# Patient Record
Sex: FEMALE | Race: WHITE | URBAN - METROPOLITAN AREA
[De-identification: names, ages, dates, MRNs, and addresses within clinical notes are randomized per-mention and may not be internally consistent; named-entity substitution may affect disease eponyms.]

---

## 2017-04-16 ENCOUNTER — EMERGENCY (EMERGENCY)
Facility: HOSPITAL | Age: 77
LOS: 0 days | Discharge: HOME | End: 2017-04-16
Admitting: INTERNAL MEDICINE

## 2017-06-14 ENCOUNTER — APPOINTMENT (OUTPATIENT)
Dept: SURGERY | Facility: CLINIC | Age: 77
End: 2017-06-14

## 2017-06-14 VITALS
DIASTOLIC BLOOD PRESSURE: 74 MMHG | SYSTOLIC BLOOD PRESSURE: 148 MMHG | HEIGHT: 62 IN | BODY MASS INDEX: 37.54 KG/M2 | WEIGHT: 204 LBS

## 2017-06-14 DIAGNOSIS — D17.30 BENIGN LIPOMATOUS NEOPLASM OF SKIN AND SUBCUTANEOUS TISSUE OF UNSPECIFIED SITES: ICD-10-CM

## 2017-06-27 DIAGNOSIS — M79.672 PAIN IN LEFT FOOT: ICD-10-CM

## 2017-06-27 DIAGNOSIS — Z79.899 OTHER LONG TERM (CURRENT) DRUG THERAPY: ICD-10-CM

## 2017-06-27 DIAGNOSIS — E78.00 PURE HYPERCHOLESTEROLEMIA, UNSPECIFIED: ICD-10-CM

## 2017-06-27 DIAGNOSIS — I10 ESSENTIAL (PRIMARY) HYPERTENSION: ICD-10-CM

## 2017-06-27 DIAGNOSIS — M10.9 GOUT, UNSPECIFIED: ICD-10-CM

## 2017-06-28 ENCOUNTER — OUTPATIENT (OUTPATIENT)
Dept: OUTPATIENT SERVICES | Facility: HOSPITAL | Age: 77
LOS: 1 days | Discharge: HOME | End: 2017-06-28

## 2017-06-28 DIAGNOSIS — I10 ESSENTIAL (PRIMARY) HYPERTENSION: ICD-10-CM

## 2017-06-28 DIAGNOSIS — I48.91 UNSPECIFIED ATRIAL FIBRILLATION: ICD-10-CM

## 2017-06-28 DIAGNOSIS — R92.8 OTHER ABNORMAL AND INCONCLUSIVE FINDINGS ON DIAGNOSTIC IMAGING OF BREAST: ICD-10-CM

## 2017-06-28 DIAGNOSIS — E78.5 HYPERLIPIDEMIA, UNSPECIFIED: ICD-10-CM

## 2017-08-28 ENCOUNTER — INPATIENT (INPATIENT)
Facility: HOSPITAL | Age: 77
LOS: 0 days | Discharge: HOME | End: 2017-08-29
Attending: EMERGENCY MEDICINE | Admitting: INTERNAL MEDICINE

## 2017-08-28 DIAGNOSIS — I48.91 UNSPECIFIED ATRIAL FIBRILLATION: ICD-10-CM

## 2017-08-28 DIAGNOSIS — I10 ESSENTIAL (PRIMARY) HYPERTENSION: ICD-10-CM

## 2017-08-28 DIAGNOSIS — R07.89 OTHER CHEST PAIN: ICD-10-CM

## 2017-08-28 DIAGNOSIS — R60.0 LOCALIZED EDEMA: ICD-10-CM

## 2017-08-28 DIAGNOSIS — E78.5 HYPERLIPIDEMIA, UNSPECIFIED: ICD-10-CM

## 2017-08-28 DIAGNOSIS — R07.9 CHEST PAIN, UNSPECIFIED: ICD-10-CM

## 2018-01-02 ENCOUNTER — INPATIENT (INPATIENT)
Facility: HOSPITAL | Age: 78
LOS: 4 days | Discharge: HOME | End: 2018-01-07
Attending: INTERNAL MEDICINE

## 2018-01-02 DIAGNOSIS — I10 ESSENTIAL (PRIMARY) HYPERTENSION: ICD-10-CM

## 2018-01-02 DIAGNOSIS — E78.5 HYPERLIPIDEMIA, UNSPECIFIED: ICD-10-CM

## 2018-01-02 DIAGNOSIS — I48.91 UNSPECIFIED ATRIAL FIBRILLATION: ICD-10-CM

## 2018-01-12 DIAGNOSIS — I48.91 UNSPECIFIED ATRIAL FIBRILLATION: ICD-10-CM

## 2018-01-12 DIAGNOSIS — E89.0 POSTPROCEDURAL HYPOTHYROIDISM: ICD-10-CM

## 2018-01-12 DIAGNOSIS — Z85.850 PERSONAL HISTORY OF MALIGNANT NEOPLASM OF THYROID: ICD-10-CM

## 2018-01-12 DIAGNOSIS — N17.9 ACUTE KIDNEY FAILURE, UNSPECIFIED: ICD-10-CM

## 2018-01-12 DIAGNOSIS — E78.5 HYPERLIPIDEMIA, UNSPECIFIED: ICD-10-CM

## 2018-01-12 DIAGNOSIS — I50.9 HEART FAILURE, UNSPECIFIED: ICD-10-CM

## 2018-01-12 DIAGNOSIS — N18.3 CHRONIC KIDNEY DISEASE, STAGE 3 (MODERATE): ICD-10-CM

## 2018-01-12 DIAGNOSIS — I13.0 HYPERTENSIVE HEART AND CHRONIC KIDNEY DISEASE WITH HEART FAILURE AND STAGE 1 THROUGH STAGE 4 CHRONIC KIDNEY DISEASE, OR UNSPECIFIED CHRONIC KIDNEY DISEASE: ICD-10-CM

## 2018-01-12 DIAGNOSIS — M10.9 GOUT, UNSPECIFIED: ICD-10-CM

## 2018-01-12 DIAGNOSIS — D64.9 ANEMIA, UNSPECIFIED: ICD-10-CM

## 2018-01-17 DIAGNOSIS — I50.31 ACUTE DIASTOLIC (CONGESTIVE) HEART FAILURE: ICD-10-CM

## 2018-07-03 ENCOUNTER — OUTPATIENT (OUTPATIENT)
Dept: OUTPATIENT SERVICES | Facility: HOSPITAL | Age: 78
LOS: 1 days | Discharge: HOME | End: 2018-07-03

## 2018-07-03 DIAGNOSIS — N63.20 UNSPECIFIED LUMP IN THE LEFT BREAST, UNSPECIFIED QUADRANT: ICD-10-CM

## 2018-07-09 DIAGNOSIS — N63.21 UNSPECIFIED LUMP IN THE LEFT BREAST, UPPER OUTER QUADRANT: ICD-10-CM

## 2018-07-11 ENCOUNTER — APPOINTMENT (OUTPATIENT)
Dept: SURGERY | Facility: CLINIC | Age: 78
End: 2018-07-11
Payer: MEDICARE

## 2018-07-11 VITALS
WEIGHT: 214 LBS | HEIGHT: 62 IN | SYSTOLIC BLOOD PRESSURE: 120 MMHG | DIASTOLIC BLOOD PRESSURE: 78 MMHG | BODY MASS INDEX: 39.38 KG/M2

## 2018-07-11 PROCEDURE — 99212 OFFICE O/P EST SF 10 MIN: CPT

## 2018-12-21 ENCOUNTER — INPATIENT (INPATIENT)
Facility: HOSPITAL | Age: 78
LOS: 1 days | Discharge: HOME | End: 2018-12-23
Attending: HOSPITALIST | Admitting: HOSPITALIST
Payer: MEDICARE

## 2018-12-21 VITALS
TEMPERATURE: 98 F | DIASTOLIC BLOOD PRESSURE: 69 MMHG | OXYGEN SATURATION: 95 % | SYSTOLIC BLOOD PRESSURE: 131 MMHG | RESPIRATION RATE: 20 BRPM | HEART RATE: 65 BPM

## 2018-12-21 DIAGNOSIS — Z98.890 OTHER SPECIFIED POSTPROCEDURAL STATES: Chronic | ICD-10-CM

## 2018-12-21 LAB
ALBUMIN SERPL ELPH-MCNC: 3.9 G/DL — SIGNIFICANT CHANGE UP (ref 3.5–5.2)
ALP SERPL-CCNC: 86 U/L — SIGNIFICANT CHANGE UP (ref 30–115)
ALT FLD-CCNC: 8 U/L — SIGNIFICANT CHANGE UP (ref 0–41)
ANION GAP SERPL CALC-SCNC: 15 MMOL/L — HIGH (ref 7–14)
APPEARANCE UR: CLEAR — SIGNIFICANT CHANGE UP
APTT BLD: 34.3 SEC — SIGNIFICANT CHANGE UP (ref 27–39.2)
AST SERPL-CCNC: 10 U/L — SIGNIFICANT CHANGE UP (ref 0–41)
BASOPHILS # BLD AUTO: 0.03 K/UL — SIGNIFICANT CHANGE UP (ref 0–0.2)
BASOPHILS NFR BLD AUTO: 0.2 % — SIGNIFICANT CHANGE UP (ref 0–1)
BILIRUB SERPL-MCNC: 0.4 MG/DL — SIGNIFICANT CHANGE UP (ref 0.2–1.2)
BILIRUB UR-MCNC: NEGATIVE — SIGNIFICANT CHANGE UP
BLD GP AB SCN SERPL QL: SIGNIFICANT CHANGE UP
BUN SERPL-MCNC: 34 MG/DL — HIGH (ref 10–20)
CALCIUM SERPL-MCNC: 8.8 MG/DL — SIGNIFICANT CHANGE UP (ref 8.5–10.1)
CHLORIDE SERPL-SCNC: 100 MMOL/L — SIGNIFICANT CHANGE UP (ref 98–110)
CO2 SERPL-SCNC: 27 MMOL/L — SIGNIFICANT CHANGE UP (ref 17–32)
COLOR SPEC: YELLOW — SIGNIFICANT CHANGE UP
CREAT SERPL-MCNC: 1.2 MG/DL — SIGNIFICANT CHANGE UP (ref 0.7–1.5)
DIFF PNL FLD: NEGATIVE — SIGNIFICANT CHANGE UP
EOSINOPHIL # BLD AUTO: 0.02 K/UL — SIGNIFICANT CHANGE UP (ref 0–0.7)
EOSINOPHIL NFR BLD AUTO: 0.1 % — SIGNIFICANT CHANGE UP (ref 0–8)
ETHANOL SERPL-MCNC: <10 MG/DL — HIGH
GLUCOSE SERPL-MCNC: 143 MG/DL — HIGH (ref 70–99)
GLUCOSE UR QL: NEGATIVE MG/DL — SIGNIFICANT CHANGE UP
HCT VFR BLD CALC: 36.7 % — LOW (ref 37–47)
HGB BLD-MCNC: 11.3 G/DL — LOW (ref 12–16)
IMM GRANULOCYTES NFR BLD AUTO: 1.5 % — HIGH (ref 0.1–0.3)
INR BLD: 1.41 RATIO — HIGH (ref 0.65–1.3)
KETONES UR-MCNC: NEGATIVE — SIGNIFICANT CHANGE UP
LACTATE SERPL-SCNC: 1.2 MMOL/L — SIGNIFICANT CHANGE UP (ref 0.5–2.2)
LEUKOCYTE ESTERASE UR-ACNC: NEGATIVE — SIGNIFICANT CHANGE UP
LIDOCAIN IGE QN: 25 U/L — SIGNIFICANT CHANGE UP (ref 7–60)
LYMPHOCYTES # BLD AUTO: 1.37 K/UL — SIGNIFICANT CHANGE UP (ref 1.2–3.4)
LYMPHOCYTES # BLD AUTO: 9.1 % — LOW (ref 20.5–51.1)
MCHC RBC-ENTMCNC: 25 PG — LOW (ref 27–31)
MCHC RBC-ENTMCNC: 30.8 G/DL — LOW (ref 32–37)
MCV RBC AUTO: 81.2 FL — SIGNIFICANT CHANGE UP (ref 81–99)
MONOCYTES # BLD AUTO: 0.46 K/UL — SIGNIFICANT CHANGE UP (ref 0.1–0.6)
MONOCYTES NFR BLD AUTO: 3.1 % — SIGNIFICANT CHANGE UP (ref 1.7–9.3)
NEUTROPHILS # BLD AUTO: 12.9 K/UL — HIGH (ref 1.4–6.5)
NEUTROPHILS NFR BLD AUTO: 86 % — HIGH (ref 42.2–75.2)
NITRITE UR-MCNC: NEGATIVE — SIGNIFICANT CHANGE UP
NRBC # BLD: 0 /100 WBCS — SIGNIFICANT CHANGE UP (ref 0–0)
PH UR: 7 — SIGNIFICANT CHANGE UP (ref 5–8)
PLATELET # BLD AUTO: 310 K/UL — SIGNIFICANT CHANGE UP (ref 130–400)
POTASSIUM SERPL-MCNC: 4.4 MMOL/L — SIGNIFICANT CHANGE UP (ref 3.5–5)
POTASSIUM SERPL-SCNC: 4.4 MMOL/L — SIGNIFICANT CHANGE UP (ref 3.5–5)
PROT SERPL-MCNC: 6.7 G/DL — SIGNIFICANT CHANGE UP (ref 6–8)
PROT UR-MCNC: NEGATIVE MG/DL — SIGNIFICANT CHANGE UP
PROTHROM AB SERPL-ACNC: 16.2 SEC — HIGH (ref 9.95–12.87)
RBC # BLD: 4.52 M/UL — SIGNIFICANT CHANGE UP (ref 4.2–5.4)
RBC # FLD: 17.3 % — HIGH (ref 11.5–14.5)
SODIUM SERPL-SCNC: 142 MMOL/L — SIGNIFICANT CHANGE UP (ref 135–146)
SP GR SPEC: 1.01 — SIGNIFICANT CHANGE UP (ref 1.01–1.03)
TYPE + AB SCN PNL BLD: SIGNIFICANT CHANGE UP
UROBILINOGEN FLD QL: 0.2 MG/DL — SIGNIFICANT CHANGE UP (ref 0.2–0.2)
WBC # BLD: 15 K/UL — HIGH (ref 4.8–10.8)
WBC # FLD AUTO: 15 K/UL — HIGH (ref 4.8–10.8)

## 2018-12-21 PROCEDURE — 93010 ELECTROCARDIOGRAM REPORT: CPT

## 2018-12-21 RX ORDER — LISINOPRIL 2.5 MG/1
40 TABLET ORAL DAILY
Qty: 0 | Refills: 0 | Status: DISCONTINUED | OUTPATIENT
Start: 2018-12-21 | End: 2018-12-23

## 2018-12-21 RX ORDER — TRAMADOL HYDROCHLORIDE 50 MG/1
50 TABLET ORAL
Qty: 0 | Refills: 0 | Status: DISCONTINUED | OUTPATIENT
Start: 2018-12-21 | End: 2018-12-23

## 2018-12-21 RX ORDER — RIVAROXABAN 15 MG-20MG
20 KIT ORAL EVERY 24 HOURS
Qty: 0 | Refills: 0 | Status: DISCONTINUED | OUTPATIENT
Start: 2018-12-21 | End: 2018-12-23

## 2018-12-21 RX ORDER — CABERGOLINE 0.5 MG/1
1 TABLET ORAL
Qty: 0 | Refills: 0 | COMMUNITY

## 2018-12-21 RX ORDER — TRIAMTERENE/HYDROCHLOROTHIAZID 75 MG-50MG
1 TABLET ORAL DAILY
Qty: 0 | Refills: 0 | Status: DISCONTINUED | OUTPATIENT
Start: 2018-12-21 | End: 2018-12-23

## 2018-12-21 RX ORDER — LEVOTHYROXINE SODIUM 125 MCG
175 TABLET ORAL DAILY
Qty: 0 | Refills: 0 | Status: DISCONTINUED | OUTPATIENT
Start: 2018-12-21 | End: 2018-12-23

## 2018-12-21 RX ORDER — AMLODIPINE BESYLATE 2.5 MG/1
5 TABLET ORAL DAILY
Qty: 0 | Refills: 0 | Status: DISCONTINUED | OUTPATIENT
Start: 2018-12-21 | End: 2018-12-23

## 2018-12-21 RX ORDER — CITALOPRAM 10 MG/1
20 TABLET, FILM COATED ORAL DAILY
Qty: 0 | Refills: 0 | Status: DISCONTINUED | OUTPATIENT
Start: 2018-12-21 | End: 2018-12-23

## 2018-12-21 RX ORDER — METOPROLOL TARTRATE 50 MG
50 TABLET ORAL
Qty: 0 | Refills: 0 | Status: DISCONTINUED | OUTPATIENT
Start: 2018-12-21 | End: 2018-12-23

## 2018-12-21 RX ORDER — ACETAMINOPHEN 500 MG
650 TABLET ORAL EVERY 6 HOURS
Qty: 0 | Refills: 0 | Status: DISCONTINUED | OUTPATIENT
Start: 2018-12-21 | End: 2018-12-23

## 2018-12-21 RX ORDER — ATORVASTATIN CALCIUM 80 MG/1
20 TABLET, FILM COATED ORAL AT BEDTIME
Qty: 0 | Refills: 0 | Status: DISCONTINUED | OUTPATIENT
Start: 2018-12-21 | End: 2018-12-23

## 2018-12-21 RX ADMIN — ATORVASTATIN CALCIUM 20 MILLIGRAM(S): 80 TABLET, FILM COATED ORAL at 21:27

## 2018-12-21 RX ADMIN — RIVAROXABAN 20 MILLIGRAM(S): KIT at 20:32

## 2018-12-21 RX ADMIN — Medication 50 MILLIGRAM(S): at 20:32

## 2018-12-21 NOTE — H&P ADULT - PMH
Afib    High cholesterol    HTN (hypertension)    Hypothyroid    Osteoarthritis    Sleep apnea Afib  on xarelto  High cholesterol    HTN (hypertension)    Hypothyroid  s/p thyroid ca surgery  Osteoarthritis    Sleep apnea Afib  on xarelto  High cholesterol    HTN (hypertension)    Hypothyroid  s/p thyroid ca surgery  Osteoarthritis    Pituitary adenoma  ??  Sleep apnea Afib  on xarelto  CKD (chronic kidney disease) stage 3, GFR 30-59 ml/min    High cholesterol    HTN (hypertension)    Hypothyroid  s/p thyroid ca surgery  Osteoarthritis    Pituitary adenoma  ??  Sleep apnea

## 2018-12-21 NOTE — ED PROVIDER NOTE - NS ED ROS FT
Eyes:  No visual changes, eye pain or discharge.  Constitutional: No fever, chills, weakness.   ENMT:  No hearing changes, pain, no sore throat or runny nose, no difficulty swallowing  Cardiac:  No chest pain, SOB or edema. No chest pain with exertion.  Respiratory:  No cough or respiratory distress. No hemoptysis. No history of asthma or RAD.  GI:  No nausea, vomiting, diarrhea or abdominal pain.  :  No dysuria, frequency or burning.  MS:  Right knee, upper leg pain. No hip pain. No back pain.  Neuro:  No headache or weakness.  No LOC.  Skin:  No skin rash.   Endocrine: No history of thyroid disease or diabetes.

## 2018-12-21 NOTE — H&P ADULT - NSHPPHYSICALEXAM_GEN_ALL_CORE
GENERAL: NAD, speaks in full sentences, no signs of respiratory distress  HEAD:  Atraumatic, Normocephalic  EYES: EOMI, PERRLA, conjunctiva and sclera clear  NECK: Supple, No JVD  CHEST/LUNG: Clear to auscultation bilaterally; No wheeze; No crackles; No accessory muscles used  HEART: Regular rate and rhythm; No murmurs;   ABDOMEN: Soft, Nontender, Nondistended; Bowel sounds present; No guarding  EXTREMITIES:  2+ Peripheral Pulses, No cyanosis or edema  PSYCH: AAOx3  NEUROLOGY: non-focal  SKIN: No rashes or lesions    T(C): 36.3 (21 Dec 2018 16:36), Max: 36.8 (21 Dec 2018 12:30)  T(F): 97.4 (21 Dec 2018 16:36), Max: 98.3 (21 Dec 2018 12:30)  HR: 60 (21 Dec 2018 16:36) (60 - 65)  BP: 152/70 (21 Dec 2018 16:36) (131/69 - 152/70)  BP(mean): --  RR: 20 (21 Dec 2018 16:36) (20 - 20)  SpO2: 95% (21 Dec 2018 16:36) (95% - 95%)

## 2018-12-21 NOTE — ED PROVIDER NOTE - PHYSICAL EXAMINATION
Constitutional: Well developed, well nourished. NAD. Good general hygiene  Head: Atraumatic.  Eyes: PERRLA. EOMI without discomfort.   ENT: No nasal discharge. Mucous membranes moist.  Neck: Supple. Painless ROM.  Cardiovascular: Regular rhythm. Regular rate. Normal S1 and S2. No murmurs. 2+ pulses in all extremities.   Pulmonary: Normal respiratory rate and effort. Lungs clear to auscultation bilaterally. No wheezing, rales, or rhonchi. Bilateral, equal lung expansion.   Abdominal: Soft. Nondistended. Nontender. No rebound or guarding.   MS: Unable to ambulate. Significant tenderness in right knee, posterior thigh. No hip tenderness.  Skin: No rashes.   Neuro: AAOx3. No focal neurological deficits.  Psych: Normal mood. Normal affect.

## 2018-12-21 NOTE — H&P ADULT - ASSESSMENT
# Fall  # inability to ambulate    # AFib on xarelto    # HTN    # ckd 3  - avoid nephrotoxics  - f/u bmp    # DVT ppx  - on xarelto    # Dispo # Fall  # Osteoarthritis  # inability to ambulate  - pt/rehab cs  - pain control    # AFib on xarelto  - c/w xarelto  - c/w BB  - Fall precautions on AC    # HTN  - stable  - c/w home meds    # Hypothyroidism s/p thyroidectomy   - stable  - c/w home meds    # ckd 3  - avoid nephrotoxics  - f/u bmp    # DVT ppx  - on xarelto    # Dispo  - pt/rehab cs # Fall  # Osteoarthritis  # inability to ambulate  - pt/rehab cs  - pain control    # AFib on xarelto  - c/w xarelto  - c/w BB  - Fall precautions on AC    # HTN  - stable  - c/w home meds    # Hypothyroidism s/p thyroidectomy   - stable  - c/w home meds    # ckd 3  - avoid nephrotoxics  - f/u bmp    # DVT ppx  - on xarelto    # Dispo  - pt/rehab cs  - PMD is dr gaines- Dr quintero might cover the might?? # Mechanical Fall  # Osteoarthritis  # inability to ambulate  - pt/rehab cs  - pain control    # AFib on xarelto  - c/w xarelto  - c/w BB  - Fall precautions on AC    # HTN  - stable  - c/w home meds    # Hypothyroidism s/p thyroidectomy   - stable  - c/w home meds    # ckd 3  - avoid nephrotoxics  - f/u bmp    # DVT ppx  - on xarelto    # Dispo  - pt/rehab cs; will need physical therapy   - PMD is dr gaines- Dr quintero might cover the might?? 78 yof with pmh of pituitary growth, thyroid ca s/p thyroidectomy, hypothyroidism, htn, afib on xarelto, dld, OA presents s/p Mechanical fall    # Mechanical Fall  # Osteoarthritis  # inability to ambulate  - pt/rehab cs  - pain control    # AFib on xarelto  - c/w xarelto  - c/w BB  - Fall precautions on AC    # HTN  - stable  - c/w home meds    # Hypothyroidism s/p thyroidectomy   - stable  - c/w home meds    # pituitary enlargement/ pituitary adenoma??  - on cabergoline weekly;     # ckd 3  - avoid nephrotoxics  - f/u bmp    # DVT ppx  - on xarelto    # Dispo  - pt/rehab cs; will need physical therapy   - PMD is dr gaines- Dr quintero might cover the might?? 78 yof with pmh of pituitary growth, thyroid ca s/p thyroidectomy, hypothyroidism, htn, afib on xarelto, dld, OA presents s/p Mechanical fall    # Mechanical Fall  # Osteoarthritis  # inability to ambulate  # Leukocytosis 2/2 fall- reactive  - pt/rehab cs  - pain control  - U/A; CXR- negative    # AFib on xarelto  - c/w xarelto  - c/w BB  - Fall precautions on AC    # HTN  - stable  - c/w home meds    # Hypothyroidism s/p thyroidectomy   - stable  - c/w home meds    # pituitary enlargement/ pituitary adenoma??  - on cabergoline weekly;     # ckd 3  - avoid nephrotoxics  - f/u bmp    # DVT ppx  - on xarelto    # Dispo  - pt/rehab cs; will need physical therapy   - PMD is dr gaines- Dr quintero might cover the might??

## 2018-12-21 NOTE — H&P ADULT - HISTORY OF PRESENT ILLNESS
78 yof with pmh of pituitary growth, thyroid ca s/p thyroidectomy, hypothyroidism, htn, afib on xarelto, dld, OA presents s/p Mechanical fall  - pt dint see a step, she tripped and fell on her knees and face 1 day ptp; she hurt her face and knees when she fell; she had mild nose bleed when she fell which resolved on its own  - she was able to walk yesterday but in pain; today she woke up and she was unable to walk by herself 2/2 pain in her b/l Knees  - pt is from home, lives alone; independent of ADLs;   - pt denies dizziness, syncope, lightheadedness, palpitations, sob, chest pain;

## 2018-12-21 NOTE — ED ADULT NURSE NOTE - NSIMPLEMENTINTERV_GEN_ALL_ED
Implemented All Fall with Harm Risk Interventions:  [place] to call system. Call bell, personal items and telephone within reach. Instruct patient to call for assistance. Room bathroom lighting operational. Non-slip footwear when patient is off stretcher. Physically safe environment: no spills, clutter or unnecessary equipment. Stretcher in lowest position, wheels locked, appropriate side rails in place. Provide visual cue, wrist band, yellow gown, etc. Monitor gait and stability. Monitor for mental status changes and reorient to person, place, and time. Review medications for side effects contributing to fall risk. Reinforce activity limits and safety measures with patient and family. Provide visual clues: red socks.

## 2018-12-21 NOTE — ED ADULT TRIAGE NOTE - CHIEF COMPLAINT QUOTE
Pt tripped and fell yesterday, fell face down on floor, on Xarelto, no LOC, today pt c/o headache, nose pain, R wrist and R knee pain. Pt aox3, GCS 15.

## 2018-12-21 NOTE — H&P ADULT - NSHPLABSRESULTS_GEN_ALL_CORE
11.3   15.00 )-----------( 310      ( 21 Dec 2018 13:12 )             36.7     142  |  100  |  34<H>  ----------------------------<  143<H>  4.4   |  27  |  1.2    Ca    8.8      21 Dec 2018 13:12    TPro  6.7  /  Alb  3.9  /  TBili  0.4  /  DBili  x   /  AST  10  /  ALT  8   /  AlkPhos  86      Color: Yellow / Appearance: Clear / S.015 / pH: x  Gluc: x / Ketone: Negative  / Bili: Negative / Urobili: 0.2 mg/dL   Blood: x / Protein: Negative mg/dL / Nitrite: Negative   Leuk Esterase: Negative / RBC: x / WBC x   Sq Epi: x / Non Sq Epi: x / Bacteria: x    PT/INR - ( 21 Dec 2018 13:12 )   PT: 16.20 sec;   INR: 1.41 ratio      PTT - ( 21 Dec 2018 13:12 )  PTT:34.3 sec    Lactate Trend   @ 13:12 Lactate:1.2     < from: 12 Lead ECG (18 @ 13:41) >    Normal sinus rhythm  Left anterior fascicular block  Abnormal ECG    < end of copied text >    < from: Xray Knee 4 Views, Right (18 @ 14:41) >    No evidence of acute fracture.  Meniscal chondrocalcinosis.  Synovial osteochondromatosis.    < end of copied text >    < from: CT Head No Cont (18 @ 16:07) >    No CT evidence of acute intracranial pathology.    < end of copied text >    < from: CT Lower Extremity No Cont, Right (18 @ 16:16) >    1. Osteopenia without acutely displaced fracture  2. Moderate right hip osteoarthritis with labral chondrocalcinosis and   joint body  3. Tricompartmental knee osteoarthritis with suprapatellar joint bodies   and moderate volume joint effusion

## 2018-12-21 NOTE — CONSULT NOTE ADULT - SUBJECTIVE AND OBJECTIVE BOX
TRAUMA ACTIVATION LEVEL:  Consult    MECHANISM OF INJURY: Fall    GCS: 15 	E: 4	V: 5	M: 6      HPI: 78 year old female on xarelto for a. fib s/p mechanical fall yesterday on to her knees, witnesses -LOC -HT, c/o pain over nasal bridge and b/l knee pain. Has been ambulating since yesterday but complaining of pain.      PAST MEDICAL & SURGICAL HISTORY:  Sleep apnea  Afib  Hypothyroid  High cholesterol  HTN (hypertension)  No significant past surgical history      Allergies  No Known Allergies      Home Medications:  amLODIPine 5 mg oral tablet: 1 tab(s) orally once a day (21 Dec 2018 13:28)  atorvastatin 20 mg oral tablet: 1 tab(s) orally once a day (21 Dec 2018 13:28)  benazepril: 50 milligram(s) orally 2 times a day (21 Dec 2018 13:28)  cabergoline 0.5 mg oral tablet: 1 tab(s) orally (21 Dec 2018 13:28)  citalopram 20 mg oral tablet: 1 tab(s) orally once a day (21 Dec 2018 13:28)  levothyroxine 175 mcg (0.175 mg) oral tablet: 1 tab(s) orally once a day (21 Dec 2018 13:28)  metoprolol tartrate 50 mg oral tablet: 1 tab(s) orally 2 times a day (21 Dec 2018 13:28)  Toviaz 4 mg oral tablet, extended release: 1 tab(s) orally once a day (21 Dec 2018 13:28)  Xarelto 20 mg oral tablet: 1 tab(s) orally once a day (in the evening) (21 Dec 2018 13:28)      ROS: 10-system review is otherwise negative except HPI above.      Primary Survey:    A - airway intact  B - bilateral breath sounds and good chest rise  C - palpable pulses in all extremities  D - GCS 15 on arrival, MONTIEL  Exposure obtained    Vital Signs Last 24 Hrs  T(F): 97.4 (21 Dec 2018 16:36), Max: 98.3 (21 Dec 2018 12:30)  HR: 60 (21 Dec 2018 16:36) (60 - 65)  BP: 152/70 (21 Dec 2018 16:36) (131/69 - 152/70)  RR: 20 (21 Dec 2018 16:36) (20 - 20)  SpO2: 95% (21 Dec 2018 16:36) (95% - 95%)    Secondary Survey:   General: NAD  HEENT: Normocephalic, atraumatic, EOMI, PEERLA. no scalp lacerations   Neck: Soft, midline trachea. no cspine tenderness  Chest: No chest wall tenderness. or subq  emphysema   Cardiac: S1, S2, RRR  Respiratory: Bilateral breath sounds, clear and equal bilaterally  Abdomen: Soft, non-distended, non-tender, no rebound,   Groin: Normal appearing, pelvis stable   Ext: palp radial b/l UE, b/l DP palp in Lower Extrem.   Back: no TTP, no palpable runoff/stepoff/deformity        LABS:                          11.3   15.00 )-----------( 310      ( 21 Dec 2018 13:12 )             36.7       Auto Neutrophil %: 86.0 % (18 @ 13:12)  Auto Immature Granulocyte %: 1.5 % (18 @ 13:12)        142  |  100  |  34<H>  ----------------------------<  143<H>  4.4   |  27  |  1.2      Calcium, Total Serum: 8.8 mg/dL (18 @ 13:12)      LFTs:             6.7  | 0.4  | 10       ------------------[86      ( 21 Dec 2018 13:12 )  3.9  | x    | 8           Lipase:25        Lactate, Blood: 1.2 mmol/L (18 @ 13:12)    Coags:     16.20  ----< 1.41    ( 21 Dec 2018 13:12 )     34.3            Urinalysis Basic - ( 21 Dec 2018 13:12 )    Color: Yellow / Appearance: Clear / S.015 / pH: x  Gluc: x / Ketone: Negative  / Bili: Negative / Urobili: 0.2 mg/dL   Blood: x / Protein: Negative mg/dL / Nitrite: Negative   Leuk Esterase: Negative / RBC: x / WBC x   Sq Epi: x / Non Sq Epi: x / Bacteria: x    Alcohol, Blood: <10 mg/dL (18 @ 13:12)      RADIOLOGY & ADDITIONAL STUDIES:    < from: Xray Chest 1 View AP/PA (.18 @ 14:39) >  Impression:    No radiographic evidence of acute pulmonary disease.  < end of copied text >    < from: Xray Hip 2-3 Views, Right (12..18 @ 14:39) >  Impression:  No evidence of acute fracture.  < end of copied text >    < from: Xray Knee 4 Views, Right (12..18 @ 14:41) >  Impression:  No evidence of acute fracture.  Meniscal chondrocalcinosis.  Synovial osteochondromatosis.  < end of copied text >    < from: CT Cervical Spine No Cont (12..18 @ 16:07) >  IMPRESSION:  1.  No evidence of acute cervical spine fracture or subluxation.  2.  Overall mild degenerative changes as described.  3.  Multiple lucent vertebral body lesions are nonspecific and may   reflect hemangiomas. If there is clinicalconcern consider outpatient MRI   or bone scan.  < end of copied text >    < from: CT Head No Cont (..18 @ 16:07) >  IMPRESSION:  No CT evidence of acute intracranial pathology.  < end of copied text >    < from: CT Maxillofacial No Cont (..18 @ 16:08) >  IMPRESSION:  No evidence of acute facial fracture.  < end of copied text >    < from: CT Lower Extremity No Cont, Right (..18 @ 16:16) >  Impression:  1. Osteopenia without acutely displaced fracture  2. Moderate right hip osteoarthritis with labral chondrocalcinosis and   joint body  3. Tricompartmental knee osteoarthritis with suprapatellar joint bodies   and moderate volume joint effusion  < end of copied text >      ---------------------------------------------------------------------------------------    ASSESSMENT:  78y old f s/p fall, no injuries being admitted to medicine for pain control, physical therapy and social work

## 2018-12-21 NOTE — ED PROVIDER NOTE - OBJECTIVE STATEMENT
77 y/o female h/o afib on xarelto, HLD p/w fall. Fell yesterday, mechanical, down one step. Broke fall with both knees, hit head on table. No LOC, no vomiting. Able to ambulate, however with severe pain in right knee and right upper leg. Denies numbness/parasthesias, confusion.

## 2018-12-21 NOTE — ED PROVIDER NOTE - MEDICAL DECISION MAKING DETAILS
79 Y/O F ON XARELTO S/P FALL YESTERDAY. ALL DIAGNOSTIC TESTING REVIEWED. NO ACUTE TRAUMATIC INJURY. PT UNABLE TO AMBULATE DUE TO KNEE PAIN, WILL ADMIT FOR PT/REHAB EVAL.

## 2018-12-21 NOTE — ED PROVIDER NOTE - PROGRESS NOTE DETAILS
CT, labs, XR I personally evaluated the patient. I reviewed the Resident’s or Physician Assistant’s note (as assigned above), and agree with the findings and plan except as documented in my note.   79 Y/O F DLD, AFIB ON XARELTO, S/P FALL YESTERDAY MORNING. PT TRIPPED AND FELL FORWARD, FALLING ONTO B/L KNEES AND THEN HIT HER FACE ON A TABLE. + EPISTAXIS AT THE TIME. + NOSE PAIN. NO LOC. NO HA, N/V. NO NECK PAIN. NO BACK PAIN. NO CP, SOB. NO ABD PAIN. VITALS NOTED. ALERT OX3 NAD GCS-15. NCAT. PERRL, EOMI. + TENDERNESS BRIDGE OF NOSE. NO DEFORMITY. OP NORMAL. MMM. NO MIDLINE C SPINE TENDERNESS. LUNGS CLEAR B/L. CHEST NONTENDER, NO CREPITUS. RRR. ABD- SOFT NONTENDER. PELVIS STABLE NONTENDER. BACK NONTENDER. NO SPINE TENDERNESS. NEURO EXAM NONFOCAL. L KNEE WITH TENDERNESS. NO EFFUSION. + FROM. L HIP NONTENDER, FROM. L ANKLE NONTENDER, FROM. 2+ PEDAL PULSES B/L. TRAUMA CONSULTED. Admit for PT rehab. Spoke to RAPHAEL Mruphy. Spoke to PMD who stated to admit to hospitalist.

## 2018-12-21 NOTE — ED ADULT NURSE NOTE - OBJECTIVE STATEMENT
Pt s/p fall yesterday, (-)LOC. Pt stated pt fell face down on the floor, now c/o headache, R wrist and R knee pain. Pt on xarelto for afib. Pt A&ox4 denies any other symptoms

## 2018-12-22 PROBLEM — E03.9 HYPOTHYROIDISM, UNSPECIFIED: Chronic | Status: ACTIVE | Noted: 2018-12-21

## 2018-12-22 PROBLEM — I48.91 UNSPECIFIED ATRIAL FIBRILLATION: Chronic | Status: ACTIVE | Noted: 2018-12-21

## 2018-12-22 LAB
ANION GAP SERPL CALC-SCNC: 17 MMOL/L — HIGH (ref 7–14)
BUN SERPL-MCNC: 29 MG/DL — HIGH (ref 10–20)
CALCIUM SERPL-MCNC: 8.8 MG/DL — SIGNIFICANT CHANGE UP (ref 8.5–10.1)
CHLORIDE SERPL-SCNC: 97 MMOL/L — LOW (ref 98–110)
CO2 SERPL-SCNC: 27 MMOL/L — SIGNIFICANT CHANGE UP (ref 17–32)
CREAT SERPL-MCNC: 1.1 MG/DL — SIGNIFICANT CHANGE UP (ref 0.7–1.5)
GLUCOSE SERPL-MCNC: 104 MG/DL — HIGH (ref 70–99)
HCT VFR BLD CALC: 33.1 % — LOW (ref 37–47)
HGB BLD-MCNC: 10.4 G/DL — LOW (ref 12–16)
MAGNESIUM SERPL-MCNC: 2 MG/DL — SIGNIFICANT CHANGE UP (ref 1.8–2.4)
MCHC RBC-ENTMCNC: 25.4 PG — LOW (ref 27–31)
MCHC RBC-ENTMCNC: 31.4 G/DL — LOW (ref 32–37)
MCV RBC AUTO: 80.9 FL — LOW (ref 81–99)
NRBC # BLD: 0 /100 WBCS — SIGNIFICANT CHANGE UP (ref 0–0)
PLATELET # BLD AUTO: 291 K/UL — SIGNIFICANT CHANGE UP (ref 130–400)
POTASSIUM SERPL-MCNC: 3.7 MMOL/L — SIGNIFICANT CHANGE UP (ref 3.5–5)
POTASSIUM SERPL-SCNC: 3.7 MMOL/L — SIGNIFICANT CHANGE UP (ref 3.5–5)
RBC # BLD: 4.09 M/UL — LOW (ref 4.2–5.4)
RBC # FLD: 17.4 % — HIGH (ref 11.5–14.5)
SODIUM SERPL-SCNC: 141 MMOL/L — SIGNIFICANT CHANGE UP (ref 135–146)
TSH SERPL-MCNC: 0.48 UIU/ML — SIGNIFICANT CHANGE UP (ref 0.27–4.2)
WBC # BLD: 12.06 K/UL — HIGH (ref 4.8–10.8)
WBC # FLD AUTO: 12.06 K/UL — HIGH (ref 4.8–10.8)

## 2018-12-22 RX ORDER — LANOLIN ALCOHOL/MO/W.PET/CERES
5 CREAM (GRAM) TOPICAL AT BEDTIME
Qty: 0 | Refills: 0 | Status: DISCONTINUED | OUTPATIENT
Start: 2018-12-22 | End: 2018-12-23

## 2018-12-22 RX ADMIN — AMLODIPINE BESYLATE 5 MILLIGRAM(S): 2.5 TABLET ORAL at 06:02

## 2018-12-22 RX ADMIN — CITALOPRAM 20 MILLIGRAM(S): 10 TABLET, FILM COATED ORAL at 11:25

## 2018-12-22 RX ADMIN — Medication 1 TABLET(S): at 06:06

## 2018-12-22 RX ADMIN — LISINOPRIL 40 MILLIGRAM(S): 2.5 TABLET ORAL at 06:01

## 2018-12-22 RX ADMIN — ATORVASTATIN CALCIUM 20 MILLIGRAM(S): 80 TABLET, FILM COATED ORAL at 21:48

## 2018-12-22 RX ADMIN — Medication 650 MILLIGRAM(S): at 12:53

## 2018-12-22 RX ADMIN — RIVAROXABAN 20 MILLIGRAM(S): KIT at 17:18

## 2018-12-22 RX ADMIN — Medication 5 MILLIGRAM(S): at 22:55

## 2018-12-22 RX ADMIN — Medication 50 MILLIGRAM(S): at 17:18

## 2018-12-22 RX ADMIN — Medication 175 MICROGRAM(S): at 06:02

## 2018-12-22 RX ADMIN — Medication 650 MILLIGRAM(S): at 12:52

## 2018-12-22 NOTE — CONSULT NOTE ADULT - SUBJECTIVE AND OBJECTIVE BOX
SEEN AND EXAMINED  R knee pain, fall a few days ago  has been walking since just has some knee pain    PAST MEDICAL & SURGICAL HISTORY:  CKD (chronic kidney disease) stage 3, GFR 30-59 ml/min  Pituitary adenoma: ??  Osteoarthritis  Sleep apnea  Afib: on xarelto  Hypothyroid: s/p thyroid ca surgery  High cholesterol  HTN (hypertension)  H/O thyroidectomy    Home Medications:  amLODIPine 5 mg oral tablet: 1 tab(s) orally once a day (21 Dec 2018 13:28)  atorvastatin 20 mg oral tablet: 1 tab(s) orally once a day (21 Dec 2018 13:28)  benazepril: 50 milligram(s) orally 2 times a day (21 Dec 2018 13:28)  cabergoline 0.5 mg oral tablet: 0.5 tab(s) orally once a week (21 Dec 2018 17:34)  citalopram 20 mg oral tablet: 1 tab(s) orally once a day (21 Dec 2018 13:28)  levothyroxine 175 mcg (0.175 mg) oral tablet: 1 tab(s) orally once a day (21 Dec 2018 13:28)  metoprolol tartrate 50 mg oral tablet: 1 tab(s) orally 2 times a day (21 Dec 2018 13:28)  Toviaz 4 mg oral tablet, extended release: 1 tab(s) orally once a day (21 Dec 2018 13:28)  triamterene-hydrochlorothiazide 37.5 mg-25 mg oral tablet: 1 tab(s) orally once a day (21 Dec 2018 17:35)  Xarelto 20 mg oral tablet: 1 tab(s) orally once a day (in the evening) (21 Dec 2018 13:28)    Vital Signs Last 24 Hrs  T(C): 36.5 (22 Dec 2018 11:14), Max: 37.1 (21 Dec 2018 18:00)  T(F): 97.7 (22 Dec 2018 11:14), Max: 98.7 (21 Dec 2018 18:00)  HR: 54 (22 Dec 2018 11:14) (51 - 88)  BP: 133/76 (22 Dec 2018 11:14) (119/62 - 152/70)  BP(mean): --  RR: 18 (22 Dec 2018 11:14) (16 - 20)  SpO2: 95% (21 Dec 2018 16:36) (95% - 95%)                        10.4   12.06 )-----------( 291      ( 22 Dec 2018 04:30 )             33.1     PE  R hip no pain with movment  R knee  ROM mild pain  mild swelling  JLT  stable VVAP grossly  ehl fhl ok  wwp  comps soft  skin intact    xr/CT chrondrocalcinosis, OA, loose bodies. artifact at proximal tibia/fibula    A/P: as above in R knee  no indication for actue ortho interention  f/u clinic  WBAT, knee immobilzer for comfort  pain control  dvt ppx  med mgmt

## 2018-12-22 NOTE — PROGRESS NOTE ADULT - ASSESSMENT
78 yof with pmh of pituitary growth, thyroid ca s/p thyroidectomy, hypothyroidism, htn, afib on xarelto, dld, OA presents s/p Mechanical fall    R knee pain  -ortho consult appreciated  -pain control  -pt/rehab    AFib on xarelto  - c/w xarelto  - c/w BB  - Fall precautions on AC    HTN  - stable  - c/w home meds    Hypothyroidism s/p thyroidectomy   - stable  - c/w home meds    Pituitary enlargement/ pituitary adenoma  - on cabergoline weekly    Ckd 3  - avoid nephrotoxics  - f/u bmp    Patient may be discharged home once she can ambulate.

## 2018-12-22 NOTE — PROGRESS NOTE ADULT - SUBJECTIVE AND OBJECTIVE BOX
CHIEF COMPLAINT:    Patient is a 78y old  Female who presents with a chief complaint of fall.       INTERVAL HPI/OVERNIGHT EVENTS:    Patient seen and examined at bedside. No acute overnight events occurred.    ROS: Reports moderate knee pain. All other systems are negative.    Vital Signs:    T(F): 97.7 (12-22-18 @ 11:14), Max: 98.7 (12-21-18 @ 18:00)  HR: 54 (12-22-18 @ 11:14) (51 - 88)  BP: 133/76 (12-22-18 @ 11:14) (119/62 - 152/70)  RR: 18 (12-22-18 @ 11:14) (16 - 20)  SpO2: 95% (12-21-18 @ 16:36) (95% - 95%)  I&O's Summary    22 Dec 2018 07:01  -  22 Dec 2018 15:57  --------------------------------------------------------  IN: 480 mL / OUT: 300 mL / NET: 180 mL      PHYSICAL EXAM:    GENERAL:  NAD  SKIN: No rashes or lesions  HEENT: Atraumatic. Normocephalic. Anicteric  NECK:  No JVD.   PULMONARY: Clear to ausculation bilaterally. No wheezing. No rales  CVS: Normal S1, S2. Regular rate and rhythm. No murmurs.  ABDOMEN/GI: Soft, Nontender, Nondistended; Bowel sounds are present  EXTREMITIES:  No edema B/L LE. no knee edema, pain to palpation right knee  NEUROLOGIC:  No motor deficit.  PSYCH: Alert & oriented x 3, normal affect    LABS:                        10.4   12.06 )-----------( 291      ( 22 Dec 2018 04:30 )             33.1     12-22    141  |  97<L>  |  29<H>  ----------------------------<  104<H>  3.7   |  27  |  1.1    Ca    8.8      22 Dec 2018 04:30  Mg     2.0     12-22    TPro  6.7  /  Alb  3.9  /  TBili  0.4  /  DBili  x   /  AST  10  /  ALT  8   /  AlkPhos  86  12-21    PT/INR - ( 21 Dec 2018 13:12 )   PT: 16.20 sec;   INR: 1.41 ratio         PTT - ( 21 Dec 2018 13:12 )  PTT:34.3 sec      Medications:  Standing  amLODIPine   Tablet 5 milliGRAM(s) Oral daily  atorvastatin 20 milliGRAM(s) Oral at bedtime  citalopram 20 milliGRAM(s) Oral daily  levothyroxine 175 MICROGram(s) Oral daily  lisinopril 40 milliGRAM(s) Oral daily  metoprolol tartrate 50 milliGRAM(s) Oral two times a day  rivaroxaban 20 milliGRAM(s) Oral every 24 hours  triamterene 37.5 mG/hydrochlorothiazide 25 mG Tablet 1 Tablet(s) Oral daily    PRN Meds  acetaminophen   Tablet .. 650 milliGRAM(s) Oral every 6 hours PRN  traMADol 50 milliGRAM(s) Oral two times a day PRN      Case discussed with resident    Care discussed with pt

## 2018-12-22 NOTE — CONSULT NOTE ADULT - ATTENDING COMMENTS
Pt seen and examined.  agree with resident exam and plan.  Knee immobilizer for comfort.  Pain control.  PT, OOB, WBAT  f/u with orthopedics as outpatient.  ORtho to sign off.

## 2018-12-22 NOTE — PROGRESS NOTE ADULT - ASSESSMENT
78 yof with pmh of pituitary growth, thyroid ca s/p thyroidectomy, hypothyroidism, htn, afib on xarelto, dld, OA presents s/p Mechanical fall    # Mechanical Fall no acute fractures  R knee pain  on CT scan   Moderate right hip osteoarthritis with labral chondrocalcinosis   Tricompartmental knee osteoarthritis with suprapatellar joint bodies   and moderate volume joint effusion  pain control   if no improvement - and effusion gets bigger   ortho consult and tapping the effusion  PT/OT rehab     # AFib on xarelto  - c/w xarelto  - c/w BB  - Fall precautions on AC    # HTN  - stable  - c/w home meds    # Hypothyroidism s/p thyroidectomy   - stable  - c/w home meds    # pituitary enlargement/ pituitary adenoma??  - on cabergoline weekly;   follow up outpatient     # ckd 3  - avoid nephrotoxics  - f/u bmp    # DVT ppx  - on xarelto    # Dispo  - pt/rehab cs; will need physical therapy

## 2018-12-22 NOTE — PROGRESS NOTE ADULT - SUBJECTIVE AND OBJECTIVE BOX
JEFFERY UGALDE 78y Female  MRN#: [id number]   CODE STATUS: FULL CODE       SUBJECTIVE  Patient is a 78y old Female who presents with a chief complaint of fall (21 Dec 2018 17:12)  Currently admitted to medicine with the primary diagnosis of Fall    Today is hospital day 1d, and this morning she is stable. no major overnight events - she is complaining of R knee and quadriceps pain but complete range of motion present   no fever no chills no chest pain  no nausea no diarrhea no vomit     OBJECTIVE  PAST MEDICAL & SURGICAL HISTORY  CKD (chronic kidney disease) stage 3, GFR 30-59 ml/min  Pituitary adenoma: ??  Osteoarthritis  Sleep apnea  Afib: on xarelto  Hypothyroid: s/p thyroid ca surgery  High cholesterol  HTN (hypertension)  H/O thyroidectomy    ALLERGIES:  No Known Allergies    MEDICATIONS:  STANDING MEDICATIONS  amLODIPine   Tablet 5 milliGRAM(s) Oral daily  atorvastatin 20 milliGRAM(s) Oral at bedtime  citalopram 20 milliGRAM(s) Oral daily  levothyroxine 175 MICROGram(s) Oral daily  lisinopril 40 milliGRAM(s) Oral daily  metoprolol tartrate 50 milliGRAM(s) Oral two times a day  rivaroxaban 20 milliGRAM(s) Oral every 24 hours  triamterene 37.5 mG/hydrochlorothiazide 25 mG Tablet 1 Tablet(s) Oral daily    PRN MEDICATIONS  acetaminophen   Tablet .. 650 milliGRAM(s) Oral every 6 hours PRN  traMADol 50 milliGRAM(s) Oral two times a day PRN      Home Medications  Home Medications:  amLODIPine 5 mg oral tablet: 1 tab(s) orally once a day (21 Dec 2018 13:28)  atorvastatin 20 mg oral tablet: 1 tab(s) orally once a day (21 Dec 2018 13:28)  benazepril: 50 milligram(s) orally 2 times a day (21 Dec 2018 13:28)  cabergoline 0.5 mg oral tablet: 0.5 tab(s) orally once a week (21 Dec 2018 17:34)  citalopram 20 mg oral tablet: 1 tab(s) orally once a day (21 Dec 2018 13:28)  levothyroxine 175 mcg (0.175 mg) oral tablet: 1 tab(s) orally once a day (21 Dec 2018 13:28)  metoprolol tartrate 50 mg oral tablet: 1 tab(s) orally 2 times a day (21 Dec 2018 13:28)  Toviaz 4 mg oral tablet, extended release: 1 tab(s) orally once a day (21 Dec 2018 13:28)  triamterene-hydrochlorothiazide 37.5 mg-25 mg oral tablet: 1 tab(s) orally once a day (21 Dec 2018 17:35)  Xarelto 20 mg oral tablet: 1 tab(s) orally once a day (in the evening) (21 Dec 2018 13:28)      VITAL SIGNS: Last 24 Hours  T(C): 36.6 (22 Dec 2018 00:00), Max: 37.1 (21 Dec 2018 18:00)  T(F): 97.8 (22 Dec 2018 00:00), Max: 98.7 (21 Dec 2018 18:00)  HR: 51 (22 Dec 2018 05:58) (51 - 88)  BP: 132/60 (22 Dec 2018 05:58) (119/62 - 152/70)  BP(mean): --  RR: 18 (22 Dec 2018 05:58) (16 - 20)  SpO2: 95% (21 Dec 2018 16:36) (95% - 95%)    LABS:                        10.4   12.06 )-----------( 291      ( 22 Dec 2018 04:30 )             33.1     12    141  |  97<L>  |  29<H>  ----------------------------<  104<H>  3.7   |  27  |  1.1    Ca    8.8      22 Dec 2018 04:30  Mg     2.0         TPro  6.7  /  Alb  3.9  /  TBili  0.4  /  DBili  x   /  AST  10  /  ALT  8   /  AlkPhos  86  12-21    PT/INR - ( 21 Dec 2018 13:12 )   PT: 16.20 sec;   INR: 1.41 ratio         PTT - ( 21 Dec 2018 13:12 )  PTT:34.3 sec  Urinalysis Basic - ( 21 Dec 2018 13:12 )    Color: Yellow / Appearance: Clear / S.015 / pH: x  Gluc: x / Ketone: Negative  / Bili: Negative / Urobili: 0.2 mg/dL   Blood: x / Protein: Negative mg/dL / Nitrite: Negative   Leuk Esterase: Negative / RBC: x / WBC x   Sq Epi: x / Non Sq Epi: x / Bacteria: x        Lactate, Blood: 1.2 mmol/L (18 @ 13:12)          RADIOLOGY:  CT LOWER EXTREMITY  impression:  1. Osteopenia without acutely displaced fracture  2. Moderate right hip osteoarthritis with labral chondrocalcinosis and   joint body  3. Tricompartmental knee osteoarthritis with suprapatellar joint bodies   and moderate volume joint effusion    CT brain negative     PHYSICAL EXAM:    GENERAL: NAD, well-developed, AAOx3  HEENT:  Atraumatic, Normocephalic. EOMI, PERRLA, conjunctiva and sclera clear, No JVD  PULMONARY: Clear to auscultation bilaterally; No wheeze  CARDIOVASCULAR: Regular rate and rhythm; No murmurs, rubs, or gallops  GASTROINTESTINAL: Soft, Nontender, Nondistended; Bowel sounds present  MUSCULOSKELETAL:  2+ Peripheral Pulses, No clubbing, cyanosis, or edema- pain on palpation of R knee - full range of motion   NEUROLOGY: non-focal  SKIN: No rashes or lesions

## 2018-12-23 ENCOUNTER — TRANSCRIPTION ENCOUNTER (OUTPATIENT)
Age: 78
End: 2018-12-23

## 2018-12-23 VITALS — WEIGHT: 209.88 LBS

## 2018-12-23 LAB
ANION GAP SERPL CALC-SCNC: 17 MMOL/L — HIGH (ref 7–14)
BUN SERPL-MCNC: 33 MG/DL — HIGH (ref 10–20)
CALCIUM SERPL-MCNC: 8.9 MG/DL — SIGNIFICANT CHANGE UP (ref 8.5–10.1)
CHLORIDE SERPL-SCNC: 100 MMOL/L — SIGNIFICANT CHANGE UP (ref 98–110)
CO2 SERPL-SCNC: 25 MMOL/L — SIGNIFICANT CHANGE UP (ref 17–32)
CREAT SERPL-MCNC: 1.2 MG/DL — SIGNIFICANT CHANGE UP (ref 0.7–1.5)
GLUCOSE SERPL-MCNC: 111 MG/DL — HIGH (ref 70–99)
HCT VFR BLD CALC: 36.8 % — LOW (ref 37–47)
HGB BLD-MCNC: 11.5 G/DL — LOW (ref 12–16)
MCHC RBC-ENTMCNC: 25.4 PG — LOW (ref 27–31)
MCHC RBC-ENTMCNC: 31.3 G/DL — LOW (ref 32–37)
MCV RBC AUTO: 81.4 FL — SIGNIFICANT CHANGE UP (ref 81–99)
NRBC # BLD: 0 /100 WBCS — SIGNIFICANT CHANGE UP (ref 0–0)
PLATELET # BLD AUTO: 317 K/UL — SIGNIFICANT CHANGE UP (ref 130–400)
POTASSIUM SERPL-MCNC: 3.7 MMOL/L — SIGNIFICANT CHANGE UP (ref 3.5–5)
POTASSIUM SERPL-SCNC: 3.7 MMOL/L — SIGNIFICANT CHANGE UP (ref 3.5–5)
RBC # BLD: 4.52 M/UL — SIGNIFICANT CHANGE UP (ref 4.2–5.4)
RBC # FLD: 17.3 % — HIGH (ref 11.5–14.5)
SODIUM SERPL-SCNC: 142 MMOL/L — SIGNIFICANT CHANGE UP (ref 135–146)
WBC # BLD: 13.63 K/UL — HIGH (ref 4.8–10.8)
WBC # FLD AUTO: 13.63 K/UL — HIGH (ref 4.8–10.8)

## 2018-12-23 RX ORDER — ACETAMINOPHEN 500 MG
2 TABLET ORAL
Qty: 0 | Refills: 0 | COMMUNITY
Start: 2018-12-23

## 2018-12-23 RX ORDER — FESOTERODINE FUMARATE 8 MG/1
1 TABLET, FILM COATED, EXTENDED RELEASE ORAL
Qty: 0 | Refills: 0 | COMMUNITY

## 2018-12-23 RX ORDER — ATORVASTATIN CALCIUM 80 MG/1
1 TABLET, FILM COATED ORAL
Qty: 0 | Refills: 0 | COMMUNITY

## 2018-12-23 RX ADMIN — Medication 1 TABLET(S): at 05:10

## 2018-12-23 RX ADMIN — Medication 175 MICROGRAM(S): at 05:09

## 2018-12-23 RX ADMIN — AMLODIPINE BESYLATE 5 MILLIGRAM(S): 2.5 TABLET ORAL at 05:09

## 2018-12-23 RX ADMIN — LISINOPRIL 40 MILLIGRAM(S): 2.5 TABLET ORAL at 05:09

## 2018-12-23 RX ADMIN — CITALOPRAM 20 MILLIGRAM(S): 10 TABLET, FILM COATED ORAL at 12:35

## 2018-12-23 NOTE — DISCHARGE NOTE ADULT - PLAN OF CARE
Evaluate for fracture and treat pain After you tripped and fell you did not break any bones. After 1 day in hospital your pain was controlled and you were able to walk.

## 2018-12-23 NOTE — DISCHARGE NOTE ADULT - PATIENT PORTAL LINK FT
You can access the [facility] Patient Portal, offered by [facility], by registering with the following website: [email or web address]

## 2018-12-23 NOTE — DISCHARGE NOTE ADULT - CARE PROVIDER_API CALL
Tad Savage (DO), Medicine  [facility] [place] [address]  Phone: [phone]  Fax: [phone]

## 2018-12-23 NOTE — PROGRESS NOTE ADULT - SUBJECTIVE AND OBJECTIVE BOX
Patient seen and examined at bedside. Patient has been ambulating. Pain greatly reduced. Pt eager to go home.     PHYSICAL EXAM:  GENERAL: NAD, speaks in full sentences, no signs of respiratory distress  HEAD:  Atraumatic, Normocephalic  EYES: EOMI, PERRLA, conjunctiva and sclera clear  NECK: Supple, No JVD  CHEST/LUNG: Clear to auscultation bilaterally; No wheeze; No crackles; No accessory muscles used  HEART: Regular rate and rhythm; No murmurs;   ABDOMEN: Soft, Nontender, Nondistended; Bowel sounds present; No guarding  EXTREMITIES:  2+ Peripheral Pulses, No cyanosis or edema  PSYCH: AAOx3  NEUROLOGY: non-focal  SKIN: No rashes or lesions    Since patient can walk with out issue, pain controlled, no fracture, pt is medically stable for discharge home. She lives a lone but is going to her daughter's house.

## 2018-12-23 NOTE — DISCHARGE NOTE ADULT - HOSPITAL COURSE
Patient presents to ER after mechanical fall. No fractures sustained. Pt able to ambulate independently. Stable for d/c home Patient presents to ER after mechanical fall. No fractures sustained. Pt able to ambulate independently. Stable for d/c home. Patient DOES NOT have heart failure. System autopopulates that she does and there is no obvious way around this.

## 2018-12-23 NOTE — DISCHARGE NOTE ADULT - CARE PLAN
Principal Discharge DX:	Right knee pain, unspecified chronicity  Goal:	Evaluate for fracture and treat pain  Assessment and plan of treatment:	After you tripped and fell you did not break any bones. After 1 day in hospital your pain was controlled and you were able to walk.

## 2018-12-28 DIAGNOSIS — M11.251 OTHER CHONDROCALCINOSIS, RIGHT HIP: ICD-10-CM

## 2018-12-28 DIAGNOSIS — E78.5 HYPERLIPIDEMIA, UNSPECIFIED: ICD-10-CM

## 2018-12-28 DIAGNOSIS — Z91.81 HISTORY OF FALLING: ICD-10-CM

## 2018-12-28 DIAGNOSIS — R26.2 DIFFICULTY IN WALKING, NOT ELSEWHERE CLASSIFIED: ICD-10-CM

## 2018-12-28 DIAGNOSIS — M25.561 PAIN IN RIGHT KNEE: ICD-10-CM

## 2018-12-28 DIAGNOSIS — M85.80 OTHER SPECIFIED DISORDERS OF BONE DENSITY AND STRUCTURE, UNSPECIFIED SITE: ICD-10-CM

## 2018-12-28 DIAGNOSIS — D72.829 ELEVATED WHITE BLOOD CELL COUNT, UNSPECIFIED: ICD-10-CM

## 2018-12-28 DIAGNOSIS — I48.91 UNSPECIFIED ATRIAL FIBRILLATION: ICD-10-CM

## 2018-12-28 DIAGNOSIS — I12.9 HYPERTENSIVE CHRONIC KIDNEY DISEASE WITH STAGE 1 THROUGH STAGE 4 CHRONIC KIDNEY DISEASE, OR UNSPECIFIED CHRONIC KIDNEY DISEASE: ICD-10-CM

## 2018-12-28 DIAGNOSIS — E03.9 HYPOTHYROIDISM, UNSPECIFIED: ICD-10-CM

## 2018-12-28 DIAGNOSIS — M25.461 EFFUSION, RIGHT KNEE: ICD-10-CM

## 2018-12-28 DIAGNOSIS — F41.9 ANXIETY DISORDER, UNSPECIFIED: ICD-10-CM

## 2018-12-28 DIAGNOSIS — N18.3 CHRONIC KIDNEY DISEASE, STAGE 3 (MODERATE): ICD-10-CM

## 2018-12-28 DIAGNOSIS — D35.2 BENIGN NEOPLASM OF PITUITARY GLAND: ICD-10-CM

## 2018-12-28 DIAGNOSIS — M19.90 UNSPECIFIED OSTEOARTHRITIS, UNSPECIFIED SITE: ICD-10-CM

## 2018-12-28 DIAGNOSIS — Z79.01 LONG TERM (CURRENT) USE OF ANTICOAGULANTS: ICD-10-CM

## 2019-01-19 ENCOUNTER — INPATIENT (INPATIENT)
Facility: HOSPITAL | Age: 79
LOS: 5 days | Discharge: NURSING, REHAB OR LONG-TERM CARE | End: 2019-01-25
Attending: INTERNAL MEDICINE | Admitting: INTERNAL MEDICINE
Payer: MEDICARE

## 2019-01-19 VITALS
TEMPERATURE: 98 F | RESPIRATION RATE: 20 BRPM | HEART RATE: 66 BPM | DIASTOLIC BLOOD PRESSURE: 75 MMHG | SYSTOLIC BLOOD PRESSURE: 160 MMHG | OXYGEN SATURATION: 98 %

## 2019-01-19 DIAGNOSIS — Z98.890 OTHER SPECIFIED POSTPROCEDURAL STATES: Chronic | ICD-10-CM

## 2019-01-19 PROBLEM — E78.00 PURE HYPERCHOLESTEROLEMIA, UNSPECIFIED: Chronic | Status: ACTIVE | Noted: 2018-12-21

## 2019-01-19 PROBLEM — M19.90 UNSPECIFIED OSTEOARTHRITIS, UNSPECIFIED SITE: Chronic | Status: ACTIVE | Noted: 2018-12-21

## 2019-01-19 PROBLEM — I10 ESSENTIAL (PRIMARY) HYPERTENSION: Chronic | Status: ACTIVE | Noted: 2018-12-21

## 2019-01-19 PROBLEM — N18.3 CHRONIC KIDNEY DISEASE, STAGE 3 (MODERATE): Chronic | Status: ACTIVE | Noted: 2018-12-21

## 2019-01-19 PROBLEM — G47.30 SLEEP APNEA, UNSPECIFIED: Chronic | Status: ACTIVE | Noted: 2018-12-21

## 2019-01-19 LAB
ALBUMIN SERPL ELPH-MCNC: 4 G/DL — SIGNIFICANT CHANGE UP (ref 3.5–5.2)
ALP SERPL-CCNC: 82 U/L — SIGNIFICANT CHANGE UP (ref 30–115)
ALT FLD-CCNC: 6 U/L — SIGNIFICANT CHANGE UP (ref 0–41)
ANION GAP SERPL CALC-SCNC: 16 MMOL/L — HIGH (ref 7–14)
APPEARANCE UR: CLEAR — SIGNIFICANT CHANGE UP
AST SERPL-CCNC: 9 U/L — SIGNIFICANT CHANGE UP (ref 0–41)
BASOPHILS # BLD AUTO: 0.04 K/UL — SIGNIFICANT CHANGE UP (ref 0–0.2)
BASOPHILS NFR BLD AUTO: 0.3 % — SIGNIFICANT CHANGE UP (ref 0–1)
BILIRUB SERPL-MCNC: 0.4 MG/DL — SIGNIFICANT CHANGE UP (ref 0.2–1.2)
BILIRUB UR-MCNC: NEGATIVE — SIGNIFICANT CHANGE UP
BUN SERPL-MCNC: 30 MG/DL — HIGH (ref 10–20)
CALCIUM SERPL-MCNC: 9.2 MG/DL — SIGNIFICANT CHANGE UP (ref 8.5–10.1)
CHLORIDE SERPL-SCNC: 99 MMOL/L — SIGNIFICANT CHANGE UP (ref 98–110)
CO2 SERPL-SCNC: 28 MMOL/L — SIGNIFICANT CHANGE UP (ref 17–32)
COLOR SPEC: YELLOW — SIGNIFICANT CHANGE UP
CREAT SERPL-MCNC: 1.2 MG/DL — SIGNIFICANT CHANGE UP (ref 0.7–1.5)
DIFF PNL FLD: NEGATIVE — SIGNIFICANT CHANGE UP
EOSINOPHIL # BLD AUTO: 0.03 K/UL — SIGNIFICANT CHANGE UP (ref 0–0.7)
EOSINOPHIL NFR BLD AUTO: 0.2 % — SIGNIFICANT CHANGE UP (ref 0–8)
ETHANOL SERPL-MCNC: <10 MG/DL — HIGH
GLUCOSE SERPL-MCNC: 100 MG/DL — HIGH (ref 70–99)
GLUCOSE UR QL: NEGATIVE MG/DL — SIGNIFICANT CHANGE UP
HCT VFR BLD CALC: 34.9 % — LOW (ref 37–47)
HGB BLD-MCNC: 10.6 G/DL — LOW (ref 12–16)
IMM GRANULOCYTES NFR BLD AUTO: 0.7 % — HIGH (ref 0.1–0.3)
KETONES UR-MCNC: NEGATIVE — SIGNIFICANT CHANGE UP
LEUKOCYTE ESTERASE UR-ACNC: NEGATIVE — SIGNIFICANT CHANGE UP
LIDOCAIN IGE QN: 30 U/L — SIGNIFICANT CHANGE UP (ref 7–60)
LYMPHOCYTES # BLD AUTO: 1.84 K/UL — SIGNIFICANT CHANGE UP (ref 1.2–3.4)
LYMPHOCYTES # BLD AUTO: 13.8 % — LOW (ref 20.5–51.1)
MCHC RBC-ENTMCNC: 24.8 PG — LOW (ref 27–31)
MCHC RBC-ENTMCNC: 30.4 G/DL — LOW (ref 32–37)
MCV RBC AUTO: 81.7 FL — SIGNIFICANT CHANGE UP (ref 81–99)
MONOCYTES # BLD AUTO: 0.98 K/UL — HIGH (ref 0.1–0.6)
MONOCYTES NFR BLD AUTO: 7.4 % — SIGNIFICANT CHANGE UP (ref 1.7–9.3)
NEUTROPHILS # BLD AUTO: 10.32 K/UL — HIGH (ref 1.4–6.5)
NEUTROPHILS NFR BLD AUTO: 77.6 % — HIGH (ref 42.2–75.2)
NITRITE UR-MCNC: NEGATIVE — SIGNIFICANT CHANGE UP
NRBC # BLD: 0 /100 WBCS — SIGNIFICANT CHANGE UP (ref 0–0)
PH UR: 7.5 — SIGNIFICANT CHANGE UP (ref 5–8)
PLATELET # BLD AUTO: 341 K/UL — SIGNIFICANT CHANGE UP (ref 130–400)
POTASSIUM SERPL-MCNC: 3.8 MMOL/L — SIGNIFICANT CHANGE UP (ref 3.5–5)
POTASSIUM SERPL-SCNC: 3.8 MMOL/L — SIGNIFICANT CHANGE UP (ref 3.5–5)
PROT SERPL-MCNC: 6.5 G/DL — SIGNIFICANT CHANGE UP (ref 6–8)
PROT UR-MCNC: NEGATIVE MG/DL — SIGNIFICANT CHANGE UP
RBC # BLD: 4.27 M/UL — SIGNIFICANT CHANGE UP (ref 4.2–5.4)
RBC # FLD: 15.9 % — HIGH (ref 11.5–14.5)
SODIUM SERPL-SCNC: 143 MMOL/L — SIGNIFICANT CHANGE UP (ref 135–146)
SP GR SPEC: 1.01 — SIGNIFICANT CHANGE UP (ref 1.01–1.03)
UROBILINOGEN FLD QL: 0.2 MG/DL — SIGNIFICANT CHANGE UP (ref 0.2–0.2)
WBC # BLD: 13.3 K/UL — HIGH (ref 4.8–10.8)
WBC # FLD AUTO: 13.3 K/UL — HIGH (ref 4.8–10.8)

## 2019-01-19 PROCEDURE — 99283 EMERGENCY DEPT VISIT LOW MDM: CPT

## 2019-01-19 RX ORDER — LEVOTHYROXINE SODIUM 125 MCG
175 TABLET ORAL
Qty: 0 | Refills: 0 | Status: DISCONTINUED | OUTPATIENT
Start: 2019-01-19 | End: 2019-01-25

## 2019-01-19 RX ORDER — CABERGOLINE 0.5 MG/1
0.5 TABLET ORAL
Qty: 0 | Refills: 0 | COMMUNITY

## 2019-01-19 RX ORDER — AMLODIPINE BESYLATE 2.5 MG/1
1 TABLET ORAL
Qty: 0 | Refills: 0 | COMMUNITY

## 2019-01-19 RX ORDER — MORPHINE SULFATE 50 MG/1
4 CAPSULE, EXTENDED RELEASE ORAL EVERY 4 HOURS
Qty: 0 | Refills: 0 | Status: DISCONTINUED | OUTPATIENT
Start: 2019-01-19 | End: 2019-01-23

## 2019-01-19 RX ORDER — BENAZEPRIL HYDROCHLORIDE 40 MG/1
50 TABLET ORAL
Qty: 0 | Refills: 0 | COMMUNITY

## 2019-01-19 RX ORDER — LEVOTHYROXINE SODIUM 125 MCG
1 TABLET ORAL
Qty: 0 | Refills: 0 | COMMUNITY

## 2019-01-19 RX ORDER — TRIAMTERENE/HYDROCHLOROTHIAZID 75 MG-50MG
1 TABLET ORAL DAILY
Qty: 0 | Refills: 0 | Status: DISCONTINUED | OUTPATIENT
Start: 2019-01-19 | End: 2019-01-25

## 2019-01-19 RX ORDER — ACETAMINOPHEN 500 MG
650 TABLET ORAL EVERY 6 HOURS
Qty: 0 | Refills: 0 | Status: DISCONTINUED | OUTPATIENT
Start: 2019-01-19 | End: 2019-01-25

## 2019-01-19 RX ORDER — LISINOPRIL 2.5 MG/1
20 TABLET ORAL
Qty: 0 | Refills: 0 | Status: DISCONTINUED | OUTPATIENT
Start: 2019-01-19 | End: 2019-01-25

## 2019-01-19 RX ORDER — OXYCODONE HYDROCHLORIDE 5 MG/1
10 TABLET ORAL EVERY 4 HOURS
Qty: 0 | Refills: 0 | Status: DISCONTINUED | OUTPATIENT
Start: 2019-01-19 | End: 2019-01-23

## 2019-01-19 RX ORDER — CITALOPRAM 10 MG/1
20 TABLET, FILM COATED ORAL DAILY
Qty: 0 | Refills: 0 | Status: DISCONTINUED | OUTPATIENT
Start: 2019-01-19 | End: 2019-01-25

## 2019-01-19 RX ORDER — METOPROLOL TARTRATE 50 MG
50 TABLET ORAL
Qty: 0 | Refills: 0 | Status: DISCONTINUED | OUTPATIENT
Start: 2019-01-19 | End: 2019-01-25

## 2019-01-19 RX ORDER — ATORVASTATIN CALCIUM 80 MG/1
20 TABLET, FILM COATED ORAL AT BEDTIME
Qty: 0 | Refills: 0 | Status: DISCONTINUED | OUTPATIENT
Start: 2019-01-19 | End: 2019-01-25

## 2019-01-19 RX ORDER — ENOXAPARIN SODIUM 100 MG/ML
90 INJECTION SUBCUTANEOUS
Qty: 0 | Refills: 0 | Status: DISCONTINUED | OUTPATIENT
Start: 2019-01-19 | End: 2019-01-21

## 2019-01-19 RX ORDER — OXYBUTYNIN CHLORIDE 5 MG
5 TABLET ORAL
Qty: 0 | Refills: 0 | Status: DISCONTINUED | OUTPATIENT
Start: 2019-01-19 | End: 2019-01-25

## 2019-01-19 RX ORDER — AMLODIPINE BESYLATE 2.5 MG/1
5 TABLET ORAL AT BEDTIME
Qty: 0 | Refills: 0 | Status: DISCONTINUED | OUTPATIENT
Start: 2019-01-19 | End: 2019-01-25

## 2019-01-19 RX ORDER — MORPHINE SULFATE 50 MG/1
4 CAPSULE, EXTENDED RELEASE ORAL ONCE
Qty: 0 | Refills: 0 | Status: DISCONTINUED | OUTPATIENT
Start: 2019-01-19 | End: 2019-01-19

## 2019-01-19 RX ORDER — OXYCODONE AND ACETAMINOPHEN 5; 325 MG/1; MG/1
1 TABLET ORAL ONCE
Qty: 0 | Refills: 0 | Status: DISCONTINUED | OUTPATIENT
Start: 2019-01-19 | End: 2019-01-19

## 2019-01-19 RX ADMIN — ATORVASTATIN CALCIUM 20 MILLIGRAM(S): 80 TABLET, FILM COATED ORAL at 21:30

## 2019-01-19 RX ADMIN — OXYCODONE AND ACETAMINOPHEN 1 TABLET(S): 5; 325 TABLET ORAL at 11:35

## 2019-01-19 RX ADMIN — OXYCODONE HYDROCHLORIDE 10 MILLIGRAM(S): 5 TABLET ORAL at 21:00

## 2019-01-19 RX ADMIN — LISINOPRIL 20 MILLIGRAM(S): 2.5 TABLET ORAL at 18:54

## 2019-01-19 RX ADMIN — MORPHINE SULFATE 4 MILLIGRAM(S): 50 CAPSULE, EXTENDED RELEASE ORAL at 15:11

## 2019-01-19 RX ADMIN — Medication 5 MILLIGRAM(S): at 18:54

## 2019-01-19 RX ADMIN — MORPHINE SULFATE 4 MILLIGRAM(S): 50 CAPSULE, EXTENDED RELEASE ORAL at 13:02

## 2019-01-19 RX ADMIN — OXYCODONE AND ACETAMINOPHEN 1 TABLET(S): 5; 325 TABLET ORAL at 15:11

## 2019-01-19 RX ADMIN — Medication 50 MILLIGRAM(S): at 18:54

## 2019-01-19 RX ADMIN — AMLODIPINE BESYLATE 5 MILLIGRAM(S): 2.5 TABLET ORAL at 21:30

## 2019-01-19 RX ADMIN — ENOXAPARIN SODIUM 90 MILLIGRAM(S): 100 INJECTION SUBCUTANEOUS at 18:54

## 2019-01-19 RX ADMIN — OXYCODONE HYDROCHLORIDE 10 MILLIGRAM(S): 5 TABLET ORAL at 20:28

## 2019-01-19 NOTE — ED PROVIDER NOTE - MEDICAL DECISION MAKING DETAILS
pt w/ fall from standing, head injury on xarelto w/ R hip pain. CT showing fx, d/w ortho. admit to med

## 2019-01-19 NOTE — H&P ADULT - FAMILY HISTORY
Mother  Still living? Unknown  Family history of lung cancer, Age at diagnosis: Age Unknown     Father  Still living? Unknown  Family history of abdominal aortic aneurysm (AAA), Age at diagnosis: Age Unknown

## 2019-01-19 NOTE — CONSULT NOTE ADULT - SUBJECTIVE AND OBJECTIVE BOX
TRAUMA ACTIVATION LEVEL:  Alert    MECHANISM OF INJURY: [] Fall	    GCS: 	E: 4	V: 5	M: 6      HPI:  79y/o F, w/ PMH of Afib (on Xarelto - last dose yesterday before fall), HTN, and other PMH listed below, presented to ED s/p fall at home yesterday; +HT, +AC, -LOC. Pt states she believes she tripped yesterday, on a tile floor, hitting her head on the bottom step. She complains of extreme pain of her R knee and posterior thigh, and pain around the abrasion on her frontotemporal region. Denies other complaints. Denies CP, SOB, HA, changes in vision, nausea/vomiting, abdominal pain, numbness/tingling. Pt admits to falling recently (~1 mo ago, no injuries).      PAST MEDICAL & SURGICAL HISTORY:  CKD (chronic kidney disease) stage 3, GFR 30-59 ml/min  Pituitary adenoma: ??  Osteoarthritis  Sleep apnea  Afib: on xarelto  Hypothyroid: s/p thyroid ca surgery  High cholesterol  HTN (hypertension)  H/O thyroidectomy      Allergies    No Known Allergies    Intolerances        Home Medications:  acetaminophen 325 mg oral tablet: 2 tab(s) orally every 6 hours, As needed, Mild Pain (1 - 3) (23 Dec 2018 10:29)  amLODIPine 5 mg oral tablet: 1 tab(s) orally once a day (21 Dec 2018 13:28)  benazepril: 50 milligram(s) orally 2 times a day (21 Dec 2018 13:28)  cabergoline 0.5 mg oral tablet: 0.5 tab(s) orally once a week (21 Dec 2018 17:34)  citalopram 20 mg oral tablet: 1 tab(s) orally once a day (21 Dec 2018 13:28)  levothyroxine 175 mcg (0.175 mg) oral tablet: 1 tab(s) orally once a day (21 Dec 2018 13:28)  metoprolol tartrate 50 mg oral tablet: 1 tab(s) orally 2 times a day (21 Dec 2018 13:28)  triamterene-hydrochlorothiazide 37.5 mg-25 mg oral tablet: 1 tab(s) orally once a day (21 Dec 2018 17:35)  Xarelto 20 mg oral tablet: 1 tab(s) orally once a day (in the evening) (21 Dec 2018 13:28)      ROS: 10-system review is otherwise negative except HPI above.      Primary Survey:    A - airway intact  B - bilateral breath sounds and good chest rise  C - palpable pulses in all extremities  D - GCS 15 on arrival, MONTIEL  Exposure obtained    Vital Signs Last 24 Hrs  T(C): 36.7 (19 Jan 2019 10:50), Max: 36.7 (19 Jan 2019 10:50)  T(F): 98 (19 Jan 2019 10:50), Max: 98 (19 Jan 2019 10:50)  HR: 66 (19 Jan 2019 10:50) (66 - 66)  BP: 160/75 (19 Jan 2019 10:50) (160/75 - 160/75)  BP(mean): --  RR: 20 (19 Jan 2019 10:50) (20 - 20)  SpO2: 98% (19 Jan 2019 10:50) (98% - 98%)    Secondary Survey:   General: NAD  HEENT: Frontotemporal bump + abrasion,  EOMI, PEERLA.  Neck: Soft, midline trachea. no c-spine tenderness  Chest: No chest wall tenderness. or subq  emphysema   Cardiac: S1, S2, RRR  Respiratory: b/l breath sounds, nonlabored  Abdomen: Soft, non-distended, non-tender, no rebound  Groin: Normal appearing, pelvis stable   Ext: distal pulses palpable      Procedures:    LABS:  Labs:  CAPILLARY BLOOD GLUCOSE                      LFTs:         Coags:                        RADIOLOGY & ADDITIONAL STUDIES:    pending      --------------------------------------------------------------------------------------- TRAUMA ACTIVATION LEVEL:  Alert    MECHANISM OF INJURY: [] Fall	    GCS: 	E: 4	V: 5	M: 6      HPI:  79y/o F, w/ PMH of Afib (on Xarelto - last dose yesterday before fall), HTN, and other PMH listed below, presented to ED s/p fall at home yesterday; +HT, +AC, -LOC. Pt states she believes she tripped yesterday, on a tile floor, hitting her head on the bottom step. She complains of extreme pain of her R knee and posterior thigh, and pain around the abrasion on her frontotemporal region. Denies other complaints. Denies CP, SOB, HA, changes in vision, nausea/vomiting, abdominal pain, numbness/tingling. Pt admits to falling recently (~1 mo ago, no injuries).      PAST MEDICAL & SURGICAL HISTORY:  CKD (chronic kidney disease) stage 3, GFR 30-59 ml/min  Pituitary adenoma: ??  Osteoarthritis  Sleep apnea  Afib: on xarelto  Hypothyroid: s/p thyroid ca surgery  High cholesterol  HTN (hypertension)  H/O thyroidectomy      Allergies    No Known Allergies    Intolerances        Home Medications:  acetaminophen 325 mg oral tablet: 2 tab(s) orally every 6 hours, As needed, Mild Pain (1 - 3) (23 Dec 2018 10:29)  amLODIPine 5 mg oral tablet: 1 tab(s) orally once a day (21 Dec 2018 13:28)  benazepril: 50 milligram(s) orally 2 times a day (21 Dec 2018 13:28)  cabergoline 0.5 mg oral tablet: 0.5 tab(s) orally once a week (21 Dec 2018 17:34)  citalopram 20 mg oral tablet: 1 tab(s) orally once a day (21 Dec 2018 13:28)  levothyroxine 175 mcg (0.175 mg) oral tablet: 1 tab(s) orally once a day (21 Dec 2018 13:28)  metoprolol tartrate 50 mg oral tablet: 1 tab(s) orally 2 times a day (21 Dec 2018 13:28)  triamterene-hydrochlorothiazide 37.5 mg-25 mg oral tablet: 1 tab(s) orally once a day (21 Dec 2018 17:35)  Xarelto 20 mg oral tablet: 1 tab(s) orally once a day (in the evening) (21 Dec 2018 13:28)      ROS: 10-system review is otherwise negative except HPI above.      Primary Survey:    A - airway intact  B - bilateral breath sounds and good chest rise  C - palpable pulses in all extremities  D - GCS 15 on arrival, MONTIEL  Exposure obtained    Vital Signs Last 24 Hrs  T(C): 36.7 (2019 10:50), Max: 36.7 (2019 10:50)  T(F): 98 (2019 10:50), Max: 98 (2019 10:50)  HR: 66 (2019 10:50) (66 - 66)  BP: 160/75 (2019 10:50) (160/75 - 160/75)  BP(mean): --  RR: 20 (2019 10:50) (20 - 20)  SpO2: 98% (2019 10:50) (98% - 98%)    Secondary Survey:   General: NAD  HEENT: Frontotemporal bump + abrasion,  EOMI, PEERLA.  Neck: Soft, midline trachea. no c-spine tenderness  Chest: No chest wall tenderness. or subq  emphysema   Cardiac: S1, S2, RRR  Respiratory: b/l breath sounds, nonlabored  Abdomen: Soft, non-distended, non-tender, no rebound  Groin: Normal appearing, pelvis stable   Ext: distal pulses palpable, left knee tender       Procedures:    LABS:  POCT Blood Glucose.: 104 mg/dL (2019 11:26)                          10.6   13.30 )-----------( 341      ( 2019 11:20 )             34.9       Auto Neutrophil %: 77.6 % (19 @ 11:20)  Auto Immature Granulocyte %: 0.7 % (19 @ 11:20)        143  |  99  |  30<H>  ----------------------------<  100<H>  3.8   |  28  |  1.2      Calcium, Total Serum: 9.2 mg/dL (19 @ 11:20)      LFTs:             6.5  | 0.4  | 9        ------------------[82      ( 2019 11:20 )  4.0  | x    | 6           Lipase:30     Amylase:x             Coags:          Alcohol, Blood: <10 mg/dL (19 @ 11:20)    Urinalysis Basic - ( 2019 11:21 )    Color: Yellow / Appearance: Clear / S.015 / pH: x  Gluc: x / Ketone: Negative  / Bili: Negative / Urobili: 0.2 mg/dL   Blood: x / Protein: Negative mg/dL / Nitrite: Negative   Leuk Esterase: Negative / RBC: x / WBC x   Sq Epi: x / Non Sq Epi: x / Bacteria: x      RADIOLOGY & ADDITIONAL STUDIES:    < from: CT Head No Cont (19 @ 13:45) >  1.  No evidence of acute intracranial pathology.      2.  Chronic microvascular ischemic changes.    3.  Small right frontal extracalvarial hematoma.        < end of copied text >  < from: CT Cervical Spine No Cont (19 @ 13:48) >  IMPRESSION:    No evidence of a cervical spine fracture or subluxation.    Degenerative changes of the cervical spine as above.    Multiple lucent vertebral body lesions are again noted which may reflect   hemangiomas. If there is clinical concern consider nonemergent MRI or   bone scan.        < end of copied text >  < from: CT Hip No Cont, Right (19 @ 13:55) >    IMPRESSION:  1. Femoral head/ neck cortical irregularity with subacute impaction   incomplete fracture. If patient is not ambulatory and further assessment   is clinically,warranted consider MRI of the right hip to characterize   fracture  2. 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 Since prior, there is no   interval change with tricompartmental severe degenerative change and   multiple joint bodies largest in the suprapatellar bursa measuring up to   2 cm. There is unchanged small suprapatellar joint effusion. Vascular   calcifications are present.    < end of copied text >      ---------------------------------------------------------------------------------------

## 2019-01-19 NOTE — ED PROVIDER NOTE - NS ED ROS FT
Constitutional: See HPI.  Eyes: No visual changes, eye pain or discharge. No Photophobia  ENMT: No neck pain or stiffness. No limited ROM  Cardiac: No SOB or edema. No chest pain with exertion.  Respiratory: No cough or respiratory distress. No hemoptysis. No history of asthma or RAD.  GI: No nausea, vomiting, diarrhea or abdominal pain.  : No dysuria, frequency or burning. No Discharge  MS: No myalgia, muscle weakness. Right hip/knee pain  Neuro: No headache or weakness. No LOC.  Skin: No skin rash.  Except as documented in the HPI, all other systems are negative.

## 2019-01-19 NOTE — ED ADULT NURSE NOTE - ED STAT RN HANDOFF DETAILS
ED ENT HPI





- General


Chief complaint: Skin/Abscess/Foreign Body


Stated complaint: ABSCESS


Time Seen by Provider: 08/04/18 10:52


Source: patient


Mode of arrival: Ambulatory


Limitations: No Limitations





- History of Present Illness


Initial comments: 





She is a 15-year-old black female who presented with sore throat.  Patient 

states she has some pain and swelling to the left tonsillar region.  Patient 

also has had subjective fevers.  Patient has 6 out of 10 pain when she 

swallows.  Patient denies any cough.  Patient states there is no nausea 

vomiting diarrhea or chest pain at this time also.





- Related Data


 Previous Rx's











 Medication  Instructions  Recorded  Last Taken  Type


 


Azithromycin [Zithromax Z-CANDY] 250 mg PO DAILY #6 tablet 08/04/18 Unknown Rx


 


HYDROcodone/ACETAMINOPHEN 6 ml PO Q6HR PRN #60 solution 08/04/18 Unknown Rx





[Hydrocodon-Acetamin 7.5-325/15]    











 Allergies











Allergy/AdvReac Type Severity Reaction Status Date / Time


 


No Known Allergies Allergy   Unverified 08/04/18 10:48














ED Dental HPI





- General


Chief complaint: Skin/Abscess/Foreign Body


Stated complaint: ABSCESS


Time Seen by Provider: 08/04/18 10:52


Source: patient


Mode of arrival: Ambulatory


Limitations: No Limitations





- Related Data


 Previous Rx's











 Medication  Instructions  Recorded  Last Taken  Type


 


Azithromycin [Zithromax Z-CANDY] 250 mg PO DAILY #6 tablet 08/04/18 Unknown Rx


 


HYDROcodone/ACETAMINOPHEN 6 ml PO Q6HR PRN #60 solution 08/04/18 Unknown Rx





[Hydrocodon-Acetamin 7.5-325/15]    











 Allergies











Allergy/AdvReac Type Severity Reaction Status Date / Time


 


No Known Allergies Allergy   Unverified 08/04/18 10:48














ED Review of Systems


ROS: 


Stated complaint: ABSCESS


Other details as noted in HPI





Comment: All other systems reviewed and negative





ED Past Medical Hx





- Past Medical History


Hx Diabetes: Yes





- Surgical History


Past Surgical History?: No





- Social History


Smoking Status: Never Smoker


Substance Use Type: None





- Medications


Home Medications: 


 Home Medications











 Medication  Instructions  Recorded  Confirmed  Last Taken  Type


 


Azithromycin [Zithromax Z-CANDY] 250 mg PO DAILY #6 tablet 08/04/18  Unknown Rx


 


HYDROcodone/ACETAMINOPHEN 6 ml PO Q6HR PRN #60 solution 08/04/18  Unknown Rx





[Hydrocodon-Acetamin 7.5-325/15]     














ED Physical Exam





- General


Limitations: No Limitations


General appearance: alert, in no apparent distress





- Head


Head exam: Present: atraumatic, normocephalic





- Eye


Eye exam: Present: normal appearance





- ENT


ENT exam: Present: mucous membranes moist, other (and has some bilateral 

tonsillar erythema with some mild swelling.  There is no exudates present.  

Patient does have some very small tender anterior cervical lymph nodes present.)





- Neck


Neck exam: Present: normal inspection, lymphadenopathy





- Respiratory


Respiratory exam: Present: normal lung sounds bilaterally.  Absent: respiratory 

distress, wheezes, rales, rhonchi, stridor





- Cardiovascular


Cardiovascular Exam: Present: regular rate, normal rhythm.  Absent: systolic 

murmur, diastolic murmur, rubs, gallop





- GI/Abdominal


GI/Abdominal exam: Present: soft, normal bowel sounds





- Extremities Exam


Extremities exam: Present: normal inspection





- Back Exam


Back exam: Present: normal inspection





- Neurological Exam


Neurological exam: Present: alert, oriented X3





- Psychiatric


Psychiatric exam: Present: normal affect, normal mood





- Skin


Skin exam: Present: warm, dry, intact, normal color.  Absent: rash





ED Course





 Vital Signs











  08/04/18





  10:48


 


Temperature 98.3 F


 


Pulse Rate 90


 


Respiratory 18





Rate 


 


Blood Pressure 133/67


 


O2 Sat by Pulse 99





Oximetry 











Critical care attestation.: 


If time is entered above; I have spent that time in minutes in the direct care 

of this critically ill patient, excluding procedure time.








ED Disposition


Clinical Impression: 


Pharyngitis


Qualifiers:


 Pharyngitis/tonsillitis etiology: unspecified etiology Qualified Code(s): 

J02.9 - Acute pharyngitis, unspecified





Disposition: DC-01 TO HOME OR SELFCARE


Is pt being admited?: No


Does the pt Need Aspirin: No


Condition: Stable


Instructions:  Pharyngitis (ED)


Time of Disposition: 10:59 report given to SANNA Castaneda

## 2019-01-19 NOTE — ED PROVIDER NOTE - PHYSICAL EXAMINATION
AOx4, Non toxic appearing, NAD, speaking in full sentences. GCS 15.   Skin - warm and dry, no acute rash.   Head - small contusion to midline frontoparietal region, no fluctuance, noncompressible.    Eyes - PERRLA/EOMI, conjunctiva and sclera clear.   ENT- MM moist, no nasal discharge.  Pharynx unremarkable.  No mastoid or temporal ttp.   Neck - supple nt, no meningeal signs.   Heart - RRR s1s2 nl, no rub/murmur.   Lungs- No retractions, BS equal, CTAB.   Abdomen - soft ntnd no r/g.   Extremities- Swelling and ecchymosis to anterior right knee, ranging with difficulty/pain. moves all toes, +equal distal pulses, brisk cap refill, sensation wnl, normal ROM. No LE edema, calves nttp b/l.

## 2019-01-19 NOTE — H&P ADULT - HISTORY OF PRESENT ILLNESS
79 yo female with pmh of PAF on xarelto, htn, hld, depression, hypothyroid p/w mechanical fall with (+)head trauma, no LOC. patient states that she tripped on the pavement and hit top of her head on a step. denies having any palpitations, diaphoresis, chest pain, blurry vision, dizziness States that she does have right hip and knee pain  worsened after the fall but she has been having the pain for weeks. yesterday she went to her PMD office (Dr. Savage) and had a cortisone injection to her right knee.    interval history:  1 month ago patient had a mechanical fall, she missed a stair when walking down a flight of steps at [employer] when watching good morning jerrell. she was able to complete watching the show but at night when at home she developed severe pain of right le and came in via cab for evaluation. trauma w/u was negative at that time. was admitted and d/marco.

## 2019-01-19 NOTE — CONSULT NOTE ADULT - ASSESSMENT
ASSESSMENT:  77 y/o F, w/ PMH of Afib on Xarelto and other PMH listed above, s/p trip & fall. hitting her head on a step. +bump and abrasion on frontotemporal scalp. Complains of pain of her R knee & posterior thigh    PLAN:    - f/u panscan  - f/u X/R R knee  - f/u labs ASSESSMENT:  79 y/o F, w/ PMH of Afib on Xarelto and other PMH listed above, s/p trip & fall. hitting her head on a step. +bump and abrasion on frontotemporal scalp. Complains of pain of her R knee & posterior thigh    PLAN:    - f/u panscan  - f/u X/R R knee  - f/u labs    Senior Trauma Resident Note  77yo f sp fall from standing +ht -loc on xeralto for afib  left hip fracture - f/u ortho  cleared from trauma  Airway intact  Bilateral Breath Sounds  Palpable pulses in 4 ext  GCS 15, PERRL, MONTIEL  VSS  No Subq emphysema, abdominal tenderness,  or pelvic instability   CXR and PXR negative  FAST neg  Ct findings above  Will Dispo accordingly  Plan as above d/w Dr Nai Ambriz

## 2019-01-19 NOTE — ED PROVIDER NOTE - ATTENDING CONTRIBUTION TO CARE
9 yo F with a hx of a. fib on Xarelto, HTN, HLD here for fall. pt was going to go up stairs but fell forward and hit head on stair. no loc/n/v. + R knee/hip pain.     A/B/C intact, GCS 15    vss  gen- NAD, aaox3  card-rrr  lungs-ctab, no wheezing or rhonchi  abd-sntnd, no guarding or rebound  neuro- full str/sensation, cn ii-xii grossly intact, normal coordination  RLE: ttp to knee and distal femur, abrasion to knee  Hip: minimal R hip ttp  Back: no c spine ttp    trauma alert- head injury on AC  cth, ctcs, cxr, pelvis film, R knee film, trauma labs  ed obs

## 2019-01-19 NOTE — H&P ADULT - NSHPSOCIALHISTORY_GEN_ALL_CORE
no smoking  no etoh use  no drug use    patient lives at home alone, completes all ADL's on her own   passed away 1.5 years ago

## 2019-01-19 NOTE — CONSULT NOTE ADULT - SUBJECTIVE AND OBJECTIVE BOX
78F s/p GL mechanical fall and R knee pain. ELVIS, previously was able to ambulate upstairs, reported R knee pain +hip pain after fall. No R knee clicking or pop after fall. Since being in ED, Hip pain improved to pain 2/10. Knee pain still 8/10.   No open wounds. No numbness/tingling.   Walks with cane. hx of OA in R knee.  Limited ROM in R knee.     ED got CT of hip; ortho consulted to r/o hip fx    phx: afib, htn, knee oa  nka  med per chart   NS, retired    PE  NAD   RLE   Can SLR  No pain with axial force  No log roll pain at hip, pain at suprapatella area  M JLT   v/v stable   lachman neg  WWP toes  ROM 0-85   s/sp/t silt    xr: Tricompartment OA R knee  CT hip, no hip fx, xr : no monica fx    A/p   rec MRI of R Hip to r/o hip fx   NWB pending MRI   -Knee pain   OA vs MMT  Cryotherapy  NSAID 78F s/p GL mechanical fall and R knee pain. ELVIS, previously was able to ambulate upstairs, reported R knee pain +hip pain after fall. No R knee clicking or pop after fall. Since being in ED, Hip pain improved to pain 2/10. Knee pain still 8/10.   No open wounds. No numbness/tingling.   Walks with cane. hx of OA in R knee.  Limited ROM in R knee.     ED got CT of hip; ortho consulted to r/o hip fx    phx: afib, htn, knee oa  nka  med per chart   NS, retired    PE  NAD   RLE   Can SLR  No pain with axial force  No log roll pain at hip, pain at suprapatella area  M JLT   v/v stable   lachman neg  WWP toes  ROM 0-85   s/sp/t silt    xr: Tricompartment OA R knee  CT hip, no hip fx, xr : no monica fx    A/p   rec MRI of R Hip to r/o hip fx   NWB pending MRI   -Knee pain   OA vs MMT  Cryotherapy  NSAID   second fall in a month  significant right hip pain  x-rays negative  nv intact    MRI  edema in femoral neck  no clear evidence of fx to neck  will discuss with radiology and DHF tomorrow    choices discussed with patient and family include protected WB or CRPP

## 2019-01-19 NOTE — ED ADULT NURSE NOTE - NSIMPLEMENTINTERV_GEN_ALL_ED
Implemented All Fall with Harm Risk Interventions:  [place] to call system. Call bell, personal items and telephone within reach. Instruct patient to call for assistance. Room bathroom lighting operational. Non-slip footwear when patient is off stretcher. Physically safe environment: no spills, clutter or unnecessary equipment. Stretcher in lowest position, wheels locked, appropriate side rails in place. Provide visual cue, wrist band, yellow gown, etc. Monitor gait and stability. Monitor for mental status changes and reorient to person, place, and time. Review medications for side effects contributing to fall risk. Reinforce activity limits and safety measures with patient and family. Provide visual clues: red socks.

## 2019-01-19 NOTE — H&P ADULT - ASSESSMENT
#mechanical fall subacute fem neck incomplete fracture  - admit to medicine  - orthopedics eval  - bedrest for now, NWB for RLE until otho eval  - pain control  - pt rehab eval      # Atrial fibrillation  -hold xarelto for now incase ortho needs to operate  -lovenox BID for now, if ortho states patient does not need surgery then restart xarelto  -metoprolol 50mg BID      # HTN  c/w home medications    #hypothyroid  - levothyroxine 175 qhs Monday -friday (patinets dosing from her endocrinologist)    #HLD  atorvastatin    #depression- celexa    #overactive bladder  oxybutynin qhs    #diet, dash diet

## 2019-01-19 NOTE — H&P ADULT - NSHPLABSRESULTS_GEN_ALL_CORE
Allergies  No Known Allergies    Intolerances        T(F): 96.7 (19 @ 16:06), Max: 98 (19 @ 10:50)  HR: 84 (19 @ 16:06) (56 - 84)  BP: 157/85 (19 @ 16:06) (140/56 - 160/75)  BP(mean): --  RR: 18 (19 @ 16:06) (18 - 20)  SpO2: 98% (19 @ 14:51) (98% - 98%)        143  |  99  |  30<H>  ----------------------------<  100<H>  3.8   |  28  |  1.2    Ca    9.2      2019 11:20    TPro  6.5  /  Alb  4.0  /  TBili  0.4  /  DBili  x   /  AST  9   /  ALT  6   /  AlkPhos  82                              10.6   13.30 )-----------( 341      ( 2019 11:20 )             34.9         Urinalysis Basic - ( 2019 11:21 )    Color: Yellow / Appearance: Clear / S.015 / pH: x  Gluc: x / Ketone: Negative  / Bili: Negative / Urobili: 0.2 mg/dL   Blood: x / Protein: Negative mg/dL / Nitrite: Negative   Leuk Esterase: Negative / RBC: x / WBC x   Sq Epi: x / Non Sq Epi: x / Bacteria: x      POCT Blood Glucose.: 104 mg/dL (2019 11:26)    < from: CT Hip No Cont, Right (19 @ 13:55) >    1. Femoral head/ neck cortical irregularity with subacute impaction   incomplete fracture. If patient is not ambulatory and further assessment   is clinically,warranted consider MRI of the right hip to characterize   fracture  2. 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    Degenerative changes of the cervical spine as above.    Multiple lucent vertebral body lesions are again noted which may reflect   hemangiomas. If there is clinical concern consider nonemergent MRI or   bone scan.    < end of copied text >    < from: CT Head No Cont (19 @ 13:45) >    1.  No evidence of acute intracranial pathology.      2.  Chronic microvascular ischemic changes.    3.  Small right frontal extracalvarial hematoma.    < end of copied text >

## 2019-01-19 NOTE — H&P ADULT - ATTENDING COMMENTS
79 yo woman with hx afib admitted after mechanical fall subacute fem neck incomplete fracture    Pt seen and examined- uncomfortable    chart reviewed- agree with above initial plan    awaiting MRI    ortho f/u    DVT proph  analgesia  decubitus prevention    resume xarelto and call PT/rehab  if no surgery planned

## 2019-01-19 NOTE — ED PROVIDER NOTE - OBJECTIVE STATEMENT
77 yo F with a hx of a. fib on Xarelto, HTN, HLD, presents after a fall with head trauma. Incident occurred last night, ~ 12 hours pta. Patient tripped, fell forward, and hit the top of her head on a step. No LOC. C/o right hip/knee pain. Weight bearing after incident. Denies NV, abd pain. GCS 15.

## 2019-01-19 NOTE — ED ADULT NURSE NOTE - PMH
Afib  on xarelto  CKD (chronic kidney disease) stage 3, GFR 30-59 ml/min    High cholesterol    HTN (hypertension)    Hypothyroid  s/p thyroid ca surgery  Osteoarthritis    Pituitary adenoma  ??  Sleep apnea

## 2019-01-19 NOTE — H&P ADULT - NSHPPHYSICALEXAM_GEN_ALL_CORE
PHYSICAL EXAM:  GENERAL: NAD,   HEAD:  Normocephalic, some sweling on top of scalp, no rbuising  EYES: EOMI,   NECK: Supple, No JVD  CHEST/LUNG: Clear to auscultation bilaterally; No wheeze; No crackles; No accessory muscles used  HEART: Regular rate and rhythm; No murmurs;   ABDOMEN: Soft, Nontender, Nondistended; No guarding  EXTREMITIES:  b/l knee swelling, right knee ttp, no anterior or posterior drawer signs (pain during exam), sensation intact thoughout b/l LE  PSYCH: AAOx3  NEUROLOGY: non-focal  SKIN: No rashes or lesions

## 2019-01-20 LAB
ALBUMIN SERPL ELPH-MCNC: 3.6 G/DL — SIGNIFICANT CHANGE UP (ref 3.5–5.2)
ALP SERPL-CCNC: 73 U/L — SIGNIFICANT CHANGE UP (ref 30–115)
ALT FLD-CCNC: <5 U/L — SIGNIFICANT CHANGE UP (ref 0–41)
ANION GAP SERPL CALC-SCNC: 15 MMOL/L — HIGH (ref 7–14)
APTT BLD: 44.6 SEC — HIGH (ref 27–39.2)
AST SERPL-CCNC: 7 U/L — SIGNIFICANT CHANGE UP (ref 0–41)
BILIRUB SERPL-MCNC: 0.6 MG/DL — SIGNIFICANT CHANGE UP (ref 0.2–1.2)
BUN SERPL-MCNC: 27 MG/DL — HIGH (ref 10–20)
CALCIUM SERPL-MCNC: 8.8 MG/DL — SIGNIFICANT CHANGE UP (ref 8.5–10.1)
CHLORIDE SERPL-SCNC: 97 MMOL/L — LOW (ref 98–110)
CO2 SERPL-SCNC: 27 MMOL/L — SIGNIFICANT CHANGE UP (ref 17–32)
CREAT SERPL-MCNC: 1.3 MG/DL — SIGNIFICANT CHANGE UP (ref 0.7–1.5)
GLUCOSE SERPL-MCNC: 177 MG/DL — HIGH (ref 70–99)
INR BLD: 1.32 RATIO — HIGH (ref 0.65–1.3)
MAGNESIUM SERPL-MCNC: 2.6 MG/DL — HIGH (ref 1.8–2.4)
POTASSIUM SERPL-MCNC: 3.9 MMOL/L — SIGNIFICANT CHANGE UP (ref 3.5–5)
POTASSIUM SERPL-SCNC: 3.9 MMOL/L — SIGNIFICANT CHANGE UP (ref 3.5–5)
PROT SERPL-MCNC: 6.1 G/DL — SIGNIFICANT CHANGE UP (ref 6–8)
PROTHROM AB SERPL-ACNC: 15.1 SEC — HIGH (ref 9.95–12.87)
SODIUM SERPL-SCNC: 139 MMOL/L — SIGNIFICANT CHANGE UP (ref 135–146)

## 2019-01-20 RX ADMIN — OXYCODONE HYDROCHLORIDE 10 MILLIGRAM(S): 5 TABLET ORAL at 03:35

## 2019-01-20 RX ADMIN — Medication 5 MILLIGRAM(S): at 05:24

## 2019-01-20 RX ADMIN — OXYCODONE HYDROCHLORIDE 10 MILLIGRAM(S): 5 TABLET ORAL at 08:18

## 2019-01-20 RX ADMIN — AMLODIPINE BESYLATE 5 MILLIGRAM(S): 2.5 TABLET ORAL at 21:36

## 2019-01-20 RX ADMIN — CITALOPRAM 20 MILLIGRAM(S): 10 TABLET, FILM COATED ORAL at 12:30

## 2019-01-20 RX ADMIN — OXYCODONE HYDROCHLORIDE 10 MILLIGRAM(S): 5 TABLET ORAL at 08:44

## 2019-01-20 RX ADMIN — ATORVASTATIN CALCIUM 20 MILLIGRAM(S): 80 TABLET, FILM COATED ORAL at 21:36

## 2019-01-20 RX ADMIN — Medication 50 MILLIGRAM(S): at 05:24

## 2019-01-20 RX ADMIN — Medication 50 MILLIGRAM(S): at 17:08

## 2019-01-20 RX ADMIN — OXYCODONE HYDROCHLORIDE 10 MILLIGRAM(S): 5 TABLET ORAL at 17:23

## 2019-01-20 RX ADMIN — LISINOPRIL 20 MILLIGRAM(S): 2.5 TABLET ORAL at 17:08

## 2019-01-20 RX ADMIN — LISINOPRIL 20 MILLIGRAM(S): 2.5 TABLET ORAL at 05:24

## 2019-01-20 RX ADMIN — Medication 5 MILLIGRAM(S): at 17:08

## 2019-01-20 RX ADMIN — ENOXAPARIN SODIUM 90 MILLIGRAM(S): 100 INJECTION SUBCUTANEOUS at 05:24

## 2019-01-20 RX ADMIN — Medication 1 TABLET(S): at 05:24

## 2019-01-20 RX ADMIN — ENOXAPARIN SODIUM 90 MILLIGRAM(S): 100 INJECTION SUBCUTANEOUS at 17:08

## 2019-01-20 RX ADMIN — OXYCODONE HYDROCHLORIDE 10 MILLIGRAM(S): 5 TABLET ORAL at 03:03

## 2019-01-20 NOTE — PROGRESS NOTE ADULT - ASSESSMENT
Patient seen and examined at bedside. C/O RLE hip and knee pain. Denies other complaints at this time.    Vital Signs Last 24 Hrs  T(C): 37.2 (20 Jan 2019 08:27), Max: 37.7 (19 Jan 2019 23:58)  T(F): 98.9 (20 Jan 2019 08:27), Max: 99.9 (19 Jan 2019 23:58)  HR: 85 (20 Jan 2019 08:27) (56 - 85)  BP: 153/75 (20 Jan 2019 08:27) (132/63 - 161/67)  BP(mean): --  RR: 18 (20 Jan 2019 08:27) (18 - 20)  SpO2: 98% (19 Jan 2019 14:51) (98% - 98%)    PE:  NAD   unlabored resp  RLE:   Can SLR  No pain with axial force  No log roll pain at hip, pain at suprapatella area  M JLT   v/v stable   lachman neg  WWP toes  ROM 0-85   s/sp/t silt    Imaging reviewed                          10.6   13.30 )-----------( 341      ( 19 Jan 2019 11:20 )             34.9     A/P: 77 yo F with right hip and knee pain, DJD R knee. R/O R hip fx  - pain control; NSAIDs if not contraindicated  - DVT ppx  - MRI R hip w/o contrast  - NWB until MRI  - Knee pain likely 2/2 OA vs MMT  - cryotherapy RLE  - Care per primary team  - Ortho following

## 2019-01-20 NOTE — PROGRESS NOTE ADULT - ASSESSMENT
SUBJECTIVE:    Patient is a 78y old  Female who presents with a chief complaint of fall, femoral head/neck subacute incomplete fracture (2019 10:13)    Currently admitted to medicine with the primary diagnosis of Hip fracture, right     Today is hospital day 1d. This morning she is resting comfortably in bed and reports no new issues or overnight events.     PAST MEDICAL & SURGICAL HISTORY  PAST MEDICAL & SURGICAL HISTORY:  CKD (chronic kidney disease) stage 3, GFR 30-59 ml/min  Pituitary adenoma: ??  Osteoarthritis  Sleep apnea  Afib: on xarelto  Hypothyroid: s/p thyroid ca surgery  High cholesterol  HTN (hypertension)  S/P rotator cuff surgery  H/O thyroidectomy      ALLERGIES:  No Known Allergies    MEDICATIONS:  STANDING MEDICATIONS  amLODIPine   Tablet 5 milliGRAM(s) Oral at bedtime  atorvastatin 20 milliGRAM(s) Oral at bedtime  citalopram 20 milliGRAM(s) Oral daily  enoxaparin Injectable 90 milliGRAM(s) SubCutaneous two times a day  levothyroxine 175 MICROGram(s) Oral <User Schedule>  lisinopril 20 milliGRAM(s) Oral two times a day  metoprolol tartrate 50 milliGRAM(s) Oral two times a day  oxybutynin 5 milliGRAM(s) Oral two times a day  triamterene 37.5 mG/hydrochlorothiazide 25 mG Tablet 1 Tablet(s) Oral daily    PRN MEDICATIONS  acetaminophen   Tablet .. 650 milliGRAM(s) Oral every 6 hours PRN  morphine  - Injectable 4 milliGRAM(s) IV Push every 4 hours PRN  oxyCODONE    IR 10 milliGRAM(s) Oral every 4 hours PRN    VITALS:   T(F): 98.9  HR: 85  BP: 153/75  RR: 18  SpO2: 98%    LABS:                        10.6   13.30 )-----------( 341      ( 2019 11:20 )             34.9     01-20    139  |  97<L>  |  27<H>  ----------------------------<  177<H>  3.9   |  27  |  1.3    Ca    8.8      2019 09:45  Mg     2.6     -    TPro  6.1  /  Alb  3.6  /  TBili  0.6  /  DBili  x   /  AST  7   /  ALT  <5  /  AlkPhos  73      PT/INR - ( 2019 09:45 )   PT: 15.10 sec;   INR: 1.32 ratio         PTT - ( 2019 09:45 )  PTT:44.6 sec  Urinalysis Basic - ( 2019 11:21 )    Color: Yellow / Appearance: Clear / S.015 / pH: x  Gluc: x / Ketone: Negative  / Bili: Negative / Urobili: 0.2 mg/dL   Blood: x / Protein: Negative mg/dL / Nitrite: Negative   Leuk Esterase: Negative / RBC: x / WBC x   Sq Epi: x / Non Sq Epi: x / Bacteria: x                RADIOLOGY:    < from: MR Hip No Cont, Right (19 @ 12:07) >  Impression: Focal marrow edema at right femoral head /neck junction   consistent with stress related change     No acutely displaced or complete femoral neck fracture    Moderate right hip effusion with synovitis and femoral head neck cystic   degenerative/erosive changes. Given labral chondrocalcinosis on    CT, findings suggest underlying CPPD arthropathy    < end of copied text >      PHYSICAL EXAM:  GEN: No acute distress  HEENT: NCAT  LUNGS: Clear to auscultation bilaterally   HEART: RRR. no murmurs  ABD: Soft, non-tender, non-distended  EXT: no edema  NEURO: AAOX3    Assessment and Plan:  #mechanical fall subacute fem neck incomplete fracture  - MRI suggestive of CPPD  - consider arthrocentesis or glucocorticoid joint injection?  - f/u ortho recommendations for weight bearing status  - pain control  - pt rehab eval      # Atrial fibrillation  -giving therapeutic lovenox for now until no operative management by ortho  -metoprolol 50mg BID      # HTN  c/w home medications    #hypothyroid  - levothyroxine 175 qhs Monday -friday (patinets dosing from her endocrinologist)    #HLD  atorvastatin    #depression- celexa    #overactive bladder  oxybutynin qhs    #diet, dash diet SUBJECTIVE:    Patient is a 78y old  Female who presents with a chief complaint of fall, femoral head/neck subacute incomplete fracture (2019 10:13)    Currently admitted to medicine with the primary diagnosis of Hip fracture, right     Today is hospital day 1d. This morning she is still having RLE pain. Denies fever/chills, chest pain, SOB.    PAST MEDICAL & SURGICAL HISTORY  PAST MEDICAL & SURGICAL HISTORY:  CKD (chronic kidney disease) stage 3, GFR 30-59 ml/min  Pituitary adenoma: ??  Osteoarthritis  Sleep apnea  Afib: on xarelto  Hypothyroid: s/p thyroid ca surgery  High cholesterol  HTN (hypertension)  S/P rotator cuff surgery  H/O thyroidectomy      ALLERGIES:  No Known Allergies    MEDICATIONS:  STANDING MEDICATIONS  amLODIPine   Tablet 5 milliGRAM(s) Oral at bedtime  atorvastatin 20 milliGRAM(s) Oral at bedtime  citalopram 20 milliGRAM(s) Oral daily  enoxaparin Injectable 90 milliGRAM(s) SubCutaneous two times a day  levothyroxine 175 MICROGram(s) Oral <User Schedule>  lisinopril 20 milliGRAM(s) Oral two times a day  metoprolol tartrate 50 milliGRAM(s) Oral two times a day  oxybutynin 5 milliGRAM(s) Oral two times a day  triamterene 37.5 mG/hydrochlorothiazide 25 mG Tablet 1 Tablet(s) Oral daily    PRN MEDICATIONS  acetaminophen   Tablet .. 650 milliGRAM(s) Oral every 6 hours PRN  morphine  - Injectable 4 milliGRAM(s) IV Push every 4 hours PRN  oxyCODONE    IR 10 milliGRAM(s) Oral every 4 hours PRN    VITALS:   T(F): 98.9  HR: 85  BP: 153/75  RR: 18  SpO2: 98%    LABS:                        10.6   13.30 )-----------( 341      ( 2019 11:20 )             34.9     01-20    139  |  97<L>  |  27<H>  ----------------------------<  177<H>  3.9   |  27  |  1.3    Ca    8.8      2019 09:45  Mg     2.6     01-20    TPro  6.1  /  Alb  3.6  /  TBili  0.6  /  DBili  x   /  AST  7   /  ALT  <5  /  AlkPhos  73      PT/INR - ( 2019 09:45 )   PT: 15.10 sec;   INR: 1.32 ratio         PTT - ( 2019 09:45 )  PTT:44.6 sec  Urinalysis Basic - ( 2019 11:21 )    Color: Yellow / Appearance: Clear / S.015 / pH: x  Gluc: x / Ketone: Negative  / Bili: Negative / Urobili: 0.2 mg/dL   Blood: x / Protein: Negative mg/dL / Nitrite: Negative   Leuk Esterase: Negative / RBC: x / WBC x   Sq Epi: x / Non Sq Epi: x / Bacteria: x                RADIOLOGY:    < from: MR Hip No Cont, Right (19 @ 12:07) >  Impression: Focal marrow edema at right femoral head /neck junction   consistent with stress related change     No acutely displaced or complete femoral neck fracture    Moderate right hip effusion with synovitis and femoral head neck cystic   degenerative/erosive changes. Given labral chondrocalcinosis on    CT, findings suggest underlying CPPD arthropathy    < end of copied text >      PHYSICAL EXAM:  GEN: mod discomfort  HEENT: NCAT  LUNGS: Clear to auscultation bilaterally   HEART: irregular. no murmurs  ABD: Soft, non-tender, non-distended  EXT: RLE tenderness from knee down, difficulty moving 2/2 pain, no erythema or swollen joint  NEURO: AAOX3    Assessment and Plan:  #mechanical fall subacute fem neck incomplete fracture  - MRI suggestive of CPPD  - consider arthrocentesis or glucocorticoid joint injection?  - f/u ortho recommendations for weight bearing status  - pain control  - pt rehab eval      # Atrial fibrillation  -giving therapeutic lovenox for now until no operative management by ortho  -metoprolol 50mg BID      # HTN  c/w home medications    #hypothyroid  - levothyroxine 175 qhs Monday -friday (patinets dosing from her endocrinologist)    #HLD  atorvastatin    #depression- celexa    #overactive bladder  oxybutynin qhs    #diet, dash diet

## 2019-01-21 LAB
ANION GAP SERPL CALC-SCNC: 15 MMOL/L — HIGH (ref 7–14)
BASOPHILS # BLD AUTO: 0.03 K/UL — SIGNIFICANT CHANGE UP (ref 0–0.2)
BASOPHILS NFR BLD AUTO: 0.2 % — SIGNIFICANT CHANGE UP (ref 0–1)
BUN SERPL-MCNC: 27 MG/DL — HIGH (ref 10–20)
CALCIUM SERPL-MCNC: 8.8 MG/DL — SIGNIFICANT CHANGE UP (ref 8.5–10.1)
CHLORIDE SERPL-SCNC: 100 MMOL/L — SIGNIFICANT CHANGE UP (ref 98–110)
CO2 SERPL-SCNC: 28 MMOL/L — SIGNIFICANT CHANGE UP (ref 17–32)
CREAT SERPL-MCNC: 1.3 MG/DL — SIGNIFICANT CHANGE UP (ref 0.7–1.5)
EOSINOPHIL # BLD AUTO: 0.01 K/UL — SIGNIFICANT CHANGE UP (ref 0–0.7)
EOSINOPHIL NFR BLD AUTO: 0.1 % — SIGNIFICANT CHANGE UP (ref 0–8)
GLUCOSE SERPL-MCNC: 132 MG/DL — HIGH (ref 70–99)
HCT VFR BLD CALC: 32.2 % — LOW (ref 37–47)
HGB BLD-MCNC: 9.6 G/DL — LOW (ref 12–16)
IMM GRANULOCYTES NFR BLD AUTO: 0.9 % — HIGH (ref 0.1–0.3)
LYMPHOCYTES # BLD AUTO: 1.56 K/UL — SIGNIFICANT CHANGE UP (ref 1.2–3.4)
LYMPHOCYTES # BLD AUTO: 11.3 % — LOW (ref 20.5–51.1)
MAGNESIUM SERPL-MCNC: 2.1 MG/DL — SIGNIFICANT CHANGE UP (ref 1.8–2.4)
MCHC RBC-ENTMCNC: 24.4 PG — LOW (ref 27–31)
MCHC RBC-ENTMCNC: 29.8 G/DL — LOW (ref 32–37)
MCV RBC AUTO: 81.9 FL — SIGNIFICANT CHANGE UP (ref 81–99)
MONOCYTES # BLD AUTO: 1.66 K/UL — HIGH (ref 0.1–0.6)
MONOCYTES NFR BLD AUTO: 12 % — HIGH (ref 1.7–9.3)
NEUTROPHILS # BLD AUTO: 10.41 K/UL — HIGH (ref 1.4–6.5)
NEUTROPHILS NFR BLD AUTO: 75.5 % — HIGH (ref 42.2–75.2)
NRBC # BLD: 0 /100 WBCS — SIGNIFICANT CHANGE UP (ref 0–0)
PLATELET # BLD AUTO: 298 K/UL — SIGNIFICANT CHANGE UP (ref 130–400)
POTASSIUM SERPL-MCNC: 4 MMOL/L — SIGNIFICANT CHANGE UP (ref 3.5–5)
POTASSIUM SERPL-SCNC: 4 MMOL/L — SIGNIFICANT CHANGE UP (ref 3.5–5)
RBC # BLD: 3.93 M/UL — LOW (ref 4.2–5.4)
RBC # FLD: 15.6 % — HIGH (ref 11.5–14.5)
SODIUM SERPL-SCNC: 143 MMOL/L — SIGNIFICANT CHANGE UP (ref 135–146)
WBC # BLD: 13.79 K/UL — HIGH (ref 4.8–10.8)
WBC # FLD AUTO: 13.79 K/UL — HIGH (ref 4.8–10.8)

## 2019-01-21 RX ORDER — RIVAROXABAN 15 MG-20MG
20 KIT ORAL AT BEDTIME
Qty: 0 | Refills: 0 | Status: DISCONTINUED | OUTPATIENT
Start: 2019-01-21 | End: 2019-01-25

## 2019-01-21 RX ADMIN — CITALOPRAM 20 MILLIGRAM(S): 10 TABLET, FILM COATED ORAL at 11:45

## 2019-01-21 RX ADMIN — OXYCODONE HYDROCHLORIDE 10 MILLIGRAM(S): 5 TABLET ORAL at 12:30

## 2019-01-21 RX ADMIN — Medication 50 MILLIGRAM(S): at 17:06

## 2019-01-21 RX ADMIN — Medication 5 MILLIGRAM(S): at 17:06

## 2019-01-21 RX ADMIN — ATORVASTATIN CALCIUM 20 MILLIGRAM(S): 80 TABLET, FILM COATED ORAL at 22:18

## 2019-01-21 RX ADMIN — Medication 5 MILLIGRAM(S): at 06:17

## 2019-01-21 RX ADMIN — RIVAROXABAN 20 MILLIGRAM(S): KIT at 22:18

## 2019-01-21 RX ADMIN — LISINOPRIL 20 MILLIGRAM(S): 2.5 TABLET ORAL at 17:06

## 2019-01-21 RX ADMIN — Medication 175 MICROGRAM(S): at 22:17

## 2019-01-21 RX ADMIN — LISINOPRIL 20 MILLIGRAM(S): 2.5 TABLET ORAL at 06:17

## 2019-01-21 RX ADMIN — ENOXAPARIN SODIUM 90 MILLIGRAM(S): 100 INJECTION SUBCUTANEOUS at 06:16

## 2019-01-21 RX ADMIN — OXYCODONE HYDROCHLORIDE 10 MILLIGRAM(S): 5 TABLET ORAL at 04:02

## 2019-01-21 RX ADMIN — Medication 50 MILLIGRAM(S): at 06:17

## 2019-01-21 RX ADMIN — AMLODIPINE BESYLATE 5 MILLIGRAM(S): 2.5 TABLET ORAL at 22:18

## 2019-01-21 RX ADMIN — OXYCODONE HYDROCHLORIDE 10 MILLIGRAM(S): 5 TABLET ORAL at 11:47

## 2019-01-21 RX ADMIN — OXYCODONE HYDROCHLORIDE 10 MILLIGRAM(S): 5 TABLET ORAL at 22:26

## 2019-01-21 RX ADMIN — Medication 1 TABLET(S): at 07:02

## 2019-01-21 RX ADMIN — OXYCODONE HYDROCHLORIDE 10 MILLIGRAM(S): 5 TABLET ORAL at 22:23

## 2019-01-21 NOTE — PROGRESS NOTE ADULT - SUBJECTIVE AND OBJECTIVE BOX
Patient was seen and examined. Spoke with RN. Chart reviewed.  No events overnight.  Vital Signs Last 24 Hrs  T(F): 99.3 (2019 00:00), Max: 100.4 (2019 15:45)  HR: 104 (2019 00:00) (79 - 104)  BP: 120/71 (2019 00:00) (120/71 - 153/75)  SpO2: --  MEDICATIONS  (STANDING):  amLODIPine   Tablet 5 milliGRAM(s) Oral at bedtime  atorvastatin 20 milliGRAM(s) Oral at bedtime  citalopram 20 milliGRAM(s) Oral daily  enoxaparin Injectable 90 milliGRAM(s) SubCutaneous two times a day  levothyroxine 175 MICROGram(s) Oral <User Schedule>  lisinopril 20 milliGRAM(s) Oral two times a day  metoprolol tartrate 50 milliGRAM(s) Oral two times a day  oxybutynin 5 milliGRAM(s) Oral two times a day  triamterene 37.5 mG/hydrochlorothiazide 25 mG Tablet 1 Tablet(s) Oral daily    MEDICATIONS  (PRN):  acetaminophen   Tablet .. 650 milliGRAM(s) Oral every 6 hours PRN Mild Pain (1 - 3)  morphine  - Injectable 4 milliGRAM(s) IV Push every 4 hours PRN Severe Pain (7 - 10)  oxyCODONE    IR 10 milliGRAM(s) Oral every 4 hours PRN Moderate Pain (4 - 6)    Labs:                        9.6    13.79 )-----------( 298      ( 2019 06:52 )             32.2                         10.6   13.30 )-----------( 341      ( 2019 11:20 )             34.9     2019 09:45    139    |  97     |  27     ----------------------------<  177    3.9     |  27     |  1.3    2019 11:20    143    |  99     |  30     ----------------------------<  100    3.8     |  28     |  1.2      Ca    8.8        2019 09:45  Ca    9.2        2019 11:20  Mg     2.6       2019 09:45    TPro  6.1    /  Alb  3.6    /  TBili  0.6    /  DBili  x      /  AST  7      /  ALT  <5     /  AlkPhos  73     2019 09:45  TPro  6.5    /  Alb  4.0    /  TBili  0.4    /  DBili  x      /  AST  9      /  ALT  6      /  AlkPhos  82     2019 11:20    PT/INR - ( 2019 09:45 )   PT: 15.10 sec;   INR: 1.32 ratio         PTT - ( 2019 09:45 )  PTT:44.6 sec  Urinalysis Basic - ( 2019 11:21 )    Color: Yellow / Appearance: Clear / S.015 / pH: x  Gluc: x / Ketone: Negative  / Bili: Negative / Urobili: 0.2 mg/dL   Blood: x / Protein: Negative mg/dL / Nitrite: Negative   Leuk Esterase: Negative / RBC: x / WBC x   Sq Epi: x / Non Sq Epi: x / Bacteria: x        General: comfortable, NAD  Neurologynonfocal  Head:  Normocephalic, atraumatic  ENT:  Mucosa moist, no ulcerations  Neck:  Supple, no JVD,   Skin: no breakdowns (as per RN)  Resp: CTA B/L  CV: RRR, S1S2,   GI: Soft, NT, bowel sounds  MS: No edema, + peripheral pulses,     A/P:  77 yo woman with mechanical fall subacute fem neck incomplete fracture  - MRI suggestive of CPPD  - outpt rheum eval  - f/u ortho recommendations for weight bearing status  - pain control  - pt rehab eval      # Atrial fibrillation  -giving therapeutic lovenox for now until no operative management by ortho  -metoprolol 50mg BID    DVT prophylaxis  Decubitus prevention- all measures as per RN protocol  Please call or text me with any questions or updates

## 2019-01-21 NOTE — PROGRESS NOTE ADULT - SUBJECTIVE AND OBJECTIVE BOX
mri reviewed with dr oakes       < from: MR Hip No Cont, Right (01.20.19 @ 12:07) >  INTERPRETATION:  Clinical History / Reason for exam: Evaluate for fracture    Technique: Multiplanar multi sequential water and fat-weighted sequences   are obtained through the right hip    Comparison right hip CT January 19, 2019 and pelvic x-ray January 19, 2019    Findings: Corresponding to the femoral head neck cortical irregularity   there is bone marrow edema consistent with stress related change   superimposed on cystic/erosive atypical degenerative changes suggestive   of CPPD arthropathy (see series 5 image 10-12, series 8 image 11). In   addition there is focally severe femoral acetabular degenerative change   with edema within the acetabular osteophytes.    Incidentally noted is a benign area of marrow heterogeneity at the   proximal femoral metaphysis.    There is an anterior to anterior superior labral tear with synovitis.    Muscles and tendons about the hip girdle demonstrate partial thickness   hamstringorigin tear measuring external millimeter.    There is mild strain along the subcutaneous soft tissues with unchanged   focus of fat sclerosis surrounded by patchy elevated T2 signal.   Insertional gluteal tendinosis intact. Iliopsoas insertion demonstrates   tendinosis with small bursitis.    There is apparent pelvic lipomatosis with prominent intramuscular simple   fat planes dissecting through the sciatic notch and right groin    Small right hip effusion present.    Impression: Focal marrow edema at right femoral head /neck junction   consistent with stress related change     No acutely displaced or complete femoral neck fracture    Moderate right hip effusion with synovitis and femoral head neck cystic   degenerative/erosive changes. Given labral chondrocalcinosis on    CT, findings suggest underlying CPPD arthropathy    < end of copied text >    no surgical indication   pt can be protected weight bearing with walker for 4 weeks   please consult and start pt/rehab  dispo planning   fu dr xavi oakes 2 weeks [phone]

## 2019-01-21 NOTE — PROGRESS NOTE ADULT - ASSESSMENT
#mechanical fall subacute fem neck incomplete fracture  - MRI suggestive of CPPD  - ortho: no surgical indication   pt can be protected weight bearing with walker for 4 weeks   please consult and start pt/rehab  dispo planning   fu dr xavi oakes 2 weeks [phone]   - pain control  - pt rehab eval    # Atrial fibrillation  -ok to restart xeralto per ortho  -c/w metoprolol 50mg BID    # HTN  c/w home medications    #hypothyroid  - levothyroxine 175 qhs Monday -friday (patinets dosing from her endocrinologist)    #HLD  atorvastatin    #depression- celexa    #overactive bladder  oxybutynin qhs    #diet, dash diet

## 2019-01-21 NOTE — PROGRESS NOTE ADULT - SUBJECTIVE AND OBJECTIVE BOX
SUBJECTIVE:    Patient is a 78y old Female who presents with a chief complaint of fall, femoral head/neck subacute incomplete fracture (2019 08:27)    Currently admitted to medicine with the primary diagnosis of Hip fracture, right     Today is hospital day 2d.   OVERNIGHT EVENTS no acute events   Today, patient denies any cp, sob, or any pain.     PAST MEDICAL & SURGICAL HISTORY  CKD (chronic kidney disease) stage 3, GFR 30-59 ml/min  Pituitary adenoma: ??  Osteoarthritis  Sleep apnea  Afib: on xarelto  Hypothyroid: s/p thyroid ca surgery  High cholesterol  HTN (hypertension)  S/P rotator cuff surgery  H/O thyroidectomy          ALLERGIES:  No Known Allergies    MEDICATIONS:  STANDING MEDICATIONS  amLODIPine   Tablet 5 milliGRAM(s) Oral at bedtime  atorvastatin 20 milliGRAM(s) Oral at bedtime  citalopram 20 milliGRAM(s) Oral daily  levothyroxine 175 MICROGram(s) Oral <User Schedule>  lisinopril 20 milliGRAM(s) Oral two times a day  metoprolol tartrate 50 milliGRAM(s) Oral two times a day  oxybutynin 5 milliGRAM(s) Oral two times a day  rivaroxaban 20 milliGRAM(s) Oral every 24 hours  triamterene 37.5 mG/hydrochlorothiazide 25 mG Tablet 1 Tablet(s) Oral daily    PRN MEDICATIONS  acetaminophen   Tablet .. 650 milliGRAM(s) Oral every 6 hours PRN  morphine  - Injectable 4 milliGRAM(s) IV Push every 4 hours PRN  oxyCODONE    IR 10 milliGRAM(s) Oral every 4 hours PRN    VITALS:   T(F): 98.8  HR: 80  BP: 116/59  RR: 18  SpO2: --          LABS:                        9.6    13.79 )-----------( 298      ( 2019 06:52 )             32.2     -    143  |  100  |  27<H>  ----------------------------<  132<H>  4.0   |  28  |  1.3    Ca    8.8      2019 06:52  Mg     2.1         TPro  6.1  /  Alb  3.6  /  TBili  0.6  /  DBili  x   /  AST  7   /  ALT  <5  /  AlkPhos  73      PT/INR - ( 2019 09:45 )   PT: 15.10 sec;   INR: 1.32 ratio         PTT - ( 2019 09:45 )  PTT:44.6 sec  Urinalysis Basic - ( 2019 11:21 )    Color: Yellow / Appearance: Clear / S.015 / pH: x  Gluc: x / Ketone: Negative  / Bili: Negative / Urobili: 0.2 mg/dL   Blood: x / Protein: Negative mg/dL / Nitrite: Negative   Leuk Esterase: Negative / RBC: x / WBC x   Sq Epi: x / Non Sq Epi: x / Bacteria: x                RADIOLOGY:    PHYSICAL EXAM:  GEN: NAD  LUNGS: CTAB  HEART: RRR s1s2 present   ABD: soft nontender nondistended +BS  EXT: no edema

## 2019-01-22 ENCOUNTER — TRANSCRIPTION ENCOUNTER (OUTPATIENT)
Age: 79
End: 2019-01-22

## 2019-01-22 LAB
ANION GAP SERPL CALC-SCNC: 18 MMOL/L — HIGH (ref 7–14)
BASOPHILS # BLD AUTO: 0.03 K/UL — SIGNIFICANT CHANGE UP (ref 0–0.2)
BASOPHILS NFR BLD AUTO: 0.2 % — SIGNIFICANT CHANGE UP (ref 0–1)
BLD GP AB SCN SERPL QL: SIGNIFICANT CHANGE UP
BUN SERPL-MCNC: 35 MG/DL — HIGH (ref 10–20)
CALCIUM SERPL-MCNC: 9 MG/DL — SIGNIFICANT CHANGE UP (ref 8.5–10.1)
CHLORIDE SERPL-SCNC: 96 MMOL/L — LOW (ref 98–110)
CO2 SERPL-SCNC: 25 MMOL/L — SIGNIFICANT CHANGE UP (ref 17–32)
CREAT SERPL-MCNC: 1.5 MG/DL — SIGNIFICANT CHANGE UP (ref 0.7–1.5)
EOSINOPHIL # BLD AUTO: 0.03 K/UL — SIGNIFICANT CHANGE UP (ref 0–0.7)
EOSINOPHIL NFR BLD AUTO: 0.2 % — SIGNIFICANT CHANGE UP (ref 0–8)
GLUCOSE BLDC GLUCOMTR-MCNC: 117 MG/DL — HIGH (ref 70–99)
GLUCOSE SERPL-MCNC: 132 MG/DL — HIGH (ref 70–99)
HCT VFR BLD CALC: 31.4 % — LOW (ref 37–47)
HGB BLD-MCNC: 9.5 G/DL — LOW (ref 12–16)
IMM GRANULOCYTES NFR BLD AUTO: 0.8 % — HIGH (ref 0.1–0.3)
LYMPHOCYTES # BLD AUTO: 1.26 K/UL — SIGNIFICANT CHANGE UP (ref 1.2–3.4)
LYMPHOCYTES # BLD AUTO: 9 % — LOW (ref 20.5–51.1)
MCHC RBC-ENTMCNC: 24.6 PG — LOW (ref 27–31)
MCHC RBC-ENTMCNC: 30.3 G/DL — LOW (ref 32–37)
MCV RBC AUTO: 81.3 FL — SIGNIFICANT CHANGE UP (ref 81–99)
MONOCYTES # BLD AUTO: 1.52 K/UL — HIGH (ref 0.1–0.6)
MONOCYTES NFR BLD AUTO: 10.8 % — HIGH (ref 1.7–9.3)
NEUTROPHILS # BLD AUTO: 11.1 K/UL — HIGH (ref 1.4–6.5)
NEUTROPHILS NFR BLD AUTO: 79 % — HIGH (ref 42.2–75.2)
NRBC # BLD: 0 /100 WBCS — SIGNIFICANT CHANGE UP (ref 0–0)
PLATELET # BLD AUTO: 302 K/UL — SIGNIFICANT CHANGE UP (ref 130–400)
POTASSIUM SERPL-MCNC: 4 MMOL/L — SIGNIFICANT CHANGE UP (ref 3.5–5)
POTASSIUM SERPL-SCNC: 4 MMOL/L — SIGNIFICANT CHANGE UP (ref 3.5–5)
RBC # BLD: 3.86 M/UL — LOW (ref 4.2–5.4)
RBC # FLD: 15.8 % — HIGH (ref 11.5–14.5)
SODIUM SERPL-SCNC: 139 MMOL/L — SIGNIFICANT CHANGE UP (ref 135–146)
TYPE + AB SCN PNL BLD: SIGNIFICANT CHANGE UP
WBC # BLD: 14.05 K/UL — HIGH (ref 4.8–10.8)
WBC # FLD AUTO: 14.05 K/UL — HIGH (ref 4.8–10.8)

## 2019-01-22 RX ORDER — CHLORHEXIDINE GLUCONATE 213 G/1000ML
1 SOLUTION TOPICAL
Qty: 0 | Refills: 0 | Status: DISCONTINUED | OUTPATIENT
Start: 2019-01-22 | End: 2019-01-25

## 2019-01-22 RX ADMIN — Medication 5 MILLIGRAM(S): at 18:01

## 2019-01-22 RX ADMIN — LISINOPRIL 20 MILLIGRAM(S): 2.5 TABLET ORAL at 18:01

## 2019-01-22 RX ADMIN — RIVAROXABAN 20 MILLIGRAM(S): KIT at 21:46

## 2019-01-22 RX ADMIN — Medication 50 MILLIGRAM(S): at 18:01

## 2019-01-22 RX ADMIN — CITALOPRAM 20 MILLIGRAM(S): 10 TABLET, FILM COATED ORAL at 13:02

## 2019-01-22 RX ADMIN — ATORVASTATIN CALCIUM 20 MILLIGRAM(S): 80 TABLET, FILM COATED ORAL at 21:46

## 2019-01-22 RX ADMIN — AMLODIPINE BESYLATE 5 MILLIGRAM(S): 2.5 TABLET ORAL at 21:46

## 2019-01-22 RX ADMIN — Medication 175 MICROGRAM(S): at 21:46

## 2019-01-22 RX ADMIN — Medication 1 TABLET(S): at 05:39

## 2019-01-22 RX ADMIN — Medication 50 MILLIGRAM(S): at 05:38

## 2019-01-22 RX ADMIN — OXYCODONE HYDROCHLORIDE 10 MILLIGRAM(S): 5 TABLET ORAL at 10:37

## 2019-01-22 RX ADMIN — Medication 5 MILLIGRAM(S): at 05:38

## 2019-01-22 RX ADMIN — Medication 40 MILLIGRAM(S): at 18:01

## 2019-01-22 RX ADMIN — LISINOPRIL 20 MILLIGRAM(S): 2.5 TABLET ORAL at 05:38

## 2019-01-22 RX ADMIN — OXYCODONE HYDROCHLORIDE 10 MILLIGRAM(S): 5 TABLET ORAL at 10:06

## 2019-01-22 NOTE — CONSULT NOTE ADULT - ASSESSMENT
IMPRESSION: Rehab of gait Ab ? Hip Fx R -Gouty Flare      PRECAUTIONS: [    ] Cardiac  [    ] Respiratory  [    ] Seizures [    ] Contact Isolation  [    ] Droplet Isolation  [    ] Other    Weight Bearing Status: PROTECTED WB RLE PER ORTHO- WALKER/ 50% RLE    RECOMMENDATION:  CHECK URIC ACID, STEROIDS    Out of Bed to Chair     DVT/Decubiti Prophylaxis    REHAB PLAN:     [  x   ] Bedside P/T 3-5 times a week   [     ] Bedside O/T  2-3 times a week   [     ] No Rehab Therapy Indicated   [     ]  Speech Therapy   Conditioning/ROM                                 ADL  Bed Mobility                                            Conditioning/ROM  Transfers                                                  Bed Mobility  Sitting /Standing Balance                      Transfers                                        Gait Training                                            Sitting/Standing Balance  Stair Training [x   ]Applicable                 Home equipment Eval                                                                     Splinting  [   ] Only      GOALS:   ADL   [    xIndependent         Transfers  [  x  ] Independent            Ambulation  [ x    ] Independent     [  x   ] With device                            [    ]  CG                                               [    ]  CG                                                    [     ] CG                            [    ] Min A                                          [    ] Min A                                                [     ] Min  A          DISCHARGE PLAN:   [     ]  Good candidate for Intensive Rehabilitation/Hospital based                                             Will tolerate 3hrs Intensive Rehab Daily                                       [      ]  Short Term Rehab in Skilled Nursing Facility                                       [      ]  Home with Outpatient or  services                                         [   x   ]  Possible Candidate for Intensive Hospital based Rehab

## 2019-01-22 NOTE — DISCHARGE NOTE ADULT - CARE PROVIDER_API CALL
Tad Savage (DO), Medicine  [address] [place] [address]  Phone: [phone]  Fax: [phone]    Alex Aguilar), Orthopaedic Surgery  [address] [place] [id number]  Phone: [phone]  Fax: [phone]

## 2019-01-22 NOTE — DISCHARGE NOTE ADULT - HOSPITAL COURSE
79 yo female with pmh of PAF on xarelto, htn, hld, depression, hypothyroid p/w mechanical fall with (+)head trauma, no LOC. Patient stated that she tripped on the pavement and hit top of her head on a step. Pt was found to have mechanical fall subacute fem neck incomplete fracture on xray and CT. CTH was negative for acute intracranial pathology.  MRI of hip suggestive of CPPD. Ortho recommended no surgical intervention; pt can be protected weight bearing with walker for 4 weeks (50% weight bearing).  PT/rehab consulted, rec 4A but pt denied. Her hospital course complicated by gouty attack and AMS- secondary to steroid vs opioid pain med use, but it resolved shortly after decreasing prednisone dose and discontinuing opioids.   Pt clinically stable for d/c to SNF, pt will f/u with dr xavi oakes (ortho) and her PCP (Dr. Savage) as outpatient.

## 2019-01-22 NOTE — DISCHARGE NOTE ADULT - PLAN OF CARE
Prevent falls, rehab Please participate in physical therapy.  You can use tylenol for pain control.   Continue protected weight bearing (50% weight bearing) with walker for 4 weeks.  Please follow up with Dr. Aguilar as outpatient in 2 weeks. Maintenance Please continue all your medications as prescribe and follow up with your PCP as outpatient in 2 weeks. Prevent recurrence You have completed 5 day course of prednisone in the hospital. Please follow up with your PCP as outpatient in 2 weeks.

## 2019-01-22 NOTE — DISCHARGE NOTE ADULT - MEDICATION SUMMARY - MEDICATIONS TO TAKE
I will START or STAY ON the medications listed below when I get home from the hospital:    acetaminophen 325 mg oral tablet  -- 2 tab(s) by mouth every 6 hours, As needed, Mild Pain (1 - 3)  -- Indication: For pain    benazepril 20 mg oral tablet  -- orally 2 times a day  -- Indication: For Hypertension    Xarelto 20 mg oral tablet  -- 1 tab(s) by mouth once a day (in the evening)  -- Indication: For afib    citalopram 20 mg oral tablet  -- 1 tab(s) by mouth once a day  -- Indication: For depression    atorvastatin 20 mg oral tablet  -- 1 tab(s) by mouth once a day  -- Indication: For DLD    triamterene-hydrochlorothiazide 37.5 mg-25 mg oral tablet  -- 1 tab(s) by mouth once a day  -- Indication: For HTN    metoprolol tartrate 50 mg oral tablet  -- 1 tab(s) by mouth 2 times a day  -- Indication: For afib    amLODIPine 5 mg oral tablet  -- 1 tab(s) by mouth once a day (at bedtime)  -- Indication: For HTN    levothyroxine 175 mcg (0.175 mg) oral tablet  -- 1 tab(s) by mouth 5 times a week  -- Indication: For Hypothyroid    Toviaz 4 mg oral tablet, extended release  -- 1 tab(s) by mouth once a day  -- Indication: For urinary incontinence

## 2019-01-22 NOTE — PROGRESS NOTE ADULT - ASSESSMENT
#mechanical fall subacute fem neck incomplete fracture  - MRI suggestive of CPPD  - ortho: no surgical indication; pt can be protected weight bearing with walker for 4 weeks   - dispo planning   - fu dr xavi oakes 2 weeks [phone]   - pain control  - pt rehab eval pending     # Atrial fibrillation  -c/w xeralto   -c/w metoprolol 50mg BID    # HTN  c/w home medications    #hypothyroid  - levothyroxine 175 qhs Monday -friday (patinets dosing from her endocrinologist)    #HLD  atorvastatin    #depression- celexa    #overactive bladder  oxybutynin qhs    #diet, dash diet

## 2019-01-22 NOTE — DISCHARGE NOTE ADULT - CARE PLAN
Principal Discharge DX:	Hip fracture, right  Goal:	Prevent falls, rehab  Assessment and plan of treatment:	Please participate in physical therapy.  You can use tylenol for pain control.   Continue protected weight bearing (50% weight bearing) with walker for 4 weeks.  Please follow up with Dr. Aguilar as outpatient in 2 weeks.  Secondary Diagnosis:	CKD (chronic kidney disease) stage 3, GFR 30-59 ml/min  Goal:	Maintenance  Assessment and plan of treatment:	Please continue all your medications as prescribe and follow up with your PCP as outpatient in 2 weeks.  Secondary Diagnosis:	Gout attack  Goal:	Prevent recurrence  Assessment and plan of treatment:	You have completed 5 day course of prednisone in the hospital. Please follow up with your PCP as outpatient in 2 weeks.

## 2019-01-22 NOTE — DISCHARGE NOTE ADULT - PATIENT PORTAL LINK FT
You can access the [facility] Patient Portal, offered by [facility], by registering with the following website: [email or web address]

## 2019-01-22 NOTE — PROGRESS NOTE ADULT - SUBJECTIVE AND OBJECTIVE BOX
SUBJECTIVE:    Patient is a 78y old Female who presents with a chief complaint of fall, femoral head/neck subacute incomplete fracture (22 Jan 2019 07:44)    Currently admitted to medicine with the primary diagnosis of Hip fracture, right     Today is hospital day 3d.   OVERNIGHT EVENTS  Today, patient is    PAST MEDICAL & SURGICAL HISTORY  CKD (chronic kidney disease) stage 3, GFR 30-59 ml/min  Pituitary adenoma: ??  Osteoarthritis  Sleep apnea  Afib: on xarelto  Hypothyroid: s/p thyroid ca surgery  High cholesterol  HTN (hypertension)  S/P rotator cuff surgery  H/O thyroidectomy          ALLERGIES:  No Known Allergies    MEDICATIONS:  STANDING MEDICATIONS  amLODIPine   Tablet 5 milliGRAM(s) Oral at bedtime  atorvastatin 20 milliGRAM(s) Oral at bedtime  chlorhexidine 4% Liquid 1 Application(s) Topical <User Schedule>  citalopram 20 milliGRAM(s) Oral daily  levothyroxine 175 MICROGram(s) Oral <User Schedule>  lisinopril 20 milliGRAM(s) Oral two times a day  metoprolol tartrate 50 milliGRAM(s) Oral two times a day  oxybutynin 5 milliGRAM(s) Oral two times a day  rivaroxaban 20 milliGRAM(s) Oral at bedtime  triamterene 37.5 mG/hydrochlorothiazide 25 mG Tablet 1 Tablet(s) Oral daily    PRN MEDICATIONS  acetaminophen   Tablet .. 650 milliGRAM(s) Oral every 6 hours PRN  morphine  - Injectable 4 milliGRAM(s) IV Push every 4 hours PRN  oxyCODONE    IR 10 milliGRAM(s) Oral every 4 hours PRN    VITALS:   T(F): 97.4  HR: 61  BP: 120/63  RR: 18  SpO2: --          LABS:                        9.5    14.05 )-----------( 302      ( 22 Jan 2019 06:06 )             31.4     01-22    139  |  96<L>  |  35<H>  ----------------------------<  132<H>  4.0   |  25  |  1.5    Ca    9.0      22 Jan 2019 06:06  Mg     2.1     01-21    TPro  6.1  /  Alb  3.6  /  TBili  0.6  /  DBili  x   /  AST  7   /  ALT  <5  /  AlkPhos  73  01-20    PT/INR - ( 20 Jan 2019 09:45 )   PT: 15.10 sec;   INR: 1.32 ratio         PTT - ( 20 Jan 2019 09:45 )  PTT:44.6 sec              RADIOLOGY:    PHYSICAL EXAM:  GEN: NAD  LUNGS: CTAB  HEART: RRR s1s2 present   ABD: soft nontender nondistended +BS  EXT: no edema   NEURO: aao x3

## 2019-01-22 NOTE — PROGRESS NOTE ADULT - SUBJECTIVE AND OBJECTIVE BOX
Patient was seen and examined. Spoke with RN. Chart reviewed.  No events overnight.  Vital Signs Last 24 Hrs  T(F): 99.4 (21 Jan 2019 15:03), Max: 99.4 (21 Jan 2019 15:03)  HR: 77 (21 Jan 2019 23:00) (77 - 80)  BP: 117/59 (21 Jan 2019 23:00) (116/59 - 118/60)  SpO2: --  MEDICATIONS  (STANDING):  amLODIPine   Tablet 5 milliGRAM(s) Oral at bedtime  atorvastatin 20 milliGRAM(s) Oral at bedtime  chlorhexidine 4% Liquid 1 Application(s) Topical <User Schedule>  citalopram 20 milliGRAM(s) Oral daily  levothyroxine 175 MICROGram(s) Oral <User Schedule>  lisinopril 20 milliGRAM(s) Oral two times a day  metoprolol tartrate 50 milliGRAM(s) Oral two times a day  oxybutynin 5 milliGRAM(s) Oral two times a day  rivaroxaban 20 milliGRAM(s) Oral at bedtime  triamterene 37.5 mG/hydrochlorothiazide 25 mG Tablet 1 Tablet(s) Oral daily    MEDICATIONS  (PRN):  acetaminophen   Tablet .. 650 milliGRAM(s) Oral every 6 hours PRN Mild Pain (1 - 3)  morphine  - Injectable 4 milliGRAM(s) IV Push every 4 hours PRN Severe Pain (7 - 10)  oxyCODONE    IR 10 milliGRAM(s) Oral every 4 hours PRN Moderate Pain (4 - 6)    Labs:                        9.5    14.05 )-----------( 302      ( 22 Jan 2019 06:06 )             31.4                         9.6    13.79 )-----------( 298      ( 21 Jan 2019 06:52 )             32.2     22 Jan 2019 06:06    139    |  96     |  35     ----------------------------<  132    4.0     |  25     |  1.5    21 Jan 2019 06:52    143    |  100    |  27     ----------------------------<  132    4.0     |  28     |  1.3      Ca    9.0        22 Jan 2019 06:06  Ca    8.8        21 Jan 2019 06:52  Mg     2.1       21 Jan 2019 06:52  Mg     2.6       20 Jan 2019 09:45    TPro  6.1    /  Alb  3.6    /  TBili  0.6    /  DBili  x      /  AST  7      /  ALT  <5     /  AlkPhos  73     20 Jan 2019 09:45    PT/INR - ( 20 Jan 2019 09:45 )   PT: 15.10 sec;   INR: 1.32 ratio         PTT - ( 20 Jan 2019 09:45 )  PTT:44.6 sec        Neurology:  nonfocal  Head:  Normocephalic, atraumatic  ENT:  Mucosa moist, no ulcerations  Neck:  Supple, no JVD,   Skin: no breakdowns (as per RN)  Resp: CTA B/L  CV: RRR, S1S2,   GI: Soft, NT, bowel sounds  MS: No edema, + peripheral pulses,      A/P:  77 yo woman with mechanical fall subacute fem neck incomplete fracture  - MRI suggestive of CPPD  - outpt rheum eval  - follow ortho recommendations - appreciated  - pain control  - pt rehab eval      # Atrial fibrillation  -xarelto  -metoprolol 50mg BID    OOB to chair    DC planning    DVT prophylaxis  Decubitus prevention- all measures as per RN protocol  Please call or text me with any questions or updates

## 2019-01-22 NOTE — PHYSICAL THERAPY INITIAL EVALUATION ADULT - LIVES WITH, PROFILE
alone/5 steps to enter home; 8 steps to bedroom; patient plans to go to daughter's home upon DC from hospital.

## 2019-01-22 NOTE — CONSULT NOTE ADULT - REASON FOR ADMISSION
fall, femoral head/neck subacute incomplete fracture
fall, femoral head/neck subacute incomplete fracture

## 2019-01-23 LAB
ANION GAP SERPL CALC-SCNC: 17 MMOL/L — HIGH (ref 7–14)
BASOPHILS # BLD AUTO: 0.01 K/UL — SIGNIFICANT CHANGE UP (ref 0–0.2)
BASOPHILS NFR BLD AUTO: 0.1 % — SIGNIFICANT CHANGE UP (ref 0–1)
BUN SERPL-MCNC: 51 MG/DL — HIGH (ref 10–20)
CALCIUM SERPL-MCNC: 9 MG/DL — SIGNIFICANT CHANGE UP (ref 8.5–10.1)
CHLORIDE SERPL-SCNC: 100 MMOL/L — SIGNIFICANT CHANGE UP (ref 98–110)
CO2 SERPL-SCNC: 25 MMOL/L — SIGNIFICANT CHANGE UP (ref 17–32)
CREAT SERPL-MCNC: 1.5 MG/DL — SIGNIFICANT CHANGE UP (ref 0.7–1.5)
EOSINOPHIL # BLD AUTO: 0 K/UL — SIGNIFICANT CHANGE UP (ref 0–0.7)
EOSINOPHIL NFR BLD AUTO: 0 % — SIGNIFICANT CHANGE UP (ref 0–8)
GLUCOSE BLDC GLUCOMTR-MCNC: 114 MG/DL — HIGH (ref 70–99)
GLUCOSE BLDC GLUCOMTR-MCNC: 116 MG/DL — HIGH (ref 70–99)
GLUCOSE SERPL-MCNC: 157 MG/DL — HIGH (ref 70–99)
HCT VFR BLD CALC: 31.5 % — LOW (ref 37–47)
HGB BLD-MCNC: 9.7 G/DL — LOW (ref 12–16)
IMM GRANULOCYTES NFR BLD AUTO: 0.7 % — HIGH (ref 0.1–0.3)
LYMPHOCYTES # BLD AUTO: 1.03 K/UL — LOW (ref 1.2–3.4)
LYMPHOCYTES # BLD AUTO: 6.6 % — LOW (ref 20.5–51.1)
MCHC RBC-ENTMCNC: 24.9 PG — LOW (ref 27–31)
MCHC RBC-ENTMCNC: 30.8 G/DL — LOW (ref 32–37)
MCV RBC AUTO: 80.8 FL — LOW (ref 81–99)
MONOCYTES # BLD AUTO: 0.69 K/UL — HIGH (ref 0.1–0.6)
MONOCYTES NFR BLD AUTO: 4.4 % — SIGNIFICANT CHANGE UP (ref 1.7–9.3)
NEUTROPHILS # BLD AUTO: 13.7 K/UL — HIGH (ref 1.4–6.5)
NEUTROPHILS NFR BLD AUTO: 88.2 % — HIGH (ref 42.2–75.2)
NRBC # BLD: 0 /100 WBCS — SIGNIFICANT CHANGE UP (ref 0–0)
PLATELET # BLD AUTO: 381 K/UL — SIGNIFICANT CHANGE UP (ref 130–400)
POTASSIUM SERPL-MCNC: 4.9 MMOL/L — SIGNIFICANT CHANGE UP (ref 3.5–5)
POTASSIUM SERPL-SCNC: 4.9 MMOL/L — SIGNIFICANT CHANGE UP (ref 3.5–5)
RBC # BLD: 3.9 M/UL — LOW (ref 4.2–5.4)
RBC # FLD: 15.9 % — HIGH (ref 11.5–14.5)
SODIUM SERPL-SCNC: 142 MMOL/L — SIGNIFICANT CHANGE UP (ref 135–146)
WBC # BLD: 15.54 K/UL — HIGH (ref 4.8–10.8)
WBC # FLD AUTO: 15.54 K/UL — HIGH (ref 4.8–10.8)

## 2019-01-23 RX ADMIN — Medication 5 MILLIGRAM(S): at 05:08

## 2019-01-23 RX ADMIN — Medication 40 MILLIGRAM(S): at 05:08

## 2019-01-23 RX ADMIN — ATORVASTATIN CALCIUM 20 MILLIGRAM(S): 80 TABLET, FILM COATED ORAL at 21:39

## 2019-01-23 RX ADMIN — RIVAROXABAN 20 MILLIGRAM(S): KIT at 21:39

## 2019-01-23 RX ADMIN — LISINOPRIL 20 MILLIGRAM(S): 2.5 TABLET ORAL at 05:08

## 2019-01-23 RX ADMIN — Medication 50 MILLIGRAM(S): at 19:20

## 2019-01-23 RX ADMIN — CHLORHEXIDINE GLUCONATE 1 APPLICATION(S): 213 SOLUTION TOPICAL at 05:08

## 2019-01-23 RX ADMIN — Medication 5 MILLIGRAM(S): at 19:20

## 2019-01-23 RX ADMIN — LISINOPRIL 20 MILLIGRAM(S): 2.5 TABLET ORAL at 19:20

## 2019-01-23 RX ADMIN — AMLODIPINE BESYLATE 5 MILLIGRAM(S): 2.5 TABLET ORAL at 21:39

## 2019-01-23 RX ADMIN — Medication 175 MICROGRAM(S): at 21:39

## 2019-01-23 RX ADMIN — Medication 50 MILLIGRAM(S): at 05:08

## 2019-01-23 RX ADMIN — Medication 1 TABLET(S): at 05:08

## 2019-01-23 NOTE — PROGRESS NOTE ADULT - SUBJECTIVE AND OBJECTIVE BOX
SUBJECTIVE:    Patient is a 78y old Female who presents with a chief complaint of fall, femoral head/neck subacute incomplete fracture (23 Jan 2019 08:17)    Currently admitted to medicine with the primary diagnosis of Hip fracture, right     Today is hospital day 4d.   OVERNIGHT EVENTS  Today, patient states she no longer has ankle pains after getting prednisone. States she wants to start working with PT b/c she has been in bed for long time.     PAST MEDICAL & SURGICAL HISTORY  CKD (chronic kidney disease) stage 3, GFR 30-59 ml/min  Pituitary adenoma: ??  Osteoarthritis  Sleep apnea  Afib: on xarelto  Hypothyroid: s/p thyroid ca surgery  High cholesterol  HTN (hypertension)  S/P rotator cuff surgery  H/O thyroidectomy          ALLERGIES:  No Known Allergies    MEDICATIONS:  STANDING MEDICATIONS  amLODIPine   Tablet 5 milliGRAM(s) Oral at bedtime  atorvastatin 20 milliGRAM(s) Oral at bedtime  chlorhexidine 4% Liquid 1 Application(s) Topical <User Schedule>  citalopram 20 milliGRAM(s) Oral daily  levothyroxine 175 MICROGram(s) Oral <User Schedule>  lisinopril 20 milliGRAM(s) Oral two times a day  metoprolol tartrate 50 milliGRAM(s) Oral two times a day  oxybutynin 5 milliGRAM(s) Oral two times a day  rivaroxaban 20 milliGRAM(s) Oral at bedtime  triamterene 37.5 mG/hydrochlorothiazide 25 mG Tablet 1 Tablet(s) Oral daily    PRN MEDICATIONS  acetaminophen   Tablet .. 650 milliGRAM(s) Oral every 6 hours PRN  morphine  - Injectable 4 milliGRAM(s) IV Push every 4 hours PRN  oxyCODONE    IR 10 milliGRAM(s) Oral every 4 hours PRN    VITALS:   T(F): 97.9  HR: 89  BP: 138/82  RR: 18  SpO2: --          LABS:                        9.7    15.54 )-----------( 381      ( 23 Jan 2019 06:24 )             31.5     01-23    142  |  100  |  51<H>  ----------------------------<  157<H>  4.9   |  25  |  1.5    Ca    9.0      23 Jan 2019 06:24                    RADIOLOGY:    PHYSICAL EXAM:  GEN: NAD  LUNGS: CTAB  HEART: rrr s1s2 present   ABD: soft nontender +BS  EXT: b/l ankle edema   NEURO: aao x3

## 2019-01-23 NOTE — PROGRESS NOTE ADULT - ASSESSMENT
#mechanical fall subacute fem neck incomplete fracture  - MRI suggestive of CPPD  - ortho: no surgical indication; pt can be protected weight bearing with walker for 4 weeks   - dispo planning   - fu dr xavi oakes 2 weeks [phone]   - pain control  - pt rehab- inpatient rehab candidate    #Gout flare up in b/l ankles- improved  - red, swollen, very tender ankles  - hx of gout, pt states pain simliar  - Today, she no longer has pain after getting prednisone 40 which started yesterday  - will decreased prednisone to 20 for 3 more days  - xray ankles, edema but no fracture    # Atrial fibrillation  -c/w xeralto   -c/w metoprolol 50mg BID    # HTN  c/w home medications    #hypothyroid  - levothyroxine 175 qhs Monday -friday (patinets dosing from her endocrinologist)    #HLD  atorvastatin    #depression- celexa    #overactive bladder  oxybutynin qhs    #diet, dash diet #mechanical fall subacute fem neck incomplete fracture  - MRI suggestive of CPPD  - ortho: no surgical indication; pt can be protected weight bearing with walker for 4 weeks   - dispo planning   - fu dr xavi oakes 2 weeks [phone]   - pain control  - pt rehab- inpatient rehab candidate    #AMS- likely 2/2 overuse of opioid vs delirium  - family reports that pt's mental status changed since yesterday  - will d/c all pain meds for now  - give tylenol as needed for pain control  - avoid opioids/narcotics/sedative  - CTH on admission negative     #Gout flare up in b/l ankles- improved  - red, swollen, very tender ankles  - hx of gout, pt states pain simliar  - Today, she no longer has pain after getting prednisone 40 which started yesterday  - will decreased prednisone to 20 for 3 more days  - xray ankles, edema but no fracture    # Atrial fibrillation  -c/w xeralto   -c/w metoprolol 50mg BID    # HTN  c/w home medications    #hypothyroid  - levothyroxine 175 qhs Monday -friday (patinets dosing from her endocrinologist)    #HLD  atorvastatin    #depression- celexa    #overactive bladder  oxybutynin qhs    #diet, dash diet

## 2019-01-23 NOTE — PROGRESS NOTE ADULT - SUBJECTIVE AND OBJECTIVE BOX
Patient was seen and examined. Spoke with RN. Chart reviewed.  No events overnight.  Vital Signs Last 24 Hrs  T(F): 99.1 (23 Jan 2019 05:09), Max: 99.7 (22 Jan 2019 16:03)  HR: 78 (22 Jan 2019 16:03) (78 - 78)  BP: 117/51 (23 Jan 2019 05:09) (112/56 - 117/51)  SpO2: --  MEDICATIONS  (STANDING):  amLODIPine   Tablet 5 milliGRAM(s) Oral at bedtime  atorvastatin 20 milliGRAM(s) Oral at bedtime  chlorhexidine 4% Liquid 1 Application(s) Topical <User Schedule>  citalopram 20 milliGRAM(s) Oral daily  levothyroxine 175 MICROGram(s) Oral <User Schedule>  lisinopril 20 milliGRAM(s) Oral two times a day  metoprolol tartrate 50 milliGRAM(s) Oral two times a day  oxybutynin 5 milliGRAM(s) Oral two times a day  predniSONE   Tablet 40 milliGRAM(s) Oral daily  rivaroxaban 20 milliGRAM(s) Oral at bedtime  triamterene 37.5 mG/hydrochlorothiazide 25 mG Tablet 1 Tablet(s) Oral daily    MEDICATIONS  (PRN):  acetaminophen   Tablet .. 650 milliGRAM(s) Oral every 6 hours PRN Mild Pain (1 - 3)  morphine  - Injectable 4 milliGRAM(s) IV Push every 4 hours PRN Severe Pain (7 - 10)  oxyCODONE    IR 10 milliGRAM(s) Oral every 4 hours PRN Moderate Pain (4 - 6)    Labs:                        9.7    15.54 )-----------( 381      ( 23 Jan 2019 06:24 )             31.5                         9.5    14.05 )-----------( 302      ( 22 Jan 2019 06:06 )             31.4     22 Jan 2019 06:06    139    |  96     |  35     ----------------------------<  132    4.0     |  25     |  1.5      Ca    9.0        22 Jan 2019 06:06            General: comfortable, NAD  Neurology: nonfocal  Head:  Normocephalic, atraumatic  ENT:  Mucosa moist, no ulcerations  Neck:  Supple, no JVD,   Skin: no breakdowns (as per RN)  Resp: CTA B/L  CV: RRR, S1S2,   GI: Soft, NT, bowel sounds  MS: No edema, + peripheral pulses,       A/P:  77 yo woman with mechanical fall subacute fem neck incomplete fracture  - MRI suggestive of CPPD  - outpt rheum eval  - follow ortho recommendations - appreciated  - pain control  - pt rehab eval      # Atrial fibrillation  -xarelto  -metoprolol 50mg BID    OOB to chair    DC planning  DVT prophylaxis  Decubitus prevention- all measures as per RN protocol  Please call or text me with any questions or updates

## 2019-01-24 LAB
ANION GAP SERPL CALC-SCNC: 21 MMOL/L — HIGH (ref 7–14)
BASOPHILS # BLD AUTO: 0.01 K/UL — SIGNIFICANT CHANGE UP (ref 0–0.2)
BASOPHILS NFR BLD AUTO: 0.1 % — SIGNIFICANT CHANGE UP (ref 0–1)
BUN SERPL-MCNC: 76 MG/DL — CRITICAL HIGH (ref 10–20)
CALCIUM SERPL-MCNC: 9.2 MG/DL — SIGNIFICANT CHANGE UP (ref 8.5–10.1)
CHLORIDE SERPL-SCNC: 100 MMOL/L — SIGNIFICANT CHANGE UP (ref 98–110)
CO2 SERPL-SCNC: 24 MMOL/L — SIGNIFICANT CHANGE UP (ref 17–32)
CREAT SERPL-MCNC: 1.9 MG/DL — HIGH (ref 0.7–1.5)
EOSINOPHIL # BLD AUTO: 0.04 K/UL — SIGNIFICANT CHANGE UP (ref 0–0.7)
EOSINOPHIL NFR BLD AUTO: 0.3 % — SIGNIFICANT CHANGE UP (ref 0–8)
GLUCOSE SERPL-MCNC: 159 MG/DL — HIGH (ref 70–99)
HCT VFR BLD CALC: 31.4 % — LOW (ref 37–47)
HGB BLD-MCNC: 9.7 G/DL — LOW (ref 12–16)
IMM GRANULOCYTES NFR BLD AUTO: 0.6 % — HIGH (ref 0.1–0.3)
LYMPHOCYTES # BLD AUTO: 1.48 K/UL — SIGNIFICANT CHANGE UP (ref 1.2–3.4)
LYMPHOCYTES # BLD AUTO: 10.3 % — LOW (ref 20.5–51.1)
MAGNESIUM SERPL-MCNC: 2.5 MG/DL — HIGH (ref 1.8–2.4)
MCHC RBC-ENTMCNC: 24.9 PG — LOW (ref 27–31)
MCHC RBC-ENTMCNC: 30.9 G/DL — LOW (ref 32–37)
MCV RBC AUTO: 80.5 FL — LOW (ref 81–99)
MONOCYTES # BLD AUTO: 1.16 K/UL — HIGH (ref 0.1–0.6)
MONOCYTES NFR BLD AUTO: 8 % — SIGNIFICANT CHANGE UP (ref 1.7–9.3)
NEUTROPHILS # BLD AUTO: 11.64 K/UL — HIGH (ref 1.4–6.5)
NEUTROPHILS NFR BLD AUTO: 80.7 % — HIGH (ref 42.2–75.2)
NRBC # BLD: 0 /100 WBCS — SIGNIFICANT CHANGE UP (ref 0–0)
PLATELET # BLD AUTO: 421 K/UL — HIGH (ref 130–400)
POTASSIUM SERPL-MCNC: 4.2 MMOL/L — SIGNIFICANT CHANGE UP (ref 3.5–5)
POTASSIUM SERPL-SCNC: 4.2 MMOL/L — SIGNIFICANT CHANGE UP (ref 3.5–5)
RBC # BLD: 3.9 M/UL — LOW (ref 4.2–5.4)
RBC # FLD: 15.7 % — HIGH (ref 11.5–14.5)
SODIUM SERPL-SCNC: 145 MMOL/L — SIGNIFICANT CHANGE UP (ref 135–146)
WBC # BLD: 14.41 K/UL — HIGH (ref 4.8–10.8)
WBC # FLD AUTO: 14.41 K/UL — HIGH (ref 4.8–10.8)

## 2019-01-24 RX ORDER — SODIUM CHLORIDE 9 MG/ML
1000 INJECTION INTRAMUSCULAR; INTRAVENOUS; SUBCUTANEOUS
Qty: 0 | Refills: 0 | Status: DISCONTINUED | OUTPATIENT
Start: 2019-01-24 | End: 2019-01-25

## 2019-01-24 RX ADMIN — LISINOPRIL 20 MILLIGRAM(S): 2.5 TABLET ORAL at 17:25

## 2019-01-24 RX ADMIN — Medication 50 MILLIGRAM(S): at 05:18

## 2019-01-24 RX ADMIN — Medication 50 MILLIGRAM(S): at 17:25

## 2019-01-24 RX ADMIN — CITALOPRAM 20 MILLIGRAM(S): 10 TABLET, FILM COATED ORAL at 11:27

## 2019-01-24 RX ADMIN — CHLORHEXIDINE GLUCONATE 1 APPLICATION(S): 213 SOLUTION TOPICAL at 05:16

## 2019-01-24 RX ADMIN — Medication 175 MICROGRAM(S): at 21:12

## 2019-01-24 RX ADMIN — Medication 5 MILLIGRAM(S): at 17:25

## 2019-01-24 RX ADMIN — Medication 650 MILLIGRAM(S): at 14:14

## 2019-01-24 RX ADMIN — AMLODIPINE BESYLATE 5 MILLIGRAM(S): 2.5 TABLET ORAL at 21:12

## 2019-01-24 RX ADMIN — SODIUM CHLORIDE 50 MILLILITER(S): 9 INJECTION INTRAMUSCULAR; INTRAVENOUS; SUBCUTANEOUS at 10:20

## 2019-01-24 RX ADMIN — Medication 20 MILLIGRAM(S): at 05:17

## 2019-01-24 RX ADMIN — Medication 1 TABLET(S): at 05:18

## 2019-01-24 RX ADMIN — RIVAROXABAN 20 MILLIGRAM(S): KIT at 21:12

## 2019-01-24 RX ADMIN — Medication 650 MILLIGRAM(S): at 11:25

## 2019-01-24 RX ADMIN — Medication 5 MILLIGRAM(S): at 05:18

## 2019-01-24 RX ADMIN — LISINOPRIL 20 MILLIGRAM(S): 2.5 TABLET ORAL at 05:18

## 2019-01-24 RX ADMIN — ATORVASTATIN CALCIUM 20 MILLIGRAM(S): 80 TABLET, FILM COATED ORAL at 21:12

## 2019-01-24 NOTE — PROGRESS NOTE ADULT - SUBJECTIVE AND OBJECTIVE BOX
Patient was seen and examined. Spoke with RN. Chart reviewed.    No events overnight.  Vital Signs Last 24 Hrs  T(F): 97.5 (24 Jan 2019 07:41), Max: 98 (23 Jan 2019 15:40)  HR: 61 (24 Jan 2019 07:41) (61 - 103)  BP: 133/73 (24 Jan 2019 07:41) (109/68 - 133/73)  SpO2: --  MEDICATIONS  (STANDING):  amLODIPine   Tablet 5 milliGRAM(s) Oral at bedtime  atorvastatin 20 milliGRAM(s) Oral at bedtime  chlorhexidine 4% Liquid 1 Application(s) Topical <User Schedule>  citalopram 20 milliGRAM(s) Oral daily  levothyroxine 175 MICROGram(s) Oral <User Schedule>  lisinopril 20 milliGRAM(s) Oral two times a day  metoprolol tartrate 50 milliGRAM(s) Oral two times a day  oxybutynin 5 milliGRAM(s) Oral two times a day  predniSONE   Tablet 20 milliGRAM(s) Oral daily  rivaroxaban 20 milliGRAM(s) Oral at bedtime  sodium chloride 0.9%. 1000 milliLiter(s) (50 mL/Hr) IV Continuous <Continuous>  triamterene 37.5 mG/hydrochlorothiazide 25 mG Tablet 1 Tablet(s) Oral daily    MEDICATIONS  (PRN):  acetaminophen   Tablet .. 650 milliGRAM(s) Oral every 6 hours PRN Mild Pain (1 - 3)    Labs:                        9.7    14.41 )-----------( 421      ( 24 Jan 2019 05:32 )             31.4                         9.7    15.54 )-----------( 381      ( 23 Jan 2019 06:24 )             31.5     24 Jan 2019 05:32    145    |  100    |  76     ----------------------------<  159    4.2     |  24     |  1.9    23 Jan 2019 06:24    142    |  100    |  51     ----------------------------<  157    4.9     |  25     |  1.5      Ca    9.2        24 Jan 2019 05:32  Ca    9.0        23 Jan 2019 06:24  Mg     2.5       24 Jan 2019 05:32              Radiology:    General: comfortable, NAD  Neurology: A&Ox3, nonfocal  Head:  Normocephalic, atraumatic  ENT:  Mucosa moist, no ulcerations  Neck:  Supple, no JVD,   Skin: no breakdowns (as per RN)  Resp: CTA B/L  CV: RRR, S1S2,   GI: Soft, NT, bowel sounds  MS: No edema, + peripheral pulses, FROM all 4 extremity

## 2019-01-24 NOTE — PROGRESS NOTE ADULT - ASSESSMENT
#mechanical fall subacute fem neck incomplete fracture  - MRI suggestive of CPPD  - ortho: no surgical indication; pt can be protected weight bearing with walker for 4 weeks (50% weight bearing)  - dispo planning   - fu dr xavi oakes 2 weeks [phone]   - pain control  - pt rehab- inpatient rehab candidate  - Cr 1.9 today  - will give gentle hydration NS 50cc/hr for 10 hours     #AMS- likely 2/2 steroid use- improving   - pt aao x3 today, mental status improved  - nonfocal neuro exam   - give tylenol as needed for pain control  - avoid opioids/narcotics/sedative  - CTH on admission negative   - send Uric acid level  - will consider starting colchicine    #Gout flare up in b/l ankles- improved  - no longer having any pain   - c/w prednisone 20 mg to complete total 5 day course  - xray ankles, edema but no fracture    # Atrial fibrillation  -c/w xeralto   -c/w metoprolol 50mg BID    # HTN  c/w home medications    #hypothyroid  - levothyroxine 175 qhs Monday -friday (patient's dosing from her endocrinologist)    #HLD  atorvastatin    #depression- celexa    #overactive bladder  oxybutynin qhs    #diet, dash diet #mechanical fall subacute fem neck incomplete fracture  - MRI suggestive of CPPD  - ortho: no surgical indication; pt can be protected weight bearing with walker for 4 weeks (50% weight bearing)  - dispo planning   - fu dr xavi oakes 2 weeks [phone]   - pain control  - pt rehab- inpatient rehab candidate  - Cr 1.9 today  - will give gentle hydration NS 50cc/hr for 10 hours     #AMS- likely 2/2 steroid use vs opioid use- improving   #Toxic encephalopathy   - pt aao x3 today, mental status improved  - nonfocal neuro exam   - give tylenol as needed for pain control  - avoid opioids/narcotics/sedative  - CTH on admission negative   - send Uric acid level  - will consider starting colchicine    #Gout flare up in b/l ankles- improved  - no longer having any pain   - c/w prednisone 20 mg to complete total 5 day course  - xray ankles, edema but no fracture    # Atrial fibrillation  -c/w xeralto   -c/w metoprolol 50mg BID    # HTN  c/w home medications    #hypothyroid  - levothyroxine 175 qhs Monday -friday (patient's dosing from her endocrinologist)    #HLD  atorvastatin    #depression- celexa    #overactive bladder  oxybutynin qhs    #diet, dash diet

## 2019-01-24 NOTE — PROGRESS NOTE ADULT - ASSESSMENT
mechanical fall subacute fem neck incomplete fracture  - MRI suggestive of CPPD  - ortho: no surgical indication; pt can be protected weight bearing with walker for 4 weeks   - dispo planning   - fu dr xavi oakes 2 weeks [phone]   - pain control  - pt rehab- inpatient rehab candidate    #AMS- likely 2/2 overuse of opioid vs delirium  - family reports that pt's mental status changed since yesterday  - will d/c all pain meds for now  - give tylenol as needed for pain control  - avoid opioids/narcotics/sedative  - CTH on admission negative     #Gout flare up in b/l ankles- improved  - red, swollen, very tender ankles  - hx of gout, pt states pain simliar  - Today, she no longer has pain after getting prednisone 40 which started yesterday  - will decreased prednisone to 20 for 3 more days  - xray ankles, edema but no fracture    # Atrial fibrillation  -c/w xeralto   -c/w metoprolol 50mg BID    # HTN  c/w home medications    #hypothyroid  - levothyroxine 175 qhs Monday -friday (patinets dosing from her endocrinologist)    #HLD  atorvastatin    #depression- celexa    #overactive bladder  oxybutynin qhs    #diet, dash diet

## 2019-01-24 NOTE — PROGRESS NOTE ADULT - SUBJECTIVE AND OBJECTIVE BOX
SUBJECTIVE:    Patient is a 78y old Female who presents with a chief complaint of fall, femoral head/neck subacute incomplete fracture (23 Jan 2019 10:32)    Currently admitted to medicine with the primary diagnosis of Hip fracture, right     Today is hospital day 5d.   OVERNIGHT EVENTS no acute events   Today, patient is aao x3, denies any ankle or hip pain.     PAST MEDICAL & SURGICAL HISTORY  CKD (chronic kidney disease) stage 3, GFR 30-59 ml/min  Pituitary adenoma: ??  Osteoarthritis  Sleep apnea  Afib: on xarelto  Hypothyroid: s/p thyroid ca surgery  High cholesterol  HTN (hypertension)  S/P rotator cuff surgery  H/O thyroidectomy          ALLERGIES:  No Known Allergies    MEDICATIONS:  STANDING MEDICATIONS  amLODIPine   Tablet 5 milliGRAM(s) Oral at bedtime  atorvastatin 20 milliGRAM(s) Oral at bedtime  chlorhexidine 4% Liquid 1 Application(s) Topical <User Schedule>  citalopram 20 milliGRAM(s) Oral daily  levothyroxine 175 MICROGram(s) Oral <User Schedule>  lisinopril 20 milliGRAM(s) Oral two times a day  metoprolol tartrate 50 milliGRAM(s) Oral two times a day  oxybutynin 5 milliGRAM(s) Oral two times a day  predniSONE   Tablet 20 milliGRAM(s) Oral daily  rivaroxaban 20 milliGRAM(s) Oral at bedtime  triamterene 37.5 mG/hydrochlorothiazide 25 mG Tablet 1 Tablet(s) Oral daily    PRN MEDICATIONS  acetaminophen   Tablet .. 650 milliGRAM(s) Oral every 6 hours PRN    VITALS:   T(F): 97.5  HR: 61  BP: 133/73  RR: 18  SpO2: --          LABS:                        9.7    14.41 )-----------( 421      ( 24 Jan 2019 05:32 )             31.4     01-24    145  |  100  |  76<HH>  ----------------------------<  159<H>  4.2   |  24  |  1.9<H>    Ca    9.2      24 Jan 2019 05:32  Mg     2.5     01-24                    RADIOLOGY:    PHYSICAL EXAM:  GEN: NAD  LUNGS: CTAB  HEART: rrr s1s2 present   ABD: soft nontender nondistended +BS  EXT: no edema  NEURO: nonfocal, aao x3

## 2019-01-25 VITALS — RESPIRATION RATE: 17 BRPM | SYSTOLIC BLOOD PRESSURE: 136 MMHG | TEMPERATURE: 96 F | DIASTOLIC BLOOD PRESSURE: 81 MMHG

## 2019-01-25 LAB
ANION GAP SERPL CALC-SCNC: 18 MMOL/L — HIGH (ref 7–14)
BUN SERPL-MCNC: 72 MG/DL — CRITICAL HIGH (ref 10–20)
CALCIUM SERPL-MCNC: 8.8 MG/DL — SIGNIFICANT CHANGE UP (ref 8.5–10.1)
CHLORIDE SERPL-SCNC: 103 MMOL/L — SIGNIFICANT CHANGE UP (ref 98–110)
CO2 SERPL-SCNC: 25 MMOL/L — SIGNIFICANT CHANGE UP (ref 17–32)
CREAT SERPL-MCNC: 1.4 MG/DL — SIGNIFICANT CHANGE UP (ref 0.7–1.5)
GLUCOSE SERPL-MCNC: 125 MG/DL — HIGH (ref 70–99)
HCT VFR BLD CALC: 33.2 % — LOW (ref 37–47)
HGB BLD-MCNC: 10.1 G/DL — LOW (ref 12–16)
MCHC RBC-ENTMCNC: 24.8 PG — LOW (ref 27–31)
MCHC RBC-ENTMCNC: 30.4 G/DL — LOW (ref 32–37)
MCV RBC AUTO: 81.6 FL — SIGNIFICANT CHANGE UP (ref 81–99)
NRBC # BLD: 0 /100 WBCS — SIGNIFICANT CHANGE UP (ref 0–0)
PLATELET # BLD AUTO: 436 K/UL — HIGH (ref 130–400)
POTASSIUM SERPL-MCNC: 4.5 MMOL/L — SIGNIFICANT CHANGE UP (ref 3.5–5)
POTASSIUM SERPL-SCNC: 4.5 MMOL/L — SIGNIFICANT CHANGE UP (ref 3.5–5)
RBC # BLD: 4.07 M/UL — LOW (ref 4.2–5.4)
RBC # FLD: 15.6 % — HIGH (ref 11.5–14.5)
SODIUM SERPL-SCNC: 146 MMOL/L — SIGNIFICANT CHANGE UP (ref 135–146)
URATE SERPL-MCNC: 10.1 MG/DL — HIGH (ref 2.5–7)
WBC # BLD: 11.04 K/UL — HIGH (ref 4.8–10.8)
WBC # FLD AUTO: 11.04 K/UL — HIGH (ref 4.8–10.8)

## 2019-01-25 RX ORDER — ACETAMINOPHEN 500 MG
2 TABLET ORAL
Qty: 0 | Refills: 0 | DISCHARGE
Start: 2019-01-25

## 2019-01-25 RX ADMIN — Medication 5 MILLIGRAM(S): at 05:14

## 2019-01-25 RX ADMIN — Medication 50 MILLIGRAM(S): at 05:14

## 2019-01-25 RX ADMIN — LISINOPRIL 20 MILLIGRAM(S): 2.5 TABLET ORAL at 05:14

## 2019-01-25 RX ADMIN — Medication 1 TABLET(S): at 05:14

## 2019-01-25 RX ADMIN — CHLORHEXIDINE GLUCONATE 1 APPLICATION(S): 213 SOLUTION TOPICAL at 06:38

## 2019-01-25 RX ADMIN — CITALOPRAM 20 MILLIGRAM(S): 10 TABLET, FILM COATED ORAL at 11:35

## 2019-01-25 RX ADMIN — Medication 20 MILLIGRAM(S): at 05:14

## 2019-01-25 NOTE — PROGRESS NOTE ADULT - SUBJECTIVE AND OBJECTIVE BOX
SUBJECTIVE:    Patient is a 78y old Female who presents with a chief complaint of fall, femoral head/neck subacute incomplete fracture (25 Jan 2019 09:09)    Currently admitted to medicine with the primary diagnosis of Hip fracture, right     Today is hospital day 6d.   OVERNIGHT EVENTS no acute events  Today, patient reports her leg feels weak from lying down in bed for so long.     PAST MEDICAL & SURGICAL HISTORY  CKD (chronic kidney disease) stage 3, GFR 30-59 ml/min  Pituitary adenoma: ??  Osteoarthritis  Sleep apnea  Afib: on xarelto  Hypothyroid: s/p thyroid ca surgery  High cholesterol  HTN (hypertension)  S/P rotator cuff surgery  H/O thyroidectomy          ALLERGIES:  No Known Allergies    MEDICATIONS:  STANDING MEDICATIONS  amLODIPine   Tablet 5 milliGRAM(s) Oral at bedtime  atorvastatin 20 milliGRAM(s) Oral at bedtime  chlorhexidine 4% Liquid 1 Application(s) Topical <User Schedule>  citalopram 20 milliGRAM(s) Oral daily  levothyroxine 175 MICROGram(s) Oral <User Schedule>  lisinopril 20 milliGRAM(s) Oral two times a day  metoprolol tartrate 50 milliGRAM(s) Oral two times a day  oxybutynin 5 milliGRAM(s) Oral two times a day  predniSONE   Tablet 20 milliGRAM(s) Oral daily  rivaroxaban 20 milliGRAM(s) Oral at bedtime  sodium chloride 0.9%. 1000 milliLiter(s) IV Continuous <Continuous>  triamterene 37.5 mG/hydrochlorothiazide 25 mG Tablet 1 Tablet(s) Oral daily    PRN MEDICATIONS  acetaminophen   Tablet .. 650 milliGRAM(s) Oral every 6 hours PRN    VITALS:   T(F): 96.3  HR: 60  BP: 136/81  RR: 17  SpO2: --          LABS:                        10.1   11.04 )-----------( 436      ( 25 Jan 2019 06:30 )             33.2     01-25    146  |  103  |  72<HH>  ----------------------------<  125<H>  4.5   |  25  |  1.4    Ca    8.8      25 Jan 2019 06:30  Mg     2.5     01-24                    RADIOLOGY:    PHYSICAL EXAM:  GEN: NAD  LUNGS: CTAB  HEART: rrr s1s2 present   ABD: soft nontender nondistended +BS  EXT: no edema  NEURO: nonfocal, aao x3

## 2019-01-25 NOTE — PROGRESS NOTE ADULT - REASON FOR ADMISSION
fall, femoral head/neck subacute incomplete fracture

## 2019-01-25 NOTE — PROGRESS NOTE ADULT - ASSESSMENT
#mechanical fall subacute fem neck incomplete fracture  - MRI suggestive of CPPD  - ortho: no surgical indication; pt can be protected weight bearing with walker for 4 weeks (50% weight bearing)  - dispo planning   - fu dr xavi oakes 2 weeks [phone]   - pain control  - pt rehab- inpatient rehab candidate  - Cr 1.9 today  - will give gentle hydration NS 50cc/hr for 10 hours   -pending snf placement     #AMS- likely 2/2 steroid use vs opioid use- back to baseline  #Toxic encephalopathy   - pt aao x3  - nonfocal neuro exam   - give tylenol as needed for pain control  - avoid opioids/narcotics/sedative  - CTH on admission negative     #Gout flare up in b/l ankles- improved  - no longer having any pain   - prednisone 20 mg to complete total 5 day course  - xray ankles, edema but no fracture  - uric acid elevated 10, will consider starting allopurinol    # Atrial fibrillation  -c/w xeralto   -c/w metoprolol 50mg BID    # HTN  c/w home medications    #hypothyroid  - levothyroxine 175 qhs Monday -friday (patient's dosing from her endocrinologist)    #HLD  atorvastatin    #depression- celexa    #overactive bladder  oxybutynin qhs    #diet, dash diet

## 2019-01-25 NOTE — PROGRESS NOTE ADULT - ASSESSMENT
mechanical fall subacute fem neck incomplete fracture  - MRI suggestive of CPPD  - ortho: no surgical indication; pt can be protected weight bearing with walker for 4 weeks   - dispo planning   - fu dr xavi oakes 2 weeks [phone]   - pain control  - pt rehab- inpatient rehab candidate    #AMS- likely 2/2 overuse of opioid vs delirium  - family reports that pt's mental status changed since yesterday  - will d/c all pain meds for now  - give tylenol as needed for pain control  - avoid opioids/narcotics/sedative  - CTH on admission negative     #Gout flare up in b/l ankles- improved  - red, swollen, very tender ankles  - hx of gout, pt states pain simliar  - Today, she no longer has pain after getting prednisone 40 which started yesterday  - will decreased prednisone to 20 for 3 more days  - xray ankles, edema but no fracture    # Atrial fibrillation  -c/w xeralto   -c/w metoprolol 50mg BID    # HTN  c/w home medications    #hypothyroid  - levothyroxine 175 qhs Monday -friday (patinets dosing from her endocrinologist)    #HLD  atorvastatin    #depression- celexa    #overactive bladder  oxybutynin qhs    #diet, dash diet

## 2019-01-26 ENCOUNTER — OUTPATIENT (OUTPATIENT)
Dept: OUTPATIENT SERVICES | Facility: HOSPITAL | Age: 79
LOS: 1 days | Discharge: HOME | End: 2019-01-26

## 2019-01-26 DIAGNOSIS — N18.9 CHRONIC KIDNEY DISEASE, UNSPECIFIED: ICD-10-CM

## 2019-01-26 DIAGNOSIS — I10 ESSENTIAL (PRIMARY) HYPERTENSION: ICD-10-CM

## 2019-01-26 DIAGNOSIS — I48.91 UNSPECIFIED ATRIAL FIBRILLATION: ICD-10-CM

## 2019-01-26 DIAGNOSIS — Z98.890 OTHER SPECIFIED POSTPROCEDURAL STATES: Chronic | ICD-10-CM

## 2019-01-29 DIAGNOSIS — G47.30 SLEEP APNEA, UNSPECIFIED: ICD-10-CM

## 2019-01-29 DIAGNOSIS — I12.9 HYPERTENSIVE CHRONIC KIDNEY DISEASE WITH STAGE 1 THROUGH STAGE 4 CHRONIC KIDNEY DISEASE, OR UNSPECIFIED CHRONIC KIDNEY DISEASE: ICD-10-CM
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
## 2019-01-29 DIAGNOSIS — M11.80 OTHER SPECIFIED CRYSTAL ARTHROPATHIES, UNSPECIFIED SITE: ICD-10-CM
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
## 2019-01-29 DIAGNOSIS — F32.9 MAJOR DEPRESSIVE DISORDER, SINGLE EPISODE, UNSPECIFIED: ICD-10-CM

## 2019-01-29 DIAGNOSIS — I48.91 UNSPECIFIED ATRIAL FIBRILLATION: ICD-10-CM

## 2019-01-29 DIAGNOSIS — N18.3 CHRONIC KIDNEY DISEASE, STAGE 3 (MODERATE): ICD-10-CM

## 2019-01-29 DIAGNOSIS — S72.091A OTHER FRACTURE OF HEAD AND NECK OF RIGHT FEMUR, INITIAL ENCOUNTER FOR CLOSED FRACTURE: ICD-10-CM

## 2019-01-29 DIAGNOSIS — N32.81 OVERACTIVE BLADDER: ICD-10-CM

## 2019-01-29 DIAGNOSIS — E89.0 POSTPROCEDURAL HYPOTHYROIDISM: ICD-10-CM

## 2019-01-29 DIAGNOSIS — E78.5 HYPERLIPIDEMIA, UNSPECIFIED: ICD-10-CM

## 2019-01-29 DIAGNOSIS — G92 TOXIC ENCEPHALOPATHY: ICD-10-CM

## 2019-01-29 DIAGNOSIS — Z80.1 FAMILY HISTORY OF MALIGNANT NEOPLASM OF TRACHEA, BRONCHUS AND LUNG: ICD-10-CM

## 2019-01-29 DIAGNOSIS — S09.90XA UNSPECIFIED INJURY OF HEAD, INITIAL ENCOUNTER: ICD-10-CM

## 2019-01-29 DIAGNOSIS — M10.9 GOUT, UNSPECIFIED: ICD-10-CM
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
## 2019-01-31 ENCOUNTER — OUTPATIENT (OUTPATIENT)
Dept: OUTPATIENT SERVICES | Facility: HOSPITAL | Age: 79
LOS: 1 days | Discharge: HOME | End: 2019-01-31

## 2019-01-31 DIAGNOSIS — Z98.890 OTHER SPECIFIED POSTPROCEDURAL STATES: Chronic | ICD-10-CM

## 2019-01-31 DIAGNOSIS — B96.89 OTHER SPECIFIED BACTERIAL AGENTS AS THE CAUSE OF DISEASES CLASSIFIED ELSEWHERE: ICD-10-CM

## 2019-02-01 ENCOUNTER — OUTPATIENT (OUTPATIENT)
Dept: OUTPATIENT SERVICES | Facility: HOSPITAL | Age: 79
LOS: 1 days | Discharge: HOME | End: 2019-02-01

## 2019-02-01 DIAGNOSIS — Z98.890 OTHER SPECIFIED POSTPROCEDURAL STATES: Chronic | ICD-10-CM

## 2019-02-01 DIAGNOSIS — D72.829 ELEVATED WHITE BLOOD CELL COUNT, UNSPECIFIED: ICD-10-CM

## 2019-02-03 ENCOUNTER — OUTPATIENT (OUTPATIENT)
Dept: OUTPATIENT SERVICES | Facility: HOSPITAL | Age: 79
LOS: 1 days | Discharge: HOME | End: 2019-02-03

## 2019-02-03 DIAGNOSIS — Z98.890 OTHER SPECIFIED POSTPROCEDURAL STATES: Chronic | ICD-10-CM

## 2019-02-03 DIAGNOSIS — Z79.2 LONG TERM (CURRENT) USE OF ANTIBIOTICS: ICD-10-CM

## 2019-02-05 ENCOUNTER — OUTPATIENT (OUTPATIENT)
Dept: OUTPATIENT SERVICES | Facility: HOSPITAL | Age: 79
LOS: 1 days | Discharge: HOME | End: 2019-02-05

## 2019-02-05 DIAGNOSIS — Z98.890 OTHER SPECIFIED POSTPROCEDURAL STATES: Chronic | ICD-10-CM

## 2019-02-05 DIAGNOSIS — D72.829 ELEVATED WHITE BLOOD CELL COUNT, UNSPECIFIED: ICD-10-CM

## 2019-02-05 DIAGNOSIS — R11.0 NAUSEA: ICD-10-CM

## 2019-02-06 ENCOUNTER — OUTPATIENT (OUTPATIENT)
Dept: OUTPATIENT SERVICES | Facility: HOSPITAL | Age: 79
LOS: 1 days | Discharge: HOME | End: 2019-02-06

## 2019-02-06 DIAGNOSIS — Z98.890 OTHER SPECIFIED POSTPROCEDURAL STATES: Chronic | ICD-10-CM

## 2019-02-06 DIAGNOSIS — Z79.2 LONG TERM (CURRENT) USE OF ANTIBIOTICS: ICD-10-CM

## 2019-02-08 ENCOUNTER — OUTPATIENT (OUTPATIENT)
Dept: OUTPATIENT SERVICES | Facility: HOSPITAL | Age: 79
LOS: 1 days | Discharge: HOME | End: 2019-02-08

## 2019-02-08 DIAGNOSIS — Z98.890 OTHER SPECIFIED POSTPROCEDURAL STATES: Chronic | ICD-10-CM

## 2019-02-08 DIAGNOSIS — R79.9 ABNORMAL FINDING OF BLOOD CHEMISTRY, UNSPECIFIED: ICD-10-CM

## 2019-02-09 ENCOUNTER — INPATIENT (INPATIENT)
Facility: HOSPITAL | Age: 79
LOS: 10 days | Discharge: NURSING, REHAB OR LONG-TERM CARE | End: 2019-02-20
Attending: INTERNAL MEDICINE | Admitting: INTERNAL MEDICINE

## 2019-02-09 VITALS
RESPIRATION RATE: 18 BRPM | SYSTOLIC BLOOD PRESSURE: 110 MMHG | OXYGEN SATURATION: 100 % | TEMPERATURE: 98 F | DIASTOLIC BLOOD PRESSURE: 81 MMHG | HEART RATE: 86 BPM

## 2019-02-09 DIAGNOSIS — Z98.890 OTHER SPECIFIED POSTPROCEDURAL STATES: Chronic | ICD-10-CM

## 2019-02-09 LAB
ALBUMIN SERPL ELPH-MCNC: 3.2 G/DL — LOW (ref 3.5–5.2)
ALP SERPL-CCNC: 248 U/L — HIGH (ref 30–115)
ALT FLD-CCNC: 117 U/L — HIGH (ref 0–41)
ANION GAP SERPL CALC-SCNC: 16 MMOL/L — HIGH (ref 7–14)
APTT BLD: 31.2 SEC — SIGNIFICANT CHANGE UP (ref 27–39.2)
AST SERPL-CCNC: 30 U/L — SIGNIFICANT CHANGE UP (ref 0–41)
BASOPHILS # BLD AUTO: 0.06 K/UL — SIGNIFICANT CHANGE UP (ref 0–0.2)
BASOPHILS NFR BLD AUTO: 0.6 % — SIGNIFICANT CHANGE UP (ref 0–1)
BILIRUB SERPL-MCNC: 0.3 MG/DL — SIGNIFICANT CHANGE UP (ref 0.2–1.2)
BUN SERPL-MCNC: 45 MG/DL — HIGH (ref 10–20)
CALCIUM SERPL-MCNC: 8.9 MG/DL — SIGNIFICANT CHANGE UP (ref 8.5–10.1)
CHLORIDE SERPL-SCNC: 104 MMOL/L — SIGNIFICANT CHANGE UP (ref 98–110)
CO2 SERPL-SCNC: 22 MMOL/L — SIGNIFICANT CHANGE UP (ref 17–32)
CREAT SERPL-MCNC: 1.6 MG/DL — HIGH (ref 0.7–1.5)
EOSINOPHIL # BLD AUTO: 0.1 K/UL — SIGNIFICANT CHANGE UP (ref 0–0.7)
EOSINOPHIL NFR BLD AUTO: 1.1 % — SIGNIFICANT CHANGE UP (ref 0–8)
GAS PNL BLDV: SIGNIFICANT CHANGE UP
GLUCOSE SERPL-MCNC: 134 MG/DL — HIGH (ref 70–99)
HCT VFR BLD CALC: 31.4 % — LOW (ref 37–47)
HGB BLD-MCNC: 9.3 G/DL — LOW (ref 12–16)
IMM GRANULOCYTES NFR BLD AUTO: 1.7 % — HIGH (ref 0.1–0.3)
INR BLD: 1.85 RATIO — HIGH (ref 0.65–1.3)
LYMPHOCYTES # BLD AUTO: 1.2 K/UL — SIGNIFICANT CHANGE UP (ref 1.2–3.4)
LYMPHOCYTES # BLD AUTO: 12.6 % — LOW (ref 20.5–51.1)
MAGNESIUM SERPL-MCNC: 2.3 MG/DL — SIGNIFICANT CHANGE UP (ref 1.8–2.4)
MCHC RBC-ENTMCNC: 24.5 PG — LOW (ref 27–31)
MCHC RBC-ENTMCNC: 29.6 G/DL — LOW (ref 32–37)
MCV RBC AUTO: 82.8 FL — SIGNIFICANT CHANGE UP (ref 81–99)
MONOCYTES # BLD AUTO: 1.05 K/UL — HIGH (ref 0.1–0.6)
MONOCYTES NFR BLD AUTO: 11 % — HIGH (ref 1.7–9.3)
NEUTROPHILS # BLD AUTO: 6.94 K/UL — HIGH (ref 1.4–6.5)
NEUTROPHILS NFR BLD AUTO: 73 % — SIGNIFICANT CHANGE UP (ref 42.2–75.2)
NT-PROBNP SERPL-SCNC: HIGH PG/ML (ref 0–300)
PLATELET # BLD AUTO: 371 K/UL — SIGNIFICANT CHANGE UP (ref 130–400)
POTASSIUM SERPL-MCNC: 4.8 MMOL/L — SIGNIFICANT CHANGE UP (ref 3.5–5)
POTASSIUM SERPL-SCNC: 4.8 MMOL/L — SIGNIFICANT CHANGE UP (ref 3.5–5)
PROT SERPL-MCNC: 5.8 G/DL — LOW (ref 6–8)
PROTHROM AB SERPL-ACNC: 21.1 SEC — HIGH (ref 9.95–12.87)
RBC # BLD: 3.79 M/UL — LOW (ref 4.2–5.4)
RBC # FLD: 16.8 % — HIGH (ref 11.5–14.5)
SODIUM SERPL-SCNC: 142 MMOL/L — SIGNIFICANT CHANGE UP (ref 135–146)
TROPONIN T SERPL-MCNC: <0.01 NG/ML — SIGNIFICANT CHANGE UP
WBC # BLD: 9.51 K/UL — SIGNIFICANT CHANGE UP (ref 4.8–10.8)
WBC # FLD AUTO: 9.51 K/UL — SIGNIFICANT CHANGE UP (ref 4.8–10.8)

## 2019-02-09 RX ORDER — LISINOPRIL 2.5 MG/1
20 TABLET ORAL DAILY
Qty: 0 | Refills: 0 | Status: DISCONTINUED | OUTPATIENT
Start: 2019-02-09 | End: 2019-02-13

## 2019-02-09 RX ORDER — TRIAMTERENE/HYDROCHLOROTHIAZID 75 MG-50MG
1 TABLET ORAL DAILY
Qty: 0 | Refills: 0 | Status: DISCONTINUED | OUTPATIENT
Start: 2019-02-09 | End: 2019-02-15

## 2019-02-09 RX ORDER — LEVOTHYROXINE SODIUM 125 MCG
175 TABLET ORAL DAILY
Qty: 0 | Refills: 0 | Status: DISCONTINUED | OUTPATIENT
Start: 2019-02-11 | End: 2019-02-12

## 2019-02-09 RX ORDER — RIVAROXABAN 15 MG-20MG
15 KIT ORAL EVERY 24 HOURS
Qty: 0 | Refills: 0 | Status: DISCONTINUED | OUTPATIENT
Start: 2019-02-09 | End: 2019-02-20

## 2019-02-09 RX ORDER — ACETAMINOPHEN 500 MG
650 TABLET ORAL EVERY 6 HOURS
Qty: 0 | Refills: 0 | Status: DISCONTINUED | OUTPATIENT
Start: 2019-02-09 | End: 2019-02-20

## 2019-02-09 RX ORDER — AZITHROMYCIN 500 MG/1
500 TABLET, FILM COATED ORAL ONCE
Qty: 0 | Refills: 0 | Status: COMPLETED | OUTPATIENT
Start: 2019-02-09 | End: 2019-02-09

## 2019-02-09 RX ORDER — METOPROLOL TARTRATE 50 MG
5 TABLET ORAL ONCE
Qty: 0 | Refills: 0 | Status: COMPLETED | OUTPATIENT
Start: 2019-02-09 | End: 2019-02-09

## 2019-02-09 RX ORDER — CITALOPRAM 10 MG/1
20 TABLET, FILM COATED ORAL DAILY
Qty: 0 | Refills: 0 | Status: DISCONTINUED | OUTPATIENT
Start: 2019-02-09 | End: 2019-02-20

## 2019-02-09 RX ORDER — FUROSEMIDE 40 MG
40 TABLET ORAL ONCE
Qty: 0 | Refills: 0 | Status: COMPLETED | OUTPATIENT
Start: 2019-02-09 | End: 2019-02-09

## 2019-02-09 RX ORDER — CHLORHEXIDINE GLUCONATE 213 G/1000ML
1 SOLUTION TOPICAL
Qty: 0 | Refills: 0 | Status: DISCONTINUED | OUTPATIENT
Start: 2019-02-09 | End: 2019-02-20

## 2019-02-09 RX ORDER — CEFEPIME 1 G/1
2000 INJECTION, POWDER, FOR SOLUTION INTRAMUSCULAR; INTRAVENOUS ONCE
Qty: 0 | Refills: 0 | Status: COMPLETED | OUTPATIENT
Start: 2019-02-09 | End: 2019-02-09

## 2019-02-09 RX ORDER — AMLODIPINE BESYLATE 2.5 MG/1
5 TABLET ORAL AT BEDTIME
Qty: 0 | Refills: 0 | Status: DISCONTINUED | OUTPATIENT
Start: 2019-02-10 | End: 2019-02-11

## 2019-02-09 RX ORDER — FUROSEMIDE 40 MG
40 TABLET ORAL DAILY
Qty: 0 | Refills: 0 | Status: DISCONTINUED | OUTPATIENT
Start: 2019-02-09 | End: 2019-02-10

## 2019-02-09 RX ORDER — METOPROLOL TARTRATE 50 MG
50 TABLET ORAL ONCE
Qty: 0 | Refills: 0 | Status: COMPLETED | OUTPATIENT
Start: 2019-02-09 | End: 2019-02-09

## 2019-02-09 RX ORDER — ATORVASTATIN CALCIUM 80 MG/1
20 TABLET, FILM COATED ORAL AT BEDTIME
Qty: 0 | Refills: 0 | Status: DISCONTINUED | OUTPATIENT
Start: 2019-02-09 | End: 2019-02-20

## 2019-02-09 RX ORDER — METOPROLOL TARTRATE 50 MG
50 TABLET ORAL THREE TIMES A DAY
Qty: 0 | Refills: 0 | Status: DISCONTINUED | OUTPATIENT
Start: 2019-02-09 | End: 2019-02-10

## 2019-02-09 RX ORDER — OXYBUTYNIN CHLORIDE 5 MG
5 TABLET ORAL
Qty: 0 | Refills: 0 | Status: DISCONTINUED | OUTPATIENT
Start: 2019-02-09 | End: 2019-02-20

## 2019-02-09 RX ORDER — PANTOPRAZOLE SODIUM 20 MG/1
40 TABLET, DELAYED RELEASE ORAL
Qty: 0 | Refills: 0 | Status: DISCONTINUED | OUTPATIENT
Start: 2019-02-09 | End: 2019-02-20

## 2019-02-09 RX ADMIN — CEFEPIME 100 MILLIGRAM(S): 1 INJECTION, POWDER, FOR SOLUTION INTRAMUSCULAR; INTRAVENOUS at 22:28

## 2019-02-09 RX ADMIN — AZITHROMYCIN 255 MILLIGRAM(S): 500 TABLET, FILM COATED ORAL at 19:22

## 2019-02-09 RX ADMIN — Medication 5 MILLIGRAM(S): at 22:27

## 2019-02-09 RX ADMIN — Medication 5 MILLIGRAM(S): at 22:26

## 2019-02-09 RX ADMIN — Medication 50 MILLIGRAM(S): at 19:44

## 2019-02-09 RX ADMIN — RIVAROXABAN 15 MILLIGRAM(S): KIT at 18:37

## 2019-02-09 RX ADMIN — ATORVASTATIN CALCIUM 20 MILLIGRAM(S): 80 TABLET, FILM COATED ORAL at 22:27

## 2019-02-09 RX ADMIN — Medication 40 MILLIGRAM(S): at 14:55

## 2019-02-09 NOTE — ED PROVIDER NOTE - PROGRESS NOTE DETAILS
Pt with RR 36, placed on BIPAP upon arrival with symptomatic improvement. Pt feeling better at this time. Lasix ordered. will admit for CHF exacerbation. Dr. Taye claros, endorsed to MAR.

## 2019-02-09 NOTE — H&P ADULT - ATTENDING COMMENTS
77 yo female patient with PMHx of Paroxysmal AF on Xarelto, HTN, DL, hypothyroidism (history of thyroid cancer s/p resection), FELIX on CPAP, recent hip fracture discharged to [facility] recently, currently presenting because of dyspnea on exertion and palpitations    Pt seen and examined in ER- 19a    Spoke with RN    Noted with dyspnea- now on bipap; also lius afib with RVR at 120's    Chart reviewed- agree with above plan    cardiology eval    tele    close monitoring    O2 as needed    CCU eval- transfer if bed available

## 2019-02-09 NOTE — ED ADULT NURSE REASSESSMENT NOTE - NS ED NURSE REASSESS COMMENT FT1
Pt assessed a/O times 4 with C/O SOB for 1 week on and off worse from  today comfort provide safety precaution on progress HOB elevated  35 degree ,pt is seen evaluate by Ed attending placed on Biyap   family members at bed site Lasix 40 mg IVP order is given on going nursing observation .

## 2019-02-09 NOTE — ED PROVIDER NOTE - PHYSICAL EXAMINATION
Constitutional: Well developed, well nourished. NAD.  Head: Normocephalic, atraumatic.  Eyes: PERRL. EOMI.  ENT: No nasal discharge. Mucous membranes moist.  Neck: Supple. Painless ROM.  Cardiovascular: Normal S1, S2. Regular rate and rhythm. No murmurs, rubs, or gallops.  Pulmonary: Tachypnic, speaking in short sentences. Bilateral rales.   Abdominal: Soft. Nondistended. Nontender. No rebound, guarding, rigidity.  Extremities. Pelvis stable. No lower extremity edema, symmetric calves.  Skin: No rashes, cyanosis.  Neuro: AAOx3. No focal neurological deficits.  Psych: Normal mood. Normal affect. Constitutional: Well developed, well nourished. NAD.  Head: Normocephalic, atraumatic.  Eyes: PERRL. EOMI.  ENT: No nasal discharge. Mucous membranes moist.  Neck: Supple. Painless ROM.  Cardiovascular: Normal S1, S2. Regular rate and rhythm. + systolic ejection murmur.  Pulmonary: Tachypnic, speaking in short sentences. Bilateral rales.   Abdominal: Soft. Nondistended. Nontender. No rebound, guarding, rigidity.  Extremities. Pelvis stable. No lower extremity edema, symmetric calves.  Skin: No rashes, cyanosis.  Neuro: AAOx3. No focal neurological deficits.  Psych: Normal mood. Normal affect. Constitutional: Well developed, well nourished. NAD.  Head: Normocephalic, atraumatic.  Eyes: PERRL. EOMI.  ENT: No nasal discharge. Mucous membranes moist.  Neck: Supple. Painless ROM.  Cardiovascular: Normal S1, S2. Normal rate, irregularly irregular rhythm. + systolic ejection murmur.  Pulmonary: Tachypnic, speaking in short sentences. Bilateral rales.   Abdominal: Soft. Nondistended. Nontender. No rebound, guarding, rigidity.  Extremities. Pelvis stable. No lower extremity edema, symmetric calves.  Skin: No rashes, cyanosis.  Neuro: AAOx3. No focal neurological deficits.  Psych: Normal mood. Normal affect.

## 2019-02-09 NOTE — ED PROVIDER NOTE - OBJECTIVE STATEMENT
Pt is a 78 y/of female with hx of HTN, DLD, afib on xarelto, hypothyroidism, FELIX on CPAP at night, presents to ED for SOB that began several days ago, worse since onset. Symptoms are moderate, worse with laying flat, exertion. + intermittent chest pain, cough. No fever, abd pain, n/v/d. Pt denies similar sx's in the past. Pt was recently here for fall, found to have subacute hip fracture, now in rehab. Pt is a 79 y/o female with hx of HTN, DLD, afib on xarelto, hypothyroidism, FELIX on CPAP at night, presents to ED for SOB that began several days ago, worse since onset. Symptoms are moderate, worse with laying flat, exertion. + intermittent chest pain, cough. No fever, abd pain, n/v/d. Pt denies similar sx's in the past. Pt was recently here for fall, found to have subacute hip fracture, now in rehab. Pt recently finished course of abx cefepime and vanc for elevated WBC.

## 2019-02-09 NOTE — H&P ADULT - HISTORY OF PRESENT ILLNESS
79 yo female patient with PMHx of Paroxysmal AF on Xarelto, HTN, DL, hypothyroidism (history of thyroid cancer s/p resection), FELIX on CPAP, recent hip fracture discharged to [facility] recently, currently presenting because of dyspnea on exertion and palpitations over the last 3 days.  Patient reports having dyspnea on minimal exertion over the last few days, if she walks to the bathroom she gets exhausted. She also has palpitations when she walks. She says she's been coughing, no phlegm, has orthopnea, no PND. She admits intermittent episodes of chest discomfort with the palpitations. Denies lower extremities swelling.  No other complaints: No headaches, no fever, no chills, no abdominal pain, no urinary symptoms

## 2019-02-09 NOTE — ED PROVIDER NOTE - NS ED ROS FT
Constitutional: No fever, chills.  Eyes: No visual changes.  ENT: No hearing changes. No sore throat.  Neck: No neck pain or stiffness.  Cardiovascular: + chest pain. No palpitations, edema.  Pulmonary: + SOB, cough. No hemoptysis.  Abdominal: No abdominal pain, nausea, vomiting, diarrhea.  : No dysuria, frequency.  Neuro: No headache, syncope, dizziness.  MS: No back pain. No calf pain/swelling.  Psych: No suicidal ideations.

## 2019-02-09 NOTE — ED ADULT NURSE NOTE - OBJECTIVE STATEMENT
Pt just was d/C from nursing home today C/O coughing SOB weakness fr 3 days . Pt at nursing home rehabilitation S/P fall with left hip injury .

## 2019-02-09 NOTE — ED PROVIDER NOTE - ATTENDING CONTRIBUTION TO CARE
78F PMH afib xarelto, htn, hl, depression, hypothyroid, recent admission w ?R hip fx vs CPPD, sent to rehab facility, was scheduled to be dc today but found by daughter to have SOB at rest and w exertion and EMS was called. pt reports fever 3 days ago 101. has had 2-3 days BECK, SOB, palp and dry cough. +LE edema assoc bilat. chest pressure as well in center of chest constant nonradiating. no tearing bp. no immobilization, hormones, hemoptysis. no le pain. pt had recent leukocytosis at rehab and completed 1 week vanc/cefepime.  no dysuria, freq, hematuria. no abd pain, nvdc.     on exam, AFVSS, well karl nad, ncat, eomi, perrla, mmm, bibasilar rales, tachypnea and mild inc wob, speaking in full sentences, no accessory muscle use, rrr nl s1s2 no mrg, abd soft ntnd, aaox3, no focal deficits, 2+ bilat pitting edema to knees, no erythema or warmth, no calf ttp,2+ dp pulse, from, 5/5 motor    , a/p; SOB/CP, cough, leg swelling, concern for pna, acs, CHF, doubt PE from H&P, will do labs, ekg/trop, CXR, bipap, re-eval.

## 2019-02-09 NOTE — H&P ADULT - PMH
Afib  on xarelto  CKD (chronic kidney disease) stage 3, GFR 30-59 ml/min    High cholesterol    HTN (hypertension)    Hypothyroid  s/p thyroid ca surgery  Osteoarthritis    Sleep apnea

## 2019-02-09 NOTE — H&P ADULT - ASSESSMENT
79 yo female patient with PMHx of Paroxysmal AF on Xarelto, HTN, DL, hypothyroidism (history of thyroid cancer s/p resection), FELIX on CPAP, recent hip fracture discharged to [facility] recently, currently presenting because of dyspnea on exertion and palpitations over the last 3 days.    #) Dyspnea on exertion and palpitations  Patient was found in AF/flutter with rapid ventricular rate to 130s, CXR congested, [id number] (dry bnp around 500)  -> acute CHF decompensation likely secondary to rapid heart rate  Rate control (increase metoprolol to TID instead of BID)  Diuresis with IV lasix for now (40mg daily)  Continue with anticoagulation - Change Xarelto to 15mg daily instead of 20mg (renal adjustment)  Accurate I&Os, F/U BUN/Cr  Repeat CXR in AM, F/U ECG and cardiac enzymes and electrolytes  Check 2d echo (no previous echo on chart)  Consider Cardiology consult if patient does not improve (patient follows with Dr. Quintero)    #) HTN DL hypothyroidism  Continue with home meds    #) FELIX  continue with cPAP    #) Miscellaneous  DVT ppx (already on xarelto)  GI ppx  DASH diet  Activity as tolerated  Full code  Dispo SNF vs home with home care

## 2019-02-09 NOTE — H&P ADULT - NSHPLABSRESULTS_GEN_ALL_CORE
9.3    9.51  )-----------( 371      ( 09 Feb 2019 11:30 )             31.4     142  |  104  |  45<H>  ----------------------------<  134<H>  4.8   |  22  |  1.6<H>    Ca    8.9      09 Feb 2019 11:30  Mg     2.3     02-09    TPro  5.8<L>  /  Alb  3.2<L>  /  TBili  0.3  /  DBili  x   /  AST  30  /  ALT  117<H>  /  AlkPhos  248<H>  02-09        PT/INR - ( 09 Feb 2019 11:30 )   PT: 21.10 sec;   INR: 1.85 ratio       PTT - ( 09 Feb 2019 11:30 )  PTT:31.2 sec    CARDIAC MARKERS ( 09 Feb 2019 11:30 )  x     / <0.01 ng/mL / x     / x     / x    ECG: Aflutter/fib with variable conduction, Rate around 130 bpm    CXR: Bilateral opacities, congestion

## 2019-02-10 PROBLEM — D35.2 BENIGN NEOPLASM OF PITUITARY GLAND: Chronic | Status: INACTIVE | Noted: 2018-12-21 | Resolved: 2019-02-09

## 2019-02-10 LAB
CK MB CFR SERPL CALC: 1.7 NG/ML — SIGNIFICANT CHANGE UP (ref 0.6–6.3)
GLUCOSE BLDC GLUCOMTR-MCNC: 224 MG/DL — HIGH (ref 70–99)
TROPONIN T SERPL-MCNC: <0.01 NG/ML — SIGNIFICANT CHANGE UP

## 2019-02-10 RX ORDER — METOPROLOL TARTRATE 50 MG
50 TABLET ORAL EVERY 6 HOURS
Qty: 0 | Refills: 0 | Status: DISCONTINUED | OUTPATIENT
Start: 2019-02-10 | End: 2019-02-14

## 2019-02-10 RX ORDER — DILTIAZEM HCL 120 MG
5 CAPSULE, EXT RELEASE 24 HR ORAL
Qty: 125 | Refills: 0 | Status: DISCONTINUED | OUTPATIENT
Start: 2019-02-10 | End: 2019-02-10

## 2019-02-10 RX ORDER — METOPROLOL TARTRATE 50 MG
5 TABLET ORAL ONCE
Qty: 0 | Refills: 0 | Status: COMPLETED | OUTPATIENT
Start: 2019-02-10 | End: 2019-02-10

## 2019-02-10 RX ORDER — FUROSEMIDE 40 MG
40 TABLET ORAL ONCE
Qty: 0 | Refills: 0 | Status: COMPLETED | OUTPATIENT
Start: 2019-02-10 | End: 2019-02-10

## 2019-02-10 RX ORDER — METOPROLOL TARTRATE 50 MG
50 TABLET ORAL ONCE
Qty: 0 | Refills: 0 | Status: COMPLETED | OUTPATIENT
Start: 2019-02-10 | End: 2019-02-10

## 2019-02-10 RX ORDER — DILTIAZEM HCL 120 MG
14 CAPSULE, EXT RELEASE 24 HR ORAL
Qty: 125 | Refills: 0 | Status: DISCONTINUED | OUTPATIENT
Start: 2019-02-10 | End: 2019-02-12

## 2019-02-10 RX ORDER — FUROSEMIDE 40 MG
40 TABLET ORAL EVERY 12 HOURS
Qty: 0 | Refills: 0 | Status: DISCONTINUED | OUTPATIENT
Start: 2019-02-10 | End: 2019-02-12

## 2019-02-10 RX ADMIN — Medication 1 TABLET(S): at 06:42

## 2019-02-10 RX ADMIN — RIVAROXABAN 15 MILLIGRAM(S): KIT at 18:47

## 2019-02-10 RX ADMIN — Medication 40 MILLIGRAM(S): at 18:43

## 2019-02-10 RX ADMIN — Medication 5 MILLIGRAM(S): at 23:33

## 2019-02-10 RX ADMIN — Medication 5 MILLIGRAM(S): at 06:42

## 2019-02-10 RX ADMIN — Medication 50 MILLIGRAM(S): at 02:03

## 2019-02-10 RX ADMIN — Medication 650 MILLIGRAM(S): at 02:03

## 2019-02-10 RX ADMIN — Medication 650 MILLIGRAM(S): at 21:27

## 2019-02-10 RX ADMIN — Medication 40 MILLIGRAM(S): at 12:36

## 2019-02-10 RX ADMIN — Medication 50 MILLIGRAM(S): at 11:31

## 2019-02-10 RX ADMIN — CITALOPRAM 20 MILLIGRAM(S): 10 TABLET, FILM COATED ORAL at 13:15

## 2019-02-10 RX ADMIN — LISINOPRIL 20 MILLIGRAM(S): 2.5 TABLET ORAL at 06:43

## 2019-02-10 RX ADMIN — Medication 5 MG/HR: at 12:36

## 2019-02-10 RX ADMIN — Medication 50 MILLIGRAM(S): at 06:43

## 2019-02-10 RX ADMIN — Medication 50 MILLIGRAM(S): at 18:45

## 2019-02-10 RX ADMIN — Medication 40 MILLIGRAM(S): at 06:43

## 2019-02-10 RX ADMIN — Medication 14 MG/HR: at 23:58

## 2019-02-10 RX ADMIN — Medication 5 MILLIGRAM(S): at 18:43

## 2019-02-10 RX ADMIN — Medication 40 MILLIGRAM(S): at 19:46

## 2019-02-10 NOTE — PROGRESS NOTE ADULT - SUBJECTIVE AND OBJECTIVE BOX
JEFFERY UGALDE  78y  Female      Patient is a 78y old  Female who presents with a chief complaint of dyspnea and palpitations for few days (09 Feb 2019 15:37)      INTERVAL HPI/ OVERNIGHT EVENTS:    79 yo female patient with PMHx of Paroxysmal AF on Xarelto, HTN, DL, hypothyroidism (history of thyroid cancer s/p resection), FELIX on CPAP, recent hip fracture discharged to [facility] recently, currently presenting because of dyspnea on exertion and palpitations over the last 3 days.  Patient reports having dyspnea on minimal exertion over the last few days, if she walks to the bathroom she gets exhausted.      T(C): 35.8 (02-10-19 @ 00:15), Max: 36.6 (02-09-19 @ 10:50)  HR: 103 (02-10-19 @ 00:15) (54 - 135)  BP: 120/79 (02-10-19 @ 00:15) (110/81 - 137/86)  RR: 20 (02-10-19 @ 00:15) (18 - 36)  SpO2: 89% (02-10-19 @ 00:15) (89% - 100%)      PHYSICAL EXAM:  alert oriented on BIPAP  no JVD  chest bilateral basal crackles  cvs s1 and s2 no added sound  abdomen soft lax no organomegaly  no pedal edema      LABS:  02-09    142  |  104  |  45<H>  ----------------------------<  134<H>  4.8   |  22  |  1.6<H>    Ca    8.9      09 Feb 2019 11:30  Mg     2.3     02-09    TPro  5.8<L>  /  Alb  3.2<L>  /  TBili  0.3  /  DBili  x   /  AST  30  /  ALT  117<H>  /  AlkPhos  248<H>  02-09                          9.3    9.51  )-----------( 371      ( 09 Feb 2019 11:30 )             31.4     Serum Pro-Brain Natriuretic Peptide (02.09.19 @ 11:30)    Serum Pro-Brain Natriuretic Peptide: 30166 pg/mL    RADIOLOGY & ADDITIONAL TESTS:  Xray Chest 1 View AP/PA (02.09.19 @ 12:04) >  Impression:      Left mid to lower lung base opacity and right perihilar/basilar opacity,   new.        Imaging Personally Reviewed:  [ ] YES  [ ] NO  acetaminophen   Tablet .. 650 milliGRAM(s) Oral every 6 hours PRN  amLODIPine   Tablet 5 milliGRAM(s) Oral at bedtime  atorvastatin 20 milliGRAM(s) Oral at bedtime  chlorhexidine 4% Liquid 1 Application(s) Topical <User Schedule>  citalopram 20 milliGRAM(s) Oral daily  diltiazem Injectable 10 milliGRAM(s) IV Push once  furosemide   Injectable 40 milliGRAM(s) IV Push daily  lisinopril 20 milliGRAM(s) Oral daily  metoprolol tartrate 50 milliGRAM(s) Oral three times a day  oxybutynin 5 milliGRAM(s) Oral two times a day  pantoprazole    Tablet 40 milliGRAM(s) Oral before breakfast  rivaroxaban 15 milliGRAM(s) Oral every 24 hours  triamterene 37.5 mG/hydrochlorothiazide 25 mG Tablet 1 Tablet(s) Oral daily      HEALTH ISSUES - PROBLEM Dx:

## 2019-02-10 NOTE — PROGRESS NOTE ADULT - ASSESSMENT
79 yo female patient with PMHx of Paroxysmal AF on Xarelto, HTN, DL, hypothyroidism (history of thyroid cancer s/p resection), FELIX on CPAP, recent hip fracture discharged to [facility] recently, currently presenting because of dyspnea on exertion and palpitations over the last 3 days.      # Dyspnea on exertion     likely CHF exacerbation secondary to Afib with RVR     s/p 1 dose of cardizem     if rate remains uncontrolled will start cardizem drip      on metoprolol 50 TID      cxr is congestion with elevated BNP      will increase  lasix 40 mg IV BID      strict I/O      follow 2D echocardiogram      will get cardiology on board      On rivaroxaban    # HTN     bp better controlled     on amlodipine    # DVT prophylaxis on rivaroxaban for AFib    # hypothyroidism     on levothyroxine      will check TSH         # GI prophylaxis on pantoprazole    # full code 79 yo female patient with PMHx of Paroxysmal AF on Xarelto, HTN, DL, hypothyroidism (history of thyroid cancer s/p resection), FELIX on CPAP, recent hip fracture discharged to [facility] recently, currently presenting because of dyspnea on exertion and palpitations over the last 3 days.      # Dyspnea on exertion     likely CHF exacerbation secondary to Afib with RVR     s/p 1 dose of cardizem     if rate remains uncontrolled will start cardizem drip      on metoprolol 50 TID      cxr is congestion with elevated BNP      will increase  lasix 40 mg IV BID      strict I/O      follow 2D echocardiogram      will get cardiology on board      On rivaroxaban    # HTN     bp better controlled     on amlodipine and lisinopril    # DVT prophylaxis on rivaroxaban for AFib    # anemia ( normocytic)     will check iron studies    # hypothyroidism     on levothyroxine      will check TSH         # GI prophylaxis on pantoprazole    # full code 79 yo female patient with PMHx of Paroxysmal AF on Xarelto, HTN, DL, hypothyroidism (history of thyroid cancer s/p resection), FELIX on CPAP, recent hip fracture discharged to [facility] recently, currently presenting because of dyspnea on exertion and palpitations over the last 3 days.      # Dyspnea on exertion     likely CHF exacerbation secondary to Afib with RVR     s/p 1 dose of cardizem     if rate remains uncontrolled will start cardizem drip      on metoprolol 50 TID      cxr is congestion with elevated BNP      will increase  lasix 40 mg IV BID      strict I/O      follow 2D echocardiogram      will get cardiology on board      On rivaroxaban    # HTN     bp better controlled     on amlodipine and lisinopril    # DVT prophylaxis on rivaroxaban for AFib    # anemia ( normocytic)     will check iron studies    # hypothyroidism     on levothyroxine      will check TSH    # transaminitis/ elevated ALP     will follow sonogram         # GI prophylaxis on pantoprazole    # full code

## 2019-02-11 DIAGNOSIS — Z02.9 ENCOUNTER FOR ADMINISTRATIVE EXAMINATIONS, UNSPECIFIED: ICD-10-CM

## 2019-02-11 LAB
ANION GAP SERPL CALC-SCNC: 18 MMOL/L — HIGH (ref 7–14)
BUN SERPL-MCNC: 45 MG/DL — HIGH (ref 10–20)
CALCIUM SERPL-MCNC: 8.8 MG/DL — SIGNIFICANT CHANGE UP (ref 8.5–10.1)
CHLORIDE SERPL-SCNC: 94 MMOL/L — LOW (ref 98–110)
CHOLEST SERPL-MCNC: 133 MG/DL — SIGNIFICANT CHANGE UP (ref 100–200)
CO2 SERPL-SCNC: 28 MMOL/L — SIGNIFICANT CHANGE UP (ref 17–32)
CREAT SERPL-MCNC: 1.7 MG/DL — HIGH (ref 0.7–1.5)
GLUCOSE SERPL-MCNC: 111 MG/DL — HIGH (ref 70–99)
HCT VFR BLD CALC: 35.8 % — LOW (ref 37–47)
HDLC SERPL-MCNC: 15 MG/DL — LOW
HGB BLD-MCNC: 10.8 G/DL — LOW (ref 12–16)
IRON SATN MFR SERPL: 10 % — LOW (ref 15–50)
IRON SATN MFR SERPL: 26 UG/DL — LOW (ref 35–150)
LIPID PNL WITH DIRECT LDL SERPL: 71 MG/DL — SIGNIFICANT CHANGE UP (ref 4–129)
MAGNESIUM SERPL-MCNC: 2 MG/DL — SIGNIFICANT CHANGE UP (ref 1.8–2.4)
MCHC RBC-ENTMCNC: 24.6 PG — LOW (ref 27–31)
MCHC RBC-ENTMCNC: 30.2 G/DL — LOW (ref 32–37)
MCV RBC AUTO: 81.5 FL — SIGNIFICANT CHANGE UP (ref 81–99)
NRBC # BLD: 0 /100 WBCS — SIGNIFICANT CHANGE UP (ref 0–0)
PHOSPHATE SERPL-MCNC: 5.5 MG/DL — HIGH (ref 2.1–4.9)
PLATELET # BLD AUTO: 334 K/UL — SIGNIFICANT CHANGE UP (ref 130–400)
POTASSIUM SERPL-MCNC: 7.6 MMOL/L — CRITICAL HIGH (ref 3.5–5)
POTASSIUM SERPL-SCNC: 7.6 MMOL/L — CRITICAL HIGH (ref 3.5–5)
RBC # BLD: 4.39 M/UL — SIGNIFICANT CHANGE UP (ref 4.2–5.4)
RBC # FLD: 17.4 % — HIGH (ref 11.5–14.5)
SODIUM SERPL-SCNC: 140 MMOL/L — SIGNIFICANT CHANGE UP (ref 135–146)
TIBC SERPL-MCNC: 272 UG/DL — SIGNIFICANT CHANGE UP (ref 220–430)
TOTAL CHOLESTEROL/HDL RATIO MEASUREMENT: 8.9 RATIO — HIGH (ref 4–5.5)
TRIGL SERPL-MCNC: 171 MG/DL — HIGH (ref 10–149)
UIBC SERPL-MCNC: 246 UG/DL — SIGNIFICANT CHANGE UP (ref 110–370)
WBC # BLD: 9.55 K/UL — SIGNIFICANT CHANGE UP (ref 4.8–10.8)
WBC # FLD AUTO: 9.55 K/UL — SIGNIFICANT CHANGE UP (ref 4.8–10.8)

## 2019-02-11 RX ADMIN — Medication 5 MILLIGRAM(S): at 06:28

## 2019-02-11 RX ADMIN — Medication 50 MILLIGRAM(S): at 23:37

## 2019-02-11 RX ADMIN — CITALOPRAM 20 MILLIGRAM(S): 10 TABLET, FILM COATED ORAL at 11:28

## 2019-02-11 RX ADMIN — Medication 175 MICROGRAM(S): at 06:27

## 2019-02-11 RX ADMIN — Medication 50 MILLIGRAM(S): at 11:30

## 2019-02-11 RX ADMIN — PANTOPRAZOLE SODIUM 40 MILLIGRAM(S): 20 TABLET, DELAYED RELEASE ORAL at 06:28

## 2019-02-11 RX ADMIN — ATORVASTATIN CALCIUM 20 MILLIGRAM(S): 80 TABLET, FILM COATED ORAL at 21:30

## 2019-02-11 RX ADMIN — LISINOPRIL 20 MILLIGRAM(S): 2.5 TABLET ORAL at 06:28

## 2019-02-11 RX ADMIN — Medication 1 TABLET(S): at 06:28

## 2019-02-11 RX ADMIN — RIVAROXABAN 15 MILLIGRAM(S): KIT at 16:43

## 2019-02-11 RX ADMIN — Medication 40 MILLIGRAM(S): at 06:27

## 2019-02-11 RX ADMIN — Medication 50 MILLIGRAM(S): at 06:28

## 2019-02-11 NOTE — PROGRESS NOTE ADULT - SUBJECTIVE AND OBJECTIVE BOX
Patient was seen and examined- still in ER. Spoke with RN and daughter. Chart reviewed.  Evgents noted. on cardizem drip  Vital Signs Last 24 Hrs  T(F): --  HR: 109 (11 Feb 2019 06:29) (50 - 109)  BP: 131/80 (11 Feb 2019 06:29) (102/76 - 131/80)  SpO2: 96% (11 Feb 2019 06:29) (96% - 98%)  MEDICATIONS  (STANDING):  amLODIPine   Tablet 5 milliGRAM(s) Oral at bedtime  atorvastatin 20 milliGRAM(s) Oral at bedtime  chlorhexidine 4% Liquid 1 Application(s) Topical <User Schedule>  citalopram 20 milliGRAM(s) Oral daily  diltiazem Infusion 14 mG/Hr (14 mL/Hr) IV Continuous <Continuous>  furosemide   Injectable 40 milliGRAM(s) IV Push every 12 hours  levothyroxine 175 MICROGram(s) Oral daily  lisinopril 20 milliGRAM(s) Oral daily  metoprolol tartrate 50 milliGRAM(s) Oral every 6 hours  oxybutynin 5 milliGRAM(s) Oral two times a day  pantoprazole    Tablet 40 milliGRAM(s) Oral before breakfast  rivaroxaban 15 milliGRAM(s) Oral every 24 hours  triamterene 37.5 mG/hydrochlorothiazide 25 mG Tablet 1 Tablet(s) Oral daily    MEDICATIONS  (PRN):  acetaminophen   Tablet .. 650 milliGRAM(s) Oral every 6 hours PRN Mild Pain (1 - 3)    Labs:                        9.3    9.51  )-----------( 371      ( 09 Feb 2019 11:30 )             31.4     09 Feb 2019 11:30    142    |  104    |  45     ----------------------------<  134    4.8     |  22     |  1.6      Ca    8.9        09 Feb 2019 11:30  Mg     2.3       09 Feb 2019 11:30    TPro  5.8    /  Alb  3.2    /  TBili  0.3    /  DBili  x      /  AST  30     /  ALT  117    /  AlkPhos  248    09 Feb 2019 11:30    PT/INR - ( 09 Feb 2019 11:30 )   PT: 21.10 sec;   INR: 1.85 ratio         PTT - ( 09 Feb 2019 11:30 )  PTT:31.2 sec      Culture - Blood (collected 09 Feb 2019 11:34)  Source: .Blood Blood  Preliminary Report (10 Feb 2019 20:01):    No growth to date.      General: comfortable, NAD  Neurology: nonfocal  Head:  Normocephalic, atraumatic  ENT:  Mucosa moist, no ulcerations  Neck:  Supple, no JVD,   Resp: CTA B/L  CV: RRR, S1S2,   GI: Soft, NT, bowel sounds  MS: B/L edema, + peripheral pulses,  joaquin      A/P:  77 yo female patient with PMHx of Paroxysmal AF on Xarelto, HTN, DL, hypothyroidism (history of thyroid cancer s/p resection), FELIX on CPAP, recent hip fracture discharged to [facility] recently, currently presenting because of dyspnea on exertion and palpitations      Noted with dyspnea- now on bipap; also with afib with RVR at 120's; now on cardizem drip    cardiology eval- Dr Haines    diuretics as per cardio    tele    close monitoring    O2 as needed    CCU eval- transfer if bed available .     DVT prophylaxis  Decubitus prevention- all measures as per RN protocol  Please call or text me with any questions or updates

## 2019-02-11 NOTE — PROGRESS NOTE ADULT - ASSESSMENT
77 yo female patient with PMHx of Paroxysmal AF on Xarelto, HTN, DL, hypothyroidism (history of thyroid cancer s/p resection), FELIX on CPAP, recent hip fracture discharged to [facility] recently, currently presenting because of dyspnea on exertion and palpitations over the last 3 days.    #) BECK 2/2 CHF exacerbation  - CXR = congested, BNP=10,000  - likely CHF exacerbation secondary to Afib with RVR  - on Metoprolol 50 q6h currently  - also on Cardizem drip - went into rapid A-fib overnight. BP good. Will continue for now until Cardio eval  - c/w Lasix 40 IV BID  - Echo - pending  - Cardio eval - pending    #) A-fib - c/w Xarelto + Metoprolol + Cardizem drip    #) HTN - stable, D/C'd Norvasc for now given Cardizem (2 CCB's), c/w Lisinopril    #) Hypothyroidism - c/w Synthroid    #) Transaminitis - will trend for now, RUQ US negative    DVT ppx: on Xarelto  Diet: DASH  Activity: AAT  Code status: FULL  Dispo: pending - from home

## 2019-02-11 NOTE — PROGRESS NOTE ADULT - SUBJECTIVE AND OBJECTIVE BOX
SUBJECTIVE:    States breathing is improved. Patient went into rapid A-fib, currently on Cardizem drip. Awaiting Cardio eval.    PAST MEDICAL & SURGICAL HISTORY  CKD (chronic kidney disease) stage 3, GFR 30-59 ml/min  Osteoarthritis  Sleep apnea  Afib: on xarelto  Hypothyroid: s/p thyroid ca surgery  High cholesterol  HTN (hypertension)  S/P rotator cuff surgery  H/O thyroidectomy    SOCIAL HISTORY:  Negative for smoking/alcohol/drug use.     ALLERGIES:  No Known Allergies    MEDICATIONS:  STANDING MEDICATIONS  atorvastatin 20 milliGRAM(s) Oral at bedtime  chlorhexidine 4% Liquid 1 Application(s) Topical <User Schedule>  citalopram 20 milliGRAM(s) Oral daily  diltiazem Infusion 14 mG/Hr IV Continuous <Continuous>  furosemide   Injectable 40 milliGRAM(s) IV Push every 12 hours  levothyroxine 175 MICROGram(s) Oral daily  lisinopril 20 milliGRAM(s) Oral daily  metoprolol tartrate 50 milliGRAM(s) Oral every 6 hours  oxybutynin 5 milliGRAM(s) Oral two times a day  pantoprazole    Tablet 40 milliGRAM(s) Oral before breakfast  rivaroxaban 15 milliGRAM(s) Oral every 24 hours  triamterene 37.5 mG/hydrochlorothiazide 25 mG Tablet 1 Tablet(s) Oral daily    PRN MEDICATIONS  acetaminophen   Tablet .. 650 milliGRAM(s) Oral every 6 hours PRN    VITALS:   T(F): 96.8  HR: 103  BP: 104/72  RR: 20  SpO2: 98%    LABS:                        10.8   9.55  )-----------( 334      ( 11 Feb 2019 08:34 )             35.8     02-11    140  |  94<L>  |  45<H>  ----------------------------<  111<H>  7.6<HH>   |  28  |  1.7<H>    Ca    8.8      11 Feb 2019 08:34  Phos  5.5     02-11  Mg     2.0     02-11    Culture - Blood (collected 09 Feb 2019 11:34)  Source: .Blood Blood  Preliminary Report (10 Feb 2019 20:01):    No growth to date.    CARDIAC MARKERS ( 10 Feb 2019 07:50 )  x     / <0.01 ng/mL / x     / x     / 1.7 ng/mL    PHYSICAL EXAM:  GEN: NAD, comfortable  LUNGS: CTAB, no w/r/r  HEART: RRR, no m/r/g  ABD: soft, NT/ND, +BS  EXT: no edema, PP b/l  NEURO: AAOx3

## 2019-02-12 LAB
ALBUMIN SERPL ELPH-MCNC: 3.5 G/DL — SIGNIFICANT CHANGE UP (ref 3.5–5.2)
ALP SERPL-CCNC: 187 U/L — HIGH (ref 30–115)
ALT FLD-CCNC: 57 U/L — HIGH (ref 0–41)
ANION GAP SERPL CALC-SCNC: 17 MMOL/L — HIGH (ref 7–14)
AST SERPL-CCNC: 11 U/L — SIGNIFICANT CHANGE UP (ref 0–41)
BASOPHILS # BLD AUTO: 0.03 K/UL — SIGNIFICANT CHANGE UP (ref 0–0.2)
BASOPHILS NFR BLD AUTO: 0.3 % — SIGNIFICANT CHANGE UP (ref 0–1)
BILIRUB SERPL-MCNC: 0.6 MG/DL — SIGNIFICANT CHANGE UP (ref 0.2–1.2)
BUN SERPL-MCNC: 50 MG/DL — HIGH (ref 10–20)
CALCIUM SERPL-MCNC: 8.5 MG/DL — SIGNIFICANT CHANGE UP (ref 8.5–10.1)
CHLORIDE SERPL-SCNC: 95 MMOL/L — LOW (ref 98–110)
CK MB CFR SERPL CALC: <1 NG/ML — SIGNIFICANT CHANGE UP (ref 0.6–6.3)
CK SERPL-CCNC: 16 U/L — SIGNIFICANT CHANGE UP (ref 0–225)
CO2 SERPL-SCNC: 30 MMOL/L — SIGNIFICANT CHANGE UP (ref 17–32)
CREAT SERPL-MCNC: 2.1 MG/DL — HIGH (ref 0.7–1.5)
EOSINOPHIL # BLD AUTO: 0.26 K/UL — SIGNIFICANT CHANGE UP (ref 0–0.7)
EOSINOPHIL NFR BLD AUTO: 2.4 % — SIGNIFICANT CHANGE UP (ref 0–8)
FERRITIN SERPL-MCNC: 95 NG/ML — SIGNIFICANT CHANGE UP (ref 15–150)
FLU A RESULT: NEGATIVE — SIGNIFICANT CHANGE UP
FLU A RESULT: NEGATIVE — SIGNIFICANT CHANGE UP
FLUAV AG NPH QL: NEGATIVE — SIGNIFICANT CHANGE UP
FLUBV AG NPH QL: NEGATIVE — SIGNIFICANT CHANGE UP
GGT SERPL-CCNC: 180 U/L — HIGH (ref 1–40)
GLUCOSE SERPL-MCNC: 124 MG/DL — HIGH (ref 70–99)
HCT VFR BLD CALC: 35 % — LOW (ref 37–47)
HGB BLD-MCNC: 10.7 G/DL — LOW (ref 12–16)
IMM GRANULOCYTES NFR BLD AUTO: 0.6 % — HIGH (ref 0.1–0.3)
LYMPHOCYTES # BLD AUTO: 2.19 K/UL — SIGNIFICANT CHANGE UP (ref 1.2–3.4)
LYMPHOCYTES # BLD AUTO: 20.6 % — SIGNIFICANT CHANGE UP (ref 20.5–51.1)
MCHC RBC-ENTMCNC: 24.9 PG — LOW (ref 27–31)
MCHC RBC-ENTMCNC: 30.6 G/DL — LOW (ref 32–37)
MCV RBC AUTO: 81.4 FL — SIGNIFICANT CHANGE UP (ref 81–99)
MONOCYTES # BLD AUTO: 0.95 K/UL — HIGH (ref 0.1–0.6)
MONOCYTES NFR BLD AUTO: 8.9 % — SIGNIFICANT CHANGE UP (ref 1.7–9.3)
NEUTROPHILS # BLD AUTO: 7.16 K/UL — HIGH (ref 1.4–6.5)
NEUTROPHILS NFR BLD AUTO: 67.2 % — SIGNIFICANT CHANGE UP (ref 42.2–75.2)
NRBC # BLD: 0 /100 WBCS — SIGNIFICANT CHANGE UP (ref 0–0)
PLATELET # BLD AUTO: 324 K/UL — SIGNIFICANT CHANGE UP (ref 130–400)
POTASSIUM SERPL-MCNC: 3.7 MMOL/L — SIGNIFICANT CHANGE UP (ref 3.5–5)
POTASSIUM SERPL-SCNC: 3.7 MMOL/L — SIGNIFICANT CHANGE UP (ref 3.5–5)
PROT SERPL-MCNC: 6 G/DL — SIGNIFICANT CHANGE UP (ref 6–8)
RBC # BLD: 4.3 M/UL — SIGNIFICANT CHANGE UP (ref 4.2–5.4)
RBC # FLD: 17 % — HIGH (ref 11.5–14.5)
RSV RESULT: POSITIVE
RSV RNA RESP QL NAA+PROBE: POSITIVE
SODIUM SERPL-SCNC: 142 MMOL/L — SIGNIFICANT CHANGE UP (ref 135–146)
TROPONIN T SERPL-MCNC: <0.01 NG/ML — SIGNIFICANT CHANGE UP
TSH SERPL-MCNC: 0.11 UIU/ML — LOW (ref 0.27–4.2)
WBC # BLD: 10.65 K/UL — SIGNIFICANT CHANGE UP (ref 4.8–10.8)
WBC # FLD AUTO: 10.65 K/UL — SIGNIFICANT CHANGE UP (ref 4.8–10.8)

## 2019-02-12 RX ORDER — ACETAMINOPHEN 500 MG
650 TABLET ORAL ONCE
Qty: 0 | Refills: 0 | Status: COMPLETED | OUTPATIENT
Start: 2019-02-12 | End: 2019-02-12

## 2019-02-12 RX ORDER — SODIUM CHLORIDE 9 MG/ML
250 INJECTION INTRAMUSCULAR; INTRAVENOUS; SUBCUTANEOUS ONCE
Qty: 0 | Refills: 0 | Status: COMPLETED | OUTPATIENT
Start: 2019-02-12 | End: 2019-02-12

## 2019-02-12 RX ORDER — SENNA PLUS 8.6 MG/1
2 TABLET ORAL AT BEDTIME
Qty: 0 | Refills: 0 | Status: DISCONTINUED | OUTPATIENT
Start: 2019-02-12 | End: 2019-02-20

## 2019-02-12 RX ORDER — DOCUSATE SODIUM 100 MG
100 CAPSULE ORAL THREE TIMES A DAY
Qty: 0 | Refills: 0 | Status: DISCONTINUED | OUTPATIENT
Start: 2019-02-12 | End: 2019-02-20

## 2019-02-12 RX ORDER — DILTIAZEM HCL 120 MG
5 CAPSULE, EXT RELEASE 24 HR ORAL
Qty: 125 | Refills: 0 | Status: DISCONTINUED | OUTPATIENT
Start: 2019-02-12 | End: 2019-02-12

## 2019-02-12 RX ORDER — LANOLIN ALCOHOL/MO/W.PET/CERES
5 CREAM (GRAM) TOPICAL ONCE
Qty: 0 | Refills: 0 | Status: COMPLETED | OUTPATIENT
Start: 2019-02-12 | End: 2019-02-12

## 2019-02-12 RX ORDER — LEVOTHYROXINE SODIUM 125 MCG
150 TABLET ORAL
Qty: 0 | Refills: 0 | Status: DISCONTINUED | OUTPATIENT
Start: 2019-02-12 | End: 2019-02-12

## 2019-02-12 RX ADMIN — LISINOPRIL 20 MILLIGRAM(S): 2.5 TABLET ORAL at 05:59

## 2019-02-12 RX ADMIN — ATORVASTATIN CALCIUM 20 MILLIGRAM(S): 80 TABLET, FILM COATED ORAL at 23:04

## 2019-02-12 RX ADMIN — Medication 50 MILLIGRAM(S): at 12:29

## 2019-02-12 RX ADMIN — Medication 1 TABLET(S): at 06:00

## 2019-02-12 RX ADMIN — PANTOPRAZOLE SODIUM 40 MILLIGRAM(S): 20 TABLET, DELAYED RELEASE ORAL at 08:18

## 2019-02-12 RX ADMIN — Medication 50 MILLIGRAM(S): at 23:03

## 2019-02-12 RX ADMIN — Medication 50 MILLIGRAM(S): at 06:00

## 2019-02-12 RX ADMIN — SODIUM CHLORIDE 1000 MILLILITER(S): 9 INJECTION INTRAMUSCULAR; INTRAVENOUS; SUBCUTANEOUS at 23:05

## 2019-02-12 RX ADMIN — Medication 50 MILLIGRAM(S): at 17:48

## 2019-02-12 RX ADMIN — SENNA PLUS 2 TABLET(S): 8.6 TABLET ORAL at 23:03

## 2019-02-12 RX ADMIN — CITALOPRAM 20 MILLIGRAM(S): 10 TABLET, FILM COATED ORAL at 12:21

## 2019-02-12 RX ADMIN — Medication 5 MILLIGRAM(S): at 06:00

## 2019-02-12 RX ADMIN — Medication 100 MILLIGRAM(S): at 23:03

## 2019-02-12 RX ADMIN — RIVAROXABAN 15 MILLIGRAM(S): KIT at 17:48

## 2019-02-12 RX ADMIN — Medication 5 MILLIGRAM(S): at 01:35

## 2019-02-12 RX ADMIN — Medication 5 MILLIGRAM(S): at 17:48

## 2019-02-12 RX ADMIN — Medication 175 MICROGRAM(S): at 08:18

## 2019-02-12 RX ADMIN — Medication 40 MILLIGRAM(S): at 05:58

## 2019-02-12 RX ADMIN — Medication 5 MG/HR: at 17:48

## 2019-02-12 RX ADMIN — Medication 650 MILLIGRAM(S): at 03:28

## 2019-02-12 NOTE — PROGRESS NOTE ADULT - SUBJECTIVE AND OBJECTIVE BOX
SUBJECTIVE:    Patient still on Cardizem drip. Awaiting Cardio eval - called Dr Quintero's office personally to request consult.    PAST MEDICAL & SURGICAL HISTORY  CKD (chronic kidney disease) stage 3, GFR 30-59 ml/min  Osteoarthritis  Sleep apnea  Afib: on xarelto  Hypothyroid: s/p thyroid ca surgery  High cholesterol  HTN (hypertension)  S/P rotator cuff surgery  H/O thyroidectomy    SOCIAL HISTORY:  Negative for smoking/alcohol/drug use.     ALLERGIES:  No Known Allergies    MEDICATIONS:  STANDING MEDICATIONS  atorvastatin 20 milliGRAM(s) Oral at bedtime  chlorhexidine 4% Liquid 1 Application(s) Topical <User Schedule>  citalopram 20 milliGRAM(s) Oral daily  diltiazem Infusion 14 mG/Hr IV Continuous <Continuous>  furosemide   Injectable 40 milliGRAM(s) IV Push every 12 hours  levothyroxine 175 MICROGram(s) Oral daily  lisinopril 20 milliGRAM(s) Oral daily  metoprolol tartrate 50 milliGRAM(s) Oral every 6 hours  oxybutynin 5 milliGRAM(s) Oral two times a day  pantoprazole    Tablet 40 milliGRAM(s) Oral before breakfast  rivaroxaban 15 milliGRAM(s) Oral every 24 hours  triamterene 37.5 mG/hydrochlorothiazide 25 mG Tablet 1 Tablet(s) Oral daily    PRN MEDICATIONS  acetaminophen   Tablet .. 650 milliGRAM(s) Oral every 6 hours PRN    VITALS:   T(F): 96.4  HR: 106  BP: 112/82  RR: 20  SpO2: 98%    LABS:                        10.7   10.65 )-----------( 324      ( 12 Feb 2019 08:51 )             35.0     02-11    141  |  94<L>  |  49<H>  ----------------------------<  149<H>  4.0   |  27  |  1.9<H>    Ca    8.6      11 Feb 2019 16:40  Phos  5.5     02-11  Mg     2.0     02-11    TPro  5.8<L>  /  Alb  3.3<L>  /  TBili  0.5  /  DBili  x   /  AST  17  /  ALT  68<H>  /  AlkPhos  199<H>  02-11    Culture - Blood (collected 09 Feb 2019 11:34)  Source: .Blood Blood  Preliminary Report (10 Feb 2019 20:01):    No growth to date.    PHYSICAL EXAM:  GEN: NAD, comfortable  LUNGS: CTAB, no w/r/r  HEART: irregular, no m/r/g  ABD: soft, NT/ND, +BS  EXT: no edema, PP b/l  NEURO: AAOx3

## 2019-02-12 NOTE — PROGRESS NOTE ADULT - SUBJECTIVE AND OBJECTIVE BOX
Patient was seen and examined. Spoke with RN. Chart reviewed.  No events overnight.  Vital Signs Last 24 Hrs  T(F): 95.7 (12 Feb 2019 05:47), Max: 96.7 (12 Feb 2019 00:08)  HR: 87 (12 Feb 2019 05:47) (59 - 119)  BP: 103/77 (12 Feb 2019 05:47) (94/66 - 128/66)  SpO2: 100% (12 Feb 2019 05:47) (95% - 100%)  MEDICATIONS  (STANDING):  atorvastatin 20 milliGRAM(s) Oral at bedtime  chlorhexidine 4% Liquid 1 Application(s) Topical <User Schedule>  citalopram 20 milliGRAM(s) Oral daily  diltiazem Infusion 14 mG/Hr (14 mL/Hr) IV Continuous <Continuous>  furosemide   Injectable 40 milliGRAM(s) IV Push every 12 hours  levothyroxine 175 MICROGram(s) Oral daily  lisinopril 20 milliGRAM(s) Oral daily  metoprolol tartrate 50 milliGRAM(s) Oral every 6 hours  oxybutynin 5 milliGRAM(s) Oral two times a day  pantoprazole    Tablet 40 milliGRAM(s) Oral before breakfast  rivaroxaban 15 milliGRAM(s) Oral every 24 hours  triamterene 37.5 mG/hydrochlorothiazide 25 mG Tablet 1 Tablet(s) Oral daily    MEDICATIONS  (PRN):  acetaminophen   Tablet .. 650 milliGRAM(s) Oral every 6 hours PRN Mild Pain (1 - 3)    Labs:                        10.8   9.55  )-----------( 334      ( 11 Feb 2019 08:34 )             35.8     11 Feb 2019 16:40    141    |  94     |  49     ----------------------------<  149    4.0     |  27     |  1.9    11 Feb 2019 08:34    140    |  94     |  45     ----------------------------<  111    7.6     |  28     |  1.7      Ca    8.6        11 Feb 2019 16:40  Ca    8.8        11 Feb 2019 08:34  Phos  5.5       11 Feb 2019 08:34  Mg     2.0       11 Feb 2019 08:34    TPro  5.8    /  Alb  3.3    /  TBili  0.5    /  DBili  x      /  AST  17     /  ALT  68     /  AlkPhos  199    11 Feb 2019 16:40          Culture - Blood (collected 09 Feb 2019 11:34)  Source: .Blood Blood  Preliminary Report (10 Feb 2019 20:01):    No growth to date.      General: comfortable, NAD  Neurology:  nonfocal  Head:  Normocephalic, atraumatic  ENT:  Mucosa moist, no ulcerations  Neck:  Supple, no JVD,   Skin: no breakdowns (as per RN)  Resp: CTA B/L  CV: RRR, S1S2,   GI: Soft, NT, bowel sounds  MS:  edema, + peripheral pulses      A/P:  79 yo female patient with PMHx of Paroxysmal AF on Xarelto, HTN, DL, hypothyroidism (history of thyroid cancer s/p resection), FELIX on CPAP, recent hip fracture- was at [facility] , presented with dyspnea on exertion and palpitations    cardiology eval- Dr Haines- please call to see pt today    diuretics as per cardio    tele    close monitoring    O2 as needed    CCU eval- transfer if bed available .     OOB to chair    daily I/O    DVT prophylaxis  Decubitus prevention- all measures as per RN protocol  Please call or text me with any questions or updates

## 2019-02-12 NOTE — PROGRESS NOTE ADULT - ASSESSMENT
79 yo female patient with PMHx of Paroxysmal AF on Xarelto, HTN, DL, hypothyroidism (history of thyroid cancer s/p resection), FELIX on CPAP, recent hip fracture discharged to [facility] recently, currently presenting because of dyspnea on exertion and palpitations over the last 3 days.    #) BECK 2/2 CHF exacerbation  - CXR = congested, BNP=10,000  - likely CHF exacerbation secondary to Afib with RVR  - on Metoprolol 50 q6h currently  - also on Cardizem drip - will continue for now until Cardio eval. Still in A-fib HR ~100-110. Patient's HR appears to drop in evening to around 60's?  - c/w Lasix 40 IV BID  - Echo - pending  - Cardio eval - pending. Called Dr Quintero's office personally to request consult    #) A-fib - c/w Xarelto + Metoprolol + Cardizem drip    #) HTN - stable, D/C'd Norvasc for now given Cardizem (2 CCB's), c/w Lisinopril    #) Hypothyroidism - c/w Synthroid, TSH noted to be 0.11, though patient is currently tachy now she appears to drop in PM to around 60's. Will c/w current dose for now until Cardio eval.    #) Transaminitis - unclear etiology, though trending down, will monitor for now, RUQ US negative    DVT ppx: on Xarelto  Diet: DASH  Activity: AAT  Code status: FULL  Dispo: pending - from home 79 yo female patient with PMHx of Paroxysmal AF on Xarelto, HTN, DL, hypothyroidism (history of thyroid cancer s/p resection), FELIX on CPAP, recent hip fracture discharged to [facility] recently, currently presenting because of dyspnea on exertion and palpitations over the last 3 days.    #) BECK 2/2 CHF exacerbation  - CXR = congested, BNP=10,000  - likely CHF exacerbation secondary to Afib with RVR  - on Metoprolol 50 q6h currently  - also on Cardizem drip - will continue for now until Cardio eval. Still in A-fib HR ~100-110.  - c/w Lasix 40 IV BID  - Echo - pending  - Cardio eval - pending. Called Dr Quintero's office personally to request consult    #) A-fib - c/w Xarelto + Metoprolol + Cardizem drip    #) HTN - stable, D/C'd Norvasc for now given Cardizem (2 CCB's), c/w Lisinopril    #) Hypothyroidism - c/w Synthroid, TSH noted to be 0.11, patient states she takes Synthroid 175 mcg M-F and SKIPS Sat + Sun. Patient did NOT receive dose here on Sat or Sun. Given TSH and rapid A-fib will lower dose to 150 mcg with same schedule.    #) Transaminitis - unclear etiology, though trending down, will monitor for now, RUQ US negative    DVT ppx: on Xarelto  Diet: DASH  Activity: AAT  Code status: FULL  Dispo: pending - from home

## 2019-02-12 NOTE — CONSULT NOTE ADULT - ASSESSMENT
79 yo female patient with PMHx of Paroxysmal AF on Xarelto, HTN, DL, hypothyroidism (history of thyroid cancer s/p resection), FELIX on CPAP, recent hip fracture discharged to [facility] recently, currently presenting because of dyspnea on exertion and palpitations.    paroxysmal Afib on eliquis and metoprolol  HTN , DL  hypothyr ( post thyroidectomy ) on synthroid : TSH low  SOB : mutilfactorial : volume overload secondary to Afib with RVR                                ? LRTI ( Cough , phlegm , fever NH)    recommendation  hold levothyroxine , check T3, FT4 and TBG  hold on lasix , pt is euvolemeic , Cr is worsening from 1.6 to 2.1  check 2d echo  check Flu , RVP  c/w metoprolol and cardizem   if HR still uncontrolled will consider switch to propranolol  O2 supply by [place]  will follow 79 yo female patient with PMHx of Paroxysmal AF on Xarelto, HTN, DL, hypothyroidism (history of thyroid cancer s/p resection), FELIX on CPAP, recent hip fracture discharged to [facility] recently, currently presenting because of dyspnea on exertion and palpitations.    paroxysmal Afib on eliquis and metoprolol  HTN , DL  hypothyr ( post thyroidectomy ) on synthroid : TSH low  SOB : mutilfactorial : volume overload secondary to Afib with RVR                                ? LRTI ( Cough , phlegm , fever NH)    recommendation  hold levothyroxine , check T3, FT4 and TBG  hold on lasix , pt is euvolemeic , Cr is worsening from 1.6 to 2.1  check 2d echo  check Flu , RVP  c/w metoprolol and cardizem   O2 supply by NC  will prob need FAITH/CV once stable  will follow

## 2019-02-12 NOTE — CONSULT NOTE ADULT - SUBJECTIVE AND OBJECTIVE BOX
HPI:  79 yo female patient with PMHx of Paroxysmal AF on Xarelto, HTN, DL, hypothyroidism (history of thyroid cancer s/p resection), FELIX on CPAP, recent hip fracture discharged to [facility] recently, currently presenting because of dyspnea on exertion and palpitations over the last 3 days.  Patient reports having dyspnea on minimal exertion over the last few days, if she walks to the bathroom she gets exhausted. She also has palpitations when she walks. She says she's been coughing, no phlegm, has orthopnea, no PND. She admits intermittent episodes of chest discomfort with the palpitations. Denies lower extremities swelling.  No other complaints: No headaches, no fever, no chills, no abdominal pain, no urinary symptoms.  ECG showed Afib with RVR ,CXR congested , pt was admitted , started on cardizem drip and IV lasix.      PAST MEDICAL & SURGICAL HISTORY  CKD (chronic kidney disease) stage 3, GFR 30-59 ml/min  Osteoarthritis  Sleep apnea  Afib: on xarelto  Hypothyroid: s/p thyroid ca surgery  High cholesterol  HTN (hypertension)  S/P rotator cuff surgery  H/O thyroidectomy      FAMILY HISTORY:  FAMILY HISTORY:  Family history of abdominal aortic aneurysm (AAA) (Father)  Family history of lung cancer (Mother)      SOCIAL HISTORY:  []smoker  []Alcohol  []Drug    ALLERGIES:  No Known Allergies      MEDICATIONS:  MEDICATIONS  (STANDING):  atorvastatin 20 milliGRAM(s) Oral at bedtime  chlorhexidine 4% Liquid 1 Application(s) Topical <User Schedule>  citalopram 20 milliGRAM(s) Oral daily  diltiazem Infusion 14 mG/Hr (14 mL/Hr) IV Continuous <Continuous>  furosemide   Injectable 40 milliGRAM(s) IV Push every 12 hours  levothyroxine 150 MICROGram(s) Oral <User Schedule>  lisinopril 20 milliGRAM(s) Oral daily  metoprolol tartrate 50 milliGRAM(s) Oral every 6 hours  oxybutynin 5 milliGRAM(s) Oral two times a day  pantoprazole    Tablet 40 milliGRAM(s) Oral before breakfast  rivaroxaban 15 milliGRAM(s) Oral every 24 hours  triamterene 37.5 mG/hydrochlorothiazide 25 mG Tablet 1 Tablet(s) Oral daily    MEDICATIONS  (PRN):  acetaminophen   Tablet .. 650 milliGRAM(s) Oral every 6 hours PRN Mild Pain (1 - 3)      HOME MEDICATIONS:  Home Medications:  acetaminophen 325 mg oral tablet: 2 tab(s) orally every 6 hours, As needed, Mild Pain (1 - 3) (25 Jan 2019 10:38)  amLODIPine 5 mg oral tablet: 1 tab(s) orally once a day (at bedtime) (19 Jan 2019 17:03)  atorvastatin 20 mg oral tablet: 1 tab(s) orally once a day (19 Jan 2019 17:03)  benazepril 20 mg oral tablet: orally 2 times a day (19 Jan 2019 17:03)  citalopram 20 mg oral tablet: 1 tab(s) orally once a day (19 Jan 2019 17:03)  levothyroxine 175 mcg (0.175 mg) oral tablet: 1 tab(s) orally 5 times a week (19 Jan 2019 17:03)  metoprolol tartrate 50 mg oral tablet: 1 tab(s) orally 2 times a day (19 Jan 2019 17:03)  Toviaz 4 mg oral tablet, extended release: 1 tab(s) orally once a day (19 Jan 2019 17:03)  triamterene-hydrochlorothiazide 37.5 mg-25 mg oral tablet: 1 tab(s) orally once a day (19 Jan 2019 17:03)  Xarelto 20 mg oral tablet: 1 tab(s) orally once a day (in the evening) (19 Jan 2019 17:03)      VITALS:   T(F): 96.4 (02-12 @ 08:32), Max: 97.8 (02-09 @ 10:50)  HR: 106 (02-12 @ 08:32) (50 - 135)  BP: 112/82 (02-12 @ 08:32) (94/66 - 137/86)  BP(mean): 62 (02-11 @ 17:25) (62 - 90)  RR: 20 (02-12 @ 08:32) (18 - 36)  SpO2: 98% (02-12 @ 08:32) (89% - 100%)    I&O's Summary    11 Feb 2019 07:01  -  12 Feb 2019 07:00  --------------------------------------------------------  IN: 390 mL / OUT: 2480 mL / NET: -2090 mL        REVIEW OF SYSTEMS:  CONSTITUTIONAL: generalized weakness, + fever at NH  EYES/ENT: No visual changes;  No vertigo or throat pain   NECK: No pain or stiffness  RESPIRATORY: SOB , +cough and phlegm  CARDIOVASCULAR: see HPI  GASTROINTESTINAL: No abdominal or epigastric pain. No nausea, vomiting, or hematemesis; No diarrhea or constipation. No melena or hematochezia.  GENITOURINARY: No dysuria, frequency or hematuria  NEUROLOGICAL: No numbness or weakness      PHYSICAL EXAM:  NEURO: patient is awake , alert and oriented  GEN: mild tachypnea  LUNGS: bilateral crackles and rhonchi  CARDIOVASCULAR: irregular HR , no loud murmur  ABD: Soft, non-tender, non-distended, +BS  EXT: No SHIRA    LABS:                        10.7   10.65 )-----------( 324      ( 12 Feb 2019 08:51 )             35.0     02-12    142  |  95<L>  |  50<H>  ----------------------------<  124<H>  3.7   |  30  |  2.1<H>    Ca    8.5      12 Feb 2019 08:51  Phos  5.5     02-11  Mg     2.0     02-11    TPro  6.0  /  Alb  3.5  /  TBili  0.6  /  DBili  x   /  AST  11  /  ALT  57<H>  /  AlkPhos  187<H>  02-12      Creatine Kinase, Serum: 16 U/L (02-12-19 @ 08:51)  Troponin T, Serum: <0.01 ng/mL (02-12-19 @ 08:51)    CARDIAC MARKERS ( 12 Feb 2019 08:51 )  x     / <0.01 ng/mL / 16 U/L / x     / <1.0 ng/mL    02-11 Chol 133 LDL 71 HDL 15<L> Trig 171<H>    RADIOLOGY:  -CXR: < from: Xray Chest 1 View- PORTABLE-Urgent (02.12.19 @ 04:38) >    Impression:      Stable cardiomegaly. Mild pulmonary venous congestion    < end of copied text >      ECG:  AFib with RVR

## 2019-02-13 LAB
ALBUMIN SERPL ELPH-MCNC: 3.2 G/DL — LOW (ref 3.5–5.2)
ALP SERPL-CCNC: 171 U/L — HIGH (ref 30–115)
ALT FLD-CCNC: 44 U/L — HIGH (ref 0–41)
ANION GAP SERPL CALC-SCNC: 20 MMOL/L — HIGH (ref 7–14)
AST SERPL-CCNC: 12 U/L — SIGNIFICANT CHANGE UP (ref 0–41)
BASOPHILS # BLD AUTO: 0.03 K/UL — SIGNIFICANT CHANGE UP (ref 0–0.2)
BASOPHILS NFR BLD AUTO: 0.3 % — SIGNIFICANT CHANGE UP (ref 0–1)
BILIRUB SERPL-MCNC: 0.5 MG/DL — SIGNIFICANT CHANGE UP (ref 0.2–1.2)
BUN SERPL-MCNC: 57 MG/DL — HIGH (ref 10–20)
CALCIUM SERPL-MCNC: 8.4 MG/DL — LOW (ref 8.5–10.1)
CHLORIDE SERPL-SCNC: 93 MMOL/L — LOW (ref 98–110)
CK MB CFR SERPL CALC: <1 NG/ML — SIGNIFICANT CHANGE UP (ref 0.6–6.3)
CK SERPL-CCNC: 20 U/L — SIGNIFICANT CHANGE UP (ref 0–225)
CO2 SERPL-SCNC: 27 MMOL/L — SIGNIFICANT CHANGE UP (ref 17–32)
CREAT SERPL-MCNC: 2.2 MG/DL — HIGH (ref 0.7–1.5)
EOSINOPHIL # BLD AUTO: 0.3 K/UL — SIGNIFICANT CHANGE UP (ref 0–0.7)
EOSINOPHIL NFR BLD AUTO: 3.2 % — SIGNIFICANT CHANGE UP (ref 0–8)
GLUCOSE SERPL-MCNC: 128 MG/DL — HIGH (ref 70–99)
HCT VFR BLD CALC: 33.9 % — LOW (ref 37–47)
HGB BLD-MCNC: 10.3 G/DL — LOW (ref 12–16)
IMM GRANULOCYTES NFR BLD AUTO: 0.6 % — HIGH (ref 0.1–0.3)
LYMPHOCYTES # BLD AUTO: 2.21 K/UL — SIGNIFICANT CHANGE UP (ref 1.2–3.4)
LYMPHOCYTES # BLD AUTO: 23.5 % — SIGNIFICANT CHANGE UP (ref 20.5–51.1)
MAGNESIUM SERPL-MCNC: 1.8 MG/DL — SIGNIFICANT CHANGE UP (ref 1.8–2.4)
MCHC RBC-ENTMCNC: 24.2 PG — LOW (ref 27–31)
MCHC RBC-ENTMCNC: 30.4 G/DL — LOW (ref 32–37)
MCV RBC AUTO: 79.8 FL — LOW (ref 81–99)
MONOCYTES # BLD AUTO: 0.68 K/UL — HIGH (ref 0.1–0.6)
MONOCYTES NFR BLD AUTO: 7.2 % — SIGNIFICANT CHANGE UP (ref 1.7–9.3)
NEUTROPHILS # BLD AUTO: 6.12 K/UL — SIGNIFICANT CHANGE UP (ref 1.4–6.5)
NEUTROPHILS NFR BLD AUTO: 65.2 % — SIGNIFICANT CHANGE UP (ref 42.2–75.2)
NRBC # BLD: 0 /100 WBCS — SIGNIFICANT CHANGE UP (ref 0–0)
PLATELET # BLD AUTO: 268 K/UL — SIGNIFICANT CHANGE UP (ref 130–400)
POTASSIUM SERPL-MCNC: 3.6 MMOL/L — SIGNIFICANT CHANGE UP (ref 3.5–5)
POTASSIUM SERPL-SCNC: 3.6 MMOL/L — SIGNIFICANT CHANGE UP (ref 3.5–5)
PROT SERPL-MCNC: 5.8 G/DL — LOW (ref 6–8)
RAPID RVP RESULT: SIGNIFICANT CHANGE UP
RBC # BLD: 4.25 M/UL — SIGNIFICANT CHANGE UP (ref 4.2–5.4)
RBC # FLD: 16.5 % — HIGH (ref 11.5–14.5)
SODIUM SERPL-SCNC: 140 MMOL/L — SIGNIFICANT CHANGE UP (ref 135–146)
T3 SERPL-MCNC: 65 NG/DL — LOW (ref 80–200)
T4 AB SER-ACNC: 9.3 UG/DL — SIGNIFICANT CHANGE UP (ref 4.6–12)
TROPONIN T SERPL-MCNC: <0.01 NG/ML — SIGNIFICANT CHANGE UP
WBC # BLD: 9.4 K/UL — SIGNIFICANT CHANGE UP (ref 4.8–10.8)
WBC # FLD AUTO: 9.4 K/UL — SIGNIFICANT CHANGE UP (ref 4.8–10.8)

## 2019-02-13 RX ORDER — IBUPROFEN 200 MG
400 TABLET ORAL ONCE
Qty: 0 | Refills: 0 | Status: COMPLETED | OUTPATIENT
Start: 2019-02-13 | End: 2019-02-13

## 2019-02-13 RX ADMIN — SENNA PLUS 2 TABLET(S): 8.6 TABLET ORAL at 21:15

## 2019-02-13 RX ADMIN — Medication 650 MILLIGRAM(S): at 02:09

## 2019-02-13 RX ADMIN — Medication 650 MILLIGRAM(S): at 01:39

## 2019-02-13 RX ADMIN — Medication 50 MILLIGRAM(S): at 17:22

## 2019-02-13 RX ADMIN — Medication 50 MILLIGRAM(S): at 05:38

## 2019-02-13 RX ADMIN — CITALOPRAM 20 MILLIGRAM(S): 10 TABLET, FILM COATED ORAL at 12:04

## 2019-02-13 RX ADMIN — Medication 5 MILLIGRAM(S): at 05:39

## 2019-02-13 RX ADMIN — Medication 400 MILLIGRAM(S): at 06:08

## 2019-02-13 RX ADMIN — Medication 100 MILLIGRAM(S): at 05:44

## 2019-02-13 RX ADMIN — RIVAROXABAN 15 MILLIGRAM(S): KIT at 17:22

## 2019-02-13 RX ADMIN — Medication 100 MILLIGRAM(S): at 14:49

## 2019-02-13 RX ADMIN — Medication 50 MILLIGRAM(S): at 12:04

## 2019-02-13 RX ADMIN — Medication 400 MILLIGRAM(S): at 05:38

## 2019-02-13 RX ADMIN — Medication 1 TABLET(S): at 05:43

## 2019-02-13 RX ADMIN — Medication 100 MILLIGRAM(S): at 21:15

## 2019-02-13 RX ADMIN — Medication 5 MILLIGRAM(S): at 17:22

## 2019-02-13 RX ADMIN — CHLORHEXIDINE GLUCONATE 1 APPLICATION(S): 213 SOLUTION TOPICAL at 05:39

## 2019-02-13 RX ADMIN — PANTOPRAZOLE SODIUM 40 MILLIGRAM(S): 20 TABLET, DELAYED RELEASE ORAL at 06:41

## 2019-02-13 RX ADMIN — Medication 50 MILLIGRAM(S): at 23:35

## 2019-02-13 RX ADMIN — LISINOPRIL 20 MILLIGRAM(S): 2.5 TABLET ORAL at 05:38

## 2019-02-13 NOTE — CONSULT NOTE ADULT - SUBJECTIVE AND OBJECTIVE BOX
HPI:  79 yo female patient with PMHx of Paroxysmal AF on Xarelto, HTN, DL, hypothyroidism (history of thyroid cancer s/p resection), FELIX on CPAP, recent hip fracture discharged to [facility] recently, currently presenting because of dyspnea on exertion and palpitations over the last 3 days.  Patient reports having dyspnea on minimal exertion over the last few days, if she walks to the bathroom she gets exhausted. She also has palpitations when she walks. She says she's been coughing, no phlegm, has orthopnea, no PND. She admits intermittent episodes of chest discomfort with the palpitations. Denies lower extremities swelling.  No other complaints: No headaches, no fever, no chills, no abdominal pain, no urinary symptoms (09 Feb 2019 15:37)      PAST MEDICAL & SURGICAL HISTORY:  CKD (chronic kidney disease) stage 3, GFR 30-59 ml/min  Osteoarthritis  Sleep apnea  Afib: on xarelto  Hypothyroid: s/p thyroid ca surgery  High cholesterol  HTN (hypertension)  S/P rotator cuff surgery  H/O thyroidectomy      Hospital Course:    TODAY'S SUBJECTIVE & REVIEW OF SYMPTOMS:     Constitutional WNL   Cardio WNL   Resp WNL   GI WNL  Heme WNL  Endo WNL  Skin WNL  MSK Weakness  Neuro WNL  Cognitive WNL  Psych WNL      MEDICATIONS  (STANDING):  atorvastatin 20 milliGRAM(s) Oral at bedtime  chlorhexidine 4% Liquid 1 Application(s) Topical <User Schedule>  citalopram 20 milliGRAM(s) Oral daily  diltiazem    Tablet 60 milliGRAM(s) Oral every 6 hours  docusate sodium 100 milliGRAM(s) Oral three times a day  metoprolol tartrate 50 milliGRAM(s) Oral every 6 hours  oxybutynin 5 milliGRAM(s) Oral two times a day  pantoprazole    Tablet 40 milliGRAM(s) Oral before breakfast  rivaroxaban 15 milliGRAM(s) Oral every 24 hours  senna 2 Tablet(s) Oral at bedtime  triamterene 37.5 mG/hydrochlorothiazide 25 mG Tablet 1 Tablet(s) Oral daily    MEDICATIONS  (PRN):  acetaminophen   Tablet .. 650 milliGRAM(s) Oral every 6 hours PRN Mild Pain (1 - 3)      FAMILY HISTORY:  Family history of abdominal aortic aneurysm (AAA) (Father)  Family history of lung cancer (Mother)      Allergies    No Known Allergies    Intolerances        SOCIAL HISTORY:    [  ] Etoh  [  ] Smoking  [  ] Substance abuse     Home Environment:  [  ] Home Alone  [  ] Lives with Family  [  ] Home Health Aid    Dwelling:  [  ] Apartment  [  ] Private House  [  ] Adult Home  [  ] Skilled Nursing Facility      [ x ] Short Term  [  ] Long Term  [  ] Stairs       Elevator [  ]    FUNCTIONAL STATUS PTA: (Check all that apply)  Ambulation: [   ]Independent    [x  ] Dependent     [  ] Non-Ambulatory  Assistive Device: [  ] SA Cane  [  ]  Q Cane  [x  ] Walker  [  ]  Wheelchair  ADL : [  ] Independent  [ x ]  Dependent       Vital Signs Last 24 Hrs  T(C): 35.6 (13 Feb 2019 13:13), Max: 36.6 (12 Feb 2019 15:32)  T(F): 96 (13 Feb 2019 13:13), Max: 97.9 (12 Feb 2019 15:32)  HR: 82 (13 Feb 2019 13:13) (46 - 101)  BP: 108/73 (13 Feb 2019 13:13) (88/50 - 125/75)  BP(mean): --  RR: 18 (13 Feb 2019 13:13) (18 - 20)  SpO2: 97% (12 Feb 2019 15:32) (97% - 97%)      PHYSICAL EXAM: Alert & Oriented X3  GENERAL: NAD, well-groomed, well-developed  HEAD:  Atraumatic, Normocephalic  CHEST/LUNG: Clear   HEART: S1S2+  ABDOMEN: Soft, Nontender  EXTREMITIES:  no calf tenderness    NERVOUS SYSTEM:  Cranial Nerves 2-12 intact [  ] Abnormal  [  ]  ROM: WFL all extremities [  ]  Abnormal [ x ]limited rle  Motor Strength: WFL all extremities  [  ]  Abnormal [ x ]limited rle  Sensation: intact to light touch [x  ] Abnormal [  ]  Reflexes: Symmetric [  ]  Abnormal [  ]    FUNCTIONAL STATUS:  Bed Mobility: Independent [  ]  Supervision [  ]  Needs Assistance [ x ]  N/A [  ]  Transfers: Independent [  ]  Supervision [  ]  Needs Assistance [x  ]  N/A [  ]   Ambulation: Independent [  ]  Supervision [  ]  Needs Assistance [  ]  N/A [  ]  ADL: Independent [  ] Requires Assistance [  ] N/A [  ]      LABS:                        10.3   9.40  )-----------( 268      ( 13 Feb 2019 06:11 )             33.9     02-13    140  |  93<L>  |  57<H>  ----------------------------<  128<H>  3.6   |  27  |  2.2<H>    Ca    8.4<L>      13 Feb 2019 06:11  Mg     1.8     02-13    TPro  5.8<L>  /  Alb  3.2<L>  /  TBili  0.5  /  DBili  x   /  AST  12  /  ALT  44<H>  /  AlkPhos  171<H>  02-13          RADIOLOGY & ADDITIONAL STUDIES:    Assesment:

## 2019-02-13 NOTE — PROGRESS NOTE ADULT - SUBJECTIVE AND OBJECTIVE BOX
Patient was seen and examined. Spoke with RN. Chart reviewed.  No events overnight. "I am feeling much better"  Vital Signs Last 24 Hrs  T(F): 95.3 (13 Feb 2019 05:38), Max: 97.9 (12 Feb 2019 15:32)  HR: 67 (13 Feb 2019 05:38) (56 - 106)  BP: 101/58 (13 Feb 2019 05:38) (88/50 - 112/82)  SpO2: 97% (12 Feb 2019 15:32) (97% - 98%)  MEDICATIONS  (STANDING):  atorvastatin 20 milliGRAM(s) Oral at bedtime  chlorhexidine 4% Liquid 1 Application(s) Topical <User Schedule>  citalopram 20 milliGRAM(s) Oral daily  diltiazem    Tablet 60 milliGRAM(s) Oral every 6 hours  docusate sodium 100 milliGRAM(s) Oral three times a day  lisinopril 20 milliGRAM(s) Oral daily  metoprolol tartrate 50 milliGRAM(s) Oral every 6 hours  oxybutynin 5 milliGRAM(s) Oral two times a day  pantoprazole    Tablet 40 milliGRAM(s) Oral before breakfast  rivaroxaban 15 milliGRAM(s) Oral every 24 hours  senna 2 Tablet(s) Oral at bedtime  triamterene 37.5 mG/hydrochlorothiazide 25 mG Tablet 1 Tablet(s) Oral daily    MEDICATIONS  (PRN):  acetaminophen   Tablet .. 650 milliGRAM(s) Oral every 6 hours PRN Mild Pain (1 - 3)    Labs:                        10.3   9.40  )-----------( 268      ( 13 Feb 2019 06:11 )             33.9                         10.7   10.65 )-----------( 324      ( 12 Feb 2019 08:51 )             35.0     13 Feb 2019 06:11    140    |  93     |  57     ----------------------------<  128    3.6     |  27     |  2.2    12 Feb 2019 08:51    142    |  95     |  50     ----------------------------<  124    3.7     |  30     |  2.1      Ca    8.4        13 Feb 2019 06:11  Ca    8.5        12 Feb 2019 08:51  Phos  5.5       11 Feb 2019 08:34  Mg     1.8       13 Feb 2019 06:11  Mg     2.0       11 Feb 2019 08:34    TPro  5.8    /  Alb  3.2    /  TBili  0.5    /  DBili  x      /  AST  12     /  ALT  44     /  AlkPhos  171    13 Feb 2019 06:11  TPro  6.0    /  Alb  3.5    /  TBili  0.6    /  DBili  x      /  AST  11     /  ALT  57     /  AlkPhos  187    12 Feb 2019 08:51          General: comfortable, NAD  Neurology: A&Ox3, nonfocal  Head:  Normocephalic, atraumatic  ENT:  Mucosa moist, no ulcerations  Neck:  Supple, no JVD,   Skin: no breakdowns (as per RN)  Resp: CTA B/L  CV: RRR, S1S2,   GI: Soft, NT, bowel sounds  MS: No edema, + peripheral pulses, FROM all 4 extremity      A/P:  79 yo female patient with PMHx of Paroxysmal AF on NOAC, HTN, DL, hypothyroidism (history of thyroid cancer s/p resection), FELIX on CPAP, recent hip fracture discharged to [facility] recently, currently presenting because of dyspnea on exertion and palpitations.    hold levothyroxine , check T3, FT4 and TBG  hold on lasix ,   Cr is worsening from 1.6 to 2.1 to 2.2- monitor closely  check 2d echo  check Flu , RVP  c/w metoprolol and cardizem   O2 VNC  will prob need FAITH/CV once stable    tele    close cardiology f/u    OOB to chair    PT eval  DVT prophylaxis  Decubitus prevention- all measures as per RN protocol  Please call or text me with any questions or updates

## 2019-02-13 NOTE — CONSULT NOTE ADULT - ASSESSMENT
IMPRESSION: Rehab of right hip incomplete fx    PRECAUTIONS: [  ] Cardiac  [  ] Respiratory  [  ] Seizures [  ] Contact Isolation  [  ] Droplet Isolation  [  ] Other    Weight Bearing Status: wbat RLE with walker    RECOMMENDATION:    Out of Bed to Chair     DVT/Decubiti Prophylaxis    REHAB PLAN:     [ x  ] Bedside P/T 3-5 times a week   [   ]   Bedside O/T  2-3 times a week             [   ] No Rehab Therapy Indicated                   [   ]  Speech Therapy   Conditioning/ROM                                    ADL  Bed Mobility                                               Conditioning/ROM  Transfers                                                     Bed Mobility  Sitting /Standing Balance                         Transfers                                        Gait Training                                               Sitting/Standing Balance  Stair Training [   ]Applicable                    Home equipment Eval                                                                        Splinting  [   ] Only      GOALS:   ADL   [   ]   Independent                    Transfers  [ x  ] Independent                          Ambulation  [ x  ] Independent     [    ] With device                            [   ]  CG                                                         [   ]  CG                                                                  [   ] CG                            [    ] Min A                                                   [   ] Min A                                                              [   ] Min  A          DISCHARGE PLAN:   [   ]  Good candidate for Intensive Rehabilitation/Hospital based                                             Will tolerate 3hrs Intensive Rehab Daily                                       [ x   ]  Short Term Rehab in Skilled Nursing Facility                                       [    ]  Home with Outpatient or  services                                         [    ]  Possible Candidate for Intensive Hospital based Rehab

## 2019-02-13 NOTE — PROGRESS NOTE ADULT - ASSESSMENT
JEFFERY UGALDE 78y Female  MRN#: [id number]   CODE STATUS: Full code      SUBJECTIVE  Patient is a 78y old Female who presented with a chief complaint of dyspnea and palpitations for few days  Currently admitted to medicine with the primary diagnosis of acute on chronic CHF exacerbation 2/2 atrial flutter  Today is hospital day 4d, and this morning she is resting in bed comfortably and reports no overnight events. Patient reports improved breathing and resolved palpitation. Denies chest pain, abdominal pain, urinary symptoms, n/v/d/c.    Present Today:           Stewart Catheter ()No/ (X)Yes? Indication: acute kidney injury, measuring I & Os      OBJECTIVE  PAST MEDICAL & SURGICAL HISTORY  CKD (chronic kidney disease) stage 3, GFR 30-59 ml/min  Osteoarthritis  Sleep apnea  Afib: on xarelto  Hypothyroid: s/p thyroid ca surgery  High cholesterol  HTN (hypertension)  S/P rotator cuff surgery  H/O thyroidectomy    ALLERGIES:  No Known Allergies    MEDICATIONS:  STANDING MEDICATIONS  atorvastatin 20 milliGRAM(s) Oral at bedtime  chlorhexidine 4% Liquid 1 Application(s) Topical <User Schedule>  citalopram 20 milliGRAM(s) Oral daily  diltiazem    Tablet 60 milliGRAM(s) Oral every 6 hours  docusate sodium 100 milliGRAM(s) Oral three times a day  metoprolol tartrate 50 milliGRAM(s) Oral every 6 hours  oxybutynin 5 milliGRAM(s) Oral two times a day  pantoprazole    Tablet 40 milliGRAM(s) Oral before breakfast  rivaroxaban 15 milliGRAM(s) Oral every 24 hours  senna 2 Tablet(s) Oral at bedtime  triamterene 37.5 mG/hydrochlorothiazide 25 mG Tablet 1 Tablet(s) Oral daily    PRN MEDICATIONS  acetaminophen   Tablet .. 650 milliGRAM(s) Oral every 6 hours PRN      VITAL SIGNS: Last 24 Hours  T(C): 35.6 (13 Feb 2019 13:13), Max: 36.2 (12 Feb 2019 20:00)  T(F): 96 (13 Feb 2019 13:13), Max: 97.1 (12 Feb 2019 20:00)  HR: 99 (13 Feb 2019 17:24) (46 - 101)  BP: 125/76 (13 Feb 2019 17:24) (88/50 - 125/76)  BP(mean): --  RR: 18 (13 Feb 2019 13:13) (18 - 20)  SpO2: 93% (13 Feb 2019 17:24) (93% - 93%)    LABS:                        10.3   9.40  )-----------( 268      ( 13 Feb 2019 06:11 )             33.9     02-13    140  |  93<L>  |  57<H>  ----------------------------<  128<H>  3.6   |  27  |  2.2<H>    Ca    8.4<L>      13 Feb 2019 06:11  Mg     1.8     02-13    TPro  5.8<L>  /  Alb  3.2<L>  /  TBili  0.5  /  DBili  x   /  AST  12  /  ALT  44<H>  /  AlkPhos  171<H>  02-13          Troponin T, Serum: <0.01 ng/mL (02-13-19 @ 06:11)  Creatine Kinase, Serum: 20 U/L (02-13-19 @ 06:11)      CARDIAC MARKERS ( 13 Feb 2019 06:11 )  x     / <0.01 ng/mL / 20 U/L / x     / <1.0 ng/mL  CARDIAC MARKERS ( 12 Feb 2019 08:51 )  x     / <0.01 ng/mL / 16 U/L / x     / <1.0 ng/mL    RADIOLOGY:      PHYSICAL EXAM:    GENERAL: NAD, well-developed, AAOx3  HEENT:  Atraumatic, Normocephalic. EOMI, PERRLA, conjunctiva and sclera clear, No JVD  PULMONARY: Clear to auscultation bilaterally; No wheeze  CARDIOVASCULAR: Regular rate and rhythm; No murmurs, rubs, or gallops  GASTROINTESTINAL: Soft, Nontender, Nondistended; Bowel sounds present  MUSCULOSKELETAL:  2+ Peripheral Pulses, No clubbing, cyanosis, or edema  NEUROLOGY: non-focal  SKIN: No rashes or lesions      ADMISSION SUMMARY  Patient is a 78y old Female who presented with a chief complaint of dyspnea and palpitations for few days  Currently admitted to medicine with the primary diagnosis of acute on chronic CHF exacerbation 2/2 atrial flutter      ASSESSMENT & PLAN  79 yo female patient with PMHx of Paroxysmal AF on Xarelto, HTN, DL, hypothyroidism (history of thyroid cancer s/p resection), FELIX on CPAP, recent hip fracture discharged to [facility] recently, currently presenting because of dyspnea on exertion and palpitations over the last 3 days.    #) Acute on chronic CHF exacerbation with unknown EF 2/2 atrial flutter  - CXR shows pulmonary venous congestion, with BNP 15394  - On Metoprolol 50 q6h and cardizem 60mg q6h  - Lasix on hold due to finding of KOBI   - Echo - pending  - Cardio recs appreciated: continue metoprolol and cardizem, as per Dr. Reno, will change metoprolol to 100mg BID and cardizem 240mg QD tomorrow; will probably need FAITH/CV once stable    #) Atrial flutter   - CHADsVASc 5  - Rate not well controlled, HR varies from 100 to 150  - Change metoprolol to 100mg BID and cardizem 240mg QD tomorrow    #) KOBI on CKD stage III progressing to stage VI  - Likely pre-renal 2/2 diuretic and ACE use  - Adequate UOP  - Hold both lasix and lisinopril for now  - Renal US  - Nephrology consulted    #) HTN - stable  - Continue cardizem, metoprolol, and triamtrene/HCTZ    #) Hypothyroidism - c/w Synthroid, TSH noted to be 0.11, patient states she takes Synthroid 175 mcg M-F and SKIPS Sat + Sun. Patient did NOT receive dose here on Sat or Sun. Given TSH and rapid A-fib will lower dose to 150 mcg with same schedule.    #) Transaminitis - unclear etiology, though trending down, will monitor for now, RUQ US negative    DVT ppx: on Xarelto  Diet: DASH  Activity: AAT  Code status: FULL  Dispo: pending - from home  DVT ppx:  GI ppx:  Diet:  Activity:  Lines:  Code status:  Dispo: JEFFERY UGALDE 78y Female  MRN#: [id number]   CODE STATUS: Full code      SUBJECTIVE  Patient is a 78y old Female who presented with a chief complaint of dyspnea and palpitations for few days  Currently admitted to medicine with the primary diagnosis of acute on chronic CHF exacerbation 2/2 atrial flutter  Today is hospital day 4d, and this morning she is resting in bed comfortably and reports no overnight events. Patient reports improved breathing and resolved palpitation. Denies chest pain, abdominal pain, urinary symptoms, n/v/d/c.    Present Today:           Stewart Catheter ()No/ (X)Yes? Indication: acute kidney injury, measuring I & Os      OBJECTIVE  PAST MEDICAL & SURGICAL HISTORY  CKD (chronic kidney disease) stage 3, GFR 30-59 ml/min  Osteoarthritis  Sleep apnea  Afib: on xarelto  Hypothyroid: s/p thyroid ca surgery  High cholesterol  HTN (hypertension)  S/P rotator cuff surgery  H/O thyroidectomy    ALLERGIES:  No Known Allergies    MEDICATIONS:  STANDING MEDICATIONS  atorvastatin 20 milliGRAM(s) Oral at bedtime  chlorhexidine 4% Liquid 1 Application(s) Topical <User Schedule>  citalopram 20 milliGRAM(s) Oral daily  diltiazem    Tablet 60 milliGRAM(s) Oral every 6 hours  docusate sodium 100 milliGRAM(s) Oral three times a day  metoprolol tartrate 50 milliGRAM(s) Oral every 6 hours  oxybutynin 5 milliGRAM(s) Oral two times a day  pantoprazole    Tablet 40 milliGRAM(s) Oral before breakfast  rivaroxaban 15 milliGRAM(s) Oral every 24 hours  senna 2 Tablet(s) Oral at bedtime  triamterene 37.5 mG/hydrochlorothiazide 25 mG Tablet 1 Tablet(s) Oral daily    PRN MEDICATIONS  acetaminophen   Tablet .. 650 milliGRAM(s) Oral every 6 hours PRN      VITAL SIGNS: Last 24 Hours  T(C): 35.6 (13 Feb 2019 13:13), Max: 36.2 (12 Feb 2019 20:00)  T(F): 96 (13 Feb 2019 13:13), Max: 97.1 (12 Feb 2019 20:00)  HR: 99 (13 Feb 2019 17:24) (46 - 101)  BP: 125/76 (13 Feb 2019 17:24) (88/50 - 125/76)  BP(mean): --  RR: 18 (13 Feb 2019 13:13) (18 - 20)  SpO2: 93% (13 Feb 2019 17:24) (93% - 93%)    LABS:                        10.3   9.40  )-----------( 268      ( 13 Feb 2019 06:11 )             33.9     02-13    140  |  93<L>  |  57<H>  ----------------------------<  128<H>  3.6   |  27  |  2.2<H>    Ca    8.4<L>      13 Feb 2019 06:11  Mg     1.8     02-13    TPro  5.8<L>  /  Alb  3.2<L>  /  TBili  0.5  /  DBili  x   /  AST  12  /  ALT  44<H>  /  AlkPhos  171<H>  02-13          Troponin T, Serum: <0.01 ng/mL (02-13-19 @ 06:11)  Creatine Kinase, Serum: 20 U/L (02-13-19 @ 06:11)      CARDIAC MARKERS ( 13 Feb 2019 06:11 )  x     / <0.01 ng/mL / 20 U/L / x     / <1.0 ng/mL  CARDIAC MARKERS ( 12 Feb 2019 08:51 )  x     / <0.01 ng/mL / 16 U/L / x     / <1.0 ng/mL    RADIOLOGY:      PHYSICAL EXAM:    GENERAL: NAD, well-developed, AAOx3  HEENT:  Atraumatic, Normocephalic. EOMI, PERRLA, conjunctiva and sclera clear, No JVD  PULMONARY: Clear to auscultation bilaterally; No wheeze  CARDIOVASCULAR: Regular rate and rhythm; No murmurs, rubs, or gallops  GASTROINTESTINAL: Soft, Nontender, Nondistended; Bowel sounds present  MUSCULOSKELETAL:  2+ Peripheral Pulses, No clubbing, cyanosis, or edema  NEUROLOGY: non-focal  SKIN: No rashes or lesions      ADMISSION SUMMARY  Patient is a 78y old Female who presented with a chief complaint of dyspnea and palpitations for few days  Currently admitted to medicine with the primary diagnosis of acute on chronic CHF exacerbation 2/2 atrial flutter      ASSESSMENT & PLAN  77 yo female patient with PMHx of Paroxysmal AF on Xarelto, HTN, DL, hypothyroidism (history of thyroid cancer s/p resection), FELIX on CPAP, recent hip fracture discharged to [facility] recently, currently presenting because of dyspnea on exertion and palpitations over the last 3 days.    #) Acute on chronic CHF exacerbation with unknown EF 2/2 atrial flutter  - CXR shows pulmonary venous congestion, with BNP 57706  - On Metoprolol 50 q6h and cardizem 60mg q6h  - Lasix on hold due to finding of KOBI   - Echo - pending  - Cardio recs appreciated: continue metoprolol and cardizem, as per Dr. Reno, will change metoprolol to 100mg BID and cardizem 240mg QD tomorrow; will probably need FAITH/CV once stable    #) Atrial flutter   - CHADsVASc 5  - Rate not well controlled, HR varies from 100 to 150  - Change metoprolol to 100mg BID and cardizem 240mg QD tomorrow    #) KOBI on CKD stage III progressing to stage VI  - Likely pre-renal 2/2 diuretic and ACE use  - Adequate UOP  - Hold both lasix and lisinopril for now  - Renal US  - Nephrology consulted    #) HTN - stable  - Continue cardizem, metoprolol, and triamtrene/HCTZ    #) Hypothyroidism   - TSH 0.11, will hold synthroid for now as it might precipitate persistent A flutter  - Check T3, Free T4, and TBG    #) Transaminitis due to venous congestion?  - RUQ US negative  - unclear etiology, though trending down, will monitor for now,      DVT ppx: Xarelto   GI ppx: Not indicated  Diet: DASH  Activity: ambulate as tolerated  Lines: Peripheral IVs  Code status: Full code  Dispo: acute JEFFERY UGALDE 78y Female  MRN#: [id number]   CODE STATUS: Full code      SUBJECTIVE  Patient is a 78y old Female who presented with a chief complaint of dyspnea and palpitations for few days  Currently admitted to medicine with the primary diagnosis of acute on chronic CHF exacerbation 2/2 atrial flutter  Today is hospital day 4d, and this morning she is resting in bed comfortably and reports no overnight events. Patient reports improved breathing and resolved palpitation. Denies chest pain, abdominal pain, urinary symptoms, n/v/d/c.    Present Today:           Stewart Catheter ()No/ (X)Yes? Indication: acute kidney injury, measuring I & Os      OBJECTIVE  PAST MEDICAL & SURGICAL HISTORY  CKD (chronic kidney disease) stage 3, GFR 30-59 ml/min  Osteoarthritis  Sleep apnea  Afib: on xarelto  Hypothyroid: s/p thyroid ca surgery  High cholesterol  HTN (hypertension)  S/P rotator cuff surgery  H/O thyroidectomy    ALLERGIES:  No Known Allergies    MEDICATIONS:  STANDING MEDICATIONS  atorvastatin 20 milliGRAM(s) Oral at bedtime  chlorhexidine 4% Liquid 1 Application(s) Topical <User Schedule>  citalopram 20 milliGRAM(s) Oral daily  diltiazem    Tablet 60 milliGRAM(s) Oral every 6 hours  docusate sodium 100 milliGRAM(s) Oral three times a day  metoprolol tartrate 50 milliGRAM(s) Oral every 6 hours  oxybutynin 5 milliGRAM(s) Oral two times a day  pantoprazole    Tablet 40 milliGRAM(s) Oral before breakfast  rivaroxaban 15 milliGRAM(s) Oral every 24 hours  senna 2 Tablet(s) Oral at bedtime  triamterene 37.5 mG/hydrochlorothiazide 25 mG Tablet 1 Tablet(s) Oral daily    PRN MEDICATIONS  acetaminophen   Tablet .. 650 milliGRAM(s) Oral every 6 hours PRN      VITAL SIGNS: Last 24 Hours  T(C): 35.6 (13 Feb 2019 13:13), Max: 36.2 (12 Feb 2019 20:00)  T(F): 96 (13 Feb 2019 13:13), Max: 97.1 (12 Feb 2019 20:00)  HR: 99 (13 Feb 2019 17:24) (46 - 101)  BP: 125/76 (13 Feb 2019 17:24) (88/50 - 125/76)  BP(mean): --  RR: 18 (13 Feb 2019 13:13) (18 - 20)  SpO2: 93% (13 Feb 2019 17:24) (93% - 93%)    LABS:                        10.3   9.40  )-----------( 268      ( 13 Feb 2019 06:11 )             33.9     02-13    140  |  93<L>  |  57<H>  ----------------------------<  128<H>  3.6   |  27  |  2.2<H>    Ca    8.4<L>      13 Feb 2019 06:11  Mg     1.8     02-13    TPro  5.8<L>  /  Alb  3.2<L>  /  TBili  0.5  /  DBili  x   /  AST  12  /  ALT  44<H>  /  AlkPhos  171<H>  02-13      Troponin T, Serum: <0.01 ng/mL (02-13-19 @ 06:11)  Creatine Kinase, Serum: 20 U/L (02-13-19 @ 06:11)      CARDIAC MARKERS ( 13 Feb 2019 06:11 )  x     / <0.01 ng/mL / 20 U/L / x     / <1.0 ng/mL  CARDIAC MARKERS ( 12 Feb 2019 08:51 )  x     / <0.01 ng/mL / 16 U/L / x     / <1.0 ng/mL    RADIOLOGY:      PHYSICAL EXAM:    GENERAL: NAD, well-developed, AAOx3  HEENT:  Atraumatic, Normocephalic. Conjunctiva pink and cornea white, No JVD  PULMONARY: bilateral wheezing on middle lobes and rhonchi bilaterally  CARDIOVASCULAR: Irregularly irregular rhythm; No murmurs  GASTROINTESTINAL: Soft, Nontender, Nondistended; Bowel sounds present  MUSCULOSKELETAL:  2+ Peripheral Pulses, No petal edema  NEUROLOGY: non-focal  SKIN: No rashes or lesions      ADMISSION SUMMARY  Patient is a 78y old Female who presented with a chief complaint of dyspnea and palpitations for few days  Currently admitted to medicine with the primary diagnosis of acute on chronic CHF exacerbation 2/2 atrial flutter      ASSESSMENT & PLAN  77 yo female patient with PMHx of Paroxysmal AF on Xarelto, HTN, DL, hypothyroidism (history of thyroid cancer s/p resection), FELIX on CPAP, recent hip fracture discharged to [facility] recently, currently presenting because of dyspnea on exertion and palpitations over the last 3 days.    #) Acute on chronic CHF exacerbation with unknown EF 2/2 atrial flutter  - CXR shows pulmonary venous congestion, with BNP 91513  - On Metoprolol 50 q6h and cardizem 60mg q6h  - Lasix on hold due to finding of KOBI   - Echo - pending  - Cardio recs appreciated: continue metoprolol and cardizem, as per Dr. Reno, will change metoprolol to 100mg BID and cardizem 240mg QD tomorrow; will probably need FAITH/CV once stable    #) Atrial flutter   - CHADsVASc 5  - Rate not well controlled, HR varies from 100 to 150  - Change metoprolol to 100mg BID and cardizem 240mg QD tomorrow    #) KOBI on CKD stage III progressing to stage VI  - Likely pre-renal 2/2 diuretic and ACE use  - Adequate UOP  - Hold both lasix and lisinopril for now  - Renal US  - Nephrology consulted    #) HTN - stable  - Continue cardizem, metoprolol, and triamtrene/HCTZ    #) Hypothyroidism   - TSH 0.11, will hold synthroid for now as it might precipitate persistent A flutter  - Check T3, Free T4, and TBG    #) Transaminitis due to venous congestion?  - RUQ US negative  - unclear etiology, though trending down, will monitor for now,      DVT ppx: Xarelto   GI ppx: Not indicated  Diet: DASH  Activity: ambulate as tolerated  Lines: Peripheral IVs  Code status: Full code  Dispo: acute

## 2019-02-14 LAB
ALBUMIN SERPL ELPH-MCNC: 3.2 G/DL — LOW (ref 3.5–5.2)
ALP SERPL-CCNC: 158 U/L — HIGH (ref 30–115)
ALT FLD-CCNC: 34 U/L — SIGNIFICANT CHANGE UP (ref 0–41)
ANION GAP SERPL CALC-SCNC: 16 MMOL/L — HIGH (ref 7–14)
AST SERPL-CCNC: 9 U/L — SIGNIFICANT CHANGE UP (ref 0–41)
BASOPHILS # BLD AUTO: 0.05 K/UL — SIGNIFICANT CHANGE UP (ref 0–0.2)
BASOPHILS NFR BLD AUTO: 0.5 % — SIGNIFICANT CHANGE UP (ref 0–1)
BILIRUB SERPL-MCNC: 0.4 MG/DL — SIGNIFICANT CHANGE UP (ref 0.2–1.2)
BUN SERPL-MCNC: 57 MG/DL — HIGH (ref 10–20)
CALCIUM SERPL-MCNC: 8.3 MG/DL — LOW (ref 8.5–10.1)
CHLORIDE SERPL-SCNC: 98 MMOL/L — SIGNIFICANT CHANGE UP (ref 98–110)
CO2 SERPL-SCNC: 28 MMOL/L — SIGNIFICANT CHANGE UP (ref 17–32)
CREAT SERPL-MCNC: 1.9 MG/DL — HIGH (ref 0.7–1.5)
CULTURE RESULTS: SIGNIFICANT CHANGE UP
EOSINOPHIL # BLD AUTO: 0.36 K/UL — SIGNIFICANT CHANGE UP (ref 0–0.7)
EOSINOPHIL NFR BLD AUTO: 3.4 % — SIGNIFICANT CHANGE UP (ref 0–8)
GLUCOSE SERPL-MCNC: 116 MG/DL — HIGH (ref 70–99)
HCT VFR BLD CALC: 35.9 % — LOW (ref 37–47)
HGB BLD-MCNC: 10.9 G/DL — LOW (ref 12–16)
IMM GRANULOCYTES NFR BLD AUTO: 0.7 % — HIGH (ref 0.1–0.3)
LYMPHOCYTES # BLD AUTO: 2.68 K/UL — SIGNIFICANT CHANGE UP (ref 1.2–3.4)
LYMPHOCYTES # BLD AUTO: 25 % — SIGNIFICANT CHANGE UP (ref 20.5–51.1)
MAGNESIUM SERPL-MCNC: 2 MG/DL — SIGNIFICANT CHANGE UP (ref 1.8–2.4)
MCHC RBC-ENTMCNC: 24.3 PG — LOW (ref 27–31)
MCHC RBC-ENTMCNC: 30.4 G/DL — LOW (ref 32–37)
MCV RBC AUTO: 80 FL — LOW (ref 81–99)
MONOCYTES # BLD AUTO: 0.73 K/UL — HIGH (ref 0.1–0.6)
MONOCYTES NFR BLD AUTO: 6.8 % — SIGNIFICANT CHANGE UP (ref 1.7–9.3)
NEUTROPHILS # BLD AUTO: 6.83 K/UL — HIGH (ref 1.4–6.5)
NEUTROPHILS NFR BLD AUTO: 63.6 % — SIGNIFICANT CHANGE UP (ref 42.2–75.2)
NRBC # BLD: 0 /100 WBCS — SIGNIFICANT CHANGE UP (ref 0–0)
PHOSPHATE SERPL-MCNC: 3.4 MG/DL — SIGNIFICANT CHANGE UP (ref 2.1–4.9)
PLATELET # BLD AUTO: 271 K/UL — SIGNIFICANT CHANGE UP (ref 130–400)
POTASSIUM SERPL-MCNC: 3.8 MMOL/L — SIGNIFICANT CHANGE UP (ref 3.5–5)
POTASSIUM SERPL-SCNC: 3.8 MMOL/L — SIGNIFICANT CHANGE UP (ref 3.5–5)
PROT SERPL-MCNC: 5.9 G/DL — LOW (ref 6–8)
RBC # BLD: 4.49 M/UL — SIGNIFICANT CHANGE UP (ref 4.2–5.4)
RBC # FLD: 16.4 % — HIGH (ref 11.5–14.5)
SODIUM SERPL-SCNC: 142 MMOL/L — SIGNIFICANT CHANGE UP (ref 135–146)
SPECIMEN SOURCE: SIGNIFICANT CHANGE UP
T3 SERPL-MCNC: 55 NG/DL — LOW (ref 80–200)
T4 FREE SERPL-MCNC: 1.7 NG/DL — SIGNIFICANT CHANGE UP (ref 0.9–1.8)
WBC # BLD: 10.72 K/UL — SIGNIFICANT CHANGE UP (ref 4.8–10.8)
WBC # FLD AUTO: 10.72 K/UL — SIGNIFICANT CHANGE UP (ref 4.8–10.8)

## 2019-02-14 RX ORDER — METOPROLOL TARTRATE 50 MG
100 TABLET ORAL EVERY 12 HOURS
Qty: 0 | Refills: 0 | Status: DISCONTINUED | OUTPATIENT
Start: 2019-02-14 | End: 2019-02-19

## 2019-02-14 RX ORDER — DILTIAZEM HCL 120 MG
240 CAPSULE, EXT RELEASE 24 HR ORAL DAILY
Qty: 0 | Refills: 0 | Status: DISCONTINUED | OUTPATIENT
Start: 2019-02-14 | End: 2019-02-14

## 2019-02-14 RX ORDER — POLYETHYLENE GLYCOL 3350 17 G/17G
17 POWDER, FOR SOLUTION ORAL AT BEDTIME
Qty: 0 | Refills: 0 | Status: DISCONTINUED | OUTPATIENT
Start: 2019-02-14 | End: 2019-02-20

## 2019-02-14 RX ORDER — DILTIAZEM HCL 120 MG
240 CAPSULE, EXT RELEASE 24 HR ORAL DAILY
Qty: 0 | Refills: 0 | Status: DISCONTINUED | OUTPATIENT
Start: 2019-02-15 | End: 2019-02-19

## 2019-02-14 RX ADMIN — ATORVASTATIN CALCIUM 20 MILLIGRAM(S): 80 TABLET, FILM COATED ORAL at 22:28

## 2019-02-14 RX ADMIN — PANTOPRAZOLE SODIUM 40 MILLIGRAM(S): 20 TABLET, DELAYED RELEASE ORAL at 05:22

## 2019-02-14 RX ADMIN — POLYETHYLENE GLYCOL 3350 17 GRAM(S): 17 POWDER, FOR SOLUTION ORAL at 22:28

## 2019-02-14 RX ADMIN — Medication 100 MILLIGRAM(S): at 18:16

## 2019-02-14 RX ADMIN — CHLORHEXIDINE GLUCONATE 1 APPLICATION(S): 213 SOLUTION TOPICAL at 05:21

## 2019-02-14 RX ADMIN — Medication 5 MILLIGRAM(S): at 18:17

## 2019-02-14 RX ADMIN — Medication 50 MILLIGRAM(S): at 05:22

## 2019-02-14 RX ADMIN — Medication 100 MILLIGRAM(S): at 22:28

## 2019-02-14 RX ADMIN — Medication 5 MILLIGRAM(S): at 05:22

## 2019-02-14 RX ADMIN — RIVAROXABAN 15 MILLIGRAM(S): KIT at 18:16

## 2019-02-14 RX ADMIN — Medication 1 TABLET(S): at 05:22

## 2019-02-14 RX ADMIN — CITALOPRAM 20 MILLIGRAM(S): 10 TABLET, FILM COATED ORAL at 12:03

## 2019-02-14 RX ADMIN — Medication 100 MILLIGRAM(S): at 05:22

## 2019-02-14 RX ADMIN — SENNA PLUS 2 TABLET(S): 8.6 TABLET ORAL at 22:28

## 2019-02-14 NOTE — PROGRESS NOTE ADULT - SUBJECTIVE AND OBJECTIVE BOX
Patient was seen and examined. Spoke with RN. Chart reviewed.  No events overnight.  Vital Signs Last 24 Hrs  T(F): 96.9 (14 Feb 2019 05:33), Max: 96.9 (14 Feb 2019 05:33)  HR: 59 (14 Feb 2019 05:33) (46 - 123)  BP: 102/59 (14 Feb 2019 05:33) (102/59 - 125/76)  SpO2: 93% (13 Feb 2019 17:24) (93% - 93%)  MEDICATIONS  (STANDING):  atorvastatin 20 milliGRAM(s) Oral at bedtime  chlorhexidine 4% Liquid 1 Application(s) Topical <User Schedule>  citalopram 20 milliGRAM(s) Oral daily  diltiazem    Tablet 60 milliGRAM(s) Oral every 6 hours  docusate sodium 100 milliGRAM(s) Oral three times a day  metoprolol tartrate 100 milliGRAM(s) Oral every 12 hours  oxybutynin 5 milliGRAM(s) Oral two times a day  pantoprazole    Tablet 40 milliGRAM(s) Oral before breakfast  rivaroxaban 15 milliGRAM(s) Oral every 24 hours  senna 2 Tablet(s) Oral at bedtime  triamterene 37.5 mG/hydrochlorothiazide 25 mG Tablet 1 Tablet(s) Oral daily    MEDICATIONS  (PRN):  acetaminophen   Tablet .. 650 milliGRAM(s) Oral every 6 hours PRN Mild Pain (1 - 3)    Labs:                        10.9   10.72 )-----------( 271      ( 14 Feb 2019 06:26 )             35.9                         10.3   9.40  )-----------( 268      ( 13 Feb 2019 06:11 )             33.9     14 Feb 2019 06:26    142    |  98     |  57     ----------------------------<  116    3.8     |  28     |  1.9    13 Feb 2019 06:11    140    |  93     |  57     ----------------------------<  128    3.6     |  27     |  2.2      Ca    8.3        14 Feb 2019 06:26  Ca    8.4        13 Feb 2019 06:11  Phos  3.4       14 Feb 2019 06:26  Mg     2.0       14 Feb 2019 06:26  Mg     1.8       13 Feb 2019 06:11    TPro  5.9    /  Alb  3.2    /  TBili  0.4    /  DBili  x      /  AST  9      /  ALT  34     /  AlkPhos  158    14 Feb 2019 06:26  TPro  5.8    /  Alb  3.2    /  TBili  0.5    /  DBili  x      /  AST  12     /  ALT  44     /  AlkPhos  171    13 Feb 2019 06:11          General: comfortable, NAD  Neurology: A&Ox3, nonfocal  Head:  Normocephalic, atraumatic  ENT:  Mucosa moist, no ulcerations  Neck:  Supple, no JVD,   Skin: no breakdowns (as per RN)  Resp: CTA B/L  CV: RRR, S1S2,   GI: Soft, NT, bowel sounds  MS: No edema, + peripheral pulses, FROM all 4 extremity      A/P:  79 yo female patient with PMHx of Paroxysmal AF on NOAC, HTN, DL, hypothyroidism (history of thyroid cancer s/p resection), FELIX on CPAP, recent hip fracture discharged to [facility] recently, currently presenting because of dyspnea on exertion and palpitations.    hold levothyroxine , check T3, FT4 and TBG  hold on lasix ,   Cr starting to improve  renal Dr Dong  check 2d echo  check Flu , RVP  c/w metoprolol and cardizem   O2 VNC  will prob need FAITH/CV once stable as per cardio    tele    close cardiology f/u- please call    OOB to chair    DVT prophylaxis  Decubitus prevention- all measures as per RN protocol  Please call or text me with any questions or updates

## 2019-02-14 NOTE — PROGRESS NOTE ADULT - ASSESSMENT
JEFFERY UGALDE 78y Female  MRN#: [id number]   CODE STATUS: Full code      SUBJECTIVE  Patient is a 78y old Female who presented with a chief complaint of dyspnea and palpitations for few days  Currently admitted to medicine with the primary diagnosis of acute on chronic CHF exacerbation 2/2 atrial flutter  Today is hospital day 5d, and this morning she is resting in bed comfortably and reports no overnight events. Patient reports improved breathing and resolved palpitation.  Reports dry cough starting this morning, Denies fever, chills, chest pain, abdominal pain, urinary symptoms, n/v/d/c.      OBJECTIVE  PAST MEDICAL & SURGICAL HISTORY  CKD (chronic kidney disease) stage 3, GFR 30-59 ml/min  Osteoarthritis  Sleep apnea  Afib: on xarelto  Hypothyroid: s/p thyroid ca surgery  High cholesterol  HTN (hypertension)  S/P rotator cuff surgery  H/O thyroidectomy    ALLERGIES:  No Known Allergies    MEDICATIONS:  STANDING MEDICATIONS  atorvastatin 20 milliGRAM(s) Oral at bedtime  chlorhexidine 4% Liquid 1 Application(s) Topical <User Schedule>  citalopram 20 milliGRAM(s) Oral daily  diltiazem    Tablet 60 milliGRAM(s) Oral every 6 hours  docusate sodium 100 milliGRAM(s) Oral three times a day  metoprolol tartrate 100 milliGRAM(s) Oral every 12 hours  oxybutynin 5 milliGRAM(s) Oral two times a day  pantoprazole    Tablet 40 milliGRAM(s) Oral before breakfast  polyethylene glycol 3350 17 Gram(s) Oral at bedtime  rivaroxaban 15 milliGRAM(s) Oral every 24 hours  senna 2 Tablet(s) Oral at bedtime  triamterene 37.5 mG/hydrochlorothiazide 25 mG Tablet 1 Tablet(s) Oral daily    PRN MEDICATIONS  acetaminophen   Tablet .. 650 milliGRAM(s) Oral every 6 hours PRN      VITAL SIGNS: Last 24 Hours  T(C): 36.1 (14 Feb 2019 05:33), Max: 36.1 (14 Feb 2019 05:33)  T(F): 96.9 (14 Feb 2019 05:33), Max: 96.9 (14 Feb 2019 05:33)  HR: 59 (14 Feb 2019 05:33) (46 - 123)  BP: 102/59 (14 Feb 2019 05:33) (102/59 - 125/76)  BP(mean): --  RR: 18 (14 Feb 2019 05:33) (18 - 20)  SpO2: 93% (13 Feb 2019 17:24) (93% - 93%)    LABS:                        10.9   10.72 )-----------( 271      ( 14 Feb 2019 06:26 )             35.9     02-14    142  |  98  |  57<H>  ----------------------------<  116<H>  3.8   |  28  |  1.9<H>    Ca    8.3<L>      14 Feb 2019 06:26  Phos  3.4     02-14  Mg     2.0     02-14    TPro  5.9<L>  /  Alb  3.2<L>  /  TBili  0.4  /  DBili  x   /  AST  9   /  ALT  34  /  AlkPhos  158<H>  02-14    CARDIAC MARKERS ( 13 Feb 2019 06:11 )  x     / <0.01 ng/mL / 20 U/L / x     / <1.0 ng/mL      RADIOLOGY:      PHYSICAL EXAM:    GENERAL: NAD, well-developed, AAOx3  HEENT:  Atraumatic, Normocephalic. Conjunctiva pink and cornea white, No JVD  PULMONARY: Improved bibasilar rhonchi bilaterally  CARDIOVASCULAR: Irregularly irregular rhythm; No murmurs  GASTROINTESTINAL: Soft, Nontender, Nondistended; Bowel sounds present  MUSCULOSKELETAL:  2+ Peripheral Pulses, No petal edema  NEUROLOGY: non-focal  SKIN: No rashes or lesions      ADMISSION SUMMARY  Patient is a 78y old Female who presented with a chief complaint of dyspnea and palpitations for few days  Currently admitted to medicine with the primary diagnosis of acute on chronic CHF exacerbation 2/2 atrial flutter      ASSESSMENT & PLAN  77 yo female patient with PMHx of Paroxysmal AF on Xarelto, HTN, DL, hypothyroidism (history of thyroid cancer s/p resection), FELIX on CPAP, recent hip fracture discharged to [facility] recently, currently presenting because of dyspnea on exertion and palpitations over the last 3 days.    #) Acute on chronic CHF exacerbation with unknown EF 2/2 atrial flutter  - CXR shows pulmonary venous congestion, with BNP 03901  - On Metoprolol 50 q6h and cardizem 60mg q6h  - Lasix on hold due to finding of KOBI   - Echo - pending  - Cardio recs appreciated: continue metoprolol and cardizem, as per Dr. Reno, will change metoprolol to 100mg BID and cardizem 240mg QD tomorrow; will probably need FAITH/CV once stable    #) Atrial flutter   - CHADsVASc 5  - Rate not well controlled, HR varies from 100 to 150  - Change metoprolol to 100mg BID and cardizem 240mg QD tomorrow    #) KOBI on CKD stage III progressing to stage VI  - Likely pre-renal 2/2 diuretic and ACE use  - Adequate UOP  - Hold both lasix and lisinopril for now  - Renal US  - Nephrology consulted    #) HTN - stable  - Continue cardizem, metoprolol, and triamtrene/HCTZ    #) Hypothyroidism   - TSH 0.11, will hold synthroid for now as it might precipitate persistent A flutter  - Check T3, Free T4, and TBG    #) Transaminitis due to venous congestion?  - RUQ US negative  - unclear etiology, though trending down, will monitor for now,      DVT ppx: Xarelto   GI ppx: Not indicated  Diet: DASH  Activity: ambulate as tolerated  Lines: Peripheral IVs  Code status: Full code  Dispo: acute JEFFERY UGALDE 78y Female  MRN#: [id number]   CODE STATUS: Full code      SUBJECTIVE  Patient is a 78y old Female who presented with a chief complaint of dyspnea and palpitations for few days  Currently admitted to medicine with the primary diagnosis of acute on chronic CHF exacerbation 2/2 atrial flutter  Today is hospital day 5d, and this morning she is resting in bed comfortably and reports no overnight events. Patient reports improved breathing and resolved palpitation.  Reports dry cough starting this morning, Denies fever, chills, chest pain, abdominal pain, urinary symptoms, n/v/d/c.      OBJECTIVE  PAST MEDICAL & SURGICAL HISTORY  CKD (chronic kidney disease) stage 3, GFR 30-59 ml/min  Osteoarthritis  Sleep apnea  Afib: on xarelto  Hypothyroid: s/p thyroid ca surgery  High cholesterol  HTN (hypertension)  S/P rotator cuff surgery  H/O thyroidectomy    ALLERGIES:  No Known Allergies    MEDICATIONS:  STANDING MEDICATIONS  atorvastatin 20 milliGRAM(s) Oral at bedtime  chlorhexidine 4% Liquid 1 Application(s) Topical <User Schedule>  citalopram 20 milliGRAM(s) Oral daily  diltiazem    Tablet 60 milliGRAM(s) Oral every 6 hours  docusate sodium 100 milliGRAM(s) Oral three times a day  metoprolol tartrate 100 milliGRAM(s) Oral every 12 hours  oxybutynin 5 milliGRAM(s) Oral two times a day  pantoprazole    Tablet 40 milliGRAM(s) Oral before breakfast  polyethylene glycol 3350 17 Gram(s) Oral at bedtime  rivaroxaban 15 milliGRAM(s) Oral every 24 hours  senna 2 Tablet(s) Oral at bedtime  triamterene 37.5 mG/hydrochlorothiazide 25 mG Tablet 1 Tablet(s) Oral daily    PRN MEDICATIONS  acetaminophen   Tablet .. 650 milliGRAM(s) Oral every 6 hours PRN      VITAL SIGNS: Last 24 Hours  T(C): 36.1 (14 Feb 2019 05:33), Max: 36.1 (14 Feb 2019 05:33)  T(F): 96.9 (14 Feb 2019 05:33), Max: 96.9 (14 Feb 2019 05:33)  HR: 59 (14 Feb 2019 05:33) (46 - 123)  BP: 102/59 (14 Feb 2019 05:33) (102/59 - 125/76)  BP(mean): --  RR: 18 (14 Feb 2019 05:33) (18 - 20)  SpO2: 93% (13 Feb 2019 17:24) (93% - 93%)    LABS:                        10.9   10.72 )-----------( 271      ( 14 Feb 2019 06:26 )             35.9     02-14    142  |  98  |  57<H>  ----------------------------<  116<H>  3.8   |  28  |  1.9<H>    Ca    8.3<L>      14 Feb 2019 06:26  Phos  3.4     02-14  Mg     2.0     02-14    TPro  5.9<L>  /  Alb  3.2<L>  /  TBili  0.4  /  DBili  x   /  AST  9   /  ALT  34  /  AlkPhos  158<H>  02-14    CARDIAC MARKERS ( 13 Feb 2019 06:11 )  x     / <0.01 ng/mL / 20 U/L / x     / <1.0 ng/mL      RADIOLOGY:      PHYSICAL EXAM:    GENERAL: NAD, well-developed, AAOx3  HEENT:  Atraumatic, Normocephalic. Conjunctiva pink and cornea white, No JVD  PULMONARY: Improved bibasilar rhonchi bilaterally  CARDIOVASCULAR: Irregularly irregular rhythm; No murmurs  GASTROINTESTINAL: Soft, Nontender, Nondistended; Bowel sounds present  MUSCULOSKELETAL:  2+ Peripheral Pulses, No petal edema  NEUROLOGY: non-focal  SKIN: No rashes or lesions      ADMISSION SUMMARY  Patient is a 78y old Female who presented with a chief complaint of dyspnea and palpitations for few days  Currently admitted to medicine with the primary diagnosis of acute on chronic CHF exacerbation 2/2 atrial flutter      ASSESSMENT & PLAN  79 yo female patient with PMHx of Paroxysmal AF on Xarelto, HTN, DL, hypothyroidism (history of thyroid cancer s/p resection), FELIX on CPAP, recent hip fracture discharged to [facility] recently, currently presenting because of dyspnea on exertion and palpitations over the last 3 days.    #) Acute on chronic CHF exacerbation with unknown EF 2/2 atrial flutter  - CXR shows pulmonary venous congestion, with BNP 26725  - On Metoprolol 50 q6h and cardizem 60mg q6h  - Continue to hold Lasix due to finding of KOBI   - Echo - pending  - Cardio recs appreciated: continue metoprolol and Cardizem will change metoprolol to 100mg BID and Cardizem 240mg QD tomorrow; will probably need FAITH/CV once stable    #) Atrial flutter   - CHADsVASc 5  - Rate not well controlled, HR varies from 80 - 120  - Change metoprolol to 100mg BID and cardizem 240mg QD tomorrow    #) KOBI on CKD stage III progressing to stage VI  - Likely pre-renal 2/2 diuretic and ACE use  - Adequate UOP  - Hold both lasix and lisinopril for now  - Renal US  - Nephrology consulted    #) HTN - stable  - Continue cardizem, metoprolol, and triamtrene/HCTZ    #) Hypothyroidism   - TSH 0.11, will hold synthroid for now as it might precipitate persistent A flutter  - TBG 13, T3 65, Free T4 9.3    #) Transaminitis due to venous congestion?  - RUQ US negative  - unclear etiology, though trending down, will monitor for now,      DVT ppx: Xarelto   GI ppx: Not indicated  Diet: DASH  Activity: ambulate as tolerated  Lines: Peripheral IVs  Code status: Full code  Dispo: acute

## 2019-02-14 NOTE — CONSULT NOTE ADULT - SUBJECTIVE AND OBJECTIVE BOX
NEPHROLOGY CONSULTATION NOTE    79 yo female patient with PMHx of Paroxysmal AF on Xarelto, HTN, DL, hypothyroidism (history of thyroid cancer s/p resection), FELIX on CPAP, recent hip fracture discharged to [facility] recently, currently presenting because of dyspnea on exertion and palpitations over the last 3 days.  Pt found to have RSV PNA.  also with afib with rvr.  renal consulted for worsening KOBI.    PAST MEDICAL & SURGICAL HISTORY:  CKD (chronic kidney disease) stage 3, GFR 30-59 ml/min  Osteoarthritis  Sleep apnea  Afib: on xarelto  Hypothyroid: s/p thyroid ca surgery  High cholesterol  HTN (hypertension)  S/P rotator cuff surgery  H/O thyroidectomy    Allergies:  No Known Allergies    Home Medications Reviewed    SOCIAL HISTORY:  Denies ETOH,Smoking,   FAMILY HISTORY:  Family history of abdominal aortic aneurysm (AAA) (Father)  Family history of lung cancer (Mother)        REVIEW OF SYSTEMS:    All other review of systems is negative unless indicated above.    PHYSICAL EXAM:  NAD  awake and alert  moist mm  no jvd  b/l BS  rrr  soft, nt   no edema  no [facility] Medications:   MEDICATIONS  (STANDING):  atorvastatin 20 milliGRAM(s) Oral at bedtime  chlorhexidine 4% Liquid 1 Application(s) Topical <User Schedule>  citalopram 20 milliGRAM(s) Oral daily  diltiazem    Tablet 60 milliGRAM(s) Oral every 6 hours  docusate sodium 100 milliGRAM(s) Oral three times a day  metoprolol tartrate 100 milliGRAM(s) Oral every 12 hours  oxybutynin 5 milliGRAM(s) Oral two times a day  pantoprazole    Tablet 40 milliGRAM(s) Oral before breakfast  polyethylene glycol 3350 17 Gram(s) Oral at bedtime  rivaroxaban 15 milliGRAM(s) Oral every 24 hours  senna 2 Tablet(s) Oral at bedtime  triamterene 37.5 mG/hydrochlorothiazide 25 mG Tablet 1 Tablet(s) Oral daily        VITALS:  T(F): 96.5 (02-14-19 @ 13:17), Max: 96.9 (02-14-19 @ 05:33)  HR: 115 (02-14-19 @ 19:39)  BP: 169/72 (02-14-19 @ 19:39)  RR: 20 (02-14-19 @ 13:17)  SpO2: 97% (02-14-19 @ 18:30)  Wt(kg): --    02-12 @ 07:01  -  02-13 @ 07:00  --------------------------------------------------------  IN: 0 mL / OUT: 650 mL / NET: -650 mL    02-13 @ 07:01  -  02-14 @ 07:00  --------------------------------------------------------  IN: 410 mL / OUT: 900 mL / NET: -490 mL    02-14 @ 07:01  -  02-14 @ 19:48  --------------------------------------------------------  IN: 700 mL / OUT: 250 mL / NET: 450 mL          LABS:  02-14    142  |  98  |  57<H>  ----------------------------<  116<H>  3.8   |  28  |  1.9<H>    Ca    8.3<L>      14 Feb 2019 06:26  Phos  3.4     02-14  Mg     2.0     02-14    TPro  5.9<L>  /  Alb  3.2<L>  /  TBili  0.4  /  DBili      /  AST  9   /  ALT  34  /  AlkPhos  158<H>  02-14                          10.9   10.72 )-----------( 271      ( 14 Feb 2019 06:26 )             35.9       Urine Studies:        RADIOLOGY & ADDITIONAL STUDIES:

## 2019-02-14 NOTE — CONSULT NOTE ADULT - ASSESSMENT
KOBI   - better today   - prerenal  CKD 3   - baseline Cr low 1's  Right kidney on small size and with renal cyst on abd sono  RSV PNA  Afib with RVR  recent hip fracture    plan:    off lasix  cont hctz / triamterene  hold lisinopril another 24 - 48 hours, then resume  check UA, UP/C  f/u bmp  off abx  renal dose xarelto  will follow  spoke with resident

## 2019-02-15 LAB
ALBUMIN SERPL ELPH-MCNC: 3.6 G/DL — SIGNIFICANT CHANGE UP (ref 3.5–5.2)
ALP SERPL-CCNC: 143 U/L — HIGH (ref 30–115)
ALT FLD-CCNC: 28 U/L — SIGNIFICANT CHANGE UP (ref 0–41)
ANION GAP SERPL CALC-SCNC: 18 MMOL/L — HIGH (ref 7–14)
AST SERPL-CCNC: 10 U/L — SIGNIFICANT CHANGE UP (ref 0–41)
BASOPHILS # BLD AUTO: 0.06 K/UL — SIGNIFICANT CHANGE UP (ref 0–0.2)
BASOPHILS NFR BLD AUTO: 0.5 % — SIGNIFICANT CHANGE UP (ref 0–1)
BILIRUB SERPL-MCNC: 0.3 MG/DL — SIGNIFICANT CHANGE UP (ref 0.2–1.2)
BUN SERPL-MCNC: 54 MG/DL — HIGH (ref 10–20)
CALCIUM SERPL-MCNC: 9 MG/DL — SIGNIFICANT CHANGE UP (ref 8.5–10.1)
CHLORIDE SERPL-SCNC: 98 MMOL/L — SIGNIFICANT CHANGE UP (ref 98–110)
CO2 SERPL-SCNC: 25 MMOL/L — SIGNIFICANT CHANGE UP (ref 17–32)
CREAT SERPL-MCNC: 1.6 MG/DL — HIGH (ref 0.7–1.5)
EOSINOPHIL # BLD AUTO: 0.37 K/UL — SIGNIFICANT CHANGE UP (ref 0–0.7)
EOSINOPHIL NFR BLD AUTO: 3.1 % — SIGNIFICANT CHANGE UP (ref 0–8)
GLUCOSE SERPL-MCNC: 124 MG/DL — HIGH (ref 70–99)
HCT VFR BLD CALC: 37 % — SIGNIFICANT CHANGE UP (ref 37–47)
HGB BLD-MCNC: 11.2 G/DL — LOW (ref 12–16)
IMM GRANULOCYTES NFR BLD AUTO: 0.5 % — HIGH (ref 0.1–0.3)
LYMPHOCYTES # BLD AUTO: 2.6 K/UL — SIGNIFICANT CHANGE UP (ref 1.2–3.4)
LYMPHOCYTES # BLD AUTO: 21.5 % — SIGNIFICANT CHANGE UP (ref 20.5–51.1)
MAGNESIUM SERPL-MCNC: 2 MG/DL — SIGNIFICANT CHANGE UP (ref 1.8–2.4)
MCHC RBC-ENTMCNC: 24.3 PG — LOW (ref 27–31)
MCHC RBC-ENTMCNC: 30.3 G/DL — LOW (ref 32–37)
MCV RBC AUTO: 80.4 FL — LOW (ref 81–99)
MONOCYTES # BLD AUTO: 0.9 K/UL — HIGH (ref 0.1–0.6)
MONOCYTES NFR BLD AUTO: 7.4 % — SIGNIFICANT CHANGE UP (ref 1.7–9.3)
NEUTROPHILS # BLD AUTO: 8.1 K/UL — HIGH (ref 1.4–6.5)
NEUTROPHILS NFR BLD AUTO: 67 % — SIGNIFICANT CHANGE UP (ref 42.2–75.2)
NRBC # BLD: 0 /100 WBCS — SIGNIFICANT CHANGE UP (ref 0–0)
PLATELET # BLD AUTO: 303 K/UL — SIGNIFICANT CHANGE UP (ref 130–400)
POTASSIUM SERPL-MCNC: 4 MMOL/L — SIGNIFICANT CHANGE UP (ref 3.5–5)
POTASSIUM SERPL-SCNC: 4 MMOL/L — SIGNIFICANT CHANGE UP (ref 3.5–5)
PROT SERPL-MCNC: 6.3 G/DL — SIGNIFICANT CHANGE UP (ref 6–8)
RBC # BLD: 4.6 M/UL — SIGNIFICANT CHANGE UP (ref 4.2–5.4)
RBC # FLD: 16.7 % — HIGH (ref 11.5–14.5)
SODIUM SERPL-SCNC: 141 MMOL/L — SIGNIFICANT CHANGE UP (ref 135–146)
WBC # BLD: 12.09 K/UL — HIGH (ref 4.8–10.8)
WBC # FLD AUTO: 12.09 K/UL — HIGH (ref 4.8–10.8)

## 2019-02-15 RX ORDER — FUROSEMIDE 40 MG
40 TABLET ORAL DAILY
Qty: 0 | Refills: 0 | Status: DISCONTINUED | OUTPATIENT
Start: 2019-02-15 | End: 2019-02-15

## 2019-02-15 RX ORDER — FUROSEMIDE 40 MG
40 TABLET ORAL DAILY
Qty: 0 | Refills: 0 | Status: DISCONTINUED | OUTPATIENT
Start: 2019-02-16 | End: 2019-02-20

## 2019-02-15 RX ADMIN — ATORVASTATIN CALCIUM 20 MILLIGRAM(S): 80 TABLET, FILM COATED ORAL at 21:23

## 2019-02-15 RX ADMIN — Medication 40 MILLIGRAM(S): at 11:18

## 2019-02-15 RX ADMIN — Medication 100 MILLIGRAM(S): at 06:17

## 2019-02-15 RX ADMIN — Medication 100 MILLIGRAM(S): at 17:49

## 2019-02-15 RX ADMIN — Medication 5 MILLIGRAM(S): at 17:50

## 2019-02-15 RX ADMIN — Medication 1 TABLET(S): at 06:19

## 2019-02-15 RX ADMIN — PANTOPRAZOLE SODIUM 40 MILLIGRAM(S): 20 TABLET, DELAYED RELEASE ORAL at 06:17

## 2019-02-15 RX ADMIN — CITALOPRAM 20 MILLIGRAM(S): 10 TABLET, FILM COATED ORAL at 11:18

## 2019-02-15 RX ADMIN — Medication 240 MILLIGRAM(S): at 06:17

## 2019-02-15 RX ADMIN — RIVAROXABAN 15 MILLIGRAM(S): KIT at 17:49

## 2019-02-15 RX ADMIN — Medication 5 MILLIGRAM(S): at 06:19

## 2019-02-15 NOTE — PROGRESS NOTE ADULT - ASSESSMENT
Patient is a 78y old Female who presented with a chief complaint of dyspnea and palpitations for few days  Currently admitted to medicine with the primary diagnosis of acute on chronic CHF exacerbation 2/2 atrial flutter      ASSESSMENT & PLAN  79 yo female patient with PMHx of Paroxysmal AF on Xarelto, HTN, DL, hypothyroidism (history of thyroid cancer s/p resection), FELIX on CPAP, recent hip fracture discharged to [facility] recently, currently presenting because of dyspnea on exertion and palpitations over the last 3 days.    #) Acute on chronic CHF exacerbation with unknown EF 2/2 atrial flutter  - CXR shows pulmonary venous congestion, with BNP 12178  - On Metoprolol 50 q6h and cardizem 60mg q6h  - Continue to hold Lasix due to finding of KOBI   - Echo - pending  - Cardio recs appreciated: continue metoprolol and Cardizem will change metoprolol to 100mg BID and Cardizem 240mg QD tomorrow; will probably need FAITH/CV once stable    #) Atrial flutter   - CHADsVASc 5  - Rate not well controlled, HR varies from 80 - 120  - Change metoprolol to 100mg BID and cardizem 240mg QD tomorrow    #) KOBI on CKD stage III progressing to stage VI  - Likely pre-renal 2/2 diuretic and ACE use  - Adequate UOP  - Hold both lasix and lisinopril for now  - Renal US  - Nephrology consulted    #) HTN - stable  - Continue cardizem, metoprolol, and triamtrene/HCTZ    #) Hypothyroidism   - TSH 0.11, will hold synthroid for now as it might precipitate persistent A flutter  - TBG 13, T3 65, Free T4 9.3    #) Transaminitis due to venous congestion?  - RUQ US negative  - unclear etiology, though trending down, will monitor for now,      DVT ppx: Xarelto   GI ppx: Not indicated  Diet: DASH  Activity: ambulate as tolerated  Lines: Peripheral IVs  Code status: Full code  Dispo: acute

## 2019-02-15 NOTE — PROGRESS NOTE ADULT - SUBJECTIVE AND OBJECTIVE BOX
NEPHROLOGY FOLLOW UP NOTE    cr better  no resting dyspnea  HR still a bit tachy  c/o sudden left foot pain    PAST MEDICAL & SURGICAL HISTORY:  CKD (chronic kidney disease) stage 3, GFR 30-59 ml/min  Osteoarthritis  Sleep apnea  Afib: on xarelto  Hypothyroid: s/p thyroid ca surgery  High cholesterol  HTN (hypertension)  S/P rotator cuff surgery  H/O thyroidectomy    Allergies:  No Known Allergies    Home Medications Reviewed    SOCIAL HISTORY:  Denies ETOH,Smoking,   FAMILY HISTORY:  Family history of abdominal aortic aneurysm (AAA) (Father)  Family history of lung cancer (Mother)        REVIEW OF SYSTEMS:    All other review of systems is negative unless indicated above.    advance care planning and advance care directives reviewed and discussed    PHYSICAL EXAM:  NAD  awake and alert  moist mm  no jvd  b/l BS  rrr  soft, nt   no edema, left foot ace wrap  no [facility] Medications:   MEDICATIONS  (STANDING):  atorvastatin 20 milliGRAM(s) Oral at bedtime  chlorhexidine 4% Liquid 1 Application(s) Topical <User Schedule>  citalopram 20 milliGRAM(s) Oral daily  diltiazem    milliGRAM(s) Oral daily  docusate sodium 100 milliGRAM(s) Oral three times a day  metoprolol tartrate 100 milliGRAM(s) Oral every 12 hours  oxybutynin 5 milliGRAM(s) Oral two times a day  pantoprazole    Tablet 40 milliGRAM(s) Oral before breakfast  polyethylene glycol 3350 17 Gram(s) Oral at bedtime  predniSONE   Tablet 40 milliGRAM(s) Oral every 24 hours  rivaroxaban 15 milliGRAM(s) Oral every 24 hours  senna 2 Tablet(s) Oral at bedtime        VITALS:  T(F): 96.1 (02-15-19 @ 06:20), Max: 96.7 (02-14-19 @ 20:06)  HR: 122 (02-15-19 @ 06:20)  BP: 110/84 (02-15-19 @ 06:20)  RR: 18 (02-15-19 @ 06:20)  SpO2: 97% (02-14-19 @ 18:30)  Wt(kg): --    02-13 @ 07:01  -  02-14 @ 07:00  --------------------------------------------------------  IN: 410 mL / OUT: 900 mL / NET: -490 mL    02-14 @ 07:01  -  02-15 @ 07:00  --------------------------------------------------------  IN: 700 mL / OUT: 250 mL / NET: 450 mL    02-15 @ 07:01  -  02-15 @ 12:14  --------------------------------------------------------  IN: 400 mL / OUT: 200 mL / NET: 200 mL          LABS:  02-15    141  |  98  |  54<H>  ----------------------------<  124<H>  4.0   |  25  |  1.6<H>    Ca    9.0      15 Feb 2019 07:40  Phos  3.4     02-14  Mg     2.0     02-15    TPro  6.3  /  Alb  3.6  /  TBili  0.3  /  DBili      /  AST  10  /  ALT  28  /  AlkPhos  143<H>  02-15                          11.2   12.09 )-----------( 303      ( 15 Feb 2019 07:40 )             37.0       Urine Studies:        RADIOLOGY & ADDITIONAL STUDIES:

## 2019-02-15 NOTE — PROGRESS NOTE ADULT - SUBJECTIVE AND OBJECTIVE BOX
Patient was seen and examined. Spoke with RN. Chart reviewed.  family at bedside,    No events overnight.  Vital Signs Last 24 Hrs  T(F): 96.1 (15 Feb 2019 06:20), Max: 96.7 (14 Feb 2019 20:06)  HR: 122 (15 Feb 2019 06:20) (67 - 122)  BP: 110/84 (15 Feb 2019 06:20) (102/71 - 169/72)  SpO2: 97% (14 Feb 2019 18:30) (97% - 97%)  MEDICATIONS  (STANDING):  atorvastatin 20 milliGRAM(s) Oral at bedtime  chlorhexidine 4% Liquid 1 Application(s) Topical <User Schedule>  citalopram 20 milliGRAM(s) Oral daily  diltiazem    milliGRAM(s) Oral daily  docusate sodium 100 milliGRAM(s) Oral three times a day  metoprolol tartrate 100 milliGRAM(s) Oral every 12 hours  oxybutynin 5 milliGRAM(s) Oral two times a day  pantoprazole    Tablet 40 milliGRAM(s) Oral before breakfast  polyethylene glycol 3350 17 Gram(s) Oral at bedtime  predniSONE   Tablet 40 milliGRAM(s) Oral every 24 hours  rivaroxaban 15 milliGRAM(s) Oral every 24 hours  senna 2 Tablet(s) Oral at bedtime    MEDICATIONS  (PRN):  acetaminophen   Tablet .. 650 milliGRAM(s) Oral every 6 hours PRN Mild Pain (1 - 3)  guaiFENesin   Syrup  (Sugar-Free) 200 milliGRAM(s) Oral every 6 hours PRN Cough    Labs:                        11.2   12.09 )-----------( 303      ( 15 Feb 2019 07:40 )             37.0                         10.9   10.72 )-----------( 271      ( 14 Feb 2019 06:26 )             35.9     15 Feb 2019 07:40    141    |  98     |  54     ----------------------------<  124    4.0     |  25     |  1.6    14 Feb 2019 06:26    142    |  98     |  57     ----------------------------<  116    3.8     |  28     |  1.9      Ca    9.0        15 Feb 2019 07:40  Ca    8.3        14 Feb 2019 06:26  Phos  3.4       14 Feb 2019 06:26  Mg     2.0       15 Feb 2019 07:40  Mg     2.0       14 Feb 2019 06:26    TPro  6.3    /  Alb  3.6    /  TBili  0.3    /  DBili  x      /  AST  10     /  ALT  28     /  AlkPhos  143    15 Feb 2019 07:40  TPro  5.9    /  Alb  3.2    /  TBili  0.4    /  DBili  x      /  AST  9      /  ALT  34     /  AlkPhos  158    14 Feb 2019 06:26            Radiology:    General: comfortable, NAD  Neurology: A&Ox3, nonfocal  Head:  Normocephalic, atraumatic  ENT:  Mucosa moist, no ulcerations  Neck:  Supple, no JVD,   Resp: CTA B/L  CV: RRR, S1S2,   GI: Soft, NT, bowel sounds  MS: lle rom ankle, + peripheral pulses, FROM all 4 extremity

## 2019-02-15 NOTE — PROGRESS NOTE ADULT - ASSESSMENT
KOBI   - better    - prerenal  CKD 3   - baseline Cr low 1's  Right kidney on small size and with renal cyst on abd sono  RSV PNA  Afib with RVR  recent hip fracture  left foot gout    plan:    off lasix  cont hctz / triamterene  may resume lisinopril  short prednisone course  check uric acid level  check UA, UP/C  f/u bmp  off abx  renal dose xarelto  f/u cardio re optimal HR control  full code  will follow  spoke with hospitalist  dc planning

## 2019-02-15 NOTE — PROGRESS NOTE ADULT - ASSESSMENT
JEFFERY UGALDE 78y Female  MRN#: [id number]   CODE STATUS: Full code      SUBJECTIVE  Patient is a 78y old Female who presented with a chief complaint of dyspnea and palpitations for few days  Currently admitted to medicine with the primary diagnosis of acute on chronic CHF exacerbation 2/2 atrial fibrillation with RVR  Today is hospital day 6d, and this morning she is resting in bed comfortably and reports no overnight events. Patient reports improved breathing and resolved palpitation. Reports sudden onset of left medial ankle pain with swelling right after she woke up. Patient reports history of gout 4 weeks ago, responded well with prednisone treatment. Denies fever, chills, chest pain, abdominal pain, urinary symptoms, n/v/d/c.      OBJECTIVE  PAST MEDICAL & SURGICAL HISTORY  CKD (chronic kidney disease) stage 3, GFR 30-59 ml/min  Osteoarthritis  Sleep apnea  Afib: on xarelto  Hypothyroid: s/p thyroid ca surgery  High cholesterol  HTN (hypertension)  S/P rotator cuff surgery  H/O thyroidectomy    ALLERGIES:  No Known Allergies    MEDICATIONS:  STANDING MEDICATIONS  atorvastatin 20 milliGRAM(s) Oral at bedtime  chlorhexidine 4% Liquid 1 Application(s) Topical <User Schedule>  citalopram 20 milliGRAM(s) Oral daily  diltiazem    milliGRAM(s) Oral daily  docusate sodium 100 milliGRAM(s) Oral three times a day  metoprolol tartrate 100 milliGRAM(s) Oral every 12 hours  oxybutynin 5 milliGRAM(s) Oral two times a day  pantoprazole    Tablet 40 milliGRAM(s) Oral before breakfast  polyethylene glycol 3350 17 Gram(s) Oral at bedtime  predniSONE   Tablet 40 milliGRAM(s) Oral every 24 hours  rivaroxaban 15 milliGRAM(s) Oral every 24 hours  senna 2 Tablet(s) Oral at bedtime    PRN MEDICATIONS  acetaminophen   Tablet .. 650 milliGRAM(s) Oral every 6 hours PRN  guaiFENesin   Syrup  (Sugar-Free) 200 milliGRAM(s) Oral every 6 hours PRN      VITAL SIGNS: Last 24 Hours  T(C): 35.6 (15 Feb 2019 13:27), Max: 35.9 (14 Feb 2019 20:06)  T(F): 96 (15 Feb 2019 13:27), Max: 96.7 (14 Feb 2019 20:06)  HR: 54 (15 Feb 2019 13:27) (54 - 122)  BP: 98/65 (15 Feb 2019 13:27) (98/65 - 169/72)  BP(mean): --  RR: 20 (15 Feb 2019 13:27) (18 - 20)  SpO2: 94% (15 Feb 2019 08:00) (94% - 97%)    LABS:                        11.2   12.09 )-----------( 303      ( 15 Feb 2019 07:40 )             37.0     02-15    141  |  98  |  54<H>  ----------------------------<  124<H>  4.0   |  25  |  1.6<H>    Ca    9.0      15 Feb 2019 07:40  Phos  3.4     02-14  Mg     2.0     02-15    TPro  6.3  /  Alb  3.6  /  TBili  0.3  /  DBili  x   /  AST  10  /  ALT  28  /  AlkPhos  143<H>  02-15    RADIOLOGY:      PHYSICAL EXAM:    GENERAL: NAD, well-developed, AAOx3  HEENT:  Atraumatic, Normocephalic. Conjunctiva pink and cornea white, No JVD  PULMONARY: Improved bibasilar rhonchi bilaterally  CARDIOVASCULAR: Irregularly irregular rhythm; No murmurs  GASTROINTESTINAL: Soft, Nontender, Nondistended; Bowel sounds present  EXTREMITIES:  2+ Peripheral Pulses, No petal edema; left medial ankle tenderness with mild erythema, swelling and warmth, no active lesions, limited ROM due to pain  NEUROLOGY: non-focal  SKIN: No rashes or lesions      ADMISSION SUMMARY  Patient is a 78y old Female who presented with a chief complaint of dyspnea and palpitations for few days  Currently admitted to medicine with the primary diagnosis of acute on chronic CHF exacerbation 2/2 afib with RVR      ASSESSMENT & PLAN  79 yo female patient with PMHx of Paroxysmal AF on Xarelto, HTN, DL, hypothyroidism (history of thyroid cancer s/p resection), FELIX on CPAP, recent hip fracture discharged to [facility] recently, currently presenting because of dyspnea on exertion and palpitations over the last 3 days.    #) Acute on chronic CHF exacerbation with HFpEF 2/2 atrial flutter - improved  - CXR shows pulmonary venous congestion, with BNP 82934  - ECHO shows EF 50-55% with severely enlarged LA, dilatation of aorta  - Currently on metoprolol 100mg BID and Cardizem 240mg QD  - Start to wean off O2    #) Atrial fibrillation with RVR  - CHADsVASc 5, continue xarelto 15mg QD  - Rate not well controlled, HR varies from 100 - 120 despite on high dose BB and CCB  - Currently on metoprolol 100mg BID and Cardizem 240mg QD  - Case discussed to Dr. Ortiz, plan to do FAITH/CV on Monday 2/18, NPO at [facility] on Sunday, continue anticoagulation    #) KOBI on CKD stage III progressing to stage VI - improved  - Likely pre-renal 2/2 diuretic and ACE use  - Renal US no hydro, right 1.6cm renal cyst  - Stop triamtrene/HCTZ due to acute gout attack, start PO Lasix 40mg QD  - Nephrology recs appreciated: UA and UP/C, renal dose Xarelto hold lisinopril for another 24-48hr then resume    #) Acute gouty arthritis  - Likely 2/2 HCTZ use  - Stop triamtrene/HCTZ  - Start prednisone 40mg QD    #) HTN - stable  - Continue cardizem and metoprolol  - Stop triamtrene/HCTZ    #) Hypothyroidism   - TSH 0.11, will hold synthroid for now as it might precipitate persistent A flutter  - TBG 13, T3 65, Free T4 9.3    #) Transaminitis due to venous congestion - resolved  - RUQ US negative      DVT ppx: Xarelto   GI ppx: Not indicated  Diet: DASH  Activity: ambulate as tolerated  Lines: Peripheral IVs  Code status: Full code  Dispo: acute, plan for FAITH/CV on Monday with Dr. Ortiz

## 2019-02-16 LAB
ALBUMIN SERPL ELPH-MCNC: 3.5 G/DL — SIGNIFICANT CHANGE UP (ref 3.5–5.2)
ALP SERPL-CCNC: 127 U/L — HIGH (ref 30–115)
ALT FLD-CCNC: 24 U/L — SIGNIFICANT CHANGE UP (ref 0–41)
ANION GAP SERPL CALC-SCNC: 21 MMOL/L — HIGH (ref 7–14)
AST SERPL-CCNC: 11 U/L — SIGNIFICANT CHANGE UP (ref 0–41)
BASOPHILS # BLD AUTO: 0.04 K/UL — SIGNIFICANT CHANGE UP (ref 0–0.2)
BASOPHILS NFR BLD AUTO: 0.3 % — SIGNIFICANT CHANGE UP (ref 0–1)
BILIRUB SERPL-MCNC: 0.3 MG/DL — SIGNIFICANT CHANGE UP (ref 0.2–1.2)
BUN SERPL-MCNC: 50 MG/DL — HIGH (ref 10–20)
CALCIUM SERPL-MCNC: 8.7 MG/DL — SIGNIFICANT CHANGE UP (ref 8.5–10.1)
CHLORIDE SERPL-SCNC: 97 MMOL/L — LOW (ref 98–110)
CO2 SERPL-SCNC: 23 MMOL/L — SIGNIFICANT CHANGE UP (ref 17–32)
CREAT SERPL-MCNC: 1.6 MG/DL — HIGH (ref 0.7–1.5)
EOSINOPHIL # BLD AUTO: 0.09 K/UL — SIGNIFICANT CHANGE UP (ref 0–0.7)
EOSINOPHIL NFR BLD AUTO: 0.7 % — SIGNIFICANT CHANGE UP (ref 0–8)
GLUCOSE SERPL-MCNC: 123 MG/DL — HIGH (ref 70–99)
HCT VFR BLD CALC: 34.6 % — LOW (ref 37–47)
HGB BLD-MCNC: 10.6 G/DL — LOW (ref 12–16)
IMM GRANULOCYTES NFR BLD AUTO: 0.6 % — HIGH (ref 0.1–0.3)
INR BLD: 1.76 RATIO — HIGH (ref 0.65–1.3)
LYMPHOCYTES # BLD AUTO: 1.84 K/UL — SIGNIFICANT CHANGE UP (ref 1.2–3.4)
LYMPHOCYTES # BLD AUTO: 14.6 % — LOW (ref 20.5–51.1)
MAGNESIUM SERPL-MCNC: 2 MG/DL — SIGNIFICANT CHANGE UP (ref 1.8–2.4)
MCHC RBC-ENTMCNC: 24.4 PG — LOW (ref 27–31)
MCHC RBC-ENTMCNC: 30.6 G/DL — LOW (ref 32–37)
MCV RBC AUTO: 79.7 FL — LOW (ref 81–99)
MONOCYTES # BLD AUTO: 1 K/UL — HIGH (ref 0.1–0.6)
MONOCYTES NFR BLD AUTO: 7.9 % — SIGNIFICANT CHANGE UP (ref 1.7–9.3)
NEUTROPHILS # BLD AUTO: 9.57 K/UL — HIGH (ref 1.4–6.5)
NEUTROPHILS NFR BLD AUTO: 75.9 % — HIGH (ref 42.2–75.2)
NRBC # BLD: 0 /100 WBCS — SIGNIFICANT CHANGE UP (ref 0–0)
PLATELET # BLD AUTO: 312 K/UL — SIGNIFICANT CHANGE UP (ref 130–400)
POTASSIUM SERPL-MCNC: 3.9 MMOL/L — SIGNIFICANT CHANGE UP (ref 3.5–5)
POTASSIUM SERPL-SCNC: 3.9 MMOL/L — SIGNIFICANT CHANGE UP (ref 3.5–5)
PROT SERPL-MCNC: 6 G/DL — SIGNIFICANT CHANGE UP (ref 6–8)
PROTHROM AB SERPL-ACNC: 20.1 SEC — HIGH (ref 9.95–12.87)
RBC # BLD: 4.34 M/UL — SIGNIFICANT CHANGE UP (ref 4.2–5.4)
RBC # FLD: 16.3 % — HIGH (ref 11.5–14.5)
SODIUM SERPL-SCNC: 141 MMOL/L — SIGNIFICANT CHANGE UP (ref 135–146)
URATE SERPL-MCNC: 9.4 MG/DL — HIGH (ref 2.5–7)
WBC # BLD: 12.61 K/UL — HIGH (ref 4.8–10.8)
WBC # FLD AUTO: 12.61 K/UL — HIGH (ref 4.8–10.8)

## 2019-02-16 RX ADMIN — ATORVASTATIN CALCIUM 20 MILLIGRAM(S): 80 TABLET, FILM COATED ORAL at 21:48

## 2019-02-16 RX ADMIN — Medication 40 MILLIGRAM(S): at 06:09

## 2019-02-16 RX ADMIN — Medication 100 MILLIGRAM(S): at 06:09

## 2019-02-16 RX ADMIN — Medication 40 MILLIGRAM(S): at 12:06

## 2019-02-16 RX ADMIN — Medication 5 MILLIGRAM(S): at 17:00

## 2019-02-16 RX ADMIN — Medication 240 MILLIGRAM(S): at 06:09

## 2019-02-16 RX ADMIN — Medication 5 MILLIGRAM(S): at 06:10

## 2019-02-16 RX ADMIN — RIVAROXABAN 15 MILLIGRAM(S): KIT at 17:00

## 2019-02-16 RX ADMIN — CITALOPRAM 20 MILLIGRAM(S): 10 TABLET, FILM COATED ORAL at 12:06

## 2019-02-16 RX ADMIN — PANTOPRAZOLE SODIUM 40 MILLIGRAM(S): 20 TABLET, DELAYED RELEASE ORAL at 06:09

## 2019-02-16 RX ADMIN — Medication 100 MILLIGRAM(S): at 17:00

## 2019-02-16 NOTE — PROGRESS NOTE ADULT - SUBJECTIVE AND OBJECTIVE BOX
NEPHROLOGY FOLLOW UP NOTE    renal function stable  no sob  no fever  overall better  off isolation    PAST MEDICAL & SURGICAL HISTORY:  CKD (chronic kidney disease) stage 3, GFR 30-59 ml/min  Osteoarthritis  Sleep apnea  Afib: on xarelto  Hypothyroid: s/p thyroid ca surgery  High cholesterol  HTN (hypertension)  S/P rotator cuff surgery  H/O thyroidectomy    Allergies:  No Known Allergies    Home Medications Reviewed    SOCIAL HISTORY:  Denies ETOH,Smoking,   FAMILY HISTORY:  Family history of abdominal aortic aneurysm (AAA) (Father)  Family history of lung cancer (Mother)        REVIEW OF SYSTEMS:    All other review of systems is negative unless indicated above.      PHYSICAL EXAM:  NAD  awake and alert  moist mm  no jvd  b/l BS  rrr  soft, nt   no edema, left foot ace wrap  no [facility] Medications:   MEDICATIONS  (STANDING):  atorvastatin 20 milliGRAM(s) Oral at bedtime  chlorhexidine 4% Liquid 1 Application(s) Topical <User Schedule>  citalopram 20 milliGRAM(s) Oral daily  diltiazem    milliGRAM(s) Oral daily  docusate sodium 100 milliGRAM(s) Oral three times a day  furosemide    Tablet 40 milliGRAM(s) Oral daily  metoprolol tartrate 100 milliGRAM(s) Oral every 12 hours  oxybutynin 5 milliGRAM(s) Oral two times a day  pantoprazole    Tablet 40 milliGRAM(s) Oral before breakfast  polyethylene glycol 3350 17 Gram(s) Oral at bedtime  predniSONE   Tablet 40 milliGRAM(s) Oral every 24 hours  rivaroxaban 15 milliGRAM(s) Oral every 24 hours  senna 2 Tablet(s) Oral at bedtime        VITALS:  T(F): 97.6 (02-16-19 @ 13:20), Max: 97.6 (02-16-19 @ 13:20)  HR: 78 (02-16-19 @ 17:06)  BP: 128/86 (02-16-19 @ 17:06)  RR: 18 (02-16-19 @ 13:20)  SpO2: --  Wt(kg): --    02-14 @ 07:01  -  02-15 @ 07:00  --------------------------------------------------------  IN: 700 mL / OUT: 250 mL / NET: 450 mL    02-15 @ 07:01  -  02-16 @ 07:00  --------------------------------------------------------  IN: 400 mL / OUT: 200 mL / NET: 200 mL    02-16 @ 07:01  -  02-16 @ 18:52  --------------------------------------------------------  IN: 420 mL / OUT: 850 mL / NET: -430 mL          LABS:  02-16    141  |  97<L>  |  50<H>  ----------------------------<  123<H>  3.9   |  23  |  1.6<H>    Ca    8.7      16 Feb 2019 07:28  Mg     2.0     02-16    TPro  6.0  /  Alb  3.5  /  TBili  0.3  /  DBili      /  AST  11  /  ALT  24  /  AlkPhos  127<H>  02-16                          10.6   12.61 )-----------( 312      ( 16 Feb 2019 07:28 )             34.6       Urine Studies:        RADIOLOGY & ADDITIONAL STUDIES:

## 2019-02-16 NOTE — PROGRESS NOTE ADULT - SUBJECTIVE AND OBJECTIVE BOX
Patient was seen and examined. Spoke with RN. Chart reviewed.  feels better,    No events overnight.  Vital Signs Last 24 Hrs  T(F): 97.6 (16 Feb 2019 13:20), Max: 97.6 (16 Feb 2019 13:20)  HR: 73 (16 Feb 2019 13:20) (73 - 118)  BP: 102/64 (16 Feb 2019 13:20) (100/59 - 126/84)  SpO2: 97% (15 Feb 2019 17:51) (97% - 97%)  MEDICATIONS  (STANDING):  atorvastatin 20 milliGRAM(s) Oral at bedtime  chlorhexidine 4% Liquid 1 Application(s) Topical <User Schedule>  citalopram 20 milliGRAM(s) Oral daily  diltiazem    milliGRAM(s) Oral daily  docusate sodium 100 milliGRAM(s) Oral three times a day  furosemide    Tablet 40 milliGRAM(s) Oral daily  metoprolol tartrate 100 milliGRAM(s) Oral every 12 hours  oxybutynin 5 milliGRAM(s) Oral two times a day  pantoprazole    Tablet 40 milliGRAM(s) Oral before breakfast  polyethylene glycol 3350 17 Gram(s) Oral at bedtime  predniSONE   Tablet 40 milliGRAM(s) Oral every 24 hours  rivaroxaban 15 milliGRAM(s) Oral every 24 hours  senna 2 Tablet(s) Oral at bedtime    MEDICATIONS  (PRN):  acetaminophen   Tablet .. 650 milliGRAM(s) Oral every 6 hours PRN Mild Pain (1 - 3)  guaiFENesin   Syrup  (Sugar-Free) 200 milliGRAM(s) Oral every 6 hours PRN Cough    Labs:                        10.6   12.61 )-----------( 312      ( 16 Feb 2019 07:28 )             34.6                         11.2   12.09 )-----------( 303      ( 15 Feb 2019 07:40 )             37.0     16 Feb 2019 07:28    141    |  97     |  50     ----------------------------<  123    3.9     |  23     |  1.6    15 Feb 2019 07:40    141    |  98     |  54     ----------------------------<  124    4.0     |  25     |  1.6      Ca    8.7        16 Feb 2019 07:28  Ca    9.0        15 Feb 2019 07:40  Mg     2.0       16 Feb 2019 07:28  Mg     2.0       15 Feb 2019 07:40    TPro  6.0    /  Alb  3.5    /  TBili  0.3    /  DBili  x      /  AST  11     /  ALT  24     /  AlkPhos  127    16 Feb 2019 07:28  TPro  6.3    /  Alb  3.6    /  TBili  0.3    /  DBili  x      /  AST  10     /  ALT  28     /  AlkPhos  143    15 Feb 2019 07:40    PT/INR - ( 16 Feb 2019 07:28 )   PT: 20.10 sec;   INR: 1.76 ratio                 Radiology:    General: comfortable, NAD  Neurology: A&Ox3, nonfocal  Head:  Normocephalic, atraumatic  ENT:  Mucosa moist, no ulcerations  Neck:  Supple, no JVD,   Skin: no breakdown  Resp: CTA B/L  CV: RRR, S1S2,   GI: Soft, NT, bowel sounds  MS:left ankle improved,+ peripheral pulses, FROM all 4 extremity

## 2019-02-16 NOTE — PROGRESS NOTE ADULT - ASSESSMENT
ASSESSMENT & PLAN  79 yo female patient with PMHx of Paroxysmal AF on Xarelto, HTN, DL, hypothyroidism (history of thyroid cancer s/p resection), FELIX on CPAP, recent hip fracture discharged to [facility] recently, currently presenting because of dyspnea on exertion and palpitations over the last 3 days.    #) Acute on chronic CHF exacerbation with HFpEF 2/2 atrial flutter - improved  - CXR shows pulmonary venous congestion, with BNP 49574  - ECHO shows EF 50-55% with severely enlarged LA, dilatation of aorta  - Currently on metoprolol 100mg BID and Cardizem 240mg QD  - Start to wean off O2    #) Atrial fibrillation with RVR  - CHADsVASc 5, continue xarelto 15mg QD  - Rate not well controlled, HR varies from 100 - 120 despite on high dose BB and CCB  - Currently on metoprolol 100mg BID and Cardizem 240mg QD  - Case discussed to Dr. Ortzi, plan to do FAITH/CV on Monday 2/18, NPO at [facility] on Sunday, continue anticoagulation    #) KOBI on CKD stage III progressing to stage VI - improved  - Likely pre-renal 2/2 diuretic and ACE use  - Renal US no hydro, right 1.6cm renal cyst  - Stop triamtrene/HCTZ due to acute gout attack, start PO Lasix 40mg QD  - Nephrology recs appreciated: UA and UP/C, renal dose Xarelto hold lisinopril for another 24-48hr then resume    #) Acute gouty arthritis  - Likely 2/2 HCTZ use  - Stop triamtrene/HCTZ  - Start prednisone 40mg QD    #) HTN - stable  - Continue cardizem and metoprolol  - Stop triamtrene/HCTZ    #) Hypothyroidism   - TSH 0.11, will hold synthroid for now as it might precipitate persistent A flutter  - TBG 13, T3 65, Free T4 9.3    #) Transaminitis due to venous congestion - resolved  - RUQ US negative      DVT ppx: Xarelto   GI ppx: Not indicated  Diet: DASH  Activity: ambulate as tolerated  Lines: Peripheral IVs  Code status: Full code  Dispo: acute, plan for FAITH/CV on Monday with Dr. Ortiz

## 2019-02-16 NOTE — PROGRESS NOTE ADULT - ASSESSMENT
KOBI   - better    - prerenal  CKD 3   - baseline Cr low 1's  Right kidney on small size and with renal cyst on abd sono  RSV PNA  Afib with RVR  recent hip fracture  left foot gout    plan:    off lasix  cont hctz / triamterene  dc norvasc  start losartan 25mg po qd  short prednisone course  check uric acid level  check UA, UP/C  f/u bmp  off abx  renal dose xarelto  dc planning KOBI   - better    - prerenal  CKD 3   - baseline Cr low 1's  Right kidney on small size and with renal cyst on abd sono  RSV PNA  Afib with RVR  recent hip fracture  left foot gout    plan:    agree with lasix 40mg po qd  off hctz / triamterene  cont cardizem cd  short prednisone course  off abx  renal dose xarelto  dc planning KOBI   - better    - prerenal  CKD 3   - baseline Cr low 1's  Right kidney on small size and with renal cyst on abd sono  RSV PNA  Afib with RVR  recent hip fracture  left foot gout    plan:    ok with lasix 40mg po qd, but monitor cr in next few weeks, pt may not tolerate  off hctz / triamterene  cont cardizem cd  short prednisone course  off abx  renal dose xarelto  dc planning

## 2019-02-17 LAB
ALBUMIN SERPL ELPH-MCNC: 3.5 G/DL — SIGNIFICANT CHANGE UP (ref 3.5–5.2)
ALP SERPL-CCNC: 117 U/L — HIGH (ref 30–115)
ALT FLD-CCNC: 24 U/L — SIGNIFICANT CHANGE UP (ref 0–41)
ANION GAP SERPL CALC-SCNC: 15 MMOL/L — HIGH (ref 7–14)
AST SERPL-CCNC: 11 U/L — SIGNIFICANT CHANGE UP (ref 0–41)
BASOPHILS # BLD AUTO: 0.02 K/UL — SIGNIFICANT CHANGE UP (ref 0–0.2)
BASOPHILS NFR BLD AUTO: 0.2 % — SIGNIFICANT CHANGE UP (ref 0–1)
BILIRUB SERPL-MCNC: 0.3 MG/DL — SIGNIFICANT CHANGE UP (ref 0.2–1.2)
BUN SERPL-MCNC: 54 MG/DL — HIGH (ref 10–20)
CALCIUM SERPL-MCNC: 9.1 MG/DL — SIGNIFICANT CHANGE UP (ref 8.5–10.1)
CHLORIDE SERPL-SCNC: 98 MMOL/L — SIGNIFICANT CHANGE UP (ref 98–110)
CO2 SERPL-SCNC: 27 MMOL/L — SIGNIFICANT CHANGE UP (ref 17–32)
CREAT SERPL-MCNC: 1.6 MG/DL — HIGH (ref 0.7–1.5)
EOSINOPHIL # BLD AUTO: 0.04 K/UL — SIGNIFICANT CHANGE UP (ref 0–0.7)
EOSINOPHIL NFR BLD AUTO: 0.3 % — SIGNIFICANT CHANGE UP (ref 0–8)
GLUCOSE SERPL-MCNC: 123 MG/DL — HIGH (ref 70–99)
HCT VFR BLD CALC: 34.8 % — LOW (ref 37–47)
HGB BLD-MCNC: 10.6 G/DL — LOW (ref 12–16)
IMM GRANULOCYTES NFR BLD AUTO: 0.5 % — HIGH (ref 0.1–0.3)
LYMPHOCYTES # BLD AUTO: 1.71 K/UL — SIGNIFICANT CHANGE UP (ref 1.2–3.4)
LYMPHOCYTES # BLD AUTO: 13.3 % — LOW (ref 20.5–51.1)
MCHC RBC-ENTMCNC: 24.7 PG — LOW (ref 27–31)
MCHC RBC-ENTMCNC: 30.5 G/DL — LOW (ref 32–37)
MCV RBC AUTO: 81.1 FL — SIGNIFICANT CHANGE UP (ref 81–99)
MONOCYTES # BLD AUTO: 0.89 K/UL — HIGH (ref 0.1–0.6)
MONOCYTES NFR BLD AUTO: 6.9 % — SIGNIFICANT CHANGE UP (ref 1.7–9.3)
NEUTROPHILS # BLD AUTO: 10.16 K/UL — HIGH (ref 1.4–6.5)
NEUTROPHILS NFR BLD AUTO: 78.8 % — HIGH (ref 42.2–75.2)
NRBC # BLD: 0 /100 WBCS — SIGNIFICANT CHANGE UP (ref 0–0)
PLATELET # BLD AUTO: 293 K/UL — SIGNIFICANT CHANGE UP (ref 130–400)
POTASSIUM SERPL-MCNC: 3.9 MMOL/L — SIGNIFICANT CHANGE UP (ref 3.5–5)
POTASSIUM SERPL-SCNC: 3.9 MMOL/L — SIGNIFICANT CHANGE UP (ref 3.5–5)
PROT SERPL-MCNC: 6.1 G/DL — SIGNIFICANT CHANGE UP (ref 6–8)
RBC # BLD: 4.29 M/UL — SIGNIFICANT CHANGE UP (ref 4.2–5.4)
RBC # FLD: 16.5 % — HIGH (ref 11.5–14.5)
SODIUM SERPL-SCNC: 140 MMOL/L — SIGNIFICANT CHANGE UP (ref 135–146)
WBC # BLD: 12.88 K/UL — HIGH (ref 4.8–10.8)
WBC # FLD AUTO: 12.88 K/UL — HIGH (ref 4.8–10.8)

## 2019-02-17 RX ADMIN — PANTOPRAZOLE SODIUM 40 MILLIGRAM(S): 20 TABLET, DELAYED RELEASE ORAL at 06:26

## 2019-02-17 RX ADMIN — Medication 100 MILLIGRAM(S): at 13:56

## 2019-02-17 RX ADMIN — ATORVASTATIN CALCIUM 20 MILLIGRAM(S): 80 TABLET, FILM COATED ORAL at 21:33

## 2019-02-17 RX ADMIN — Medication 5 MILLIGRAM(S): at 17:14

## 2019-02-17 RX ADMIN — RIVAROXABAN 15 MILLIGRAM(S): KIT at 17:15

## 2019-02-17 RX ADMIN — Medication 40 MILLIGRAM(S): at 11:39

## 2019-02-17 RX ADMIN — SENNA PLUS 2 TABLET(S): 8.6 TABLET ORAL at 21:33

## 2019-02-17 RX ADMIN — Medication 240 MILLIGRAM(S): at 06:26

## 2019-02-17 RX ADMIN — CITALOPRAM 20 MILLIGRAM(S): 10 TABLET, FILM COATED ORAL at 11:39

## 2019-02-17 RX ADMIN — Medication 100 MILLIGRAM(S): at 17:15

## 2019-02-17 RX ADMIN — Medication 40 MILLIGRAM(S): at 06:26

## 2019-02-17 RX ADMIN — Medication 100 MILLIGRAM(S): at 21:33

## 2019-02-17 RX ADMIN — Medication 5 MILLIGRAM(S): at 06:26

## 2019-02-17 RX ADMIN — Medication 100 MILLIGRAM(S): at 06:26

## 2019-02-17 NOTE — PROGRESS NOTE ADULT - ASSESSMENT
KOBI   - better    - prerenal  CKD 3   - baseline Cr low 1's  Right kidney on small size and with renal cyst on abd sono  RSV PNA  Afib with RVR  recent hip fracture  left foot gout    plan:    cont lasix 40mg po qd  off hctz / triamterene  off high dose benazapril  cont cardizem cd  short prednisone course  off abx  renal dose xarelto  await FAITH / CV  f/u bmp  ckd education  dc planning

## 2019-02-17 NOTE — DIETITIAN INITIAL EVALUATION ADULT. - OTHER INFO
Reason for assessment: LOS. Pt admitted with chief complaint of dyspnea and palpitations for few days; Being monitored for: acute on chronic CHF exacerbation with HFpEF 2/2 a-flutter improved; A-fib with RVRl KOBI on CKD III, progressing to stage IV-improved; acute gouty arthritis, HTN stable, hypothyroidism, transaminitis due to venous congestion resolved.

## 2019-02-17 NOTE — PROGRESS NOTE ADULT - SUBJECTIVE AND OBJECTIVE BOX
Patient was seen and examined. Spoke with RN. Chart reviewed.  No events overnight.  Vital Signs Last 24 Hrs  T(F): 97.8 (17 Feb 2019 05:36), Max: 97.8 (17 Feb 2019 05:36)  HR: 71 (17 Feb 2019 05:36) (71 - 102)  BP: 139/68 (17 Feb 2019 05:36) (102/64 - 139/68)  SpO2: --  MEDICATIONS  (STANDING):  atorvastatin 20 milliGRAM(s) Oral at bedtime  chlorhexidine 4% Liquid 1 Application(s) Topical <User Schedule>  citalopram 20 milliGRAM(s) Oral daily  diltiazem    milliGRAM(s) Oral daily  docusate sodium 100 milliGRAM(s) Oral three times a day  furosemide    Tablet 40 milliGRAM(s) Oral daily  metoprolol tartrate 100 milliGRAM(s) Oral every 12 hours  oxybutynin 5 milliGRAM(s) Oral two times a day  pantoprazole    Tablet 40 milliGRAM(s) Oral before breakfast  polyethylene glycol 3350 17 Gram(s) Oral at bedtime  predniSONE   Tablet 40 milliGRAM(s) Oral every 24 hours  rivaroxaban 15 milliGRAM(s) Oral every 24 hours  senna 2 Tablet(s) Oral at bedtime    MEDICATIONS  (PRN):  acetaminophen   Tablet .. 650 milliGRAM(s) Oral every 6 hours PRN Mild Pain (1 - 3)  guaiFENesin   Syrup  (Sugar-Free) 200 milliGRAM(s) Oral every 6 hours PRN Cough    Labs:                        10.6   12.88 )-----------( 293      ( 17 Feb 2019 07:27 )             34.8                         10.6   12.61 )-----------( 312      ( 16 Feb 2019 07:28 )             34.6     17 Feb 2019 07:27    140    |  98     |  54     ----------------------------<  123    3.9     |  27     |  1.6    16 Feb 2019 07:28    141    |  97     |  50     ----------------------------<  123    3.9     |  23     |  1.6      Ca    9.1        17 Feb 2019 07:27  Ca    8.7        16 Feb 2019 07:28  Mg     2.0       16 Feb 2019 07:28    TPro  6.1    /  Alb  3.5    /  TBili  0.3    /  DBili  x      /  AST  11     /  ALT  24     /  AlkPhos  117    17 Feb 2019 07:27  TPro  6.0    /  Alb  3.5    /  TBili  0.3    /  DBili  x      /  AST  11     /  ALT  24     /  AlkPhos  127    16 Feb 2019 07:28    PT/INR - ( 16 Feb 2019 07:28 )   PT: 20.10 sec;   INR: 1.76 ratio               General: comfortable, NAD  Neurology: A&Ox3, nonfocal  Head:  Normocephalic, atraumatic  ENT:  Mucosa moist, no ulcerations  Neck:  Supple, no JVD,   Skin: no breakdowns (as per RN)  Resp: CTA B/L  CV: RRR, S1S2,   GI: Soft, NT, bowel sounds  MS: No edema, + peripheral pulses, FROM all 4 extremity      A/P:  79 yo female patient with PMHx of Paroxysmal AF on Xarelto, HTN, DL, hypothyroidism (history of thyroid cancer s/p resection), FELIX on CPAP, recent hip fracture discharged to [facility] recently, currently presenting because of dyspnea on exertion and palpitations over the last 3 days.    #) Acute on chronic CHF exacerbation with HFpEF 2/2 atrial flutter - improved  - CXR shows pulmonary venous congestion, with BNP 86419  - ECHO shows EF 50-55% with severely enlarged LA, dilatation of aorta  - Currently on metoprolol 100mg BID and Cardizem 240mg QD  - Start to wean off O2    #) Atrial fibrillation with RVR  - CHADsVASc 5, continue xarelto 15mg QD  - Rate not well controlled, HR varies from 100 - 120 despite on high dose BB and CCB  - Currently on metoprolol 100mg BID and Cardizem 240mg QD  - Dr. Ortiz plans to do FAITH/CV on Monday 2/18, NPO at [facility] on Sunday, continue anticoagulation    #) KOBI on CKD stage III progressing to stage VI - improved  - Likely pre-renal 2/2 diuretic and ACE use  - Renal US no hydro, right 1.6cm renal cyst  - Stop triamtrene/HCTZ due to acute gout attack, start PO Lasix 40mg QD  - Nephrology recs appreciated: UA and UP/C, renal dose Xarelto hold lisinopril for another 24-48hr then resume    #) Acute gouty arthritis  - Likely 2/2 HCTZ use  - Stop triamtrene/HCTZ  - Start prednisone 40mg QD    #) HTN - stable  - Continue cardizem and metoprolol  - Stop triamtrene/HCTZ    #) Hypothyroidism   - TSH 0.11, will hold synthroid for now as it might precipitate persistent A flutter  - TBG 13, T3 65, Free T4 9.3    #) Transaminitis due to venous congestion - resolved  - RUQ US negative    DVT prophylaxis  Decubitus prevention- all measures as per RN protocol  Please call or text me with any questions or updates

## 2019-02-17 NOTE — PROGRESS NOTE ADULT - SUBJECTIVE AND OBJECTIVE BOX
NEPHROLOGY FOLLOW UP NOTE    renal function stable  no sob  no fever  tolerating po  on po lasix    PAST MEDICAL & SURGICAL HISTORY:  CKD (chronic kidney disease) stage 3, GFR 30-59 ml/min  Osteoarthritis  Sleep apnea  Afib: on xarelto  Hypothyroid: s/p thyroid ca surgery  High cholesterol  HTN (hypertension)  S/P rotator cuff surgery  H/O thyroidectomy    Allergies:  No Known Allergies    Home Medications Reviewed    SOCIAL HISTORY:  Denies ETOH,Smoking,   FAMILY HISTORY:  Family history of abdominal aortic aneurysm (AAA) (Father)  Family history of lung cancer (Mother)        REVIEW OF SYSTEMS:    All other review of systems is negative unless indicated above.      PHYSICAL EXAM:  NAD  awake and alert  moist mm  no jvd  b/l BS  rrr  soft, nt   no edema, left foot ace wrap  no [facility] Medications:   MEDICATIONS  (STANDING):  atorvastatin 20 milliGRAM(s) Oral at bedtime  chlorhexidine 4% Liquid 1 Application(s) Topical <User Schedule>  citalopram 20 milliGRAM(s) Oral daily  diltiazem    milliGRAM(s) Oral daily  docusate sodium 100 milliGRAM(s) Oral three times a day  furosemide    Tablet 40 milliGRAM(s) Oral daily  metoprolol tartrate 100 milliGRAM(s) Oral every 12 hours  oxybutynin 5 milliGRAM(s) Oral two times a day  pantoprazole    Tablet 40 milliGRAM(s) Oral before breakfast  polyethylene glycol 3350 17 Gram(s) Oral at bedtime  predniSONE   Tablet 40 milliGRAM(s) Oral every 24 hours  rivaroxaban 15 milliGRAM(s) Oral every 24 hours  senna 2 Tablet(s) Oral at bedtime        VITALS:  T(F): 97.6 (02-17-19 @ 13:09), Max: 97.8 (02-17-19 @ 05:36)  HR: 79 (02-17-19 @ 13:09)  BP: 100/63 (02-17-19 @ 13:09)  RR: 18 (02-17-19 @ 13:09)  SpO2: --  Wt(kg): --    02-15 @ 07:01  -  02-16 @ 07:00  --------------------------------------------------------  IN: 400 mL / OUT: 200 mL / NET: 200 mL    02-16 @ 07:01  -  02-17 @ 07:00  --------------------------------------------------------  IN: 640 mL / OUT: 1250 mL / NET: -610 mL    02-17 @ 07:01  -  02-17 @ 14:53  --------------------------------------------------------  IN: 440 mL / OUT: 304 mL / NET: 136 mL        Weight (kg): 88.1 (02-17 @ 05:36)    LABS:  02-17    140  |  98  |  54<H>  ----------------------------<  123<H>  3.9   |  27  |  1.6<H>    Ca    9.1      17 Feb 2019 07:27  Mg     2.0     02-16    TPro  6.1  /  Alb  3.5  /  TBili  0.3  /  DBili      /  AST  11  /  ALT  24  /  AlkPhos  117<H>  02-17                          10.6   12.88 )-----------( 293      ( 17 Feb 2019 07:27 )             34.8       Urine Studies:        RADIOLOGY & ADDITIONAL STUDIES:

## 2019-02-17 NOTE — DIETITIAN INITIAL EVALUATION ADULT. - FACTORS AFF FOOD INTAKE
Patient denies chewing/swallowing problems and tolerating current diet, denies GI issues- reports last BM 2 days ago./none

## 2019-02-17 NOTE — DIETITIAN INITIAL EVALUATION ADULT. - ORAL INTAKE PTA
Patient reports good appetite PTA and current good appetite and eating most of trays. NKFA, no preferences reported. Reports taking vit D, no other supplements./good

## 2019-02-17 NOTE — DIETITIAN INITIAL EVALUATION ADULT. - PERTINENT MEDS FT
Xarelto, lasix, diltiazem, prednisone, robitussin, metoprolol, miralax, colace, senna, tylenol, atorvastatin, celexa, ditropan, protonix

## 2019-02-17 NOTE — DIETITIAN INITIAL EVALUATION ADULT. - ENERGY NEEDS
Calories: MSJ x 1.0-1.1 due to obesity= 2931-7639 kcal/day  Protein: 1.0-1.2 g/kg IBW of 50 kg due to obesity= 50-60 g/day, pt with CKD III, monitor need for protein restriction  Fluid: 1200 mL fluid restriction

## 2019-02-17 NOTE — DIETITIAN INITIAL EVALUATION ADULT. - PHYSICAL APPEARANCE
obese/BMI 35, no apparent signs of fat/muscle wasting. 1+ edema documented to b/l ankles on 2/14.  Alert and oriented. Skin intact. Pt reports stable wt.

## 2019-02-17 NOTE — PROGRESS NOTE ADULT - ASSESSMENT
JEFFERY UGALDE 78y Female  MRN#: [id number]   CODE STATUS: Full code      SUBJECTIVE  Patient is a 78y old Female who presented with a chief complaint of dyspnea and palpitations for few days  Currently admitted to medicine with the primary diagnosis of acute on chronic CHF exacerbation 2/2 atrial fibrillation with RVR  Today is hospital day 8d, and this morning she is resting in bed comfortably and reports no overnight events. Denies fever, chills, chest pain, abdominal pain, urinary symptoms, n/v/d/c.    OBJECTIVE  PAST MEDICAL & SURGICAL HISTORY  CKD (chronic kidney disease) stage 3, GFR 30-59 ml/min  Osteoarthritis  Sleep apnea  Afib: on xarelto  Hypothyroid: s/p thyroid ca surgery  High cholesterol  HTN (hypertension)  S/P rotator cuff surgery  H/O thyroidectomy    ALLERGIES:  No Known Allergies    MEDICATIONS:  STANDING MEDICATIONS  atorvastatin 20 milliGRAM(s) Oral at bedtime  chlorhexidine 4% Liquid 1 Application(s) Topical <User Schedule>  citalopram 20 milliGRAM(s) Oral daily  diltiazem    milliGRAM(s) Oral daily  docusate sodium 100 milliGRAM(s) Oral three times a day  furosemide    Tablet 40 milliGRAM(s) Oral daily  metoprolol tartrate 100 milliGRAM(s) Oral every 12 hours  oxybutynin 5 milliGRAM(s) Oral two times a day  pantoprazole    Tablet 40 milliGRAM(s) Oral before breakfast  polyethylene glycol 3350 17 Gram(s) Oral at bedtime  predniSONE   Tablet 40 milliGRAM(s) Oral every 24 hours  rivaroxaban 15 milliGRAM(s) Oral every 24 hours  senna 2 Tablet(s) Oral at bedtime    PRN MEDICATIONS  acetaminophen   Tablet .. 650 milliGRAM(s) Oral every 6 hours PRN  guaiFENesin   Syrup  (Sugar-Free) 200 milliGRAM(s) Oral every 6 hours PRN      VITAL SIGNS: Last 24 Hours  T(C): 36.4 (17 Feb 2019 13:09), Max: 36.6 (17 Feb 2019 05:36)  T(F): 97.6 (17 Feb 2019 13:09), Max: 97.8 (17 Feb 2019 05:36)  HR: 61 (17 Feb 2019 17:17) (61 - 79)  BP: 131/82 (17 Feb 2019 17:17) (100/63 - 139/68)  BP(mean): --  RR: 18 (17 Feb 2019 13:09) (18 - 18)  SpO2: --    LABS:                        10.6   12.88 )-----------( 293      ( 17 Feb 2019 07:27 )             34.8     02-17    140  |  98  |  54<H>  ----------------------------<  123<H>  3.9   |  27  |  1.6<H>    Ca    9.1      17 Feb 2019 07:27  Mg     2.0     02-16    TPro  6.1  /  Alb  3.5  /  TBili  0.3  /  DBili  x   /  AST  11  /  ALT  24  /  AlkPhos  117<H>  02-17    PT/INR - ( 16 Feb 2019 07:28 )   PT: 20.10 sec;   INR: 1.76 ratio      RADIOLOGY:      PHYSICAL EXAM:  GENERAL: NAD, well-developed, AAOx3  HEENT:  Atraumatic, Normocephalic. Conjunctiva pink and cornea white, No JVD  PULMONARY: Improved bibasilar rhonchi bilaterally  CARDIOVASCULAR: Irregularly irregular rhythm; No murmurs  GASTROINTESTINAL: Soft, Nontender, Nondistended; Bowel sounds present  EXTREMITIES:  2+ Peripheral Pulses, No petal edema; left medial ankle tenderness with mild erythema, swelling and warmth, no active lesions, limited ROM due to pain  NEUROLOGY: non-focal  SKIN: No rashes or lesions      ADMISSION SUMMARY  Patient is a 78y old Female who presented with a chief complaint of dyspnea and palpitations for few days  Currently admitted to medicine with the primary diagnosis of acute on chronic CHF exacerbation 2/2 afib with RVR      ASSESSMENT & PLAN  79 yo female patient with PMHx of Paroxysmal AF on Xarelto, HTN, DL, hypothyroidism (history of thyroid cancer s/p resection), FELIX on CPAP, recent hip fracture discharged to [facility] recently, currently presenting because of dyspnea on exertion and palpitations over the last 3 days.    #) Acute on chronic CHF exacerbation with HFpEF 2/2 atrial flutter - resolved  - CXR shows pulmonary venous congestion, with BNP 84134  - ECHO shows EF 50-55% with severely enlarged LA, dilatation of aorta  - Currently on metoprolol 100mg BID and Cardizem 240mg QD    #) Atrial fibrillation with RVR  - CHADsVASc 5, continue xarelto 15mg QD  - Rate not well controlled, HR varies from 100 - 120 despite on high dose BB and CCB  - Currently on metoprolol 100mg BID and Cardizem 240mg QD  - Case discussed to Dr. Ortiz, plan to do [facility]/CV tomorrow 2/18, NPO at [facility] on Sunday, continue anticoagulation    #) KOBI on CKD stage III progressing to stage VI - stable  - Likely pre-renal 2/2 diuretic and ACE use  - Renal US no hydro, right 1.6cm renal cyst  - Stop triamtrene/HCTZ due to acute gout attack, continue PO Lasix 40mg QD  - Hold ACE-i for now  - Nephrology recs appreciated    #) Acute gouty arthritis - improved  - Likely 2/2 HCTZ use; elevated uric acid  - Stop triamtrene/HCTZ  - Start prednisone 40mg QD for a total of 5 days    #) HTN - stable  - Continue cardizem and metoprolol    #) Hypothyroidism   - TSH 0.11, will hold synthroid for now as it might precipitate persistent A flutter  - TBG 13, T3 65, Free T4 9.3  - Consider resume home dose Synthroid      DVT ppx: Xarelto   GI ppx: Not indicated  Diet: DASH  Activity: ambulate as tolerated  Lines: Peripheral IVs  Code status: Full code  Dispo: d/c planning to [facility]; plan for [facility]/CV tomorrow with Dr. Ortiz JEFFERY UGALDE 78y Female  MRN#: [id number]   CODE STATUS: Full code      SUBJECTIVE  Patient is a 78y old Female who presented with a chief complaint of dyspnea and palpitations for few days  Currently admitted to medicine with the primary diagnosis of acute on chronic CHF exacerbation 2/2 atrial fibrillation with RVR  Today is hospital day 8d, and this morning she is resting in bed comfortably and reports no overnight events. Reports significant improved left ankle pain with prednisone. Patient is aware that she is going for cardioversion tomorrow. Denies fever, chills, chest pain, abdominal pain, urinary symptoms, n/v/d/c.    OBJECTIVE  PAST MEDICAL & SURGICAL HISTORY  CKD (chronic kidney disease) stage 3, GFR 30-59 ml/min  Osteoarthritis  Sleep apnea  Afib: on xarelto  Hypothyroid: s/p thyroid ca surgery  High cholesterol  HTN (hypertension)  S/P rotator cuff surgery  H/O thyroidectomy    ALLERGIES:  No Known Allergies    MEDICATIONS:  STANDING MEDICATIONS  atorvastatin 20 milliGRAM(s) Oral at bedtime  chlorhexidine 4% Liquid 1 Application(s) Topical <User Schedule>  citalopram 20 milliGRAM(s) Oral daily  diltiazem    milliGRAM(s) Oral daily  docusate sodium 100 milliGRAM(s) Oral three times a day  furosemide    Tablet 40 milliGRAM(s) Oral daily  metoprolol tartrate 100 milliGRAM(s) Oral every 12 hours  oxybutynin 5 milliGRAM(s) Oral two times a day  pantoprazole    Tablet 40 milliGRAM(s) Oral before breakfast  polyethylene glycol 3350 17 Gram(s) Oral at bedtime  predniSONE   Tablet 40 milliGRAM(s) Oral every 24 hours  rivaroxaban 15 milliGRAM(s) Oral every 24 hours  senna 2 Tablet(s) Oral at bedtime    PRN MEDICATIONS  acetaminophen   Tablet .. 650 milliGRAM(s) Oral every 6 hours PRN  guaiFENesin   Syrup  (Sugar-Free) 200 milliGRAM(s) Oral every 6 hours PRN      VITAL SIGNS: Last 24 Hours  T(C): 36.4 (17 Feb 2019 13:09), Max: 36.6 (17 Feb 2019 05:36)  T(F): 97.6 (17 Feb 2019 13:09), Max: 97.8 (17 Feb 2019 05:36)  HR: 61 (17 Feb 2019 17:17) (61 - 79)  BP: 131/82 (17 Feb 2019 17:17) (100/63 - 139/68)  BP(mean): --  RR: 18 (17 Feb 2019 13:09) (18 - 18)  SpO2: --    LABS:                        10.6   12.88 )-----------( 293      ( 17 Feb 2019 07:27 )             34.8     02-17    140  |  98  |  54<H>  ----------------------------<  123<H>  3.9   |  27  |  1.6<H>    Ca    9.1      17 Feb 2019 07:27  Mg     2.0     02-16    TPro  6.1  /  Alb  3.5  /  TBili  0.3  /  DBili  x   /  AST  11  /  ALT  24  /  AlkPhos  117<H>  02-17    PT/INR - ( 16 Feb 2019 07:28 )   PT: 20.10 sec;   INR: 1.76 ratio      RADIOLOGY:      PHYSICAL EXAM:  GENERAL: NAD, well-developed, AAOx3  HEENT:  Atraumatic, Normocephalic. Conjunctiva pink and cornea white, No JVD  PULMONARY: Improved bibasilar rhonchi bilaterally  CARDIOVASCULAR: Irregularly irregular rhythm with tachycardia; No murmurs  GASTROINTESTINAL: Soft, Nontender, Nondistended; Bowel sounds present  EXTREMITIES:  2+ Peripheral Pulses, No petal edema; significant left medial ankle tenderness; no erythema, swelling and warmth, no active lesions  NEUROLOGY: non-focal  SKIN: No rashes or lesions      ADMISSION SUMMARY  Patient is a 78y old Female who presented with a chief complaint of dyspnea and palpitations for few days  Currently admitted to medicine with the primary diagnosis of acute on chronic CHF exacerbation 2/2 afib with RVR      ASSESSMENT & PLAN  79 yo female patient with PMHx of Paroxysmal AF on Xarelto, HTN, DL, hypothyroidism (history of thyroid cancer s/p resection), FELIX on CPAP, recent hip fracture discharged to [facility] recently, currently presenting because of dyspnea on exertion and palpitations over the last 3 days.    #) Acute on chronic CHF exacerbation with HFpEF 2/2 atrial flutter - resolved  - CXR shows pulmonary venous congestion, with BNP 28094  - ECHO shows EF 50-55% with severely enlarged LA, dilatation of aorta  - Currently on metoprolol 100mg BID and Cardizem 240mg QD    #) Atrial fibrillation with RVR  - CHADsVASc 5, continue xarelto 15mg QD  - Rate not well controlled, HR varies from 100 - 120 despite on high dose BB and CCB  - Currently on metoprolol 100mg BID and Cardizem 240mg QD  - Case discussed to Dr. Ortiz, plan to do [facility]/CV tomorrow 2/18, NPO at [facility] on Sunday, continue anticoagulation  - If needed can try low dose IV metoprolol push if BP permits for symptomatic RVR    #) KOBI on CKD stage III progressing to stage VI - stable  - Likely pre-renal 2/2 diuretic and ACE use  - Renal US no hydro, right 1.6cm renal cyst  - Stop triamtrene/HCTZ due to acute gout attack, continue PO Lasix 40mg QD  - Hold ACE-i for now  - Nephrology recs appreciated    #) Acute gouty arthritis - improved  - Likely 2/2 HCTZ use; elevated uric acid  - Stop triamtrene/HCTZ  - Start prednisone 40mg QD for a total of 5 days    #) HTN - stable  - Continue cardizem and metoprolol    #) Hypothyroidism   - TSH 0.11, will hold synthroid for now as it might precipitate persistent A flutter  - TBG 13, T3 65, Free T4 9.3  - Consider resume home dose Synthroid      DVT ppx: Xarelto   GI ppx: Not indicated  Diet: DASH  Activity: ambulate as tolerated  Lines: Peripheral IVs  Code status: Full code  Dispo: d/c planning to [facility]; plan for [facility]/CV tomorrow with Dr. Ortiz

## 2019-02-18 LAB
ALBUMIN SERPL ELPH-MCNC: 3.3 G/DL — LOW (ref 3.5–5.2)
ALP SERPL-CCNC: 118 U/L — HIGH (ref 30–115)
ALT FLD-CCNC: 24 U/L — SIGNIFICANT CHANGE UP (ref 0–41)
ANION GAP SERPL CALC-SCNC: 15 MMOL/L — HIGH (ref 7–14)
AST SERPL-CCNC: 12 U/L — SIGNIFICANT CHANGE UP (ref 0–41)
BASOPHILS # BLD AUTO: 0.02 K/UL — SIGNIFICANT CHANGE UP (ref 0–0.2)
BASOPHILS NFR BLD AUTO: 0.2 % — SIGNIFICANT CHANGE UP (ref 0–1)
BILIRUB SERPL-MCNC: <0.2 MG/DL — SIGNIFICANT CHANGE UP (ref 0.2–1.2)
BUN SERPL-MCNC: 57 MG/DL — HIGH (ref 10–20)
CALCIUM SERPL-MCNC: 9.1 MG/DL — SIGNIFICANT CHANGE UP (ref 8.5–10.1)
CHLORIDE SERPL-SCNC: 102 MMOL/L — SIGNIFICANT CHANGE UP (ref 98–110)
CO2 SERPL-SCNC: 25 MMOL/L — SIGNIFICANT CHANGE UP (ref 17–32)
CREAT SERPL-MCNC: 1.5 MG/DL — SIGNIFICANT CHANGE UP (ref 0.7–1.5)
EOSINOPHIL # BLD AUTO: 0.02 K/UL — SIGNIFICANT CHANGE UP (ref 0–0.7)
EOSINOPHIL NFR BLD AUTO: 0.2 % — SIGNIFICANT CHANGE UP (ref 0–8)
GLUCOSE SERPL-MCNC: 136 MG/DL — HIGH (ref 70–99)
HCT VFR BLD CALC: 33.9 % — LOW (ref 37–47)
HGB BLD-MCNC: 10.2 G/DL — LOW (ref 12–16)
IMM GRANULOCYTES NFR BLD AUTO: 0.5 % — HIGH (ref 0.1–0.3)
INR BLD: 1.48 RATIO — HIGH (ref 0.65–1.3)
LYMPHOCYTES # BLD AUTO: 1.88 K/UL — SIGNIFICANT CHANGE UP (ref 1.2–3.4)
LYMPHOCYTES # BLD AUTO: 16 % — LOW (ref 20.5–51.1)
MAGNESIUM SERPL-MCNC: 1.8 MG/DL — SIGNIFICANT CHANGE UP (ref 1.8–2.4)
MCHC RBC-ENTMCNC: 24.5 PG — LOW (ref 27–31)
MCHC RBC-ENTMCNC: 30.1 G/DL — LOW (ref 32–37)
MCV RBC AUTO: 81.5 FL — SIGNIFICANT CHANGE UP (ref 81–99)
MONOCYTES # BLD AUTO: 0.84 K/UL — HIGH (ref 0.1–0.6)
MONOCYTES NFR BLD AUTO: 7.1 % — SIGNIFICANT CHANGE UP (ref 1.7–9.3)
NEUTROPHILS # BLD AUTO: 8.96 K/UL — HIGH (ref 1.4–6.5)
NEUTROPHILS NFR BLD AUTO: 76 % — HIGH (ref 42.2–75.2)
NRBC # BLD: 0 /100 WBCS — SIGNIFICANT CHANGE UP (ref 0–0)
PLATELET # BLD AUTO: 291 K/UL — SIGNIFICANT CHANGE UP (ref 130–400)
POTASSIUM SERPL-MCNC: 3.8 MMOL/L — SIGNIFICANT CHANGE UP (ref 3.5–5)
POTASSIUM SERPL-SCNC: 3.8 MMOL/L — SIGNIFICANT CHANGE UP (ref 3.5–5)
PROT SERPL-MCNC: 5.9 G/DL — LOW (ref 6–8)
PROTHROM AB SERPL-ACNC: 16.9 SEC — HIGH (ref 9.95–12.87)
RBC # BLD: 4.16 M/UL — LOW (ref 4.2–5.4)
RBC # FLD: 16.3 % — HIGH (ref 11.5–14.5)
SODIUM SERPL-SCNC: 142 MMOL/L — SIGNIFICANT CHANGE UP (ref 135–146)
WBC # BLD: 11.78 K/UL — HIGH (ref 4.8–10.8)
WBC # FLD AUTO: 11.78 K/UL — HIGH (ref 4.8–10.8)

## 2019-02-18 RX ORDER — MAGNESIUM SULFATE 500 MG/ML
2 VIAL (ML) INJECTION ONCE
Qty: 0 | Refills: 0 | Status: COMPLETED | OUTPATIENT
Start: 2019-02-18 | End: 2019-02-18

## 2019-02-18 RX ADMIN — Medication 40 MILLIGRAM(S): at 06:18

## 2019-02-18 RX ADMIN — Medication 50 GRAM(S): at 18:49

## 2019-02-18 RX ADMIN — Medication 100 MILLIGRAM(S): at 06:18

## 2019-02-18 RX ADMIN — ATORVASTATIN CALCIUM 20 MILLIGRAM(S): 80 TABLET, FILM COATED ORAL at 21:24

## 2019-02-18 RX ADMIN — Medication 100 MILLIGRAM(S): at 21:24

## 2019-02-18 RX ADMIN — Medication 100 MILLIGRAM(S): at 17:17

## 2019-02-18 RX ADMIN — Medication 5 MILLIGRAM(S): at 17:17

## 2019-02-18 RX ADMIN — SENNA PLUS 2 TABLET(S): 8.6 TABLET ORAL at 21:24

## 2019-02-18 RX ADMIN — RIVAROXABAN 15 MILLIGRAM(S): KIT at 17:17

## 2019-02-18 RX ADMIN — Medication 5 MILLIGRAM(S): at 06:18

## 2019-02-18 RX ADMIN — CITALOPRAM 20 MILLIGRAM(S): 10 TABLET, FILM COATED ORAL at 11:41

## 2019-02-18 RX ADMIN — Medication 40 MILLIGRAM(S): at 11:41

## 2019-02-18 RX ADMIN — Medication 100 MILLIGRAM(S): at 13:02

## 2019-02-18 RX ADMIN — PANTOPRAZOLE SODIUM 40 MILLIGRAM(S): 20 TABLET, DELAYED RELEASE ORAL at 06:18

## 2019-02-18 RX ADMIN — Medication 240 MILLIGRAM(S): at 06:18

## 2019-02-18 NOTE — PROGRESS NOTE ADULT - ASSESSMENT
KOBI   - better    - prerenal  CKD 3   - baseline Cr low 1's  Right kidney on small size and with renal cyst on abd sono  RSV PNA  Afib with RVR  recent hip fracture  left foot gout    plan:    cont lasix 40mg po qd  off hctz / triamterene  off high dose benazapril  cont cardizem cd  taper steroids  off abx  renal dose xarelto  await FAITH / CV  f/u bmp  ckd education  dc planning

## 2019-02-18 NOTE — PROGRESS NOTE ADULT - ASSESSMENT
JEFFERY UGALDE 78y Female  MRN#: [id number]   CODE STATUS: Full code      SUBJECTIVE  Patient is a 78y old Female who presented with a chief complaint of dyspnea and palpitations for few days  Currently admitted to medicine with the primary diagnosis of acute on chronic CHF exacerbation 2/2 atrial fibrillation with RVR  Today is hospital day 9d, and this morning she is resting in bed comfortably and reports no overnight events. Reports minimal left ankle pain now. Patient is aware that she is going for cardioversion tomorrow. Denies fever, chills, chest pain, abdominal pain, urinary symptoms, n/v/d/c.        OBJECTIVE  PAST MEDICAL & SURGICAL HISTORY  CKD (chronic kidney disease) stage 3, GFR 30-59 ml/min  Osteoarthritis  Sleep apnea  Afib: on xarelto  Hypothyroid: s/p thyroid ca surgery  High cholesterol  HTN (hypertension)  S/P rotator cuff surgery  H/O thyroidectomy    ALLERGIES:  No Known Allergies    MEDICATIONS:  STANDING MEDICATIONS  atorvastatin 20 milliGRAM(s) Oral at bedtime  chlorhexidine 4% Liquid 1 Application(s) Topical <User Schedule>  citalopram 20 milliGRAM(s) Oral daily  diltiazem    milliGRAM(s) Oral daily  docusate sodium 100 milliGRAM(s) Oral three times a day  furosemide    Tablet 40 milliGRAM(s) Oral daily  metoprolol tartrate 100 milliGRAM(s) Oral every 12 hours  oxybutynin 5 milliGRAM(s) Oral two times a day  pantoprazole    Tablet 40 milliGRAM(s) Oral before breakfast  polyethylene glycol 3350 17 Gram(s) Oral at bedtime  predniSONE   Tablet 40 milliGRAM(s) Oral every 24 hours  rivaroxaban 15 milliGRAM(s) Oral every 24 hours  senna 2 Tablet(s) Oral at bedtime    PRN MEDICATIONS  acetaminophen   Tablet .. 650 milliGRAM(s) Oral every 6 hours PRN  guaiFENesin   Syrup  (Sugar-Free) 200 milliGRAM(s) Oral every 6 hours PRN      VITAL SIGNS: Last 24 Hours  T(C): 35.6 (18 Feb 2019 20:07), Max: 36.6 (18 Feb 2019 14:05)  T(F): 96.1 (18 Feb 2019 20:07), Max: 97.8 (18 Feb 2019 14:05)  HR: 77 (18 Feb 2019 20:07) (50 - 120)  BP: 116/68 (18 Feb 2019 20:07) (88/60 - 131/83)  BP(mean): --  RR: 18 (18 Feb 2019 20:07) (18 - 18)  SpO2: 96% (18 Feb 2019 08:00) (96% - 96%)    LABS:                        10.2   11.78 )-----------( 291      ( 18 Feb 2019 06:11 )             33.9     02-18    142  |  102  |  57<H>  ----------------------------<  136<H>  3.8   |  25  |  1.5    Ca    9.1      18 Feb 2019 06:11  Mg     1.8     02-18    TPro  5.9<L>  /  Alb  3.3<L>  /  TBili  <0.2  /  DBili  x   /  AST  12  /  ALT  24  /  AlkPhos  118<H>  02-18    PT/INR - ( 18 Feb 2019 06:11 )   PT: 16.90 sec;   INR: 1.48 ratio      RADIOLOGY:      PHYSICAL EXAM:  GENERAL: NAD, well-developed, AAOx3  HEENT:  Atraumatic, Normocephalic. Conjunctiva pink and cornea white, No JVD  PULMONARY: Improved bibasilar rhonchi bilaterally  CARDIOVASCULAR: Irregularly irregular rhythm with tachycardia; No murmurs  GASTROINTESTINAL: Soft, Nontender, Nondistended; Bowel sounds present  EXTREMITIES:  2+ Peripheral Pulses, No petal edema; mild left medial ankle tenderness; no erythema, swelling and warmth, no active lesions  NEUROLOGY: non-focal  SKIN: No rashes or lesions      ADMISSION SUMMARY  Patient is a 78y old Female who presented with a chief complaint of dyspnea and palpitations for few days  Currently admitted to medicine with the primary diagnosis of acute on chronic CHF exacerbation 2/2 afib with RVR      ASSESSMENT & PLAN  77 yo female patient with PMHx of Paroxysmal AF on Xarelto, HTN, DL, hypothyroidism (history of thyroid cancer s/p resection), FELIX on CPAP, recent hip fracture discharged to [facility] recently, currently presenting because of dyspnea on exertion and palpitations over the last 3 days.    #) Acute on chronic CHF exacerbation with HFpEF 2/2 atrial flutter - resolved  - CXR shows pulmonary venous congestion, with BNP 76515  - ECHO shows EF 50-55% with severely enlarged LA, dilatation of aorta  - Currently on metoprolol 100mg BID and Cardizem 240mg QD    #) Atrial fibrillation with RVR  - CHADsVASc 5, continue xarelto 15mg QD  - Rate not well controlled, HR varies from 100 - 120 despite on high dose BB and CCB  - Currently on metoprolol 100mg BID and Cardizem 240mg QD  - Case discussed to Dr. Ortiz, plan to do FAITH/CV tomorrow 2/19, NPO at [facility], continue anticoagulation  - If needed can try low dose IV metoprolol push if BP permits for symptomatic RVR    #) KOBI on CKD stage III - improving back to baseline  - Likely pre-renal 2/2 diuretic and ACE use  - Renal US no hydro, right 1.6cm renal cyst  - Stop triamtrene/HCTZ due to acute gout attack  - Hold ACE-i for now  - Nephrology recs appreciated    #) Acute gouty arthritis - improved  - Likely 2/2 HCTZ use; elevated uric acid  - Stop triamtrene/HCTZ  - Start prednisone 40mg QD for 1 more day    #) HTN - stable  - Continue cardizem and metoprolol    #) Hypothyroidism   - TSH 0.11, will hold synthroid for now as it might precipitate persistent A flutter  - TBG 13, T3 65, Free T4 9.3  - Will resume home dose Synthroid      DVT ppx: Xarelto   GI ppx: Not indicated  Diet: DASH  Activity: ambulate as tolerated  Lines: Peripheral IVs  Code status: Full code  Dispo: d/c planning to [facility]; plan for FAITH/CV tomorrow with Dr. Ortiz

## 2019-02-18 NOTE — PROGRESS NOTE ADULT - SUBJECTIVE AND OBJECTIVE BOX
Patient was seen and examined. Spoke with RN. Chart reviewed.  No events overnight.  Vital Signs Last 24 Hrs  T(F): 95.7 (18 Feb 2019 04:43), Max: 97.6 (17 Feb 2019 13:09)  HR: 120 (18 Feb 2019 06:14) (50 - 120)  BP: 131/83 (18 Feb 2019 04:43) (100/63 - 131/83)  SpO2: 96% (18 Feb 2019 08:00) (96% - 97%)  MEDICATIONS  (STANDING):  atorvastatin 20 milliGRAM(s) Oral at bedtime  chlorhexidine 4% Liquid 1 Application(s) Topical <User Schedule>  citalopram 20 milliGRAM(s) Oral daily  diltiazem    milliGRAM(s) Oral daily  docusate sodium 100 milliGRAM(s) Oral three times a day  furosemide    Tablet 40 milliGRAM(s) Oral daily  metoprolol tartrate 100 milliGRAM(s) Oral every 12 hours  oxybutynin 5 milliGRAM(s) Oral two times a day  pantoprazole    Tablet 40 milliGRAM(s) Oral before breakfast  polyethylene glycol 3350 17 Gram(s) Oral at bedtime  predniSONE   Tablet 40 milliGRAM(s) Oral every 24 hours  rivaroxaban 15 milliGRAM(s) Oral every 24 hours  senna 2 Tablet(s) Oral at bedtime    MEDICATIONS  (PRN):  acetaminophen   Tablet .. 650 milliGRAM(s) Oral every 6 hours PRN Mild Pain (1 - 3)  guaiFENesin   Syrup  (Sugar-Free) 200 milliGRAM(s) Oral every 6 hours PRN Cough    Labs:                        10.2   11.78 )-----------( 291      ( 18 Feb 2019 06:11 )             33.9                         10.6   12.88 )-----------( 293      ( 17 Feb 2019 07:27 )             34.8     18 Feb 2019 06:11    142    |  102    |  57     ----------------------------<  136    3.8     |  25     |  1.5    17 Feb 2019 07:27    140    |  98     |  54     ----------------------------<  123    3.9     |  27     |  1.6      Ca    9.1        18 Feb 2019 06:11  Ca    9.1        17 Feb 2019 07:27  Mg     1.8       18 Feb 2019 06:11    TPro  5.9    /  Alb  3.3    /  TBili  <0.2   /  DBili  x      /  AST  12     /  ALT  24     /  AlkPhos  118    18 Feb 2019 06:11  TPro  6.1    /  Alb  3.5    /  TBili  0.3    /  DBili  x      /  AST  11     /  ALT  24     /  AlkPhos  117    17 Feb 2019 07:27    PT/INR - ( 18 Feb 2019 06:11 )   PT: 16.90 sec;   INR: 1.48 ratio               General: comfortable, NAD  Neurology: A&Ox3, nonfocal  Head:  Normocephalic, atraumatic  ENT:  Mucosa moist, no ulcerations  Neck:  Supple, no JVD,   Skin: no breakdowns (as per RN)  Resp: CTA B/L  CV: RRR, S1S2,   GI: Soft, NT, bowel sounds  MS: No edema, + peripheral pulses, FROM all 4 extremity      A/P:  79 yo female patient with PMHx of Paroxysmal AF on Xarelto, HTN, DL, hypothyroidism (history of thyroid cancer s/p resection), FELIX on CPAP, recent hip fracture discharged to [facility] recently, admitted because of dyspnea on exertion and palpitations     #) Acute on chronic CHF exacerbation with HFpEF 2/2 atrial flutter - improved  - CXR shows pulmonary venous congestion, with BNP 28689  - ECHO shows EF 50-55% with severely enlarged LA, dilatation of aorta  - Currently on metoprolol 100mg BID and Cardizem 240mg QD  - Start to wean off O2    #) Atrial fibrillation with RVR  - CHADsVASc 5, continue xarelto 15mg QD  - Rate not well controlled, HR varies from 100 - 120 despite on high dose BB and CCB  - Currently on metoprolol 100mg BID and Cardizem 240mg QD  - Dr. Ortiz plans to do FAITH/CV today,  anticoagulation as per cardio    #) KOBI on CKD stage III progressing to stage VI - improved  - Likely pre-renal 2/2 diuretic and ACE use  - Renal US no hydro, right 1.6cm renal cyst  - Stop triamtrene/HCTZ due to acute gout attack, start PO Lasix 40mg QD  - Nephrology recs appreciated: UA and UP/C, renal dose Xarelto hold lisinopril for another 24-48hr then resume    #) Acute gouty arthritis  - Likely 2/2 HCTZ use  - Stop triamtrene/HCTZ  - Start prednisone 40mg QD    #) HTN - stable  - Continue cardizem and metoprolol  - Stop triamtrene/HCTZ    #) Hypothyroidism   - TSH 0.11, will hold synthroid for now as it might precipitate persistent A flutter  - TBG 13, T3 65, Free T4 9.3    #) Transaminitis due to venous congestion - resolved  - RUQ US negative    DC planning when cleared by cradio- perhaps 24-48    DVT prophylaxis  Decubitus prevention- all measures as per RN protocol  Please call or text me with any questions or updates

## 2019-02-18 NOTE — PROGRESS NOTE ADULT - SUBJECTIVE AND OBJECTIVE BOX
NEPHROLOGY FOLLOW UP NOTE    renal function stable  no sob  no fever  on po lasix  awaiting FAITH / CV    PAST MEDICAL & SURGICAL HISTORY:  CKD (chronic kidney disease) stage 3, GFR 30-59 ml/min  Osteoarthritis  Sleep apnea  Afib: on xarelto  Hypothyroid: s/p thyroid ca surgery  High cholesterol  HTN (hypertension)  S/P rotator cuff surgery  H/O thyroidectomy    Allergies:  No Known Allergies    Home Medications Reviewed    SOCIAL HISTORY:  Denies ETOH,Smoking,   FAMILY HISTORY:  Family history of abdominal aortic aneurysm (AAA) (Father)  Family history of lung cancer (Mother)        REVIEW OF SYSTEMS:    All other review of systems is negative unless indicated above.      PHYSICAL EXAM:  NAD  awake and alert  moist mm  no jvd  b/l BS  rrr  soft, nt   no edema, left foot ace wrap  no [facility] Medications:   MEDICATIONS  (STANDING):  atorvastatin 20 milliGRAM(s) Oral at bedtime  chlorhexidine 4% Liquid 1 Application(s) Topical <User Schedule>  citalopram 20 milliGRAM(s) Oral daily  diltiazem    milliGRAM(s) Oral daily  docusate sodium 100 milliGRAM(s) Oral three times a day  furosemide    Tablet 40 milliGRAM(s) Oral daily  magnesium sulfate  IVPB 2 Gram(s) IV Intermittent once  metoprolol tartrate 100 milliGRAM(s) Oral every 12 hours  oxybutynin 5 milliGRAM(s) Oral two times a day  pantoprazole    Tablet 40 milliGRAM(s) Oral before breakfast  polyethylene glycol 3350 17 Gram(s) Oral at bedtime  predniSONE   Tablet 40 milliGRAM(s) Oral every 24 hours  rivaroxaban 15 milliGRAM(s) Oral every 24 hours  senna 2 Tablet(s) Oral at bedtime        VITALS:  T(F): 97.8 (02-18-19 @ 14:05), Max: 97.8 (02-18-19 @ 14:05)  HR: 88 (02-18-19 @ 15:59)  BP: 111/64 (02-18-19 @ 15:59)  RR: 18 (02-18-19 @ 15:59)  SpO2: 96% (02-18-19 @ 08:00)  Wt(kg): --    02-16 @ 07:01  -  02-17 @ 07:00  --------------------------------------------------------  IN: 640 mL / OUT: 1250 mL / NET: -610 mL    02-17 @ 07:01  -  02-18 @ 07:00  --------------------------------------------------------  IN: 440 mL / OUT: 304 mL / NET: 136 mL    02-18 @ 07:01  -  02-18 @ 18:09  --------------------------------------------------------  IN: 0 mL / OUT: 1050 mL / NET: -1050 mL          LABS:  02-18    142  |  102  |  57<H>  ----------------------------<  136<H>  3.8   |  25  |  1.5    Ca    9.1      18 Feb 2019 06:11  Mg     1.8     02-18    TPro  5.9<L>  /  Alb  3.3<L>  /  TBili  <0.2  /  DBili      /  AST  12  /  ALT  24  /  AlkPhos  118<H>  02-18                          10.2   11.78 )-----------( 291      ( 18 Feb 2019 06:11 )             33.9       Urine Studies:        RADIOLOGY & ADDITIONAL STUDIES:

## 2019-02-19 LAB
ALBUMIN SERPL ELPH-MCNC: 3.5 G/DL — SIGNIFICANT CHANGE UP (ref 3.5–5.2)
ALP SERPL-CCNC: 107 U/L — SIGNIFICANT CHANGE UP (ref 30–115)
ALT FLD-CCNC: 20 U/L — SIGNIFICANT CHANGE UP (ref 0–41)
ANION GAP SERPL CALC-SCNC: 17 MMOL/L — HIGH (ref 7–14)
AST SERPL-CCNC: 8 U/L — SIGNIFICANT CHANGE UP (ref 0–41)
BASOPHILS # BLD AUTO: 0.02 K/UL — SIGNIFICANT CHANGE UP (ref 0–0.2)
BASOPHILS NFR BLD AUTO: 0.2 % — SIGNIFICANT CHANGE UP (ref 0–1)
BILIRUB SERPL-MCNC: 0.3 MG/DL — SIGNIFICANT CHANGE UP (ref 0.2–1.2)
BUN SERPL-MCNC: 59 MG/DL — HIGH (ref 10–20)
CALCIUM SERPL-MCNC: 9 MG/DL — SIGNIFICANT CHANGE UP (ref 8.5–10.1)
CHLORIDE SERPL-SCNC: 99 MMOL/L — SIGNIFICANT CHANGE UP (ref 98–110)
CO2 SERPL-SCNC: 28 MMOL/L — SIGNIFICANT CHANGE UP (ref 17–32)
CREAT SERPL-MCNC: 1.5 MG/DL — SIGNIFICANT CHANGE UP (ref 0.7–1.5)
EOSINOPHIL # BLD AUTO: 0.05 K/UL — SIGNIFICANT CHANGE UP (ref 0–0.7)
EOSINOPHIL NFR BLD AUTO: 0.4 % — SIGNIFICANT CHANGE UP (ref 0–8)
GLUCOSE SERPL-MCNC: 136 MG/DL — HIGH (ref 70–99)
HCT VFR BLD CALC: 34.9 % — LOW (ref 37–47)
HGB BLD-MCNC: 10.5 G/DL — LOW (ref 12–16)
IMM GRANULOCYTES NFR BLD AUTO: 0.7 % — HIGH (ref 0.1–0.3)
LYMPHOCYTES # BLD AUTO: 19.7 % — LOW (ref 20.5–51.1)
LYMPHOCYTES # BLD AUTO: 2.34 K/UL — SIGNIFICANT CHANGE UP (ref 1.2–3.4)
MAGNESIUM SERPL-MCNC: 2.2 MG/DL — SIGNIFICANT CHANGE UP (ref 1.8–2.4)
MCHC RBC-ENTMCNC: 24.4 PG — LOW (ref 27–31)
MCHC RBC-ENTMCNC: 30.1 G/DL — LOW (ref 32–37)
MCV RBC AUTO: 81.2 FL — SIGNIFICANT CHANGE UP (ref 81–99)
MONOCYTES # BLD AUTO: 0.9 K/UL — HIGH (ref 0.1–0.6)
MONOCYTES NFR BLD AUTO: 7.6 % — SIGNIFICANT CHANGE UP (ref 1.7–9.3)
NEUTROPHILS # BLD AUTO: 8.48 K/UL — HIGH (ref 1.4–6.5)
NEUTROPHILS NFR BLD AUTO: 71.4 % — SIGNIFICANT CHANGE UP (ref 42.2–75.2)
NRBC # BLD: 0 /100 WBCS — SIGNIFICANT CHANGE UP (ref 0–0)
PLATELET # BLD AUTO: 320 K/UL — SIGNIFICANT CHANGE UP (ref 130–400)
POTASSIUM SERPL-MCNC: 3.6 MMOL/L — SIGNIFICANT CHANGE UP (ref 3.5–5)
POTASSIUM SERPL-SCNC: 3.6 MMOL/L — SIGNIFICANT CHANGE UP (ref 3.5–5)
PROT SERPL-MCNC: 6 G/DL — SIGNIFICANT CHANGE UP (ref 6–8)
RBC # BLD: 4.3 M/UL — SIGNIFICANT CHANGE UP (ref 4.2–5.4)
RBC # FLD: 16.1 % — HIGH (ref 11.5–14.5)
SODIUM SERPL-SCNC: 144 MMOL/L — SIGNIFICANT CHANGE UP (ref 135–146)
WBC # BLD: 11.87 K/UL — HIGH (ref 4.8–10.8)
WBC # FLD AUTO: 11.87 K/UL — HIGH (ref 4.8–10.8)

## 2019-02-19 RX ORDER — METOPROLOL TARTRATE 50 MG
50 TABLET ORAL
Qty: 0 | Refills: 0 | Status: DISCONTINUED | OUTPATIENT
Start: 2019-02-19 | End: 2019-02-20

## 2019-02-19 RX ORDER — AMIODARONE HYDROCHLORIDE 400 MG/1
200 TABLET ORAL
Qty: 0 | Refills: 0 | Status: DISCONTINUED | OUTPATIENT
Start: 2019-02-19 | End: 2019-02-20

## 2019-02-19 RX ADMIN — CITALOPRAM 20 MILLIGRAM(S): 10 TABLET, FILM COATED ORAL at 11:16

## 2019-02-19 RX ADMIN — Medication 5 MILLIGRAM(S): at 05:38

## 2019-02-19 RX ADMIN — SENNA PLUS 2 TABLET(S): 8.6 TABLET ORAL at 21:35

## 2019-02-19 RX ADMIN — Medication 40 MILLIGRAM(S): at 11:16

## 2019-02-19 RX ADMIN — Medication 5 MILLIGRAM(S): at 17:05

## 2019-02-19 RX ADMIN — POLYETHYLENE GLYCOL 3350 17 GRAM(S): 17 POWDER, FOR SOLUTION ORAL at 21:35

## 2019-02-19 RX ADMIN — Medication 50 MILLIGRAM(S): at 17:05

## 2019-02-19 RX ADMIN — Medication 100 MILLIGRAM(S): at 05:38

## 2019-02-19 RX ADMIN — Medication 100 MILLIGRAM(S): at 21:35

## 2019-02-19 RX ADMIN — RIVAROXABAN 15 MILLIGRAM(S): KIT at 17:06

## 2019-02-19 RX ADMIN — Medication 240 MILLIGRAM(S): at 05:38

## 2019-02-19 RX ADMIN — ATORVASTATIN CALCIUM 20 MILLIGRAM(S): 80 TABLET, FILM COATED ORAL at 21:35

## 2019-02-19 RX ADMIN — AMIODARONE HYDROCHLORIDE 200 MILLIGRAM(S): 400 TABLET ORAL at 17:05

## 2019-02-19 RX ADMIN — Medication 40 MILLIGRAM(S): at 05:38

## 2019-02-19 NOTE — PROGRESS NOTE ADULT - SUBJECTIVE AND OBJECTIVE BOX
Patient was seen and examined. Spoke with RN. Chart reviewed.    No events overnight.  Vital Signs Last 24 Hrs  T(F): 97 (19 Feb 2019 05:30), Max: 97 (19 Feb 2019 05:30)  HR: 102 (19 Feb 2019 05:30) (77 - 102)  BP: 137/87 (19 Feb 2019 05:30) (111/64 - 137/87)  SpO2: --  MEDICATIONS  (STANDING):  amiodarone    Tablet 200 milliGRAM(s) Oral two times a day  atorvastatin 20 milliGRAM(s) Oral at bedtime  chlorhexidine 4% Liquid 1 Application(s) Topical <User Schedule>  citalopram 20 milliGRAM(s) Oral daily  docusate sodium 100 milliGRAM(s) Oral three times a day  furosemide    Tablet 40 milliGRAM(s) Oral daily  metoprolol tartrate 50 milliGRAM(s) Oral two times a day  oxybutynin 5 milliGRAM(s) Oral two times a day  pantoprazole    Tablet 40 milliGRAM(s) Oral before breakfast  polyethylene glycol 3350 17 Gram(s) Oral at bedtime  predniSONE   Tablet 40 milliGRAM(s) Oral every 24 hours  rivaroxaban 15 milliGRAM(s) Oral every 24 hours  senna 2 Tablet(s) Oral at bedtime    MEDICATIONS  (PRN):  acetaminophen   Tablet .. 650 milliGRAM(s) Oral every 6 hours PRN Mild Pain (1 - 3)  guaiFENesin   Syrup  (Sugar-Free) 200 milliGRAM(s) Oral every 6 hours PRN Cough    Labs:                        10.5   11.87 )-----------( 320      ( 19 Feb 2019 06:26 )             34.9                         10.2   11.78 )-----------( 291      ( 18 Feb 2019 06:11 )             33.9     19 Feb 2019 06:26    144    |  99     |  59     ----------------------------<  136    3.6     |  28     |  1.5    18 Feb 2019 06:11    142    |  102    |  57     ----------------------------<  136    3.8     |  25     |  1.5      Ca    9.0        19 Feb 2019 06:26  Ca    9.1        18 Feb 2019 06:11  Mg     2.2       19 Feb 2019 06:26  Mg     1.8       18 Feb 2019 06:11    TPro  6.0    /  Alb  3.5    /  TBili  0.3    /  DBili  x      /  AST  8      /  ALT  20     /  AlkPhos  107    19 Feb 2019 06:26  TPro  5.9    /  Alb  3.3    /  TBili  <0.2   /  DBili  x      /  AST  12     /  ALT  24     /  AlkPhos  118    18 Feb 2019 06:11    PT/INR - ( 18 Feb 2019 06:11 )   PT: 16.90 sec;   INR: 1.48 ratio                 Radiology:    General: comfortable, NAD  Neurology: A&Ox3, nonfocal  Head:  Normocephalic, atraumatic  ENT:  Mucosa moist, no ulcerations  Neck:  Supple, no JVD,   Resp: CTA B/L  CV: irir, S1S2  GI: Soft, NT, bowel sounds  MS: No edema, + peripheral pulses, FROM all 4 extremity

## 2019-02-19 NOTE — PROGRESS NOTE ADULT - ASSESSMENT
JEFFERY UGALDE 78y Female  MRN#: [id number]   CODE STATUS: Full code      SUBJECTIVE  Patient is a 78y old Female who presented with a chief complaint of dyspnea and palpitations for few days  Currently admitted to medicine with the primary diagnosis of acute on chronic CHF exacerbation 2/2 atrial fibrillation with RVR  Today is hospital day 10d, and this morning she is resting in bed comfortably and reports no overnight events. Patient is able to ambulate without exertion, however HR is still uncontrolled. S/p FAITH and cardioversion today, now rhythm back to NSR.       OBJECTIVE  PAST MEDICAL & SURGICAL HISTORY  CKD (chronic kidney disease) stage 3, GFR 30-59 ml/min  Osteoarthritis  Sleep apnea  Afib: on xarelto  Hypothyroid: s/p thyroid ca surgery  High cholesterol  HTN (hypertension)  S/P rotator cuff surgery  H/O thyroidectomy    ALLERGIES:  No Known Allergies    MEDICATIONS:  STANDING MEDICATIONS  amiodarone    Tablet 200 milliGRAM(s) Oral two times a day  atorvastatin 20 milliGRAM(s) Oral at bedtime  chlorhexidine 4% Liquid 1 Application(s) Topical <User Schedule>  citalopram 20 milliGRAM(s) Oral daily  docusate sodium 100 milliGRAM(s) Oral three times a day  furosemide    Tablet 40 milliGRAM(s) Oral daily  metoprolol tartrate 50 milliGRAM(s) Oral two times a day  oxybutynin 5 milliGRAM(s) Oral two times a day  pantoprazole    Tablet 40 milliGRAM(s) Oral before breakfast  polyethylene glycol 3350 17 Gram(s) Oral at bedtime  rivaroxaban 15 milliGRAM(s) Oral every 24 hours  senna 2 Tablet(s) Oral at bedtime    PRN MEDICATIONS  acetaminophen   Tablet .. 650 milliGRAM(s) Oral every 6 hours PRN  guaiFENesin   Syrup  (Sugar-Free) 200 milliGRAM(s) Oral every 6 hours PRN      VITAL SIGNS: Last 24 Hours  T(C): 36.1 (19 Feb 2019 05:30), Max: 36.1 (19 Feb 2019 05:30)  T(F): 97 (19 Feb 2019 05:30), Max: 97 (19 Feb 2019 05:30)  HR: 54 (19 Feb 2019 15:34) (54 - 102)  BP: 106/56 (19 Feb 2019 15:34) (106/56 - 137/87)  BP(mean): --  RR: 18 (19 Feb 2019 15:34) (18 - 18)  SpO2: --    LABS:                        10.5   11.87 )-----------( 320      ( 19 Feb 2019 06:26 )             34.9     02-19    144  |  99  |  59<H>  ----------------------------<  136<H>  3.6   |  28  |  1.5    Ca    9.0      19 Feb 2019 06:26  Mg     2.2     02-19    TPro  6.0  /  Alb  3.5  /  TBili  0.3  /  DBili  x   /  AST  8   /  ALT  20  /  AlkPhos  107  02-19    PT/INR - ( 18 Feb 2019 06:11 )   PT: 16.90 sec;   INR: 1.48 ratio        RADIOLOGY:      PHYSICAL EXAM:  GENERAL: NAD, well-developed, AAOx3  HEENT:  Atraumatic, Normocephalic. Conjunctiva pink and cornea white, No JVD  PULMONARY: Improved bibasilar rhonchi bilaterally  CARDIOVASCULAR: Irregularly irregular rhythm with tachycardia; No murmurs  GASTROINTESTINAL: Soft, Nontender, Nondistended; Bowel sounds present  EXTREMITIES:  2+ Peripheral Pulses, No petal edema; resolved left medial ankle tenderness; no erythema, swelling and warmth, no active lesions  NEUROLOGY: non-focal  SKIN: No rashes or lesions      ADMISSION SUMMARY  Patient is a 78y old Female who presented with a chief complaint of dyspnea and palpitations for few days  Currently admitted to medicine with the primary diagnosis of acute on chronic CHF exacerbation 2/2 afib with RVR      ASSESSMENT & PLAN  77 yo female patient with PMHx of Paroxysmal AF on Xarelto, HTN, DL, hypothyroidism (history of thyroid cancer s/p resection), FELIX on CPAP, recent hip fracture discharged to [facility] recently, currently presenting because of dyspnea on exertion and palpitations over the last 3 days.    #) Atrial fibrillation with RVR - resolved  - CHADsVASc 5, continue Xarelto 15mg QD  - S/p FAITH with CV, rhythm converted back to NSR  - Start amiodarone 200mg BID for 1 week and then change to 200mg QD, decrease metoprolol to 50mg BID  - Follow up Cardiology recs    #) Acute on chronic CHF exacerbation with HFpEF 2/2 atrial flutter - resolved  - CXR shows pulmonary venous congestion, with BNP 56460  - ECHO shows EF 50-55% with severely enlarged LA, dilatation of aorta  - Metoprolol decreased to 50mg BID for now    #) KOBI on CKD stage III - resolved, back to baseline  - Likely pre-renal 2/2 diuretic and ACE use  - Renal US no hydro, right 1.6cm renal cyst  - Hold triamtrene/HCTZ and ACE-i for now  - Nephrology recs appreciated    #) Acute gouty arthritis - resolved  - Likely 2/2 HCTZ use; elevated uric acid  - Stop triamtrene/HCTZ  - Last day of prednisone    #) HTN - stable  - Continue metoprolol     #) Hypothyroidism   - TSH 0.11, will hold synthroid for now as it might precipitate persistent A flutter  - TBG 13, T3 65, Free T4 9.3  - Will resume home dose Synthroid      DVT ppx: Xarelto   GI ppx: Not indicated  Diet: DASH  Activity: ambulate as tolerated  Lines: Peripheral IVs, left UE midline (placed at [place])  Code status: Full code  Dispo: d/c planning to [facility]

## 2019-02-19 NOTE — CHART NOTE - NSCHARTNOTEFT_GEN_A_CORE
FAITH Procedure Note:     After risks, benefits and alternatives of the procedure were explained, consent was signed and placed in the medical record.  Procedural timeout was taken.  Sedation was administered by anesthesia.  FAITH probe inserted without complication and FAITH performed.  Patient tolerated the procedure well without complication.  Post-procedure vital signs were stable.    FAITH findings:  LA dilated , no JOE thrombus  RA dilated  MV: mild to moderate MR  TV: mild TR  AV: trifleaflet, + calcification  LV: normal function    See full report for findings.     CV was successful with 360J synchronized .  Post procedure EKG : NSR    Recommendations:  stop cardizem   change metoprolol to 50 bid  start amiodarone to 200mg bid for 1 week , then 200mg once daily FAITH Procedure Note:     After risks, benefits and alternatives of the procedure were explained, consent was signed and placed in the medical record.  Procedural timeout was taken.  Sedation was administered by anesthesia.  FAITH probe inserted without complication and FAITH performed.  Patient tolerated the procedure well without complication.  Post-procedure vital signs were stable.    FAITH findings:  LA dilated , no JOE thrombus  RA dilated  MV: mild to moderate MR  TV: mild TR  AV: trifleaflet, + calcification  LV: normal function    See full report for findings.     CV was successful with 360J synchronized .  Post procedure EKG : NSR    Recommendations:  stop cardizem   change metoprolol to 50 bid  start amiodarone to 200mg bid for 1 week , then 200mg once daily  DC home

## 2019-02-19 NOTE — PROGRESS NOTE ADULT - SUBJECTIVE AND OBJECTIVE BOX
NEPHROLOGY FOLLOW UP NOTE    renal function stable  no sob  no fever  on po lasix  s/p FAITH / CV    PAST MEDICAL & SURGICAL HISTORY:  CKD (chronic kidney disease) stage 3, GFR 30-59 ml/min  Osteoarthritis  Sleep apnea  Afib: on xarelto  Hypothyroid: s/p thyroid ca surgery  High cholesterol  HTN (hypertension)  S/P rotator cuff surgery  H/O thyroidectomy    Allergies:  No Known Allergies    Home Medications Reviewed    SOCIAL HISTORY:  Denies ETOH,Smoking,   FAMILY HISTORY:  Family history of abdominal aortic aneurysm (AAA) (Father)  Family history of lung cancer (Mother)        REVIEW OF SYSTEMS:    All other review of systems is negative unless indicated above.      PHYSICAL EXAM:  NAD  awake and alert  moist mm  no jvd  b/l BS  rrr  soft, nt   no edema, left foot ace wrap  no [facility] Medications:   MEDICATIONS  (STANDING):  amiodarone    Tablet 200 milliGRAM(s) Oral two times a day  atorvastatin 20 milliGRAM(s) Oral at bedtime  chlorhexidine 4% Liquid 1 Application(s) Topical <User Schedule>  citalopram 20 milliGRAM(s) Oral daily  docusate sodium 100 milliGRAM(s) Oral three times a day  furosemide    Tablet 40 milliGRAM(s) Oral daily  metoprolol tartrate 50 milliGRAM(s) Oral two times a day  oxybutynin 5 milliGRAM(s) Oral two times a day  pantoprazole    Tablet 40 milliGRAM(s) Oral before breakfast  polyethylene glycol 3350 17 Gram(s) Oral at bedtime  rivaroxaban 15 milliGRAM(s) Oral every 24 hours  senna 2 Tablet(s) Oral at bedtime        VITALS:  T(F): 97 (02-19-19 @ 05:30), Max: 97 (02-19-19 @ 05:30)  HR: 54 (02-19-19 @ 15:34)  BP: 106/56 (02-19-19 @ 15:34)  RR: 18 (02-19-19 @ 15:34)  SpO2: --  Wt(kg): --    02-17 @ 07:01  -  02-18 @ 07:00  --------------------------------------------------------  IN: 440 mL / OUT: 304 mL / NET: 136 mL    02-18 @ 07:01  -  02-19 @ 07:00  --------------------------------------------------------  IN: 50 mL / OUT: 1250 mL / NET: -1200 mL      Height (cm): 157.48 (02-19 @ 12:32)  Weight (kg): 88.1 (02-19 @ 12:32)  BMI (kg/m2): 35.5 (02-19 @ 12:32)  BSA (m2): 1.89 (02-19 @ 12:32)    LABS:  02-19    144  |  99  |  59<H>  ----------------------------<  136<H>  3.6   |  28  |  1.5    Ca    9.0      19 Feb 2019 06:26  Mg     2.2     02-19    TPro  6.0  /  Alb  3.5  /  TBili  0.3  /  DBili      /  AST  8   /  ALT  20  /  AlkPhos  107  02-19                          10.5   11.87 )-----------( 320      ( 19 Feb 2019 06:26 )             34.9       Urine Studies:        RADIOLOGY & ADDITIONAL STUDIES:

## 2019-02-19 NOTE — PROGRESS NOTE ADULT - ASSESSMENT
79 yo female patient with PMHx of Paroxysmal AF on Xarelto, HTN, DL, hypothyroidism (history of thyroid cancer s/p resection), FELIX on CPAP, recent hip fracture discharged to [facility] recently, currently presenting because of dyspnea on exertion and palpitations over the last 3 days.    #) Acute on chronic CHF exacerbation with HFpEF 2/2 atrial flutter - resolved  - CXR shows pulmonary venous congestion, with BNP 22199  - ECHO shows EF 50-55% with severely enlarged LA, dilatation of aorta  - Currently on metoprolol 100mg BID and Cardizem 240mg QD    #) Atrial fibrillation with RVR  - CHADsVASc 5, continue xarelto 15mg QD  - Rate not well controlled, HR varies from 100 - 120 despite on high dose BB and CCB  - Currently on metoprolol 100mg BID and Cardizem 240mg QD  - Case discussed to Dr. Ortiz, plan to do FAITH/CV today, continue anticoagulation  - If needed can try low dose IV metoprolol push if BP permits for symptomatic RVR    #) KOBI on CKD stage III - improving back to baseline  - Likely pre-renal 2/2 diuretic and ACE use  - Renal US no hydro, right 1.6cm renal cyst  - Stop triamtrene/HCTZ due to acute gout attack  - Hold ACE-i for now  - Nephrology recs appreciated    #) Acute gouty arthritis - improved  - Likely 2/2 HCTZ use; elevated uric acid  - Stop triamtrene/HCTZ  - Start prednisone 40mg QD for 1 more day    #) HTN - stable  - Continue cardizem and metoprolol  #) Hypothyroidism   - TSH 0.11, will hold synthroid for now as it might precipitate persistent A flutter  - TBG 13, T3 65, Free T4 9.3  - Will resume home dose Synthroid      DVT ppx: Xarelto

## 2019-02-19 NOTE — PROGRESS NOTE ADULT - ASSESSMENT
KOBI   - better    - prerenal  CKD 3   - baseline Cr low 1's  Right kidney on small size and with renal cyst on abd sono  RSV PNA  Afib with RVR  recent hip fracture  left foot gout    plan:    cont lasix 40mg po qd  off hctz / triamterene  off high dose benazapril  amio / lopressor per cardio  taper steroids  off abx  renal dose xarelto  dc planning

## 2019-02-20 ENCOUNTER — TRANSCRIPTION ENCOUNTER (OUTPATIENT)
Age: 79
End: 2019-02-20

## 2019-02-20 VITALS — WEIGHT: 195.99 LBS

## 2019-02-20 RX ORDER — RIVAROXABAN 15 MG-20MG
1 KIT ORAL
Qty: 0 | Refills: 0 | COMMUNITY

## 2019-02-20 RX ORDER — AMIODARONE HYDROCHLORIDE 400 MG/1
1 TABLET ORAL
Qty: 0 | Refills: 0 | DISCHARGE
Start: 2019-02-20

## 2019-02-20 RX ORDER — RIVAROXABAN 15 MG-20MG
1 KIT ORAL
Qty: 0 | Refills: 0 | DISCHARGE
Start: 2019-02-20

## 2019-02-20 RX ORDER — BENAZEPRIL HYDROCHLORIDE 40 MG/1
0 TABLET ORAL
Qty: 0 | Refills: 0 | COMMUNITY

## 2019-02-20 RX ORDER — FUROSEMIDE 40 MG
1 TABLET ORAL
Qty: 0 | Refills: 0 | DISCHARGE
Start: 2019-02-20

## 2019-02-20 RX ORDER — TRIAMTERENE/HYDROCHLOROTHIAZID 75 MG-50MG
1 TABLET ORAL
Qty: 0 | Refills: 0 | COMMUNITY

## 2019-02-20 RX ADMIN — Medication 100 MILLIGRAM(S): at 15:43

## 2019-02-20 RX ADMIN — RIVAROXABAN 15 MILLIGRAM(S): KIT at 17:03

## 2019-02-20 RX ADMIN — AMIODARONE HYDROCHLORIDE 200 MILLIGRAM(S): 400 TABLET ORAL at 17:03

## 2019-02-20 RX ADMIN — Medication 20 MILLIGRAM(S): at 05:18

## 2019-02-20 RX ADMIN — CHLORHEXIDINE GLUCONATE 1 APPLICATION(S): 213 SOLUTION TOPICAL at 05:18

## 2019-02-20 RX ADMIN — Medication 50 MILLIGRAM(S): at 17:03

## 2019-02-20 RX ADMIN — Medication 5 MILLIGRAM(S): at 05:18

## 2019-02-20 RX ADMIN — Medication 40 MILLIGRAM(S): at 05:18

## 2019-02-20 RX ADMIN — Medication 5 MILLIGRAM(S): at 17:03

## 2019-02-20 RX ADMIN — PANTOPRAZOLE SODIUM 40 MILLIGRAM(S): 20 TABLET, DELAYED RELEASE ORAL at 07:49

## 2019-02-20 RX ADMIN — Medication 50 MILLIGRAM(S): at 05:18

## 2019-02-20 RX ADMIN — AMIODARONE HYDROCHLORIDE 200 MILLIGRAM(S): 400 TABLET ORAL at 05:18

## 2019-02-20 RX ADMIN — Medication 100 MILLIGRAM(S): at 05:18

## 2019-02-20 RX ADMIN — CITALOPRAM 20 MILLIGRAM(S): 10 TABLET, FILM COATED ORAL at 12:47

## 2019-02-20 NOTE — DISCHARGE NOTE ADULT - PATIENT PORTAL LINK FT
You can access the [facility] Patient Portal, offered by [facility], by registering with the following website: [email or web address]

## 2019-02-20 NOTE — PROGRESS NOTE ADULT - SUBJECTIVE AND OBJECTIVE BOX
NEPHROLOGY FOLLOW UP NOTE    renal function stable  no sob  no fever  on po lasix  s/p FAITH / CV  for dc today    PAST MEDICAL & SURGICAL HISTORY:  CKD (chronic kidney disease) stage 3, GFR 30-59 ml/min  Osteoarthritis  Sleep apnea  Afib: on xarelto  Hypothyroid: s/p thyroid ca surgery  High cholesterol  HTN (hypertension)  S/P rotator cuff surgery  H/O thyroidectomy    Allergies:  No Known Allergies    Home Medications Reviewed    SOCIAL HISTORY:  Denies ETOH,Smoking,   FAMILY HISTORY:  Family history of abdominal aortic aneurysm (AAA) (Father)  Family history of lung cancer (Mother)        REVIEW OF SYSTEMS:    All other review of systems is negative unless indicated above.      PHYSICAL EXAM:  NAD  awake and alert  moist mm  no jvd  b/l BS  rrr  soft, nt   no edema, left foot ace wrap  no [facility] Medications:   MEDICATIONS  (STANDING):  amiodarone    Tablet 200 milliGRAM(s) Oral two times a day  atorvastatin 20 milliGRAM(s) Oral at bedtime  chlorhexidine 4% Liquid 1 Application(s) Topical <User Schedule>  citalopram 20 milliGRAM(s) Oral daily  docusate sodium 100 milliGRAM(s) Oral three times a day  furosemide    Tablet 40 milliGRAM(s) Oral daily  metoprolol tartrate 50 milliGRAM(s) Oral two times a day  oxybutynin 5 milliGRAM(s) Oral two times a day  pantoprazole    Tablet 40 milliGRAM(s) Oral before breakfast  polyethylene glycol 3350 17 Gram(s) Oral at bedtime  predniSONE   Tablet 20 milliGRAM(s) Oral daily  rivaroxaban 15 milliGRAM(s) Oral every 24 hours  senna 2 Tablet(s) Oral at bedtime        VITALS:  T(F): 97.7 (02-20-19 @ 13:34), Max: 97.7 (02-20-19 @ 13:34)  HR: 51 (02-20-19 @ 13:34)  BP: 150/70 (02-20-19 @ 13:34)  RR: 18 (02-20-19 @ 13:34)  SpO2: --  Wt(kg): --    02-18 @ 07:01  -  02-19 @ 07:00  --------------------------------------------------------  IN: 50 mL / OUT: 1250 mL / NET: -1200 mL    02-19 @ 07:01  -  02-20 @ 07:00  --------------------------------------------------------  IN: 200 mL / OUT: 400 mL / NET: -200 mL          LABS:  02-19    144  |  99  |  59<H>  ----------------------------<  136<H>  3.6   |  28  |  1.5    Ca    9.0      19 Feb 2019 06:26  Mg     2.2     02-19    TPro  6.0  /  Alb  3.5  /  TBili  0.3  /  DBili      /  AST  8   /  ALT  20  /  AlkPhos  107  02-19                          10.5   11.87 )-----------( 320      ( 19 Feb 2019 06:26 )             34.9       Urine Studies:        RADIOLOGY & ADDITIONAL STUDIES:

## 2019-02-20 NOTE — DISCHARGE NOTE ADULT - MEDICATION SUMMARY - MEDICATIONS TO STOP TAKING
I will STOP taking the medications listed below when I get home from the hospital:    triamterene-hydrochlorothiazide 37.5 mg-25 mg oral tablet  -- 1 tab(s) by mouth once a day    benazepril 20 mg oral tablet  -- orally 2 times a day

## 2019-02-20 NOTE — DISCHARGE NOTE ADULT - HOSPITAL COURSE
77 yo female patient with PMHx of Paroxysmal AF on Xarelto, HTN, DL, hypothyroidism (history of thyroid cancer s/p resection), FELIX on CPAP, recent hip fracture discharged to [facility] recently, currently presenting because of dyspnea on exertion and palpitations over the last 3 days.  Patient reports having dyspnea on minimal exertion over the last few days, if she walks to the bathroom she gets exhausted. She also has palpitations when she walks. She says she's been coughing, no phlegm, has orthopnea, no PND. She admits intermittent episodes of chest discomfort with the palpitations. Denies lower extremities swelling.  No other complaints: No headaches, no fever, no chills, no abdominal pain, no urinary symptoms    Patient had a rapid response due to shortness of breath with rapid Afib. Patient was placed on cardizem drip for heart rate control. Shortness of breath improved with IV lasix and cardizem drip was transitioned to oral cardizem. Despite improved respiratory status, patient's HR had been difficult to control while on maximal dose of cardizem and metoprolol. Patient also developed acute kidney injury with ACE-i use and diuretic, which improved after discontinuing both. Patient eventually underwent cardioversion and now her rhythm is back to normal sinus rhythm. Patient is stable to be discharged back to [facility] and is advised to follow up with her cardiologist and PCP. within normal limits

## 2019-02-20 NOTE — DISCHARGE NOTE ADULT - CARE PROVIDER_API CALL
Tad Savage (DO)  Medicine  [facility] [place] [address]  Phone: [phone]  Fax: [phone]  Follow Up Time:     Madi Quintero)  Cardiovascular Disease; Interventional Cardiology  [address] [place] [address]  Phone: [phone]  Fax: [phone]  Follow Up Time:

## 2019-02-20 NOTE — DISCHARGE NOTE ADULT - CARE PLAN
Principal Discharge DX:	Acute on chronic diastolic congestive heart failure  Goal:	Resolution of symptoms, continue medical treatment  Assessment and plan of treatment:	Your symptoms are likely caused by acute exacerbation on chronic congestive heart failure. You will need to maintain a low salt diet (no more than half of teaspoon of salt) and restrict your daily fluid intake to <1.5 liter. Please continue to take the prescribed as directed daily and watch your weight. If there is increased in weight >3lbs per day or >5lbs per week, you will need to contact your primary care physician for medication changes. If you are having acute symptoms such as shortness of breath on minimal exertion and significant swelling, please return to the ED for evaluation. Please make a follow up appointment with your Cardiologist within 3-5 days after you are discharged.  Secondary Diagnosis:	Chronic atrial fibrillation  Goal:	Resolution of symptoms and continue medical treatment  Assessment and plan of treatment:	You have a history of uncontrolled atrial fibrillation. You underwent cardioversion and your heart rate is successfully converted back to normal sinus rhythm. We added a new medication: Amiodarone to your list. You will need to take Amiodarone 200mg orally two times a day for 6 more days, then decrease to once daily. Your heart rate is well controlled and you will need to continue your prescribed medication, especially your blood thinner Xarelto 15mg once daily to avoid clot formation. Please follow up with you primary care physician/Cardiologist for continue medical management.

## 2019-02-20 NOTE — DISCHARGE NOTE ADULT - MEDICATION SUMMARY - MEDICATIONS TO TAKE
I will START or STAY ON the medications listed below when I get home from the hospital:    predniSONE 20 mg oral tablet  -- 1 tab(s) by mouth once a day for 2 more days  -- Indication: For Acute gout    acetaminophen 325 mg oral tablet  -- 2 tab(s) by mouth every 6 hours, As needed, Mild Pain (1 - 3)  -- Indication: For Pain management    amiodarone 200 mg oral tablet  -- 1 tab(s) by mouth 2 times a day for 6 more days, then 1 tab by mouth once daily  -- Indication: For Atrial fibrillation    rivaroxaban 15 mg oral tablet  -- 1 tab(s) by mouth every 24 hours  -- Indication: For Atrial fibrillation    citalopram 20 mg oral tablet  -- 1 tab(s) by mouth once a day  -- Indication: For Depression    atorvastatin 20 mg oral tablet  -- 1 tab(s) by mouth once a day  -- Indication: For Dyslipidemia    metoprolol tartrate 50 mg oral tablet  -- 1 tab(s) by mouth 2 times a day  -- Indication: For Atrial fibrillation    amLODIPine 5 mg oral tablet  -- 1 tab(s) by mouth once a day (at bedtime)  -- Indication: For HTN    furosemide 40 mg oral tablet  -- 1 tab(s) by mouth once a day  -- Indication: For HTN    levothyroxine 175 mcg (0.175 mg) oral tablet  -- 1 tab(s) by mouth 5 times a week  -- Indication: For Hypothyroidism    Toviaz 4 mg oral tablet, extended release  -- 1 tab(s) by mouth once a day  -- Indication: For Overactive bladder

## 2019-02-20 NOTE — PROGRESS NOTE ADULT - ASSESSMENT
KOBI   - better    - prerenal  CKD 3   - baseline Cr low 1's  Right kidney on small size and with renal cyst on abd sono  RSV PNA  Afib with RVR  recent hip fracture  left foot gout    plan:    cont lasix 40mg po qd  off hctz / triamterene  off high dose benazapril  amio / lopressor per cardio  taper steroids  off abx  renal dose xarelto  dc to [facility]  oupt renal f/u

## 2019-02-20 NOTE — DISCHARGE NOTE ADULT - PLAN OF CARE
Resolution of symptoms, continue medical treatment Your symptoms are likely caused by acute exacerbation on chronic congestive heart failure. You will need to maintain a low salt diet (no more than half of teaspoon of salt) and restrict your daily fluid intake to <1.5 liter. Please continue to take the prescribed as directed daily and watch your weight. If there is increased in weight >3lbs per day or >5lbs per week, you will need to contact your primary care physician for medication changes. If you are having acute symptoms such as shortness of breath on minimal exertion and significant swelling, please return to the ED for evaluation. Please make a follow up appointment with your Cardiologist within 3-5 days after you are discharged. Resolution of symptoms and continue medical treatment You have a history of uncontrolled atrial fibrillation. You underwent cardioversion and your heart rate is successfully converted back to normal sinus rhythm. We added a new medication: Amiodarone to your list. You will need to take Amiodarone 200mg orally two times a day for 6 more days, then decrease to once daily. Your heart rate is well controlled and you will need to continue your prescribed medication, especially your blood thinner Xarelto 15mg once daily to avoid clot formation. Please follow up with you primary care physician/Cardiologist for continue medical management.

## 2019-02-20 NOTE — PROGRESS NOTE ADULT - REASON FOR ADMISSION
dyspnea and palpitations for few days

## 2019-02-20 NOTE — DISCHARGE NOTE ADULT - ADMISSION DATE +STARTOFVISITDATE
Problem: Patient Care Overview  Goal: Plan of Care Review  Outcome: Ongoing (interventions implemented as appropriate)  Afebrile. SR. 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 Still with tingling to left side of face; says no worse. Denies any headache or blurred vision. SCD's in use. Bed low and locked; call bell in reach. Turns self. Neuro and cardio consults. Blood sugar monitoring. Plan of care reviewed with patient and daughter; agree with plan.       Statement Selected

## 2019-02-20 NOTE — PROGRESS NOTE ADULT - ASSESSMENT
<<<RESIDENT DISCHARGE NOTE>>>     JEFFERY UGALDE  MRN-[id number]    VITAL SIGNS:  T(F): 97.7 (02-20-19 @ 13:34), Max: 97.7 (02-20-19 @ 13:34)  HR: 51 (02-20-19 @ 13:34)  BP: 150/70 (02-20-19 @ 13:34)  SpO2: --      PHYSICAL EXAMINATION:  GENERAL: NAD, well-developed, AAOx3  HEENT:  Atraumatic, Normocephalic. Conjunctiva pink and cornea white, No JVD  PULMONARY: Improved bibasilar rhonchi bilaterally  CARDIOVASCULAR: Irregularly irregular rhythm with tachycardia; No murmurs  GASTROINTESTINAL: Soft, Nontender, Nondistended; Bowel sounds present  EXTREMITIES:  2+ Peripheral Pulses, No petal edema; resolved left medial ankle tenderness; no erythema, swelling and warmth, no active lesions  NEUROLOGY: non-focal  SKIN: No rashes or lesions    TEST RESULTS:                        10.5   11.87 )-----------( 320      ( 19 Feb 2019 06:26 )             34.9       02-19    144  |  99  |  59<H>  ----------------------------<  136<H>  3.6   |  28  |  1.5    Ca    9.0      19 Feb 2019 06:26  Mg     2.2     02-19    TPro  6.0  /  Alb  3.5  /  TBili  0.3  /  DBili  x   /  AST  8   /  ALT  20  /  AlkPhos  107  02-19      FINAL DISCHARGE INTERVIEW:  Resident(s) Present: (Name:  WADE Fletcher, RN Present: (Name:  Adrianna Winn RN)    DISCHARGE MEDICATION RECONCILIATION  reviewed with Attending (Name: Aguilar Kothari MD)    DISPOSITION:   [  ] Home,    [  ] Home with Visiting Nursing Services,   [  X  ]  SNF/ NH,    [   ] Acute Rehab (4A),   [   ] Other (Specify:_________)

## 2019-02-21 ENCOUNTER — OUTPATIENT (OUTPATIENT)
Dept: OUTPATIENT SERVICES | Facility: HOSPITAL | Age: 79
LOS: 1 days | Discharge: HOME | End: 2019-02-21

## 2019-02-21 DIAGNOSIS — Z98.890 OTHER SPECIFIED POSTPROCEDURAL STATES: Chronic | ICD-10-CM

## 2019-02-21 DIAGNOSIS — N18.9 CHRONIC KIDNEY DISEASE, UNSPECIFIED: ICD-10-CM

## 2019-02-21 DIAGNOSIS — I50.9 HEART FAILURE, UNSPECIFIED: ICD-10-CM

## 2019-02-21 DIAGNOSIS — I48.91 UNSPECIFIED ATRIAL FIBRILLATION: ICD-10-CM

## 2019-02-25 DIAGNOSIS — I13.0 HYPERTENSIVE HEART AND CHRONIC KIDNEY DISEASE WITH HEART FAILURE AND STAGE 1 THROUGH STAGE 4 CHRONIC KIDNEY DISEASE, OR UNSPECIFIED CHRONIC KIDNEY DISEASE: ICD-10-CM

## 2019-02-25 DIAGNOSIS — M10.9 GOUT, UNSPECIFIED: ICD-10-CM

## 2019-02-25 DIAGNOSIS — N18.3 CHRONIC KIDNEY DISEASE, STAGE 3 (MODERATE): ICD-10-CM

## 2019-02-25 DIAGNOSIS — I48.2 CHRONIC ATRIAL FIBRILLATION: ICD-10-CM

## 2019-02-25 DIAGNOSIS — I50.33 ACUTE ON CHRONIC DIASTOLIC (CONGESTIVE) HEART FAILURE: ICD-10-CM

## 2019-02-25 DIAGNOSIS — J12.1 RESPIRATORY SYNCYTIAL VIRUS PNEUMONIA: ICD-10-CM

## 2019-02-25 DIAGNOSIS — G47.33 OBSTRUCTIVE SLEEP APNEA (ADULT) (PEDIATRIC): ICD-10-CM

## 2019-02-25 DIAGNOSIS — Z79.01 LONG TERM (CURRENT) USE OF ANTICOAGULANTS: ICD-10-CM

## 2019-02-25 DIAGNOSIS — Z85.850 PERSONAL HISTORY OF MALIGNANT NEOPLASM OF THYROID: ICD-10-CM

## 2019-02-25 DIAGNOSIS — N28.1 CYST OF KIDNEY, ACQUIRED: ICD-10-CM

## 2019-02-25 DIAGNOSIS — E78.5 HYPERLIPIDEMIA, UNSPECIFIED: ICD-10-CM

## 2019-02-25 DIAGNOSIS — R06.00 DYSPNEA, UNSPECIFIED: ICD-10-CM

## 2019-02-25 DIAGNOSIS — N17.9 ACUTE KIDNEY FAILURE, UNSPECIFIED: ICD-10-CM

## 2019-02-25 DIAGNOSIS — E89.0 POSTPROCEDURAL HYPOTHYROIDISM: ICD-10-CM

## 2019-02-25 DIAGNOSIS — D64.9 ANEMIA, UNSPECIFIED: ICD-10-CM

## 2019-02-26 ENCOUNTER — OUTPATIENT (OUTPATIENT)
Dept: OUTPATIENT SERVICES | Facility: HOSPITAL | Age: 79
LOS: 1 days | Discharge: HOME | End: 2019-02-26

## 2019-02-26 DIAGNOSIS — Z98.890 OTHER SPECIFIED POSTPROCEDURAL STATES: Chronic | ICD-10-CM

## 2019-02-26 DIAGNOSIS — R79.9 ABNORMAL FINDING OF BLOOD CHEMISTRY, UNSPECIFIED: ICD-10-CM

## 2019-04-16 ENCOUNTER — APPOINTMENT (RX ONLY)
Dept: URBAN - METROPOLITAN AREA CLINIC 150 | Facility: CLINIC | Age: 79
Setting detail: DERMATOLOGY
End: 2019-04-16

## 2019-04-16 DIAGNOSIS — L82.1 OTHER SEBORRHEIC KERATOSIS: ICD-10-CM

## 2019-04-16 DIAGNOSIS — L91.8 OTHER HYPERTROPHIC DISORDERS OF THE SKIN: ICD-10-CM

## 2019-04-16 PROBLEM — M12.9 ARTHROPATHY, UNSPECIFIED: Status: ACTIVE | Noted: 2019-04-16

## 2019-04-16 PROBLEM — I10 ESSENTIAL (PRIMARY) HYPERTENSION: Status: ACTIVE | Noted: 2019-04-16

## 2019-04-16 PROBLEM — E05.90 THYROTOXICOSIS, UNSPECIFIED WITHOUT THYROTOXIC CRISIS OR STORM: Status: ACTIVE | Noted: 2019-04-16

## 2019-04-16 PROBLEM — E78.5 HYPERLIPIDEMIA, UNSPECIFIED: Status: ACTIVE | Noted: 2019-04-16

## 2019-04-16 PROBLEM — I48.91 UNSPECIFIED ATRIAL FIBRILLATION: Status: ACTIVE | Noted: 2019-04-16

## 2019-04-16 PROCEDURE — ? COUNSELING

## 2019-04-16 PROCEDURE — ? OTHER

## 2019-04-16 PROCEDURE — 99202 OFFICE O/P NEW SF 15 MIN: CPT

## 2019-04-16 ASSESSMENT — LOCATION ZONE DERM
LOCATION ZONE: TRUNK
LOCATION ZONE: NECK

## 2019-04-16 ASSESSMENT — LOCATION DETAILED DESCRIPTION DERM
LOCATION DETAILED: LEFT MEDIAL SUPERIOR CHEST
LOCATION DETAILED: RIGHT CLAVICULAR SKIN
LOCATION DETAILED: LEFT MEDIAL TRAPEZIAL NECK
LOCATION DETAILED: RIGHT SUPERIOR MEDIAL UPPER BACK

## 2019-04-16 ASSESSMENT — LOCATION SIMPLE DESCRIPTION DERM
LOCATION SIMPLE: CHEST
LOCATION SIMPLE: POSTERIOR NECK
LOCATION SIMPLE: RIGHT UPPER BACK
LOCATION SIMPLE: RIGHT CLAVICULAR SKIN

## 2019-04-16 NOTE — PROCEDURE: OTHER
Other (Free Text): Counseled patient that skin tags and SKs are not covered by insurance.  Counseled patient that we can remove areas, but they could incur an out of pocket expense.  Patient declines treatment at this time.
Note Text (......Xxx Chief Complaint.): This diagnosis correlates with the
Detail Level: Zone

## 2019-06-26 ENCOUNTER — APPOINTMENT (OUTPATIENT)
Dept: SURGERY | Facility: CLINIC | Age: 79
End: 2019-06-26

## 2019-12-27 ENCOUNTER — OUTPATIENT (OUTPATIENT)
Dept: OUTPATIENT SERVICES | Facility: HOSPITAL | Age: 79
LOS: 1 days | Discharge: HOME | End: 2019-12-27

## 2019-12-27 ENCOUNTER — APPOINTMENT (OUTPATIENT)
Dept: UROGYNECOLOGY | Facility: CLINIC | Age: 79
End: 2019-12-27
Payer: MEDICARE

## 2019-12-27 VITALS
DIASTOLIC BLOOD PRESSURE: 64 MMHG | SYSTOLIC BLOOD PRESSURE: 120 MMHG | HEIGHT: 62 IN | BODY MASS INDEX: 39.38 KG/M2 | WEIGHT: 214 LBS

## 2019-12-27 DIAGNOSIS — Z98.890 OTHER SPECIFIED POSTPROCEDURAL STATES: Chronic | ICD-10-CM

## 2019-12-27 DIAGNOSIS — Z80.1 FAMILY HISTORY OF MALIGNANT NEOPLASM OF TRACHEA, BRONCHUS AND LUNG: ICD-10-CM

## 2019-12-27 DIAGNOSIS — M06.9 RHEUMATOID ARTHRITIS, UNSPECIFIED: ICD-10-CM

## 2019-12-27 DIAGNOSIS — I51.9 HEART DISEASE, UNSPECIFIED: ICD-10-CM

## 2019-12-27 DIAGNOSIS — F41.9 ANXIETY DISORDER, UNSPECIFIED: ICD-10-CM

## 2019-12-27 DIAGNOSIS — Z87.898 PERSONAL HISTORY OF OTHER SPECIFIED CONDITIONS: ICD-10-CM

## 2019-12-27 DIAGNOSIS — Z02.9 ENCOUNTER FOR ADMINISTRATIVE EXAMINATIONS, UNSPECIFIED: ICD-10-CM

## 2019-12-27 PROCEDURE — 99204 OFFICE O/P NEW MOD 45 MIN: CPT

## 2019-12-27 PROCEDURE — 51701 INSERT BLADDER CATHETER: CPT

## 2019-12-27 NOTE — DISCUSSION/SUMMARY
[FreeTextEntry1] : \par Mixed incontinence-\par The pathophysiology of the above condition was discussed with the patient. The patient was given information and education on pelvic floor muscle exercises/rehabilitation, avoidance of dietary bladder irritants, and other strategies to improve bladder control, such as scheduled voiding. She was counseled regarding further management strategies for overactive bladder and urge incontinence including pelvic floor physical therapy, medications or surgical management. \par \par We discussed pelvic floor muscle rehabilitation, sacral neuromodulation (Interstim device), and intravesical Botox A and peripheral tibial nerve stimulation (PTNS). The risks and benefits of all were reviewed and the patient voiced understanding and voiced that she does not want to do botox every 3 months, does not want to only sponge bathe for 2 weeks during the testing phase of interstim and does not have the ability to get here every week for PTNS.\par \par The patient voiced understanding and agrees with diet changes, constipation control and continuing myrbetriq 50mg for now. We will follow up on her urine tests.\par \par Constipation-\par The patient was counseled about her defecatory dysfunction. We discussed the importance of fiber, hydration and exercise. She was given a handout with specific information about dietary fiber and ways to relieve constipation.\par

## 2019-12-27 NOTE — HISTORY OF PRESENT ILLNESS
[FreeTextEntry1] : \par Pt with pelvic floor dysfunction here for urogynecologic evaluation. She describes: \par Referring provider: Dr Tad Savage\par Gyn: Dr Rosas\par \par Chief PFD: urinary incontinence\par \par Pelvic organ prolapse: no bulge, denies splinting\par Stress urinary incontinence: min\par Overactive bladder syndrome: s/p vesicare (for years, no change in symptoms) s/p detrol LA (no change in symptoms), on myrbetriq 50mg for 3 months, mild change, voids q1hour secondary to incontinence, intermittent eneuresis, urge incontinence daily, leakage without warning daily, for years, s/p botox x2 (both worked well but does not like the need for injections every 3 months)\par Voiding dysfunction: yes Incomplete bladder emptying, no hesitancy \par Lower urinary tract/vaginal symptoms: no recurrent UTIs per year, no hematuria, no dysuria, no bladder pain \par Fecal incontinence: denies\par Defecatory dysfunction: Type I\par Sexual dysfunction: not active\par Pelvic pain: denies\par Vaginal dryness:hx of a fib, on xarelto, no bothersome dryness\par \par Her pelvic floor symptoms are significantly bothersome and negatively impacting her quality of life. \par \par

## 2019-12-27 NOTE — COUNSELING
[FreeTextEntry1] : \par We will notify you of the urine results if they are abnormal\par \par Please increase your water intake to at least 4 cups of water per day\par \par For 4-5 days, cut one item from the list out of your diet.\par \par After 4-5 days, it it makes a difference, you have to decide if it is worth keeping it out of your diet to help with the urinary issues.\par \par If it does not make a difference, you should add it back into your diet and remove another item for another 4-5 days.\par \par Please continue with the myrbetriq for now\par \par Bowel recipe every night for constipation control\par \par Schedule 6 week follow up with my [facility] ([facility] – [place], [place])

## 2019-12-28 DIAGNOSIS — N39.46 MIXED INCONTINENCE: ICD-10-CM

## 2019-12-29 LAB
APPEARANCE: CLEAR
BACTERIA UR CULT: NORMAL
BILIRUBIN URINE: NEGATIVE
BLOOD URINE: NEGATIVE
COLOR: YELLOW
GLUCOSE QUALITATIVE U: NEGATIVE
KETONES URINE: NEGATIVE
LEUKOCYTE ESTERASE URINE: NEGATIVE
NITRITE URINE: NEGATIVE
PH URINE: 6
PROTEIN URINE: NORMAL
SPECIFIC GRAVITY URINE: 1.02
UROBILINOGEN URINE: NORMAL

## 2019-12-31 DIAGNOSIS — N39.46 MIXED INCONTINENCE: ICD-10-CM

## 2019-12-31 DIAGNOSIS — K59.00 CONSTIPATION, UNSPECIFIED: ICD-10-CM

## 2020-01-14 ENCOUNTER — OUTPATIENT (OUTPATIENT)
Dept: OUTPATIENT SERVICES | Facility: HOSPITAL | Age: 80
LOS: 1 days | Discharge: HOME | End: 2020-01-14
Payer: MEDICARE

## 2020-01-14 DIAGNOSIS — Z98.890 OTHER SPECIFIED POSTPROCEDURAL STATES: Chronic | ICD-10-CM

## 2020-01-14 DIAGNOSIS — Z01.818 ENCOUNTER FOR OTHER PREPROCEDURAL EXAMINATION: ICD-10-CM

## 2020-01-14 PROCEDURE — 78452 HT MUSCLE IMAGE SPECT MULT: CPT | Mod: 26

## 2020-02-03 DIAGNOSIS — R94.31 ABNORMAL ELECTROCARDIOGRAM [ECG] [EKG]: ICD-10-CM

## 2020-02-07 ENCOUNTER — APPOINTMENT (OUTPATIENT)
Dept: UROGYNECOLOGY | Facility: CLINIC | Age: 80
End: 2020-02-07

## 2020-06-04 NOTE — ED PROVIDER NOTE - NS ED MD DISPO ISOLATION TYPES
None

## 2020-06-20 ENCOUNTER — OUTPATIENT (OUTPATIENT)
Dept: OUTPATIENT SERVICES | Facility: HOSPITAL | Age: 80
LOS: 1 days | Discharge: HOME | End: 2020-06-20
Payer: MEDICARE

## 2020-06-20 DIAGNOSIS — Z98.890 OTHER SPECIFIED POSTPROCEDURAL STATES: Chronic | ICD-10-CM

## 2020-06-20 DIAGNOSIS — Z12.31 ENCOUNTER FOR SCREENING MAMMOGRAM FOR MALIGNANT NEOPLASM OF BREAST: ICD-10-CM

## 2020-06-20 PROCEDURE — 77063 BREAST TOMOSYNTHESIS BI: CPT | Mod: 26

## 2020-06-20 PROCEDURE — 77067 SCR MAMMO BI INCL CAD: CPT | Mod: 26

## 2021-02-12 ENCOUNTER — APPOINTMENT (OUTPATIENT)
Dept: UROGYNECOLOGY | Facility: CLINIC | Age: 81
End: 2021-02-12
Payer: MEDICARE

## 2021-02-12 ENCOUNTER — OUTPATIENT (OUTPATIENT)
Dept: OUTPATIENT SERVICES | Facility: HOSPITAL | Age: 81
LOS: 1 days | Discharge: HOME | End: 2021-02-12

## 2021-02-12 VITALS — BODY MASS INDEX: 38.41 KG/M2 | DIASTOLIC BLOOD PRESSURE: 80 MMHG | WEIGHT: 210 LBS | SYSTOLIC BLOOD PRESSURE: 130 MMHG

## 2021-02-12 DIAGNOSIS — Z98.890 OTHER SPECIFIED POSTPROCEDURAL STATES: Chronic | ICD-10-CM

## 2021-02-12 PROCEDURE — 99214 OFFICE O/P EST MOD 30 MIN: CPT

## 2021-02-12 NOTE — DISCUSSION/SUMMARY
[FreeTextEntry1] : Mixed Incontinence-\par Rx: Toviaz 4mg QD\par Precautions reviewed.\par Will return in 6 weeks for follow up or earlier if she has any issues.\par \par We discussed pelvic floor muscle rehabilitation, sacral neuromodulation (Interstim device), and intravesical Botox A and peripheral tibial nerve stimulation (PTNS). The risks and benefits of all were reviewed and the patient voiced understanding and agrees with Botox if another medication is not going to help. \par \par WIll need to have UDS w/o reduction and cysto first. \par

## 2021-02-12 NOTE — COUNSELING
[FreeTextEntry1] : If you feel like you have an infection it is important for you to call our office and we will arrange testing of your urine.\par \par Please STOP taking Myrbetriq 50mg.\par \par Please begin taking Toviaz 4 mg once a day. It takes up to 6 weeks to go into full effect. Please  your refill when you complete the 1st bottle.\par \par Please call my office if you have any issues with the cost or side effects of the medication. \par \par Schedule a 6 weeks follow up med check appointment.\par

## 2021-02-12 NOTE — HISTORY OF PRESENT ILLNESS
[FreeTextEntry1] : Patient is here for 1 year follow up for med check for urge incontinence.\par Last seen on 12/27/2019 as a new pt  for urinary incontinence. \par \par s/p vesicare (for years, no change in symptoms) \par s/p detrol LA (no change in symptoms), \par  s/p botox x2 (both worked well but does not like the need for injections every 3 months)\par hx of a fib, on xarelto\par \par Myrbetriq 50mg \par \par Today, patient states she is not happy with Myrbetriq 50mg QD, states that it never helped her,s he was just afraid to stop using it, worried that it would be worse. C/o frequency of urination, voiding every hour during the day, at night it's not so bad, 1-2 times during the night.  Denies side effects. Patient does not feel she has an infection.\par \par Patient would like to try another bladder medication and is interested in third line treatment options. \par \par

## 2021-04-13 ENCOUNTER — APPOINTMENT (OUTPATIENT)
Dept: UROGYNECOLOGY | Facility: CLINIC | Age: 81
End: 2021-04-13
Payer: MEDICARE

## 2021-04-13 ENCOUNTER — OUTPATIENT (OUTPATIENT)
Dept: OUTPATIENT SERVICES | Facility: HOSPITAL | Age: 81
LOS: 1 days | Discharge: HOME | End: 2021-04-13

## 2021-04-13 VITALS
DIASTOLIC BLOOD PRESSURE: 60 MMHG | HEIGHT: 62 IN | SYSTOLIC BLOOD PRESSURE: 102 MMHG | WEIGHT: 219 LBS | BODY MASS INDEX: 40.3 KG/M2

## 2021-04-13 DIAGNOSIS — Z98.890 OTHER SPECIFIED POSTPROCEDURAL STATES: Chronic | ICD-10-CM

## 2021-04-13 PROCEDURE — 99213 OFFICE O/P EST LOW 20 MIN: CPT

## 2021-04-13 RX ORDER — MIRABEGRON 25 MG/1
25 TABLET, FILM COATED, EXTENDED RELEASE ORAL
Refills: 0 | Status: DISCONTINUED | COMMUNITY
End: 2021-04-13

## 2021-04-13 NOTE — COUNSELING
[FreeTextEntry1] : If you feel like you have an infection it is important for you to call our office and we will arrange testing of your urine.\par \par Please continue taking Toviaz 4mg for frequency. Refills sent to your pharmacy.\par \par Please call my office if you have any issues with the cost or side effects of the medication. \par \par Schedule a 6 months follow up med check appointment.\par

## 2021-04-13 NOTE — DISCUSSION/SUMMARY
[FreeTextEntry1] : Mixed Incontinence-\par Patient happy with Toviaz 4mg QD\par Refills provided. 90 days supply with 1 refills.\par Precautions reviewed.\par Will return in 6 months for follow up or earlier if she has any issues.\par \par \par

## 2021-04-13 NOTE — HISTORY OF PRESENT ILLNESS
[FreeTextEntry1] : Patient is here for 6 weeks med check for urge incontinence.\par Last seen on 2/12/21 for med check.\par \par s/p vesicare (for years, no change in symptoms) \par s/p detrol LA (no change in symptoms), \par  s/p botox x2 (both worked well but does not like the need for injections every 3 months)\par hx of a fib, on xarelto\par \par s/p Myrbetriq 50mg : stopped working\par Toviaz 4mg QD\par \par Today, patient states she is happy with Toviaz 4mg QD  and is noticing improvement. Denies side effects. Patient does not feel she has an infection.\par \par Patient would like to continue Toviaz 4mg QD.\par

## 2021-05-13 ENCOUNTER — APPOINTMENT (OUTPATIENT)
Age: 81
End: 2021-05-13
Payer: MEDICARE

## 2021-05-13 VITALS
RESPIRATION RATE: 14 BRPM | HEART RATE: 67 BPM | SYSTOLIC BLOOD PRESSURE: 128 MMHG | HEIGHT: 62 IN | BODY MASS INDEX: 41.04 KG/M2 | WEIGHT: 223 LBS | OXYGEN SATURATION: 97 % | DIASTOLIC BLOOD PRESSURE: 80 MMHG

## 2021-05-13 PROCEDURE — 71046 X-RAY EXAM CHEST 2 VIEWS: CPT

## 2021-05-13 PROCEDURE — 99203 OFFICE O/P NEW LOW 30 MIN: CPT | Mod: 25

## 2021-05-13 NOTE — HISTORY OF PRESENT ILLNESS
[None] : No associated symptoms are reported [CPAP] : CPAP [Good Compliance] : good compliance with treatment [Good Tolerance] : good tolerance of treatment [Good Symptom Control] : good symptom control [Follow-Up - Routine Clinic] : a routine clinic follow-up of [Excessive Daytime Sleepiness] : excessive daytime sleepiness [Snoring] : snoring [Unrefreshing Sleep] : unrefreshing sleep [Currently Experiencing] : The patient is currently experiencing symptoms.

## 2021-05-13 NOTE — PROCEDURE
[FreeTextEntry1] : CXR PA and Lateral \par The costophrenic and cardiophrenic angles are sharp\par The nurys parenchyma shows no infiltrates, consolidations, or nodules \par The Mediastinum is within normal limits\par No pleural effusions\par

## 2021-05-13 NOTE — ASSESSMENT
[FreeTextEntry1] : HO FELIX optimum CPAP 12 cm H2O \par Likely undertreated with the current CPAP setting.  \par Using her daughter's CPAP machine  \par HO small lung nodules stable for years

## 2021-05-13 NOTE — REASON FOR VISIT
[Follow-Up] : a follow-up visit [Abnormal CXR/ Chest CT] : an abnormal CXR/ chest CT [Sleep Apnea] : sleep apnea

## 2021-05-28 ENCOUNTER — OUTPATIENT (OUTPATIENT)
Dept: OUTPATIENT SERVICES | Facility: HOSPITAL | Age: 81
LOS: 1 days | Discharge: HOME | End: 2021-05-28
Payer: MEDICARE

## 2021-05-28 DIAGNOSIS — Z98.890 OTHER SPECIFIED POSTPROCEDURAL STATES: Chronic | ICD-10-CM

## 2021-05-28 PROCEDURE — 95810 POLYSOM 6/> YRS 4/> PARAM: CPT | Mod: 26

## 2021-06-01 DIAGNOSIS — G47.33 OBSTRUCTIVE SLEEP APNEA (ADULT) (PEDIATRIC): ICD-10-CM

## 2021-06-22 ENCOUNTER — RESULT REVIEW (OUTPATIENT)
Age: 81
End: 2021-06-22

## 2021-06-22 ENCOUNTER — OUTPATIENT (OUTPATIENT)
Dept: OUTPATIENT SERVICES | Facility: HOSPITAL | Age: 81
LOS: 1 days | Discharge: HOME | End: 2021-06-22
Payer: MEDICARE

## 2021-06-22 DIAGNOSIS — Z12.31 ENCOUNTER FOR SCREENING MAMMOGRAM FOR MALIGNANT NEOPLASM OF BREAST: ICD-10-CM

## 2021-06-22 DIAGNOSIS — Z98.890 OTHER SPECIFIED POSTPROCEDURAL STATES: Chronic | ICD-10-CM

## 2021-06-22 PROCEDURE — 77067 SCR MAMMO BI INCL CAD: CPT | Mod: 26

## 2021-06-22 PROCEDURE — 77063 BREAST TOMOSYNTHESIS BI: CPT | Mod: 26

## 2021-06-24 ENCOUNTER — APPOINTMENT (OUTPATIENT)
Age: 81
End: 2021-06-24
Payer: MEDICARE

## 2021-06-24 VITALS
HEIGHT: 62 IN | DIASTOLIC BLOOD PRESSURE: 80 MMHG | OXYGEN SATURATION: 98 % | RESPIRATION RATE: 14 BRPM | BODY MASS INDEX: 40.3 KG/M2 | SYSTOLIC BLOOD PRESSURE: 120 MMHG | HEART RATE: 70 BPM | WEIGHT: 219 LBS

## 2021-06-24 PROCEDURE — 99213 OFFICE O/P EST LOW 20 MIN: CPT

## 2021-06-24 NOTE — ASSESSMENT
[FreeTextEntry1] : HO FELIX\par NPSG with severe FELIX and O2 desaturations \par Using her daughter's CPAP machine  \par HO small lung nodules stable for years

## 2021-06-24 NOTE — HISTORY OF PRESENT ILLNESS
[Excessive Daytime Sleepiness] : excessive daytime sleepiness [Snoring] : snoring [Unrefreshing Sleep] : unrefreshing sleep [Currently Experiencing] : The patient is currently experiencing symptoms. [None] : No associated symptoms are reported [CPAP] : CPAP [Good Compliance] : good compliance with treatment [Good Tolerance] : good tolerance of treatment [Good Symptom Control] : good symptom control [Follow-Up - Routine Clinic] : a routine clinic follow-up of

## 2021-07-20 ENCOUNTER — NON-APPOINTMENT (OUTPATIENT)
Age: 81
End: 2021-07-20

## 2021-07-21 ENCOUNTER — NON-APPOINTMENT (OUTPATIENT)
Age: 81
End: 2021-07-21

## 2021-07-28 ENCOUNTER — OUTPATIENT (OUTPATIENT)
Dept: OUTPATIENT SERVICES | Facility: HOSPITAL | Age: 81
LOS: 1 days | Discharge: HOME | End: 2021-07-28
Payer: MEDICARE

## 2021-07-28 DIAGNOSIS — Z98.890 OTHER SPECIFIED POSTPROCEDURAL STATES: Chronic | ICD-10-CM

## 2021-07-28 PROCEDURE — 95811 POLYSOM 6/>YRS CPAP 4/> PARM: CPT | Mod: 26

## 2021-07-29 DIAGNOSIS — G47.33 OBSTRUCTIVE SLEEP APNEA (ADULT) (PEDIATRIC): ICD-10-CM

## 2021-08-03 NOTE — DISCHARGE NOTE ADULT - NS AS DC FU [facility] LV FUNCTION ASSESSMENT
Thank you for your patience today.  Please follow-up with your regular doctor in the next 2-3 days for further evaluation and follow-up care.  Please call to schedule an appointment.  Please continue your own medications.  Please return to the ER if you develop any worsening of your current symptoms.  It was a pleasure taking care of you today.  We hope you feel better soon.     yes

## 2021-09-29 ENCOUNTER — RX RENEWAL (OUTPATIENT)
Age: 81
End: 2021-09-29

## 2021-10-12 ENCOUNTER — APPOINTMENT (OUTPATIENT)
Dept: UROGYNECOLOGY | Facility: CLINIC | Age: 81
End: 2021-10-12

## 2021-11-18 ENCOUNTER — APPOINTMENT (OUTPATIENT)
Age: 81
End: 2021-11-18
Payer: MEDICARE

## 2021-11-18 VITALS
BODY MASS INDEX: 40.48 KG/M2 | HEIGHT: 62 IN | DIASTOLIC BLOOD PRESSURE: 80 MMHG | RESPIRATION RATE: 12 BRPM | HEART RATE: 86 BPM | SYSTOLIC BLOOD PRESSURE: 120 MMHG | WEIGHT: 220 LBS | OXYGEN SATURATION: 98 %

## 2021-11-18 PROCEDURE — 99213 OFFICE O/P EST LOW 20 MIN: CPT

## 2021-11-21 NOTE — PATIENT PROFILE ADULT - BRADEN ACTIVITY
PAST SURGICAL HISTORY:  No significant past surgical history     No significant past surgical history      (2) chairfast

## 2021-12-05 ENCOUNTER — RX RENEWAL (OUTPATIENT)
Age: 81
End: 2021-12-05

## 2022-04-02 ENCOUNTER — RX RENEWAL (OUTPATIENT)
Age: 82
End: 2022-04-02

## 2022-04-29 ENCOUNTER — RESULT REVIEW (OUTPATIENT)
Age: 82
End: 2022-04-29

## 2022-04-29 ENCOUNTER — OUTPATIENT (OUTPATIENT)
Dept: OUTPATIENT SERVICES | Facility: HOSPITAL | Age: 82
LOS: 1 days | Discharge: HOME | End: 2022-04-29
Payer: MEDICARE

## 2022-04-29 ENCOUNTER — TRANSCRIPTION ENCOUNTER (OUTPATIENT)
Age: 82
End: 2022-04-29

## 2022-04-29 VITALS
OXYGEN SATURATION: 95 % | SYSTOLIC BLOOD PRESSURE: 128 MMHG | HEART RATE: 69 BPM | RESPIRATION RATE: 12 BRPM | DIASTOLIC BLOOD PRESSURE: 62 MMHG

## 2022-04-29 VITALS
HEART RATE: 59 BPM | SYSTOLIC BLOOD PRESSURE: 156 MMHG | TEMPERATURE: 97 F | DIASTOLIC BLOOD PRESSURE: 97 MMHG | RESPIRATION RATE: 18 BRPM

## 2022-04-29 DIAGNOSIS — Z98.890 OTHER SPECIFIED POSTPROCEDURAL STATES: Chronic | ICD-10-CM

## 2022-04-29 PROCEDURE — 88312 SPECIAL STAINS GROUP 1: CPT | Mod: 26

## 2022-04-29 PROCEDURE — 88305 TISSUE EXAM BY PATHOLOGIST: CPT | Mod: 26

## 2022-04-29 NOTE — H&P PST ADULT - NSICDXFAMILYHX_GEN_ALL_CORE_FT
FAMILY HISTORY:  Father  Still living? Unknown  Family history of abdominal aortic aneurysm (AAA), Age at diagnosis: Age Unknown    Mother  Still living? Unknown  Family history of lung cancer, Age at diagnosis: Age Unknown

## 2022-04-29 NOTE — ASU PATIENT PROFILE, ADULT - NSICDXPASTMEDICALHX_GEN_ALL_CORE_FT
PAST MEDICAL HISTORY:  Afib on xarelto    CKD (chronic kidney disease) stage 3, GFR 30-59 ml/min     High cholesterol     HTN (hypertension)     Hypothyroid s/p thyroid ca surgery    Osteoarthritis     Sleep apnea

## 2022-04-29 NOTE — CHART NOTE - NSCHARTNOTEFT_GEN_A_CORE
PACU ANESTHESIA ADMISSION NOTE      Procedure: EGD and colonoscopy   Post op diagnosis: anemia     ____  Intubated  TV:______       Rate: ______      FiO2: ______    _x___  Patent Airway    _x___  Full return of protective reflexes    _x___  Full recovery from anesthesia / back to baseline status    Vitals:  T(F): 97   HR: 52  BP: 139/65  RR: 18  SpO2: 99%    Mental Status:  _x___ Awake   ___x_ Alert   _____ Drowsy   _____ Sedated    Nausea/Vomiting:  _x___  NO       ______Yes,   See Post - Op Orders         Pain Scale (0-10):  __0___    Treatment: _x___ None    ____ See Post - Op/PCA Orders    Post - Operative Fluids:   __x__ Oral   ____ See Post - Op Orders    Plan: Discharge:   _x___Home       _____Floor     _____Critical Care    _____  Other:_________________    Comments:  No anesthesia issues or complications noted.  Discharge when criteria met.

## 2022-05-02 LAB
SURGICAL PATHOLOGY STUDY: SIGNIFICANT CHANGE UP
SURGICAL PATHOLOGY STUDY: SIGNIFICANT CHANGE UP

## 2022-05-04 ENCOUNTER — NON-APPOINTMENT (OUTPATIENT)
Age: 82
End: 2022-05-04

## 2022-05-04 DIAGNOSIS — M19.90 UNSPECIFIED OSTEOARTHRITIS, UNSPECIFIED SITE: ICD-10-CM

## 2022-05-04 DIAGNOSIS — Z79.01 LONG TERM (CURRENT) USE OF ANTICOAGULANTS: ICD-10-CM

## 2022-05-04 DIAGNOSIS — D64.9 ANEMIA, UNSPECIFIED: ICD-10-CM

## 2022-05-04 DIAGNOSIS — K31.7 POLYP OF STOMACH AND DUODENUM: ICD-10-CM

## 2022-05-04 DIAGNOSIS — K57.30 DIVERTICULOSIS OF LARGE INTESTINE WITHOUT PERFORATION OR ABSCESS WITHOUT BLEEDING: ICD-10-CM

## 2022-05-04 DIAGNOSIS — N18.30 CHRONIC KIDNEY DISEASE, STAGE 3 UNSPECIFIED: ICD-10-CM

## 2022-05-04 DIAGNOSIS — K29.50 UNSPECIFIED CHRONIC GASTRITIS WITHOUT BLEEDING: ICD-10-CM

## 2022-05-04 DIAGNOSIS — D12.5 BENIGN NEOPLASM OF SIGMOID COLON: ICD-10-CM

## 2022-05-04 DIAGNOSIS — E03.9 HYPOTHYROIDISM, UNSPECIFIED: ICD-10-CM

## 2022-05-04 DIAGNOSIS — B96.81 HELICOBACTER PYLORI [H. PYLORI] AS THE CAUSE OF DISEASES CLASSIFIED ELSEWHERE: ICD-10-CM

## 2022-05-04 DIAGNOSIS — I12.9 HYPERTENSIVE CHRONIC KIDNEY DISEASE WITH STAGE 1 THROUGH STAGE 4 CHRONIC KIDNEY DISEASE, OR UNSPECIFIED CHRONIC KIDNEY DISEASE: ICD-10-CM

## 2022-05-04 DIAGNOSIS — Z99.89 DEPENDENCE ON OTHER ENABLING MACHINES AND DEVICES: ICD-10-CM

## 2022-05-04 DIAGNOSIS — Z85.850 PERSONAL HISTORY OF MALIGNANT NEOPLASM OF THYROID: ICD-10-CM

## 2022-05-04 DIAGNOSIS — D12.2 BENIGN NEOPLASM OF ASCENDING COLON: ICD-10-CM

## 2022-05-04 DIAGNOSIS — K64.8 OTHER HEMORRHOIDS: ICD-10-CM

## 2022-05-04 DIAGNOSIS — E78.00 PURE HYPERCHOLESTEROLEMIA, UNSPECIFIED: ICD-10-CM

## 2022-05-04 DIAGNOSIS — I48.91 UNSPECIFIED ATRIAL FIBRILLATION: ICD-10-CM

## 2022-05-04 DIAGNOSIS — G47.33 OBSTRUCTIVE SLEEP APNEA (ADULT) (PEDIATRIC): ICD-10-CM

## 2022-05-20 ENCOUNTER — INPATIENT (INPATIENT)
Facility: HOSPITAL | Age: 82
LOS: 3 days | Discharge: HOME | End: 2022-05-24
Attending: INTERNAL MEDICINE | Admitting: INTERNAL MEDICINE
Payer: MEDICARE

## 2022-05-20 VITALS
HEIGHT: 62 IN | DIASTOLIC BLOOD PRESSURE: 69 MMHG | WEIGHT: 210.1 LBS | OXYGEN SATURATION: 98 % | RESPIRATION RATE: 17 BRPM | SYSTOLIC BLOOD PRESSURE: 142 MMHG | HEART RATE: 79 BPM | TEMPERATURE: 98 F

## 2022-05-20 DIAGNOSIS — Z98.890 OTHER SPECIFIED POSTPROCEDURAL STATES: Chronic | ICD-10-CM

## 2022-05-20 DIAGNOSIS — N17.9 ACUTE KIDNEY FAILURE, UNSPECIFIED: ICD-10-CM

## 2022-05-20 LAB
ALBUMIN SERPL ELPH-MCNC: 3.7 G/DL — SIGNIFICANT CHANGE UP (ref 3.5–5.2)
ALP SERPL-CCNC: 99 U/L — SIGNIFICANT CHANGE UP (ref 30–115)
ALT FLD-CCNC: 9 U/L — SIGNIFICANT CHANGE UP (ref 0–41)
ANION GAP SERPL CALC-SCNC: 12 MMOL/L — SIGNIFICANT CHANGE UP (ref 7–14)
APTT BLD: 44 SEC — HIGH (ref 27–39.2)
AST SERPL-CCNC: 15 U/L — SIGNIFICANT CHANGE UP (ref 0–41)
BASOPHILS # BLD AUTO: 0.04 K/UL — SIGNIFICANT CHANGE UP (ref 0–0.2)
BASOPHILS NFR BLD AUTO: 0.5 % — SIGNIFICANT CHANGE UP (ref 0–1)
BILIRUB SERPL-MCNC: 0.3 MG/DL — SIGNIFICANT CHANGE UP (ref 0.2–1.2)
BLD GP AB SCN SERPL QL: SIGNIFICANT CHANGE UP
BUN SERPL-MCNC: 37 MG/DL — HIGH (ref 10–20)
CALCIUM SERPL-MCNC: 8.7 MG/DL — SIGNIFICANT CHANGE UP (ref 8.5–10.1)
CHLORIDE SERPL-SCNC: 105 MMOL/L — SIGNIFICANT CHANGE UP (ref 98–110)
CK MB CFR SERPL CALC: 1.3 NG/ML — SIGNIFICANT CHANGE UP (ref 0.6–6.3)
CK SERPL-CCNC: 90 U/L — SIGNIFICANT CHANGE UP (ref 0–225)
CO2 SERPL-SCNC: 25 MMOL/L — SIGNIFICANT CHANGE UP (ref 17–32)
CREAT SERPL-MCNC: 1.8 MG/DL — HIGH (ref 0.7–1.5)
EGFR: 28 ML/MIN/1.73M2 — LOW
EOSINOPHIL # BLD AUTO: 0.16 K/UL — SIGNIFICANT CHANGE UP (ref 0–0.7)
EOSINOPHIL NFR BLD AUTO: 2.1 % — SIGNIFICANT CHANGE UP (ref 0–8)
GLUCOSE SERPL-MCNC: 107 MG/DL — HIGH (ref 70–99)
HCT VFR BLD CALC: 26.6 % — LOW (ref 37–47)
HCT VFR BLD CALC: 29.7 % — LOW (ref 37–47)
HGB BLD-MCNC: 7.9 G/DL — LOW (ref 12–16)
HGB BLD-MCNC: 8.8 G/DL — LOW (ref 12–16)
IMM GRANULOCYTES NFR BLD AUTO: 0.5 % — HIGH (ref 0.1–0.3)
INR BLD: 1.73 RATIO — HIGH (ref 0.65–1.3)
LYMPHOCYTES # BLD AUTO: 1.65 K/UL — SIGNIFICANT CHANGE UP (ref 1.2–3.4)
LYMPHOCYTES # BLD AUTO: 21.5 % — SIGNIFICANT CHANGE UP (ref 20.5–51.1)
MCHC RBC-ENTMCNC: 22.5 PG — LOW (ref 27–31)
MCHC RBC-ENTMCNC: 22.6 PG — LOW (ref 27–31)
MCHC RBC-ENTMCNC: 29.6 G/DL — LOW (ref 32–37)
MCHC RBC-ENTMCNC: 29.7 G/DL — LOW (ref 32–37)
MCV RBC AUTO: 75.8 FL — LOW (ref 81–99)
MCV RBC AUTO: 76.2 FL — LOW (ref 81–99)
MONOCYTES # BLD AUTO: 0.66 K/UL — HIGH (ref 0.1–0.6)
MONOCYTES NFR BLD AUTO: 8.6 % — SIGNIFICANT CHANGE UP (ref 1.7–9.3)
NEUTROPHILS # BLD AUTO: 5.13 K/UL — SIGNIFICANT CHANGE UP (ref 1.4–6.5)
NEUTROPHILS NFR BLD AUTO: 66.8 % — SIGNIFICANT CHANGE UP (ref 42.2–75.2)
NRBC # BLD: 0 /100 WBCS — SIGNIFICANT CHANGE UP (ref 0–0)
NRBC # BLD: 0 /100 WBCS — SIGNIFICANT CHANGE UP (ref 0–0)
NT-PROBNP SERPL-SCNC: 1262 PG/ML — HIGH (ref 0–300)
PLATELET # BLD AUTO: 296 K/UL — SIGNIFICANT CHANGE UP (ref 130–400)
PLATELET # BLD AUTO: 312 K/UL — SIGNIFICANT CHANGE UP (ref 130–400)
POTASSIUM SERPL-MCNC: 3.3 MMOL/L — LOW (ref 3.5–5)
POTASSIUM SERPL-SCNC: 3.3 MMOL/L — LOW (ref 3.5–5)
PROT SERPL-MCNC: 6 G/DL — SIGNIFICANT CHANGE UP (ref 6–8)
PROTHROM AB SERPL-ACNC: 19.8 SEC — HIGH (ref 9.95–12.87)
RBC # BLD: 3.51 M/UL — LOW (ref 4.2–5.4)
RBC # BLD: 3.9 M/UL — LOW (ref 4.2–5.4)
RBC # FLD: 18 % — HIGH (ref 11.5–14.5)
RBC # FLD: 18.4 % — HIGH (ref 11.5–14.5)
SARS-COV-2 RNA SPEC QL NAA+PROBE: SIGNIFICANT CHANGE UP
SODIUM SERPL-SCNC: 142 MMOL/L — SIGNIFICANT CHANGE UP (ref 135–146)
TROPONIN T SERPL-MCNC: <0.01 NG/ML — SIGNIFICANT CHANGE UP
TROPONIN T SERPL-MCNC: <0.01 NG/ML — SIGNIFICANT CHANGE UP
WBC # BLD: 6.11 K/UL — SIGNIFICANT CHANGE UP (ref 4.8–10.8)
WBC # BLD: 7.68 K/UL — SIGNIFICANT CHANGE UP (ref 4.8–10.8)
WBC # FLD AUTO: 6.11 K/UL — SIGNIFICANT CHANGE UP (ref 4.8–10.8)
WBC # FLD AUTO: 7.68 K/UL — SIGNIFICANT CHANGE UP (ref 4.8–10.8)

## 2022-05-20 PROCEDURE — 99285 EMERGENCY DEPT VISIT HI MDM: CPT

## 2022-05-20 PROCEDURE — 99223 1ST HOSP IP/OBS HIGH 75: CPT

## 2022-05-20 PROCEDURE — 93010 ELECTROCARDIOGRAM REPORT: CPT

## 2022-05-20 PROCEDURE — 71045 X-RAY EXAM CHEST 1 VIEW: CPT | Mod: 26

## 2022-05-20 RX ORDER — ATORVASTATIN CALCIUM 80 MG/1
20 TABLET, FILM COATED ORAL AT BEDTIME
Refills: 0 | Status: DISCONTINUED | OUTPATIENT
Start: 2022-05-20 | End: 2022-05-24

## 2022-05-20 RX ORDER — METOPROLOL TARTRATE 50 MG
50 TABLET ORAL
Refills: 0 | Status: DISCONTINUED | OUTPATIENT
Start: 2022-05-20 | End: 2022-05-24

## 2022-05-20 RX ORDER — LEVOTHYROXINE SODIUM 125 MCG
175 TABLET ORAL
Refills: 0 | Status: DISCONTINUED | OUTPATIENT
Start: 2022-05-21 | End: 2022-05-24

## 2022-05-20 RX ORDER — PANTOPRAZOLE SODIUM 20 MG/1
40 TABLET, DELAYED RELEASE ORAL
Refills: 0 | Status: DISCONTINUED | OUTPATIENT
Start: 2022-05-20 | End: 2022-05-20

## 2022-05-20 RX ORDER — AMLODIPINE BESYLATE 2.5 MG/1
5 TABLET ORAL AT BEDTIME
Refills: 0 | Status: DISCONTINUED | OUTPATIENT
Start: 2022-05-20 | End: 2022-05-24

## 2022-05-20 RX ORDER — AMIODARONE HYDROCHLORIDE 400 MG/1
200 TABLET ORAL DAILY
Refills: 0 | Status: DISCONTINUED | OUTPATIENT
Start: 2022-05-20 | End: 2022-05-24

## 2022-05-20 RX ORDER — AMOXICILLIN 250 MG/5ML
1000 SUSPENSION, RECONSTITUTED, ORAL (ML) ORAL
Refills: 0 | Status: DISCONTINUED | OUTPATIENT
Start: 2022-05-20 | End: 2022-05-24

## 2022-05-20 RX ORDER — FUROSEMIDE 40 MG
40 TABLET ORAL ONCE
Refills: 0 | Status: COMPLETED | OUTPATIENT
Start: 2022-05-20 | End: 2022-05-20

## 2022-05-20 RX ORDER — OXYBUTYNIN CHLORIDE 5 MG
5 TABLET ORAL
Refills: 0 | Status: DISCONTINUED | OUTPATIENT
Start: 2022-05-20 | End: 2022-05-20

## 2022-05-20 RX ORDER — PANTOPRAZOLE SODIUM 20 MG/1
40 TABLET, DELAYED RELEASE ORAL EVERY 12 HOURS
Refills: 0 | Status: DISCONTINUED | OUTPATIENT
Start: 2022-05-20 | End: 2022-05-24

## 2022-05-20 RX ORDER — CITALOPRAM 10 MG/1
1 TABLET, FILM COATED ORAL
Qty: 0 | Refills: 0 | DISCHARGE

## 2022-05-20 RX ORDER — OMEPRAZOLE 10 MG/1
1 CAPSULE, DELAYED RELEASE ORAL
Qty: 0 | Refills: 0 | DISCHARGE

## 2022-05-20 RX ORDER — RIVAROXABAN 15 MG-20MG
15 KIT ORAL
Refills: 0 | Status: DISCONTINUED | OUTPATIENT
Start: 2022-05-20 | End: 2022-05-24

## 2022-05-20 RX ORDER — CHLORHEXIDINE GLUCONATE 213 G/1000ML
1 SOLUTION TOPICAL
Refills: 0 | Status: DISCONTINUED | OUTPATIENT
Start: 2022-05-20 | End: 2022-05-24

## 2022-05-20 RX ADMIN — ATORVASTATIN CALCIUM 20 MILLIGRAM(S): 80 TABLET, FILM COATED ORAL at 21:18

## 2022-05-20 RX ADMIN — Medication 50 MILLIGRAM(S): at 18:14

## 2022-05-20 RX ADMIN — Medication 1000 MILLIGRAM(S): at 18:12

## 2022-05-20 RX ADMIN — Medication 40 MILLIGRAM(S): at 16:53

## 2022-05-20 RX ADMIN — AMLODIPINE BESYLATE 5 MILLIGRAM(S): 2.5 TABLET ORAL at 21:18

## 2022-05-20 RX ADMIN — PANTOPRAZOLE SODIUM 40 MILLIGRAM(S): 20 TABLET, DELAYED RELEASE ORAL at 18:18

## 2022-05-20 RX ADMIN — RIVAROXABAN 15 MILLIGRAM(S): KIT at 18:18

## 2022-05-20 NOTE — H&P ADULT - NSHPLABSRESULTS_GEN_ALL_CORE
7.9    7.68  )-----------( 312      ( 20 May 2022 10:20 )             26.6       05-20    142  |  105  |  37<H>  ----------------------------<  107<H>  3.3<L>   |  25  |  1.8<H>    Ca    8.7      20 May 2022 10:20    TPro  6.0  /  Alb  3.7  /  TBili  0.3  /  DBili  x   /  AST  15  /  ALT  9   /  AlkPhos  99  05-20        PT/INR - ( 20 May 2022 10:20 )   PT: 19.80 sec;   INR: 1.73 ratio         PTT - ( 20 May 2022 10:20 )  PTT:44.0 sec    CARDIAC MARKERS ( 20 May 2022 10:20 )  x     / <0.01 ng/mL / x     / x     / x            CAPILLARY BLOOD GLUCOSE

## 2022-05-20 NOTE — H&P ADULT - ATTENDING COMMENTS
Patient seen and examined on 05/20/2022 at 21:04    HPI:  82 y/o woman with a past medical history of  Paroxysmal AFib on Xarelto, HTN, DL, hypothyroidism (history of thyroid cancer s/p resection), FELIX on CPAP, hx hip fracture, CHFpEF , CKD 3, with recent EGD /colonoscopy for microcytic anemia with H pylori positive (on rx) admitted for acute CHF exacerbation. She notes dyspnea beginning last night with some orthopnea and intermittent leg swelling. Additionally she reported some chest discomfort, non-radiating, not pressure like, but no associated palpitations, LE pain, diaphoresis, abdo pain, n/v.   CXR showed some vascular congestion, BNP 1200, trop negative x3.     REVIEW OF SYSTEMS:  CONSTITUTIONAL:  No weakness, fevers, chills, night sweats, weight loss  EYES/ENT: No visual changes. No vertigo or dysphagia  NECK: No neck pain or stiffness  RESPIRATORY: No cough, wheezing, hemoptysis. + shortness of breath  CARDIOVASCULAR: + chest pain. No palpitations. +lower extremity edema, orthopnea  GASTROINTESTINAL: No abdominal pain. No nausea, vomiting, diarrhea, or hematemesis  GENITOURINARY: No dysuria or hematuria   NEUROLOGICAL: No focal numbness or weakness  SKIN: No rashes or itching  HEMATOLOGIC: No easy bruising or prolonged bleeding.      PHYSICAL EXAM:  GENERAL: NAD, well-developed, Non-toxic, stated age   HEAD:  Atraumatic, Normocephalic  EYES: EOMI, Sclera White   NECK: Supple, No JVD  CHEST/LUNG: minimal crackles in lower lobes; No wheezing, rhonchi. Breathing and speaking comfortably on room air  HEART: Regular rate and rhythm; s1, s2, No murmurs, rubs, or gallops  ABDOMEN: Soft, Nontender, Nondistended; Bowel sounds present, No rebound or guarding noted   EXTREMITIES:  No lower extremity edema or calf tenderness to palpation.  No clubbing or cyanosis  PSYCH: AAOx3, pleasant, cooperative, not anxious  NEUROLOGY: 5/5 strength in all extremities, no downward drift. Sensation grossly intact.   SKIN: No rashes or lesions      ASSESSMENT AND PLAN:  Dyspnea and atypical chest pain --> suspected secondary to mild acute heart failure with preserved ejection fraction  -Cont with IV lasix 40mg qD  -Cont tele monitoring, trend cardiac enzymes, check 2D Echo.   -Cont beta blocker and ACE-I  -Strict I/O, daily weights, and monitor electrolytes carefully while aggressively diuresing.       My note supersedes the residents in the event of a discrepancy. Patient seen and examined on 05/20/2022 at 21:04    HPI:  80 y/o woman with a past medical history of  Paroxysmal AFib on Xarelto, HTN, DL, hypothyroidism (history of thyroid cancer s/p resection), FELIX on CPAP, hx hip fracture, CHFpEF , CKD 3, with recent EGD /colonoscopy for microcytic anemia with H pylori positive (on rx) admitted for acute CHF exacerbation. She notes dyspnea beginning last night with some orthopnea and intermittent leg swelling. Additionally she reported some chest discomfort, non-radiating, not pressure like, but no associated palpitations, LE pain, diaphoresis, abdo pain, n/v.   CXR showed some vascular congestion, BNP 1200, trop negative x3.     REVIEW OF SYSTEMS:  CONSTITUTIONAL:  No weakness, fevers, chills, night sweats, weight loss  EYES/ENT: No visual changes. No vertigo or dysphagia  NECK: No neck pain or stiffness  RESPIRATORY: No cough, wheezing, hemoptysis. + shortness of breath  CARDIOVASCULAR: + chest pain. No palpitations. +lower extremity edema, orthopnea  GASTROINTESTINAL: No abdominal pain. No nausea, vomiting, diarrhea, or hematemesis  GENITOURINARY: No dysuria or hematuria   NEUROLOGICAL: No focal numbness or weakness  SKIN: No rashes or itching  HEMATOLOGIC: No easy bruising or prolonged bleeding.      PHYSICAL EXAM:  GENERAL: NAD, well-developed, Non-toxic, stated age   HEAD:  Atraumatic, Normocephalic  EYES: EOMI, Sclera White   NECK: Supple, No JVD  CHEST/LUNG: minimal crackles in lower lobes; No wheezing, rhonchi. Breathing and speaking comfortably on room air  HEART: Regular rate and rhythm; s1, s2, No murmurs, rubs, or gallops  ABDOMEN: Soft, Nontender, Nondistended; Bowel sounds present, No rebound or guarding noted   EXTREMITIES:  No lower extremity edema or calf tenderness to palpation.  No clubbing or cyanosis  PSYCH: AAOx3, pleasant, cooperative, not anxious  NEUROLOGY: 5/5 strength in all extremities, no downward drift. Sensation grossly intact.   SKIN: No rashes or lesions      ASSESSMENT AND PLAN:  Dyspnea and atypical chest pain --> suspected secondary to mild acute heart failure with preserved ejection fraction  HTN / HLD  -Cont with IV lasix 40mg qD  -Cont tele monitoring, trend cardiac enzymes, check 2D Echo.   -Cont metoprolol and lisinopril (Cr is ~at baseline, check HIE labs)  -Strict I/O, daily weights, and monitor electrolytes carefully while aggressively diuresing.   -Cardio eval with dr malone   -Cont with amlodipine, atorvastatin,     Paroxysmal atrial fibrillation: on xarelto  -Rate controlled on metoprolol and amiodarone     CKD IIIb: Cr roughly at baseline  -Checked HIE previous labs, Cr 1.7  GFR 30 in March 2022 and Cr 1.56 / GRF 36 in 10/2021   -Monitor Cr closely while diuresing, if worsening then stop ACE-I    GERD + H Pylori: currently on triple therapy, cont with protonix, amoxicillin, and clarithromycin     FELIX on CPAP: cont with CPAP at night     Hypothyroidism: cont with levothyroxine     DVT ppx: on xarelto  GI ppx: cont home PPI bid  GOC: Full code.      My note supersedes the residents in the event of a discrepancy.

## 2022-05-20 NOTE — ED ADULT TRIAGE NOTE - INTERNATIONAL TRAVEL
No Tre: 32 yom with hx of methadone program for over 6 months. As per previous records, pt occasionally uses fentanyl and other substances but he says that has improved. Pt missed clinic today because he woke up late and they directed him to ED. Pt states that he has a high dose and still feels that it doesn't last 24 hours. Pt appears slightly agitated but pupils reactive, clear lungs, normal cardiac. When Cassy spoke with pharmacy they stated that pt received 3 doses on Friday which pt vehemently denies. Pt appears reliable but as per protocol offered 30mg but not full dose.   When pharmacist tubed up paperwork it showed that Fridays dose was given Friday and Saturday and Sunday's dose given today and that Monday's dose was given Monday (as in 2 days in the future.) This makes me doubt that today's dose was given. I discussed at length that extra dose may be detrimental to his health if he takes double dose. Patient understands, swears he did not get today's dose. I will give him today's dose.

## 2022-05-20 NOTE — H&P ADULT - ASSESSMENT
80 yo female patient with PMHx of Paroxysmal AF on Xarelto, HTN, DL, hypothyroidism (history of thyroid cancer s/p resection), FELIX on CPAP, hx hip fracture, CHFpEF , CKD 3, with recent EGD /colonoscopy for microcytic anemia  presented to ER for left sided chest pain     #Left sided chest pain r/o ACS vs CHF exacerbation   - tropx1 negative  - CXR: cardiomegaly  - EKG:  Diagnosis Line Sinus rhythm with 1st degree A-V block  Left axis deviation  Non-specific intra-ventricular conduction block  Minimal voltage criteria for LVH, may be normal variant ( [place] product )  Abnormal ECG  - s/p 1 dose of IV lasix  - admit to telemetry  - fu serial EKG and CE    #Acute on chronic microcytic anemia  #H pylori positve on EGD +  - Hb: 7.9 on admission (baseline 9-10)  - recent EGD /COLon by  on 4/29  - EGD: Normal mucosa in the whole esophagus.    Erythema in the stomach compatible with non-erosive gastritis. (Biopsy).    Multiple nodules seen in antrum between 3 and 7 mm in size (Biopsy).    3 mm antral polyp (hyperplastic)   Two 3 mm polyps in the fundus(hyperplastic)  - Colonoscopy (04.29.22 @ 07:30) >   Polyp (1 cm) in the sigmoid colon. (Polypectomy, Endoclip: hyperplastic).    Polyp (1 cm) in the sigmoid colon. 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    Polyp (1 cm) in the Ascendingcolon. (Polypectomy, Endoclip: tubulo villous adenoma).   - will start triple therapy for H pylori  - needs outpt follow up for eradication    #Paroxysmal A fib on xarelto  - c/w lopressor 50mg BID  - c/w xarelto  - monitor CBC  - c/w amiodarone 200mg daily    #HTN  - c/w amlodipine 5mg    #HLD  - c.w lipitor 20mg qHS    #Hypothyroidism  - c/w levothyroxine 175mcg    #DIET: DASH  #DVTppx: xarelto  #GIppx: protonix  #Activity:Increase as tolerated  #CODE: FULL  #Disposition: from home       82 yo female patient with PMHx of Paroxysmal AFib on Xarelto, HTN, DL, hypothyroidism (history of thyroid cancer s/p resection), FELIX on CPAP, hx hip fracture, CHFpEF , CKD 3, with recent EGD /colonoscopy for microcytic anemia with H pylori positive (on rx) presented to ER for left sided shoulder numbness , BECK admitted for further workup    #BECK and chest tightness, atypical presentation r/o ACS vs CHF exacerbation   - tropx1 negative  - CXR: cardiomegaly  - EKG:  Diagnosis Line Sinus rhythm with 1st degree A-V block  Left axis deviation  Non-specific intra-ventricular conduction block  Minimal voltage criteria for LVH, may be normal variant ( [place] product )  Abnormal ECG  - s/p 1 dose of IV lasix  - fu BNP, 2+ LE edema+  - admit to telemetry  - fu repeat ECHO  - cardio consult  (requested by patient)  - fu serial EKG and CE  - baseline : fully funcitonal , lives alone  - strict I&O  - daily weight    #KOBI on CKD 3  - cr: 1.8 (1.1 in 2020)  - monitor BMP  - hold benazepril  - consider nephro consult ( ) if worsening    #Acute on chronic microcytic anemia  #H pylori positve on EGD +  - Hb: 7.9 on admission (baseline 9-10)  - recent EGD /COLon by  on 4/29  - EGD: Normal mucosa in the whole esophagus.    Erythema in the stomach compatible with non-erosive gastritis. (Biopsy).    Multiple nodules seen in antrum between 3 and 7 mm in size (Biopsy).    3 mm antral polyp (hyperplastic)   Two 3 mm polyps in the fundus(hyperplastic)  - Colonoscopy (04.29.22 @ 07:30) >   Polyp (1 cm) in the sigmoid colon. (Polypectomy, Endoclip: hyperplastic).    Polyp (1 cm) in the sigmoid colon. 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    Polyp (1 cm) in the Ascendingcolon. (Polypectomy, Endoclip: tubulo villous adenoma).   - on triple therapy for H pylori (8 more days left), needs clarithromycin NF to be filled, patient can use her own meds  - needs outpt follow up for eradication fu  - maintain active T&S    #Paroxysmal A fib on xarelto  - c/w lopressor 50mg BID  - c/w xarelto  - monitor CBC  - c/w amiodarone 200mg daily    #HTN  - c/w amlodipine 5mg  - hold traimterene-HCTZ ( was discontinued in 2019 for gouty attack but patient mentions she is been taking the medicaiton  - hold benazepril in setting of KOBI    #Gout  - c/w allopurinol 100mg     #prolactinoma?  - on cabergoline (every monday)    #HLD  - c.w lipitor 20mg qHS    #Hypothyroidism  - c/w levothyroxine 175mcg    #DIET: DASH  #DVTppx: xarelto  #GIppx: protonix  #Activity:Increase as tolerated  #CODE: FULL  #Disposition: from home       82 yo female patient with PMHx of Paroxysmal AFib on Xarelto, HTN, DL, hypothyroidism (history of thyroid cancer s/p resection), FELIX on CPAP, hx hip fracture, CHFpEF , CKD 3, with recent EGD /colonoscopy for microcytic anemia with H pylori positive (on rx) presented to ER for left sided shoulder numbness , BECK admitted for further workup    #BECK and chest tightness, atypical presentation r/o ACS vs CHF exacerbation   - tropx1 negative  - CXR: cardiomegaly  - EKG:  Diagnosis Line Sinus rhythm with 1st degree A-V block  Left axis deviation  Non-specific intra-ventricular conduction block  Minimal voltage criteria for LVH, may be normal variant ( [place] product )  Abnormal ECG  - s/p 1 dose of IV lasix  - fu BNP, 2+ LE edema+  - admit to telemetry  - fu repeat ECHO  - cardio consult  (requested by patient)  - fu serial EKG and CE  - baseline : fully funcitonal , lives alone  - strict I&O  - daily weight    #KOBI on CKD 3  - cr: 1.8 (1.1 in 2020)  - monitor BMP  - hold benazepril  - consider nephro consult ( ) if worsening    #Acute on chronic microcytic anemia  #H pylori positve on EGD +  - Hb: 7.9 on admission (baseline 9-10)  - recent EGD /COLon by  on 4/29  - EGD: Normal mucosa in the whole esophagus.    Erythema in the stomach compatible with non-erosive gastritis. (Biopsy).    Multiple nodules seen in antrum between 3 and 7 mm in size (Biopsy).    3 mm antral polyp (hyperplastic)   Two 3 mm polyps in the fundus(hyperplastic)  - Colonoscopy (04.29.22 @ 07:30) >   Polyp (1 cm) in the sigmoid colon. (Polypectomy, Endoclip: hyperplastic).    Polyp (1 cm) in the sigmoid colon. 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    Polyp (1 cm) in the Ascending colon. (Polypectomy, Endoclip: tubulo villous adenoma).   - on triple therapy for H pylori (8 more days left), needs clarithromycin NF to be filled, patient can use her own meds  - needs outpt follow up for eradication fu  - maintain active T&S    #Paroxysmal A fib on xarelto  - c/w lopressor 50mg BID  - c/w xarelto  - monitor CBC  - c/w amiodarone 200mg daily  - s/p FAITH cardioversion in 2019 by     #HTN  - c/w amlodipine 5mg  - hold traimterene-HCTZ ( was discontinued in 2019 for gouty attack but patient mentions she is been taking the medicaiton  - hold benazepril in setting of KOBI    #Gout  - c/w allopurinol 100mg     #prolactinoma?  - on cabergoline (every monday)    #HLD  - c.w lipitor 20mg qHS    #Hypothyroidism  - c/w levothyroxine 175mcg    #DIET: DASH  #DVTppx: xarelto  #GIppx: protonix  #Activity:Increase as tolerated  #CODE: FULL  #Disposition: from home       80 yo female patient with PMHx of Paroxysmal AFib on Xarelto, HTN, DL, hypothyroidism (history of thyroid cancer s/p resection), FELIX on CPAP, hx hip fracture, CHFpEF , CKD 3, with recent EGD /colonoscopy for microcytic anemia with H pylori positive (on rx) presented to ER for left sided shoulder numbness , BECK admitted for further workup    #BECK and chest tightness, atypical presentation r/o ACS vs CHF exacerbation   - tropx1 negative  - CXR: cardiomegaly  - EKG:  Diagnosis Line Sinus rhythm with 1st degree A-V block  Left axis deviation  Non-specific intra-ventricular conduction block  Minimal voltage criteria for LVH, may be normal variant ( [place] product )  Abnormal ECG  - s/p 1 dose of IV lasix  - fu BNP, 2+ LE edema+  - admit to telemetry  - fu repeat ECHO  - cardio consult  (requested by patient)  - fu serial EKG and CE  - baseline : fully funcitonal , lives alone  - strict I&O  - daily weight    #KOBI on CKD 3  - cr: 1.8 (1.1 in 2020)  - monitor BMP  - hold benazepril  - consider nephro consult ( ) if worsening    #Acute on chronic microcytic anemia  #H pylori positve on EGD +  - Hb: 7.9 on admission (baseline 9-10)  - recent EGD /COLon by  on 4/29  - EGD: Normal mucosa in the whole esophagus.    Erythema in the stomach compatible with non-erosive gastritis. (Biopsy).    Multiple nodules seen in antrum between 3 and 7 mm in size (Biopsy).    3 mm antral polyp (hyperplastic)   Two 3 mm polyps in the fundus(hyperplastic)  - Colonoscopy (04.29.22 @ 07:30) >   Polyp (1 cm) in the sigmoid colon. (Polypectomy, Endoclip: hyperplastic).    Polyp (1 cm) in the sigmoid colon. 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    Polyp (1 cm) in the Ascending colon. (Polypectomy, Endoclip: tubulo villous adenoma).   - on triple therapy for H pylori (8 more days left), needs clarithromycin NF to be filled, patient can use her own meds  - needs outpt follow up for eradication fu  - maintain active T&S    #Paroxysmal A fib on xarelto  - c/w lopressor 50mg BID  - c/w xarelto  - monitor CBC  - c/w amiodarone 200mg daily  - s/p FAITH cardioversion in 2019 by     #HTN  - c/w amlodipine 5mg  - hold traimterene-HCTZ ( was discontinued in 2019 for gouty attack but patient mentions she is been taking the medicaiton  - hold benazepril in setting of KOBI    #Gout  - c/w allopurinol 100mg     #prolactinoma?  - on cabergoline (every monday)    #HLD  - c.w lipitor 20mg qHS    #Hypothyroidism  - c/w levothyroxine 175mcg    #overactive bladder  - pt on toriaz 4mg>>ordered oxybuytinin inpatient    #DIET: DASH  #DVTppx: xarelto  #GIppx: protonix  #Activity:Increase as tolerated  #CODE: FULL  #Disposition: from home

## 2022-05-20 NOTE — H&P ADULT - NSHPPHYSICALEXAM_GEN_ALL_CORE
PHYSICAL EXAM:  GENERAL: NAD, AAO x 4, 81y F  HEAD:  Atraumatic, Normocephalic  EYES: EOMI, conjunctiva and sclera white  NECK: Supple, No JVD  CHEST/LUNG: Clear to auscultation bilaterally; No wheeze; No crackles; No accessory muscles used  HEART: Regular rate and rhythm; No murmurs;   ABDOMEN: Soft, Nontender, Nondistended; Bowel sounds present; No guarding, No organomegaly  EXTREMITIES:  2+ Peripheral Pulses, No cyanosis or edema  NEUROLOGY: non-focal

## 2022-05-20 NOTE — H&P ADULT - HISTORY OF PRESENT ILLNESS
82 yo female patient with PMHx of Paroxysmal AF on Xarelto, HTN, DL, hypothyroidism (history of thyroid cancer s/p resection), FELIX on CPAP, hx hip fracture, CHFpEF , CKD 3, with recent EGD /colonoscopy for microcytic anemia  presented to ER for left sided chest pain           ER Course:  Vital Signs Last 24 Hrs  T(F): 98.2 (20 May 2022 08:54), Max: 98.2 (20 May 2022 08:54)  HR: 79 (20 May 2022 08:54) (79 - 79)  BP: 142/69 (20 May 2022 08:54) (142/69 - 142/69)  RR: 17 (20 May 2022 08:54) (17 - 17)  SpO2: 98% (20 May 2022 08:54) (98% - 98%)    Labs:  - trop x1 negative  - hb: 7.9 (baseline 10 in 2019)  -  80 yo female patient with PMHx of Paroxysmal AF on Xarelto, HTN, DL, hypothyroidism (history of thyroid cancer s/p resection), FELIX on CPAP, hx hip fracture, CHFpEF , CKD 3, with recent EGD /colonoscopy for microcytic anemia  presented to ER for left sided chest pain           ER Course:  Vital Signs Last 24 Hrs  T(F): 98.2 (20 May 2022 08:54), Max: 98.2 (20 May 2022 08:54)  HR: 79 (20 May 2022 08:54) (79 - 79)  BP: 142/69 (20 May 2022 08:54) (142/69 - 142/69)  RR: 17 (20 May 2022 08:54) (17 - 17)  SpO2: 98% (20 May 2022 08:54) (98% - 98%)    Labs:  - trop x1 negative  - hb: 7.9 (baseline 10 in 2019)  - Cr: 1.8 (baseline 1.1(2020)    CXR: cardiomegaly 82 yo female patient with PMHx of Paroxysmal AFib on Xarelto, HTN, DL, hypothyroidism (history of thyroid cancer s/p resection), FELIX on CPAP, hx hip fracture, CHFpEF , CKD 3, with recent EGD /colonoscopy for microcytic anemia with H pylori positive (on rx) presented to ER for left sided shoulder numbness which started yesterday night with some chest tightness while she was asleep. She denies any exertional chest pain but reports having SOB on exertion for the past few days. She denies any nausea/diaphoresis/fever/chills.     ER Course:  Vital Signs Last 24 Hrs  T(F): 98.2 (20 May 2022 08:54), Max: 98.2 (20 May 2022 08:54)  HR: 79 (20 May 2022 08:54) (79 - 79)  BP: 142/69 (20 May 2022 08:54) (142/69 - 142/69)  RR: 17 (20 May 2022 08:54) (17 - 17)  SpO2: 98% (20 May 2022 08:54) (98% - 98%)    Labs:  - trop x1 negative  - hb: 7.9 (baseline 10 in 2019)  - Cr: 1.8 (baseline 1.1(2020)    CXR: cardiomegaly

## 2022-05-20 NOTE — ED PROVIDER NOTE - DISPOSITION TYPE
ADMIT Cheek To Nose Interpolation Flap Division And Inset Text: Division and inset of the cheek to nose interpolation flap was performed to achieve optimal aesthetic result, restore normal anatomic appearance and avoid distortion of normal anatomy, expedite and facilitate wound healing, achieve optimal functional result and because linear closure either not possible or would produce suboptimal result. The patient was prepped and draped in the usual manner. The pedicle was infiltrated with local anesthesia. The pedicle was sectioned with a #15 blade. The pedicle was de-bulked and trimmed to match the shape of the defect. Hemostasis was achieved. The flap donor site and free margin of the flap were secured with deep buried sutures and the wound edges were re-approximated.

## 2022-05-20 NOTE — ED PROVIDER NOTE - ATTENDING APP SHARED VISIT CONTRIBUTION OF CARE
82 yo f with pmh of afib on xarelto, htn, presents with c/o chest pain.  pt describes as L sided with radiation down L arm.  pt has baseline sob which has been going on for 3-4 months.  pt says saw dr. malone and mentioned the sob, pt had echo and stress test 2/2022 which pt says was normal.  denies leg swelling or pain.  pt denies cough, uri sx, fever or chills.  exam: nad, ncat, perrl, eomi, mmm, rrr, ctab, abd soft, nt, nd aox3, no calf tenderness, minimal pitting edema imp: pt with L sided chest pain since last night, r/o acs, ekg, cxr, labs, contact dr. malone

## 2022-05-20 NOTE — ED PROVIDER NOTE - PHYSICAL EXAMINATION
VITAL SIGNS: I have reviewed nursing notes and confirm.  CONSTITUTIONAL: Patient is in no acute distress.  SKIN: Skin exam is warm and dry, no acute rash.  HEAD: Normocephalic; atraumatic.  EYES: PERRL, EOM intact; conjunctiva and sclera clear.  ENT: No nasal discharge; airway clear.   NECK: Supple; non tender.  CARD: S1, S2 normal; no murmurs, gallops, or rubs. Regular rate and rhythm.  RESP: Clear to auscultation bilaterally. No wheezes, rales or rhonchi.  ABD: Normal bowel sounds; soft; non-distended; non-tender.   EXT: Normal ROM. No edema.  LYMPH: No acute cervical adenopathy.  NEURO: Alert, oriented. Grossly unremarkable. No focal deficits.  PSYCH: Cooperative, appropriate.

## 2022-05-20 NOTE — ED PROVIDER NOTE - OBJECTIVE STATEMENT
81-year-old female with past medical history of A. fib on Xarelto, hypertension, hyperlipidemia presenting for evaluation of pressure-like left-sided chest pain radiating to left arm since last night associated with shortness of breath.  Symptoms are moderate, symptoms are worse with exertion.  States that she recently had a stress test done.  Patient's cardiologist is Dr. Quintero.  Patient denies any black or bloody stools.  Patient recently had a EGD and colonoscopy last month and is currently being treated for H.pylori. No fever, chills, abdominal pain, nausea, vomiting, diarrhea, back pain, urinary symptoms, headache, dizziness, paresthesias, or weakness.

## 2022-05-20 NOTE — ED PROVIDER NOTE - BIRTH SEX
What Type Of Note Output Would You Prefer (Optional)?: Bullet Format What Is The Reason For Today's Visit?: Full Body Skin Examination with No Concerns What Is The Reason For Today's Visit? (Being Monitored For X): concerning skin lesions on an annual basis How Severe Are Your Spot(S)?: mild Female

## 2022-05-20 NOTE — ED PROVIDER NOTE - NS ED ROS FT
Review of Systems:  	•	CONSTITUTIONAL - no fever, no diaphoresis, no chills  	•	SKIN - no rash  	•	HEMATOLOGIC - no bleeding, no bruising  	•	EYES - no eye pain, no blurry vision  	•	ENT - no congestion  	•	RESPIRATORY - +shortness of breath, no cough  	•	CARDIAC - +chest pain, no palpitations  	•	GI - no abd pain, no nausea, no vomiting, no diarrhea, no constipation  	•	GENITO-URINARY - no dysuria; no hematuria, no increased urinary frequency  	•	MUSCULOSKELETAL - no joint paint, no swelling, no redness  	•	NEUROLOGIC - no weakness, no headache, no paresthesias, no LOC  	•	PSYCH - no anxiety, no depression  	All other ROS are negative except as documented in HPI.

## 2022-05-21 LAB
ALBUMIN SERPL ELPH-MCNC: 3.8 G/DL — SIGNIFICANT CHANGE UP (ref 3.5–5.2)
ALP SERPL-CCNC: 103 U/L — SIGNIFICANT CHANGE UP (ref 30–115)
ALT FLD-CCNC: 10 U/L — SIGNIFICANT CHANGE UP (ref 0–41)
ANION GAP SERPL CALC-SCNC: 17 MMOL/L — HIGH (ref 7–14)
AST SERPL-CCNC: 16 U/L — SIGNIFICANT CHANGE UP (ref 0–41)
BASOPHILS # BLD AUTO: 0.05 K/UL — SIGNIFICANT CHANGE UP (ref 0–0.2)
BASOPHILS NFR BLD AUTO: 0.7 % — SIGNIFICANT CHANGE UP (ref 0–1)
BILIRUB SERPL-MCNC: 0.5 MG/DL — SIGNIFICANT CHANGE UP (ref 0.2–1.2)
BUN SERPL-MCNC: 38 MG/DL — HIGH (ref 10–20)
CALCIUM SERPL-MCNC: 8.8 MG/DL — SIGNIFICANT CHANGE UP (ref 8.5–10.1)
CHLORIDE SERPL-SCNC: 100 MMOL/L — SIGNIFICANT CHANGE UP (ref 98–110)
CK MB CFR SERPL CALC: 1.3 NG/ML — SIGNIFICANT CHANGE UP (ref 0.6–6.3)
CK SERPL-CCNC: 82 U/L — SIGNIFICANT CHANGE UP (ref 0–225)
CO2 SERPL-SCNC: 25 MMOL/L — SIGNIFICANT CHANGE UP (ref 17–32)
CREAT SERPL-MCNC: 1.9 MG/DL — HIGH (ref 0.7–1.5)
EGFR: 27 ML/MIN/1.73M2 — LOW
EOSINOPHIL # BLD AUTO: 0.16 K/UL — SIGNIFICANT CHANGE UP (ref 0–0.7)
EOSINOPHIL NFR BLD AUTO: 2.3 % — SIGNIFICANT CHANGE UP (ref 0–8)
GLUCOSE SERPL-MCNC: 99 MG/DL — SIGNIFICANT CHANGE UP (ref 70–99)
HCT VFR BLD CALC: 27.4 % — LOW (ref 37–47)
HGB BLD-MCNC: 8.5 G/DL — LOW (ref 12–16)
IMM GRANULOCYTES NFR BLD AUTO: 0.3 % — SIGNIFICANT CHANGE UP (ref 0.1–0.3)
LYMPHOCYTES # BLD AUTO: 1.87 K/UL — SIGNIFICANT CHANGE UP (ref 1.2–3.4)
LYMPHOCYTES # BLD AUTO: 26.9 % — SIGNIFICANT CHANGE UP (ref 20.5–51.1)
MCHC RBC-ENTMCNC: 22.8 PG — LOW (ref 27–31)
MCHC RBC-ENTMCNC: 31 G/DL — LOW (ref 32–37)
MCV RBC AUTO: 73.7 FL — LOW (ref 81–99)
MONOCYTES # BLD AUTO: 0.65 K/UL — HIGH (ref 0.1–0.6)
MONOCYTES NFR BLD AUTO: 9.4 % — HIGH (ref 1.7–9.3)
NEUTROPHILS # BLD AUTO: 4.2 K/UL — SIGNIFICANT CHANGE UP (ref 1.4–6.5)
NEUTROPHILS NFR BLD AUTO: 60.4 % — SIGNIFICANT CHANGE UP (ref 42.2–75.2)
NRBC # BLD: 0 /100 WBCS — SIGNIFICANT CHANGE UP (ref 0–0)
PLATELET # BLD AUTO: 313 K/UL — SIGNIFICANT CHANGE UP (ref 130–400)
POTASSIUM SERPL-MCNC: 3.1 MMOL/L — LOW (ref 3.5–5)
POTASSIUM SERPL-SCNC: 3.1 MMOL/L — LOW (ref 3.5–5)
PROT SERPL-MCNC: 6.3 G/DL — SIGNIFICANT CHANGE UP (ref 6–8)
RBC # BLD: 3.72 M/UL — LOW (ref 4.2–5.4)
RBC # FLD: 18.2 % — HIGH (ref 11.5–14.5)
SODIUM SERPL-SCNC: 142 MMOL/L — SIGNIFICANT CHANGE UP (ref 135–146)
TROPONIN T SERPL-MCNC: <0.01 NG/ML — SIGNIFICANT CHANGE UP
TROPONIN T SERPL-MCNC: <0.01 NG/ML — SIGNIFICANT CHANGE UP
WBC # BLD: 6.95 K/UL — SIGNIFICANT CHANGE UP (ref 4.8–10.8)
WBC # FLD AUTO: 6.95 K/UL — SIGNIFICANT CHANGE UP (ref 4.8–10.8)

## 2022-05-21 PROCEDURE — 76770 US EXAM ABDO BACK WALL COMP: CPT | Mod: 26

## 2022-05-21 PROCEDURE — 93306 TTE W/DOPPLER COMPLETE: CPT | Mod: 26

## 2022-05-21 PROCEDURE — 99233 SBSQ HOSP IP/OBS HIGH 50: CPT

## 2022-05-21 RX ORDER — ACETAMINOPHEN 500 MG
650 TABLET ORAL EVERY 6 HOURS
Refills: 0 | Status: DISCONTINUED | OUTPATIENT
Start: 2022-05-21 | End: 2022-05-24

## 2022-05-21 RX ORDER — LISINOPRIL 2.5 MG/1
20 TABLET ORAL DAILY
Refills: 0 | Status: DISCONTINUED | OUTPATIENT
Start: 2022-05-21 | End: 2022-05-22

## 2022-05-21 RX ORDER — FUROSEMIDE 40 MG
40 TABLET ORAL DAILY
Refills: 0 | Status: DISCONTINUED | OUTPATIENT
Start: 2022-05-21 | End: 2022-05-21

## 2022-05-21 RX ORDER — FUROSEMIDE 40 MG
40 TABLET ORAL DAILY
Refills: 0 | Status: DISCONTINUED | OUTPATIENT
Start: 2022-05-22 | End: 2022-05-22

## 2022-05-21 RX ORDER — POTASSIUM CHLORIDE 20 MEQ
40 PACKET (EA) ORAL EVERY 4 HOURS
Refills: 0 | Status: COMPLETED | OUTPATIENT
Start: 2022-05-21 | End: 2022-05-21

## 2022-05-21 RX ADMIN — AMIODARONE HYDROCHLORIDE 200 MILLIGRAM(S): 400 TABLET ORAL at 06:29

## 2022-05-21 RX ADMIN — LISINOPRIL 20 MILLIGRAM(S): 2.5 TABLET ORAL at 06:29

## 2022-05-21 RX ADMIN — Medication 40 MILLIEQUIVALENT(S): at 21:08

## 2022-05-21 RX ADMIN — Medication 50 MILLIGRAM(S): at 06:29

## 2022-05-21 RX ADMIN — RIVAROXABAN 15 MILLIGRAM(S): KIT at 18:02

## 2022-05-21 RX ADMIN — Medication 650 MILLIGRAM(S): at 22:38

## 2022-05-21 RX ADMIN — Medication 1000 MILLIGRAM(S): at 06:28

## 2022-05-21 RX ADMIN — ATORVASTATIN CALCIUM 20 MILLIGRAM(S): 80 TABLET, FILM COATED ORAL at 21:09

## 2022-05-21 RX ADMIN — AMLODIPINE BESYLATE 5 MILLIGRAM(S): 2.5 TABLET ORAL at 21:09

## 2022-05-21 RX ADMIN — PANTOPRAZOLE SODIUM 40 MILLIGRAM(S): 20 TABLET, DELAYED RELEASE ORAL at 18:02

## 2022-05-21 RX ADMIN — Medication 1000 MILLIGRAM(S): at 18:00

## 2022-05-21 RX ADMIN — PANTOPRAZOLE SODIUM 40 MILLIGRAM(S): 20 TABLET, DELAYED RELEASE ORAL at 06:28

## 2022-05-21 RX ADMIN — Medication 175 MICROGRAM(S): at 07:00

## 2022-05-21 RX ADMIN — Medication 40 MILLIEQUIVALENT(S): at 18:01

## 2022-05-21 RX ADMIN — Medication 40 MILLIGRAM(S): at 06:30

## 2022-05-21 RX ADMIN — Medication 50 MILLIGRAM(S): at 18:04

## 2022-05-21 NOTE — PROGRESS NOTE ADULT - SUBJECTIVE AND OBJECTIVE BOX
Pt seen and examined at bedside. No CP or SOB. Pt states that she has been having SOB for the past month when she walks. Orthopnea and also got worse yesterday and came to ED. Chest pressure on middle chest, non radiating.       PAST MEDICAL & SURGICAL HISTORY:  HTN (hypertension)    High cholesterol    Hypothyroid  s/p thyroid ca surgery    Afib  on xarelto    Sleep apnea    Osteoarthritis    CKD (chronic kidney disease) stage 3, GFR 30-59 ml/min    H/O thyroidectomy    S/P rotator cuff surgery        VITAL SIGNS (Last 24 hrs):  T(C): 35.7 (05-21-22 @ 16:07), Max: 36.6 (05-20-22 @ 17:34)  HR: 56 (05-21-22 @ 16:07) (51 - 67)  BP: 141/63 (05-21-22 @ 16:07) (104/70 - 156/70)  RR: 18 (05-21-22 @ 16:07) (18 - 18)  SpO2: 98% (05-21-22 @ 16:07) (98% - 100%)  Wt(kg): --  Daily     Daily     I&O's Summary      PHYSICAL EXAM:  GENERAL: NAD, well-developed  HEAD:  Atraumatic, Normocephalic  EYES: EOMI, PERRLA, conjunctiva and sclera clear  NECK: Supple, No JVD  CHEST/LUNG: mild rales  HEART: Regular rate and rhythm; No murmurs, rubs, or gallops  ABDOMEN: Soft, Nontender, Nondistended; Bowel sounds present  EXTREMITIES:  2+ Peripheral Pulses, No clubbing, cyanosis, or trace edema   PSYCH: AAOx3  NEUROLOGY: non-focal  SKIN: No rashes or lesions    Labs Reviewed  Spoke to patient in regards to abnormal labs.    CBC Full  -  ( 21 May 2022 04:30 )  WBC Count : 6.95 K/uL  Hemoglobin : 8.5 g/dL  Hematocrit : 27.4 %  Platelet Count - Automated : 313 K/uL  Mean Cell Volume : 73.7 fL  Mean Cell Hemoglobin : 22.8 pg  Mean Cell Hemoglobin Concentration : 31.0 g/dL  Auto Neutrophil # : 4.20 K/uL  Auto Lymphocyte # : 1.87 K/uL  Auto Monocyte # : 0.65 K/uL  Auto Eosinophil # : 0.16 K/uL  Auto Basophil # : 0.05 K/uL  Auto Neutrophil % : 60.4 %  Auto Lymphocyte % : 26.9 %  Auto Monocyte % : 9.4 %  Auto Eosinophil % : 2.3 %  Auto Basophil % : 0.7 %    BMP:    05-21 @ 04:30    Blood Urea Nitrogen - 38  Calcium - 8.8  Carbond Dioxide - 25  Chloride - 100  Creatinine - 1.9  Glucose - 99  Potassium - 3.1  Sodium - 142      Hemoglobin A1c -   PT/INR - ( 20 May 2022 10:20 )   PT: 19.80 sec;   INR: 1.73 ratio         PTT - ( 20 May 2022 10:20 )  PTT:44.0 sec  Urine Culture:        COVID Labs  CRP:      D-Dimer:            Imaging reviewed independently and reviewed read  < from: Xray Chest 1 View-PORTABLE IMMEDIATE (Xray Chest 1 View-PORTABLE IMMEDIATE .) (05.20.22 @ 13:28) >  Impression:    Cardiomegaly with CHF.    < end of copied text >        < from: TTE Echo Complete w/o Contrast w/ Doppler (05.21.22 @ 12:28) >  Summary:   1. LV Ejection Fraction by Lester's Method with a biplaneEF of 62 %.   2. Elevated mean left atrial pressure.   3. Severe concentric left ventricular hypertrophy.   4. Spectral Doppler shows pseudonormal pattern of left ventricular   myocardial filling (Grade II diastolic dysfunction).   5. Moderately enlarged left atrium.   6. Moderately enlarged right atrium.   7. Mild mitral valve regurgitation.   8. Mitral annular calcification.   9. Mild tricuspid regurgitation.  10. Mild to moderate pulmonic valve regurgitation.  11. Dilatation of the ascending aorta.    < end of copied text >    < from: 12 Lead ECG (05.20.22 @ 08:51) >  Diagnosis Line Sinus rhythm with 1st degree A-V block  Left axis deviation  Non-specific intra-ventricular conduction block  Minimal voltage criteria for LVH, may be normal variant ( [place] product )  Abnormal ECG    < end of copied text >      social: no smoking,  used to smoke,  no drugs, no drinking    MEDICATIONS  (STANDING):  aMIOdarone    Tablet 200 milliGRAM(s) Oral daily  amLODIPine   Tablet 5 milliGRAM(s) Oral at bedtime  amoxicillin 1000 milliGRAM(s) Oral two times a day  atorvastatin 20 milliGRAM(s) Oral at bedtime  chlorhexidine 4% Liquid 1 Application(s) Topical <User Schedule>  furosemide   Injectable 40 milliGRAM(s) IntraMuscular daily  levothyroxine 175 MICROGram(s) Oral <User Schedule>  lisinopril 20 milliGRAM(s) Oral daily  metoprolol tartrate 50 milliGRAM(s) Oral two times a day  pantoprazole    Tablet 40 milliGRAM(s) Oral every 12 hours  rivaroxaban 15 milliGRAM(s) Oral with dinner    MEDICATIONS  (PRN):

## 2022-05-22 LAB
ALBUMIN SERPL ELPH-MCNC: 3.7 G/DL — SIGNIFICANT CHANGE UP (ref 3.5–5.2)
ALP SERPL-CCNC: 102 U/L — SIGNIFICANT CHANGE UP (ref 30–115)
ALT FLD-CCNC: 9 U/L — SIGNIFICANT CHANGE UP (ref 0–41)
ANION GAP SERPL CALC-SCNC: 15 MMOL/L — HIGH (ref 7–14)
AST SERPL-CCNC: 13 U/L — SIGNIFICANT CHANGE UP (ref 0–41)
BASOPHILS # BLD AUTO: 0.05 K/UL — SIGNIFICANT CHANGE UP (ref 0–0.2)
BASOPHILS NFR BLD AUTO: 0.7 % — SIGNIFICANT CHANGE UP (ref 0–1)
BILIRUB SERPL-MCNC: 0.4 MG/DL — SIGNIFICANT CHANGE UP (ref 0.2–1.2)
BUN SERPL-MCNC: 44 MG/DL — HIGH (ref 10–20)
CALCIUM SERPL-MCNC: 8.5 MG/DL — SIGNIFICANT CHANGE UP (ref 8.5–10.1)
CHLORIDE SERPL-SCNC: 100 MMOL/L — SIGNIFICANT CHANGE UP (ref 98–110)
CO2 SERPL-SCNC: 27 MMOL/L — SIGNIFICANT CHANGE UP (ref 17–32)
CREAT SERPL-MCNC: 2.3 MG/DL — HIGH (ref 0.7–1.5)
EGFR: 21 ML/MIN/1.73M2 — LOW
EOSINOPHIL # BLD AUTO: 0.15 K/UL — SIGNIFICANT CHANGE UP (ref 0–0.7)
EOSINOPHIL NFR BLD AUTO: 2 % — SIGNIFICANT CHANGE UP (ref 0–8)
GLUCOSE SERPL-MCNC: 116 MG/DL — HIGH (ref 70–99)
HCT VFR BLD CALC: 28.8 % — LOW (ref 37–47)
HGB BLD-MCNC: 8.5 G/DL — LOW (ref 12–16)
IMM GRANULOCYTES NFR BLD AUTO: 0.3 % — SIGNIFICANT CHANGE UP (ref 0.1–0.3)
LYMPHOCYTES # BLD AUTO: 1.92 K/UL — SIGNIFICANT CHANGE UP (ref 1.2–3.4)
LYMPHOCYTES # BLD AUTO: 25.6 % — SIGNIFICANT CHANGE UP (ref 20.5–51.1)
MAGNESIUM SERPL-MCNC: 2.2 MG/DL — SIGNIFICANT CHANGE UP (ref 1.8–2.4)
MCHC RBC-ENTMCNC: 22.4 PG — LOW (ref 27–31)
MCHC RBC-ENTMCNC: 29.5 G/DL — LOW (ref 32–37)
MCV RBC AUTO: 76 FL — LOW (ref 81–99)
MONOCYTES # BLD AUTO: 0.92 K/UL — HIGH (ref 0.1–0.6)
MONOCYTES NFR BLD AUTO: 12.3 % — HIGH (ref 1.7–9.3)
NEUTROPHILS # BLD AUTO: 4.44 K/UL — SIGNIFICANT CHANGE UP (ref 1.4–6.5)
NEUTROPHILS NFR BLD AUTO: 59.1 % — SIGNIFICANT CHANGE UP (ref 42.2–75.2)
NRBC # BLD: 0 /100 WBCS — SIGNIFICANT CHANGE UP (ref 0–0)
PHOSPHATE SERPL-MCNC: 4.4 MG/DL — SIGNIFICANT CHANGE UP (ref 2.1–4.9)
PLATELET # BLD AUTO: 321 K/UL — SIGNIFICANT CHANGE UP (ref 130–400)
POTASSIUM SERPL-MCNC: 3.5 MMOL/L — SIGNIFICANT CHANGE UP (ref 3.5–5)
POTASSIUM SERPL-SCNC: 3.5 MMOL/L — SIGNIFICANT CHANGE UP (ref 3.5–5)
PROT SERPL-MCNC: 6 G/DL — SIGNIFICANT CHANGE UP (ref 6–8)
RBC # BLD: 3.79 M/UL — LOW (ref 4.2–5.4)
RBC # FLD: 18.2 % — HIGH (ref 11.5–14.5)
SODIUM SERPL-SCNC: 142 MMOL/L — SIGNIFICANT CHANGE UP (ref 135–146)
WBC # BLD: 7.5 K/UL — SIGNIFICANT CHANGE UP (ref 4.8–10.8)
WBC # FLD AUTO: 7.5 K/UL — SIGNIFICANT CHANGE UP (ref 4.8–10.8)

## 2022-05-22 PROCEDURE — 93018 CV STRESS TEST I&R ONLY: CPT

## 2022-05-22 PROCEDURE — 93016 CV STRESS TEST SUPVJ ONLY: CPT

## 2022-05-22 PROCEDURE — 99233 SBSQ HOSP IP/OBS HIGH 50: CPT

## 2022-05-22 PROCEDURE — 78452 HT MUSCLE IMAGE SPECT MULT: CPT | Mod: 26

## 2022-05-22 RX ORDER — ADENOSINE 3 MG/ML
60 INJECTION INTRAVENOUS ONCE
Refills: 0 | Status: DISCONTINUED | OUTPATIENT
Start: 2022-05-22 | End: 2022-05-24

## 2022-05-22 RX ORDER — LANOLIN ALCOHOL/MO/W.PET/CERES
5 CREAM (GRAM) TOPICAL AT BEDTIME
Refills: 0 | Status: DISCONTINUED | OUTPATIENT
Start: 2022-05-22 | End: 2022-05-22

## 2022-05-22 RX ORDER — LANOLIN ALCOHOL/MO/W.PET/CERES
5 CREAM (GRAM) TOPICAL AT BEDTIME
Refills: 0 | Status: DISCONTINUED | OUTPATIENT
Start: 2022-05-22 | End: 2022-05-24

## 2022-05-22 RX ORDER — LANOLIN ALCOHOL/MO/W.PET/CERES
5 CREAM (GRAM) TOPICAL ONCE
Refills: 0 | Status: COMPLETED | OUTPATIENT
Start: 2022-05-22 | End: 2022-05-22

## 2022-05-22 RX ADMIN — Medication 650 MILLIGRAM(S): at 15:21

## 2022-05-22 RX ADMIN — AMIODARONE HYDROCHLORIDE 200 MILLIGRAM(S): 400 TABLET ORAL at 05:25

## 2022-05-22 RX ADMIN — PANTOPRAZOLE SODIUM 40 MILLIGRAM(S): 20 TABLET, DELAYED RELEASE ORAL at 05:25

## 2022-05-22 RX ADMIN — Medication 5 MILLIGRAM(S): at 02:22

## 2022-05-22 RX ADMIN — Medication 40 MILLIGRAM(S): at 05:25

## 2022-05-22 RX ADMIN — Medication 650 MILLIGRAM(S): at 16:32

## 2022-05-22 RX ADMIN — Medication 50 MILLIGRAM(S): at 05:25

## 2022-05-22 RX ADMIN — Medication 5 MILLIGRAM(S): at 21:45

## 2022-05-22 RX ADMIN — Medication 1000 MILLIGRAM(S): at 05:25

## 2022-05-22 RX ADMIN — LISINOPRIL 20 MILLIGRAM(S): 2.5 TABLET ORAL at 05:25

## 2022-05-22 RX ADMIN — ATORVASTATIN CALCIUM 20 MILLIGRAM(S): 80 TABLET, FILM COATED ORAL at 21:30

## 2022-05-22 RX ADMIN — AMLODIPINE BESYLATE 5 MILLIGRAM(S): 2.5 TABLET ORAL at 21:30

## 2022-05-22 RX ADMIN — CHLORHEXIDINE GLUCONATE 1 APPLICATION(S): 213 SOLUTION TOPICAL at 05:25

## 2022-05-22 RX ADMIN — RIVAROXABAN 15 MILLIGRAM(S): KIT at 18:04

## 2022-05-22 RX ADMIN — Medication 1000 MILLIGRAM(S): at 18:03

## 2022-05-22 RX ADMIN — PANTOPRAZOLE SODIUM 40 MILLIGRAM(S): 20 TABLET, DELAYED RELEASE ORAL at 18:03

## 2022-05-22 RX ADMIN — Medication 50 MILLIGRAM(S): at 18:03

## 2022-05-22 NOTE — PATIENT PROFILE ADULT - FALL HARM RISK - HARM RISK INTERVENTIONS

## 2022-05-22 NOTE — PROGRESS NOTE ADULT - SUBJECTIVE AND OBJECTIVE BOX
Pt seen and examined at bedside. No CP or SOB.      PAST MEDICAL & SURGICAL HISTORY:  HTN (hypertension)    High cholesterol    Hypothyroid  s/p thyroid ca surgery    Afib  on xarelto    Sleep apnea    Osteoarthritis    CKD (chronic kidney disease) stage 3, GFR 30-59 ml/min    H/O thyroidectomy    S/P rotator cuff surgery        VITAL SIGNS (Last 24 hrs):  T(C): 36.5 (22 @ 11:51), Max: 36.6 (22 @ 00:24)  HR: 53 (22 @ 11:51) (53 - 66)  BP: 108/58 (22 @ 11:51) (106/58 - 132/60)  RR: 20 (22 @ 11:51) (18 - 20)  SpO2: --  Wt(kg): --  Daily Height in cm: 157.48 (22 May 2022 00:24)    Daily Weight in k.6 (22 May 2022 05:13)    I&O's Summary    21 May 2022 07:01  -  22 May 2022 07:00  --------------------------------------------------------  IN: 240 mL / OUT: 0 mL / NET: 240 mL    22 May 2022 07:01  -  22 May 2022 17:51  --------------------------------------------------------  IN: 220 mL / OUT: 200 mL / NET: 20 mL        PHYSICAL EXAM:  GENERAL: NAD, well-developed  HEAD:  Atraumatic, Normocephalic  EYES: EOMI, PERRLA, conjunctiva and sclera clear  NECK: Supple, No JVD  CHEST/LUNG: Clear to auscultation bilaterally; No wheeze  HEART: Regular rate and rhythm; No murmurs, rubs, or gallops  ABDOMEN: Soft, Nontender, Nondistended; Bowel sounds present  EXTREMITIES:  2+ Peripheral Pulses, No clubbing, cyanosis, or edema  PSYCH: AAOx3  NEUROLOGY: non-focal  SKIN: No rashes or lesions    Labs Reviewed  Spoke to patient in regards to abnormal labs.    CBC Full  -  ( 22 May 2022 06:25 )  WBC Count : 7.50 K/uL  Hemoglobin : 8.5 g/dL  Hematocrit : 28.8 %  Platelet Count - Automated : 321 K/uL  Mean Cell Volume : 76.0 fL  Mean Cell Hemoglobin : 22.4 pg  Mean Cell Hemoglobin Concentration : 29.5 g/dL  Auto Neutrophil # : 4.44 K/uL  Auto Lymphocyte # : 1.92 K/uL  Auto Monocyte # : 0.92 K/uL  Auto Eosinophil # : 0.15 K/uL  Auto Basophil # : 0.05 K/uL  Auto Neutrophil % : 59.1 %  Auto Lymphocyte % : 25.6 %  Auto Monocyte % : 12.3 %  Auto Eosinophil % : 2.0 %  Auto Basophil % : 0.7 %    BMP:     @ 06:25    Blood Urea Nitrogen - 44  Calcium - 8.5  Carbond Dioxide - 27  Chloride - 100  Creatinine - 2.3  Glucose - 116  Potassium - 3.5  Sodium - 142      Hemoglobin A1c -   PT/INR - ( 20 May 2022 10:20 )   PT: 19.80 sec;   INR: 1.73 ratio         PTT - ( 20 May 2022 10:20 )  PTT:44.0 sec  Urine Culture:        COVID Labs  CRP:      D-Dimer:            Imaging reviewed independently and reviewed read    < from: NM Nuclear Stress Pharmacologic Multiple (22 @ 13:31) >  1. NO EVIDENCE FOR ISCHEMIA DURING ADENOSINE INFUSION.  2. NORMAL RESTING LEFT VENTRICULAR WALL MOTION AND WALL THICKENING.  3. LEFT VENTRICULAR EJECTION FRACTION OF  75 % WHICH IS WITHIN RANGE OF   NORMAL.    < end of copied text >  < from: TTE Echo Complete w/o Contrast w/ Doppler (22 @ 12:28) >  Summary:   1. LV Ejection Fraction by Lester's Method with a biplaneEF of 62 %.   2. Elevated mean left atrial pressure.   3. Severe concentric left ventricular hypertrophy.   4. Spectral Doppler shows pseudonormal pattern of left ventricular   myocardial filling (Grade II diastolic dysfunction).   5. Moderately enlarged left atrium.   6. Moderately enlarged right atrium.   7. Mild mitral valve regurgitation.   8. Mitral annular calcification.   9. Mild tricuspid regurgitation.  10. Mild to moderate pulmonic valve regurgitation.  11. Dilatation of the ascending aorta.    < end of copied text >      < from: US Kidney and Bladder (22 @ 20:13) >  IMPRESSION:    No hydronephrosis.    Nonspecific nonvisualization of the bilateral ureteral jets.    Urinary bladder volume of 90 cc. Postvoid urinary bladder volume was not   able to be obtained.    2.8 cmright renal cyst.    < end of copied text >    MEDICATIONS  (STANDING):  aDENosine Injectable (ADENOSCAN) 60 milliGRAM(s) IV Bolus once  aMIOdarone    Tablet 200 milliGRAM(s) Oral daily  amLODIPine   Tablet 5 milliGRAM(s) Oral at bedtime  amoxicillin 1000 milliGRAM(s) Oral two times a day  atorvastatin 20 milliGRAM(s) Oral at bedtime  chlorhexidine 4% Liquid 1 Application(s) Topical <User Schedule>  levothyroxine 175 MICROGram(s) Oral <User Schedule>  lisinopril 20 milliGRAM(s) Oral daily  metoprolol tartrate 50 milliGRAM(s) Oral two times a day  pantoprazole    Tablet 40 milliGRAM(s) Oral every 12 hours  rivaroxaban 15 milliGRAM(s) Oral with dinner    MEDICATIONS  (PRN):  acetaminophen     Tablet .. 650 milliGRAM(s) Oral every 6 hours PRN Mild Pain (1 - 3)  melatonin 5 milliGRAM(s) Oral at bedtime PRN Insomnia

## 2022-05-22 NOTE — PROGRESS NOTE ADULT - SUBJECTIVE AND OBJECTIVE BOX
SUBJ:No chest pain or shortness of breath      MEDICATIONS  (STANDING):  aDENosine Injectable (ADENOSCAN) 60 milliGRAM(s) IV Bolus once  aMIOdarone    Tablet 200 milliGRAM(s) Oral daily  amLODIPine   Tablet 5 milliGRAM(s) Oral at bedtime  amoxicillin 1000 milliGRAM(s) Oral two times a day  atorvastatin 20 milliGRAM(s) Oral at bedtime  chlorhexidine 4% Liquid 1 Application(s) Topical <User Schedule>  levothyroxine 175 MICROGram(s) Oral <User Schedule>  lisinopril 20 milliGRAM(s) Oral daily  metoprolol tartrate 50 milliGRAM(s) Oral two times a day  pantoprazole    Tablet 40 milliGRAM(s) Oral every 12 hours  rivaroxaban 15 milliGRAM(s) Oral with dinner    MEDICATIONS  (PRN):  acetaminophen     Tablet .. 650 milliGRAM(s) Oral every 6 hours PRN Mild Pain (1 - 3)  melatonin 5 milliGRAM(s) Oral at bedtime PRN Insomnia            Vital Signs Last 24 Hrs  T(C): 36.5 (22 May 2022 11:51), Max: 36.6 (22 May 2022 00:24)  T(F): 97.7 (22 May 2022 11:51), Max: 97.8 (22 May 2022 00:24)  HR: 53 (22 May 2022 11:51) (53 - 66)  BP: 108/58 (22 May 2022 11:51) (106/58 - 141/63)  BP(mean): --  RR: 20 (22 May 2022 11:51) (18 - 20)  SpO2: 98% (21 May 2022 16:07) (98% - 98%)      ECG:NML    TTE:    LABS:                        8.5    7.50  )-----------( 321      ( 22 May 2022 06:25 )             28.8     05-22    142  |  100  |  44<H>  ----------------------------<  116<H>  3.5   |  27  |  2.3<H>    Ca    8.5      22 May 2022 06:25  Phos  4.4     05-22  Mg     2.2     05-22    TPro  6.0  /  Alb  3.7  /  TBili  0.4  /  DBili  x   /  AST  13  /  ALT  9   /  AlkPhos  102  05-22    CARDIAC MARKERS ( 21 May 2022 11:53 )  x     / <0.01 ng/mL / x     / x     / x      CARDIAC MARKERS ( 21 May 2022 00:26 )  x     / <0.01 ng/mL / 82 U/L / x     / 1.3 ng/mL  CARDIAC MARKERS ( 20 May 2022 21:41 )  x     / <0.01 ng/mL / 90 U/L / x     / 1.3 ng/mL          I&O's Summary    21 May 2022 07:01  -  22 May 2022 07:00  --------------------------------------------------------  IN: 240 mL / OUT: 0 mL / NET: 240 mL    22 May 2022 07:01  -  22 May 2022 12:51  --------------------------------------------------------  IN: 220 mL / OUT: 200 mL / NET: 20 mL      BNP

## 2022-05-23 LAB
ANION GAP SERPL CALC-SCNC: 16 MMOL/L — HIGH (ref 7–14)
APPEARANCE UR: CLEAR — SIGNIFICANT CHANGE UP
BASOPHILS # BLD AUTO: 0.06 K/UL — SIGNIFICANT CHANGE UP (ref 0–0.2)
BASOPHILS NFR BLD AUTO: 0.7 % — SIGNIFICANT CHANGE UP (ref 0–1)
BILIRUB UR-MCNC: NEGATIVE — SIGNIFICANT CHANGE UP
BUN SERPL-MCNC: 58 MG/DL — HIGH (ref 10–20)
CALCIUM SERPL-MCNC: 8.6 MG/DL — SIGNIFICANT CHANGE UP (ref 8.5–10.1)
CHLORIDE SERPL-SCNC: 99 MMOL/L — SIGNIFICANT CHANGE UP (ref 98–110)
CO2 SERPL-SCNC: 27 MMOL/L — SIGNIFICANT CHANGE UP (ref 17–32)
COLOR SPEC: SIGNIFICANT CHANGE UP
CREAT ?TM UR-MCNC: 105 MG/DL — SIGNIFICANT CHANGE UP
CREAT ?TM UR-MCNC: 107 MG/DL — SIGNIFICANT CHANGE UP
CREAT SERPL-MCNC: 2.7 MG/DL — HIGH (ref 0.7–1.5)
DIFF PNL FLD: NEGATIVE — SIGNIFICANT CHANGE UP
EGFR: 17 ML/MIN/1.73M2 — LOW
EOSINOPHIL # BLD AUTO: 0.11 K/UL — SIGNIFICANT CHANGE UP (ref 0–0.7)
EOSINOPHIL NFR BLD AUTO: 1.3 % — SIGNIFICANT CHANGE UP (ref 0–8)
FERRITIN SERPL-MCNC: 14 NG/ML — LOW (ref 15–150)
GLUCOSE SERPL-MCNC: 102 MG/DL — HIGH (ref 70–99)
GLUCOSE UR QL: NEGATIVE — SIGNIFICANT CHANGE UP
HCT VFR BLD CALC: 27.9 % — LOW (ref 37–47)
HGB BLD-MCNC: 8.3 G/DL — LOW (ref 12–16)
IMM GRANULOCYTES NFR BLD AUTO: 0.5 % — HIGH (ref 0.1–0.3)
KETONES UR-MCNC: NEGATIVE — SIGNIFICANT CHANGE UP
LEUKOCYTE ESTERASE UR-ACNC: NEGATIVE — SIGNIFICANT CHANGE UP
LYMPHOCYTES # BLD AUTO: 1.64 K/UL — SIGNIFICANT CHANGE UP (ref 1.2–3.4)
LYMPHOCYTES # BLD AUTO: 20.1 % — LOW (ref 20.5–51.1)
MAGNESIUM SERPL-MCNC: 2.3 MG/DL — SIGNIFICANT CHANGE UP (ref 1.8–2.4)
MCHC RBC-ENTMCNC: 22.4 PG — LOW (ref 27–31)
MCHC RBC-ENTMCNC: 29.7 G/DL — LOW (ref 32–37)
MCV RBC AUTO: 75.2 FL — LOW (ref 81–99)
MONOCYTES # BLD AUTO: 0.96 K/UL — HIGH (ref 0.1–0.6)
MONOCYTES NFR BLD AUTO: 11.8 % — HIGH (ref 1.7–9.3)
NEUTROPHILS # BLD AUTO: 5.35 K/UL — SIGNIFICANT CHANGE UP (ref 1.4–6.5)
NEUTROPHILS NFR BLD AUTO: 65.6 % — SIGNIFICANT CHANGE UP (ref 42.2–75.2)
NITRITE UR-MCNC: NEGATIVE — SIGNIFICANT CHANGE UP
NRBC # BLD: 0 /100 WBCS — SIGNIFICANT CHANGE UP (ref 0–0)
OSMOLALITY UR: 506 MOS/KG — SIGNIFICANT CHANGE UP (ref 50–1200)
PH UR: 6 — SIGNIFICANT CHANGE UP (ref 5–8)
PLATELET # BLD AUTO: 325 K/UL — SIGNIFICANT CHANGE UP (ref 130–400)
POTASSIUM SERPL-MCNC: 4 MMOL/L — SIGNIFICANT CHANGE UP (ref 3.5–5)
POTASSIUM SERPL-SCNC: 4 MMOL/L — SIGNIFICANT CHANGE UP (ref 3.5–5)
PROT ?TM UR-MCNC: 13 MG/DLG/24H — SIGNIFICANT CHANGE UP
PROT UR-MCNC: SIGNIFICANT CHANGE UP
PROT/CREAT UR-RTO: 0.1 RATIO — SIGNIFICANT CHANGE UP (ref 0–0.2)
RBC # BLD: 3.71 M/UL — LOW (ref 4.2–5.4)
RBC # FLD: 17.9 % — HIGH (ref 11.5–14.5)
SODIUM SERPL-SCNC: 142 MMOL/L — SIGNIFICANT CHANGE UP (ref 135–146)
SODIUM UR-SCNC: 21 MMOL/L — SIGNIFICANT CHANGE UP
SP GR SPEC: 1.02 — SIGNIFICANT CHANGE UP (ref 1.01–1.03)
UROBILINOGEN FLD QL: SIGNIFICANT CHANGE UP
UUN UR-MCNC: 935 MG/DL — SIGNIFICANT CHANGE UP
WBC # BLD: 8.16 K/UL — SIGNIFICANT CHANGE UP (ref 4.8–10.8)
WBC # FLD AUTO: 8.16 K/UL — SIGNIFICANT CHANGE UP (ref 4.8–10.8)

## 2022-05-23 PROCEDURE — 71045 X-RAY EXAM CHEST 1 VIEW: CPT | Mod: 26

## 2022-05-23 PROCEDURE — 99233 SBSQ HOSP IP/OBS HIGH 50: CPT

## 2022-05-23 RX ORDER — SODIUM CHLORIDE 9 MG/ML
1000 INJECTION, SOLUTION INTRAVENOUS
Refills: 0 | Status: DISCONTINUED | OUTPATIENT
Start: 2022-05-23 | End: 2022-05-23

## 2022-05-23 RX ORDER — SODIUM CHLORIDE 9 MG/ML
1000 INJECTION, SOLUTION INTRAVENOUS
Refills: 0 | Status: DISCONTINUED | OUTPATIENT
Start: 2022-05-23 | End: 2022-05-24

## 2022-05-23 RX ORDER — NYSTATIN CREAM 100000 [USP'U]/G
1 CREAM TOPICAL ONCE
Refills: 0 | Status: DISCONTINUED | OUTPATIENT
Start: 2022-05-23 | End: 2022-05-24

## 2022-05-23 RX ADMIN — CHLORHEXIDINE GLUCONATE 1 APPLICATION(S): 213 SOLUTION TOPICAL at 06:06

## 2022-05-23 RX ADMIN — AMIODARONE HYDROCHLORIDE 200 MILLIGRAM(S): 400 TABLET ORAL at 06:05

## 2022-05-23 RX ADMIN — ATORVASTATIN CALCIUM 20 MILLIGRAM(S): 80 TABLET, FILM COATED ORAL at 21:55

## 2022-05-23 RX ADMIN — Medication 650 MILLIGRAM(S): at 02:55

## 2022-05-23 RX ADMIN — AMLODIPINE BESYLATE 5 MILLIGRAM(S): 2.5 TABLET ORAL at 21:57

## 2022-05-23 RX ADMIN — Medication 1000 MILLIGRAM(S): at 06:05

## 2022-05-23 RX ADMIN — Medication 650 MILLIGRAM(S): at 02:25

## 2022-05-23 RX ADMIN — SODIUM CHLORIDE 50 MILLILITER(S): 9 INJECTION, SOLUTION INTRAVENOUS at 13:28

## 2022-05-23 RX ADMIN — SODIUM CHLORIDE 50 MILLILITER(S): 9 INJECTION, SOLUTION INTRAVENOUS at 13:20

## 2022-05-23 RX ADMIN — Medication 5 MILLIGRAM(S): at 21:55

## 2022-05-23 RX ADMIN — SODIUM CHLORIDE 75 MILLILITER(S): 9 INJECTION, SOLUTION INTRAVENOUS at 16:13

## 2022-05-23 RX ADMIN — Medication 650 MILLIGRAM(S): at 21:55

## 2022-05-23 RX ADMIN — PANTOPRAZOLE SODIUM 40 MILLIGRAM(S): 20 TABLET, DELAYED RELEASE ORAL at 17:11

## 2022-05-23 RX ADMIN — Medication 1000 MILLIGRAM(S): at 17:11

## 2022-05-23 RX ADMIN — PANTOPRAZOLE SODIUM 40 MILLIGRAM(S): 20 TABLET, DELAYED RELEASE ORAL at 06:05

## 2022-05-23 RX ADMIN — Medication 50 MILLIGRAM(S): at 17:15

## 2022-05-23 RX ADMIN — SODIUM CHLORIDE 75 MILLILITER(S): 9 INJECTION, SOLUTION INTRAVENOUS at 21:57

## 2022-05-23 RX ADMIN — Medication 175 MICROGRAM(S): at 06:05

## 2022-05-23 RX ADMIN — RIVAROXABAN 15 MILLIGRAM(S): KIT at 17:12

## 2022-05-23 NOTE — PHYSICAL THERAPY INITIAL EVALUATION ADULT - PERTINENT HX OF CURRENT PROBLEM, REHAB EVAL
82 yo female patient with PMHx of Paroxysmal AFib on Xarelto, HTN, DL, hypothyroidism (history of thyroid cancer s/p resection), FELIX on CPAP, hx hip fracture, CHFpEF , CKD 3, with recent EGD /colonoscopy for microcytic anemia with H pylori positive (on rx) presented to ER for left sided shoulder numbness which started yesterday night with some chest tightness while she was asleep.

## 2022-05-23 NOTE — PROGRESS NOTE ADULT - ATTENDING COMMENTS
80 yo female patient with PMHx of Paroxysmal AFib on Xarelto, HTN, DL, hypothyroidism (history of thyroid cancer s/p resection), FELIX on CPAP, hx hip fracture, CHFpEF , CKD 3, with recent EGD /colonoscopy for microcytic anemia with H pylori positive (on rx) presented to ER for left sided shoulder numbness, BECK admitted for further workup.     #BECK and Chest Tightness - resolved   - tropx neg x3; pro-BNP 1262 on admission  - CXR: cardiomegaly  - NM stress test 5/22 normal  - TTE: severe concentric LVH, grade 2 diastolic dysfunction, dilation of ascending aorta    #KOBI on CKD 3  - baseline creat 1.8  - start IVF   - hold  triamterene-HCTZ and ACEi     #Chronic microcytic anemia  #H pylori positive on EGD +  - recent EGD/Colonoscopy by  on 4/29  - on triple therapy for H pylori (8 more days left), needs clarithromycin NF to be filled, patient can use her own meds    #Paroxysmal A fib on xarelto  - c/w lopressor 50mg BID  - c/w Xarelto  - c/w amiodarone 200mg daily  - s/p FAITH cardioversion in 2019 by Dr. Quintero    #HTN  - c/w amlodipine 5mg  - hold triamterene-HCTZ    - hold benazepril in setting of KOBI    #Gout  - c/w allopurinol 100mg     #Prolactinoma?  - on cabergoline (every Monday)    #HLD  - c/w Lipitor 20mg qHS    #Hypothyroidism  - c/w levothyroxine 175mcg    #Overactive bladder  - pt on toriaz > oxybuytinin inpatient    GI ppx: pantoprazole BID  DVT ppx: Xarelto    Pending: KOBI improvement  Spoke with daughter at bedside   Dispo: Home in 24 hours if Cr improves

## 2022-05-23 NOTE — CONSULT NOTE ADULT - ASSESSMENT
prerenal KOBI form ACE-I / diuretics  CKD stage 3   - baseline Cr "upper 1's"  no hydro on renal sonogram  bland UA, no proteinuria with low Cristopher+  chest pain / negative stress test  HTN with current low side BP's  PAF  no overt CHF  anemia  FELIX    plan:    agree with gentle hydration  hold ACE-I  no further lasix  off triamterene / hctz  hold amlodipine if BP's low  f/u labs  will follow  
pt w/ villarreal, pressure w/o mi. mild failure. give lasix.  advise jose e kaye. plan pending.

## 2022-05-23 NOTE — PHYSICAL THERAPY INITIAL EVALUATION ADULT - ADDITIONAL COMMENTS
Pt lives alone in house with 4 HUMZA, 1 FOS inside. Pt reports being independent in ADLs and ambulation. Pt with no recent hx of falls. Pt has RW and SC at home.

## 2022-05-23 NOTE — PROGRESS NOTE ADULT - SUBJECTIVE AND OBJECTIVE BOX
JEFFERY UGALDE 81y Female  MRN#: [id number]   CODE STATUS:________    Hospital Day: 3d    Pt is currently admitted with the primary diagnosis of     SUBJECTIVE  Overnight/Interval Events:    VITAL SIGNS: Last 24 Hours  T(C): 36.1 (23 May 2022 05:20), Max: 36.5 (22 May 2022 11:51)  T(F): 96.9 (23 May 2022 05:20), Max: 97.7 (22 May 2022 11:51)  HR: 55 (23 May 2022 05:20) (53 - 77)  BP: 117/56 (23 May 2022 05:20) (100/54 - 119/58)  BP(mean): --  RR: 16 (23 May 2022 05:20) (16 - 20)  SpO2: --      05-22-22 @ 07:01  -  05-23-22 @ 07:00  --------------------------------------------------------  IN: 460 mL / OUT: 600 mL / NET: -140 mL        Present Today:   - Terry:  No [  ], Yes [   ] : Indication:     - Type of IV Access:       .. CVC/Piccline:  No [  ], Yes [   ] : Indication:       .. Midline: No [  ], Yes [   ] : Indication:                                             ----------------------------------------------------------  OBJECTIVE  PAST MEDICAL & SURGICAL HISTORY  HTN (hypertension)    High cholesterol    Hypothyroid  s/p thyroid ca surgery    Afib  on xarelto    Sleep apnea    Osteoarthritis    CKD (chronic kidney disease) stage 3, GFR 30-59 ml/min    H/O thyroidectomy    S/P rotator cuff surgery                                              -----------------------------------------------------------  ALLERGIES:  No Known Allergies                                            ------------------------------------------------------------    HOME MEDICATIONS  Home Medications:  amiodarone 200 mg oral tablet: 1 tab(s) orally once a day (20 May 2022 17:27)  amLODIPine 5 mg oral tablet: 1 tab(s) orally once a day (at bedtime) (29 Apr 2022 07:01)  atorvastatin 20 mg oral tablet: 1 tab(s) orally once a day (29 Apr 2022 07:01)  benazepril 20 mg oral tablet: 1 tab(s) orally once a day (20 May 2022 17:28)  cabergoline 0.5 mg oral tablet: 1 tab(s) orally once a week, monday night (20 May 2022 17:29)  clarithromycin 500 mg oral tablet: 1 tab(s) orally every 12 hours (20 May 2022 17:35)  levothyroxine 175 mcg (0.175 mg) oral tablet: 1 tab(s) orally 5 times a week (29 Apr 2022 07:01)  metoprolol tartrate 50 mg oral tablet: 1 tab(s) orally 2 times a day (29 Apr 2022 07:01)  omeprazole 40 mg oral delayed release capsule: 1 cap(s) orally 2 times a day (20 May 2022 17:32)  rivaroxaban 15 mg oral tablet: 1 tab(s) orally every 24 hours (29 Apr 2022 07:01)  Toviaz 4 mg oral tablet, extended release: 1 tab(s) orally once a day (29 Apr 2022 07:01)  triamterene-hydrochlorothiazide 37.5 mg-25 mg oral tablet: 1 tab(s) orally once a day (20 May 2022 17:30)                           MEDICATIONS:  STANDING MEDICATIONS  aDENosine Injectable (ADENOSCAN) 60 milliGRAM(s) IV Bolus once  aMIOdarone    Tablet 200 milliGRAM(s) Oral daily  amLODIPine   Tablet 5 milliGRAM(s) Oral at bedtime  amoxicillin 1000 milliGRAM(s) Oral two times a day  atorvastatin 20 milliGRAM(s) Oral at bedtime  chlorhexidine 4% Liquid 1 Application(s) Topical <User Schedule>  levothyroxine 175 MICROGram(s) Oral <User Schedule>  metoprolol tartrate 50 milliGRAM(s) Oral two times a day  pantoprazole    Tablet 40 milliGRAM(s) Oral every 12 hours  rivaroxaban 15 milliGRAM(s) Oral with dinner    PRN MEDICATIONS  acetaminophen     Tablet .. 650 milliGRAM(s) Oral every 6 hours PRN  melatonin 5 milliGRAM(s) Oral at bedtime PRN                                             --------------------------------------------------------------  LABS:                        8.5    7.50  )-----------( 321      ( 22 May 2022 06:25 )             28.8     05-22    142  |  100  |  44<H>  ----------------------------<  116<H>  3.5   |  27  |  2.3<H>    Ca    8.5      22 May 2022 06:25  Phos  4.4     05-22  Mg     2.2     05-22    TPro  6.0  /  Alb  3.7  /  TBili  0.4  /  DBili  x   /  AST  13  /  ALT  9   /  AlkPhos  102  05-22                  CARDIAC MARKERS ( 21 May 2022 11:53 )  x     / <0.01 ng/mL / x     / x     / x                                                  -------------------------------------------------------------  RADIOLOGY:    I have personally reviewed the last available Chest xray  CXR      CT                                              --------------------------------------------------------------    PHYSICAL EXAM:  General: no acute distress  HEENT: normocephalic, atraumatic  Lungs: CTAB  Heart: regular rate and rhythm, normal S1 and S2  Abdomen: soft, nontender, nondistended  Neuro: AAO                                             --------------------------------------------------------------    ASSESSMENT & PLAN    80 yo female patient with PMHx of Paroxysmal AFib on Xarelto, HTN, DL, hypothyroidism (history of thyroid cancer s/p resection), FELIX on CPAP, hx hip fracture, CHFpEF , CKD 3, with recent EGD /colonoscopy for microcytic anemia with H pylori positive (on rx) presented to ER for left sided shoulder numbness , BECK admitted for further workup    #BECK and chest tightness secondary to acute on chronic heart failure with preserved ejection fraction.  - tropx neg x3  - CXR: cardiomegaly  - EKG:  Diagnosis Line Sinus rhythm with 1st degree A-V block  Left axis deviation  Non-specific intra-ventricular conduction block  Minimal voltage criteria for LVH, may be normal variant ( [place] product )  Abnormal ECG  - fu BNP, 2+ LE edema+  - admit to telemetry  - cardio consult appreciated recc stress test  - fu serial EKG and CE  - baseline : fully funcitonal , lives alone  - strict I&O  - daily weight  - cotninue 40 mg lasix IV  - echo as above  - stress test neg, no ischemia   - symptoms possibly 2/2 HTN  - BP stable  - pt appears to be Euvolemic    #KOBI on CKD 3  - cr: 1.9 today (1.1 in 2020)-->worsening 2.3, possible progressive from HTN  - monitor BMP  - hold benazepril  - will follow up cr on 5/22 if worsening will get renal   - renal US no hydro  - renal consult  - DC lasix  - DC lisinapril   - pt appears to be Euvolemic  - check UA and lytes    #Acute on chronic microcytic anemia  #H pylori positve on EGD +  - Hb: 7.9 on admission (baseline 9-10)  - recent EGD /COLon by  on 4/29  - EGD: Normal mucosa in the whole esophagus.    Erythema in the stomach compatible with non-erosive gastritis. (Biopsy).    Multiple nodules seen in antrum between 3 and 7 mm in size (Biopsy).    3 mm antral polyp (hyperplastic)   Two 3 mm polyps in the fundus(hyperplastic)  - Colonoscopy (04.29.22 @ 07:30) >   Polyp (1 cm) in the sigmoid colon. (Polypectomy, Endoclip: hyperplastic).    Polyp (1 cm) in the sigmoid colon. 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    Polyp (1 cm) in the Ascending colon. (Polypectomy, Endoclip: tubulo villous adenoma).   - on triple therapy for H pylori (8 more days left), needs clarithromycin NF to be filled, patient can use her own meds  - needs outpt follow up for eradication fu  - maintain active T&S  - hgb stable  - will check Iron studies     #Paroxysmal A fib on xarelto  - c/w lopressor 50mg BID  - c/w xarelto  - monitor CBC  - c/w amiodarone 200mg daily  - s/p FAITH cardioversion in 2019 by     #HTN  - c/w amlodipine 5mg  - hold traimterene-HCTZ ( was discontinued in 2019 for gouty attack but patient mentions she is been taking the medicaiton  - hold benazepril in setting of KOBI   - bp stable    #Gout  - c/w allopurinol 100mg     #prolactinoma?  - on cabergoline (every monday)    #HLD  - c.w lipitor 20mg qHS    #Hypothyroidism  - c/w levothyroxine 175mcg    #overactive bladder  - pt on toriaz 4mg>>ordered oxybuytinin inpatient    #hypokalemia- repleted    #Progress Note Handoff  Pending (specify: KOBI, Renal  follow kidney   Family discussion: house staff updated pt family  Disposition: depending on renal, will anticpate, DC cardiac telemonitoring, no events on cardiac telemonitoring and ischemia w/u neg                             JEFFERY UGALDE 81y Female  MRN#: [id number]   CODE STATUS: full code    Hospital Day: 3d    Pt is currently admitted with the primary diagnosis of KOBI    SUBJECTIVE  Overnight/Interval Events: No acute events overnight. Patient seen and examined at bedside. She says chest discomfort has improved and has no complaints.     VITAL SIGNS: Last 24 Hours  T(C): 36.1 (23 May 2022 05:20), Max: 36.5 (22 May 2022 11:51)  T(F): 96.9 (23 May 2022 05:20), Max: 97.7 (22 May 2022 11:51)  HR: 55 (23 May 2022 05:20) (53 - 77)  BP: 117/56 (23 May 2022 05:20) (100/54 - 119/58)    RR: 16 (23 May 2022 05:20) (16 - 20)  SpO2: --      05-22-22 @ 07:01  -  05-23-22 @ 07:00  --------------------------------------------------------  IN: 460 mL / OUT: 600 mL / NET: -140 mL                                              ----------------------------------------------------------  OBJECTIVE  PAST MEDICAL & SURGICAL HISTORY  HTN (hypertension)    High cholesterol    Hypothyroid  s/p thyroid ca surgery    Afib  on xarelto    Sleep apnea    Osteoarthritis    CKD (chronic kidney disease) stage 3, GFR 30-59 ml/min    H/O thyroidectomy    S/P rotator cuff surgery                                              -----------------------------------------------------------  ALLERGIES:  No Known Allergies                                            ------------------------------------------------------------    HOME MEDICATIONS  Home Medications:  amiodarone 200 mg oral tablet: 1 tab(s) orally once a day (20 May 2022 17:27)  amLODIPine 5 mg oral tablet: 1 tab(s) orally once a day (at bedtime) (29 Apr 2022 07:01)  atorvastatin 20 mg oral tablet: 1 tab(s) orally once a day (29 Apr 2022 07:01)  benazepril 20 mg oral tablet: 1 tab(s) orally once a day (20 May 2022 17:28)  cabergoline 0.5 mg oral tablet: 1 tab(s) orally once a week, monday night (20 May 2022 17:29)  clarithromycin 500 mg oral tablet: 1 tab(s) orally every 12 hours (20 May 2022 17:35)  levothyroxine 175 mcg (0.175 mg) oral tablet: 1 tab(s) orally 5 times a week (29 Apr 2022 07:01)  metoprolol tartrate 50 mg oral tablet: 1 tab(s) orally 2 times a day (29 Apr 2022 07:01)  omeprazole 40 mg oral delayed release capsule: 1 cap(s) orally 2 times a day (20 May 2022 17:32)  rivaroxaban 15 mg oral tablet: 1 tab(s) orally every 24 hours (29 Apr 2022 07:01)  Toviaz 4 mg oral tablet, extended release: 1 tab(s) orally once a day (29 Apr 2022 07:01)  triamterene-hydrochlorothiazide 37.5 mg-25 mg oral tablet: 1 tab(s) orally once a day (20 May 2022 17:30)                           MEDICATIONS:  STANDING MEDICATIONS  aDENosine Injectable (ADENOSCAN) 60 milliGRAM(s) IV Bolus once  aMIOdarone    Tablet 200 milliGRAM(s) Oral daily  amLODIPine   Tablet 5 milliGRAM(s) Oral at bedtime  amoxicillin 1000 milliGRAM(s) Oral two times a day  atorvastatin 20 milliGRAM(s) Oral at bedtime  chlorhexidine 4% Liquid 1 Application(s) Topical <User Schedule>  levothyroxine 175 MICROGram(s) Oral <User Schedule>  metoprolol tartrate 50 milliGRAM(s) Oral two times a day  pantoprazole    Tablet 40 milliGRAM(s) Oral every 12 hours  rivaroxaban 15 milliGRAM(s) Oral with dinner    PRN MEDICATIONS  acetaminophen     Tablet .. 650 milliGRAM(s) Oral every 6 hours PRN  melatonin 5 milliGRAM(s) Oral at bedtime PRN                                             --------------------------------------------------------------  LABS:                        8.5    7.50  )-----------( 321      ( 22 May 2022 06:25 )             28.8     05-22    142  |  100  |  44<H>  ----------------------------<  116<H>  3.5   |  27  |  2.3<H>    Ca    8.5      22 May 2022 06:25  Phos  4.4     05-22  Mg     2.2     05-22    TPro  6.0  /  Alb  3.7  /  TBili  0.4  /  DBili  x   /  AST  13  /  ALT  9   /  AlkPhos  102  05-22                  CARDIAC MARKERS ( 21 May 2022 11:53 )  x     / <0.01 ng/mL / x     / x     / x                                                  -------------------------------------------------------------  RADIOLOGY:    CXR 5/23: cardiomegaly, congestion (official read pending)    NM stress test 5/22:  1. NO EVIDENCE FOR ISCHEMIA DURING ADENOSINE INFUSION.  2. NORMAL RESTING LEFT VENTRICULAR WALL MOTION AND WALL THICKENING.  3. LEFT VENTRICULAR EJECTION FRACTION OF 75 % WHICH IS WITHIN RANGE OF NORMAL.    KUB 5/22:  No hydronephrosis.    Nonspecific nonvisualization of the bilateral ureteral jets.    Urinary bladder volume of 90 cc. Postvoid urinary bladder volume was not able to be obtained.    2.8 cm right renal cyst.    Echo 5/20:  1. LV Ejection Fraction by Lester's Method with a biplane EF of 62 %.  2. Elevated mean left atrial pressure.  3. Severe concentric left ventricular hypertrophy.  4. Spectral Doppler shows pseudonormal pattern of left ventricular myocardial filling (Grade II diastolic dysfunction).  5. Moderately enlarged left atrium.  6. Moderately enlarged right atrium.  7. Mild mitral valve regurgitation.  8. Mitral annular calcification.  9. Mild tricuspid regurgitation.  10. Mild to moderate pulmonic valve regurgitation.  11. Dilatation of the ascending aorta.                                                    --------------------------------------------------------------    PHYSICAL EXAM:  General: no acute distress  HEENT: normocephalic, atraumatic  Lungs: CTAB  Heart: regular rate and rhythm, normal S1 and S2  Abdomen: soft, nontender, nondistended  Neuro: AAOx3, no motor or sensory deficit                                             --------------------------------------------------------------    ASSESSMENT & PLAN    82 yo female patient with PMHx of Paroxysmal AFib on Xarelto, HTN, DL, hypothyroidism (history of thyroid cancer s/p resection), FELIX on CPAP, hx hip fracture, CHFpEF , CKD 3, with recent EGD /colonoscopy for microcytic anemia with H pylori positive (on rx) presented to ER for left sided shoulder numbness , BECK admitted for further workup    #BECK and chest tightness   #acute on chronic heart failure with preserved ejection fraction.  - tropx neg x3; pro-BNP 1262 on admission  - CXR: cardiomegaly  - EKG:Sinus rhythm with 1st degree A-V block, Left axis deviation, Non-specific intra-ventricular conduction block, Minimal voltage criteria for LVH, may be normal variant ( [place] product )  - NM stress test 5/22 normal  - echo: severe concentric LVH, grade 2 diastolic dysfunction, dilation of ascending aorta  - strict I&O  - daily weight  - appears euvolemic, lasix was d/c    #KOBI on CKD 3  - cr: 1.9 worsening 2.3, possible progressive from HTN  - monitor BMP  - hold benazepril  - f/u recs per renal, Dr. Dong    #Acute on chronic microcytic anemia  #H pylori positve on EGD +  - Hb: 7.9 on admission (baseline 9-10)  - recent EGD /COLon by  on 4/29  - EGD: Normal mucosa in the whole esophagus.    Erythema in the stomach compatible with non-erosive gastritis. (Biopsy).    Multiple nodules seen in antrum between 3 and 7 mm in size (Biopsy).    3 mm antral polyp (hyperplastic)   Two 3 mm polyps in the fundus(hyperplastic)  - Colonoscopy (04.29.22 @ 07:30) >   Polyp (1 cm) in the sigmoid colon. (Polypectomy, Endoclip: hyperplastic).    Polyp (1 cm) in the sigmoid colon. 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    Polyp (1 cm) in the Ascending colon. (Polypectomy, Endoclip: tubulo villous adenoma).   - on triple therapy for H pylori (8 more days left), needs clarithromycin NF to be filled, patient can use her own meds  - needs outpt follow up for eradication fu  - maintain active T&S  - hgb stable  - will check Iron studies     #Paroxysmal A fib on xarelto  - c/w lopressor 50mg BID  - c/w xarelto  - monitor CBC  - c/w amiodarone 200mg daily  - s/p FAITH cardioversion in 2019 by     #HTN  - c/w amlodipine 5mg  - hold traimterene-HCTZ ( was discontinued in 2019 for gouty attack but patient mentions she is been taking the medicaiton  - hold benazepril in setting of KOBI   - bp stable    #Gout  - c/w allopurinol 100mg     #prolactinoma?  - on cabergoline (every monday)    #HLD  - c.w lipitor 20mg qHS    #Hypothyroidism  - c/w levothyroxine 175mcg    #overactive bladder  - pt on toriaz 4mg>>ordered oxybuytinin inpatient      Code: full code  Pending (specify: KOBI, Renal  follow kidney                                JEFFERY UGALDE 81y Female  MRN#: [id number]   CODE STATUS: full code    Hospital Day: 3d    Pt is currently admitted with the primary diagnosis of KOBI    SUBJECTIVE  Overnight/Interval Events: No acute events overnight. Patient seen and examined at bedside. She says chest discomfort has improved and has no complaints.     VITAL SIGNS: Last 24 Hours  T(C): 36.1 (23 May 2022 05:20), Max: 36.5 (22 May 2022 11:51)  T(F): 96.9 (23 May 2022 05:20), Max: 97.7 (22 May 2022 11:51)  HR: 55 (23 May 2022 05:20) (53 - 77)  BP: 117/56 (23 May 2022 05:20) (100/54 - 119/58)    RR: 16 (23 May 2022 05:20) (16 - 20)  SpO2: --      05-22-22 @ 07:01  -  05-23-22 @ 07:00  --------------------------------------------------------  IN: 460 mL / OUT: 600 mL / NET: -140 mL                                              ----------------------------------------------------------  OBJECTIVE  PAST MEDICAL & SURGICAL HISTORY  HTN (hypertension)    High cholesterol    Hypothyroid  s/p thyroid ca surgery    Afib  on xarelto    Sleep apnea    Osteoarthritis    CKD (chronic kidney disease) stage 3, GFR 30-59 ml/min    H/O thyroidectomy    S/P rotator cuff surgery                                              -----------------------------------------------------------  ALLERGIES:  No Known Allergies                                            ------------------------------------------------------------    HOME MEDICATIONS  Home Medications:  amiodarone 200 mg oral tablet: 1 tab(s) orally once a day (20 May 2022 17:27)  amLODIPine 5 mg oral tablet: 1 tab(s) orally once a day (at bedtime) (29 Apr 2022 07:01)  atorvastatin 20 mg oral tablet: 1 tab(s) orally once a day (29 Apr 2022 07:01)  benazepril 20 mg oral tablet: 1 tab(s) orally once a day (20 May 2022 17:28)  cabergoline 0.5 mg oral tablet: 1 tab(s) orally once a week, monday night (20 May 2022 17:29)  clarithromycin 500 mg oral tablet: 1 tab(s) orally every 12 hours (20 May 2022 17:35)  levothyroxine 175 mcg (0.175 mg) oral tablet: 1 tab(s) orally 5 times a week (29 Apr 2022 07:01)  metoprolol tartrate 50 mg oral tablet: 1 tab(s) orally 2 times a day (29 Apr 2022 07:01)  omeprazole 40 mg oral delayed release capsule: 1 cap(s) orally 2 times a day (20 May 2022 17:32)  rivaroxaban 15 mg oral tablet: 1 tab(s) orally every 24 hours (29 Apr 2022 07:01)  Toviaz 4 mg oral tablet, extended release: 1 tab(s) orally once a day (29 Apr 2022 07:01)  triamterene-hydrochlorothiazide 37.5 mg-25 mg oral tablet: 1 tab(s) orally once a day (20 May 2022 17:30)                           MEDICATIONS:  STANDING MEDICATIONS  aDENosine Injectable (ADENOSCAN) 60 milliGRAM(s) IV Bolus once  aMIOdarone    Tablet 200 milliGRAM(s) Oral daily  amLODIPine   Tablet 5 milliGRAM(s) Oral at bedtime  amoxicillin 1000 milliGRAM(s) Oral two times a day  atorvastatin 20 milliGRAM(s) Oral at bedtime  chlorhexidine 4% Liquid 1 Application(s) Topical <User Schedule>  levothyroxine 175 MICROGram(s) Oral <User Schedule>  metoprolol tartrate 50 milliGRAM(s) Oral two times a day  pantoprazole    Tablet 40 milliGRAM(s) Oral every 12 hours  rivaroxaban 15 milliGRAM(s) Oral with dinner    PRN MEDICATIONS  acetaminophen     Tablet .. 650 milliGRAM(s) Oral every 6 hours PRN  melatonin 5 milliGRAM(s) Oral at bedtime PRN                                             --------------------------------------------------------------  LABS:                        8.5    7.50  )-----------( 321      ( 22 May 2022 06:25 )             28.8     05-22    142  |  100  |  44<H>  ----------------------------<  116<H>  3.5   |  27  |  2.3<H>    Ca    8.5      22 May 2022 06:25  Phos  4.4     05-22  Mg     2.2     05-22    TPro  6.0  /  Alb  3.7  /  TBili  0.4  /  DBili  x   /  AST  13  /  ALT  9   /  AlkPhos  102  05-22                  CARDIAC MARKERS ( 21 May 2022 11:53 )  x     / <0.01 ng/mL / x     / x     / x                                                  -------------------------------------------------------------  RADIOLOGY:    CXR 5/23: cardiomegaly, congestion (official read pending)    NM stress test 5/22:  1. NO EVIDENCE FOR ISCHEMIA DURING ADENOSINE INFUSION.  2. NORMAL RESTING LEFT VENTRICULAR WALL MOTION AND WALL THICKENING.  3. LEFT VENTRICULAR EJECTION FRACTION OF 75 % WHICH IS WITHIN RANGE OF NORMAL.    KUB 5/22:  No hydronephrosis.    Nonspecific nonvisualization of the bilateral ureteral jets.    Urinary bladder volume of 90 cc. Postvoid urinary bladder volume was not able to be obtained.    2.8 cm right renal cyst.    Echo 5/20:  1. LV Ejection Fraction by Lester's Method with a biplane EF of 62 %.  2. Elevated mean left atrial pressure.  3. Severe concentric left ventricular hypertrophy.  4. Spectral Doppler shows pseudonormal pattern of left ventricular myocardial filling (Grade II diastolic dysfunction).  5. Moderately enlarged left atrium.  6. Moderately enlarged right atrium.  7. Mild mitral valve regurgitation.  8. Mitral annular calcification.  9. Mild tricuspid regurgitation.  10. Mild to moderate pulmonic valve regurgitation.  11. Dilatation of the ascending aorta.                                                    --------------------------------------------------------------    PHYSICAL EXAM:  General: no acute distress  HEENT: normocephalic, atraumatic  Lungs: CTAB  Heart: regular rate and rhythm, normal S1 and S2  Abdomen: soft, nontender, nondistended  Neuro: AAOx3, no motor or sensory deficit                                             --------------------------------------------------------------    ASSESSMENT & PLAN    80 yo female patient with PMHx of Paroxysmal AFib on Xarelto, HTN, DL, hypothyroidism (history of thyroid cancer s/p resection), FELIX on CPAP, hx hip fracture, CHFpEF , CKD 3, with recent EGD /colonoscopy for microcytic anemia with H pylori positive (on rx) presented to ER for left sided shoulder numbness , BECK admitted for further workup    #BECK and chest tightness   #acute on chronic heart failure with preserved ejection fraction.  - tropx neg x3; pro-BNP 1262 on admission  - CXR: cardiomegaly  - EKG:Sinus rhythm with 1st degree A-V block, Left axis deviation, Non-specific intra-ventricular conduction block, Minimal voltage criteria for LVH, may be normal variant ( [place] product )  - NM stress test 5/22 normal  - echo: severe concentric LVH, grade 2 diastolic dysfunction, dilation of ascending aorta  - strict I&O  - daily weight  - appears euvolemic, lasix was d/c    #KOBI on CKD 3  - cr: worsening 2.3 yesterday; on admission, creat 1.8  - monitor BMP  - hold benazepril  - f/u recs per renal, Dr. Dong    #Acute on chronic microcytic anemia  #H pylori positve on EGD +  -hgb 8.5, stable; hgb 7.9 on admission (baseline 9-10)  - recent EGD /COLon by  on 4/29  - EGD: Normal mucosa in the whole esophagus.    Erythema in the stomach compatible with non-erosive gastritis. (Biopsy).    Multiple nodules seen in antrum between 3 and 7 mm in size (Biopsy).    3 mm antral polyp (hyperplastic)   Two 3 mm polyps in the fundus(hyperplastic)  - Colonoscopy (04.29.22 @ 07:30) >   Polyp (1 cm) in the sigmoid colon. (Polypectomy, Endoclip: hyperplastic).    Polyp (1 cm) in the sigmoid colon. 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    Polyp (1 cm) in the Ascending colon. (Polypectomy, Endoclip: tubulo villous adenoma).   - on triple therapy for H pylori (8 more days left), needs clarithromycin NF to be filled, patient can use her own meds  - needs outpt follow up for eradication fu  - f/u Iron studies     #Paroxysmal A fib on xarelto  - c/w lopressor 50mg BID  - c/w xarelto  - monitor CBC  - c/w amiodarone 200mg daily  - s/p FAITH cardioversion in 2019 by     #HTN  - c/w amlodipine 5mg  - hold traimterene-HCTZ ( was discontinued in 2019 for gouty attack but patient mentions she is been taking the medicaiton  - hold benazepril in setting of KOBI   - bp stable    #Gout  - c/w allopurinol 100mg     #prolactinoma?  - on cabergoline (every monday)    #HLD  - c.w lipitor 20mg qHS    #Hypothyroidism  - c/w levothyroxine 175mcg    #overactive bladder  - pt on toriaz 4mg>>ordered oxybuytinin inpatient    GI ppx: pantoprazole BID  DVT ppx: xarelto  Diet: DASH  Code: full code  Dispo: pending resolution KOBI, f/u recs per nephro

## 2022-05-23 NOTE — CONSULT NOTE ADULT - SUBJECTIVE AND OBJECTIVE BOX
Patient is a 81y old  Female who presents with a chief complaint of chest pain (20 May 2022 16:07)      HPI:  80 yo female patient with PMHx of Paroxysmal AFib on Xarelto, HTN, DL, hypothyroidism (history of thyroid cancer s/p resection), FELIX on CPAP, hx hip fracture, CHFpEF , CKD 3, with recent EGD /colonoscopy for microcytic anemia with H pylori positive (on rx) presented to ER for left sided shoulder numbness which started yesterday night with some chest tightness while she was asleep. She denies any exertional chest pain but reports having SOB on exertion for the past few days. She denies any nausea/diaphoresis/fever/chills.     ER Course:  Vital Signs Last 24 Hrs  T(F): 98.2 (20 May 2022 08:54), Max: 98.2 (20 May 2022 08:54)  HR: 79 (20 May 2022 08:54) (79 - 79)  BP: 142/69 (20 May 2022 08:54) (142/69 - 142/69)  RR: 17 (20 May 2022 08:54) (17 - 17)  SpO2: 98% (20 May 2022 08:54) (98% - 98%)    Labs:  - trop x1 negative  - hb: 7.9 (baseline 10 in 2019)  - Cr: 1.8 (baseline 1.1(2020)    CXR: cardiomegaly (20 May 2022 16:07)      PAST MEDICAL & SURGICAL HISTORY:  HTN (hypertension)      High cholesterol      Hypothyroid  s/p thyroid ca surgery      Afib  on xarelto      Sleep apnea      Osteoarthritis      CKD (chronic kidney disease) stage 3, GFR 30-59 ml/min      H/O thyroidectomy      S/P rotator cuff surgery                          PREVIOUS DIAGNOSTIC TESTING:      ECHO  FINDINGS:    STRESS  FINDINGS:    CATHETERIZATION  FINDINGS:    MEDICATIONS  (STANDING):  aMIOdarone    Tablet 200 milliGRAM(s) Oral daily  amLODIPine   Tablet 5 milliGRAM(s) Oral at bedtime  amoxicillin 1000 milliGRAM(s) Oral two times a day  atorvastatin 20 milliGRAM(s) Oral at bedtime  chlorhexidine 4% Liquid 1 Application(s) Topical <User Schedule>  furosemide   Injectable 40 milliGRAM(s) IntraMuscular daily  levothyroxine 175 MICROGram(s) Oral <User Schedule>  lisinopril 20 milliGRAM(s) Oral daily  metoprolol tartrate 50 milliGRAM(s) Oral two times a day  pantoprazole    Tablet 40 milliGRAM(s) Oral every 12 hours  rivaroxaban 15 milliGRAM(s) Oral with dinner    MEDICATIONS  (PRN):      FAMILY HISTORY:  Family history of lung cancer (Mother)    Family history of abdominal aortic aneurysm (AAA) (Father)        SOCIAL HISTORY:    CIGARETTES:    ALCOHOL:        Vital Signs Last 24 Hrs  T(C): 36.3 (21 May 2022 07:34), Max: 36.6 (20 May 2022 17:34)  T(F): 97.3 (21 May 2022 07:34), Max: 97.9 (20 May 2022 17:34)  HR: 51 (21 May 2022 07:34) (51 - 67)  BP: 150/69 (21 May 2022 07:34) (104/70 - 156/70)  BP(mean): --  RR: 18 (21 May 2022 07:34) (18 - 18)  SpO2: 100% (21 May 2022 07:34) (97% - 100%)            INTERPRETATION OF TELEMETRY:    ECG:    I&O's Detail      LABS:                        8.5    6.95  )-----------( 313      ( 21 May 2022 04:30 )             27.4     05-21    142  |  100  |  38<H>  ----------------------------<  99  3.1<L>   |  25  |  1.9<H>    Ca    8.8      21 May 2022 04:30    TPro  6.3  /  Alb  3.8  /  TBili  0.5  /  DBili  x   /  AST  16  /  ALT  10  /  AlkPhos  103  05-21    CARDIAC MARKERS ( 21 May 2022 00:26 )  x     / <0.01 ng/mL / 82 U/L / x     / 1.3 ng/mL  CARDIAC MARKERS ( 20 May 2022 21:41 )  x     / <0.01 ng/mL / 90 U/L / x     / 1.3 ng/mL  CARDIAC MARKERS ( 20 May 2022 10:20 )  x     / <0.01 ng/mL / x     / x     / x          PT/INR - ( 20 May 2022 10:20 )   PT: 19.80 sec;   INR: 1.73 ratio         PTT - ( 20 May 2022 10:20 )  PTT:44.0 sec    I&O's Summary    BNPSerum Pro-Brain Natriuretic Peptide: 1262 pg/mL (05-20 @ 21:41)    RADIOLOGY & ADDITIONAL STUDIES:
NEPHROLOGY CONSULTATION NOTE    82 yo female with PMH of PAFib on Xarelto, HTN, DL, hypothyroidism (history of thyroid cancer s/p resection), FELIX on CPAP, hx hip fracture, CHFpEF , CKD 3, microcytic anemia admitted with chest pain.  PT diuresed and on high dose ACE-I.  Now with worsening renal function.  Chronic urinary incontinence.      PAST MEDICAL & SURGICAL HISTORY:  HTN (hypertension)      High cholesterol      Hypothyroid  s/p thyroid ca surgery      Afib  on xarelto      Sleep apnea      Osteoarthritis      CKD (chronic kidney disease) stage 3, GFR 30-59 ml/min      H/O thyroidectomy      S/P rotator cuff surgery        Allergies:  No Known Allergies    Home Medications Reviewed    SOCIAL HISTORY:  Denies ETOH,Smoking,   FAMILY HISTORY:  Family history of lung cancer (Mother)    Family history of abdominal aortic aneurysm (AAA) (Father)      REVIEW OF SYSTEMS:  CONSTITUTIONAL: No weakness, fevers or chills  EYES/ENT: No visual changes;  No vertigo or throat pain   NECK: No pain or stiffness  RESPIRATORY: No cough, wheezing, hemoptysis; No shortness of breath  CARDIOVASCULAR: No chest pain or palpitations.  GASTROINTESTINAL: No abdominal or epigastric pain. No nausea, vomiting, or hematemesis; No diarrhea or constipation. No melena or hematochezia.  GENITOURINARY: No dysuria, frequency, foamy urine, urinary urgency, incontinence or hematuria  NEUROLOGICAL: No numbness or weakness  SKIN: No itching, burning, rashes, or lesions   VASCULAR: No bilateral lower extremity edema.   All other review of systems is negative unless indicated above.    PHYSICAL EXAM:  Constitutional: NAD  HEENT: anicteric sclera, oropharynx clear, MMM  Neck: No JVD  Respiratory: CTAB, no wheezes, rales or rhonchi  Cardiovascular: S1, S2, RRR  Gastrointestinal: BS+, soft, NT/ND  Extremities: No cyanosis or clubbing. No peripheral edema  Neurological: A/O x 3, no focal deficits  Psychiatric: Normal mood, normal affect  : No CVA tenderness. No joaquin.   Skin: No rashes    Hospital Medications:   MEDICATIONS  (STANDING):  aDENosine Injectable (ADENOSCAN) 60 milliGRAM(s) IV Bolus once  aMIOdarone    Tablet 200 milliGRAM(s) Oral daily  amLODIPine   Tablet 5 milliGRAM(s) Oral at bedtime  amoxicillin 1000 milliGRAM(s) Oral two times a day  atorvastatin 20 milliGRAM(s) Oral at bedtime  chlorhexidine 4% Liquid 1 Application(s) Topical <User Schedule>  lactated ringers. 1000 milliLiter(s) (75 mL/Hr) IV Continuous <Continuous>  levothyroxine 175 MICROGram(s) Oral <User Schedule>  metoprolol tartrate 50 milliGRAM(s) Oral two times a day  pantoprazole    Tablet 40 milliGRAM(s) Oral every 12 hours  rivaroxaban 15 milliGRAM(s) Oral with dinner        VITALS:  T(F): 98.3 (22 @ 13:58), Max: 98.3 (22 @ 13:58)  HR: 65 (22 @ 13:58)  BP: 118/60 (22 @ 13:58)  RR: 18 (22 @ 13:58)  SpO2: --  Wt(kg): --     @ 07:01  -   @ 07:00  --------------------------------------------------------  IN: 240 mL / OUT: 0 mL / NET: 240 mL     @ 07:01  -   @ 07:00  --------------------------------------------------------  IN: 460 mL / OUT: 600 mL / NET: -140 mL          LABS:      142  |  99  |  58<H>  ----------------------------<  102<H>  4.0   |  27  |  2.7<H>    Ca    8.6      23 May 2022 12:18  Phos  4.4       Mg     2.3         TPro  6.0  /  Alb  3.7  /  TBili  0.4  /  DBili      /  AST  13  /  ALT  9   /  AlkPhos  102                            8.3    8.16  )-----------( 325      ( 23 May 2022 12:18 )             27.9       Urine Studies:  Urinalysis Basic - ( 23 May 2022 14:31 )    Color: Light Yellow / Appearance: Clear / S.021 / pH:   Gluc:  / Ketone: Negative  / Bili: Negative / Urobili: <2 mg/dL   Blood:  / Protein: Trace / Nitrite: Negative   Leuk Esterase: Negative / RBC:  / WBC    Sq Epi:  / Non Sq Epi:  / Bacteria:       Creatinine, Random Urine: 105 mg/dL ( @ 14:31)  Sodium, Random Urine: 21.0 mmoL/L ( @ 14:31)  Osmolality, Random Urine: 506 mos/kg ( @ 14:31)      RADIOLOGY & ADDITIONAL STUDIES:

## 2022-05-24 ENCOUNTER — TRANSCRIPTION ENCOUNTER (OUTPATIENT)
Age: 82
End: 2022-05-24

## 2022-05-24 VITALS — SYSTOLIC BLOOD PRESSURE: 131 MMHG | DIASTOLIC BLOOD PRESSURE: 70 MMHG | HEART RATE: 70 BPM

## 2022-05-24 LAB
ANION GAP SERPL CALC-SCNC: 14 MMOL/L — SIGNIFICANT CHANGE UP (ref 7–14)
BASOPHILS # BLD AUTO: 0.03 K/UL — SIGNIFICANT CHANGE UP (ref 0–0.2)
BASOPHILS NFR BLD AUTO: 0.5 % — SIGNIFICANT CHANGE UP (ref 0–1)
BUN SERPL-MCNC: 52 MG/DL — HIGH (ref 10–20)
CALCIUM SERPL-MCNC: 8.7 MG/DL — SIGNIFICANT CHANGE UP (ref 8.5–10.1)
CHLORIDE SERPL-SCNC: 105 MMOL/L — SIGNIFICANT CHANGE UP (ref 98–110)
CO2 SERPL-SCNC: 25 MMOL/L — SIGNIFICANT CHANGE UP (ref 17–32)
CREAT SERPL-MCNC: 2.1 MG/DL — HIGH (ref 0.7–1.5)
EGFR: 24 ML/MIN/1.73M2 — LOW
EOSINOPHIL # BLD AUTO: 0.11 K/UL — SIGNIFICANT CHANGE UP (ref 0–0.7)
EOSINOPHIL NFR BLD AUTO: 1.7 % — SIGNIFICANT CHANGE UP (ref 0–8)
GLUCOSE SERPL-MCNC: 116 MG/DL — HIGH (ref 70–99)
HCT VFR BLD CALC: 26.8 % — LOW (ref 37–47)
HGB BLD-MCNC: 8 G/DL — LOW (ref 12–16)
IMM GRANULOCYTES NFR BLD AUTO: 0.3 % — SIGNIFICANT CHANGE UP (ref 0.1–0.3)
LYMPHOCYTES # BLD AUTO: 1.25 K/UL — SIGNIFICANT CHANGE UP (ref 1.2–3.4)
LYMPHOCYTES # BLD AUTO: 19.1 % — LOW (ref 20.5–51.1)
MAGNESIUM SERPL-MCNC: 2.3 MG/DL — SIGNIFICANT CHANGE UP (ref 1.8–2.4)
MCHC RBC-ENTMCNC: 22.3 PG — LOW (ref 27–31)
MCHC RBC-ENTMCNC: 29.9 G/DL — LOW (ref 32–37)
MCV RBC AUTO: 74.9 FL — LOW (ref 81–99)
MONOCYTES # BLD AUTO: 0.67 K/UL — HIGH (ref 0.1–0.6)
MONOCYTES NFR BLD AUTO: 10.2 % — HIGH (ref 1.7–9.3)
NEUTROPHILS # BLD AUTO: 4.46 K/UL — SIGNIFICANT CHANGE UP (ref 1.4–6.5)
NEUTROPHILS NFR BLD AUTO: 68.2 % — SIGNIFICANT CHANGE UP (ref 42.2–75.2)
NRBC # BLD: 0 /100 WBCS — SIGNIFICANT CHANGE UP (ref 0–0)
PLATELET # BLD AUTO: 295 K/UL — SIGNIFICANT CHANGE UP (ref 130–400)
POTASSIUM SERPL-MCNC: 3.5 MMOL/L — SIGNIFICANT CHANGE UP (ref 3.5–5)
POTASSIUM SERPL-SCNC: 3.5 MMOL/L — SIGNIFICANT CHANGE UP (ref 3.5–5)
RBC # BLD: 3.58 M/UL — LOW (ref 4.2–5.4)
RBC # FLD: 18.2 % — HIGH (ref 11.5–14.5)
SODIUM SERPL-SCNC: 144 MMOL/L — SIGNIFICANT CHANGE UP (ref 135–146)
WBC # BLD: 6.54 K/UL — SIGNIFICANT CHANGE UP (ref 4.8–10.8)
WBC # FLD AUTO: 6.54 K/UL — SIGNIFICANT CHANGE UP (ref 4.8–10.8)

## 2022-05-24 PROCEDURE — 99239 HOSP IP/OBS DSCHRG MGMT >30: CPT

## 2022-05-24 RX ORDER — METHOCARBAMOL 500 MG/1
750 TABLET, FILM COATED ORAL EVERY 8 HOURS
Refills: 0 | Status: DISCONTINUED | OUTPATIENT
Start: 2022-05-24 | End: 2022-05-24

## 2022-05-24 RX ORDER — FERROUS SULFATE 325(65) MG
1 TABLET ORAL
Qty: 0 | Refills: 0 | DISCHARGE
Start: 2022-05-24

## 2022-05-24 RX ORDER — IRON SUCROSE 20 MG/ML
200 INJECTION, SOLUTION INTRAVENOUS EVERY 24 HOURS
Refills: 0 | Status: DISCONTINUED | OUTPATIENT
Start: 2022-05-24 | End: 2022-05-24

## 2022-05-24 RX ORDER — FERROUS SULFATE 325(65) MG
1 TABLET ORAL
Qty: 30 | Refills: 2
Start: 2022-05-24 | End: 2022-08-21

## 2022-05-24 RX ORDER — FERROUS SULFATE 325(65) MG
325 TABLET ORAL DAILY
Refills: 0 | Status: DISCONTINUED | OUTPATIENT
Start: 2022-05-24 | End: 2022-05-24

## 2022-05-24 RX ORDER — BENAZEPRIL HYDROCHLORIDE 40 MG/1
1 TABLET ORAL
Qty: 30 | Refills: 0
Start: 2022-05-24 | End: 2022-06-22

## 2022-05-24 RX ORDER — LIDOCAINE 4 G/100G
1 CREAM TOPICAL EVERY 24 HOURS
Refills: 0 | Status: DISCONTINUED | OUTPATIENT
Start: 2022-05-24 | End: 2022-05-24

## 2022-05-24 RX ORDER — AMOXICILLIN 250 MG/5ML
2 SUSPENSION, RECONSTITUTED, ORAL (ML) ORAL
Qty: 0 | Refills: 0 | DISCHARGE
Start: 2022-05-24

## 2022-05-24 RX ORDER — TRIAMTERENE/HYDROCHLOROTHIAZID 75 MG-50MG
1 TABLET ORAL
Qty: 0 | Refills: 0 | DISCHARGE

## 2022-05-24 RX ORDER — METHOCARBAMOL 500 MG/1
1 TABLET, FILM COATED ORAL
Qty: 90 | Refills: 0
Start: 2022-05-24 | End: 2022-06-22

## 2022-05-24 RX ORDER — METHOCARBAMOL 500 MG/1
1 TABLET, FILM COATED ORAL
Qty: 0 | Refills: 0 | DISCHARGE
Start: 2022-05-24

## 2022-05-24 RX ORDER — BENAZEPRIL HYDROCHLORIDE 40 MG/1
1 TABLET ORAL
Qty: 0 | Refills: 0 | DISCHARGE

## 2022-05-24 RX ORDER — NYSTATIN CREAM 100000 [USP'U]/G
1 CREAM TOPICAL
Refills: 0 | Status: DISCONTINUED | OUTPATIENT
Start: 2022-05-24 | End: 2022-05-24

## 2022-05-24 RX ADMIN — Medication 650 MILLIGRAM(S): at 08:05

## 2022-05-24 RX ADMIN — PANTOPRAZOLE SODIUM 40 MILLIGRAM(S): 20 TABLET, DELAYED RELEASE ORAL at 05:24

## 2022-05-24 RX ADMIN — LIDOCAINE 1 PATCH: 4 CREAM TOPICAL at 07:35

## 2022-05-24 RX ADMIN — Medication 650 MILLIGRAM(S): at 07:36

## 2022-05-24 RX ADMIN — METHOCARBAMOL 750 MILLIGRAM(S): 500 TABLET, FILM COATED ORAL at 08:56

## 2022-05-24 RX ADMIN — METHOCARBAMOL 750 MILLIGRAM(S): 500 TABLET, FILM COATED ORAL at 13:52

## 2022-05-24 RX ADMIN — Medication 50 MILLIGRAM(S): at 17:00

## 2022-05-24 RX ADMIN — PANTOPRAZOLE SODIUM 40 MILLIGRAM(S): 20 TABLET, DELAYED RELEASE ORAL at 17:00

## 2022-05-24 RX ADMIN — Medication 325 MILLIGRAM(S): at 17:01

## 2022-05-24 RX ADMIN — NYSTATIN CREAM 1 APPLICATION(S): 100000 CREAM TOPICAL at 17:01

## 2022-05-24 RX ADMIN — SODIUM CHLORIDE 75 MILLILITER(S): 9 INJECTION, SOLUTION INTRAVENOUS at 05:29

## 2022-05-24 RX ADMIN — Medication 175 MICROGRAM(S): at 05:24

## 2022-05-24 RX ADMIN — Medication 1000 MILLIGRAM(S): at 17:00

## 2022-05-24 RX ADMIN — IRON SUCROSE 110 MILLIGRAM(S): 20 INJECTION, SOLUTION INTRAVENOUS at 13:46

## 2022-05-24 RX ADMIN — CHLORHEXIDINE GLUCONATE 1 APPLICATION(S): 213 SOLUTION TOPICAL at 05:24

## 2022-05-24 RX ADMIN — RIVAROXABAN 15 MILLIGRAM(S): KIT at 17:00

## 2022-05-24 RX ADMIN — Medication 1000 MILLIGRAM(S): at 05:24

## 2022-05-24 NOTE — DISCHARGE NOTE NURSING/CASE MANAGEMENT/SOCIAL WORK - PATIENT PORTAL LINK FT
You can access the FollowMyHealth Patient Portal offered by [facility] by registering at the following website: [email or web address]. By joining [employer]’s FollowMyHealth portal, you will also be able to view your health information using other applications (apps) compatible with our system.

## 2022-05-24 NOTE — PROGRESS NOTE ADULT - SUBJECTIVE AND OBJECTIVE BOX
NEPHROLOGY FOLLOW UP NOTE    pt seen and examined  cr improving  bp's better  + void  c/o neck discomfort    PAST MEDICAL & SURGICAL HISTORY:  HTN (hypertension)      High cholesterol      Hypothyroid  s/p thyroid ca surgery      Afib  on xarelto      Sleep apnea      Osteoarthritis      CKD (chronic kidney disease) stage 3, GFR 30-59 ml/min      H/O thyroidectomy      S/P rotator cuff surgery        Allergies:  No Known Allergies    Home Medications Reviewed    SOCIAL HISTORY:  Denies ETOH,Smoking,   FAMILY HISTORY:  Family history of lung cancer (Mother)    Family history of abdominal aortic aneurysm (AAA) (Father)      REVIEW OF SYSTEMS:  CONSTITUTIONAL: No weakness, fevers or chills  EYES/ENT: No visual changes;  No vertigo or throat pain   NECK: No pain or stiffness  RESPIRATORY: No cough, wheezing, hemoptysis; No shortness of breath  CARDIOVASCULAR: No chest pain or palpitations.  GASTROINTESTINAL: No abdominal or epigastric pain. No nausea, vomiting, or hematemesis; No diarrhea or constipation. No melena or hematochezia.  GENITOURINARY: No dysuria, frequency, foamy urine, urinary urgency, incontinence or hematuria  NEUROLOGICAL: No numbness or weakness  SKIN: No itching, burning, rashes, or lesions   VASCULAR: No bilateral lower extremity edema.   All other review of systems is negative unless indicated above.    PHYSICAL EXAM:  Constitutional: NAD  HEENT: anicteric sclera, oropharynx clear, MMM  Neck: No JVD  Respiratory: CTAB, no wheezes, rales or rhonchi  Cardiovascular: S1, S2, RRR  Gastrointestinal: BS+, soft, NT/ND  Extremities: No cyanosis or clubbing. No peripheral edema  Neurological: A/O x 3, no focal deficits  Psychiatric: Normal mood, normal affect  : No CVA tenderness. No joaquin.   Skin: No rashes    Hospital Medications:   MEDICATIONS  (STANDING):  aDENosine Injectable (ADENOSCAN) 60 milliGRAM(s) IV Bolus once  aMIOdarone    Tablet 200 milliGRAM(s) Oral daily  amLODIPine   Tablet 5 milliGRAM(s) Oral at bedtime  amoxicillin 1000 milliGRAM(s) Oral two times a day  atorvastatin 20 milliGRAM(s) Oral at bedtime  chlorhexidine 4% Liquid 1 Application(s) Topical <User Schedule>  lactated ringers. 1000 milliLiter(s) (75 mL/Hr) IV Continuous <Continuous>  levothyroxine 175 MICROGram(s) Oral <User Schedule>  lidocaine   4% Patch 1 Patch Transdermal every 24 hours  methocarbamol 750 milliGRAM(s) Oral every 8 hours  metoprolol tartrate 50 milliGRAM(s) Oral two times a day  nystatin Cream 1 Application(s) Topical once  nystatin Powder 1 Application(s) Topical two times a day  pantoprazole    Tablet 40 milliGRAM(s) Oral every 12 hours  rivaroxaban 15 milliGRAM(s) Oral with dinner        VITALS:  T(F): 96.8 (22 @ 05:28), Max: 98.3 (22 @ 13:58)  HR: 59 (22 @ 05:28)  BP: 123/56 (22 @ 05:28)  RR: 18 (22 @ 05:28)  SpO2: --  Wt(kg): --     @ 07:01  -   @ 07:00  --------------------------------------------------------  IN: 460 mL / OUT: 600 mL / NET: -140 mL          LABS:      144  |  105  |  52<H>  ----------------------------<  116<H>  3.5   |  25  |  2.1<H>    Ca    8.7      24 May 2022 10:00  Mg     2.3                                 8.0    6.54  )-----------( 295      ( 24 May 2022 10:00 )             26.8       Urine Studies:  Urinalysis Basic - ( 23 May 2022 14:31 )    Color: Light Yellow / Appearance: Clear / S.021 / pH:   Gluc:  / Ketone: Negative  / Bili: Negative / Urobili: <2 mg/dL   Blood:  / Protein: Trace / Nitrite: Negative   Leuk Esterase: Negative / RBC:  / WBC    Sq Epi:  / Non Sq Epi:  / Bacteria:       Creatinine, Random Urine: 105 mg/dL ( @ 14:31)  Sodium, Random Urine: 21.0 mmoL/L ( @ 14:31)  Creatinine, Random Urine: 107 mg/dL ( @ 14:31)  Protein/Creatinine Ratio Calculation: 0.1 Ratio ( @ 14:31)  Osmolality, Random Urine: 506 mos/kg ( @ 14:31)      RADIOLOGY & ADDITIONAL STUDIES:

## 2022-05-24 NOTE — DISCHARGE NOTE PROVIDER - NSDCFUSCHEDAPPT_GEN_ALL_CORE_FT
[facility]  Urogynecology [phone]  Scheduled Appointment: 06/10/2022

## 2022-05-24 NOTE — DISCHARGE NOTE NURSING/CASE MANAGEMENT/SOCIAL WORK - NSDCFUADDAPPT_GEN_ALL_CORE_FT
Please follow up with your primary care doctor and with nephrology, Dr. Dong, within two weeks. You need to have labs to monitor kidney function (basic metabolic panel) and iron studies.    In addition, please continue treatment for H pylori and follow up with gastroenterology outpatient.    Please follow up with cardiology, Dr. Quintero, as needed.

## 2022-05-24 NOTE — PROGRESS NOTE ADULT - SUBJECTIVE AND OBJECTIVE BOX
JEFFERY UGALDE 81y Female  MRN#: [id number]   CODE STATUS: full code    Hospital Day: 4d    Pt is currently admitted with the primary diagnosis of KOBI    SUBJECTIVE  Overnight/Interval Events: Overnight patient complaining of left neck pain    VITAL SIGNS: Last 24 Hours  T(C): 36 (24 May 2022 05:28), Max: 36.8 (23 May 2022 13:58)  T(F): 96.8 (24 May 2022 05:28), Max: 98.3 (23 May 2022 13:58)  HR: 59 (24 May 2022 05:28) (59 - 65)  BP: 123/56 (24 May 2022 05:28) (118/60 - 125/63)    RR: 18 (24 May 2022 05:28) (18 - 18)  SpO2: --                                                  ----------------------------------------------------------  OBJECTIVE  PAST MEDICAL & SURGICAL HISTORY  HTN (hypertension)    High cholesterol    Hypothyroid  s/p thyroid ca surgery    Afib  on xarelto    Sleep apnea    Osteoarthritis    CKD (chronic kidney disease) stage 3, GFR 30-59 ml/min    H/O thyroidectomy    S/P rotator cuff surgery                                              -----------------------------------------------------------  ALLERGIES:  No Known Allergies                                            ------------------------------------------------------------    HOME MEDICATIONS  Home Medications:  amiodarone 200 mg oral tablet: 1 tab(s) orally once a day (20 May 2022 17:27)  amLODIPine 5 mg oral tablet: 1 tab(s) orally once a day (at bedtime) (2022 07:01)  atorvastatin 20 mg oral tablet: 1 tab(s) orally once a day (2022 07:01)  benazepril 20 mg oral tablet: 1 tab(s) orally once a day (20 May 2022 17:28)  cabergoline 0.5 mg oral tablet: 1 tab(s) orally once a week, monday night (20 May 2022 17:29)  clarithromycin 500 mg oral tablet: 1 tab(s) orally every 12 hours (20 May 2022 17:35)  levothyroxine 175 mcg (0.175 mg) oral tablet: 1 tab(s) orally 5 times a week (2022 07:01)  metoprolol tartrate 50 mg oral tablet: 1 tab(s) orally 2 times a day (2022 07:01)  omeprazole 40 mg oral delayed release capsule: 1 cap(s) orally 2 times a day (20 May 2022 17:32)  rivaroxaban 15 mg oral tablet: 1 tab(s) orally every 24 hours (2022 07:01)  Toviaz 4 mg oral tablet, extended release: 1 tab(s) orally once a day (2022 07:01)  triamterene-hydrochlorothiazide 37.5 mg-25 mg oral tablet: 1 tab(s) orally once a day (20 May 2022 17:30)                           MEDICATIONS:  STANDING MEDICATIONS  aDENosine Injectable (ADENOSCAN) 60 milliGRAM(s) IV Bolus once  aMIOdarone    Tablet 200 milliGRAM(s) Oral daily  amLODIPine   Tablet 5 milliGRAM(s) Oral at bedtime  amoxicillin 1000 milliGRAM(s) Oral two times a day  atorvastatin 20 milliGRAM(s) Oral at bedtime  chlorhexidine 4% Liquid 1 Application(s) Topical <User Schedule>  lactated ringers. 1000 milliLiter(s) IV Continuous <Continuous>  levothyroxine 175 MICROGram(s) Oral <User Schedule>  lidocaine   4% Patch 1 Patch Transdermal every 24 hours  metoprolol tartrate 50 milliGRAM(s) Oral two times a day  nystatin Cream 1 Application(s) Topical once  pantoprazole    Tablet 40 milliGRAM(s) Oral every 12 hours  rivaroxaban 15 milliGRAM(s) Oral with dinner    PRN MEDICATIONS  acetaminophen     Tablet .. 650 milliGRAM(s) Oral every 6 hours PRN  melatonin 5 milliGRAM(s) Oral at bedtime PRN                                             --------------------------------------------------------------  LABS:                        8.3    8.16  )-----------( 325      ( 23 May 2022 12:18 )             27.9         142  |  99  |  58<H>  ----------------------------<  102<H>  4.0   |  27  |  2.7<H>    Ca    8.6      23 May 2022 12:18  Mg     2.3             Urinalysis Basic - ( 23 May 2022 14:31 )    Color: Light Yellow / Appearance: Clear / S.021 / pH: x  Gluc: x / Ketone: Negative  / Bili: Negative / Urobili: <2 mg/dL   Blood: x / Protein: Trace / Nitrite: Negative   Leuk Esterase: Negative / RBC: x / WBC x   Sq Epi: x / Non Sq Epi: x / Bacteria: x                                                            -------------------------------------------------------------  RADIOLOGY:    CXR : cardiomegaly, congestion (official read pending)    NM stress test :  1. NO EVIDENCE FOR ISCHEMIA DURING ADENOSINE INFUSION.  2. NORMAL RESTING LEFT VENTRICULAR WALL MOTION AND WALL THICKENING.  3. LEFT VENTRICULAR EJECTION FRACTION OF 75 % WHICH IS WITHIN RANGE OF NORMAL.    KUB :  No hydronephrosis.    Nonspecific nonvisualization of the bilateral ureteral jets.    Urinary bladder volume of 90 cc. Postvoid urinary bladder volume was not able to be obtained.    2.8 cm right renal cyst.    Echo :  1. LV Ejection Fraction by Lester's Method with a biplane EF of 62 %.  2. Elevated mean left atrial pressure.  3. Severe concentric left ventricular hypertrophy.  4. Spectral Doppler shows pseudonormal pattern of left ventricular myocardial filling (Grade II diastolic dysfunction).  5. Moderately enlarged left atrium.  6. Moderately enlarged right atrium.  7. Mild mitral valve regurgitation.  8. Mitral annular calcification.  9. Mild tricuspid regurgitation.  10. Mild to moderate pulmonic valve regurgitation.  11. Dilatation of the ascending aorta.                                                    --------------------------------------------------------------    PHYSICAL EXAM:  General: no acute distress  HEENT: normocephalic, atraumatic  Lungs: CTAB  Heart: regular rate and rhythm, normal S1 and S2  Abdomen: soft, nontender, nondistended  Neuro: AAOx3, no motor or sensory deficit                                             --------------------------------------------------------------    ASSESSMENT & PLAN    80 yo female patient with PMHx of Paroxysmal AFib on Xarelto, HTN, DL, hypothyroidism (history of thyroid cancer s/p resection), FELIX on CPAP, hx hip fracture, CHFpEF , CKD 3, with recent EGD /colonoscopy for microcytic anemia with H pylori positive (on rx) presented to ER for left sided shoulder numbness , BECK admitted for further workup    #BECK and chest tightness   #acute on chronic heart failure with preserved ejection fraction.  - tropx neg x3; pro-BNP 1262 on admission  - CXR: cardiomegaly  - EKG:Sinus rhythm with 1st degree A-V block, Left axis deviation, Non-specific intra-ventricular conduction block, Minimal voltage criteria for LVH, may be normal variant ( [place] product )  - NM stress test  normal  - echo: severe concentric LVH, grade 2 diastolic dysfunction, dilation of ascending aorta  - strict I&O  - daily weight  - appears euvolemic, lasix was d/c    #KOBI on CKD 3  - cr: worsening 2.3 yesterday; on admission, creat 1.8  - monitor BMP  - hold benazepril  - f/u recs per renal, Dr. Dong    #Acute on chronic microcytic anemia  #H pylori positve on EGD +  -hgb 8.5, stable; hgb 7.9 on admission (baseline 9-10)  - recent EGD /COLon by  on   - EGD: Normal mucosa in the whole esophagus.    Erythema in the stomach compatible with non-erosive gastritis. (Biopsy).    Multiple nodules seen in antrum between 3 and 7 mm in size (Biopsy).    3 mm antral polyp (hyperplastic)   Two 3 mm polyps in the fundus(hyperplastic)  - Colonoscopy (22 @ 07:30) >   Polyp (1 cm) in the sigmoid colon. (Polypectomy, Endoclip: hyperplastic).    Polyp (1 cm) in the sigmoid colon. 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    Polyp (1 cm) in the Ascending colon. (Polypectomy, Endoclip: tubulo villous adenoma).   - on triple therapy for H pylori (8 more days left), needs clarithromycin NF to be filled, patient can use her own meds  - needs outpt follow up for eradication fu  - f/u Iron studies     #Paroxysmal A fib on xarelto  - c/w lopressor 50mg BID  - c/w xarelto  - monitor CBC  - c/w amiodarone 200mg daily  - s/p FAITH cardioversion in 2019 by     #HTN  - c/w amlodipine 5mg  - hold traimterene-HCTZ ( was discontinued in 2019 for gouty attack but patient mentions she is been taking the medicaiton  - hold benazepril in setting of KOBI   - bp stable    #Gout  - c/w allopurinol 100mg     #prolactinoma?  - on cabergoline (every monday)    #HLD  - c.w lipitor 20mg qHS    #Hypothyroidism  - c/w levothyroxine 175mcg    #overactive bladder  - pt on toriaz 4mg>>ordered oxybuytinin inpatient    GI ppx: pantoprazole BID  DVT ppx: xarelto  Diet: DASH  Code: full code  Dispo: pending resolution KOBI, f/u recs per nephro     JEFFERY UGALDE 81y Female  MRN#: [id number]   CODE STATUS: full code    Hospital Day: 4d    Pt is currently admitted with the primary diagnosis of KOBI    SUBJECTIVE  Overnight/Interval Events: Overnight patient complaining of left neck pain. She says it is worse with movement.    VITAL SIGNS: Last 24 Hours  T(C): 36 (24 May 2022 05:28), Max: 36.8 (23 May 2022 13:58)  T(F): 96.8 (24 May 2022 05:28), Max: 98.3 (23 May 2022 13:58)  HR: 59 (24 May 2022 05:28) (59 - 65)  BP: 123/56 (24 May 2022 05:28) (118/60 - 125/63)    RR: 18 (24 May 2022 05:28) (18 - 18)  SpO2: --                                                  ----------------------------------------------------------  OBJECTIVE  PAST MEDICAL & SURGICAL HISTORY  HTN (hypertension)    High cholesterol    Hypothyroid  s/p thyroid ca surgery    Afib  on xarelto    Sleep apnea    Osteoarthritis    CKD (chronic kidney disease) stage 3, GFR 30-59 ml/min    H/O thyroidectomy    S/P rotator cuff surgery                                              -----------------------------------------------------------  ALLERGIES:  No Known Allergies                                            ------------------------------------------------------------    HOME MEDICATIONS  Home Medications:  amiodarone 200 mg oral tablet: 1 tab(s) orally once a day (20 May 2022 17:27)  amLODIPine 5 mg oral tablet: 1 tab(s) orally once a day (at bedtime) (2022 07:01)  atorvastatin 20 mg oral tablet: 1 tab(s) orally once a day (2022 07:01)  benazepril 20 mg oral tablet: 1 tab(s) orally once a day (20 May 2022 17:28)  cabergoline 0.5 mg oral tablet: 1 tab(s) orally once a week, monday night (20 May 2022 17:29)  clarithromycin 500 mg oral tablet: 1 tab(s) orally every 12 hours (20 May 2022 17:35)  levothyroxine 175 mcg (0.175 mg) oral tablet: 1 tab(s) orally 5 times a week (2022 07:01)  metoprolol tartrate 50 mg oral tablet: 1 tab(s) orally 2 times a day (2022 07:01)  omeprazole 40 mg oral delayed release capsule: 1 cap(s) orally 2 times a day (20 May 2022 17:32)  rivaroxaban 15 mg oral tablet: 1 tab(s) orally every 24 hours (2022 07:01)  Toviaz 4 mg oral tablet, extended release: 1 tab(s) orally once a day (2022 07:01)  triamterene-hydrochlorothiazide 37.5 mg-25 mg oral tablet: 1 tab(s) orally once a day (20 May 2022 17:30)                           MEDICATIONS:  STANDING MEDICATIONS  aDENosine Injectable (ADENOSCAN) 60 milliGRAM(s) IV Bolus once  aMIOdarone    Tablet 200 milliGRAM(s) Oral daily  amLODIPine   Tablet 5 milliGRAM(s) Oral at bedtime  amoxicillin 1000 milliGRAM(s) Oral two times a day  atorvastatin 20 milliGRAM(s) Oral at bedtime  chlorhexidine 4% Liquid 1 Application(s) Topical <User Schedule>  lactated ringers. 1000 milliLiter(s) IV Continuous <Continuous>  levothyroxine 175 MICROGram(s) Oral <User Schedule>  lidocaine   4% Patch 1 Patch Transdermal every 24 hours  metoprolol tartrate 50 milliGRAM(s) Oral two times a day  nystatin Cream 1 Application(s) Topical once  pantoprazole    Tablet 40 milliGRAM(s) Oral every 12 hours  rivaroxaban 15 milliGRAM(s) Oral with dinner    PRN MEDICATIONS  acetaminophen     Tablet .. 650 milliGRAM(s) Oral every 6 hours PRN  melatonin 5 milliGRAM(s) Oral at bedtime PRN                                             --------------------------------------------------------------  LABS:                        8.3    8.16  )-----------( 325      ( 23 May 2022 12:18 )             27.9         142  |  99  |  58<H>  ----------------------------<  102<H>  4.0   |  27  |  2.7<H>    Ca    8.6      23 May 2022 12:18  Mg     2.3             Urinalysis Basic - ( 23 May 2022 14:31 )    Color: Light Yellow / Appearance: Clear / S.021 / pH: x  Gluc: x / Ketone: Negative  / Bili: Negative / Urobili: <2 mg/dL   Blood: x / Protein: Trace / Nitrite: Negative   Leuk Esterase: Negative / RBC: x / WBC x   Sq Epi: x / Non Sq Epi: x / Bacteria: x                                                            -------------------------------------------------------------  RADIOLOGY:    CXR : cardiomegaly, congestion (official read pending)    NM stress test :  1. NO EVIDENCE FOR ISCHEMIA DURING ADENOSINE INFUSION.  2. NORMAL RESTING LEFT VENTRICULAR WALL MOTION AND WALL THICKENING.  3. LEFT VENTRICULAR EJECTION FRACTION OF 75 % WHICH IS WITHIN RANGE OF NORMAL.    KUB :  No hydronephrosis.    Nonspecific nonvisualization of the bilateral ureteral jets.    Urinary bladder volume of 90 cc. Postvoid urinary bladder volume was not able to be obtained.    2.8 cm right renal cyst.    Echo :  1. LV Ejection Fraction by Lester's Method with a biplane EF of 62 %.  2. Elevated mean left atrial pressure.  3. Severe concentric left ventricular hypertrophy.  4. Spectral Doppler shows pseudonormal pattern of left ventricular myocardial filling (Grade II diastolic dysfunction).  5. Moderately enlarged left atrium.  6. Moderately enlarged right atrium.  7. Mild mitral valve regurgitation.  8. Mitral annular calcification.  9. Mild tricuspid regurgitation.  10. Mild to moderate pulmonic valve regurgitation.  11. Dilatation of the ascending aorta.                                                    --------------------------------------------------------------    PHYSICAL EXAM:  General: no acute distress  HEENT: normocephalic, atraumatic  Lungs: CTAB  Heart: regular rate and rhythm, normal S1 and S2  Abdomen: soft, nontender, nondistended  Neuro: AAOx3, no motor or sensory deficit                                             --------------------------------------------------------------    ASSESSMENT & PLAN    82 yo female patient with PMHx of Paroxysmal AFib on Xarelto, HTN, DL, hypothyroidism (history of thyroid cancer s/p resection), FELIX on CPAP, hx hip fracture, CHFpEF , CKD 3, with recent EGD /colonoscopy for microcytic anemia with H pylori positive (on rx) presented to ER for left sided shoulder numbness , BECK admitted for further workup    #neck spasms  -etiology appears to be musculoskeletal, worse with movement  -methocarbamol 750mg PO q8h    #BECK and chest tightness   #acute on chronic heart failure with preserved ejection fraction.  - tropx neg x3; pro-BNP 1262 on admission  - CXR: cardiomegaly  - EKG:Sinus rhythm with 1st degree A-V block, Left axis deviation, Non-specific intra-ventricular conduction block, Minimal voltage criteria for LVH, may be normal variant ( [place] product )  - NM stress test  normal  - echo: severe concentric LVH, grade 2 diastolic dysfunction, dilation of ascending aorta  - strict I&O  - daily weight  - appears euvolemic, lasix was d/c    #KOBI on CKD 3  - cr 2.1 today; on admission, creat 1.8  -on IVF: LR@75cc/h  - monitor BMP  - hold benazepril  - per nephro, no further lasix    #Acute on chronic microcytic anemia  #H pylori positve on EGD +  -hgb 8.5, stable; hgb 7.9 on admission (baseline 9-10)  - recent EGD /COLon by  on   - EGD: Normal mucosa in the whole esophagus.    Erythema in the stomach compatible with non-erosive gastritis. (Biopsy).    Multiple nodules seen in antrum between 3 and 7 mm in size (Biopsy).    3 mm antral polyp (hyperplastic)   Two 3 mm polyps in the fundus(hyperplastic)  - Colonoscopy (22 @ 07:30) >   Polyp (1 cm) in the sigmoid colon. (Polypectomy, Endoclip: hyperplastic).    Polyp (1 cm) in the sigmoid colon. 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    Polyp (1 cm) in the Ascending colon. (Polypectomy, Endoclip: tubulo villous adenoma).   - on triple therapy for H pylori (8 more days left), needs clarithromycin NF to be filled, patient can use her own meds  - needs outpt follow up for eradication fu  - f/u Iron studies     #Paroxysmal A fib on xarelto  - c/w lopressor 50mg BID  - c/w xarelto  - monitor CBC  - c/w amiodarone 200mg daily  - s/p FAITH cardioversion in 2019 by     #HTN  - c/w amlodipine 5mg  - hold traimterene-HCTZ ( was discontinued in 2019 for gouty attack but patient mentions she is been taking the medicaiton  - hold benazepril in setting of KOBI   - bp stable    #Gout  - c/w allopurinol 100mg     #prolactinoma?  - on cabergoline (every monday)    #HLD  - c.w lipitor 20mg qHS    #Hypothyroidism  - c/w levothyroxine 175mcg    #overactive bladder  - pt on toriaz 4mg>>ordered oxybuytinin inpatient    GI ppx: pantoprazole BID  DVT ppx: xarelto  Diet: DASH  Code: full code  Dispo: pending improvement KOBI

## 2022-05-24 NOTE — DISCHARGE NOTE PROVIDER - NSDCFUADDAPPT_GEN_ALL_CORE_FT
Please follow up with your primary care doctor and with nephrology, Dr. Dong, within two weeks. You need to have labs (basic metabolic panel) and have your BP checked  Please follow up with your primary care doctor and with nephrology, Dr. Dong, within two weeks. You need to have labs (basic metabolic panel) and have your BP checked    In addition, please continue treatment for H pylori and follow up with gastroenterology outpatient for iron studies for anemia.  Please follow up with your primary care doctor and with nephrology, Dr. Dong, within two weeks. You need to have labs to monitor kidney function (basic metabolic panel) and iron studies.    In addition, please continue treatment for H pylori and follow up with gastroenterology outpatient.    Please follow up with cardiology, Dr. Quintero, as needed.

## 2022-05-24 NOTE — DISCHARGE NOTE PROVIDER - NPI NUMBER (FOR SYSAD[employer] USE ONLY) :
[[id number]],[[id number]] [[id number]],[[id number]],[[id number]] [[id number]],[[id number]],[[id number]],[[id number]]

## 2022-05-24 NOTE — DISCHARGE NOTE PROVIDER - CARE PROVIDER_API CALL
[facility] [address] [place] [address]  Phone: [phone]  Fax: [phone]  Follow Up Time:     Pavan Dong)  Internal Medicine; Nephrology  [address] [place] [address]  Phone: [phone]  Fax: [phone]  Follow Up Time:    [facility] [place] [address]  Phone: [phone]  Fax: [phone]  Follow Up Time:     Pavan Dong)  Internal Medicine; Nephrology  [address] [place] [address]  Phone: [phone]  Fax: [phone]  Follow Up Time:     Mir Marquez)  Gastroenterology; Internal Medicine  [address] [place] [address]  Phone: [phone]  Fax: [phone]  Follow Up Time:    [address] [place] [address]  Phone: [phone]  Fax: [phone]  Follow Up Time:     Pavan Dong)  Internal Medicine; Nephrology  [address] [place] [address]  Phone: [phone]  Fax: [phone]  Follow Up Time:     Mir Marquez)  Gastroenterology; Internal Medicine  [address] [place] [address]  Phone: [phone]  Fax: [phone]  Follow Up Time:     Madi Quintero)  Cardiovascular Disease; Interventional Cardiology  [address] [place] [address]  Phone: [phone]  Fax: [phone]  Follow Up Time:

## 2022-05-24 NOTE — DISCHARGE NOTE NURSING/CASE MANAGEMENT/SOCIAL WORK - NSDCPEFALRISK_GEN_ALL_CORE
For information on Fall & Injury Prevention, visit: [email or web address] OR  [email or web address] OR  https://www.cdc.gov/steadi/patient.html

## 2022-05-24 NOTE — DISCHARGE NOTE PROVIDER - NSDCCPCAREPLAN_GEN_ALL_CORE_FT
PRINCIPAL DISCHARGE DIAGNOSIS  Diagnosis: HF (heart failure)  Assessment and Plan of Treatment: You came in with chest discomfort and had en echocardiogram in the hospital which showed heart failure with preserved ejection fraction. You had a nuclear stress test in the hospital which was unremarkable. You were found to have acute kidney injury in the hospital and were seen by nephrology.  Please take all medications as prescribed and follow up outpatient with your cardiologist as needed. You need to have your BP checked and get labs checked within 1 week of discharge.       PRINCIPAL DISCHARGE DIAGNOSIS  Diagnosis: HF (heart failure)  Assessment and Plan of Treatment: You came in with chest discomfort and had en echocardiogram in the hospital which showed heart failure with preserved ejection fraction. You had a nuclear stress test in the hospital which was unremarkable. You were found to have acute kidney injury in the hospital and were seen by nephrology.  Please take all medications as prescribed and follow up outpatient with your cardiologist as needed. Please stop taking the hydrochlorothiazide-triamterene. You need to have your BP checked and get labs checked within 1 week of discharge.       PRINCIPAL DISCHARGE DIAGNOSIS  Diagnosis: Chest pain  Assessment and Plan of Treatment: You came in with chest discomfort and had en echocardiogram in the hospital which showed heart failure with preserved ejection fraction. You had a nuclear stress test in the hospital which was unremarkable. You were found to have acute kidney injury in the hospital and were seen by nephrology.  Please take all medications as prescribed and follow up outpatient with your cardiologist as needed. Please stop taking the hydrochlorothiazide-triamterene. You need to have your BP checked and get labs checked within 1 week of discharge.      SECONDARY DISCHARGE DIAGNOSES  Diagnosis: Iron deficiency anemia  Assessment and Plan of Treatment: Please follow up with your primary care doctor for further investigation of iron deficiency anemia. Take ferrous sulfate as directed.

## 2022-05-24 NOTE — DISCHARGE NOTE PROVIDER - HOSPITAL COURSE
80 yo female patient with PMHx of Paroxysmal AFib on Xarelto, HTN, DL, hypothyroidism (history of thyroid cancer s/p resection), FELIX on CPAP, hx hip fracture, CHFpEF , CKD 3, with recent EGD /colonoscopy for microcytic anemia with H pylori positive (on rx) presented to ER for left sided shoulder numbness which started yesterday night with some chest tightness while she was asleep. She denies any exertional chest pain but reports having SOB on exertion for the past few days. She denies any nausea/diaphoresis/fever/chills.     ER Course:  Vital Signs Last 24 Hrs  T(F): 98.2 (20 May 2022 08:54), Max: 98.2 (20 May 2022 08:54)  HR: 79 (20 May 2022 08:54) (79 - 79)  BP: 142/69 (20 May 2022 08:54) (142/69 - 142/69)  RR: 17 (20 May 2022 08:54) (17 - 17)  SpO2: 98% (20 May 2022 08:54) (98% - 98%)    Labs:  - trop x1 negative  - hb: 7.9 (baseline 10 in 2019)  - Cr: 1.8 (baseline 1.1(2020)    CXR: cardiomegaly    Patient was admitted to medicine floor and evaluated by cardiology. She had an echo which showed severe concentric LVH, grade 2 diastolic dysfunction, and dilation of ascending aorta. She also had a nuclear medicine stress test which was normal. Patient's chest discomfort resolved. She had KOBI and was seen by nephrology. Patient stable for discharge home with close outpatient follow up.    #neck spasms  -etiology appears to be musculoskeletal, worse with movement  -methocarbamol 750mg PO q8h    #BECK and chest tightness   #acute on chronic heart failure with preserved ejection fraction.  - tropx neg x3; pro-BNP 1262 on admission  - CXR: cardiomegaly  - EKG:Sinus rhythm with 1st degree A-V block, Left axis deviation, Non-specific intra-ventricular conduction block, Minimal voltage criteria for LVH, may be normal variant ( [place] product )  - NM stress test 5/22 normal  - echo: severe concentric LVH, grade 2 diastolic dysfunction, dilation of ascending aorta  - strict I&O  - daily weight  - appears euvolemic, lasix was d/c    #KOBI on CKD 3  - cr 2.1 today; on admission, creat 1.8  -on IVF: LR@75cc/h  - monitor BMP  - hold benazepril  - per nephro, no further lasix    #Acute on chronic microcytic anemia  #H pylori positve on EGD +  -hgb 8.5, stable; hgb 7.9 on admission (baseline 9-10)  - recent EGD /COLon by  on 4/29  - EGD: Normal mucosa in the whole esophagus.    Erythema in the stomach compatible with non-erosive gastritis. (Biopsy).    Multiple nodules seen in antrum between 3 and 7 mm in size (Biopsy).    3 mm antral polyp (hyperplastic)   Two 3 mm polyps in the fundus(hyperplastic)  - Colonoscopy (04.29.22 @ 07:30) >   Polyp (1 cm) in the sigmoid colon. (Polypectomy, Endoclip: hyperplastic).    Polyp (1 cm) in the sigmoid colon. 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    Polyp (1 cm) in the Ascending colon. (Polypectomy, Endoclip: tubulo villous adenoma).   - on triple therapy for H pylori (8 more days left), needs clarithromycin NF to be filled, patient can use her own meds  - needs outpt follow up for eradication fu  - f/u Iron studies     #Paroxysmal A fib on xarelto  had FAITH cardioversion in 2019 by   - c/w lopressor 50mg BID  - c/w xarelto  - c/w amiodarone 200mg daily    #HTN  - c/w amlodipine 5mg  - hold trimterene-HCTZ  - benazepril 10mg    #Gout  - c/w allopurinol 100mg     #prolactinoma?  - on cabergoline (every monday)    #HLD  - c.w lipitor 20mg qHS    #Hypothyroidism  - c/w levothyroxine 175mcg    #overactive bladder  - pt on toriaz 4mg>>ordered oxybuytinin inpatient 80 yo female patient with PMHx of Paroxysmal AFib on Xarelto, HTN, DL, hypothyroidism (history of thyroid cancer s/p resection), FELIX on CPAP, hx hip fracture, CHFpEF , CKD 3, with recent EGD /colonoscopy for microcytic anemia with H pylori positive (on rx) presented to ER for left sided shoulder numbness which started yesterday night with some chest tightness while she was asleep. She denies any exertional chest pain but reports having SOB on exertion for the past few days. She denies any nausea/diaphoresis/fever/chills.     In the ED, T 98.2F, HR 79, /69, RR 17, SpO2 98% on room air. On labs, trop x1 negative, hb: 7.9 (baseline 10 in 2019), and Cr: 1.8 (baseline 1.1(2020). CXR showed cardiomegaly.    Patient was admitted to medicine floor and evaluated by cardiology. She had an echo which showed severe concentric LVH, grade 2 diastolic dysfunction, and dilation of ascending aorta. She also had a nuclear medicine stress test which was normal. Patient's chest discomfort resolved. She was seen by nephrology and patient's KOBI improved with IV fluids. Patient stable for discharge home with close outpatient follow up.    #neck spasms  -etiology appears to be musculoskeletal, worse with movement  -methocarbamol 750mg PO q8h    #acute on chronic heart failure with preserved ejection fraction.  - tropx neg x3; pro-BNP 1262 on admission  - CXR: cardiomegaly  - EKG:Sinus rhythm with 1st degree A-V block, Left axis deviation, Non-specific intra-ventricular conduction block, Minimal voltage criteria for LVH, may be normal variant ( [place] product )  - NM stress test 5/22 normal  - echo: severe concentric LVH, grade 2 diastolic dysfunction, dilation of ascending aorta    #KOBI on CKD 3  - cr 2.1 ; on admission, creat 1.8  - monitor BMP  - per nephro, resume benazepril 10mg PO qd (half of usual dose)    #Acute on chronic microcytic anemia  #H pylori positve on EGD +  -hgb 8.5, stable; hgb 7.9 on admission (baseline 9-10)  - recent EGD /COLon by  on 4/29  - EGD: Normal mucosa in the whole esophagus.    Erythema in the stomach compatible with non-erosive gastritis. (Biopsy).    Multiple nodules seen in antrum between 3 and 7 mm in size (Biopsy).    3 mm antral polyp (hyperplastic)   Two 3 mm polyps in the fundus(hyperplastic)  - Colonoscopy (04.29.22 @ 07:30) >   Polyp (1 cm) in the sigmoid colon. (Polypectomy, Endoclip: hyperplastic).    Polyp (1 cm) in the sigmoid colon. 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    Polyp (1 cm) in the Ascending colon. (Polypectomy, Endoclip: tubulo villous adenoma).   - on triple therapy for H pylori (8 more days left), needs clarithromycin NF to be filled, patient can use her own meds  - needs outpt follow up for eradication fu  - f/u Iron studies     #Paroxysmal A fib on xarelto  had FAITH cardioversion in 2019 by   - c/w lopressor 50mg BID  - c/w xarelto  - c/w amiodarone 200mg daily    #HTN  - c/w amlodipine 5mg  - hold trimterene-HCTZ  - benazepril 10mg qd    #Gout  - c/w allopurinol 100mg     #prolactinoma?  - on cabergoline (every monday)    #HLD  - c.w lipitor 20mg qHS    #Hypothyroidism  - c/w levothyroxine 175mcg    #overactive bladder  - pt on toriaz 4mg>>ordered oxybuytinin inpatient 80 yo female patient with PMHx of Paroxysmal AFib on Xarelto, HTN, DL, hypothyroidism (history of thyroid cancer s/p resection), FELIX on CPAP, hx hip fracture, CHFpEF , CKD 3, with recent EGD /colonoscopy for microcytic anemia with H pylori positive (on rx) presented to ER for left sided shoulder numbness which started yesterday night with some chest tightness while she was asleep. She denies any exertional chest pain but reports having SOB on exertion for the past few days. She denies any nausea/diaphoresis/fever/chills.     In the ED, T 98.2F, HR 79, /69, RR 17, SpO2 98% on room air. On labs, trop x1 negative, hb: 7.9 (baseline 10 in 2019), and Cr: 1.8 (baseline 1.1(2020). CXR showed cardiomegaly.    Patient was admitted to medicine floor and evaluated by cardiology. She had an echo which showed severe concentric LVH, grade 2 diastolic dysfunction, and dilation of ascending aorta. She also had a nuclear medicine stress test which was normal. Patient's chest discomfort resolved. She was seen by nephrology and patient's KOBI improved with IV fluids. Patient stable for discharge home with close outpatient follow up.    #neck spasms  -etiology appears to be musculoskeletal, worse with movement  -methocarbamol 750mg PO q8h    #acute on chronic heart failure with preserved ejection fraction.  - tropx neg x3; pro-BNP 1262 on admission  - CXR: cardiomegaly  - EKG:Sinus rhythm with 1st degree A-V block, Left axis deviation, Non-specific intra-ventricular conduction block, Minimal voltage criteria for LVH, may be normal variant ( [place] product )  - NM stress test 5/22 normal  - echo: severe concentric LVH, grade 2 diastolic dysfunction, dilation of ascending aorta    #KOBI on CKD 3  - cr 2.1 ; on admission, creat 1.8  - monitor BMP  - per nephro, resume benazepril 10mg PO qd (half of usual dose)    #Acute on chronic microcytic anemia  #H pylori positve on EGD +  -hgb 8.5, stable; hgb 7.9 on admission (baseline 9-10)  - recent EGD /COLon by  on 4/29  - f/u Iron studies OP    #Paroxysmal A fib on xarelto  had FAITH cardioversion in 2019 by   - c/w lopressor 50mg BID  - c/w xarelto  - c/w amiodarone 200mg daily    #HTN  - c/w amlodipine 5mg  - hold trimterene-HCTZ  - benazepril 10mg qd    #Gout  - c/w allopurinol 100mg     #prolactinoma?  - on cabergoline (every monday)    #HLD  - c.w lipitor 20mg qHS    #Hypothyroidism  - c/w levothyroxine 175mcg    #overactive bladder  - pt on toriaz 4mg>>ordered oxybuytinin inpatient 80 yo female patient with PMHx of Paroxysmal AFib on Xarelto, HTN, DL, hypothyroidism (history of thyroid cancer s/p resection), FELIX on CPAP, hx hip fracture, CHFpEF , CKD 3, with recent EGD /colonoscopy for microcytic anemia with H pylori positive (on rx) presented to ER for left sided shoulder numbness which started yesterday night with some chest tightness while she was asleep. She denies any exertional chest pain but reports having SOB on exertion for the past few days. She denies any nausea/diaphoresis/fever/chills.     In the ED, T 98.2F, HR 79, /69, RR 17, SpO2 98% on room air. On labs, trop x1 negative, hb: 7.9 (baseline 10 in 2019), and Cr: 1.8 (baseline 1.1(2020). CXR showed cardiomegaly.    Patient was admitted to medicine floor and evaluated by cardiology. She had an echo which showed severe concentric LVH, grade 2 diastolic dysfunction, and dilation of ascending aorta. She also had a nuclear medicine stress test which was normal. Patient's chest discomfort resolved. She was seen by nephrology and patient's KOBI improved with IV fluids. Patient stable for discharge home with close outpatient follow up.    #neck spasms  -etiology appears to be musculoskeletal, worse with movement  -methocarbamol 750mg PO q8h    #acute on chronic heart failure with preserved ejection fraction.  - tropx neg x3; pro-BNP 1262 on admission  - CXR: cardiomegaly  - EKG:Sinus rhythm with 1st degree A-V block, Left axis deviation, Non-specific intra-ventricular conduction block, Minimal voltage criteria for LVH, may be normal variant ( [place] product )  - NM stress test 5/22 normal  - echo: severe concentric LVH, grade 2 diastolic dysfunction, dilation of ascending aorta    #KOBI on CKD 3  - cr 2.1 on discharge ; on admission, creat 1.8  - nephro OP follow up; monitor BMP 1 week after discharge  - per nephro, resume benazepril 10mg PO qd (half of usual dose)    #iron deficiency anemia  - f/u Iron studies OP    #H pylori positve on EGD +  -hgb 8.5, stable; hgb 7.9 on admission (baseline 9-10)  - recent EGD /COLon by  on 4/29    #Paroxysmal A fib on xarelto  had FAITH cardioversion in 2019 by   - c/w lopressor 50mg BID  - c/w xarelto  - c/w amiodarone 200mg daily    #HTN  - c/w amlodipine 5mg  - hold trimterene-HCTZ  - benazepril 10mg qd    #Gout  - c/w allopurinol 100mg     #prolactinoma  - on cabergoline (every monday)    #HLD  - c.w lipitor 20mg qHS    #Hypothyroidism  - c/w levothyroxine 175mcg    #overactive bladder  - c/w home toriaz 4mg 82 yo female patient with PMHx of Paroxysmal AFib on Xarelto, HTN, DL, hypothyroidism (history of thyroid cancer s/p resection), FELIX on CPAP, hx hip fracture, CHFpEF , CKD 3, with recent EGD /colonoscopy for microcytic anemia with H pylori positive (on rx) presented to ER for left sided shoulder numbness which started yesterday night with some chest tightness while she was asleep. She denies any exertional chest pain but reports having SOB on exertion for the past few days. She denies any nausea/diaphoresis/fever/chills.     In the ED, T 98.2F, HR 79, /69, RR 17, SpO2 98% on room air. On labs, trop x1 negative, hb: 7.9 (baseline 10 in 2019), and Cr: 1.8 (baseline 1.1(2020). CXR showed cardiomegaly.    Patient was admitted to medicine floor and evaluated by cardiology. She had an echo which showed severe concentric LVH, grade 2 diastolic dysfunction, and dilation of ascending aorta. She also had a nuclear medicine stress test which was normal. Patient's chest discomfort resolved. She was seen by nephrology and patient's KOBI improved with IV fluids. Patient stable for discharge home with close outpatient follow up.    #neck spasms  -etiology appears to be musculoskeletal, worse with movement  -methocarbamol 750mg PO q8h    #acute on chronic heart failure with preserved ejection fraction.  - tropx neg x3; pro-BNP 1262 on admission  - CXR: cardiomegaly  - EKG:Sinus rhythm with 1st degree A-V block, Left axis deviation, Non-specific intra-ventricular conduction block, Minimal voltage criteria for LVH, may be normal variant ( [place] product )  - NM stress test 5/22 normal  - echo: severe concentric LVH, grade 2 diastolic dysfunction, dilation of ascending aorta    #KOBI on CKD 3  - cr 2.1 on discharge ; on admission, creat 1.8  - nephro OP follow up; monitor BMP 1 week after discharge  - per nephro, resume benazepril 10mg PO qd (half of usual dose 20mg)    #iron deficiency anemia  - f/u Iron studies OP    #H pylori positve on EGD +  -hgb 8.5, stable; hgb 7.9 on admission (baseline 9-10)  - recent EGD /COLon by  on 4/29    #Paroxysmal A fib on xarelto  had FAITH cardioversion in 2019 by   - c/w lopressor 50mg BID  - c/w xarelto  - c/w amiodarone 200mg daily    #HTN  - c/w amlodipine 5mg  - hold trimterene-HCTZ  - benazepril 10mg qd    #Gout  - c/w allopurinol 100mg     #prolactinoma  - on cabergoline (every monday)    #HLD  - c.w lipitor 20mg qHS    #Hypothyroidism  - c/w levothyroxine 175mcg    #overactive bladder  - c/w home toriaz 4mg

## 2022-05-24 NOTE — DISCHARGE NOTE PROVIDER - PROVIDER TOKENS
PROVIDER:[TOKEN:[[id number]]],PROVIDER:[TOKEN:[[id number]]] PROVIDER:[TOKEN:[[id number]]],PROVIDER:[TOKEN:[[id number]]],PROVIDER:[TOKEN:[[id number]]] PROVIDER:[TOKEN:[[id number]]],PROVIDER:[TOKEN:[[id number]]],PROVIDER:[TOKEN:[[id number]]],PROVIDER:[TOKEN:[[id number]]]

## 2022-05-24 NOTE — DISCHARGE NOTE PROVIDER - NSDCMRMEDTOKEN_GEN_ALL_CORE_FT
amiodarone 200 mg oral tablet: 1 tab(s) orally once a day  amLODIPine 5 mg oral tablet: 1 tab(s) orally once a day (at bedtime)  atorvastatin 20 mg oral tablet: 1 tab(s) orally once a day  benazepril 20 mg oral tablet: 1 tab(s) orally once a day  cabergoline 0.5 mg oral tablet: 1 tab(s) orally once a week, monday night  clarithromycin 500 mg oral tablet: 1 tab(s) orally every 12 hours  levothyroxine 175 mcg (0.175 mg) oral tablet: 1 tab(s) orally 5 times a week  metoprolol tartrate 50 mg oral tablet: 1 tab(s) orally 2 times a day  omeprazole 40 mg oral delayed release capsule: 1 cap(s) orally 2 times a day  rivaroxaban 15 mg oral tablet: 1 tab(s) orally every 24 hours  Toviaz 4 mg oral tablet, extended release: 1 tab(s) orally once a day  triamterene-hydrochlorothiazide 37.5 mg-25 mg oral tablet: 1 tab(s) orally once a day   amiodarone 200 mg oral tablet: 1 tab(s) orally once a day  amLODIPine 5 mg oral tablet: 1 tab(s) orally once a day (at bedtime)  atorvastatin 20 mg oral tablet: 1 tab(s) orally once a day  cabergoline 0.5 mg oral tablet: 1 tab(s) orally once a week, monday night  clarithromycin 500 mg oral tablet: 1 tab(s) orally every 12 hours  ferrous sulfate 325 mg (65 mg elemental iron) oral tablet: 1 tab(s) orally once a day  levothyroxine 175 mcg (0.175 mg) oral tablet: 1 tab(s) orally 5 times a week  Lotensin 10 mg oral tablet: 1 tab(s) orally once a day  metoprolol tartrate 50 mg oral tablet: 1 tab(s) orally 2 times a day  omeprazole 40 mg oral delayed release capsule: 1 cap(s) orally 2 times a day  rivaroxaban 15 mg oral tablet: 1 tab(s) orally every 24 hours  Toviaz 4 mg oral tablet, extended release: 1 tab(s) orally once a day   amiodarone 200 mg oral tablet: 1 tab(s) orally once a day  amLODIPine 5 mg oral tablet: 1 tab(s) orally once a day (at bedtime)  amoxicillin 500 mg oral capsule: 2 cap(s) orally 2 times a day  atorvastatin 20 mg oral tablet: 1 tab(s) orally once a day  cabergoline 0.5 mg oral tablet: 1 tab(s) orally once a week, monday night  clarithromycin 500 mg oral tablet: 1 tab(s) orally every 12 hours  ferrous sulfate 325 mg (65 mg elemental iron) oral tablet: 1 tab(s) orally once a day  levothyroxine 175 mcg (0.175 mg) oral tablet: 1 tab(s) orally 5 times a week  Lotensin 10 mg oral tablet: 1 tab(s) orally once a day  methocarbamol 750 mg oral tablet: 1 tab(s) orally every 8 hours  metoprolol tartrate 50 mg oral tablet: 1 tab(s) orally 2 times a day  omeprazole 40 mg oral delayed release capsule: 1 cap(s) orally 2 times a day  rivaroxaban 15 mg oral tablet: 1 tab(s) orally every 24 hours  Toviaz 4 mg oral tablet, extended release: 1 tab(s) orally once a day

## 2022-05-24 NOTE — PROGRESS NOTE ADULT - ASSESSMENT
80 yo female patient with PMHx of Paroxysmal AFib on Xarelto, HTN, DL, hypothyroidism (history of thyroid cancer s/p resection), FELIX on CPAP, hx hip fracture, CHFpEF , CKD 3, with recent EGD /colonoscopy for microcytic anemia with H pylori positive (on rx) presented to ER for left sided shoulder numbness , BECK admitted for further workup    #BECK and chest tightness secondary to acute on chronic heart failure with preserved ejection fraction.  - tropx neg x3  - CXR: cardiomegaly  - EKG:  Diagnosis Line Sinus rhythm with 1st degree A-V block  Left axis deviation  Non-specific intra-ventricular conduction block  Minimal voltage criteria for LVH, may be normal variant ( Salvador product )  Abnormal ECG  - fu BNP, 2+ LE edema+  - admit to telemetry  - cardio consult appreciated recc stress test  - fu serial EKG and CE  - baseline : fully funcitonal , lives alone  - strict I&O  - daily weight  - cotninue 40 mg lasix IV  - echo as above    #KOBI on CKD 3  - cr: 1.9 today (1.1 in 2020)  - monitor BMP  - hold benazepril  - consider nephro consult ( ) if worsening  - will follow up cr on 5/22 if worsening will get renal   - renal US and lytes    #Acute on chronic microcytic anemia  #H pylori positve on EGD +  - Hb: 7.9 on admission (baseline 9-10)  - recent EGD /COLon by  on 4/29  - EGD: Normal mucosa in the whole esophagus.    Erythema in the stomach compatible with non-erosive gastritis. (Biopsy).    Multiple nodules seen in antrum between 3 and 7 mm in size (Biopsy).    3 mm antral polyp (hyperplastic)   Two 3 mm polyps in the fundus(hyperplastic)  - Colonoscopy (04.29.22 @ 07:30) >   Polyp (1 cm) in the sigmoid colon. (Polypectomy, Endoclip: hyperplastic).    Polyp (1 cm) in the sigmoid colon. 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    Polyp (1 cm) in the Ascending colon. (Polypectomy, Endoclip: tubulo villous adenoma).   - on triple therapy for H pylori (8 more days left), needs clarithromycin NF to be filled, patient can use her own meds  - needs outpt follow up for eradication fu  - maintain active T&S  - hgb stable    #Paroxysmal A fib on xarelto  - c/w lopressor 50mg BID  - c/w xarelto  - monitor CBC  - c/w amiodarone 200mg daily  - s/p FAITH cardioversion in 2019 by     #HTN  - c/w amlodipine 5mg  - hold traimterene-HCTZ ( was discontinued in 2019 for gouty attack but patient mentions she is been taking the medicaiton  - hold benazepril in setting of KOBI   - bp stable    #Gout  - c/w allopurinol 100mg     #prolactinoma?  - on cabergoline (every monday)    #HLD  - c.w lipitor 20mg qHS    #Hypothyroidism  - c/w levothyroxine 175mcg    #overactive bladder  - pt on toriaz 4mg>>ordered oxybuytinin inpatient    #hypokalemia- repleted    #Progress Note Handoff  Pending (specify: Follow-up stress test, cardiology  Family discussion: house staff updated pt family  Disposition: Depending on stress test results patient may need catheterization.  We will get physical therapy to see patient.  
prerenal KOBI form ACE-I / diuretics  CKD stage 3   - baseline Cr "upper 1's"  no hydro on renal sonogram  bland UA, no proteinuria with low Cristopher+  chest pain / negative stress test  HTN with current low side BP's  PAF  no overt CHF  anemia due to iron deficiency (indices and low ferritin c/w honey) - GI w/u recently done  FELIX    plan:      can dc IVF  encourage po hydration  keep off hctz / triamterene combo   may resume benazepril 10mg po qd upon discharge (1/2 her usual dose)  cont amlodipine  give venofer 200mg iv q24h x 5 days or until discharge (whichever comes first)  start ferrous sulfate 325mg po qd  outpt f/u my office for bmp and BP check  full code    
pt w/o distress w/ jose e thal and further plans pending.
82 yo female patient with PMHx of Paroxysmal AFib on Xarelto, HTN, DL, hypothyroidism (history of thyroid cancer s/p resection), FELIX on CPAP, hx hip fracture, CHFpEF , CKD 3, with recent EGD /colonoscopy for microcytic anemia with H pylori positive (on rx) presented to ER for left sided shoulder numbness , BECK admitted for further workup    #BECK and chest tightness secondary to acute on chronic heart failure with preserved ejection fraction.  - tropx neg x3  - CXR: cardiomegaly  - EKG:  Diagnosis Line Sinus rhythm with 1st degree A-V block  Left axis deviation  Non-specific intra-ventricular conduction block  Minimal voltage criteria for LVH, may be normal variant ( [place] product )  Abnormal ECG  - fu BNP, 2+ LE edema+  - admit to telemetry  - cardio consult appreciated recc stress test  - fu serial EKG and CE  - baseline : fully funcitonal , lives alone  - strict I&O  - daily weight  - cotninue 40 mg lasix IV  - echo as above  - stress test neg, no ischemia   - symptoms possibly 2/2 HTN  - BP stable  - pt appears to be Euvolemic    #KOBI on CKD 3  - cr: 1.9 today (1.1 in 2020)-->worsening 2.3, possible progressive from HTN  - monitor BMP  - hold benazepril  - will follow up cr on 5/22 if worsening will get renal   - renal US no hydro  - renal consult  - DC lasix  - DC lisinapril   - pt appears to be Euvolemic  - check UA and lytes    #Acute on chronic microcytic anemia  #H pylori positve on EGD +  - Hb: 7.9 on admission (baseline 9-10)  - recent EGD /COLon by  on 4/29  - EGD: Normal mucosa in the whole esophagus.    Erythema in the stomach compatible with non-erosive gastritis. (Biopsy).    Multiple nodules seen in antrum between 3 and 7 mm in size (Biopsy).    3 mm antral polyp (hyperplastic)   Two 3 mm polyps in the fundus(hyperplastic)  - Colonoscopy (04.29.22 @ 07:30) >   Polyp (1 cm) in the sigmoid colon. (Polypectomy, Endoclip: hyperplastic).    Polyp (1 cm) in the sigmoid colon. 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    Polyp (1 cm) in the Ascending colon. (Polypectomy, Endoclip: tubulo villous adenoma).   - on triple therapy for H pylori (8 more days left), needs clarithromycin NF to be filled, patient can use her own meds  - needs outpt follow up for eradication fu  - maintain active T&S  - hgb stable  - will check Iron studies     #Paroxysmal A fib on xarelto  - c/w lopressor 50mg BID  - c/w xarelto  - monitor CBC  - c/w amiodarone 200mg daily  - s/p FAITH cardioversion in 2019 by     #HTN  - c/w amlodipine 5mg  - hold traimterene-HCTZ ( was discontinued in 2019 for gouty attack but patient mentions she is been taking the medicaiton  - hold benazepril in setting of KOBI   - bp stable    #Gout  - c/w allopurinol 100mg     #prolactinoma?  - on cabergoline (every monday)    #HLD  - c.w lipitor 20mg qHS    #Hypothyroidism  - c/w levothyroxine 175mcg    #overactive bladder  - pt on toriaz 4mg>>ordered oxybuytinin inpatient    #hypokalemia- repleted    #Progress Note Handoff  Pending (specify: KOBI, Renal  follow kidney   Family discussion: house staff updated pt family  Disposition: depending on renal, will anticpate, DC cardiac telemonitoring, no events on cardiac telemonitoring and ischemia w/u neg

## 2022-05-29 DIAGNOSIS — R25.2 CRAMP AND SPASM: ICD-10-CM

## 2022-05-29 DIAGNOSIS — G47.33 OBSTRUCTIVE SLEEP APNEA (ADULT) (PEDIATRIC): ICD-10-CM

## 2022-05-29 DIAGNOSIS — E78.5 HYPERLIPIDEMIA, UNSPECIFIED: ICD-10-CM

## 2022-05-29 DIAGNOSIS — N17.9 ACUTE KIDNEY FAILURE, UNSPECIFIED: ICD-10-CM

## 2022-05-29 DIAGNOSIS — I48.0 PAROXYSMAL ATRIAL FIBRILLATION: ICD-10-CM

## 2022-05-29 DIAGNOSIS — D63.1 ANEMIA IN CHRONIC KIDNEY DISEASE: ICD-10-CM

## 2022-05-29 DIAGNOSIS — R07.89 OTHER CHEST PAIN: ICD-10-CM

## 2022-05-29 DIAGNOSIS — Z79.01 LONG TERM (CURRENT) USE OF ANTICOAGULANTS: ICD-10-CM

## 2022-05-29 DIAGNOSIS — D50.9 IRON DEFICIENCY ANEMIA, UNSPECIFIED: ICD-10-CM

## 2022-05-29 DIAGNOSIS — M10.9 GOUT, UNSPECIFIED: ICD-10-CM

## 2022-05-29 DIAGNOSIS — N32.81 OVERACTIVE BLADDER: ICD-10-CM

## 2022-05-29 DIAGNOSIS — N18.30 CHRONIC KIDNEY DISEASE, STAGE 3 UNSPECIFIED: ICD-10-CM

## 2022-05-29 DIAGNOSIS — I44.0 ATRIOVENTRICULAR BLOCK, FIRST DEGREE: ICD-10-CM

## 2022-05-29 DIAGNOSIS — I50.33 ACUTE ON CHRONIC DIASTOLIC (CONGESTIVE) HEART FAILURE: ICD-10-CM

## 2022-05-29 DIAGNOSIS — I13.0 HYPERTENSIVE HEART AND CHRONIC KIDNEY DISEASE WITH HEART FAILURE AND STAGE 1 THROUGH STAGE 4 CHRONIC KIDNEY DISEASE, OR UNSPECIFIED CHRONIC KIDNEY DISEASE: ICD-10-CM

## 2022-06-27 NOTE — PHYSICAL THERAPY INITIAL EVALUATION ADULT - GAIT DISTANCE, PT EVAL
25 feet/x 2 Ketoconazole Counseling:   Patient counseled regarding improving absorption with orange juice.  Adverse effects include but are not limited to breast enlargement, headache, diarrhea, nausea, upset stomach, liver function test abnormalities, taste disturbance, and stomach pain.  There is a rare possibility of liver failure that can occur when taking ketoconazole. The patient understands that monitoring of LFTs may be required, especially at baseline. The patient verbalized understanding of the proper use and possible adverse effects of ketoconazole.  All of the patient's questions and concerns were addressed.

## 2022-07-29 ENCOUNTER — APPOINTMENT (OUTPATIENT)
Dept: UROGYNECOLOGY | Facility: CLINIC | Age: 82
End: 2022-07-29

## 2022-09-13 ENCOUNTER — OUTPATIENT (OUTPATIENT)
Dept: OUTPATIENT SERVICES | Facility: HOSPITAL | Age: 82
LOS: 1 days | Discharge: HOME | End: 2022-09-13

## 2022-09-13 ENCOUNTER — INPATIENT (INPATIENT)
Facility: HOSPITAL | Age: 82
LOS: 9 days | Discharge: HOME HEALTH | End: 2022-09-23
Attending: INTERNAL MEDICINE | Admitting: INTERNAL MEDICINE

## 2022-09-13 VITALS
HEIGHT: 62 IN | TEMPERATURE: 98 F | SYSTOLIC BLOOD PRESSURE: 140 MMHG | DIASTOLIC BLOOD PRESSURE: 74 MMHG | OXYGEN SATURATION: 86 % | HEART RATE: 92 BPM | RESPIRATION RATE: 28 BRPM

## 2022-09-13 DIAGNOSIS — Z98.890 OTHER SPECIFIED POSTPROCEDURAL STATES: Chronic | ICD-10-CM

## 2022-09-13 DIAGNOSIS — I48.91 UNSPECIFIED ATRIAL FIBRILLATION: ICD-10-CM

## 2022-09-13 LAB
ALBUMIN SERPL ELPH-MCNC: 4.1 G/DL — SIGNIFICANT CHANGE UP (ref 3.5–5.2)
ALP SERPL-CCNC: 141 U/L — HIGH (ref 30–115)
ALT FLD-CCNC: 13 U/L — SIGNIFICANT CHANGE UP (ref 0–41)
ANION GAP SERPL CALC-SCNC: 15 MMOL/L — HIGH (ref 7–14)
APTT BLD: 34.9 SEC — SIGNIFICANT CHANGE UP (ref 27–39.2)
AST SERPL-CCNC: 16 U/L — SIGNIFICANT CHANGE UP (ref 0–41)
BASE EXCESS BLDV CALC-SCNC: -0.3 MMOL/L — SIGNIFICANT CHANGE UP (ref -2–3)
BASOPHILS # BLD AUTO: 0.06 K/UL — SIGNIFICANT CHANGE UP (ref 0–0.2)
BASOPHILS NFR BLD AUTO: 0.3 % — SIGNIFICANT CHANGE UP (ref 0–1)
BILIRUB SERPL-MCNC: 0.4 MG/DL — SIGNIFICANT CHANGE UP (ref 0.2–1.2)
BUN SERPL-MCNC: 28 MG/DL — HIGH (ref 10–20)
CA-I SERPL-SCNC: 1.16 MMOL/L — SIGNIFICANT CHANGE UP (ref 1.15–1.33)
CALCIUM SERPL-MCNC: 9 MG/DL — SIGNIFICANT CHANGE UP (ref 8.4–10.4)
CHLORIDE SERPL-SCNC: 103 MMOL/L — SIGNIFICANT CHANGE UP (ref 98–110)
CK MB CFR SERPL CALC: 1.1 NG/ML — SIGNIFICANT CHANGE UP (ref 0.6–6.3)
CK SERPL-CCNC: 38 U/L — SIGNIFICANT CHANGE UP (ref 0–225)
CO2 SERPL-SCNC: 24 MMOL/L — SIGNIFICANT CHANGE UP (ref 17–32)
CREAT SERPL-MCNC: 1.7 MG/DL — HIGH (ref 0.7–1.5)
EGFR: 30 ML/MIN/1.73M2 — LOW
EOSINOPHIL # BLD AUTO: 0.03 K/UL — SIGNIFICANT CHANGE UP (ref 0–0.7)
EOSINOPHIL NFR BLD AUTO: 0.1 % — SIGNIFICANT CHANGE UP (ref 0–8)
GAS PNL BLDV: 140 MMOL/L — SIGNIFICANT CHANGE UP (ref 136–145)
GAS PNL BLDV: SIGNIFICANT CHANGE UP
GAS PNL BLDV: SIGNIFICANT CHANGE UP
GLUCOSE SERPL-MCNC: 194 MG/DL — HIGH (ref 70–99)
HCO3 BLDV-SCNC: 26 MMOL/L — SIGNIFICANT CHANGE UP (ref 22–29)
HCT VFR BLD CALC: 39.3 % — SIGNIFICANT CHANGE UP (ref 37–47)
HCT VFR BLDA CALC: 36 % — LOW (ref 39–51)
HGB BLD CALC-MCNC: 11.9 G/DL — LOW (ref 12.6–17.4)
HGB BLD-MCNC: 11.5 G/DL — LOW (ref 12–16)
IMM GRANULOCYTES NFR BLD AUTO: 0.6 % — HIGH (ref 0.1–0.3)
INR BLD: 1.71 RATIO — HIGH (ref 0.65–1.3)
LACTATE BLDV-MCNC: 4.2 MMOL/L — CRITICAL HIGH (ref 0.5–2)
LIDOCAIN IGE QN: 29 U/L — SIGNIFICANT CHANGE UP (ref 7–60)
LYMPHOCYTES # BLD AUTO: 2.02 K/UL — SIGNIFICANT CHANGE UP (ref 1.2–3.4)
LYMPHOCYTES # BLD AUTO: 9.5 % — LOW (ref 20.5–51.1)
MAGNESIUM SERPL-MCNC: 1.9 MG/DL — SIGNIFICANT CHANGE UP (ref 1.8–2.4)
MCHC RBC-ENTMCNC: 22.7 PG — LOW (ref 27–31)
MCHC RBC-ENTMCNC: 29.3 G/DL — LOW (ref 32–37)
MCV RBC AUTO: 77.7 FL — LOW (ref 81–99)
MONOCYTES # BLD AUTO: 0.86 K/UL — HIGH (ref 0.1–0.6)
MONOCYTES NFR BLD AUTO: 4 % — SIGNIFICANT CHANGE UP (ref 1.7–9.3)
NEUTROPHILS # BLD AUTO: 18.26 K/UL — HIGH (ref 1.4–6.5)
NEUTROPHILS NFR BLD AUTO: 85.5 % — HIGH (ref 42.2–75.2)
NRBC # BLD: 0 /100 WBCS — SIGNIFICANT CHANGE UP (ref 0–0)
NT-PROBNP SERPL-SCNC: 1425 PG/ML — HIGH (ref 0–300)
PCO2 BLDV: 48 MMHG — HIGH (ref 39–42)
PH BLDV: 7.34 — SIGNIFICANT CHANGE UP (ref 7.32–7.43)
PLATELET # BLD AUTO: 410 K/UL — HIGH (ref 130–400)
PO2 BLDV: 22 MMHG — SIGNIFICANT CHANGE UP
POTASSIUM BLDV-SCNC: 3.9 MMOL/L — SIGNIFICANT CHANGE UP (ref 3.5–5.1)
POTASSIUM SERPL-MCNC: 3.9 MMOL/L — SIGNIFICANT CHANGE UP (ref 3.5–5)
POTASSIUM SERPL-SCNC: 3.9 MMOL/L — SIGNIFICANT CHANGE UP (ref 3.5–5)
PROT SERPL-MCNC: 6.5 G/DL — SIGNIFICANT CHANGE UP (ref 6–8)
PROTHROM AB SERPL-ACNC: 19.6 SEC — HIGH (ref 9.95–12.87)
RBC # BLD: 5.06 M/UL — SIGNIFICANT CHANGE UP (ref 4.2–5.4)
RBC # FLD: 17.8 % — HIGH (ref 11.5–14.5)
SAO2 % BLDV: 28 % — SIGNIFICANT CHANGE UP
SARS-COV-2 RNA SPEC QL NAA+PROBE: SIGNIFICANT CHANGE UP
SODIUM SERPL-SCNC: 142 MMOL/L — SIGNIFICANT CHANGE UP (ref 135–146)
TROPONIN T SERPL-MCNC: <0.01 NG/ML — SIGNIFICANT CHANGE UP
WBC # BLD: 21.36 K/UL — HIGH (ref 4.8–10.8)
WBC # FLD AUTO: 21.36 K/UL — HIGH (ref 4.8–10.8)

## 2022-09-13 PROCEDURE — 93010 ELECTROCARDIOGRAM REPORT: CPT

## 2022-09-13 PROCEDURE — 93010 ELECTROCARDIOGRAM REPORT: CPT | Mod: 77

## 2022-09-13 PROCEDURE — 71045 X-RAY EXAM CHEST 1 VIEW: CPT | Mod: 26

## 2022-09-13 PROCEDURE — 99285 EMERGENCY DEPT VISIT HI MDM: CPT

## 2022-09-13 PROCEDURE — 71275 CT ANGIOGRAPHY CHEST: CPT | Mod: 26,MA

## 2022-09-13 RX ORDER — AZITHROMYCIN 500 MG/1
500 TABLET, FILM COATED ORAL ONCE
Refills: 0 | Status: COMPLETED | OUTPATIENT
Start: 2022-09-13 | End: 2022-09-13

## 2022-09-13 RX ORDER — ATORVASTATIN CALCIUM 80 MG/1
1 TABLET, FILM COATED ORAL
Qty: 0 | Refills: 0 | DISCHARGE

## 2022-09-13 RX ORDER — OMEPRAZOLE 10 MG/1
1 CAPSULE, DELAYED RELEASE ORAL
Qty: 0 | Refills: 0 | DISCHARGE

## 2022-09-13 RX ORDER — ALPRAZOLAM 0.25 MG
0.5 TABLET ORAL ONCE
Refills: 0 | Status: DISCONTINUED | OUTPATIENT
Start: 2022-09-13 | End: 2022-09-13

## 2022-09-13 RX ORDER — CEFTRIAXONE 500 MG/1
1000 INJECTION, POWDER, FOR SOLUTION INTRAMUSCULAR; INTRAVENOUS ONCE
Refills: 0 | Status: COMPLETED | OUTPATIENT
Start: 2022-09-13 | End: 2022-09-13

## 2022-09-13 RX ORDER — CLARITHROMYCIN 500 MG
1 TABLET ORAL
Qty: 0 | Refills: 0 | DISCHARGE

## 2022-09-13 RX ORDER — FESOTERODINE FUMARATE 8 MG/1
1 TABLET, FILM COATED, EXTENDED RELEASE ORAL
Qty: 0 | Refills: 0 | DISCHARGE

## 2022-09-13 RX ADMIN — Medication 0.5 MILLIGRAM(S): at 19:56

## 2022-09-13 NOTE — ED PROVIDER NOTE - NS ED ROS FT
Review of Systems    Constitutional: (-) fever   Eyes/ENT: (-) vision changes  Cardiovascular: (+) chest pain, (-) syncope (-) palpitations  Respiratory: (-) cough, (+) shortness of breath  Gastrointestinal: (-) vomiting, (-) diarrhea (-) abdominal pain  Genitourinary:  (-) dysuria   Musculoskeletal: (-) neck pain, (-) back pain, (-) leg pain/swelling  Integumentary: (-) rash, (-) edema  Neurological: (-) headache, (-) confusion  Hematologic: (-) easy bruising

## 2022-09-13 NOTE — ED PROVIDER NOTE - ATTENDING APP SHARED VISIT CONTRIBUTION OF CARE
80 yo F pmh as stated in the chart pw sob. Pt had suddent onset while eating a burger today, associated with hypoxia to the 80's. Pt states she had a cardioversion performed by Dr. Quintero yesterday. No f/c, no n/v/d, no abd pain, no back pain, no cp, no recent illness. Pt started on bipap upon arrival to the ED with improvement in VS.     CONSTITUTIONAL: +respiratory distress.   SKIN: skin exam is warm and dry, no acute rash.  HEAD: Normocephalic; atraumatic.  EYES: PERRL, 3 mm bilateral, no nystagmus, EOM intact; conjunctiva and sclera clear.  ENT: No nasal discharge; airway clear.  NECK: Supple; non tender. + full passive ROM in all directions. No JVD  CARD: S1, S2 normal; no murmurs, gallops, or rubs. Regular rate and rhythm. + Symmetric Strong Pulses  RESP: +tachypnea, respiratory distress and retractions. No crackles, no wheezes, no rhonchi, no stridor.   ABD: soft; non-distended; non-tender. No Rebound, No Guarding, No signs of peritonitis, No CVA tenderness. No pulsatile abdominal mass. + Strong and Symmetric Pulses  EXT: Normal ROM. No clubbing, cyanosis or edema. Dp and Pt Pulses intact. Cap refill less than 3 seconds  NEURO: CN 2-12 intact, no sensory or motor deficits, Alert, oriented, grossly unremarkable. No Focal deficits. GCS 15. NIH 0  PSYCH: Cooperative, appropriate.

## 2022-09-13 NOTE — CHART NOTE - NSCHARTNOTEFT_GEN_A_CORE
POST OPERATIVE PROCEDURAL DOCUMENTATION  PRE-OP DIAGNOSIS: Atrial fibrillation    POST-OP DIAGNOSIS: Mild-Moderate MR. Left atrial appendage was clear of clot and smoke. Successful cardioversion to normal sinus rhythm.    PROCEDURE: Transesophageal echocardiogram    Primary Physician: Dr. Quintero    ANESTHESIA TYPE  [  ] General Anesthesia  [ x ] Conscious Sedation  [  ] Local/Regional    CONDITION  [  ] Critical  [  ] Serious  [  ] Fair  [ x ] Good    SPECIMENS REMOVED (IF APPLICABLE): N/A    IMPLANTS (IF APPLICABLE): None    ESTIMATED BLOOD LOSS: None    COMPLICATIONS: None      FINDINGS:    After risks and benefits of procedures were explained, informed consent was obtained and placed in chart. Refer to Anesthesia note for sedation details.  The FAITH probe was passed into the esophagus without difficulty.  Transesophageal images were obtained.  The FAITH probe was removed without difficulty and examined.  There was no evidence for bleeding.  The patient tolerated the procedure well without any immediate FAITH-related complications.      Preliminary Findings:  LA: normal  JOE: Left atrial appendage was clear of clot and smoke.  LV: LVEF was estimated at 50-55%  MV: Mild-Moderate MR , no evidence of MS.   AV: No evidence of AI, no evidence of AS.   RA: normal  TV: no TR.     There was mild, non-mobile atheroma seen in the thoracic aorta.     Patient successfully converted to sinus rhythm with synchronized  200J of direct current cardioversion.    DIAGNOSIS/IMPRESSION:  Mild-Moderate MR. Left atrial appendage was clear of clot and smoke. Successful cardioversion to normal sinus rhythm.    PLAN OF CARE:  Continue with anticoagulation  Outpt follow up with Dr. Quintero

## 2022-09-13 NOTE — ED PROVIDER NOTE - PROGRESS NOTE DETAILS
AMANDA NIEVES: pt placed on bipap on arrival. has been comfortable on bipap since then-- saturating 98 on bipap now.

## 2022-09-13 NOTE — CHART NOTE - NSCHARTNOTEFT_GEN_A_CORE
PACU ANESTHESIA ADMISSION NOTE      Procedure:   Post op diagnosis:      ____  Intubated  TV:______       Rate: ______      FiO2: ______    __x__  Patent Airway    __x__  Full return of protective reflexes    __x__  Full recovery from anesthesia / back to baseline status    Vitals:  T(C): --  HR: --  BP: --  RR: --  SpO2: --    Mental Status:  __x__ Awake   ___x__ Alert   _____ Drowsy   _____ Sedated    Nausea/Vomiting:  __x__ NO  ______Yes,   See Post - Op Orders          Pain Scale (0-10):  _____    Treatment: ____ None    __x__ See Post - Op/PCA Orders    Post - Operative Fluids:   ____ Oral   __x__ See Post - Op Orders    Plan: Discharge:   __x__Home       _____Floor     _____Critical Care    _____  Other:_________________    Comments: Patient had smooth intraoperative event, no anesthesia complication.  PACU Vital signs: HR:  43          BP:  108      / 54         RR:   16          O2 Sat: 97      %     Temp 98

## 2022-09-13 NOTE — ED PROVIDER NOTE - CLINICAL SUMMARY MEDICAL DECISION MAKING FREE TEXT BOX
I personally evaluated the patient. I reviewed the Resident’s or Physician Assistant’s note (as assigned above), and agree with the findings and plan except as documented in my note. Patient evaluated for sob. Labs, ekg, cxr, CT PE study performed in the ED. Pt noted to have signs of pna, started on abx. Admitted to medicine for further evaluation and treatment.

## 2022-09-13 NOTE — H&P CARDIOLOGY - HISTORY OF PRESENT ILLNESS
HPI  80 y/o F w/ Afib here for FAITH/DCCV. Pt reports exertional dyspnea and genralized weakness/fatigue when in Afib.    REVIEW OF SYSTEMS:  CONSTITUTIONAL: No weakness, fevers or chills  EYES/ENT: No visual changes;  No vertigo or throat pain   NECK: No pain or stiffness  RESPIRATORY: No cough, wheezing, hemoptysis; SEE HPI  CARDIOVASCULAR: SEE HPI  GASTROINTESTINAL: No abdominal or epigastric pain. No nausea, vomiting, or hematemesis; No diarrhea or constipation. No melena or hematochezia.  GENITOURINARY: No dysuria, frequency or hematuria  NEUROLOGICAL: No numbness or weakness  SKIN: No itching, rashes      PHYSICAL EXAM:    GENERAL: NAD  HEAD:  Atraumatic, Normocephalic  EYES: conjunctiva and sclera clear  NECK: No JVD  CHEST/LUNG: Clear to auscultation bilaterally; No wheeze  HEART:irregular  ABDOMEN: Soft, Nontender, Nondistended; Bowel sounds present  EXTREMITIES:  2+ Peripheral Pulses, No clubbing, cyanosis, or edema  NEUROLOGY:  A&Ox3, appropriate  SKIN: No rashes or lesions

## 2022-09-13 NOTE — ED PROVIDER NOTE - CARE PLAN
1 Principal Discharge DX:	Pneumonia  Secondary Diagnosis:	Hypoxia  Secondary Diagnosis:	Shortness of breath

## 2022-09-13 NOTE — ED PROVIDER NOTE - OBJECTIVE STATEMENT
80 y/o F with PMH Paroxysmal AFib on Xarelto (s/p cardioversion 9/13/22), CHFpEF, CKD 3b, chronic microcytic anemia, FELIX (on CPAP) HTN, DLD, hypothyroidism (history of thyroid cancer s/p resection), hx hip fracture, cardioversion for afib by kira yesterday presents with sob while eating a burger, hypoxia to 80s, placed on bipap on arrival.   Pt presents to ED for difficulty breathing s/p cardioversion. Found to be hypoxic to 80s, placed on bipap.  no cough/congestion/fever/n/v/d/ab pain/back pain.

## 2022-09-13 NOTE — ED PROVIDER NOTE - PHYSICAL EXAMINATION
PHYSICAL EXAM:    GENERAL: Alert, appears stated age, well appearing, non-toxic  SKIN: Warm, pink and dry. MMM.   HEAD: NC, AT  EYE: Normal lids/conjunctiva  ENT: Normal hearing, patent oropharynx   NECK: +supple. No meningismus, or JVD  Pulm: Bilateral BS, increased resp effort, no wheezes, stridor, crackles. +retractions.   CV: RRR, no M/R/G, 2+and = radial pulses  Abd: soft, non-tender, non-distended  Mskel: no erythema, cyanosis, edema. no calf tenderness  Neuro: AAOx3, moving extremities

## 2022-09-13 NOTE — ED ADULT TRIAGE NOTE - CHIEF COMPLAINT QUOTE
Patient walk into ED a+ox4 co difficulty breathing- as per patient sp cardioversion this morning for Afib. Pt appears anxious and tachypneic with SpO2 80's on RA. Pt transferred to crit care

## 2022-09-14 DIAGNOSIS — Z02.9 ENCOUNTER FOR ADMINISTRATIVE EXAMINATIONS, UNSPECIFIED: ICD-10-CM

## 2022-09-14 LAB
ALBUMIN SERPL ELPH-MCNC: 3.4 G/DL — LOW (ref 3.5–5.2)
ALP SERPL-CCNC: 106 U/L — SIGNIFICANT CHANGE UP (ref 30–115)
ALT FLD-CCNC: 10 U/L — SIGNIFICANT CHANGE UP (ref 0–41)
ANION GAP SERPL CALC-SCNC: 12 MMOL/L — SIGNIFICANT CHANGE UP (ref 7–14)
APPEARANCE UR: CLEAR — SIGNIFICANT CHANGE UP
APTT BLD: 38.8 SEC — SIGNIFICANT CHANGE UP (ref 27–39.2)
APTT BLD: 86.4 SEC — CRITICAL HIGH (ref 27–39.2)
AST SERPL-CCNC: 12 U/L — SIGNIFICANT CHANGE UP (ref 0–41)
BASE EXCESS BLDV CALC-SCNC: 1.1 MMOL/L — SIGNIFICANT CHANGE UP (ref -2–3)
BASOPHILS # BLD AUTO: 0.03 K/UL — SIGNIFICANT CHANGE UP (ref 0–0.2)
BASOPHILS NFR BLD AUTO: 0.2 % — SIGNIFICANT CHANGE UP (ref 0–1)
BILIRUB SERPL-MCNC: 0.5 MG/DL — SIGNIFICANT CHANGE UP (ref 0.2–1.2)
BILIRUB UR-MCNC: NEGATIVE — SIGNIFICANT CHANGE UP
BUN SERPL-MCNC: 35 MG/DL — HIGH (ref 10–20)
CA-I SERPL-SCNC: 1.12 MMOL/L — LOW (ref 1.15–1.33)
CALCIUM SERPL-MCNC: 8.1 MG/DL — LOW (ref 8.4–10.5)
CHLORIDE SERPL-SCNC: 107 MMOL/L — SIGNIFICANT CHANGE UP (ref 98–110)
CO2 SERPL-SCNC: 25 MMOL/L — SIGNIFICANT CHANGE UP (ref 17–32)
COLOR SPEC: COLORLESS — SIGNIFICANT CHANGE UP
CREAT SERPL-MCNC: 1.6 MG/DL — HIGH (ref 0.7–1.5)
D DIMER BLD IA.RAPID-MCNC: 338 NG/ML DDU — HIGH (ref 0–230)
DIFF PNL FLD: NEGATIVE — SIGNIFICANT CHANGE UP
EGFR: 33 ML/MIN/1.73M2 — LOW
EOSINOPHIL # BLD AUTO: 0.01 K/UL — SIGNIFICANT CHANGE UP (ref 0–0.7)
EOSINOPHIL NFR BLD AUTO: 0.1 % — SIGNIFICANT CHANGE UP (ref 0–8)
GAS PNL BLDV: 138 MMOL/L — SIGNIFICANT CHANGE UP (ref 136–145)
GLUCOSE SERPL-MCNC: 132 MG/DL — HIGH (ref 70–99)
GLUCOSE UR QL: NEGATIVE — SIGNIFICANT CHANGE UP
HCO3 BLDV-SCNC: 27 MMOL/L — SIGNIFICANT CHANGE UP (ref 22–29)
HCT VFR BLD CALC: 32.1 % — LOW (ref 37–47)
HCT VFR BLDA CALC: 32 % — LOW (ref 39–51)
HGB BLD CALC-MCNC: 10.5 G/DL — LOW (ref 12.6–17.4)
HGB BLD-MCNC: 9.7 G/DL — LOW (ref 12–16)
IMM GRANULOCYTES NFR BLD AUTO: 0.5 % — HIGH (ref 0.1–0.3)
INR BLD: 1.47 RATIO — HIGH (ref 0.65–1.3)
INR BLD: 1.69 RATIO — HIGH (ref 0.65–1.3)
KETONES UR-MCNC: NEGATIVE — SIGNIFICANT CHANGE UP
LACTATE BLDV-MCNC: 1.5 MMOL/L — SIGNIFICANT CHANGE UP (ref 0.5–2)
LEUKOCYTE ESTERASE UR-ACNC: NEGATIVE — SIGNIFICANT CHANGE UP
LYMPHOCYTES # BLD AUTO: 1.18 K/UL — LOW (ref 1.2–3.4)
LYMPHOCYTES # BLD AUTO: 7 % — LOW (ref 20.5–51.1)
MAGNESIUM SERPL-MCNC: 2.1 MG/DL — SIGNIFICANT CHANGE UP (ref 1.8–2.4)
MCHC RBC-ENTMCNC: 23.2 PG — LOW (ref 27–31)
MCHC RBC-ENTMCNC: 30.2 G/DL — LOW (ref 32–37)
MCV RBC AUTO: 76.8 FL — LOW (ref 81–99)
MONOCYTES # BLD AUTO: 0.68 K/UL — HIGH (ref 0.1–0.6)
MONOCYTES NFR BLD AUTO: 4 % — SIGNIFICANT CHANGE UP (ref 1.7–9.3)
MRSA PCR RESULT.: NEGATIVE — SIGNIFICANT CHANGE UP
NEUTROPHILS # BLD AUTO: 14.93 K/UL — HIGH (ref 1.4–6.5)
NEUTROPHILS NFR BLD AUTO: 88.2 % — HIGH (ref 42.2–75.2)
NITRITE UR-MCNC: NEGATIVE — SIGNIFICANT CHANGE UP
NRBC # BLD: 0 /100 WBCS — SIGNIFICANT CHANGE UP (ref 0–0)
PCO2 BLDV: 45 MMHG — HIGH (ref 39–42)
PH BLDV: 7.38 — SIGNIFICANT CHANGE UP (ref 7.32–7.43)
PH UR: 5 — SIGNIFICANT CHANGE UP (ref 5–8)
PLATELET # BLD AUTO: 290 K/UL — SIGNIFICANT CHANGE UP (ref 130–400)
PO2 BLDV: 46 MMHG — SIGNIFICANT CHANGE UP
POTASSIUM BLDV-SCNC: 3.3 MMOL/L — LOW (ref 3.5–5.1)
POTASSIUM SERPL-MCNC: 3.8 MMOL/L — SIGNIFICANT CHANGE UP (ref 3.5–5)
POTASSIUM SERPL-SCNC: 3.8 MMOL/L — SIGNIFICANT CHANGE UP (ref 3.5–5)
PROCALCITONIN SERPL-MCNC: 24.2 NG/ML — HIGH (ref 0.02–0.1)
PROT SERPL-MCNC: 5.4 G/DL — LOW (ref 6–8)
PROT UR-MCNC: NEGATIVE — SIGNIFICANT CHANGE UP
PROTHROM AB SERPL-ACNC: 16.8 SEC — HIGH (ref 9.95–12.87)
PROTHROM AB SERPL-ACNC: 19.3 SEC — HIGH (ref 9.95–12.87)
RBC # BLD: 4.18 M/UL — LOW (ref 4.2–5.4)
RBC # FLD: 17.6 % — HIGH (ref 11.5–14.5)
SAO2 % BLDV: 75.2 % — SIGNIFICANT CHANGE UP
SODIUM SERPL-SCNC: 144 MMOL/L — SIGNIFICANT CHANGE UP (ref 135–146)
SP GR SPEC: 1.01 — SIGNIFICANT CHANGE UP (ref 1.01–1.03)
UROBILINOGEN FLD QL: SIGNIFICANT CHANGE UP
WBC # BLD: 16.91 K/UL — HIGH (ref 4.8–10.8)
WBC # FLD AUTO: 16.91 K/UL — HIGH (ref 4.8–10.8)

## 2022-09-14 PROCEDURE — 71045 X-RAY EXAM CHEST 1 VIEW: CPT | Mod: 26

## 2022-09-14 PROCEDURE — 93010 ELECTROCARDIOGRAM REPORT: CPT

## 2022-09-14 PROCEDURE — 99223 1ST HOSP IP/OBS HIGH 75: CPT

## 2022-09-14 PROCEDURE — 73610 X-RAY EXAM OF ANKLE: CPT | Mod: 26,LT

## 2022-09-14 PROCEDURE — 99291 CRITICAL CARE FIRST HOUR: CPT

## 2022-09-14 RX ORDER — VANCOMYCIN HCL 1 G
1250 VIAL (EA) INTRAVENOUS EVERY 24 HOURS
Refills: 0 | Status: DISCONTINUED | OUTPATIENT
Start: 2022-09-14 | End: 2022-09-14

## 2022-09-14 RX ORDER — CEFEPIME 1 G/1
2000 INJECTION, POWDER, FOR SOLUTION INTRAMUSCULAR; INTRAVENOUS EVERY 12 HOURS
Refills: 0 | Status: DISCONTINUED | OUTPATIENT
Start: 2022-09-14 | End: 2022-09-14

## 2022-09-14 RX ORDER — CEFTRIAXONE 500 MG/1
1000 INJECTION, POWDER, FOR SOLUTION INTRAMUSCULAR; INTRAVENOUS EVERY 24 HOURS
Refills: 0 | Status: DISCONTINUED | OUTPATIENT
Start: 2022-09-14 | End: 2022-09-14

## 2022-09-14 RX ORDER — METOPROLOL TARTRATE 50 MG
25 TABLET ORAL
Refills: 0 | Status: DISCONTINUED | OUTPATIENT
Start: 2022-09-14 | End: 2022-09-23

## 2022-09-14 RX ORDER — HEPARIN SODIUM 5000 [USP'U]/ML
3500 INJECTION INTRAVENOUS; SUBCUTANEOUS EVERY 6 HOURS
Refills: 0 | Status: DISCONTINUED | OUTPATIENT
Start: 2022-09-14 | End: 2022-09-15

## 2022-09-14 RX ORDER — PIPERACILLIN AND TAZOBACTAM 4; .5 G/20ML; G/20ML
3.38 INJECTION, POWDER, LYOPHILIZED, FOR SOLUTION INTRAVENOUS ONCE
Refills: 0 | Status: DISCONTINUED | OUTPATIENT
Start: 2022-09-14 | End: 2022-09-14

## 2022-09-14 RX ORDER — ONDANSETRON 8 MG/1
4 TABLET, FILM COATED ORAL EVERY 8 HOURS
Refills: 0 | Status: DISCONTINUED | OUTPATIENT
Start: 2022-09-14 | End: 2022-09-23

## 2022-09-14 RX ORDER — HEPARIN SODIUM 5000 [USP'U]/ML
7500 INJECTION INTRAVENOUS; SUBCUTANEOUS EVERY 6 HOURS
Refills: 0 | Status: DISCONTINUED | OUTPATIENT
Start: 2022-09-14 | End: 2022-09-15

## 2022-09-14 RX ORDER — LINEZOLID 600 MG/300ML
INJECTION, SOLUTION INTRAVENOUS
Refills: 0 | Status: DISCONTINUED | OUTPATIENT
Start: 2022-09-14 | End: 2022-09-14

## 2022-09-14 RX ORDER — LANOLIN ALCOHOL/MO/W.PET/CERES
3 CREAM (GRAM) TOPICAL AT BEDTIME
Refills: 0 | Status: DISCONTINUED | OUTPATIENT
Start: 2022-09-14 | End: 2022-09-23

## 2022-09-14 RX ORDER — HEPARIN SODIUM 5000 [USP'U]/ML
INJECTION INTRAVENOUS; SUBCUTANEOUS
Qty: 25000 | Refills: 0 | Status: DISCONTINUED | OUTPATIENT
Start: 2022-09-14 | End: 2022-09-15

## 2022-09-14 RX ORDER — LANOLIN ALCOHOL/MO/W.PET/CERES
3 CREAM (GRAM) TOPICAL ONCE
Refills: 0 | Status: COMPLETED | OUTPATIENT
Start: 2022-09-14 | End: 2022-09-16

## 2022-09-14 RX ORDER — LISINOPRIL 2.5 MG/1
10 TABLET ORAL DAILY
Refills: 0 | Status: DISCONTINUED | OUTPATIENT
Start: 2022-09-14 | End: 2022-09-17

## 2022-09-14 RX ORDER — LEVOTHYROXINE SODIUM 125 MCG
175 TABLET ORAL DAILY
Refills: 0 | Status: DISCONTINUED | OUTPATIENT
Start: 2022-09-14 | End: 2022-09-23

## 2022-09-14 RX ORDER — RIVAROXABAN 15 MG-20MG
15 KIT ORAL
Refills: 0 | Status: DISCONTINUED | OUTPATIENT
Start: 2022-09-14 | End: 2022-09-14

## 2022-09-14 RX ORDER — HEPARIN SODIUM 5000 [USP'U]/ML
5000 INJECTION INTRAVENOUS; SUBCUTANEOUS EVERY 8 HOURS
Refills: 0 | Status: DISCONTINUED | OUTPATIENT
Start: 2022-09-14 | End: 2022-09-14

## 2022-09-14 RX ORDER — AMIODARONE HYDROCHLORIDE 400 MG/1
200 TABLET ORAL
Refills: 0 | Status: DISCONTINUED | OUTPATIENT
Start: 2022-09-14 | End: 2022-09-23

## 2022-09-14 RX ORDER — PIPERACILLIN AND TAZOBACTAM 4; .5 G/20ML; G/20ML
3.38 INJECTION, POWDER, LYOPHILIZED, FOR SOLUTION INTRAVENOUS ONCE
Refills: 0 | Status: COMPLETED | OUTPATIENT
Start: 2022-09-14 | End: 2022-09-14

## 2022-09-14 RX ORDER — ALLOPURINOL 300 MG
100 TABLET ORAL
Refills: 0 | Status: DISCONTINUED | OUTPATIENT
Start: 2022-09-14 | End: 2022-09-23

## 2022-09-14 RX ORDER — LINEZOLID 600 MG/300ML
600 INJECTION, SOLUTION INTRAVENOUS ONCE
Refills: 0 | Status: COMPLETED | OUTPATIENT
Start: 2022-09-14 | End: 2022-09-14

## 2022-09-14 RX ORDER — PIPERACILLIN AND TAZOBACTAM 4; .5 G/20ML; G/20ML
3.38 INJECTION, POWDER, LYOPHILIZED, FOR SOLUTION INTRAVENOUS EVERY 8 HOURS
Refills: 0 | Status: DISCONTINUED | OUTPATIENT
Start: 2022-09-14 | End: 2022-09-14

## 2022-09-14 RX ORDER — FUROSEMIDE 40 MG
40 TABLET ORAL ONCE
Refills: 0 | Status: COMPLETED | OUTPATIENT
Start: 2022-09-14 | End: 2022-09-14

## 2022-09-14 RX ORDER — ATORVASTATIN CALCIUM 80 MG/1
20 TABLET, FILM COATED ORAL AT BEDTIME
Refills: 0 | Status: DISCONTINUED | OUTPATIENT
Start: 2022-09-14 | End: 2022-09-23

## 2022-09-14 RX ORDER — LEVOTHYROXINE SODIUM 125 MCG
1 TABLET ORAL
Qty: 0 | Refills: 0 | DISCHARGE

## 2022-09-14 RX ORDER — ACETAMINOPHEN 500 MG
650 TABLET ORAL EVERY 6 HOURS
Refills: 0 | Status: DISCONTINUED | OUTPATIENT
Start: 2022-09-14 | End: 2022-09-23

## 2022-09-14 RX ORDER — CEFTRIAXONE 500 MG/1
1000 INJECTION, POWDER, FOR SOLUTION INTRAMUSCULAR; INTRAVENOUS EVERY 24 HOURS
Refills: 0 | Status: DISCONTINUED | OUTPATIENT
Start: 2022-09-14 | End: 2022-09-15

## 2022-09-14 RX ADMIN — CEFTRIAXONE 100 MILLIGRAM(S): 500 INJECTION, POWDER, FOR SOLUTION INTRAMUSCULAR; INTRAVENOUS at 00:30

## 2022-09-14 RX ADMIN — CEFTRIAXONE 100 MILLIGRAM(S): 500 INJECTION, POWDER, FOR SOLUTION INTRAMUSCULAR; INTRAVENOUS at 12:09

## 2022-09-14 RX ADMIN — HEPARIN SODIUM 1700 UNIT(S)/HR: 5000 INJECTION INTRAVENOUS; SUBCUTANEOUS at 17:15

## 2022-09-14 RX ADMIN — PIPERACILLIN AND TAZOBACTAM 200 GRAM(S): 4; .5 INJECTION, POWDER, LYOPHILIZED, FOR SOLUTION INTRAVENOUS at 06:30

## 2022-09-14 RX ADMIN — Medication 3 MILLIGRAM(S): at 23:46

## 2022-09-14 RX ADMIN — AMIODARONE HYDROCHLORIDE 200 MILLIGRAM(S): 400 TABLET ORAL at 18:44

## 2022-09-14 RX ADMIN — AMIODARONE HYDROCHLORIDE 200 MILLIGRAM(S): 400 TABLET ORAL at 10:15

## 2022-09-14 RX ADMIN — Medication 60 MILLIGRAM(S): at 08:15

## 2022-09-14 RX ADMIN — Medication 166.67 MILLIGRAM(S): at 14:19

## 2022-09-14 RX ADMIN — Medication 25 MILLIGRAM(S): at 18:44

## 2022-09-14 RX ADMIN — Medication 60 MILLIGRAM(S): at 18:43

## 2022-09-14 RX ADMIN — LINEZOLID 300 MILLIGRAM(S): 600 INJECTION, SOLUTION INTRAVENOUS at 08:03

## 2022-09-14 RX ADMIN — HEPARIN SODIUM 1700 UNIT(S)/HR: 5000 INJECTION INTRAVENOUS; SUBCUTANEOUS at 11:11

## 2022-09-14 RX ADMIN — AZITHROMYCIN 255 MILLIGRAM(S): 500 TABLET, FILM COATED ORAL at 01:20

## 2022-09-14 RX ADMIN — Medication 100 MILLIGRAM(S): at 18:44

## 2022-09-14 RX ADMIN — ATORVASTATIN CALCIUM 20 MILLIGRAM(S): 80 TABLET, FILM COATED ORAL at 21:14

## 2022-09-14 RX ADMIN — Medication 40 MILLIGRAM(S): at 07:41

## 2022-09-14 NOTE — ED ADULT NURSE NOTE - OBJECTIVE STATEMENT
c/o SOB from home .Patient had a cardioversion done today at [facility] office ,and around 6pm patient start feeling worse .

## 2022-09-14 NOTE — PATIENT PROFILE ADULT - OVER THE PAST TWO WEEKS HAVE YOU FELT DOWN, DEPRESSED OR HOPELESS?
Received a DENIAL from [employer] for Humulin N Kwikpen 100 unit/ml Pen-injector.     Forms scanned to Epic.   no

## 2022-09-14 NOTE — PATIENT PROFILE ADULT - FALL HARM RISK - HARM RISK INTERVENTIONS

## 2022-09-14 NOTE — H&P ADULT - HISTORY OF PRESENT ILLNESS
80 yo female with PMHx of Paroxysmal AFib on Xarelto (s/p cardioversion 9/13/22), CHFpEF, CKD 3b, FELIX (on CPAP) HTN, DLD, hypothyroidism (history of thyroid cancer s/p resection), H. Pylori, hx hip fracture.  Pt presents to ED for difficulty breathing s/p cardioversion. Found to be hypoxic to 80s, placed on bipap.   History goes back to 9/13 AM, where patient had outpatient elective cardioversion with Dr. Quintero. Several hours Post procdure, pt went to diner with her sister to eat lunch. While at the diner pt suddenly became dyspneic and experienced rigors. Denied cough, no nausea, vomiting, chest pain, palpitations.   CTA PE was performed which showed Right middle and lower lobe as well as left lower lobe lobar consolidations. Bilateral upper lobe patchy peribronchial vascular opacifications.  Pt given Rocephin and azithromycin.     On my exam this AM, pt was already transferred to [facility]. Pt was on bipap since admission. Bipap was removed to conduct history and physical. Pt was found to be tachypneic >30, vitals were checked, saturation found to be in 70s. Pt was then placed back on to bipap.     in the ED,pt's VS T(C): 36.8 (09-14-22 @ 05:00), Max: 36.8 (09-14-22 @ 02:44)  HR: 60 (09-14-22 @ 05:00) (60 - 92)  BP: 128/62 (09-14-22 @ 05:00) (122/59 - 161/79)  RR: 18 (09-14-22 @ 05:00) (18 - 28)  SpO2: 98% (09-14-22 @ 02:44) (86% - 99%), labs done showed   pt received   pt is admitted for workup/management 80 yo female with PMHx of Paroxysmal AFib on Xarelto (s/p cardioversion 9/13/22), CHFpEF, CKD 3b, chronic microcytic anemia, FELIX (on CPAP) HTN, DLD, hypothyroidism (history of thyroid cancer s/p resection), hx hip fracture.  Pt presents to ED for difficulty breathing s/p cardioversion. Found to be hypoxic to 80s, placed on bipap.  History goes back to 9/13 AM, where patient had outpatient elective cardioversion with Dr. Quintero. Several hours Post procedure pt went to diner with her sister to eat lunch. While at the diner pt suddenly became dyspneic and experienced rigors. Denied cough, no nausea, vomiting, chest pain, palpitations.   In ED, afebrile. Tachypneic to 28, and hypoxic to 80s.  Labs done showed WBC 21.6, Hb 11.5, Mcv 77.7, INR 1.71, Cr 1.7, GFR 30, BUN 28, , Trop neg, BNP 1425 (similar to baseline); VBG lactate 4.2 (repeat 1.5)  CTA PE was performed which showed Right middle and lower lobe as well as left lower lobe lobar consolidations. Bilateral upper lobe patchy peribronchial vascular opacifications.  In ED Pt given Rocephin and azithromycin. Ativan 0.5 mg IV was also given.    On my exam this AM, pt was already transferred to [facility]. Pt was on bipap max settings since admission. Bipap was removed to conduct history and physical. Pt was found to be tachypneic >30, vitals were checked, saturation found to be in 70s. Pt was then placed back on to bipap. D/w pulmonary fellow #[id number], approved for ICU monitoring.    in the ED,pt's VS T(C): 36.8 (09-14-22 @ 05:00), Max: 36.8 (09-14-22 @ 02:44)  HR: 60 (09-14-22 @ 05:00) (60 - 92)  BP: 128/62 (09-14-22 @ 05:00) (122/59 - 161/79)  RR: 18 (09-14-22 @ 05:00) (18 - 28)  SpO2: 98% (09-14-22 @ 02:44) (86% - 99%)    pt is admitted for workup/management bilateral pneumonia

## 2022-09-14 NOTE — H&P ADULT - ATTENDING COMMENTS
Patient is a 81 year old female with ho paroxysmal afib s/p cardioversion on Xarelto tx. at home , CHFpEF, HTN, gout presents shortly after cardioversion for dyspnea, rigors and hypoxia requiring Bipap tx, dx. with Sepsis due to suspected aspiration pneumonia.    # Acute hypoxic respiratory failure due to Right sided pneumonia ? due to aspiration  # Sepsis was present on admission   - started on IV Linezolid, IV Zosyn, f/up blood cxs, sputum cxs, serum procal level, f/up MRSA swab , consult ID , monitor CBC daily  -transitioned to High Flow 60/100-sat 93%  - s/p CT chest: b/l lung consolidations, no PE  - Pulmonary team on board - rec. to upgrade to ICU  -Lactic acidosis- resolved post bipap tx. initiation    # KOBI due to sepsis-   - monitor daily BMP  - avoid nephrotoxic agents  - hold diuretics for next 24 hours  - inserted joaquin- u/a normal, monitor strict I/O    # Paroxysmal afib with s/p Cardioversion/ h/o CHFpEF/HTN  - hold Xarelto due to KOBI, start on heparin drip  -Consult Cardiology who has completed Cardioversion  -resume on home amiodarone, lopressor( with holding parameters)  - cardiac telemonitoring  -trop neg.    # Microcytic anemia- obtain iron/tibc/ferritin levels    # left ankle pain and edema- obtain 2-3 views of left ankle x ray- f/up results    Code status: full code    Patient is to be upgraded to ICU level of care.    Total time spent to complete patient's bedside assessment, review medical chart, discuss medical plan of care with covering medical team was more than 70 minutes Patient is a 81 year old female with ho paroxysmal afib s/p cardioversion on Xarelto tx. at home , CHFpEF, HTN, gout presents shortly after cardioversion for dyspnea, rigors and hypoxia requiring Bipap tx, dx. with Sepsis due to suspected aspiration pneumonia.    # Acute hypoxic respiratory failure due to Right sided pneumonia ? due to aspiration  # Sepsis was present on admission   - started on IV Linezolid, IV Zosyn, f/up blood cxs, sputum cxs, serum procal level, f/up MRSA swab , consult ID , monitor CBC daily  -transitioned to High Flow 60/100-sat 93%  - s/p CT chest: b/l lung consolidations, no PE  - Pulmonary team on board - rec. to upgrade to ICU  -Lactic acidosis- resolved post bipap tx. initiation    # KOBI due to sepsis-   - monitor daily BMP  - avoid nephrotoxic agents  - hold diuretics for next 24 hours  - inserted joaquin- u/a normal, monitor strict I/O    # Paroxysmal afib with s/p Cardioversion/ h/o CHFpEF/HTN  - hold Xarelto due to KOBI, start on heparin drip  -Consult Cardiology who has completed Cardioversion  -resume on home amiodarone, lopressor( with holding parameters)  - cardiac telemonitoring  -trop neg.    # Microcytic anemia- obtain iron/tibc/ferritin levels    # left ankle pain and edema- obtain 2-3 views of left ankle x ray- f/up results    # Hypothyroid- on synthroid tx.    DVT proph- pt. is on heparin drip for tx. for parox. afib post cardioversion, once renal function improves will place back on xarelto tx.    Code status: full code    Patient is to be upgraded to ICU level of care.    Total time spent to complete patient's bedside assessment, review medical chart, discuss medical plan of care with covering medical team was more than 70 minutes

## 2022-09-14 NOTE — CHART NOTE - NSCHARTNOTEFT_GEN_A_CORE
MICU Transfer Note    Transfer from:   Transfer to:  MICU  Accepting physican: Dr. Ruiz     HPI:  80 yo female with PMHx of Paroxysmal AFib on Xarelto (s/p cardioversion 9/13/22), CHFpEF, CKD 3b, chronic microcytic anemia, FELIX (on CPAP) HTN, DLD, hypothyroidism (history of thyroid cancer s/p resection), hx hip fracture.  Pt presents to ED for difficulty breathing s/p cardioversion. Found to be hypoxic to 80s, placed on bipap.  History goes back to 9/13 AM, where patient had outpatient elective cardioversion with Dr. Quintero. Several hours Post procedure pt went to diner with her sister to eat lunch. While at the diner pt suddenly became dyspneic and experienced rigors. Denied cough, no nausea, vomiting, chest pain, palpitations.   In ED, afebrile. Tachypneic to 28, and hypoxic to 80s.  Labs done showed WBC 21.6, Hb 11.5, Mcv 77.7, INR 1.71, Cr 1.7, GFR 30, BUN 28, , Trop neg, BNP 1425 (similar to baseline); VBG lactate 4.2 (repeat 1.5)  CTA PE was performed which showed Right middle and lower lobe as well as left lower lobe lobar consolidations. Bilateral upper lobe patchy peribronchial vascular opacifications.  In ED Pt given Rocephin and azithromycin. Ativan 0.5 mg IV was also given.    On my exam this AM, pt was already transferred to [facility]. Pt was on bipap max settings since admission. Bipap was removed to conduct history and physical. Pt was found to be tachypneic >30, vitals were checked, saturation found to be in 70s. Pt was then placed back on to bipap. D/w pulmonary fellow #[id number], approved for ICU monitoring.    in the ED,pt's VS T(C): 36.8 (09-14-22 @ 05:00), Max: 36.8 (09-14-22 @ 02:44)  HR: 60 (09-14-22 @ 05:00) (60 - 92)  BP: 128/62 (09-14-22 @ 05:00) (122/59 - 161/79)  RR: 18 (09-14-22 @ 05:00) (18 - 28)  SpO2: 98% (09-14-22 @ 02:44) (86% - 99%)    pt is admitted for workup/management bilateral pneumonia (14 Sep 2022 06:19)    Hospital Course:   Pt weaned to HFNC, still tachypnic but is endorsing an improvement in her symptoms. Daughter was spoken to, pt is full code. High risk, low threshold for intubation. Pt to be monitored in the ICU.     ASSESSMENT & PLAN:   81 year old female with ho paroxysmal afib s/p cardioversion, CHFpEF, HTN, gout presents shortly after cardioversion for difficulty breathing, rigors and hypoxia. Increased WBC. bilateral pna on CT scan. No PE. Pt on bipap. Will require close monitoring and IV abx broad spectrum.     # Acute hypoxic res failure  # Sepsis POA  # Suspected Aspiration PNA vs B/l bacterial vs Post cardioversion ALI (less likely)   # H/O of FELIX on CPAP  - on admission, pt tachypnic, hypoxic, WBC 21.6k  -Cta chest, no PE, with R middle and lower lobe/ L lower lobe consolidations/ bilateral upper lobe patchy peribronchial vascular opacifications  -BiPAP q4hrs, HFNC 60/100 in between   - f/u pan cxs, legionela ug, strep ug, procal, RVP, MRSA  -ID c/s  - start on vanc, ctx, azithro  - solumedrol 60mg IV BID  - upgrade to ICU    #paroxysmal afib  -had FAITH cardioversion in 2019 by   -repeat cardioversion 9/13/22 prior to presentation  -EKG with sinus bradycardia  -c/w amiodarone 200mg BID  - start on hep drip while here     # LL ankle swelling  -r/o dvt  -cta pe negative  -f/u va duplex venous  - ankle xray pending      # HFpPF  - tropx neg   - CTA : cardiomegaly  - NM stress test 5/22 normal  - recent echo: severe concentric LVH, grade 2 diastolic dysfunction, dilation of ascending aorta  - repeat echo pending   - cardio consulted, follows with Dr. Quintero     #CKD 3  - Cr 1.7 similar to recent baseline  - trend bmp    #iron deficiency anemia  - f/u Iron studies   - PO iron    #H pylori positve on EGD +  - EGD /Colon by  on 4/29  - o/p management    #HTN  - benazepril 10mg qd  - restart amlodipine 5mg if htnsive    #Gout  - c/w allopurinol 100mg bid    #prolactinoma  - on cabergoline (every Monday)    #HLD  - c.w lipitor 20mg qHS    #Hypothyroidism  - c/w levothyroxine 175mcg    #overactive bladder  - c/w home toriaz 4mg    dvt ppx  gi ppx  diet NPO for now   activity AAT          For Follow-Up:          Vital Signs Last 24 Hrs  T(C): 36.8 (14 Sep 2022 05:00), Max: 36.8 (14 Sep 2022 02:44)  T(F): 98.2 (14 Sep 2022 05:00), Max: 98.3 (14 Sep 2022 02:44)  HR: 60 (14 Sep 2022 05:00) (60 - 92)  BP: 128/62 (14 Sep 2022 05:00) (122/59 - 161/79)  BP(mean): 85 (13 Sep 2022 23:24) (85 - 85)  RR: 18 (14 Sep 2022 05:00) (18 - 28)  SpO2: 98% (14 Sep 2022 02:44) (86% - 99%)    Parameters below as of 14 Sep 2022 02:44  Patient On (Oxygen Delivery Method): BiPAP/CPAP      I&O's Summary        MEDICATIONS  (STANDING):  allopurinol 100 milliGRAM(s) Oral two times a day  aMIOdarone    Tablet 200 milliGRAM(s) Oral two times a day  atorvastatin 20 milliGRAM(s) Oral at bedtime  cefTRIAXone   IVPB 1000 milliGRAM(s) IV Intermittent every 24 hours  heparin   Injectable 5000 Unit(s) SubCutaneous every 8 hours  heparin  Infusion.  Unit(s)/Hr (17 mL/Hr) IV Continuous <Continuous>  levothyroxine 175 MICROGram(s) Oral daily  lisinopril 10 milliGRAM(s) Oral daily  methylPREDNISolone sodium succinate Injectable 60 milliGRAM(s) IV Push two times a day  metoprolol tartrate 25 milliGRAM(s) Oral two times a day  rivaroxaban 15 milliGRAM(s) Oral with dinner  vancomycin  IVPB 1250 milliGRAM(s) IV Intermittent every 24 hours    MEDICATIONS  (PRN):  acetaminophen     Tablet .. 650 milliGRAM(s) Oral every 6 hours PRN Temp greater or equal to 38C (100.4F), Mild Pain (1 - 3)  aluminum hydroxide/magnesium hydroxide/simethicone Suspension 30 milliLiter(s) Oral every 4 hours PRN Dyspepsia  heparin   Injectable 7500 Unit(s) IV Push every 6 hours PRN For aPTT less than 40  heparin   Injectable 3500 Unit(s) IV Push every 6 hours PRN For aPTT between 40 - 57  melatonin 3 milliGRAM(s) Oral at bedtime PRN Insomnia  ondansetron Injectable 4 milliGRAM(s) IV Push every 8 hours PRN Nausea and/or Vomiting        LABS                                            9.7                   Neurophils% (auto):   88.2   (09-14 @ 09:36):    16.91)-----------(290          Lymphocytes% (auto):  7.0                                           32.1                   Eosinphils% (auto):   0.1      Manual%: Neutrophils x    ; Lymphocytes x    ; Eosinophils x    ; Bands%: x    ; Blasts x                                    142    |  103    |  28                  Calcium: 9.0   / iCa: x      (09-13 @ 19:29)    ----------------------------<  194       Magnesium: 1.9                              3.9     |  24     |  1.7              Phosphorous: x        TPro  6.5    /  Alb  4.1    /  TBili  0.4    /  DBili  x      /  AST  16     /  ALT  13     /  AlkPhos  141    13 Sep 2022 19:29    ( 09-14 @ 08:40 )   PT: 16.80 sec;   INR: 1.47 ratio  aPTT: 38.8 sec

## 2022-09-14 NOTE — CONSULT NOTE ADULT - ASSESSMENT
IMPRESSION:    Acute hypoxic respiratory failure   Possible CAP  Afib POD 1 s/p cardioversion  HO CKD     PLAN:    CNS: Avoid CNS depressants    HEENT: Oral care    PULMONARY:  HOB @ 45 degrees. Alternate NIV and HFNC. Supplemental O2 target Spo2 92-96. Solumedrol 60 IV BID.    CARDIOVASCULAR: Avoid fluid overload. Cardiology follow up.    GI: GI prophylaxis.  Feeding when off NIV.    RENAL:  Follow up lytes.  Correct as needed    INFECTIOUS DISEASE: Follow up cultures. Send urine legionella and strep, check nasal MRSA. ABX vanc , cefepime, azithromycin.    HEMATOLOGICAL:  DVT prophylaxis.    ENDOCRINE:  Follow up FS.  Insulin protocol if needed.    MUSCULOSKELETAL: Bed rest    MICU monitoring.       IMPRESSION:    Acute hypoxic respiratory failure   Possible CAP  Afib POD 1 s/p cardioversion  HO CKD   HO Severe FELIX on APAP     PLAN:    CNS: Avoid CNS depressants    HEENT: Oral care    PULMONARY:  HOB @ 45 degrees. Alternate NIV and HFNC. Supplemental O2 target Spo2 92-96. Solumedrol 60 IV BID.  Low threshold for intubation     CARDIOVASCULAR: Avoid fluid overload. Cardiology follow up. BNP.  CE Repeat ECHO     GI: GI prophylaxis.  Feeding when off NIV.    RENAL:  Follow up lytes.  Correct as needed    INFECTIOUS DISEASE: Follow up cultures. Send urine legionella and strep, check nasal MRSA. Rocephin and DOxy.    HEMATOLOGICAL:  DVT prophylaxis.  Dimer     ENDOCRINE:  Follow up FS.  Insulin protocol if needed.    MUSCULOSKELETAL: Bed rest    MICU monitoring.    DW Daughter over the phone

## 2022-09-14 NOTE — CONSULT NOTE ADULT - SUBJECTIVE AND OBJECTIVE BOX
JEFFERY UGALDE  81y, Female  Allergy: No Known Allergies      CHIEF COMPLAINT: PNA (14 Sep 2022 06:19)      LOS  1d    HPI:  82 yo female with PMHx of Paroxysmal AFib on Xarelto (s/p cardioversion 9/13/22), CHFpEF, CKD 3b, chronic microcytic anemia, FELIX (on CPAP) HTN, DLD, hypothyroidism (history of thyroid cancer s/p resection), hx hip fracture.  Pt presents to ED for difficulty breathing s/p cardioversion. Found to be hypoxic to 80s, placed on bipap.  History goes back to 9/13 AM, where patient had outpatient elective cardioversion with Dr. Quintero. Several hours Post procedure pt went to diner with her sister to eat lunch. While at the diner pt suddenly became dyspneic and experienced rigors. Denied cough, no nausea, vomiting, chest pain, palpitations.   In ED, afebrile. Tachypneic to 28, and hypoxic to 80s.  Labs done showed WBC 21.6, Hb 11.5, Mcv 77.7, INR 1.71, Cr 1.7, GFR 30, BUN 28, , Trop neg, BNP 1425 (similar to baseline); VBG lactate 4.2 (repeat 1.5)  CTA PE was performed which showed Right middle and lower lobe as well as left lower lobe lobar consolidations. Bilateral upper lobe patchy peribronchial vascular opacifications.  In ED Pt given Rocephin and azithromycin. Ativan 0.5 mg IV was also given.    On my exam this AM, pt was already transferred to [facility]. Pt was on bipap max settings since admission. Bipap was removed to conduct history and physical. Pt was found to be tachypneic >30, vitals were checked, saturation found to be in 70s. Pt was then placed back on to bipap. D/w pulmonary fellow #[id number], approved for ICU monitoring.    in the ED,pt's VS T(C): 36.8 (09-14-22 @ 05:00), Max: 36.8 (09-14-22 @ 02:44)  HR: 60 (09-14-22 @ 05:00) (60 - 92)  BP: 128/62 (09-14-22 @ 05:00) (122/59 - 161/79)  RR: 18 (09-14-22 @ 05:00) (18 - 28)  SpO2: 98% (09-14-22 @ 02:44) (86% - 99%)    pt is admitted for workup/management bilateral pneumonia (14 Sep 2022 06:19)      INFECTIOUS DISEASE HISTORY:    PAST MEDICAL & SURGICAL HISTORY:  HTN (hypertension)      High cholesterol      Hypothyroid  s/p thyroid ca surgery      Afib  on xarelto      Sleep apnea      Osteoarthritis      CKD (chronic kidney disease) stage 3, GFR 30-59 ml/min      H/O thyroidectomy      S/P rotator cuff surgery          FAMILY HISTORY  Family history of lung cancer (Mother)    Family history of abdominal aortic aneurysm (AAA) (Father)        SOCIAL HISTORY  Social History:    VITALS:  T(F): 98.2, Max: 98.3 (09-14-22 @ 02:44)  HR: 60  BP: 128/62  RR: 18Vital Signs Last 24 Hrs  T(C): 36.8 (14 Sep 2022 05:00), Max: 36.8 (14 Sep 2022 02:44)  T(F): 98.2 (14 Sep 2022 05:00), Max: 98.3 (14 Sep 2022 02:44)  HR: 60 (14 Sep 2022 05:00) (60 - 92)  BP: 128/62 (14 Sep 2022 05:00) (122/59 - 161/79)  BP(mean): 85 (13 Sep 2022 23:24) (85 - 85)  RR: 18 (14 Sep 2022 05:00) (18 - 28)  SpO2: 98% (14 Sep 2022 02:44) (86% - 99%)    Parameters below as of 14 Sep 2022 02:44  Patient On (Oxygen Delivery Method): BiPAP/CPAP    PHYSICAL EXAM:  Gen: NAD, resting in bed  HEENT: Normocephalic, atraumatic  CV: Regular rate & regular rhythm  Lungs: decreased BS at bases, no fremitus  Abdomen: Soft, BS present  Ext: Warm, well perfused  Neuro: non focal, awake  Skin: no rash, no erythema  Lines: no phlebitis    TESTS & MEASUREMENTS:                        9.7    16.91 )-----------( 290      ( 14 Sep 2022 09:36 )             32.1     09-14    144  |  107  |  35<H>  ----------------------------<  132<H>  3.8   |  25  |  1.6<H>    Ca    8.1<L>      14 Sep 2022 09:36  Mg     2.1     09-14    TPro  5.4<L>  /  Alb  3.4<L>  /  TBili  0.5  /  DBili  x   /  AST  12  /  ALT  10  /  AlkPhos  106  09-14      LIVER FUNCTIONS - ( 14 Sep 2022 09:36 )  Alb: 3.4 g/dL / Pro: 5.4 g/dL / ALK PHOS: 106 U/L / ALT: 10 U/L / AST: 12 U/L / GGT: x                 Blood Gas Venous - Lactate: 1.50 mmol/L (09-13-22 @ 23:59)  Blood Gas Venous - Lactate: 4.20 mmol/L (09-13-22 @ 19:36)      INFECTIOUS DISEASES TESTING  COVID-19 PCR: [facility] (09-13-22 @ 21:35)  [facility] (05-20-22 @ 10:00)      RADIOLOGY & ADDITIONAL TESTS:  I have personally reviewed the last Chest xray  CXR      CT  CT Angio Chest PE Protocol w/ IV Cont:   ACC: [id number] EXAM:  CT ANGIO CHEST PULM [facility]                          PROCEDURE DATE:  09/13/2022          INTERPRETATION:  CLINICAL HISTORY: Shortness of breath, abnormal chest   x-ray and recent catheter ablation.    TECHNIQUE: CTA of the thorax was performed after administration of   contrast per the PE protocol with 65 cc of Omnipaque 350 intravenous   contrast. Sagittal, coronal, and MIP reformats were performed.    COMPARISON: None.      FINDINGS:  PULMONARY ARTERIES: No pulmonary emboli.    LUNGS, PLEURA, AND AIRWAYS: Central airway patent. Right middle and lower   lobe as well as left lower lobe lobar consolidations. Bilateral upper   lobe patchy peribronchial vascular opacifications.    HEART/GREAT VESSELS: Cardiomegaly. Abnormal dilation 4.5 cm main   pulmonary artery, compatible with pulmonary arterial hypertension. No   pericardial effusion. Coronary and aortic artery calcifications.    MEDIASTINUM/LYMPH NODES: No enlarged mediastinal, hilar, or axillary   lymph nodes.Visualized portion of the thyroid gland is unremarkable.    UPPER ABDOMEN: The visualized portion of the upper abdomen shows no focal   abnormality.    BONES AND SOFT TISSUES: Severe degenerative changes of the spine with   ossification of the anterior longitudinal ligament.      IMPRESSION:    1. Right and middle lower lobe, and left lower lobe consolidations,   compatible with pneumonia and/or pulmonary edema in the appropriate   clinical setting.    2. No pulmonary embolism.    3. Cardiomegalywith evidence of pulmonary arterial hypertension.    --- End of Report ---          STEPHANIA MARSH MD; Resident Radiologist  This document has been electronically signed.  TYREE DAVIS MD; Attending Radiologist  This document has been electronically signed. Sep 14 2022 12:00AM (09-13-22 @ 23:25)      CARDIOLOGY TESTING  12 Lead ECG:   Ventricular Rate 70 BPM    Atrial Rate 70 BPM    P-R Interval 260 ms    QRS Duration 114 ms    Q-T Interval 396 ms    QTC Calculation(Bazett) 427 ms    P Axis 46 degrees    R Axis 253 degrees    T Axis 90 degrees    Diagnosis Line Sinus rhythm [id number] degree A-V block with Premature supraventricular  complexes  Incomplete right bundle branch block LAHB  Septal infarct , age undetermined    Abnormal ECG    Confirmed by KAVITHA PHILLIPS MD ([id number]) on 9/14/2022 7:24:56 AM (09-13-22 @ 19:48)  12 Lead ECG:   Ventricular Rate 91 BPM    Atrial Rate 91 BPM    P-R Interval 226 ms    QRS Duration 128 ms    Q-T Interval 376 ms    QTC Calculation(Bazett) 462 ms    P Axis 61 degrees    R Axis -80 degrees    T Axis 93 degrees    Diagnosis Line Sinus rhythm [id number] degree A-V block  Left axis deviation  Non-specific intra-ventricular conduction block  Minimal voltage criteria for LVH, may be normal variant ( [place] product )  Cannot rule out Septal infarct , age undetermined  T wave abnormality, consider lateral ischemia  Abnormal ECG    Confirmed by KAVITHA PHILLIPS MD ([id number]) on 9/14/2022 7:26:23 AM (09-13-22 @ 19:19)      MEDICATIONS  allopurinol 100 Oral two times a day  aMIOdarone    Tablet 200 Oral two times a day  atorvastatin 20 Oral at bedtime  cefTRIAXone   IVPB 1000 IV Intermittent every 24 hours  heparin  Infusion.  IV Continuous <Continuous>  levothyroxine 175 Oral daily  lisinopril 10 Oral daily  methylPREDNISolone sodium succinate Injectable 60 IV Push two times a day  metoprolol tartrate 25 Oral two times a day  vancomycin  IVPB 1250 IV Intermittent every 24 hours      Weight  Weight (kg): 94.2 (09-14-22 @ 08:41)    ANTIBIOTICS:  cefTRIAXone   IVPB 1000 milliGRAM(s) IV Intermittent every 24 hours  vancomycin  IVPB 1250 milliGRAM(s) IV Intermittent every 24 hours      ALLERGIES:  No Known Allergies   JEFFERY UGALDE  81y, Female  Allergy: No Known Allergies      CHIEF COMPLAINT: PNA (14 Sep 2022 06:19)      LOS  1d    HPI:  82 yo female with PMHx of Paroxysmal AFib on Xarelto (s/p cardioversion 9/13/22), CHFpEF, CKD 3b, chronic microcytic anemia, FELIX (on CPAP) HTN, DLD, hypothyroidism (history of thyroid cancer s/p resection), hx hip fracture.  Pt presents to ED for difficulty breathing s/p cardioversion. Found to be hypoxic to 80s, placed on bipap.  History goes back to 9/13 AM, where patient had outpatient elective cardioversion with Dr. Quintero. Several hours Post procedure pt went to diner with her sister to eat lunch. While at the diner pt suddenly became dyspneic and experienced rigors. Denied cough, no nausea, vomiting, chest pain, palpitations.   In ED, afebrile. Tachypneic to 28, and hypoxic to 80s.  Labs done showed WBC 21.6, Hb 11.5, Mcv 77.7, INR 1.71, Cr 1.7, GFR 30, BUN 28, , Trop neg, BNP 1425 (similar to baseline); VBG lactate 4.2 (repeat 1.5)  CTA PE was performed which showed Right middle and lower lobe as well as left lower lobe lobar consolidations. Bilateral upper lobe patchy peribronchial vascular opacifications.  In ED Pt given Rocephin and azithromycin. Ativan 0.5 mg IV was also given.    On my exam this AM, pt was already transferred to [facility]. Pt was on bipap max settings since admission. Bipap was removed to conduct history and physical. Pt was found to be tachypneic >30, vitals were checked, saturation found to be in 70s. Pt was then placed back on to bipap. D/w pulmonary fellow #[id number], approved for ICU monitoring.    in the ED,pt's VS T(C): 36.8 (09-14-22 @ 05:00), Max: 36.8 (09-14-22 @ 02:44)  HR: 60 (09-14-22 @ 05:00) (60 - 92)  BP: 128/62 (09-14-22 @ 05:00) (122/59 - 161/79)  RR: 18 (09-14-22 @ 05:00) (18 - 28)  SpO2: 98% (09-14-22 @ 02:44) (86% - 99%)    pt is admitted for workup/management bilateral pneumonia (14 Sep 2022 06:19)      INFECTIOUS DISEASE HISTORY:    PAST MEDICAL & SURGICAL HISTORY:  HTN (hypertension)      High cholesterol      Hypothyroid  s/p thyroid ca surgery      Afib  on xarelto      Sleep apnea      Osteoarthritis      CKD (chronic kidney disease) stage 3, GFR 30-59 ml/min      H/O thyroidectomy      S/P rotator cuff surgery          FAMILY HISTORY  Family history of lung cancer (Mother)    Family history of abdominal aortic aneurysm (AAA) (Father)        SOCIAL HISTORY  Social History:    VITALS:  T(F): 98.2, Max: 98.3 (09-14-22 @ 02:44)  HR: 60  BP: 128/62  RR: 18Vital Signs Last 24 Hrs  T(C): 36.8 (14 Sep 2022 05:00), Max: 36.8 (14 Sep 2022 02:44)  T(F): 98.2 (14 Sep 2022 05:00), Max: 98.3 (14 Sep 2022 02:44)  HR: 60 (14 Sep 2022 05:00) (60 - 92)  BP: 128/62 (14 Sep 2022 05:00) (122/59 - 161/79)  BP(mean): 85 (13 Sep 2022 23:24) (85 - 85)  RR: 18 (14 Sep 2022 05:00) (18 - 28)  SpO2: 98% (14 Sep 2022 02:44) (86% - 99%)    Parameters below as of 14 Sep 2022 02:44  Patient On (Oxygen Delivery Method): BiPAP/CPAP    PHYSICAL EXAM:  Gen: NAD, resting in bed  HEENT: Normocephalic, atraumatic  CV: Regular rate & regular rhythm  Lungs: decreased BS at bases, no fremitus  Abdomen: Soft, BS present  Ext: Warm, well perfused; + L ankle swelling and tenderness  Neuro: non focal, awake  Skin: no rash, no erythema  Lines: no phlebitis    TESTS & MEASUREMENTS:                        9.7    16.91 )-----------( 290      ( 14 Sep 2022 09:36 )             32.1     09-14    144  |  107  |  35<H>  ----------------------------<  132<H>  3.8   |  25  |  1.6<H>    Ca    8.1<L>      14 Sep 2022 09:36  Mg     2.1     09-14    TPro  5.4<L>  /  Alb  3.4<L>  /  TBili  0.5  /  DBili  x   /  AST  12  /  ALT  10  /  AlkPhos  106  09-14      LIVER FUNCTIONS - ( 14 Sep 2022 09:36 )  Alb: 3.4 g/dL / Pro: 5.4 g/dL / ALK PHOS: 106 U/L / ALT: 10 U/L / AST: 12 U/L / GGT: x                 Blood Gas Venous - Lactate: 1.50 mmol/L (09-13-22 @ 23:59)  Blood Gas Venous - Lactate: 4.20 mmol/L (09-13-22 @ 19:36)      INFECTIOUS DISEASES TESTING  COVID-19 PCR: [facility] (09-13-22 @ 21:35)  COVID-19 PCR: [facility] (05-20-22 @ 10:00)      RADIOLOGY & ADDITIONAL TESTS:  I have personally reviewed the last Chest xray  CXR      CT  CT Angio Chest PE Protocol w/ IV Cont:   ACC: [id number] EXAM:  CT ANGIO CHEST PULM [facility]                          PROCEDURE DATE:  09/13/2022          INTERPRETATION:  CLINICAL HISTORY: Shortness of breath, abnormal chest   x-ray and recent catheter ablation.    TECHNIQUE: CTA of the thorax was performed after administration of   contrast per the PE protocol with 65 cc of Omnipaque 350 intravenous   contrast. Sagittal, coronal, and MIP reformats were performed.    COMPARISON: None.      FINDINGS:  PULMONARY ARTERIES: No pulmonary emboli.    LUNGS, PLEURA, AND AIRWAYS: Central airway patent. Right middle and lower   lobe as well as left lower lobe lobar consolidations. Bilateral upper   lobe patchy peribronchial vascular opacifications.    HEART/GREAT VESSELS: Cardiomegaly. Abnormal dilation 4.5 cm main   pulmonary artery, compatible with pulmonary arterial hypertension. No   pericardial effusion. Coronary and aortic artery calcifications.    MEDIASTINUM/LYMPH NODES: No enlarged mediastinal, hilar, or axillary   lymph nodes.Visualized portion of the thyroid gland is unremarkable.    UPPER ABDOMEN: The visualized portion of the upper abdomen shows no focal   abnormality.    BONES AND SOFT TISSUES: Severe degenerative changes of the spine with   ossification of the anterior longitudinal ligament.      IMPRESSION:    1. Right and middle lower lobe, and left lower lobe consolidations,   compatible with pneumonia and/or pulmonary edema in the appropriate   clinical setting.    2. No pulmonary embolism.    3. Cardiomegalywith evidence of pulmonary arterial hypertension.    --- End of Report ---          STEPHANIA MARSH MD; Resident Radiologist  This document has been electronically signed.  TYREE DAVIS MD; Attending Radiologist  This document has been electronically signed. Sep 14 2022 12:00AM (09-13-22 @ 23:25)      CARDIOLOGY TESTING  12 Lead ECG:   Ventricular Rate 70 BPM    Atrial Rate 70 BPM    P-R Interval 260 ms    QRS Duration 114 ms    Q-T Interval 396 ms    QTC Calculation(Bazett) 427 ms    P Axis 46 degrees    R Axis 253 degrees    T Axis 90 degrees    Diagnosis Line Sinus rhythm [id number] degree A-V block with Premature supraventricular  complexes  Incomplete right bundle branch block LAHB  Septal infarct , age undetermined    Abnormal ECG    Confirmed by KAVITHA PHILLIPS MD ([id number]) on 9/14/2022 7:24:56 AM (09-13-22 @ 19:48)  12 Lead ECG:   Ventricular Rate 91 BPM    Atrial Rate 91 BPM    P-R Interval 226 ms    QRS Duration 128 ms    Q-T Interval 376 ms    QTC Calculation(Bazett) 462 ms    P Axis 61 degrees    R Axis -80 degrees    T Axis 93 degrees    Diagnosis Line Sinus rhythm [id number] degree A-V block  Left axis deviation  Non-specific intra-ventricular conduction block  Minimal voltage criteria for LVH, may be normal variant ( [place] product )  Cannot rule out Septal infarct , age undetermined  T wave abnormality, consider lateral ischemia  Abnormal ECG    Confirmed by KAVITHA PHILLIPS MD ([id number]) on 9/14/2022 7:26:23 AM (09-13-22 @ 19:19)      MEDICATIONS  allopurinol 100 Oral two times a day  aMIOdarone    Tablet 200 Oral two times a day  atorvastatin 20 Oral at bedtime  cefTRIAXone   IVPB 1000 IV Intermittent every 24 hours  heparin  Infusion.  IV Continuous <Continuous>  levothyroxine 175 Oral daily  lisinopril 10 Oral daily  methylPREDNISolone sodium succinate Injectable 60 IV Push two times a day  metoprolol tartrate 25 Oral two times a day  vancomycin  IVPB 1250 IV Intermittent every 24 hours      Weight  Weight (kg): 94.2 (09-14-22 @ 08:41)    ANTIBIOTICS:  cefTRIAXone   IVPB 1000 milliGRAM(s) IV Intermittent every 24 hours  vancomycin  IVPB 1250 milliGRAM(s) IV Intermittent every 24 hours      ALLERGIES:  No Known Allergies

## 2022-09-14 NOTE — CONSULT NOTE ADULT - ATTENDING COMMENTS
IMPRESSION:    Acute hypoxic respiratory failure   Possible CAP  Afib POD 1 s/p cardioversion  HO CKD \    Plan as outlined above
History as above    81F with PMHx of paroxysmal afib (on Xarelto; s/p cardioversion 9/13), CHFpEF, CKD 3b, chronic microcytic anemia, FELIX (on CPAP, ) HTN, DLD, hypothyroidism (history of thyroid cancer s/p resection), gout, and hx hip fracture presents for difficulty breathing, rigors, and hypoxia after recent cardioversion.    #Acute Hypoxic Respiratory Failure  #Sepsis on admission (WBC>12, Tachypnea)   #Multifocal Pneuminia  - CT Angio Chest PE Protocol w/ IV Cont (09.13.22 @ 23:25):  Right and middle lower lobe, and left lower lobe consolidations,  compatible with pneumonia and/or pulmonary edema in the appropriate  clinical setting.    #Atrial Fibrillation  #CKD Stage III    Recommendations  - no clear Pseudomonas risk factors - favor Ceftriaxone 2g daily   - check MRSA nares   - hold further vancomycin dosing -- please check level tomorrow AM   - trend WBC  - O2 supplement per primary     Please call or message on Microsoft Teams if with any questions.  Spectra [phone]

## 2022-09-14 NOTE — CONSULT NOTE ADULT - SUBJECTIVE AND OBJECTIVE BOX
Patient is a 81y old  Female who presents with a chief complaint of PNA (14 Sep 2022 10:29)      HPI:  80 yo female with PMHx of Paroxysmal AFib on Xarelto (s/p cardioversion 22), CHFpEF, CKD 3b, chronic microcytic anemia, FELIX (on CPAP) HTN, DLD, hypothyroidism (history of thyroid cancer s/p resection), hx hip fracture.  Pt presents to ED for difficulty breathing s/p cardioversion. Found to be hypoxic to 80s, placed on bipap.  [facility]  [facility], where patient had outpatient elective cardioversion with Dr. Quintero. Several hours Post procedure pt went to diner with her sister to eat lunch. While at the diner pt suddenly became dyspneic and experienced rigors. Denied cough, no nausea, vomiting, chest pain, palpitations.   In ED, afebrile. Tachypneic to 28, and hypoxic to 80s.  Labs done showed WBC 21.6, Hb 11.5, Mcv 77.7, INR 1.71, Cr 1.7, GFR 30, BUN 28, , Trop neg, BNP 1425 (similar to baseline); VBG lactate 4.2 (repeat 1.5)  CTA PE was performed which showed Right middle and lower lobe as well as left lower lobe lobar consolidations. Bilateral upper lobe patchy peribronchial vascular opacifications.  In ED Pt given Rocephin and azithromycin. Ativan 0.5 mg IV was also given.    On my exam this AM, pt was already transferred to [facility]. Pt was on bipap max settings since admission. Bipap was removed to conduct history and physical. Pt was found to be tachypneic >30, vitals were checked, saturation found to be in 70s. Pt was then placed back on to bipap. D/w pulmonary fellow #[id number], approved for ICU monitoring.    in the ED,pt's VS T(C): 36.8 (22 @ 05:00), Max: 36.8 (22 @ 02:44)  HR: 60 (22 @ 05:00) (60 - 92)  BP: 128/62 (22 @ 05:00) (122/59 - 161/79)  RR: 18 (22 @ 05:00) (18 - 28)  SpO2: 98% (22 @ 02:44) (86% - 99%)    pt is admitted for workup/management bilateral pneumonia (14 Sep 2022 06:19)      PAST MEDICAL & SURGICAL HISTORY:  HTN (hypertension)      High cholesterol      Hypothyroid  s/p thyroid ca surgery      Afib  on xarelto      Sleep apnea      Osteoarthritis      CKD (chronic kidney disease) stage 3, GFR 30-59 ml/min      H/O thyroidectomy      S/P rotator cuff surgery          SOCIAL HX:   Smoking                         ETOH                            Other    FAMILY HISTORY:  Family history of lung cancer (Mother)    Family history of abdominal aortic aneurysm (AAA) (Father)    :  No known cardiovacular family hisotry     Review Of Systems:     All ROS are negative except per HPI       Allergies    No Known Allergies    Intolerances          PHYSICAL EXAM    ICU Vital Signs Last 24 Hrs  T(C): 36.8 (14 Sep 2022 05:00), Max: 36.8 (14 Sep 2022 02:44)  T(F): 98.2 (14 Sep 2022 05:00), Max: 98.3 (14 Sep 2022 02:44)  HR: 60 (14 Sep 2022 05:00) (60 - 92)  BP: 128/62 (14 Sep 2022 05:00) (122/59 - 161/79)  BP(mean): 85 (13 Sep 2022 23:24) (85 - 85)  ABP: --  ABP(mean): --  RR: 18 (14 Sep 2022 05:00) (18 - 28)  SpO2: 98% (14 Sep 2022 02:44) (86% - 99%)    O2 Parameters below as of 14 Sep 2022 02:44  Patient On (Oxygen Delivery Method): BiPAP/CPAP            CONSTITUTIONAL:  Well nourished.  NAD    ENT:   Airway patent,   Mouth with normal mucosa.   No thrush    EYES:   pupils equal,   round and reactive to light.    CARDIAC:   Normal rate,   Regular rhythm.    Heart sounds S1, S2.   No edema      Vascular:   normal systolic impulse  no bruits    RESPIRATORY:   bilateral rhonchi  No wheezing   Normal chest expansion  No use of accessory muscles    GASTROINTESTINAL:  Abdomen soft   Non-tender,   No guarding,   + BS    GENITOURINARY  normal genitalia for sex  no edema    MUSCULOSKELETAL:   Range of motion is not limited,  Nno clubbing, cyanosis    NEUROLOGICAL:   Alert and oriented   No motor or sensory deficits.  Pertinent DTRs normal    SKIN:   Skin normal color for race,   Warm and dry  No evidence of rash.    PSYCHIATRIC:   Normal mood and affect.   No apparent risk to self or others.    HEME LYMPH:   No cervical  lymphadenopathy.  No inguinal lymphadenopathy      22 @ 07:01  -  22 @ 12:29  --------------------------------------------------------  IN:  Total IN: 0 mL    OUT:    Indwelling Catheter - Urethral (mL): 1000 mL  Total OUT: 1000 mL    Total NET: -1000 mL      LABS:                          9.7    16.91 )-----------( 290      ( 14 Sep 2022 09:36 )             32.1                                                   144  |  107  |  35<H>  ----------------------------<  132<H>  3.8   |  25  |  1.6<H>    Ca    8.1<L>      14 Sep 2022 09:36  Mg     2.1     09-14c    TPro  5.4<L>  /  Alb  3.4<L>  /  TBili  0.5  /  DBili  x   /  AST  12  /  ALT  10  /  AlkPhos  106  14      PT/INR - ( 14 Sep 2022 08:40 )   PT: 16.80 sec;   INR: 1.47 ratio         PTT - ( 14 Sep 2022 08:40 )  PTT:38.8 sec                                       Urinalysis Basic - ( 14 Sep 2022 10:00 )    Color: Colorless / Appearance: Clear / S.010 / pH: x  Gluc: x / Ketone: Negative  / Bili: Negative / Urobili: <2 mg/dL   Blood: x / Protein: Negative / Nitrite: Negative   Leuk Esterase: Negative / RBC: x / WBC x   Sq Epi: x / Non Sq Epi: x / Bacteria: x        CARDIAC MARKERS ( 13 Sep 2022 19:29 )  x     / <0.01 ng/mL / 38 U/L / x     / 1.1 ng/mL                                            LIVER FUNCTIONS - ( 14 Sep 2022 09:36 )  Alb: 3.4 g/dL / Pro: 5.4 g/dL / ALK PHOS: 106 U/L / ALT: 10 U/L / AST: 12 U/L / GGT: x                                                                                                                                       X-Rays reviewed:                                                                                    ECHO    CXR interpreted by me:    MEDICATIONS  (STANDING):  allopurinol 100 milliGRAM(s) Oral two times a day  aMIOdarone    Tablet 200 milliGRAM(s) Oral two times a day  atorvastatin 20 milliGRAM(s) Oral at bedtime  cefTRIAXone   IVPB 1000 milliGRAM(s) IV Intermittent every 24 hours  heparin  Infusion.  Unit(s)/Hr (17 mL/Hr) IV Continuous <Continuous>  levothyroxine 175 MICROGram(s) Oral daily  lisinopril 10 milliGRAM(s) Oral daily  methylPREDNISolone sodium succinate Injectable 60 milliGRAM(s) IV Push two times a day  metoprolol tartrate 25 milliGRAM(s) Oral two times a day  vancomycin  IVPB 1250 milliGRAM(s) IV Intermittent every 24 hours    MEDICATIONS  (PRN):  acetaminophen     Tablet .. 650 milliGRAM(s) Oral every 6 hours PRN Temp greater or equal to 38C (100.4F), Mild Pain (1 - 3)  aluminum hydroxide/magnesium hydroxide/simethicone Suspension 30 milliLiter(s) Oral every 4 hours PRN Dyspepsia  heparin   Injectable 7500 Unit(s) IV Push every 6 hours PRN For aPTT less than 40  heparin   Injectable 3500 Unit(s) IV Push every 6 hours PRN For aPTT between 40 - 57  melatonin 3 milliGRAM(s) Oral at bedtime PRN Insomnia  ondansetron Injectable 4 milliGRAM(s) IV Push every 8 hours PRN Nausea and/or Vomiting         Patient is a 81y old  Female who presents with a chief complaint of PNA (14 Sep 2022 10:29)      HPI:  80 yo female with PMHx of Paroxysmal AFib on Xarelto (s/p cardioversion 22), CHFpEF, CKD 3b, chronic microcytic anemia, FELIX (on CPAP) HTN, DLD, hypothyroidism (history of thyroid cancer s/p resection), hx hip fracture.  Pt presents to ED for difficulty breathing s/p cardioversion. Found to be hypoxic to 80s, placed on bipap.  [facility] to  [facility], where patient had outpatient elective cardioversion with Dr. Quintero. Several hours Post procedure pt went to diner with her sister to eat lunch. While at the diner pt suddenly became dyspneic and experienced rigors. Denied cough, no nausea, vomiting, chest pain, palpitations.   In ED, afebrile. Tachypneic to 28, and hypoxic to 80s.  Labs done showed WBC 21.6, Hb 11.5, Mcv 77.7, INR 1.71, Cr 1.7, GFR 30, BUN 28, , Trop neg, BNP 1425 (similar to baseline); VBG lactate 4.2 (repeat 1.5)  CTA PE was performed which showed Right middle and lower lobe as well as left lower lobe lobar consolidations. Bilateral upper lobe patchy peribronchial vascular opacifications.  In ED Pt given Rocephin and azithromycin. Ativan 0.5 mg IV was also given.    On my exam this AM, pt was already transferred to [facility]. Pt was on bipap max settings since admission. Bipap was removed to conduct history and physical. Pt was found to be tachypneic >30, vitals were checked, saturation found to be in 70s. Pt was then placed back on to bipap. D/w pulmonary fellow #[id number], approved for ICU monitoring.    in the ED,pt's VS T(C): 36.8 (22 @ 05:00), Max: 36.8 (22 @ 02:44)  HR: 60 (22 @ 05:00) (60 - 92)  BP: 128/62 (22 @ 05:00) (122/59 - 161/79)  RR: 18 (22 @ 05:00) (18 - 28)  SpO2: 98% (22 @ 02:44) (86% - 99%)    pt is admitted for workup/management bilateral pneumonia (14 Sep 2022 06:19)      PAST MEDICAL & SURGICAL HISTORY:  HTN (hypertension)      High cholesterol      Hypothyroid  s/p thyroid ca surgery      Afib  on xarelto      Sleep apnea      Osteoarthritis      CKD (chronic kidney disease) stage 3, GFR 30-59 ml/min      H/O thyroidectomy      S/P rotator cuff surgery          SOCIAL HX:   Smoking                         ETOH                            Other    FAMILY HISTORY:  Family history of lung cancer (Mother)    Family history of abdominal aortic aneurysm (AAA) (Father)    :  No known cardiovacular family hisotry     Review Of Systems:     All ROS are negative except per HPI       Allergies    No Known Allergies    Intolerances          PHYSICAL EXAM    ICU Vital Signs Last 24 Hrs  T(C): 36.8 (14 Sep 2022 05:00), Max: 36.8 (14 Sep 2022 02:44)  T(F): 98.2 (14 Sep 2022 05:00), Max: 98.3 (14 Sep 2022 02:44)  HR: 60 (14 Sep 2022 05:00) (60 - 92)  BP: 128/62 (14 Sep 2022 05:00) (122/59 - 161/79)  BP(mean): 85 (13 Sep 2022 23:24) (85 - 85)  ABP: --  ABP(mean): --  RR: 18 (14 Sep 2022 05:00) (18 - 28)  SpO2: 98% (14 Sep 2022 02:44) (86% - 99%)    O2 Parameters below as of 14 Sep 2022 02:44  Patient On (Oxygen Delivery Method): BiPAP/CPAP            CONSTITUTIONAL:  Well nourished.  NAD    ENT:   Airway patent,   Mouth with normal mucosa.   No thrush    EYES:   pupils equal,   round and reactive to light.    CARDIAC:   Normal rate,   Regular rhythm.    Heart sounds S1, S2.   No edema    stolic impulse  no bruits    RESPIRATORY:   bilateral rhonchi  No wheezing   Normal chest expansion  Tachypneic   No use of accessory muscles    GASTROINTESTINAL:  Abdomen soft   Non-tender,   No guarding,   + BS    MUSCULOSKELETAL:   Range of motion is not limited,  Nno clubbing, cyanosis    NEUROLOGICAL:   Alert and oriented   No motor or sensory deficits.  Pertinent DTRs normal    SKIN:   Skin normal color for race,   Warm and dry  No evidence of rash.    PSYCHIATRIC:   Normal mood and affect.   No apparent risk to self or others.        22 @ 07:01  -  22 @ 12:29  --------------------------------------------------------  IN:  Total IN: 0 mL    OUT:    Indwelling Catheter - Urethral (mL): 1000 mL  Total OUT: 1000 mL    Total NET: -1000 mL      LABS:                          9.7    16.91 )-----------( 290      ( 14 Sep 2022 09:36 )             32.1                                                   144  |  107  |  35<H>  ----------------------------<  132<H>  3.8   |  25  |  1.6<H>    Ca    8.1<L>      14 Sep 2022 09:36  Mg     2.1     09-14c    TPro  5.4<L>  /  Alb  3.4<L>  /  TBili  0.5  /  DBili  x   /  AST  12  /  ALT  10  /  AlkPhos  106  -14      PT/INR - ( 14 Sep 2022 08:40 )   PT: 16.80 sec;   INR: 1.47 ratio         PTT - ( 14 Sep 2022 08:40 )  PTT:38.8 sec                                       Urinalysis Basic - ( 14 Sep 2022 10:00 )    Color: Colorless / Appearance: Clear / S.010 / pH: x  Gluc: x / Ketone: Negative  / Bili: Negative / Urobili: <2 mg/dL   Blood: x / Protein: Negative / Nitrite: Negative   Leuk Esterase: Negative / RBC: x / WBC x   Sq Epi: x / Non Sq Epi: x / Bacteria: x        CARDIAC MARKERS ( 13 Sep 2022 19:29 )  x     / <0.01 ng/mL / 38 U/L / x     / 1.1 ng/mL                                            LIVER FUNCTIONS - ( 14 Sep 2022 09:36 )  Alb: 3.4 g/dL / Pro: 5.4 g/dL / ALK PHOS: 106 U/L / ALT: 10 U/L / AST: 12 U/L / GGT: x                                                                                                                                       X-Rays reviewed:                                                                                    ECHO        MEDICATIONS  (STANDING):  allopurinol 100 milliGRAM(s) Oral two times a day  aMIOdarone    Tablet 200 milliGRAM(s) Oral two times a day  atorvastatin 20 milliGRAM(s) Oral at bedtime  cefTRIAXone   IVPB 1000 milliGRAM(s) IV Intermittent every 24 hours  heparin  Infusion.  Unit(s)/Hr (17 mL/Hr) IV Continuous <Continuous>  levothyroxine 175 MICROGram(s) Oral daily  lisinopril 10 milliGRAM(s) Oral daily  methylPREDNISolone sodium succinate Injectable 60 milliGRAM(s) IV Push two times a day  metoprolol tartrate 25 milliGRAM(s) Oral two times a day  vancomycin  IVPB 1250 milliGRAM(s) IV Intermittent every 24 hours    MEDICATIONS  (PRN):  acetaminophen     Tablet .. 650 milliGRAM(s) Oral every 6 hours PRN Temp greater or equal to 38C (100.4F), Mild Pain (1 - 3)  aluminum hydroxide/magnesium hydroxide/simethicone Suspension 30 milliLiter(s) Oral every 4 hours PRN Dyspepsia  heparin   Injectable 7500 Unit(s) IV Push every 6 hours PRN For aPTT less than 40  heparin   Injectable 3500 Unit(s) IV Push every 6 hours PRN For aPTT between 40 - 57  melatonin 3 milliGRAM(s) Oral at bedtime PRN Insomnia  ondansetron Injectable 4 milliGRAM(s) IV Push every 8 hours PRN Nausea and/or Vomiting

## 2022-09-14 NOTE — H&P ADULT - NSHPLABSRESULTS_GEN_ALL_CORE
LABS: Personally reviewed labs, imaging, and ECG                          11.5   21.36 )-----------( 410      ( 13 Sep 2022 19:29 )             39.3       09-13    142  |  103  |  28<H>  ----------------------------<  194<H>  3.9   |  24  |  1.7<H>    Ca    9.0      13 Sep 2022 19:29  Mg     1.9     09-13    TPro  6.5  /  Alb  4.1  /  TBili  0.4  /  DBili  x   /  AST  16  /  ALT  13  /  AlkPhos  141<H>  09-13       LIVER FUNCTIONS - ( 13 Sep 2022 19:29 )  Alb: 4.1 g/dL / Pro: 6.5 g/dL / ALK PHOS: 141 U/L / ALT: 13 U/L / AST: 16 U/L / GGT: x                        PT/INR - ( 13 Sep 2022 19:29 )   PT: 19.60 sec;   INR: 1.71 ratio         PTT - ( 13 Sep 2022 19:29 )  PTT:34.9 sec    Lactate Trend      CARDIAC MARKERS ( 13 Sep 2022 19:29 )  x     / <0.01 ng/mL / 38 U/L / x     / 1.1 ng/mL        CAPILLARY BLOOD GLUCOSE            RADIOLOGY & ADDITIONAL TESTS:

## 2022-09-14 NOTE — H&P ADULT - NSHPREVIEWOFSYSTEMS_GEN_ALL_CORE
CONSTITUTIONAL: No weakness, fevers   EYES/ENT: No visual changes;  No vertigo or throat pain   NECK: No pain or stiffness  RESPIRATORY: No cough, wheezing, hemoptysis; +dyspnea  CARDIOVASCULAR: No chest pain or palpitations  GASTROINTESTINAL: No abdominal or epigastric pain. No nausea, vomiting, or hematemesis; No diarrhea or constipation. No melena or hematochezia.  GENITOURINARY: +urinary frequency (hx of incontinence); no dysuria  NEUROLOGICAL: No numbness or weakness  SKIN: No itching, rashes

## 2022-09-14 NOTE — H&P ADULT - CONVERSATION DETAILS
d/w daughter  per Liza, all four children in agreement that patient is full code  chest compressions and intubate as necessary

## 2022-09-14 NOTE — ED ADULT NURSE NOTE - CCCP TRG CHIEF CMPLNT
Colitis  LARGE INTESTINE  COPD (chronic obstructive pulmonary disease)    GERD (gastroesophageal reflux disease)    Hiatal hernia    High blood cholesterol    High cholesterol    HTN (hypertension)    Morbid obesity    Numbness and tingling  hands  Obstructive sleep apnea    Osteoarthritis    SOB (shortness of breath) difficulty breathing

## 2022-09-14 NOTE — H&P ADULT - NSHPPHYSICALEXAM_GEN_ALL_CORE
PHYSICAL EXAM:  GENERAL: NAD, AAO x 3  HEAD:  Atraumatic, Normocephalic  EYES: EOMI, conjunctiva and sclera white  NECK: Supple, No JVD  CHEST/LUNG: tachypneic, diffuse obstructed lung sounds some rales  HEART: Regular rate and rhythm; No murmurs;   ABDOMEN: Soft, Nontender, Nondistended; Bowel sounds present; No guarding  EXTREMITIES:  2+ Peripheral Pulses, edema  NEUROLOGY: non-focal

## 2022-09-14 NOTE — CONSULT NOTE ADULT - ASSESSMENT
Pt is a 81F with PMHx of paroxysmal afib (on Xarelto; s/p cardioversion 9/13), HFpEF, HTN, gout presents for difficulty breathing, rigors and hypoxia after recent cardioversion. In the ED, patient afebrile, tachypneic with SpO2 86% on RA. Labs showed leukocytosis (21.6) and VBG lactate 4.2 w/ repeat 1.5. CT PE showed right middle + lower lobe + left lower lobe consolidations, as well as bilateral upper lobe patchy peribronchial vascular opacifications. Patient was given rocephin + azithromycin in the ED and started on BiPAP max settings. Patient denies fever, chills, nausea, vomiting, chest pain, abdominal pain, diarrhea, urinary frequency/pain. Currently receiving vancomycin + ceftriaxone + azithromycin.    IMPRESSION  # B/l PNA (aspiration vs bacterial)  # Sepsis POA    RECOMMENDATIONS  - F/u blood + sputum cultures, legionella + strep urine antigen, RVP, MRSA  - Continue vancomycin + ceftriaxone + azithromycin for broad spectrum coverage  - Please check vancomycin trough 30 min prior to the 4th dose     Spectra [id number]     Pt is a 81F with PMHx of paroxysmal afib (on Xarelto; s/p cardioversion 9/13), HFpEF, HTN, gout presents for difficulty breathing, rigors and hypoxia after recent cardioversion. In the ED, patient afebrile, tachypneic with SpO2 86% on RA. Labs showed leukocytosis (21.6) and VBG lactate 4.2 w/ repeat 1.5. COVID -ve. CT PE showed right middle + lower lobe + left lower lobe consolidations, as well as bilateral upper lobe patchy peribronchial vascular opacifications. Patient was given rocephin + azithromycin in the ED and started on BiPAP max settings. Patient denies fever, chills, nausea, vomiting, chest pain, abdominal pain, diarrhea, urinary frequency/pain. Currently receiving vancomycin + ceftriaxone + azithromycin.    IMPRESSION  # B/l PNA (aspiration vs bacterial)  # Sepsis POA    RECOMMENDATIONS  - F/u blood + sputum cultures, legionella + strep urine antigen, RVP, MRSA  - Continue vancomycin + ceftriaxone + azithromycin for broad spectrum coverage  - Please check vancomycin trough 30 min prior to the 4th dose     Spectra [id number]     Pt is a 81F with PMHx of paroxysmal afib (on Xarelto; s/p cardioversion 9/13), CHFpEF, CKD 3b, chronic microcytic anemia, FELIX (on CPAP, ) HTN, DLD, hypothyroidism (history of thyroid cancer s/p resection), gout, and hx hip fracture presents for difficulty breathing, rigors, and hypoxia after recent cardioversion. In the ED, patient afebrile, tachypneic with SpO2 86% on RA. Labs showed leukocytosis (21.6) and VBG lactate 4.2 w/ repeat 1.5. COVID -ve. CT PE showed right middle + lower lobe + left lower lobe consolidations, as well as bilateral upper lobe patchy peribronchial vascular opacifications. Patient was given rocephin + azithromycin in the ED and started on BiPAP max settings. Patient denies fever, chills, nausea, vomiting, chest pain, abdominal pain, diarrhea, urinary frequency/pain. Currently receiving vancomycin + ceftriaxone + azithromycin.    IMPRESSION  # B/l PNA (aspiration vs bacterial)  # Sepsis POA    RECOMMENDATIONS  - F/u blood + sputum cultures, legionella + strep urine antigen, RVP, MRSA  - Continue ceftriaxone 2g + azithromycin for broad spectrum coverage  - Change vancomycin to linezolid 600mg q12h (start in evening as pt received a dose this morning) i/s/o poor renal function until MRSA result available  - IV route for now until patient off BiPAP    Spectra [id number] Pt is a 81F with PMHx of paroxysmal afib (on Xarelto; s/p cardioversion 9/13), CHFpEF, CKD 3b, chronic microcytic anemia, FELIX (on CPAP, ) HTN, DLD, hypothyroidism (history of thyroid cancer s/p resection), gout, and hx hip fracture presents for difficulty breathing, rigors, and hypoxia after recent cardioversion. In the ED, patient afebrile, tachypneic with SpO2 86% on RA. Labs showed leukocytosis (21.6) and VBG lactate 4.2 w/ repeat 1.5. COVID -ve. CT PE showed right middle + lower lobe + left lower lobe consolidations, as well as bilateral upper lobe patchy peribronchial vascular opacifications. Patient was given rocephin + azithromycin in the ED and started on BiPAP max settings. Patient denies fever, chills, nausea, vomiting, chest pain, abdominal pain, diarrhea, urinary frequency/pain. Currently receiving vancomycin + ceftriaxone + azithromycin.    IMPRESSION  # B/l PNA (aspiration vs bacterial)  # Sepsis POA    RECOMMENDATIONS  - F/u blood + sputum cultures, legionella + strep urine antigen, RVP, MRSA  - Continue ceftriaxone 2g + azithromycin for broad spectrum coverage  - Stop vancomycin as low concern for MRSA; can use linezolid 600mg q12 if clinical deterioration suspicious for untreated MRSA, or positive MRSA nares  - IV route for now until patient off BiPAP    Spectra [id number] Pt is a 81F with PMHx of paroxysmal afib (on Xarelto; s/p cardioversion 9/13), CHFpEF, CKD 3b, chronic microcytic anemia, FELIX (on CPAP, ) HTN, DLD, hypothyroidism (history of thyroid cancer s/p resection), gout, and hx hip fracture presents for difficulty breathing, rigors, and hypoxia after recent cardioversion. In the ED, patient afebrile, tachypneic with SpO2 86% on RA. Labs showed leukocytosis (21.6) and VBG lactate 4.2 w/ repeat 1.5. COVID -ve. CT PE showed right middle + lower lobe + left lower lobe consolidations, as well as bilateral upper lobe patchy peribronchial vascular opacifications. Patient was given rocephin + azithromycin in the ED and started on BiPAP max settings. Patient denies fever, chills, nausea, vomiting, chest pain, abdominal pain, diarrhea, urinary frequency/pain. Currently receiving vancomycin + ceftriaxone + azithromycin.    IMPRESSION  # B/l PNA (aspiration vs bacterial)  # Sepsis POA    RECOMMENDATIONS  - F/u blood + sputum cultures, legionella + strep urine antigen, RVP, MRSA  - Continue ceftriaxone 2g + azithromycin for broad spectrum coverage  - IV route for now until patient off BiPAP    Spectra [id number]

## 2022-09-14 NOTE — H&P ADULT - ASSESSMENT
81 year old female with ho paroxysmal afib s/p cardioversion, CHFpEF, HTN, gout presents shortly after cardioversion for diffiuclty breathing, rigors and hypoxia. Increased WBC. bilateral pna on CT scan. No PE. Pt on bipap. Will require close monitoring and IV abx broad spectrum.     #b/l PNA  #tachypnea  #hypoxia  -no cough, + rigors  -aspiration?   -on bipap, max settings  -hypoxic to 70s off bipap  -Cta PE with R middle and lower lobe/ L lower lobe consolidations/ bilateral upper lobe patchy peribronchial vascular opacifications  -on broad spectrum abx  -zosyn/ linezolid  -ID c/s  -pulmonary c/s    #paroxysmal afib  -had FIATH cardioversion in 2019 by   -repeat cardioversion 9/13/22 prior to presentation  -EKG with sinus bradycardia  - c/w lopressor 50mg BID  - c/w xarelto  - c/w amiodarone 200mg daily    #LE swelling  -r/o dvt  -cta pe negative  -f/u va duplex venous    #acute on chronic heart failure with preserved ejection fraction.  - tropx neg   - CTA : cardiomegaly  - NM stress test 5/22 normal  - echo: severe concentric LVH, grade 2 diastolic dysfunction, dilation of ascending aorta    #KOBI on CKD 3 vs progression to CKD 4  - Cr 1.7 similar to recent baseline  - trend bmp    #iron deficiency anemia  - f/u Iron studies   - PO iron    #H pylori positve on EGD +  - hgb 11.5 improved from recent baseline  - EGD /Colon by  on 4/29    #HTN  - c/w amlodipine 5mg  - benazepril 10mg qd    #Gout  - c/w allopurinol 100mg     #prolactinoma  - on cabergoline (every Monday    #HLD  - c.w lipitor 20mg qHS    #Hypothyroidism  - c/w levothyroxine 175mcg    #overactive bladder  - c/w home toriaz 4mg    dvt ppx  gi ppx  diet NPO for now   activity AAT     81 year old female with ho paroxysmal afib s/p cardioversion, CHFpEF, HTN, gout presents shortly after cardioversion for diffiuclty breathing, rigors and hypoxia. Increased WBC. bilateral pna on CT scan. No PE. Pt on bipap. Will require close monitoring and IV abx broad spectrum.     #b/l PNA  #tachypnea  #hypoxia  -no cough, + rigors  -aspiration?   -on bipap, max settings  -hypoxic to 70s off bipap  -Cta PE with R middle and lower lobe/ L lower lobe consolidations/ bilateral upper lobe patchy peribronchial vascular opacifications  -on broad spectrum abx  -zosyn/ linezolid  -ID c/s  -pulmonary c/s    #paroxysmal afib  -had FAITH cardioversion in 2019 by   -repeat cardioversion 9/13/22 prior to presentation  -EKG with sinus bradycardia  -hold lopressor 50 bid pt bradycardic  -c/w xarelto  -c/w amiodarone 200mg daily    #LE swelling  -r/o dvt  -cta pe negative  -f/u va duplex venous    #chronic heart failure with preserved ejection fraction.  - tropx neg   - CTA : cardiomegaly  - NM stress test 5/22 normal  - echo: severe concentric LVH, grade 2 diastolic dysfunction, dilation of ascending aorta    #KOBI on CKD 3 vs progression to CKD 4  - Cr 1.7 similar to recent baseline  - trend bmp    #iron deficiency anemia  - f/u Iron studies   - PO iron    #H pylori positve on EGD +  - hgb 11.5 improved from recent baseline  - EGD /Colon by  on 4/29    #HTN  - benazepril 10mg qd  - restart amlodipine 5mg if htnsive    #Gout  - c/w allopurinol 100mg bid    #prolactinoma  - on cabergoline (every Monday    #HLD  - c.w lipitor 20mg qHS    #Hypothyroidism  - c/w levothyroxine 175mcg    #overactive bladder  - c/w home toriaz 4mg    dvt ppx  gi ppx  diet NPO for now   activity AAT

## 2022-09-15 LAB
ALBUMIN SERPL ELPH-MCNC: 3.1 G/DL — LOW (ref 3.5–5.2)
ALP SERPL-CCNC: 96 U/L — SIGNIFICANT CHANGE UP (ref 30–115)
ALT FLD-CCNC: 8 U/L — SIGNIFICANT CHANGE UP (ref 0–41)
ANION GAP SERPL CALC-SCNC: 16 MMOL/L — HIGH (ref 7–14)
APTT BLD: 105.5 SEC — CRITICAL HIGH (ref 27–39.2)
APTT BLD: 125.3 SEC — CRITICAL HIGH (ref 27–39.2)
AST SERPL-CCNC: 11 U/L — SIGNIFICANT CHANGE UP (ref 0–41)
BASOPHILS # BLD AUTO: 0.01 K/UL — SIGNIFICANT CHANGE UP (ref 0–0.2)
BASOPHILS NFR BLD AUTO: 0.1 % — SIGNIFICANT CHANGE UP (ref 0–1)
BILIRUB SERPL-MCNC: 0.4 MG/DL — SIGNIFICANT CHANGE UP (ref 0.2–1.2)
BUN SERPL-MCNC: 34 MG/DL — HIGH (ref 10–20)
CALCIUM SERPL-MCNC: 8.4 MG/DL — SIGNIFICANT CHANGE UP (ref 8.4–10.4)
CHLORIDE SERPL-SCNC: 103 MMOL/L — SIGNIFICANT CHANGE UP (ref 98–110)
CO2 SERPL-SCNC: 25 MMOL/L — SIGNIFICANT CHANGE UP (ref 17–32)
CREAT SERPL-MCNC: 1.5 MG/DL — SIGNIFICANT CHANGE UP (ref 0.7–1.5)
EGFR: 35 ML/MIN/1.73M2 — LOW
EOSINOPHIL # BLD AUTO: 0 K/UL — SIGNIFICANT CHANGE UP (ref 0–0.7)
EOSINOPHIL NFR BLD AUTO: 0 % — SIGNIFICANT CHANGE UP (ref 0–8)
GLUCOSE SERPL-MCNC: 152 MG/DL — HIGH (ref 70–99)
HCT VFR BLD CALC: 30.7 % — LOW (ref 37–47)
HGB BLD-MCNC: 9.3 G/DL — LOW (ref 12–16)
IMM GRANULOCYTES NFR BLD AUTO: 0.7 % — HIGH (ref 0.1–0.3)
LEGIONELLA AG UR QL: NEGATIVE — SIGNIFICANT CHANGE UP
LYMPHOCYTES # BLD AUTO: 0.47 K/UL — LOW (ref 1.2–3.4)
LYMPHOCYTES # BLD AUTO: 3.4 % — LOW (ref 20.5–51.1)
MAGNESIUM SERPL-MCNC: 2 MG/DL — SIGNIFICANT CHANGE UP (ref 1.8–2.4)
MCHC RBC-ENTMCNC: 23 PG — LOW (ref 27–31)
MCHC RBC-ENTMCNC: 30.3 G/DL — LOW (ref 32–37)
MCV RBC AUTO: 75.8 FL — LOW (ref 81–99)
MONOCYTES # BLD AUTO: 0.34 K/UL — SIGNIFICANT CHANGE UP (ref 0.1–0.6)
MONOCYTES NFR BLD AUTO: 2.5 % — SIGNIFICANT CHANGE UP (ref 1.7–9.3)
NEUTROPHILS # BLD AUTO: 12.83 K/UL — HIGH (ref 1.4–6.5)
NEUTROPHILS NFR BLD AUTO: 93.3 % — HIGH (ref 42.2–75.2)
NRBC # BLD: 0 /100 WBCS — SIGNIFICANT CHANGE UP (ref 0–0)
PLATELET # BLD AUTO: 256 K/UL — SIGNIFICANT CHANGE UP (ref 130–400)
POTASSIUM SERPL-MCNC: 3.5 MMOL/L — SIGNIFICANT CHANGE UP (ref 3.5–5)
POTASSIUM SERPL-SCNC: 3.5 MMOL/L — SIGNIFICANT CHANGE UP (ref 3.5–5)
PROT SERPL-MCNC: 5.2 G/DL — LOW (ref 6–8)
RAPID RVP RESULT: SIGNIFICANT CHANGE UP
RBC # BLD: 4.05 M/UL — LOW (ref 4.2–5.4)
RBC # FLD: 17.2 % — HIGH (ref 11.5–14.5)
S PNEUM AG UR QL: NEGATIVE — SIGNIFICANT CHANGE UP
SARS-COV-2 RNA SPEC QL NAA+PROBE: SIGNIFICANT CHANGE UP
SODIUM SERPL-SCNC: 144 MMOL/L — SIGNIFICANT CHANGE UP (ref 135–146)
TROPONIN T SERPL-MCNC: <0.01 NG/ML — SIGNIFICANT CHANGE UP
TSH SERPL-MCNC: 0.7 UIU/ML — SIGNIFICANT CHANGE UP (ref 0.27–4.2)
URATE SERPL-MCNC: 6.7 MG/DL — SIGNIFICANT CHANGE UP (ref 2.5–7)
VANCOMYCIN TROUGH SERPL-MCNC: 9.8 UG/ML — SIGNIFICANT CHANGE UP (ref 5–10)
WBC # BLD: 13.74 K/UL — HIGH (ref 4.8–10.8)
WBC # FLD AUTO: 13.74 K/UL — HIGH (ref 4.8–10.8)

## 2022-09-15 PROCEDURE — 99291 CRITICAL CARE FIRST HOUR: CPT

## 2022-09-15 PROCEDURE — 93970 EXTREMITY STUDY: CPT | Mod: 26

## 2022-09-15 PROCEDURE — 93306 TTE W/DOPPLER COMPLETE: CPT | Mod: 26

## 2022-09-15 PROCEDURE — 71045 X-RAY EXAM CHEST 1 VIEW: CPT | Mod: 26

## 2022-09-15 RX ORDER — CEFTRIAXONE 500 MG/1
1000 INJECTION, POWDER, FOR SOLUTION INTRAMUSCULAR; INTRAVENOUS ONCE
Refills: 0 | Status: COMPLETED | OUTPATIENT
Start: 2022-09-15 | End: 2022-09-15

## 2022-09-15 RX ORDER — PANTOPRAZOLE SODIUM 20 MG/1
40 TABLET, DELAYED RELEASE ORAL
Refills: 0 | Status: DISCONTINUED | OUTPATIENT
Start: 2022-09-15 | End: 2022-09-23

## 2022-09-15 RX ORDER — ENOXAPARIN SODIUM 100 MG/ML
90 INJECTION SUBCUTANEOUS
Refills: 0 | Status: DISCONTINUED | OUTPATIENT
Start: 2022-09-15 | End: 2022-09-19

## 2022-09-15 RX ORDER — PANTOPRAZOLE SODIUM 20 MG/1
40 TABLET, DELAYED RELEASE ORAL DAILY
Refills: 0 | Status: DISCONTINUED | OUTPATIENT
Start: 2022-09-15 | End: 2022-09-15

## 2022-09-15 RX ORDER — CEFTRIAXONE 500 MG/1
2000 INJECTION, POWDER, FOR SOLUTION INTRAMUSCULAR; INTRAVENOUS EVERY 24 HOURS
Refills: 0 | Status: DISCONTINUED | OUTPATIENT
Start: 2022-09-16 | End: 2022-09-20

## 2022-09-15 RX ORDER — CHLORHEXIDINE GLUCONATE 213 G/1000ML
1 SOLUTION TOPICAL
Refills: 0 | Status: DISCONTINUED | OUTPATIENT
Start: 2022-09-15 | End: 2022-09-23

## 2022-09-15 RX ADMIN — Medication 25 MILLIGRAM(S): at 05:10

## 2022-09-15 RX ADMIN — Medication 100 MILLIGRAM(S): at 17:11

## 2022-09-15 RX ADMIN — Medication 60 MILLIGRAM(S): at 05:10

## 2022-09-15 RX ADMIN — CHLORHEXIDINE GLUCONATE 1 APPLICATION(S): 213 SOLUTION TOPICAL at 05:11

## 2022-09-15 RX ADMIN — CEFTRIAXONE 100 MILLIGRAM(S): 500 INJECTION, POWDER, FOR SOLUTION INTRAMUSCULAR; INTRAVENOUS at 17:10

## 2022-09-15 RX ADMIN — AMIODARONE HYDROCHLORIDE 200 MILLIGRAM(S): 400 TABLET ORAL at 05:10

## 2022-09-15 RX ADMIN — HEPARIN SODIUM 1500 UNIT(S)/HR: 5000 INJECTION INTRAVENOUS; SUBCUTANEOUS at 01:42

## 2022-09-15 RX ADMIN — HEPARIN SODIUM 1300 UNIT(S)/HR: 5000 INJECTION INTRAVENOUS; SUBCUTANEOUS at 08:45

## 2022-09-15 RX ADMIN — Medication 175 MICROGRAM(S): at 05:10

## 2022-09-15 RX ADMIN — AMIODARONE HYDROCHLORIDE 200 MILLIGRAM(S): 400 TABLET ORAL at 17:11

## 2022-09-15 RX ADMIN — LISINOPRIL 10 MILLIGRAM(S): 2.5 TABLET ORAL at 05:10

## 2022-09-15 RX ADMIN — Medication 110 MILLIGRAM(S): at 05:11

## 2022-09-15 RX ADMIN — PANTOPRAZOLE SODIUM 40 MILLIGRAM(S): 20 TABLET, DELAYED RELEASE ORAL at 09:05

## 2022-09-15 RX ADMIN — ENOXAPARIN SODIUM 90 MILLIGRAM(S): 100 INJECTION SUBCUTANEOUS at 21:11

## 2022-09-15 RX ADMIN — ENOXAPARIN SODIUM 90 MILLIGRAM(S): 100 INJECTION SUBCUTANEOUS at 09:05

## 2022-09-15 RX ADMIN — Medication 100 MILLIGRAM(S): at 05:10

## 2022-09-15 RX ADMIN — CEFTRIAXONE 100 MILLIGRAM(S): 500 INJECTION, POWDER, FOR SOLUTION INTRAMUSCULAR; INTRAVENOUS at 05:10

## 2022-09-15 RX ADMIN — ATORVASTATIN CALCIUM 20 MILLIGRAM(S): 80 TABLET, FILM COATED ORAL at 21:11

## 2022-09-15 NOTE — PROGRESS NOTE ADULT - SUBJECTIVE AND OBJECTIVE BOX
Patient is a 81y old  Female who presents with a chief complaint of PNA (14 Sep 2022 12:27)        Over Night Events:  Feels better.  off pressors.  Refused NIV last night. On HFNCO2 50 liters 60%         ROS:     All ROS are negative except HPI         PHYSICAL EXAM    ICU Vital Signs Last 24 Hrs  T(C): 36.8 (15 Sep 2022 04:00), Max: 36.8 (14 Sep 2022 20:00)  T(F): 98.2 (15 Sep 2022 04:00), Max: 98.2 (14 Sep 2022 20:00)  HR: 56 (15 Sep 2022 07:00) (54 - 84)  BP: 126/64 (15 Sep 2022 07:00) (126/64 - 141/71)  BP(mean): 94 (15 Sep 2022 07:00) (86 - 99)  ABP: --  ABP(mean): --  RR: 39 (15 Sep 2022 07:00) (20 - 43)  SpO2: 99% (15 Sep 2022 07:00) (93% - 100%)    O2 Parameters below as of 14 Sep 2022 20:08  Patient On (Oxygen Delivery Method): nasal cannula, high flow  O2 Flow (L/min): 60  O2 Concentration (%): 80        CONSTITUTIONAL:  Well nourished.  NAD    ENT:   Airway patent,   Mouth with normal mucosa.   No thrush    EYES:   Pupils equal,   Round and reactive to light.    CARDIAC:   Normal rate,   Regular rhythm.    No edema      Vascular:  Normal systolic impulse  No Carotid bruits    RESPIRATORY:   No wheezing  Bilateral crackles   Normal chest expansion  Not tachypneic,  No use of accessory muscles    GASTROINTESTINAL:  Abdomen soft,   Non-tender,   No guarding,   + BS    MUSCULOSKELETAL:   Range of motion is not limited,  No clubbing, cyanosis    NEUROLOGICAL:   Alert and oriented   No motor  deficits.    SKIN:   Skin normal color for race,   Warm and dry and intact.   No evidence of rash.    PSYCHIATRIC:   Normal mood and affect.   No apparent risk to self or others.          22 @ 07:01  -  09-15-22 @ 07:00  --------------------------------------------------------  IN:    Heparin Infusion: 211 mL  Total IN: 211 mL    OUT:    Indwelling Catheter - Urethral (mL): 1705 mL  Total OUT: 1705 mL    Total NET: -1494 mL          LABS:                            9.3    13.74 )-----------( 256      ( 15 Sep 2022 05:45 )             30.7                                               09-15    144  |  103  |  34<H>  ----------------------------<  152<H>  3.5   |  25  |  1.5    Creatinine Trend  BUN 34, Cr 1.5, (09-15-22 @ 05:45)  Creatinine Trend  BUN 35, Cr 1.6, (22 @ 09:36)  Creatinine Trend  BUN 28, Cr 1.7, (22 @ 19:29)      Ca    8.4      15 Sep 2022 05:45  Mg     2.0     09-15    TPro  5.2<L>  /  Alb  3.1<L>  /  TBili  0.4  /  DBili  x   /  AST  11  /  ALT  8   /  AlkPhos  96  09-15      PT/INR - ( 14 Sep 2022 16:11 )   PT: 19.30 sec;   INR: 1.69 ratio         PTT - ( 15 Sep 2022 07:19 )  PTT:105.5 sec                                       Urinalysis Basic - ( 14 Sep 2022 10:00 )    Color: Colorless / Appearance: Clear / S.010 / pH: x  Gluc: x / Ketone: Negative  / Bili: Negative / Urobili: <2 mg/dL   Blood: x / Protein: Negative / Nitrite: Negative   Leuk Esterase: Negative / RBC: x / WBC x   Sq Epi: x / Non Sq Epi: x / Bacteria: x        CARDIAC MARKERS ( 13 Sep 2022 19:29 )  x     / <0.01 ng/mL / 38 U/L / x     / 1.1 ng/mL                                            LIVER FUNCTIONS - ( 15 Sep 2022 05:45 )  Alb: 3.1 g/dL / Pro: 5.2 g/dL / ALK PHOS: 96 U/L / ALT: 8 U/L / AST: 11 U/L / GGT: x                                                                                                                                       MEDICATIONS  (STANDING):  allopurinol 100 milliGRAM(s) Oral two times a day  aMIOdarone    Tablet 200 milliGRAM(s) Oral two times a day  atorvastatin 20 milliGRAM(s) Oral at bedtime  cefTRIAXone   IVPB 1000 milliGRAM(s) IV Intermittent every 24 hours  chlorhexidine 2% Cloths 1 Application(s) Topical <User Schedule>  doxycycline IVPB 100 milliGRAM(s) IV Intermittent every 12 hours  heparin  Infusion.  Unit(s)/Hr (17 mL/Hr) IV Continuous <Continuous>  levothyroxine 175 MICROGram(s) Oral daily  lisinopril 10 milliGRAM(s) Oral daily  melatonin 3 milliGRAM(s) Oral once  methylPREDNISolone sodium succinate Injectable 60 milliGRAM(s) IV Push two times a day  metoprolol tartrate 25 milliGRAM(s) Oral two times a day  pantoprazole  Injectable 40 milliGRAM(s) IV Push daily    MEDICATIONS  (PRN):  acetaminophen     Tablet .. 650 milliGRAM(s) Oral every 6 hours PRN Temp greater or equal to 38C (100.4F), Mild Pain (1 - 3)  aluminum hydroxide/magnesium hydroxide/simethicone Suspension 30 milliLiter(s) Oral every 4 hours PRN Dyspepsia  heparin   Injectable 7500 Unit(s) IV Push every 6 hours PRN For aPTT less than 40  heparin   Injectable 3500 Unit(s) IV Push every 6 hours PRN For aPTT between 40 - 57  melatonin 3 milliGRAM(s) Oral at bedtime PRN Insomnia  ondansetron Injectable 4 milliGRAM(s) IV Push every 8 hours PRN Nausea and/or Vomiting      New X-rays reviewed:                                                                                  ECHO

## 2022-09-15 NOTE — PHARMACOTHERAPY INTERVENTION NOTE - COMMENTS
transition heparin drip to enoxaparin, CrCl ~45, recom 90mg sc q12h, stat dose
recom changing doxycycline 100mg IV q12h to po  -pantoprazole 40mg IV q24h to po
Recommended increasing ceftriaxone from 1g IV daily to 2g IV daily per Dr. Mary's recommendation for severe CAP. D/W Dr. Luciano (ICU Pharmacist), who relayed message to team.

## 2022-09-15 NOTE — PROGRESS NOTE ADULT - SUBJECTIVE AND OBJECTIVE BOX
Patient is a 81y old  Female who presents with a chief complaint of PNA (15 Sep 2022 08:49)      INTERVAL HPI/OVERNIGHT EVENTS:   No overnight events   Afebrile, hemodynamically stable, on High Flow NC    ICU Vital Signs Last 24 Hrs  T(C): 36.1 (15 Sep 2022 08:00), Max: 36.8 (14 Sep 2022 20:00)  T(F): 96.9 (15 Sep 2022 08:00), Max: 98.2 (14 Sep 2022 20:00)  HR: 56 (15 Sep 2022 09:00) (54 - 84)  BP: 134/62 (15 Sep 2022 09:00) (126/64 - 141/71)  BP(mean): 95 (15 Sep 2022 09:00) (86 - 99)  ABP: --  ABP(mean): --  RR: 32 (15 Sep 2022 09:12) (22 - 43)  SpO2: 98% (15 Sep 2022 09:12) (93% - 100%)    O2 Parameters below as of 15 Sep 2022 09:12  Patient On (Oxygen Delivery Method): nasal cannula, high flow  O2 Flow (L/min): 50  O2 Concentration (%): 50      I&O's Summary    14 Sep 2022 07:01  -  15 Sep 2022 07:00  --------------------------------------------------------  IN: 211 mL / OUT: 1770 mL / NET: -1559 mL    15 Sep 2022 07:01  -  15 Sep 2022 14:21  --------------------------------------------------------  IN: 240 mL / OUT: 60 mL / NET: 180 mL          LABS:                        9.3    13.74 )-----------( 256      ( 15 Sep 2022 05:45 )             30.7     09-15    144  |  103  |  34<H>  ----------------------------<  152<H>  3.5   |  25  |  1.5    Ca    8.4      15 Sep 2022 05:45  Mg     2.0     09-15    TPro  5.2<L>  /  Alb  3.1<L>  /  TBili  0.4  /  DBili  x   /  AST  11  /  ALT  8   /  AlkPhos  96  09-15    PT/INR - ( 14 Sep 2022 16:11 )   PT: 19.30 sec;   INR: 1.69 ratio         PTT - ( 15 Sep 2022 07:19 )  PTT:105.5 sec  Urinalysis Basic - ( 14 Sep 2022 10:00 )    Color: Colorless / Appearance: Clear / S.010 / pH: x  Gluc: x / Ketone: Negative  / Bili: Negative / Urobili: <2 mg/dL   Blood: x / Protein: Negative / Nitrite: Negative   Leuk Esterase: Negative / RBC: x / WBC x   Sq Epi: x / Non Sq Epi: x / Bacteria: x      CAPILLARY BLOOD GLUCOSE            RADIOLOGY & ADDITIONAL TESTS:    Consultant(s) Notes Reviewed:  [x ] YES  [ ] NO    MEDICATIONS  (STANDING):  allopurinol 100 milliGRAM(s) Oral two times a day  aMIOdarone    Tablet 200 milliGRAM(s) Oral two times a day  atorvastatin 20 milliGRAM(s) Oral at bedtime  cefTRIAXone   IVPB 1000 milliGRAM(s) IV Intermittent once  chlorhexidine 2% Cloths 1 Application(s) Topical <User Schedule>  doxycycline monohydrate Capsule 100 milliGRAM(s) Oral every 12 hours  enoxaparin Injectable 90 milliGRAM(s) SubCutaneous <User Schedule>  levothyroxine 175 MICROGram(s) Oral daily  lisinopril 10 milliGRAM(s) Oral daily  melatonin 3 milliGRAM(s) Oral once  metoprolol tartrate 25 milliGRAM(s) Oral two times a day  pantoprazole    Tablet 40 milliGRAM(s) Oral before breakfast    MEDICATIONS  (PRN):  acetaminophen     Tablet .. 650 milliGRAM(s) Oral every 6 hours PRN Temp greater or equal to 38C (100.4F), Mild Pain (1 - 3)  aluminum hydroxide/magnesium hydroxide/simethicone Suspension 30 milliLiter(s) Oral every 4 hours PRN Dyspepsia  melatonin 3 milliGRAM(s) Oral at bedtime PRN Insomnia  ondansetron Injectable 4 milliGRAM(s) IV Push every 8 hours PRN Nausea and/or Vomiting      PHYSICAL EXAM:  GENERAL:   HEAD:  Atraumatic, Normocephalic  EYES: EOMI, PERRLA, conjunctiva and sclera clear  NECK: Supple, No JVD, Normal thyroid, no enlarged nodes  NERVOUS SYSTEM:  Alert & Awake.   CHEST/LUNG: B/L good air entry; No rales, rhonchi, or wheezing  HEART: S1S2 normal, no S3, Regular rate and rhythm; No murmurs  ABDOMEN: Soft, Nontender, Nondistended; Bowel sounds present  EXTREMITIES:  2+ Peripheral Pulses, No clubbing, cyanosis, or edema  LYMPH: No lymphadenopathy noted  SKIN: No rashes or lesions    Care Discussed with Consultants/Other Providers [ x] YES  [ ] NO

## 2022-09-15 NOTE — PROGRESS NOTE ADULT - ASSESSMENT
IMPRESSION:    Acute hypoxic respiratory failure   Probable CAP  Afib POD 1 s/p cardioversion  HO CKD   HO Severe FELIX on APAP     PLAN:    CNS: Avoid CNS depressants    HEENT: Oral care    PULMONARY:  HOB @ 45 degrees. Alternate NIV and HFNC. Supplemental O2 target Spo2 92-96.  DC Solumedrol.       CARDIOVASCULAR: Avoid fluid overload. Cardiology follow up. BNP.  Repeat ECHO     GI: GI prophylaxis.  Feeding when off NIV.    RENAL:  Follow up lytes.  Correct as needed    INFECTIOUS DISEASE: Follow up cultures. Send urine legionella and strep,  Nasal MRSA negative. Rocephin and DOxy.    HEMATOLOGICAL:  DVT prophylaxis.  Dimer.  Continue AC.  Switch to LMWH     ENDOCRINE:  Follow up FS.  Insulin protocol if needed.    MUSCULOSKELETAL: Bed rest    OOB to chair     SOFIA Stewart     MICU monitoring.    DW Daughter over the phone

## 2022-09-15 NOTE — PROGRESS NOTE ADULT - ASSESSMENT
Assessment and Plan    HPI: 82 yo female with PMHx of Paroxysmal AFib on Xarelto (s/p cardioversion 9/13/22), CHFpEF, CKD 3b, chronic microcytic anemia, FELIX (on CPAP) HTN, DLD, hypothyroidism (history of thyroid cancer s/p resection), hx hip fracture.  Pt presents to ED for difficulty breathing s/p cardioversion. Found to be hypoxic to 80s, placed on bipap.  History goes back to 9/13 AM, where patient had outpatient elective cardioversion with Dr. Quintero. Several hours Post procedure pt went to diner with her sister to eat lunch. While at the diner pt suddenly became dyspneic and experienced rigors. Denied cough, no nausea, vomiting, chest pain, palpitations.   In ED, afebrile. Tachypneic to 28, and hypoxic to 80s.  Labs done showed WBC 21.6, Hb 11.5, Mcv 77.7, INR 1.71, Cr 1.7, GFR 30, BUN 28, , Trop neg, BNP 1425 (similar to baseline); VBG lactate 4.2 (repeat 1.5)  CTA PE was performed which showed Right middle and lower lobe as well as left lower lobe lobar consolidations. Bilateral upper lobe patchy peribronchial vascular opacifications.  In ED Pt given Rocephin and azithromycin. Ativan 0.5 mg IV was also given.    On my exam this AM, pt was already [facility]. Pt was on bipap max settings since admission. Bipap was removed to conduct history and physical. Pt was found to be tachypneic >30, vitals were checked, saturation found to be in 70s. Pt was then placed back on to bipap. D/w pulmonary fellow #[id number], approved for ICU monitoring.    #Acute hypoxic respiratory failure, Possible CAP  -Wean O2, patient improving on High Flow  -Procal 24, trending down  -d/c solumedrol  -Nasal MRSA negative  -Rocephin & Doxycycline    #Afib POD 1 s/p cardioversion  -F/u Cardio, Dr. Quintero  -Repeat Echo  -F/U cardiac enzymes  -F/U coags    #CKD  -Trend Lytes, Cr  -D/C joaquin    DVT PPX: d/c Heparin, start LMWH  PT: OOB to Chair  Diet: Full diet   Assessment and Plan    HPI: 80 yo female with PMHx of Paroxysmal AFib on Xarelto (s/p cardioversion 9/13/22), CHFpEF, CKD 3b, chronic microcytic anemia, FELIX (on CPAP) HTN, DLD, hypothyroidism (history of thyroid cancer s/p resection), hx hip fracture.  Pt presents to ED for difficulty breathing s/p cardioversion. Found to be hypoxic to 80s, placed on bipap.  History goes back to 9/13 AM, where patient had outpatient elective cardioversion with Dr. Quintero. Several hours Post procedure pt went to diner with her sister to eat lunch. While at the diner pt suddenly became dyspneic and experienced rigors. Denied cough, no nausea, vomiting, chest pain, palpitations.   In ED, afebrile. Tachypneic to 28, and hypoxic to 80s.  Labs done showed WBC 21.6, Hb 11.5, Mcv 77.7, INR 1.71, Cr 1.7, GFR 30, BUN 28, , Trop neg, BNP 1425 (similar to baseline); VBG lactate 4.2 (repeat 1.5)  CTA PE was performed which showed Right middle and lower lobe as well as left lower lobe lobar consolidations. Bilateral upper lobe patchy peribronchial vascular opacifications.  In ED Pt given Rocephin and azithromycin. Ativan 0.5 mg IV was also given.    On my exam this AM, pt was already transferred to [facility]. Pt was on bipap max settings since admission. Bipap was removed to conduct history and physical. Pt was found to be tachypneic >30, vitals were checked, saturation found to be in 70s. Pt was then placed back on to bipap. D/w pulmonary fellow #[id number], approved for ICU monitoring.    #Acute hypoxic respiratory failure, Possible CAP  -Wean O2, patient improving on High Flow  -Procal 24, trending down  -d/c solumedrol  -Nasal MRSA negative  -Rocephin & Doxycycline  -CtA PE with R middle and lower lobe/ L lower lobe consolidations/ bilateral upper lobe patchy peribronchial vascular opacifications    #paroxysmal afib  -had FAITH cardioversion in 2019 by   -repeat cardioversion 9/13/22 prior to presentation  -EKG with sinus bradycardia  - c/w lopressor 50mg BID  - c/w xarelto  - c/w amiodarone 200mg daily  -F/u Cardio, Dr. Quintero  -Repeat Echo  -F/U cardiac enzymes  -F/U coags    #CKD  -Trend Lytes  - Cr 1.7 similar to recent baseline  -D/C joaquin    #LE swelling  -r/o dvt  -cta pe negative  -f/u va duplex venous    #acute on chronic heart failure with preserved ejection fraction.  - tropx neg   - CTA : cardiomegaly  - NM stress test 5/22 normal  - echo: severe concentric LVH, grade 2 diastolic dysfunction, dilation of ascending aorta    #iron deficiency anemia  - f/u Iron studies   - PO iron    #H pylori positve on EGD +  - hgb 11.5 improved from recent baseline  - EGD /Colon by  on 4/29    #HTN  - c/w amlodipine 5mg  - benazepril 10mg qd    #Gout  - c/w allopurinol 100mg     #prolactinoma  - on cabergoline (every Monday    #HLD  - c.w lipitor 20mg qHS    #Hypothyroidism  - c/w levothyroxine 175mcg    #overactive bladder  - c/w home toriaz 4mg    dvt ppx d/c Heparin, start LMWH  gi ppx  PT: OOB to Chair  Diet: Full diet

## 2022-09-16 LAB
ALBUMIN SERPL ELPH-MCNC: 3.2 G/DL — LOW (ref 3.5–5.2)
ALBUMIN SERPL ELPH-MCNC: 3.2 G/DL — LOW (ref 3.5–5.2)
ALBUMIN SERPL ELPH-MCNC: 3.3 G/DL — LOW (ref 3.5–5.2)
ALP SERPL-CCNC: 100 U/L — SIGNIFICANT CHANGE UP (ref 30–115)
ALP SERPL-CCNC: 100 U/L — SIGNIFICANT CHANGE UP (ref 30–115)
ALP SERPL-CCNC: 104 U/L — SIGNIFICANT CHANGE UP (ref 30–115)
ALT FLD-CCNC: 9 U/L — SIGNIFICANT CHANGE UP (ref 0–41)
ANION GAP SERPL CALC-SCNC: 12 MMOL/L — SIGNIFICANT CHANGE UP (ref 7–14)
ANION GAP SERPL CALC-SCNC: 13 MMOL/L — SIGNIFICANT CHANGE UP (ref 7–14)
ANION GAP SERPL CALC-SCNC: 13 MMOL/L — SIGNIFICANT CHANGE UP (ref 7–14)
APTT BLD: 24.5 SEC — LOW (ref 27–39.2)
AST SERPL-CCNC: 10 U/L — SIGNIFICANT CHANGE UP (ref 0–41)
AST SERPL-CCNC: 11 U/L — SIGNIFICANT CHANGE UP (ref 0–41)
AST SERPL-CCNC: 21 U/L — SIGNIFICANT CHANGE UP (ref 0–41)
BASOPHILS # BLD AUTO: 0.01 K/UL — SIGNIFICANT CHANGE UP (ref 0–0.2)
BASOPHILS NFR BLD AUTO: 0.1 % — SIGNIFICANT CHANGE UP (ref 0–1)
BILIRUB SERPL-MCNC: 0.5 MG/DL — SIGNIFICANT CHANGE UP (ref 0.2–1.2)
BUN SERPL-MCNC: 41 MG/DL — HIGH (ref 10–20)
BUN SERPL-MCNC: 45 MG/DL — HIGH (ref 10–20)
BUN SERPL-MCNC: 46 MG/DL — HIGH (ref 10–20)
CALCIUM SERPL-MCNC: 8.4 MG/DL — SIGNIFICANT CHANGE UP (ref 8.4–10.4)
CALCIUM SERPL-MCNC: 8.4 MG/DL — SIGNIFICANT CHANGE UP (ref 8.4–10.5)
CALCIUM SERPL-MCNC: 8.7 MG/DL — SIGNIFICANT CHANGE UP (ref 8.4–10.5)
CHLORIDE SERPL-SCNC: 101 MMOL/L — SIGNIFICANT CHANGE UP (ref 98–110)
CHLORIDE SERPL-SCNC: 102 MMOL/L — SIGNIFICANT CHANGE UP (ref 98–110)
CHLORIDE SERPL-SCNC: 105 MMOL/L — SIGNIFICANT CHANGE UP (ref 98–110)
CO2 SERPL-SCNC: 27 MMOL/L — SIGNIFICANT CHANGE UP (ref 17–32)
CO2 SERPL-SCNC: 30 MMOL/L — SIGNIFICANT CHANGE UP (ref 17–32)
CO2 SERPL-SCNC: 31 MMOL/L — SIGNIFICANT CHANGE UP (ref 17–32)
CREAT SERPL-MCNC: 1.4 MG/DL — SIGNIFICANT CHANGE UP (ref 0.7–1.5)
CREAT SERPL-MCNC: 1.4 MG/DL — SIGNIFICANT CHANGE UP (ref 0.7–1.5)
CREAT SERPL-MCNC: 1.5 MG/DL — SIGNIFICANT CHANGE UP (ref 0.7–1.5)
EGFR: 35 ML/MIN/1.73M2 — LOW
EGFR: 38 ML/MIN/1.73M2 — LOW
EGFR: 38 ML/MIN/1.73M2 — LOW
EOSINOPHIL # BLD AUTO: 0 K/UL — SIGNIFICANT CHANGE UP (ref 0–0.7)
EOSINOPHIL NFR BLD AUTO: 0 % — SIGNIFICANT CHANGE UP (ref 0–8)
GLUCOSE BLDC GLUCOMTR-MCNC: 106 MG/DL — HIGH (ref 70–99)
GLUCOSE SERPL-MCNC: 126 MG/DL — HIGH (ref 70–99)
GLUCOSE SERPL-MCNC: 133 MG/DL — HIGH (ref 70–99)
GLUCOSE SERPL-MCNC: 180 MG/DL — HIGH (ref 70–99)
HCT VFR BLD CALC: 31.8 % — LOW (ref 37–47)
HCT VFR BLD CALC: 32 % — LOW (ref 37–47)
HGB BLD-MCNC: 9.4 G/DL — LOW (ref 12–16)
HGB BLD-MCNC: 9.5 G/DL — LOW (ref 12–16)
IMM GRANULOCYTES NFR BLD AUTO: 0.7 % — HIGH (ref 0.1–0.3)
INR BLD: 1.11 RATIO — SIGNIFICANT CHANGE UP (ref 0.65–1.3)
LYMPHOCYTES # BLD AUTO: 1.05 K/UL — LOW (ref 1.2–3.4)
LYMPHOCYTES # BLD AUTO: 6.2 % — LOW (ref 20.5–51.1)
MAGNESIUM SERPL-MCNC: 2.1 MG/DL — SIGNIFICANT CHANGE UP (ref 1.8–2.4)
MCHC RBC-ENTMCNC: 22.5 PG — LOW (ref 27–31)
MCHC RBC-ENTMCNC: 22.6 PG — LOW (ref 27–31)
MCHC RBC-ENTMCNC: 29.6 G/DL — LOW (ref 32–37)
MCHC RBC-ENTMCNC: 29.7 G/DL — LOW (ref 32–37)
MCV RBC AUTO: 75.7 FL — LOW (ref 81–99)
MCV RBC AUTO: 76.4 FL — LOW (ref 81–99)
MONOCYTES # BLD AUTO: 0.83 K/UL — HIGH (ref 0.1–0.6)
MONOCYTES NFR BLD AUTO: 4.9 % — SIGNIFICANT CHANGE UP (ref 1.7–9.3)
NEUTROPHILS # BLD AUTO: 15 K/UL — HIGH (ref 1.4–6.5)
NEUTROPHILS NFR BLD AUTO: 88.1 % — HIGH (ref 42.2–75.2)
NRBC # BLD: 0 /100 WBCS — SIGNIFICANT CHANGE UP (ref 0–0)
NRBC # BLD: 0 /100 WBCS — SIGNIFICANT CHANGE UP (ref 0–0)
PLATELET # BLD AUTO: 297 K/UL — SIGNIFICANT CHANGE UP (ref 130–400)
PLATELET # BLD AUTO: 307 K/UL — SIGNIFICANT CHANGE UP (ref 130–400)
POTASSIUM SERPL-MCNC: 3.4 MMOL/L — LOW (ref 3.5–5)
POTASSIUM SERPL-MCNC: 3.7 MMOL/L — SIGNIFICANT CHANGE UP (ref 3.5–5)
POTASSIUM SERPL-MCNC: 6.2 MMOL/L — CRITICAL HIGH (ref 3.5–5)
POTASSIUM SERPL-SCNC: 3.4 MMOL/L — LOW (ref 3.5–5)
POTASSIUM SERPL-SCNC: 3.7 MMOL/L — SIGNIFICANT CHANGE UP (ref 3.5–5)
POTASSIUM SERPL-SCNC: 6.2 MMOL/L — CRITICAL HIGH (ref 3.5–5)
PROT SERPL-MCNC: 5.5 G/DL — LOW (ref 6–8)
PROT SERPL-MCNC: 5.5 G/DL — LOW (ref 6–8)
PROT SERPL-MCNC: 5.6 G/DL — LOW (ref 6–8)
PROTHROM AB SERPL-ACNC: 12.7 SEC — SIGNIFICANT CHANGE UP (ref 9.95–12.87)
RBC # BLD: 4.16 M/UL — LOW (ref 4.2–5.4)
RBC # BLD: 4.23 M/UL — SIGNIFICANT CHANGE UP (ref 4.2–5.4)
RBC # FLD: 17.1 % — HIGH (ref 11.5–14.5)
RBC # FLD: 17.2 % — HIGH (ref 11.5–14.5)
SODIUM SERPL-SCNC: 142 MMOL/L — SIGNIFICANT CHANGE UP (ref 135–146)
SODIUM SERPL-SCNC: 144 MMOL/L — SIGNIFICANT CHANGE UP (ref 135–146)
SODIUM SERPL-SCNC: 148 MMOL/L — HIGH (ref 135–146)
TROPONIN T SERPL-MCNC: <0.01 NG/ML — SIGNIFICANT CHANGE UP
WBC # BLD: 14.75 K/UL — HIGH (ref 4.8–10.8)
WBC # BLD: 17.01 K/UL — HIGH (ref 4.8–10.8)
WBC # FLD AUTO: 14.75 K/UL — HIGH (ref 4.8–10.8)
WBC # FLD AUTO: 17.01 K/UL — HIGH (ref 4.8–10.8)

## 2022-09-16 PROCEDURE — 71045 X-RAY EXAM CHEST 1 VIEW: CPT | Mod: 26

## 2022-09-16 PROCEDURE — 99233 SBSQ HOSP IP/OBS HIGH 50: CPT

## 2022-09-16 PROCEDURE — 93010 ELECTROCARDIOGRAM REPORT: CPT

## 2022-09-16 RX ORDER — FUROSEMIDE 40 MG
40 TABLET ORAL ONCE
Refills: 0 | Status: COMPLETED | OUTPATIENT
Start: 2022-09-16 | End: 2022-09-16

## 2022-09-16 RX ORDER — METOCLOPRAMIDE HCL 10 MG
10 TABLET ORAL ONCE
Refills: 0 | Status: COMPLETED | OUTPATIENT
Start: 2022-09-16 | End: 2022-09-16

## 2022-09-16 RX ORDER — SODIUM CHLORIDE 9 MG/ML
500 INJECTION, SOLUTION INTRAVENOUS
Refills: 0 | Status: DISCONTINUED | OUTPATIENT
Start: 2022-09-16 | End: 2022-09-17

## 2022-09-16 RX ORDER — POTASSIUM CHLORIDE 20 MEQ
20 PACKET (EA) ORAL ONCE
Refills: 0 | Status: COMPLETED | OUTPATIENT
Start: 2022-09-16 | End: 2022-09-17

## 2022-09-16 RX ADMIN — LISINOPRIL 10 MILLIGRAM(S): 2.5 TABLET ORAL at 05:25

## 2022-09-16 RX ADMIN — AMIODARONE HYDROCHLORIDE 200 MILLIGRAM(S): 400 TABLET ORAL at 18:16

## 2022-09-16 RX ADMIN — CEFTRIAXONE 100 MILLIGRAM(S): 500 INJECTION, POWDER, FOR SOLUTION INTRAMUSCULAR; INTRAVENOUS at 10:02

## 2022-09-16 RX ADMIN — Medication 10 MILLIGRAM(S): at 18:18

## 2022-09-16 RX ADMIN — Medication 3 MILLIGRAM(S): at 00:34

## 2022-09-16 RX ADMIN — Medication 40 MILLIGRAM(S): at 10:01

## 2022-09-16 RX ADMIN — Medication 650 MILLIGRAM(S): at 06:34

## 2022-09-16 RX ADMIN — Medication 100 MILLIGRAM(S): at 05:24

## 2022-09-16 RX ADMIN — Medication 25 MILLIGRAM(S): at 18:15

## 2022-09-16 RX ADMIN — CHLORHEXIDINE GLUCONATE 1 APPLICATION(S): 213 SOLUTION TOPICAL at 05:33

## 2022-09-16 RX ADMIN — Medication 100 MILLIGRAM(S): at 18:17

## 2022-09-16 RX ADMIN — Medication 650 MILLIGRAM(S): at 05:24

## 2022-09-16 RX ADMIN — ENOXAPARIN SODIUM 90 MILLIGRAM(S): 100 INJECTION SUBCUTANEOUS at 22:02

## 2022-09-16 RX ADMIN — Medication 100 MILLIGRAM(S): at 18:16

## 2022-09-16 RX ADMIN — ENOXAPARIN SODIUM 90 MILLIGRAM(S): 100 INJECTION SUBCUTANEOUS at 11:20

## 2022-09-16 RX ADMIN — PANTOPRAZOLE SODIUM 40 MILLIGRAM(S): 20 TABLET, DELAYED RELEASE ORAL at 06:34

## 2022-09-16 RX ADMIN — ATORVASTATIN CALCIUM 20 MILLIGRAM(S): 80 TABLET, FILM COATED ORAL at 22:02

## 2022-09-16 RX ADMIN — Medication 175 MICROGRAM(S): at 05:26

## 2022-09-16 RX ADMIN — Medication 100 MILLIGRAM(S): at 05:25

## 2022-09-16 RX ADMIN — AMIODARONE HYDROCHLORIDE 200 MILLIGRAM(S): 400 TABLET ORAL at 05:25

## 2022-09-16 RX ADMIN — SODIUM CHLORIDE 500 MILLILITER(S): 9 INJECTION, SOLUTION INTRAVENOUS at 18:25

## 2022-09-16 NOTE — PROVIDER CONTACT NOTE (OTHER) - ACTION/TREATMENT ORDERED:
ekg done, fs done, stat labs drawn, 500cc bolus admin, will re-evalaute, transfer out temporarily on hold.

## 2022-09-16 NOTE — PROGRESS NOTE ADULT - SUBJECTIVE AND OBJECTIVE BOX
Patient is a 81y old  Female who presents with a chief complaint of PNA (15 Sep 2022 14:19)        Over Night Events:  Feels better.  Off pressors.  Tolerating NIV during sleep.  On 4 liters O2.          ROS:     All ROS are negative except HPI         PHYSICAL EXAM    ICU Vital Signs Last 24 Hrs  T(C): 36.5 (16 Sep 2022 09:00), Max: 36.6 (15 Sep 2022 12:00)  T(F): 97.7 (16 Sep 2022 09:00), Max: 97.9 (15 Sep 2022 12:00)  HR: 52 (16 Sep 2022 09:00) (52 - 66)  BP: 156/60 (16 Sep 2022 09:00) (108/56 - 161/67)  BP(mean): 91 (16 Sep 2022 09:00) (71 - 115)  ABP: --  ABP(mean): --  RR: 28 (16 Sep 2022 09:00) (27 - 48)  SpO2: 98% (16 Sep 2022 09:00) (92% - 100%)    O2 Parameters below as of 16 Sep 2022 03:00  Patient On (Oxygen Delivery Method): BiPAP/CPAP            CONSTITUTIONAL:  Well nourished.  NAD    ENT:   Airway patent,   Mouth with normal mucosa.       EYES:   Pupils equal,   Round and reactive to light.    CARDIAC:   Normal rate,   Regular rhythm.        RESPIRATORY:   No wheezing  Bilateral crackles   Normal chest expansion  Not tachypneic,  No use of accessory muscles    GASTROINTESTINAL:  Abdomen soft,   Non-tender,   No guarding,   + BS    MUSCULOSKELETAL:   Range of motion is not limited,  No clubbing, cyanosis    NEUROLOGICAL:   Alert and oriented   No motor  deficits.    SKIN:   Skin normal color for race,   No evidence of rash.    PSYCHIATRIC:   No apparent risk to self or others.          09-15-22 @ 07:01  -  22 @ 07:00  --------------------------------------------------------  IN:    Oral Fluid: 340 mL  Total IN: 340 mL    OUT:    Indwelling Catheter - Urethral (mL): 60 mL    Voided (mL): 800 mL  Total OUT: 860 mL    Total NET: -520 mL          LABS:                            9.5    17.01 )-----------( 297      ( 16 Sep 2022 06:30 )             32.0                                                   142  |  102  |  46<H>  ----------------------------<  133<H>  6.2<HH>   |  27  |  1.5    Creatinine Trend  BUN 46, Cr 1.5, (22 @ 06:30)  Creatinine Trend  BUN 34, Cr 1.5, (09-15-22 @ 05:45)  Creatinine Trend  BUN 35, Cr 1.6, (22 @ 09:36)  Creatinine Trend  BUN 28, Cr 1.7, (22 @ 19:29)      Ca    8.4      16 Sep 2022 06:30  Mg     2.1         TPro  5.5<L>  /  Alb  3.2<L>  /  TBili  0.5  /  DBili  x   /  AST  21  /  ALT  9   /  AlkPhos  100        PT/INR - ( 14 Sep 2022 16:11 )   PT: 19.30 sec;   INR: 1.69 ratio         PTT - ( 15 Sep 2022 07:19 )  PTT:105.5 sec                                       Urinalysis Basic - ( 14 Sep 2022 10:00 )    Color: Colorless / Appearance: Clear / S.010 / pH: x  Gluc: x / Ketone: Negative  / Bili: Negative / Urobili: <2 mg/dL   Blood: x / Protein: Negative / Nitrite: Negative   Leuk Esterase: Negative / RBC: x / WBC x   Sq Epi: x / Non Sq Epi: x / Bacteria: x        CARDIAC MARKERS ( 15 Sep 2022 12:00 )  x     / <0.01 ng/mL / x     / x     / x                                                LIVER FUNCTIONS - ( 16 Sep 2022 06:30 )  Alb: 3.2 g/dL / Pro: 5.5 g/dL / ALK PHOS: 100 U/L / ALT: 9 U/L / AST: 21 U/L / GGT: x                                                  Culture - Blood (collected 14 Sep 2022 08:40)  Source: .Blood Blood  Preliminary Report (15 Sep 2022 19:01):    No growth to date.    Culture - Blood (collected 14 Sep 2022 08:40)  Source: .Blood Blood  Preliminary Report (15 Sep 2022 19:01):    No growth to date.                                                                                           MEDICATIONS  (STANDING):  allopurinol 100 milliGRAM(s) Oral two times a day  aMIOdarone    Tablet 200 milliGRAM(s) Oral two times a day  atorvastatin 20 milliGRAM(s) Oral at bedtime  cefTRIAXone   IVPB 2000 milliGRAM(s) IV Intermittent every 24 hours  chlorhexidine 2% Cloths 1 Application(s) Topical <User Schedule>  doxycycline monohydrate Capsule 100 milliGRAM(s) Oral every 12 hours  enoxaparin Injectable 90 milliGRAM(s) SubCutaneous <User Schedule>  levothyroxine 175 MICROGram(s) Oral daily  lisinopril 10 milliGRAM(s) Oral daily  metoprolol tartrate 25 milliGRAM(s) Oral two times a day  pantoprazole    Tablet 40 milliGRAM(s) Oral before breakfast    MEDICATIONS  (PRN):  acetaminophen     Tablet .. 650 milliGRAM(s) Oral every 6 hours PRN Temp greater or equal to 38C (100.4F), Mild Pain (1 - 3)  aluminum hydroxide/magnesium hydroxide/simethicone Suspension 30 milliLiter(s) Oral every 4 hours PRN Dyspepsia  melatonin 3 milliGRAM(s) Oral at bedtime PRN Insomnia  ondansetron Injectable 4 milliGRAM(s) IV Push every 8 hours PRN Nausea and/or Vomiting      New X-rays reviewed:                                                                                  ECHO

## 2022-09-16 NOTE — PROGRESS NOTE ADULT - ASSESSMENT
HPI: 82 yo female with PMHx of Paroxysmal AFib on Xarelto (s/p cardioversion 9/13/22), CHFpEF, CKD 3b, chronic microcytic anemia, FELIX (on CPAP) HTN, DLD, hypothyroidism (history of thyroid cancer s/p resection), hx hip fracture.  Pt presents to ED for difficulty breathing s/p cardioversion. Found to be hypoxic to 80s, placed on bipap.  History goes back to 9/13 AM, where patient had outpatient elective cardioversion with Dr. Quintero. Several hours Post procedure pt went to diner with her sister to eat lunch. While at the diner pt suddenly became dyspneic and experienced rigors. Denied cough, no nausea, vomiting, chest pain, palpitations.   In ED, afebrile. Tachypneic to 28, and hypoxic to 80s.  Labs done showed WBC 21.6, Hb 11.5, Mcv 77.7, INR 1.71, Cr 1.7, GFR 30, BUN 28, , Trop neg, BNP 1425 (similar to baseline); VBG lactate 4.2 (repeat 1.5)  CTA PE was performed which showed Right middle and lower lobe as well as left lower lobe lobar consolidations. Bilateral upper lobe patchy peribronchial vascular opacifications.  In ED Pt given Rocephin and azithromycin. Ativan 0.5 mg IV was also given.    On my exam this AM, pt was already transferred to [facility]. Pt was on bipap max settings since admission. Bipap was removed to conduct history and physical. Pt was found to be tachypneic >30, vitals were checked, saturation found to be in 70s. Pt was then placed back on to bipap. D/w pulmonary fellow #[id number], approved for ICU monitoring.    #Acute hypoxic respiratory failure, Possible CAP  -Wean O2, patient improving on High Flow, on 4L today vs 6L yday  -Procal 24, trending down  -d/c solumedrol  -Nasal MRSA negative  -Rocephin & Doxycycline  -CtA PE with R middle and lower lobe/ L lower lobe consolidations/ bilateral upper lobe patchy peribronchial vascular opacifications    #paroxysmal afib  -had FAITH cardioversion in 2019 by   -repeat cardioversion 9/13/22 prior to presentation  -EKG with sinus bradycardia  - c/w lopressor 50mg BID  - c/w xarelto  - c/w amiodarone 200mg daily  -F/u Cardio, Dr. Quintero  -Repeat Echo  -F/U cardiac enzymes  -F/U coags    #CKD  -Trend Lytes  - Cr 1.7 similar to recent baseline  -D/C joaquin    #LE swelling  -r/o dvt  -cta pe negative  -f/u va duplex venous    #acute on chronic heart failure with preserved ejection fraction.  - tropx neg   - Lasix 40 IV given 9/16  - CTA : cardiomegaly  - NM stress test 5/22 normal  - echo: severe concentric LVH, grade 2 diastolic dysfunction, dilation of ascending aorta    #iron deficiency anemia  - f/u Iron studies   - PO iron    #H pylori positve on EGD +  - hgb 11.5 improved from recent baseline  - EGD /Colon by  on 4/29    #HTN  - c/w amlodipine 5mg  - benazepril 10mg qd    #Gout  - c/w allopurinol 100mg     #prolactinoma  - on cabergoline (every Monday    #HLD  - c.w lipitor 20mg qHS    #Hypothyroidism  - c/w levothyroxine 175mcg    #overactive bladder  - c/w home toriaz 4mg    dvt ppx d/c Heparin, start LMWH  gi ppx  PT: OOB to Chair  Diet: Full diet

## 2022-09-16 NOTE — PROGRESS NOTE ADULT - ASSESSMENT
IMPRESSION:    Acute hypoxic respiratory failure   Probable CAP  Afib POD 1 s/p cardioversion  HO CKD   HO Severe FELIX on APAP     PLAN:    CNS: Avoid CNS depressants    HEENT: Oral care    PULMONARY:  HOB @ 45 degrees. Supplemental O2 target Spo2 92-96.  NIV during sleep        CARDIOVASCULAR: Avoid fluid overload. Repeat ECHO noted.   Lasix 40 mg today    GI: GI prophylaxis.  Feeding when off NIV.    RENAL:  Follow up lytes.  Correct as needed    INFECTIOUS DISEASE: Follow up cultures.  Urine legionella and strep neg.  Nasal MRSA negative. Rocephin and Doxy.    HEMATOLOGICAL:  DVT prophylaxis.  Dimer noted.  Continue LMWH     ENDOCRINE:  Follow up FS.  Insulin protocol if needed.    MUSCULOSKELETAL: Bed rest    OOB to chair     Possible SDU PM

## 2022-09-16 NOTE — PROGRESS NOTE ADULT - SUBJECTIVE AND OBJECTIVE BOX
Patient is a 81y old  Female who presents with a chief complaint of PNA (15 Sep 2022 08:49)      INTERVAL HPI/OVERNIGHT EVENTS:   No overnight events   Afebrile, hemodynamically stable, on 4 L O2 via NC  Feels better.  Off pressors.  Tolerating NIV during sleep.      ICU Vital Signs Last 24 Hrs  T(C): 36.1 (15 Sep 2022 08:00), Max: 36.8 (14 Sep 2022 20:00)  T(F): 96.9 (15 Sep 2022 08:00), Max: 98.2 (14 Sep 2022 20:00)  HR: 56 (15 Sep 2022 09:00) (54 - 84)  BP: 134/62 (15 Sep 2022 09:00) (126/64 - 141/71)  BP(mean): 95 (15 Sep 2022 09:00) (86 - 99)  ABP: --  ABP(mean): --  RR: 32 (15 Sep 2022 09:12) (22 - 43)  SpO2: 98% (15 Sep 2022 09:12) (93% - 100%)    O2 Parameters below as of 15 Sep 2022 09:12  Patient On (Oxygen Delivery Method): nasal cannula, high flow  O2 Flow (L/min): 50  O2 Concentration (%): 50      I&O's Summary    14 Sep 2022 07:01  -  15 Sep 2022 07:00  --------------------------------------------------------  IN: 211 mL / OUT: 1770 mL / NET: -1559 mL    15 Sep 2022 07:01  -  15 Sep 2022 14:21  --------------------------------------------------------  IN: 240 mL / OUT: 60 mL / NET: 180 mL          LABS:                        9.3    13.74 )-----------( 256      ( 15 Sep 2022 05:45 )             30.7     15    144  |  103  |  34<H>  ----------------------------<  152<H>  3.5   |  25  |  1.5    Ca    8.4      15 Sep 2022 05:45  Mg     2.0     09-15    TPro  5.2<L>  /  Alb  3.1<L>  /  TBili  0.4  /  DBili  x   /  AST  11  /  ALT  8   /  AlkPhos  96  09-15    PT/INR - ( 14 Sep 2022 16:11 )   PT: 19.30 sec;   INR: 1.69 ratio         PTT - ( 15 Sep 2022 07:19 )  PTT:105.5 sec  Urinalysis Basic - ( 14 Sep 2022 10:00 )    Color: Colorless / Appearance: Clear / S.010 / pH: x  Gluc: x / Ketone: Negative  / Bili: Negative / Urobili: <2 mg/dL   Blood: x / Protein: Negative / Nitrite: Negative   Leuk Esterase: Negative / RBC: x / WBC x   Sq Epi: x / Non Sq Epi: x / Bacteria: x      CAPILLARY BLOOD GLUCOSE            RADIOLOGY & ADDITIONAL TESTS:    Consultant(s) Notes Reviewed:  [x ] YES  [ ] NO    MEDICATIONS  (STANDING):  allopurinol 100 milliGRAM(s) Oral two times a day  aMIOdarone    Tablet 200 milliGRAM(s) Oral two times a day  atorvastatin 20 milliGRAM(s) Oral at bedtime  cefTRIAXone   IVPB 1000 milliGRAM(s) IV Intermittent once  chlorhexidine 2% Cloths 1 Application(s) Topical <User Schedule>  doxycycline monohydrate Capsule 100 milliGRAM(s) Oral every 12 hours  enoxaparin Injectable 90 milliGRAM(s) SubCutaneous <User Schedule>  levothyroxine 175 MICROGram(s) Oral daily  lisinopril 10 milliGRAM(s) Oral daily  melatonin 3 milliGRAM(s) Oral once  metoprolol tartrate 25 milliGRAM(s) Oral two times a day  pantoprazole    Tablet 40 milliGRAM(s) Oral before breakfast    MEDICATIONS  (PRN):  acetaminophen     Tablet .. 650 milliGRAM(s) Oral every 6 hours PRN Temp greater or equal to 38C (100.4F), Mild Pain (1 - 3)  aluminum hydroxide/magnesium hydroxide/simethicone Suspension 30 milliLiter(s) Oral every 4 hours PRN Dyspepsia  melatonin 3 milliGRAM(s) Oral at bedtime PRN Insomnia  ondansetron Injectable 4 milliGRAM(s) IV Push every 8 hours PRN Nausea and/or Vomiting      PHYSICAL EXAM:  GENERAL:   HEAD:  Atraumatic, Normocephalic  EYES: EOMI, PERRLA, conjunctiva and sclera clear  NECK: Supple, No JVD, Normal thyroid, no enlarged nodes  NERVOUS SYSTEM:  Alert & Awake.   CHEST/LUNG: B/L good air entry; No rales, rhonchi, or wheezing  HEART: S1S2 normal, no S3, Regular rate and rhythm; No murmurs  ABDOMEN: Soft, Nontender, Nondistended; Bowel sounds present  EXTREMITIES:  2+ Peripheral Pulses, No clubbing, cyanosis, 1+ pitting edema  LYMPH: No lymphadenopathy noted  SKIN: No rashes or lesions    Care Discussed with Consultants/Other Providers [ x] YES  [ ] NO Patient is a 81y old  Female who presents with a chief complaint of PNA (15 Sep 2022 08:49)      INTERVAL HPI/OVERNIGHT EVENTS:   No overnight events   Afebrile, hemodynamically stable, on 4 L O2 via NC  Feels better.  Off pressors.  Tolerating NIV during sleep.    downgrade to stepdown    ICU Vital Signs Last 24 Hrs  T(C): 36.1 (15 Sep 2022 08:00), Max: 36.8 (14 Sep 2022 20:00)  T(F): 96.9 (15 Sep 2022 08:00), Max: 98.2 (14 Sep 2022 20:00)  HR: 56 (15 Sep 2022 09:00) (54 - 84)  BP: 134/62 (15 Sep 2022 09:00) (126/64 - 141/71)  BP(mean): 95 (15 Sep 2022 09:00) (86 - 99)  ABP: --  ABP(mean): --  RR: 32 (15 Sep 2022 09:12) (22 - 43)  SpO2: 98% (15 Sep 2022 09:12) (93% - 100%)    O2 Parameters below as of 15 Sep 2022 09:12  Patient On (Oxygen Delivery Method): nasal cannula, high flow  O2 Flow (L/min): 50  O2 Concentration (%): 50      I&O's Summary    14 Sep 2022 07:01  -  15 Sep 2022 07:00  --------------------------------------------------------  IN: 211 mL / OUT: 1770 mL / NET: -1559 mL    15 Sep 2022 07:01  -  15 Sep 2022 14:21  --------------------------------------------------------  IN: 240 mL / OUT: 60 mL / NET: 180 mL          LABS:                        9.3    13.74 )-----------( 256      ( 15 Sep 2022 05:45 )             30.7     09-15    144  |  103  |  34<H>  ----------------------------<  152<H>  3.5   |  25  |  1.5    Ca    8.4      15 Sep 2022 05:45  Mg     2.0     09-15    TPro  5.2<L>  /  Alb  3.1<L>  /  TBili  0.4  /  DBili  x   /  AST  11  /  ALT  8   /  AlkPhos  96  09-15    PT/INR - ( 14 Sep 2022 16:11 )   PT: 19.30 sec;   INR: 1.69 ratio         PTT - ( 15 Sep 2022 07:19 )  PTT:105.5 sec  Urinalysis Basic - ( 14 Sep 2022 10:00 )    Color: Colorless / Appearance: Clear / S.010 / pH: x  Gluc: x / Ketone: Negative  / Bili: Negative / Urobili: <2 mg/dL   Blood: x / Protein: Negative / Nitrite: Negative   Leuk Esterase: Negative / RBC: x / WBC x   Sq Epi: x / Non Sq Epi: x / Bacteria: x      CAPILLARY BLOOD GLUCOSE            RADIOLOGY & ADDITIONAL TESTS:    Consultant(s) Notes Reviewed:  [x ] YES  [ ] NO    MEDICATIONS  (STANDING):  allopurinol 100 milliGRAM(s) Oral two times a day  aMIOdarone    Tablet 200 milliGRAM(s) Oral two times a day  atorvastatin 20 milliGRAM(s) Oral at bedtime  cefTRIAXone   IVPB 1000 milliGRAM(s) IV Intermittent once  chlorhexidine 2% Cloths 1 Application(s) Topical <User Schedule>  doxycycline monohydrate Capsule 100 milliGRAM(s) Oral every 12 hours  enoxaparin Injectable 90 milliGRAM(s) SubCutaneous <User Schedule>  levothyroxine 175 MICROGram(s) Oral daily  lisinopril 10 milliGRAM(s) Oral daily  melatonin 3 milliGRAM(s) Oral once  metoprolol tartrate 25 milliGRAM(s) Oral two times a day  pantoprazole    Tablet 40 milliGRAM(s) Oral before breakfast    MEDICATIONS  (PRN):  acetaminophen     Tablet .. 650 milliGRAM(s) Oral every 6 hours PRN Temp greater or equal to 38C (100.4F), Mild Pain (1 - 3)  aluminum hydroxide/magnesium hydroxide/simethicone Suspension 30 milliLiter(s) Oral every 4 hours PRN Dyspepsia  melatonin 3 milliGRAM(s) Oral at bedtime PRN Insomnia  ondansetron Injectable 4 milliGRAM(s) IV Push every 8 hours PRN Nausea and/or Vomiting      PHYSICAL EXAM:  GENERAL:   HEAD:  Atraumatic, Normocephalic  EYES: EOMI, PERRLA, conjunctiva and sclera clear  NECK: Supple, No JVD, Normal thyroid, no enlarged nodes  NERVOUS SYSTEM:  Alert & Awake.   CHEST/LUNG: B/L good air entry; No rales, rhonchi, or wheezing  HEART: S1S2 normal, no S3, Regular rate and rhythm; No murmurs  ABDOMEN: Soft, Nontender, Nondistended; Bowel sounds present  EXTREMITIES:  2+ Peripheral Pulses, No clubbing, cyanosis, 1+ pitting edema  LYMPH: No lymphadenopathy noted  SKIN: No rashes or lesions    Care Discussed with Consultants/Other Providers [ x] YES  [ ] NO

## 2022-09-16 NOTE — PROGRESS NOTE ADULT - SUBJECTIVE AND OBJECTIVE BOX
JEFFERY UGALDE  81y, Female  Allergy: No Known Allergies      LOS  3d    CHIEF COMPLAINT: PNA (16 Sep 2022 11:33)      INTERVAL EVENTS/HPI  - No acute events overnight  - T(F): , Max: 97.8 (09-16-22 @ 12:00)  - on 4L NC, tachypnic   - WBC Count: 17.01 (09-16-22 @ 06:30)  WBC Count: 13.74 (09-15-22 @ 05:45)     - Creatinine, Serum: 1.4 (09-16-22 @ 11:00)  Creatinine, Serum: 1.5 (09-16-22 @ 06:30)       ROS  General: Denies rigors, nightsweats  HEENT: Denies headache, rhinorrhea, sore throat, eye pain  CV: Denies CP, palpitations  PULM: Denies wheezing, hemoptysis  GI: Denies hematemesis, hematochezia, melena  : Denies discharge, hematuria  MSK: Denies arthralgias, myalgias  SKIN: Denies rash, lesions  NEURO: Denies paresthesias, weakness  PSYCH: Denies depression, anxiety    VITALS:  T(F): 97.8, Max: 97.8 (09-16-22 @ 12:00)  HR: 56  BP: 156/60  RR: 33Vital Signs Last 24 Hrs  T(C): 36.6 (16 Sep 2022 12:00), Max: 36.6 (16 Sep 2022 12:00)  T(F): 97.8 (16 Sep 2022 12:00), Max: 97.8 (16 Sep 2022 12:00)  HR: 56 (16 Sep 2022 15:00) (52 - 66)  BP: 156/60 (16 Sep 2022 09:00) (117/59 - 161/67)  BP(mean): 91 (16 Sep 2022 09:00) (71 - 115)  RR: 33 (16 Sep 2022 15:00) (20 - 43)  SpO2: 94% (16 Sep 2022 12:00) (92% - 100%)    Parameters below as of 16 Sep 2022 09:00  Patient On (Oxygen Delivery Method): nasal cannula  O2 Flow (L/min): 4      PHYSICAL EXAM:  Gen: NAD, resting in bed  HEENT: Normocephalic, atraumatic  Neck: supple, no lymphadenopathy  CV: Regular rate & regular rhythm  Lungs: decreased BS at bases, no fremitus  Abdomen: Soft, BS present  Ext: Warm, well perfused  Neuro: non focal, awake  Skin: no rash, no erythema  Lines: no phlebitis    FH: Non-contributory  Social Hx: Non-contributory    TESTS & MEASUREMENTS:                        9.5    17.01 )-----------( 297      ( 16 Sep 2022 06:30 )             32.0     09-16    148<H>  |  105  |  45<H>  ----------------------------<  180<H>  3.7   |  30  |  1.4    Ca    8.7      16 Sep 2022 11:00  Mg     2.1     09-16    TPro  5.5<L>  /  Alb  3.2<L>  /  TBili  0.5  /  DBili  x   /  AST  10  /  ALT  9   /  AlkPhos  100  09-16      LIVER FUNCTIONS - ( 16 Sep 2022 11:00 )  Alb: 3.2 g/dL / Pro: 5.5 g/dL / ALK PHOS: 100 U/L / ALT: 9 U/L / AST: 10 U/L / GGT: x               Culture - Blood (collected 09-14-22 @ 08:40)  Source: .Blood Blood  Preliminary Report (09-15-22 @ 19:01):    No growth to date.    Culture - Blood (collected 09-14-22 @ 08:40)  Source: .Blood Blood  Preliminary Report (09-15-22 @ 19:01):    No growth to date.        Blood Gas Venous - Lactate: 1.50 mmol/L (09-13-22 @ 23:59)  Blood Gas Venous - Lactate: 4.20 mmol/L (09-13-22 @ 19:36)      INFECTIOUS DISEASES TESTING  MRSA PCR Result.: Negative (09-14-22 @ 16:11)  Rapid RVP Result: NotDetec (09-14-22 @ 15:50)  Legionella Antigen, Urine: Negative (09-14-22 @ 10:00)  Procalcitonin, Serum: 24.20 (09-14-22 @ 09:27)  COVID-19 PCR: NotDetec (09-13-22 @ 21:35)  COVID-19 PCR: NotDetec (05-20-22 @ 10:00)      INFLAMMATORY MARKERS      RADIOLOGY & ADDITIONAL TESTS:  I have personally reviewed the last available Chest xray  CXR  Xray Chest 1 View AP/PA:   ACC: [id number] EXAM:  XR CHEST 1 VIEW                          PROCEDURE DATE:  09/16/2022          INTERPRETATION:  Clinical History / Reason for exam: Pneumonia.    Comparison : Chest radiograph AP portable dated September 15, 2022 time   4:18 AM.    Technique/Positioning: Frontal view time 5:46 AM.    Findings:    Support devices: EKG leads overlie the thorax.    Cardiac/mediastinum/hilum: The overall heart size appears moderately   enlarged which may be in part related to projection. Calcification within   the thoracic aorta.    Lung parenchyma/Pleura: Diffuse bilateral lung opacities.    Impression:    Diffuse bilateral lung opacities.    --- End of Report ---            DANIS WILCOX MD; Attending Radiologist  This document has been electronically signed. Sep 16 2022  1:43PM (09-16-22 @ 06:57)      CT  CT Angio Chest PE Protocol w/ IV Cont:   ACC: [id number] EXAM:  CT ANGIO CHEST PULM [facility]                          PROCEDURE DATE:  09/13/2022          INTERPRETATION:  CLINICAL HISTORY: Shortness of breath, abnormal chest   x-ray and recent catheter ablation.    TECHNIQUE: CTA of the thorax was performed after administration of   contrast per the PE protocol with 65 cc of Omnipaque 350 intravenous   contrast. Sagittal, coronal, and MIP reformats were performed.    COMPARISON: None.      FINDINGS:  PULMONARY ARTERIES: No pulmonary emboli.    LUNGS, PLEURA, AND AIRWAYS: Central airway patent. Right middle and lower   lobe as well as left lower lobe lobar consolidations. Bilateral upper   lobe patchy peribronchial vascular opacifications.    HEART/GREAT VESSELS: Cardiomegaly. Abnormal dilation 4.5 cm main   pulmonary artery, compatible with pulmonary arterial hypertension. No   pericardial effusion. Coronary and aortic artery calcifications.    MEDIASTINUM/LYMPH NODES: No enlarged mediastinal, hilar, or axillary   lymph nodes.Visualized portion of the thyroid gland is unremarkable.    UPPER ABDOMEN: The visualized portion of the upper abdomen shows no focal   abnormality.    BONES AND SOFT TISSUES: Severe degenerative changes of the spine with   ossification of the anterior longitudinal ligament.      IMPRESSION:    1. Right and middle lower lobe, and left lower lobe consolidations,   compatible with pneumonia and/or pulmonary edema in the appropriate   clinical setting.    2. No pulmonary embolism.    3. Cardiomegalywith evidence of pulmonary arterial hypertension.    --- End of Report ---          STEPHANIA MARSH MD; Resident Radiologist  This document has been electronically signed.  TYREE DAVIS MD; Attending Radiologist  This document has been electronically signed. Sep 14 2022 12:00AM (09-13-22 @ 23:25)      CARDIOLOGY TESTING  12 Lead ECG:   Ventricular Rate 61 BPM    Atrial Rate 61 BPM    P-R Interval 224 ms    QRS Duration 132 ms    Q-T Interval 502 ms    QTC Calculation(Bazett) 505 ms    P Axis 45 degrees    R Axis -22 degrees    T Axis 71 degrees    Diagnosis Line Sinus rhythm [id number] degree A-V block  Non-specific intra-ventricular conduction block  Abnormal ECG    Confirmed by KATIE SENA MD ([id number]) on 9/14/2022 2:22:24 PM (09-14-22 @ 10:48)  12 Lead ECG:   Ventricular Rate 70 BPM    Atrial Rate 70 BPM    P-R Interval 260 ms    QRS Duration 114 ms    Q-T Interval 396 ms    QTC Calculation(Bazett) 427 ms    P Axis 46 degrees    R Axis 253 degrees    T Axis 90 degrees    Diagnosis Line Sinus rhythm [id number] degree A-V block with Premature supraventricular  complexes  Incomplete right bundle branch block LAHB  Septal infarct , age undetermined    Abnormal ECG    Confirmed by KAVITHA PHILLIPS MD ([id number]) on 9/14/2022 7:24:56 AM (09-13-22 @ 19:48)      MEDICATIONS  allopurinol 100 Oral two times a day  aMIOdarone    Tablet 200 Oral two times a day  atorvastatin 20 Oral at bedtime  cefTRIAXone   IVPB 2000 IV Intermittent every 24 hours  chlorhexidine 2% Cloths 1 Topical <User Schedule>  doxycycline monohydrate Capsule 100 Oral every 12 hours  enoxaparin Injectable 90 SubCutaneous <User Schedule>  levothyroxine 175 Oral daily  lisinopril 10 Oral daily  metoprolol tartrate 25 Oral two times a day  pantoprazole    Tablet 40 Oral before breakfast      WEIGHT  Weight (kg): 94.2 (09-14-22 @ 08:41)  Creatinine, Serum: 1.4 mg/dL (09-16-22 @ 11:00)  Creatinine, Serum: 1.5 mg/dL (09-16-22 @ 06:30)      ANTIBIOTICS:  cefTRIAXone   IVPB 2000 milliGRAM(s) IV Intermittent every 24 hours  doxycycline monohydrate Capsule 100 milliGRAM(s) Oral every 12 hours      All available historical records have been reviewed

## 2022-09-16 NOTE — CHART NOTE - NSCHARTNOTEFT_GEN_A_CORE
Transfer Note    Transfer from: MICU    Transfer to: (  ) Medicine    ( x ) Telemetry     (  ) RCU       (  ) CEU    (  ) VENT                        (  ) Palliative    (  ) Stroke Unit    (  ) MICU    (  ) CCU    Signout given to:     ----------------------------------------------------------------------------------------------------------  HPI / ICU COURSE:    HPI:  82 yo female with PMHx of Paroxysmal AFib on Xarelto (s/p cardioversion 9/13/22), CHFpEF, CKD 3b, chronic microcytic anemia, FELIX (on CPAP) HTN, DLD, hypothyroidism (history of thyroid cancer s/p resection), hx hip fracture.  Pt presents to ED for difficulty breathing s/p cardioversion. Found to be hypoxic to 80s, placed on bipap.  History goes back to 9/13 AM, where patient had outpatient elective cardioversion with Dr. Quintero. Several hours Post procedure pt went to diner with her sister to eat lunch. While at the diner pt suddenly became dyspneic and experienced rigors. Denied cough, no nausea, vomiting, chest pain, palpitations.   In ED, afebrile. Tachypneic to 28, and hypoxic to 80s.  Labs done showed WBC 21.6, Hb 11.5, Mcv 77.7, INR 1.71, Cr 1.7, GFR 30, BUN 28, , Trop neg, BNP 1425 (similar to baseline); VBG lactate 4.2 (repeat 1.5)  CTA PE was performed which showed Right middle and lower lobe as well as left lower lobe lobar consolidations. Bilateral upper lobe patchy peribronchial vascular opacifications.  In ED Pt given Rocephin and azithromycin. Ativan 0.5 mg IV was also given.    On my exam this AM, pt was already transferred to [facility]. Pt was on bipap max settings since admission. Bipap was removed to conduct history and physical. Pt was found to be tachypneic >30, vitals were checked, saturation found to be in 70s. Pt was then placed back on to bip. D/w pulmonary fellow #[id number], approved for ICU monitoring.    in the ED,pt's VS T(C): 36.8 (09-14-22 @ 05:00), Max: 36.8 (09-14-22 @ 02:44)  HR: 60 (09-14-22 @ 05:00) (60 - 92)  BP: 128/62 (09-14-22 @ 05:00) (122/59 - 161/79)  RR: 18 (09-14-22 @ 05:00) (18 - 28)  SpO2: 98% (09-14-22 @ 02:44) (86% - 99%)    pt is admitted for workup/management bilateral pneumonia (14 Sep 2022 06:19)      --------------------------------------------------------------------------------------------------------  PMH/PSH:  PAST MEDICAL & SURGICAL HISTORY:  HTN (hypertension)    High cholesterol    Hypothyroid  s/p thyroid ca surgery    Afib  on xarelto    Sleep apnea    Osteoarthritis    CKD (chronic kidney disease) stage 3, GFR 30-59 ml/min    H/O thyroidectomy    S/P rotator cuff surgery        -------------------------------------------------------------------------------------------------------  PHYSICAL EXAM:  General: NAD  HEENT: Atraumatic  Respiratory: CTAB  Cardiac: RRR  Abdomen: soft, non-tender, non-distended  Extremities: warm and well-perfused  Neuro: A+Ox4. No FND    Vital Signs Last 24 Hrs  T(C): 36.5 (16 Sep 2022 09:00), Max: 36.5 (15 Sep 2022 20:00)  T(F): 97.7 (16 Sep 2022 09:00), Max: 97.7 (15 Sep 2022 20:00)  HR: 52 (16 Sep 2022 09:00) (52 - 66)  BP: 156/60 (16 Sep 2022 09:00) (116/71 - 161/67)  BP(mean): 91 (16 Sep 2022 09:00) (71 - 115)  RR: 28 (16 Sep 2022 09:00) (27 - 48)  SpO2: 98% (16 Sep 2022 09:00) (92% - 100%)    Parameters below as of 16 Sep 2022 03:00  Patient On (Oxygen Delivery Method): BiPAP/CPAP        I&O's Summary    15 Sep 2022 07:01  -  16 Sep 2022 07:00  --------------------------------------------------------  IN: 340 mL / OUT: 860 mL / NET: -520 mL        --------------------------------------------------------------------------------------------------------  LABS:   CARDIAC MARKERS ( 15 Sep 2022 12:00 )  x     / <0.01 ng/mL / x     / x     / x                                  9.5    17.01 )-----------( 297      ( 16 Sep 2022 06:30 )             32.0       09-16    148<H>  |  105  |  45<H>  ----------------------------<  180<H>  3.7   |  30  |  1.4    Ca    8.7      16 Sep 2022 11:00  Mg     2.1     09-16    TPro  5.5<L>  /  Alb  3.2<L>  /  TBili  0.5  /  DBili  x   /  AST  10  /  ALT  9   /  AlkPhos  100  09-16      PT/INR - ( 14 Sep 2022 16:11 )   PT: 19.30 sec;   INR: 1.69 ratio         PTT - ( 15 Sep 2022 07:19 )  PTT:105.5 sec        CULTURE RESULTS:                09-14-22 @ 08:40  Specimen Source: --  Method Type: --  Gram Stain - RRL: --  Gram Stain - Wound: --  Bacteria: --  Culture Results:   No growth to date.      Specimen Source:   Method Type:   Gram Stain:   Culture Results: Culture Results:   No growth to date. (09-14-22 @ 08:40)  Culture Results:   No growth to date. (09-14-22 @ 08:40)    Bacteria:       -------------------------------------------------------------------------------------------------  RADIOLOGY:      ---------------------------------------------------------------------------------------------------  ASSESSMENT & PLAN:     #Acute hypoxic respiratory failure, Possible CAP  -Wean O2, patient improving on High Flow, on 4L today vs 6L yday  -Procal 24, trending down  -d/c solumedrol  -Nasal MRSA negative  -Rocephin & Doxycycline  -CtA PE with R middle and lower lobe/ L lower lobe consolidations/ bilateral upper lobe patchy peribronchial vascular opacifications    #paroxysmal afib  -had FAITH cardioversion in 2019 by   -repeat cardioversion 9/13/22 prior to presentation  -EKG with sinus bradycardia  - c/w lopressor 50mg BID  - c/w xarelto  - c/w amiodarone 200mg daily  -F/u Cardio, Dr. Quintero  -Repeat Echo  -negative cardiac enzymes  - coags WNL    #CKD  -Trend Lytes  - Cr 1.7 similar to recent baseline  -D/C joaquin    #LE swelling  -r/o dvt  -cta pe negative  -f/u va duplex venous    #acute on chronic heart failure with preserved ejection fraction.  - tropx neg   - Lasix 40 IV given 9/16  - CTA : cardiomegaly  - NM stress test 5/22 normal  - echo: severe concentric LVH, grade 2 diastolic dysfunction, dilation of ascending aorta    #iron deficiency anemia  - f/u Iron studies   - PO iron    #H pylori positve on EGD +  - hgb 11.5 improved from recent baseline  - EGD /Colon by  on 4/29    #HTN  - c/w amlodipine 5mg  - benazepril 10mg qd    #Gout  - c/w allopurinol 100mg     #prolactinoma  - on cabergoline (every Monday    #HLD  - c.w lipitor 20mg qHS    #Hypothyroidism  - c/w levothyroxine 175mcg    #overactive bladder  - c/w home toriaz 4mg    dvt ppx d/c Heparin, start LMWH  gi ppx  PT: OOB to Chair  Diet: Full diet      FOR FOLLOW UP:  [ ] repeat CXRs  [ ] trend lytes  [ ] wean O2

## 2022-09-16 NOTE — PROGRESS NOTE ADULT - ASSESSMENT
81F with PMHx of paroxysmal afib (on Xarelto; s/p cardioversion 9/13), CHFpEF, CKD 3b, chronic microcytic anemia, FELIX (on CPAP, ) HTN, DLD, hypothyroidism (history of thyroid cancer s/p resection), gout, and hx hip fracture presents for difficulty breathing, rigors, and hypoxia after recent cardioversion.    #Acute Hypoxic Respiratory Failure  #Sepsis on admission (WBC>12, Tachypnea)   #Multifocal Pneuminia  - CT Angio Chest PE Protocol w/ IV Cont (09.13.22 @ 23:25):  Right and middle lower lobe, and left lower lobe consolidations,  compatible with pneumonia and/or pulmonary edema in the appropriate  clinical setting.  - MRSA PCR Result.: Negative: By: Real-Time PCR (Polymerase Reaction Method) (09.14.22 @ 16:11)  - Urine legionella negative  - Procalcitonin, Serum: 24.20 (09.14.22 @ 09:27)    #Atrial Fibrillation  #CKD Stage III    Recommendations  - continue ceftriaxone 2g daily   - continue doxycycline 100 mg BID   - repeat procalcitonin to help guide course   - O2 supplement per primary     Please call or message on [employer] Teams if with any questions.  Spectra [phone].

## 2022-09-17 LAB
ALBUMIN SERPL ELPH-MCNC: 3.2 G/DL — LOW (ref 3.5–5.2)
ALP SERPL-CCNC: 101 U/L — SIGNIFICANT CHANGE UP (ref 30–115)
ALT FLD-CCNC: 11 U/L — SIGNIFICANT CHANGE UP (ref 0–41)
ANION GAP SERPL CALC-SCNC: 13 MMOL/L — SIGNIFICANT CHANGE UP (ref 7–14)
AST SERPL-CCNC: 13 U/L — SIGNIFICANT CHANGE UP (ref 0–41)
BASOPHILS # BLD AUTO: 0.02 K/UL — SIGNIFICANT CHANGE UP (ref 0–0.2)
BASOPHILS NFR BLD AUTO: 0.2 % — SIGNIFICANT CHANGE UP (ref 0–1)
BILIRUB SERPL-MCNC: 0.8 MG/DL — SIGNIFICANT CHANGE UP (ref 0.2–1.2)
BUN SERPL-MCNC: 34 MG/DL — HIGH (ref 10–20)
CALCIUM SERPL-MCNC: 8.7 MG/DL — SIGNIFICANT CHANGE UP (ref 8.4–10.4)
CHLORIDE SERPL-SCNC: 106 MMOL/L — SIGNIFICANT CHANGE UP (ref 98–110)
CO2 SERPL-SCNC: 30 MMOL/L — SIGNIFICANT CHANGE UP (ref 17–32)
CREAT SERPL-MCNC: 1.3 MG/DL — SIGNIFICANT CHANGE UP (ref 0.7–1.5)
EGFR: 42 ML/MIN/1.73M2 — LOW
EOSINOPHIL # BLD AUTO: 0.11 K/UL — SIGNIFICANT CHANGE UP (ref 0–0.7)
EOSINOPHIL NFR BLD AUTO: 0.9 % — SIGNIFICANT CHANGE UP (ref 0–8)
GLUCOSE SERPL-MCNC: 155 MG/DL — HIGH (ref 70–99)
HCT VFR BLD CALC: 33.7 % — LOW (ref 37–47)
HGB BLD-MCNC: 9.9 G/DL — LOW (ref 12–16)
IMM GRANULOCYTES NFR BLD AUTO: 1.6 % — HIGH (ref 0.1–0.3)
LYMPHOCYTES # BLD AUTO: 0.95 K/UL — LOW (ref 1.2–3.4)
LYMPHOCYTES # BLD AUTO: 8 % — LOW (ref 20.5–51.1)
MAGNESIUM SERPL-MCNC: 1.8 MG/DL — SIGNIFICANT CHANGE UP (ref 1.8–2.4)
MCHC RBC-ENTMCNC: 22.6 PG — LOW (ref 27–31)
MCHC RBC-ENTMCNC: 29.4 G/DL — LOW (ref 32–37)
MCV RBC AUTO: 76.9 FL — LOW (ref 81–99)
MONOCYTES # BLD AUTO: 0.85 K/UL — HIGH (ref 0.1–0.6)
MONOCYTES NFR BLD AUTO: 7.1 % — SIGNIFICANT CHANGE UP (ref 1.7–9.3)
NEUTROPHILS # BLD AUTO: 9.78 K/UL — HIGH (ref 1.4–6.5)
NEUTROPHILS NFR BLD AUTO: 82.2 % — HIGH (ref 42.2–75.2)
NRBC # BLD: 0 /100 WBCS — SIGNIFICANT CHANGE UP (ref 0–0)
PLATELET # BLD AUTO: 262 K/UL — SIGNIFICANT CHANGE UP (ref 130–400)
POTASSIUM SERPL-MCNC: 4.2 MMOL/L — SIGNIFICANT CHANGE UP (ref 3.5–5)
POTASSIUM SERPL-SCNC: 4.2 MMOL/L — SIGNIFICANT CHANGE UP (ref 3.5–5)
PROT SERPL-MCNC: 5.5 G/DL — LOW (ref 6–8)
RBC # BLD: 4.38 M/UL — SIGNIFICANT CHANGE UP (ref 4.2–5.4)
RBC # FLD: 17.3 % — HIGH (ref 11.5–14.5)
SODIUM SERPL-SCNC: 149 MMOL/L — HIGH (ref 135–146)
WBC # BLD: 11.9 K/UL — HIGH (ref 4.8–10.8)
WBC # FLD AUTO: 11.9 K/UL — HIGH (ref 4.8–10.8)

## 2022-09-17 PROCEDURE — 99233 SBSQ HOSP IP/OBS HIGH 50: CPT

## 2022-09-17 PROCEDURE — 71045 X-RAY EXAM CHEST 1 VIEW: CPT | Mod: 26

## 2022-09-17 RX ORDER — SODIUM CHLORIDE 9 MG/ML
1000 INJECTION, SOLUTION INTRAVENOUS
Refills: 0 | Status: DISCONTINUED | OUTPATIENT
Start: 2022-09-17 | End: 2022-09-20

## 2022-09-17 RX ORDER — LISINOPRIL 2.5 MG/1
20 TABLET ORAL DAILY
Refills: 0 | Status: DISCONTINUED | OUTPATIENT
Start: 2022-09-17 | End: 2022-09-23

## 2022-09-17 RX ADMIN — Medication 20 MILLIEQUIVALENT(S): at 00:01

## 2022-09-17 RX ADMIN — CHLORHEXIDINE GLUCONATE 1 APPLICATION(S): 213 SOLUTION TOPICAL at 05:41

## 2022-09-17 RX ADMIN — AMIODARONE HYDROCHLORIDE 200 MILLIGRAM(S): 400 TABLET ORAL at 05:07

## 2022-09-17 RX ADMIN — Medication 650 MILLIGRAM(S): at 16:00

## 2022-09-17 RX ADMIN — Medication 100 MILLIGRAM(S): at 05:39

## 2022-09-17 RX ADMIN — ENOXAPARIN SODIUM 90 MILLIGRAM(S): 100 INJECTION SUBCUTANEOUS at 22:05

## 2022-09-17 RX ADMIN — Medication 25 MILLIGRAM(S): at 05:07

## 2022-09-17 RX ADMIN — ATORVASTATIN CALCIUM 20 MILLIGRAM(S): 80 TABLET, FILM COATED ORAL at 22:07

## 2022-09-17 RX ADMIN — Medication 100 MILLIGRAM(S): at 05:08

## 2022-09-17 RX ADMIN — Medication 650 MILLIGRAM(S): at 16:30

## 2022-09-17 RX ADMIN — PANTOPRAZOLE SODIUM 40 MILLIGRAM(S): 20 TABLET, DELAYED RELEASE ORAL at 06:46

## 2022-09-17 RX ADMIN — Medication 30 MILLILITER(S): at 22:09

## 2022-09-17 RX ADMIN — ENOXAPARIN SODIUM 90 MILLIGRAM(S): 100 INJECTION SUBCUTANEOUS at 10:57

## 2022-09-17 RX ADMIN — Medication 175 MICROGRAM(S): at 05:10

## 2022-09-17 RX ADMIN — LISINOPRIL 10 MILLIGRAM(S): 2.5 TABLET ORAL at 05:08

## 2022-09-17 RX ADMIN — CEFTRIAXONE 100 MILLIGRAM(S): 500 INJECTION, POWDER, FOR SOLUTION INTRAMUSCULAR; INTRAVENOUS at 12:08

## 2022-09-17 NOTE — PROGRESS NOTE ADULT - SUBJECTIVE AND OBJECTIVE BOX
INTERVAL HPI/OVERNIGHT EVENTS:    SUBJECTIVE: Patient seen and examined at bedside.   cc: hypoxia  no cp, abd pain, fever  +sob, nonproductive cough, no orthopnea, pnd    OBJECTIVE:    VITAL SIGNS:  Vital Signs Last 24 Hrs  T(C): 36.9 (17 Sep 2022 05:16), Max: 37.7 (16 Sep 2022 23:44)  T(F): 98.5 (17 Sep 2022 05:16), Max: 99.9 (16 Sep 2022 23:44)  HR: 68 (17 Sep 2022 10:00) (54 - 71)  BP: 177/81 (17 Sep 2022 10:00) (139/65 - 181/79)  BP(mean): 116 (17 Sep 2022 10:00) (96 - 116)  RR: 20 (16 Sep 2022 22:00) (20 - 50)  SpO2: 91% (17 Sep 2022 10:00) (91% - 98%)    Parameters below as of 16 Sep 2022 22:00  Patient On (Oxygen Delivery Method): nasal cannula  O2 Flow (L/min): 4        PHYSICAL EXAM:    General: NAD  HEENT: NC/AT; PERRL, clear conjunctiva  Neck: supple  Respiratory: bl crackles  Cardiovascular: +S1/S2; RRR  Abdomen: soft, NT/ND; +BS x4  Extremities: WWP, 2+ peripheral pulses b/l; no LE edema  Skin: normal color and turgor; no rash  Neurological:    MEDICATIONS:  MEDICATIONS  (STANDING):  allopurinol 100 milliGRAM(s) Oral two times a day  aMIOdarone    Tablet 200 milliGRAM(s) Oral two times a day  atorvastatin 20 milliGRAM(s) Oral at bedtime  cefTRIAXone   IVPB 2000 milliGRAM(s) IV Intermittent every 24 hours  chlorhexidine 2% Cloths 1 Application(s) Topical <User Schedule>  dextrose 5%. 1000 milliLiter(s) (50 mL/Hr) IV Continuous <Continuous>  doxycycline monohydrate Capsule 100 milliGRAM(s) Oral every 12 hours  enoxaparin Injectable 90 milliGRAM(s) SubCutaneous <User Schedule>  levothyroxine 175 MICROGram(s) Oral daily  lisinopril 20 milliGRAM(s) Oral daily  metoprolol tartrate 25 milliGRAM(s) Oral two times a day  pantoprazole    Tablet 40 milliGRAM(s) Oral before breakfast    MEDICATIONS  (PRN):  acetaminophen     Tablet .. 650 milliGRAM(s) Oral every 6 hours PRN Temp greater or equal to 38C (100.4F), Mild Pain (1 - 3)  aluminum hydroxide/magnesium hydroxide/simethicone Suspension 30 milliLiter(s) Oral every 4 hours PRN Dyspepsia  melatonin 3 milliGRAM(s) Oral at bedtime PRN Insomnia  ondansetron Injectable 4 milliGRAM(s) IV Push every 8 hours PRN Nausea and/or Vomiting      ALLERGIES:  Allergies    No Known Allergies    Intolerances        LABS:                        9.9    11.90 )-----------( 262      ( 17 Sep 2022 05:54 )             33.7     Hemoglobin: 9.9 g/dL (09-17 @ 05:54)  Hemoglobin: 9.4 g/dL (09-16 @ 17:46)  Hemoglobin: 9.5 g/dL (09-16 @ 06:30)  Hemoglobin: 9.3 g/dL (09-15 @ 05:45)  Hemoglobin: 9.7 g/dL (09-14 @ 09:36)    CBC Full  -  ( 17 Sep 2022 05:54 )  WBC Count : 11.90 K/uL  RBC Count : 4.38 M/uL  Hemoglobin : 9.9 g/dL  Hematocrit : 33.7 %  Platelet Count - Automated : 262 K/uL  Mean Cell Volume : 76.9 fL  Mean Cell Hemoglobin : 22.6 pg  Mean Cell Hemoglobin Concentration : 29.4 g/dL  Auto Neutrophil # : 9.78 K/uL  Auto Lymphocyte # : 0.95 K/uL  Auto Monocyte # : 0.85 K/uL  Auto Eosinophil # : 0.11 K/uL  Auto Basophil # : 0.02 K/uL  Auto Neutrophil % : 82.2 %  Auto Lymphocyte % : 8.0 %  Auto Monocyte % : 7.1 %  Auto Eosinophil % : 0.9 %  Auto Basophil % : 0.2 %    09-17    149<H>  |  106  |  34<H>  ----------------------------<  155<H>  4.2   |  30  |  1.3    Ca    8.7      17 Sep 2022 05:54  Mg     1.8     09-17    TPro  5.5<L>  /  Alb  3.2<L>  /  TBili  0.8  /  DBili  x   /  AST  13  /  ALT  11  /  AlkPhos  101  09-17    Creatinine Trend: 1.3<--, 1.4<--, 1.4<--, 1.5<--, 1.5<--, 1.6<--  LIVER FUNCTIONS - ( 17 Sep 2022 05:54 )  Alb: 3.2 g/dL / Pro: 5.5 g/dL / ALK PHOS: 101 U/L / ALT: 11 U/L / AST: 13 U/L / GGT: x           PT/INR - ( 16 Sep 2022 17:45 )   PT: 12.70 sec;   INR: 1.11 ratio         PTT - ( 16 Sep 2022 17:45 )  PTT:24.5 sec    hs Troponin:              CSF:                      EKG:   MICROBIOLOGY:    IMAGING:      Labs, imaging, EKG personally reviewed    RADIOLOGY & ADDITIONAL TESTS: Reviewed.

## 2022-09-17 NOTE — PROGRESS NOTE ADULT - ASSESSMENT
IMPRESSION:    Acute hypoxic respiratory failure improving   Probable CAP  Afib SP cardioversion  HO CKD   HO Severe FELIX on APAP     PLAN:    CNS: Avoid CNS depressants    HEENT: Oral care    PULMONARY:  HOB @ 45 degrees. Supplemental O2 target Spo2 90-95.  NIV during sleep.  Incentive spirometry     CARDIOVASCULAR: Avoid fluid overload.     GI: GI prophylaxis.  Feeding.      RENAL:  Follow up lytes.  Correct as needed    INFECTIOUS DISEASE: Follow up cultures.  Finish Rocephin and Doxy course.  Total 7 days.    HEMATOLOGICAL:  DVT prophylaxis.  Dimer noted.  Continue LMWH     ENDOCRINE:  Follow up FS.  Insulin protocol if needed.    MUSCULOSKELETAL: Bed rest    OOB to chair     DW Daughter     SDU Today

## 2022-09-18 LAB
ANION GAP SERPL CALC-SCNC: 9 MMOL/L — SIGNIFICANT CHANGE UP (ref 7–14)
BUN SERPL-MCNC: 22 MG/DL — HIGH (ref 10–20)
CALCIUM SERPL-MCNC: 8.5 MG/DL — SIGNIFICANT CHANGE UP (ref 8.4–10.5)
CHLORIDE SERPL-SCNC: 103 MMOL/L — SIGNIFICANT CHANGE UP (ref 98–110)
CO2 SERPL-SCNC: 30 MMOL/L — SIGNIFICANT CHANGE UP (ref 17–32)
CREAT SERPL-MCNC: 1 MG/DL — SIGNIFICANT CHANGE UP (ref 0.7–1.5)
EGFR: 57 ML/MIN/1.73M2 — LOW
GLUCOSE SERPL-MCNC: 121 MG/DL — HIGH (ref 70–99)
HCT VFR BLD CALC: 31.1 % — LOW (ref 37–47)
HGB BLD-MCNC: 9.4 G/DL — LOW (ref 12–16)
MCHC RBC-ENTMCNC: 23 PG — LOW (ref 27–31)
MCHC RBC-ENTMCNC: 30.2 G/DL — LOW (ref 32–37)
MCV RBC AUTO: 76 FL — LOW (ref 81–99)
NRBC # BLD: 0 /100 WBCS — SIGNIFICANT CHANGE UP (ref 0–0)
PLATELET # BLD AUTO: 262 K/UL — SIGNIFICANT CHANGE UP (ref 130–400)
POTASSIUM SERPL-MCNC: 3.8 MMOL/L — SIGNIFICANT CHANGE UP (ref 3.5–5)
POTASSIUM SERPL-SCNC: 3.8 MMOL/L — SIGNIFICANT CHANGE UP (ref 3.5–5)
RBC # BLD: 4.09 M/UL — LOW (ref 4.2–5.4)
RBC # FLD: 17 % — HIGH (ref 11.5–14.5)
SODIUM SERPL-SCNC: 142 MMOL/L — SIGNIFICANT CHANGE UP (ref 135–146)
WBC # BLD: 10.18 K/UL — SIGNIFICANT CHANGE UP (ref 4.8–10.8)
WBC # FLD AUTO: 10.18 K/UL — SIGNIFICANT CHANGE UP (ref 4.8–10.8)

## 2022-09-18 PROCEDURE — 99233 SBSQ HOSP IP/OBS HIGH 50: CPT

## 2022-09-18 RX ORDER — COLCHICINE 0.6 MG
0.6 TABLET ORAL ONCE
Refills: 0 | Status: COMPLETED | OUTPATIENT
Start: 2022-09-18 | End: 2022-09-18

## 2022-09-18 RX ORDER — COLCHICINE 0.6 MG
1.2 TABLET ORAL ONCE
Refills: 0 | Status: COMPLETED | OUTPATIENT
Start: 2022-09-18 | End: 2022-09-18

## 2022-09-18 RX ORDER — HYDROMORPHONE HYDROCHLORIDE 2 MG/ML
0.5 INJECTION INTRAMUSCULAR; INTRAVENOUS; SUBCUTANEOUS ONCE
Refills: 0 | Status: DISCONTINUED | OUTPATIENT
Start: 2022-09-18 | End: 2022-09-18

## 2022-09-18 RX ORDER — MORPHINE SULFATE 50 MG/1
2 CAPSULE, EXTENDED RELEASE ORAL ONCE
Refills: 0 | Status: DISCONTINUED | OUTPATIENT
Start: 2022-09-18 | End: 2022-09-18

## 2022-09-18 RX ADMIN — SODIUM CHLORIDE 50 MILLILITER(S): 9 INJECTION, SOLUTION INTRAVENOUS at 20:42

## 2022-09-18 RX ADMIN — ENOXAPARIN SODIUM 90 MILLIGRAM(S): 100 INJECTION SUBCUTANEOUS at 09:54

## 2022-09-18 RX ADMIN — Medication 100 MILLIGRAM(S): at 07:20

## 2022-09-18 RX ADMIN — Medication 100 MILLIGRAM(S): at 07:23

## 2022-09-18 RX ADMIN — ATORVASTATIN CALCIUM 20 MILLIGRAM(S): 80 TABLET, FILM COATED ORAL at 21:23

## 2022-09-18 RX ADMIN — PANTOPRAZOLE SODIUM 40 MILLIGRAM(S): 20 TABLET, DELAYED RELEASE ORAL at 07:17

## 2022-09-18 RX ADMIN — Medication 1.2 MILLIGRAM(S): at 09:54

## 2022-09-18 RX ADMIN — ENOXAPARIN SODIUM 90 MILLIGRAM(S): 100 INJECTION SUBCUTANEOUS at 21:23

## 2022-09-18 RX ADMIN — Medication 650 MILLIGRAM(S): at 08:34

## 2022-09-18 RX ADMIN — Medication 650 MILLIGRAM(S): at 02:01

## 2022-09-18 RX ADMIN — Medication 100 MILLIGRAM(S): at 19:02

## 2022-09-18 RX ADMIN — CEFTRIAXONE 100 MILLIGRAM(S): 500 INJECTION, POWDER, FOR SOLUTION INTRAMUSCULAR; INTRAVENOUS at 09:53

## 2022-09-18 RX ADMIN — AMIODARONE HYDROCHLORIDE 200 MILLIGRAM(S): 400 TABLET ORAL at 19:02

## 2022-09-18 RX ADMIN — LISINOPRIL 20 MILLIGRAM(S): 2.5 TABLET ORAL at 07:17

## 2022-09-18 RX ADMIN — AMIODARONE HYDROCHLORIDE 200 MILLIGRAM(S): 400 TABLET ORAL at 07:17

## 2022-09-18 RX ADMIN — CHLORHEXIDINE GLUCONATE 1 APPLICATION(S): 213 SOLUTION TOPICAL at 07:18

## 2022-09-18 RX ADMIN — Medication 175 MICROGRAM(S): at 07:18

## 2022-09-18 RX ADMIN — HYDROMORPHONE HYDROCHLORIDE 0.5 MILLIGRAM(S): 2 INJECTION INTRAMUSCULAR; INTRAVENOUS; SUBCUTANEOUS at 23:01

## 2022-09-18 RX ADMIN — MORPHINE SULFATE 2 MILLIGRAM(S): 50 CAPSULE, EXTENDED RELEASE ORAL at 05:15

## 2022-09-18 RX ADMIN — Medication 650 MILLIGRAM(S): at 20:42

## 2022-09-18 RX ADMIN — Medication 25 MILLIGRAM(S): at 07:17

## 2022-09-18 RX ADMIN — Medication 25 MILLIGRAM(S): at 19:02

## 2022-09-18 RX ADMIN — HYDROMORPHONE HYDROCHLORIDE 0.5 MILLIGRAM(S): 2 INJECTION INTRAMUSCULAR; INTRAVENOUS; SUBCUTANEOUS at 23:31

## 2022-09-18 RX ADMIN — Medication 650 MILLIGRAM(S): at 13:55

## 2022-09-18 RX ADMIN — HYDROMORPHONE HYDROCHLORIDE 0.5 MILLIGRAM(S): 2 INJECTION INTRAMUSCULAR; INTRAVENOUS; SUBCUTANEOUS at 10:06

## 2022-09-18 RX ADMIN — Medication 650 MILLIGRAM(S): at 07:14

## 2022-09-18 RX ADMIN — HYDROMORPHONE HYDROCHLORIDE 0.5 MILLIGRAM(S): 2 INJECTION INTRAMUSCULAR; INTRAVENOUS; SUBCUTANEOUS at 10:20

## 2022-09-18 RX ADMIN — Medication 3 MILLIGRAM(S): at 00:49

## 2022-09-18 RX ADMIN — Medication 650 MILLIGRAM(S): at 21:12

## 2022-09-18 NOTE — PROGRESS NOTE ADULT - ASSESSMENT
IMPRESSION:    Acute hypoxic respiratory failure improving   Probable CAP  Afib SP cardioversion  HO CKD   HO Severe FELIX on APAP     PLAN:    CNS: Avoid CNS depressants    HEENT: Oral care    PULMONARY:  HOB @ 45 degrees. Supplemental O2 target Spo2 90-95.  Continue NIV during sleep.  Incentive spirometry.  Repeat CXR    CARDIOVASCULAR: Avoid fluid overload.     GI: GI prophylaxis.  Feeding.      RENAL:  Follow up lytes.  Correct as needed    INFECTIOUS DISEASE: Follow up cultures.  Finish Rocephin and Doxy course.  Total 7 days.    HEMATOLOGICAL:  DVT prophylaxis.  Dimer noted.  Continue LMWH     ENDOCRINE:  Follow up FS.  Insulin protocol if needed.    MUSCULOSKELETAL: Bed rest    OOB to chair.  PT OT     SDU Today           IMPRESSION:    Acute hypoxic respiratory failure improving   Probable CAP improving   Afib SP cardioversion  HO CKD   HO Severe FELIX on APAP     PLAN:    CNS: Avoid CNS depressants    HEENT: Oral care    PULMONARY:  HOB @ 45 degrees. Supplemental O2 target Spo2 90-95.  Continue NIV during sleep.  Incentive spirometry.  Repeat CXR today     CARDIOVASCULAR: Avoid fluid overload. Free H2O to correct free H2O deficit     GI: GI prophylaxis.  Feeding.      RENAL:  Follow up lytes.  Correct as needed    INFECTIOUS DISEASE: Follow up cultures.  Finish Rocephin and Doxy course.  Total 7 days.    HEMATOLOGICAL:  DVT prophylaxis.  Duplex negative.  Continue LMWH.      ENDOCRINE:  Follow up FS.  Insulin protocol if needed.    MUSCULOSKELETAL: OOB to chair.  Pain control.  Solumedrol 40 mg daily     OOB to chair.  PT OT     Downgrade to med surg

## 2022-09-18 NOTE — PROGRESS NOTE ADULT - SUBJECTIVE AND OBJECTIVE BOX
Patient is a 81y old  Female who presents with a chief complaint of PNA (17 Sep 2022 13:05)        Over Night Events:        ROS:     All ROS are negative except HPI         PHYSICAL EXAM    ICU Vital Signs Last 24 Hrs  T(C): 35.7 (17 Sep 2022 23:39), Max: 35.9 (17 Sep 2022 19:52)  T(F): 96.3 (17 Sep 2022 23:39), Max: 96.7 (17 Sep 2022 19:52)  HR: 59 (18 Sep 2022 00:30) (59 - 68)  BP: 169/65 (17 Sep 2022 23:39) (153/65 - 177/81)  BP(mean): 94 (17 Sep 2022 17:51) (94 - 116)  ABP: --  ABP(mean): --  RR: 18 (17 Sep 2022 15:05) (18 - 18)  SpO2: 92% (18 Sep 2022 00:30) (91% - 93%)    O2 Parameters below as of 17 Sep 2022 23:39  Patient On (Oxygen Delivery Method): nasal cannula  O2 Flow (L/min): 4  O2 Concentration (%): 36        CONSTITUTIONAL:   NAD    ENT:   Airway patent,   Mouth with normal mucosa.       EYES:   Pupils equal,   Round and reactive to light.    CARDIAC:   Normal rate,   Regular rhythm.        RESPIRATORY:   No wheezing  Bilateral Crackles   Normal chest expansion  Not tachypneic,  No use of accessory muscles    GASTROINTESTINAL:  Abdomen soft,   Non-tender,   No guarding,   + BS    MUSCULOSKELETAL:   Range of motion is not limited,  No clubbing, cyanosis    NEUROLOGICAL:   Alert and oriented   No motor  deficits.    SKIN:   Skin normal color for race,   Warm and dry and intact.   No evidence of rash.    PSYCHIATRIC:   Normal mood and affect.   No apparent risk to self or others.      09-17-22 @ 07:01  -  09-18-22 @ 07:00  --------------------------------------------------------  IN:    dextrose 5%: 50 mL    Oral Fluid: 240 mL  Total IN: 290 mL    OUT:    Voided (mL): 1300 mL  Total OUT: 1300 mL    Total NET: -1010 mL          LABS:                            9.4    10.18 )-----------( 262      ( 18 Sep 2022 05:33 )             31.1                                               09-17    149<H>  |  106  |  34<H>  ----------------------------<  155<H>  4.2   |  30  |  1.3    Ca    8.7      17 Sep 2022 05:54  Mg     1.8     09-17    TPro  5.5<L>  /  Alb  3.2<L>  /  TBili  0.8  /  DBili  x   /  AST  13  /  ALT  11  /  AlkPhos  101  09-17      PT/INR - ( 16 Sep 2022 17:45 )   PT: 12.70 sec;   INR: 1.11 ratio         PTT - ( 16 Sep 2022 17:45 )  PTT:24.5 sec                                           CARDIAC MARKERS ( 16 Sep 2022 17:46 )  x     / <0.01 ng/mL / x     / x     / x                                                LIVER FUNCTIONS - ( 17 Sep 2022 05:54 )  Alb: 3.2 g/dL / Pro: 5.5 g/dL / ALK PHOS: 101 U/L / ALT: 11 U/L / AST: 13 U/L / GGT: x                                                                                                                                       MEDICATIONS  (STANDING):  allopurinol 100 milliGRAM(s) Oral two times a day  aMIOdarone    Tablet 200 milliGRAM(s) Oral two times a day  atorvastatin 20 milliGRAM(s) Oral at bedtime  cefTRIAXone   IVPB 2000 milliGRAM(s) IV Intermittent every 24 hours  chlorhexidine 2% Cloths 1 Application(s) Topical <User Schedule>  dextrose 5%. 1000 milliLiter(s) (50 mL/Hr) IV Continuous <Continuous>  doxycycline monohydrate Capsule 100 milliGRAM(s) Oral every 12 hours  enoxaparin Injectable 90 milliGRAM(s) SubCutaneous <User Schedule>  levothyroxine 175 MICROGram(s) Oral daily  lisinopril 20 milliGRAM(s) Oral daily  metoprolol tartrate 25 milliGRAM(s) Oral two times a day  pantoprazole    Tablet 40 milliGRAM(s) Oral before breakfast    MEDICATIONS  (PRN):  acetaminophen     Tablet .. 650 milliGRAM(s) Oral every 6 hours PRN Temp greater or equal to 38C (100.4F), Mild Pain (1 - 3)  aluminum hydroxide/magnesium hydroxide/simethicone Suspension 30 milliLiter(s) Oral every 4 hours PRN Dyspepsia  melatonin 3 milliGRAM(s) Oral at bedtime PRN Insomnia  ondansetron Injectable 4 milliGRAM(s) IV Push every 8 hours PRN Nausea and/or Vomiting      New X-rays reviewed:                                                                                  ECHO     Patient is a 81y old  Female who presents with a chief complaint of PNA (17 Sep 2022 13:05)        Over Night Events:  Feels better respiratory wise.  Complaining of left foot pain.  HO gout          ROS:     All ROS are negative except HPI         PHYSICAL EXAM    ICU Vital Signs Last 24 Hrs  T(C): 35.7 (17 Sep 2022 23:39), Max: 35.9 (17 Sep 2022 19:52)  T(F): 96.3 (17 Sep 2022 23:39), Max: 96.7 (17 Sep 2022 19:52)  HR: 59 (18 Sep 2022 00:30) (59 - 68)  BP: 169/65 (17 Sep 2022 23:39) (153/65 - 177/81)  BP(mean): 94 (17 Sep 2022 17:51) (94 - 116)  ABP: --  ABP(mean): --  RR: 18 (17 Sep 2022 15:05) (18 - 18)  SpO2: 92% (18 Sep 2022 00:30) (91% - 93%)    O2 Parameters below as of 17 Sep 2022 23:39  Patient On (Oxygen Delivery Method): nasal cannula  O2 Flow (L/min): 4  O2 Concentration (%): 36        CONSTITUTIONAL:   NAD    ENT:   Airway patent,   Mouth with normal mucosa.       EYES:   Pupils equal,   Round and reactive to light.    CARDIAC:   Normal rate,   Regular rhythm.        RESPIRATORY:   No wheezing  Bilateral Crackles   Normal chest expansion  Not tachypneic,  No use of accessory muscles    GASTROINTESTINAL:  Abdomen soft,   Non-tender,   No guarding,   + BS    MUSCULOSKELETAL:   Range of motion is not limited,  No clubbing, cyanosis    NEUROLOGICAL:   Alert and oriented   No motor  deficits.    SKIN:   Skin normal color for race,   Warm and dry and intact.   No evidence of rash.    PSYCHIATRIC:   Normal mood and affect.   No apparent risk to self or others.      09-17-22 @ 07:01  -  09-18-22 @ 07:00  --------------------------------------------------------  IN:    dextrose 5%: 50 mL    Oral Fluid: 240 mL  Total IN: 290 mL    OUT:    Voided (mL): 1300 mL  Total OUT: 1300 mL    Total NET: -1010 mL          LABS:                            9.4    10.18 )-----------( 262      ( 18 Sep 2022 05:33 )             31.1                                               09-17    149<H>  |  106  |  34<H>  ----------------------------<  155<H>  4.2   |  30  |  1.3    Ca    8.7      17 Sep 2022 05:54  Mg     1.8     09-17    TPro  5.5<L>  /  Alb  3.2<L>  /  TBili  0.8  /  DBili  x   /  AST  13  /  ALT  11  /  AlkPhos  101  09-17      PT/INR - ( 16 Sep 2022 17:45 )   PT: 12.70 sec;   INR: 1.11 ratio         PTT - ( 16 Sep 2022 17:45 )  PTT:24.5 sec                                           CARDIAC MARKERS ( 16 Sep 2022 17:46 )  x     / <0.01 ng/mL / x     / x     / x                                                LIVER FUNCTIONS - ( 17 Sep 2022 05:54 )  Alb: 3.2 g/dL / Pro: 5.5 g/dL / ALK PHOS: 101 U/L / ALT: 11 U/L / AST: 13 U/L / GGT: x                                                                                                                                       MEDICATIONS  (STANDING):  allopurinol 100 milliGRAM(s) Oral two times a day  aMIOdarone    Tablet 200 milliGRAM(s) Oral two times a day  atorvastatin 20 milliGRAM(s) Oral at bedtime  cefTRIAXone   IVPB 2000 milliGRAM(s) IV Intermittent every 24 hours  chlorhexidine 2% Cloths 1 Application(s) Topical <User Schedule>  dextrose 5%. 1000 milliLiter(s) (50 mL/Hr) IV Continuous <Continuous>  doxycycline monohydrate Capsule 100 milliGRAM(s) Oral every 12 hours  enoxaparin Injectable 90 milliGRAM(s) SubCutaneous <User Schedule>  levothyroxine 175 MICROGram(s) Oral daily  lisinopril 20 milliGRAM(s) Oral daily  metoprolol tartrate 25 milliGRAM(s) Oral two times a day  pantoprazole    Tablet 40 milliGRAM(s) Oral before breakfast    MEDICATIONS  (PRN):  acetaminophen     Tablet .. 650 milliGRAM(s) Oral every 6 hours PRN Temp greater or equal to 38C (100.4F), Mild Pain (1 - 3)  aluminum hydroxide/magnesium hydroxide/simethicone Suspension 30 milliLiter(s) Oral every 4 hours PRN Dyspepsia  melatonin 3 milliGRAM(s) Oral at bedtime PRN Insomnia  ondansetron Injectable 4 milliGRAM(s) IV Push every 8 hours PRN Nausea and/or Vomiting      New X-rays reviewed:                                                                                  ECHO

## 2022-09-18 NOTE — PROGRESS NOTE ADULT - SUBJECTIVE AND OBJECTIVE BOX
INTERVAL HPI/OVERNIGHT EVENTS:    SUBJECTIVE: Patient seen and examined at bedside.     cc: sob  no cp, sob, abd pain, fever  no sob ,orthopnea, pnd, cough    OBJECTIVE:    VITAL SIGNS:  Vital Signs Last 24 Hrs  T(C): 35.7 (17 Sep 2022 23:39), Max: 35.9 (17 Sep 2022 19:52)  T(F): 96.3 (17 Sep 2022 23:39), Max: 96.7 (17 Sep 2022 19:52)  HR: 59 (18 Sep 2022 00:30) (59 - 68)  BP: 169/65 (17 Sep 2022 23:39) (153/65 - 177/81)  BP(mean): 94 (17 Sep 2022 17:51) (94 - 116)  RR: 18 (17 Sep 2022 15:05) (18 - 18)  SpO2: 92% (18 Sep 2022 00:30) (91% - 93%)    Parameters below as of 17 Sep 2022 23:39  Patient On (Oxygen Delivery Method): nasal cannula  O2 Flow (L/min): 4  O2 Concentration (%): 36      PHYSICAL EXAM:    General: NAD  HEENT: NC/AT; PERRL, clear conjunctiva  Neck: supple  Respiratory: bl crackles  Cardiovascular: +S1/S2; RRR  Abdomen: soft, NT/ND; +BS x4  Extremities: WWP, 2+ peripheral pulses b/l; no LE edema  Skin: normal color and turgor; no rash  Neurological:    MEDICATIONS:  MEDICATIONS  (STANDING):  allopurinol 100 milliGRAM(s) Oral two times a day  aMIOdarone    Tablet 200 milliGRAM(s) Oral two times a day  atorvastatin 20 milliGRAM(s) Oral at bedtime  cefTRIAXone   IVPB 2000 milliGRAM(s) IV Intermittent every 24 hours  chlorhexidine 2% Cloths 1 Application(s) Topical <User Schedule>  colchicine 1.2 milliGRAM(s) Oral once  dextrose 5%. 1000 milliLiter(s) (50 mL/Hr) IV Continuous <Continuous>  doxycycline monohydrate Capsule 100 milliGRAM(s) Oral every 12 hours  enoxaparin Injectable 90 milliGRAM(s) SubCutaneous <User Schedule>  levothyroxine 175 MICROGram(s) Oral daily  lisinopril 20 milliGRAM(s) Oral daily  metoprolol tartrate 25 milliGRAM(s) Oral two times a day  pantoprazole    Tablet 40 milliGRAM(s) Oral before breakfast    MEDICATIONS  (PRN):  acetaminophen     Tablet .. 650 milliGRAM(s) Oral every 6 hours PRN Temp greater or equal to 38C (100.4F), Mild Pain (1 - 3)  aluminum hydroxide/magnesium hydroxide/simethicone Suspension 30 milliLiter(s) Oral every 4 hours PRN Dyspepsia  melatonin 3 milliGRAM(s) Oral at bedtime PRN Insomnia  ondansetron Injectable 4 milliGRAM(s) IV Push every 8 hours PRN Nausea and/or Vomiting      ALLERGIES:  Allergies    No Known Allergies    Intolerances        LABS:                        9.4    10.18 )-----------( 262      ( 18 Sep 2022 05:33 )             31.1     Hemoglobin: 9.4 g/dL (09-18 @ 05:33)  Hemoglobin: 9.9 g/dL (09-17 @ 05:54)  Hemoglobin: 9.4 g/dL (09-16 @ 17:46)  Hemoglobin: 9.5 g/dL (09-16 @ 06:30)  Hemoglobin: 9.3 g/dL (09-15 @ 05:45)    CBC Full  -  ( 18 Sep 2022 05:33 )  WBC Count : 10.18 K/uL  RBC Count : 4.09 M/uL  Hemoglobin : 9.4 g/dL  Hematocrit : 31.1 %  Platelet Count - Automated : 262 K/uL  Mean Cell Volume : 76.0 fL  Mean Cell Hemoglobin : 23.0 pg  Mean Cell Hemoglobin Concentration : 30.2 g/dL  Auto Neutrophil # : x  Auto Lymphocyte # : x  Auto Monocyte # : x  Auto Eosinophil # : x  Auto Basophil # : x  Auto Neutrophil % : x  Auto Lymphocyte % : x  Auto Monocyte % : x  Auto Eosinophil % : x  Auto Basophil % : x    09-17    149<H>  |  106  |  34<H>  ----------------------------<  155<H>  4.2   |  30  |  1.3    Ca    8.7      17 Sep 2022 05:54  Mg     1.8     09-17    TPro  5.5<L>  /  Alb  3.2<L>  /  TBili  0.8  /  DBili  x   /  AST  13  /  ALT  11  /  AlkPhos  101  09-17    Creatinine Trend: 1.3<--, 1.4<--, 1.4<--, 1.5<--, 1.5<--, 1.6<--  LIVER FUNCTIONS - ( 17 Sep 2022 05:54 )  Alb: 3.2 g/dL / Pro: 5.5 g/dL / ALK PHOS: 101 U/L / ALT: 11 U/L / AST: 13 U/L / GGT: x           PT/INR - ( 16 Sep 2022 17:45 )   PT: 12.70 sec;   INR: 1.11 ratio         PTT - ( 16 Sep 2022 17:45 )  PTT:24.5 sec    hs Troponin:              CSF:                      EKG:   MICROBIOLOGY:    IMAGING:      Labs, imaging, EKG personally reviewed    RADIOLOGY & ADDITIONAL TESTS: Reviewed.

## 2022-09-19 LAB
ALBUMIN SERPL ELPH-MCNC: 3.1 G/DL — LOW (ref 3.5–5.2)
ALP SERPL-CCNC: 102 U/L — SIGNIFICANT CHANGE UP (ref 30–115)
ALT FLD-CCNC: 14 U/L — SIGNIFICANT CHANGE UP (ref 0–41)
ANION GAP SERPL CALC-SCNC: 14 MMOL/L — SIGNIFICANT CHANGE UP (ref 7–14)
AST SERPL-CCNC: 16 U/L — SIGNIFICANT CHANGE UP (ref 0–41)
BILIRUB SERPL-MCNC: 0.4 MG/DL — SIGNIFICANT CHANGE UP (ref 0.2–1.2)
BUN SERPL-MCNC: 24 MG/DL — HIGH (ref 10–20)
CALCIUM SERPL-MCNC: 8.3 MG/DL — LOW (ref 8.4–10.5)
CHLORIDE SERPL-SCNC: 107 MMOL/L — SIGNIFICANT CHANGE UP (ref 98–110)
CO2 SERPL-SCNC: 26 MMOL/L — SIGNIFICANT CHANGE UP (ref 17–32)
CREAT SERPL-MCNC: 1 MG/DL — SIGNIFICANT CHANGE UP (ref 0.7–1.5)
CULTURE RESULTS: SIGNIFICANT CHANGE UP
CULTURE RESULTS: SIGNIFICANT CHANGE UP
EGFR: 57 ML/MIN/1.73M2 — LOW
GLUCOSE SERPL-MCNC: 125 MG/DL — HIGH (ref 70–99)
HCT VFR BLD CALC: 32.6 % — LOW (ref 37–47)
HGB BLD-MCNC: 9.6 G/DL — LOW (ref 12–16)
MAGNESIUM SERPL-MCNC: 1.7 MG/DL — LOW (ref 1.8–2.4)
MCHC RBC-ENTMCNC: 23 PG — LOW (ref 27–31)
MCHC RBC-ENTMCNC: 29.4 G/DL — LOW (ref 32–37)
MCV RBC AUTO: 78.2 FL — LOW (ref 81–99)
NRBC # BLD: 0 /100 WBCS — SIGNIFICANT CHANGE UP (ref 0–0)
PLATELET # BLD AUTO: 306 K/UL — SIGNIFICANT CHANGE UP (ref 130–400)
POTASSIUM SERPL-MCNC: 4.5 MMOL/L — SIGNIFICANT CHANGE UP (ref 3.5–5)
POTASSIUM SERPL-SCNC: 4.5 MMOL/L — SIGNIFICANT CHANGE UP (ref 3.5–5)
PROCALCITONIN SERPL-MCNC: 2.1 NG/ML — HIGH (ref 0.02–0.1)
PROT SERPL-MCNC: 5.2 G/DL — LOW (ref 6–8)
RBC # BLD: 4.17 M/UL — LOW (ref 4.2–5.4)
RBC # FLD: 17 % — HIGH (ref 11.5–14.5)
SODIUM SERPL-SCNC: 147 MMOL/L — HIGH (ref 135–146)
SPECIMEN SOURCE: SIGNIFICANT CHANGE UP
SPECIMEN SOURCE: SIGNIFICANT CHANGE UP
WBC # BLD: 12.44 K/UL — HIGH (ref 4.8–10.8)
WBC # FLD AUTO: 12.44 K/UL — HIGH (ref 4.8–10.8)

## 2022-09-19 PROCEDURE — 71045 X-RAY EXAM CHEST 1 VIEW: CPT | Mod: 26

## 2022-09-19 PROCEDURE — 99233 SBSQ HOSP IP/OBS HIGH 50: CPT

## 2022-09-19 RX ORDER — COLCHICINE 0.6 MG
0.6 TABLET ORAL
Refills: 0 | Status: DISCONTINUED | OUTPATIENT
Start: 2022-09-19 | End: 2022-09-23

## 2022-09-19 RX ORDER — RIVAROXABAN 15 MG-20MG
20 KIT ORAL
Refills: 0 | Status: DISCONTINUED | OUTPATIENT
Start: 2022-09-19 | End: 2022-09-23

## 2022-09-19 RX ORDER — TRAMADOL HYDROCHLORIDE 50 MG/1
50 TABLET ORAL EVERY 8 HOURS
Refills: 0 | Status: DISCONTINUED | OUTPATIENT
Start: 2022-09-19 | End: 2022-09-23

## 2022-09-19 RX ORDER — MAGNESIUM SULFATE 500 MG/ML
2 VIAL (ML) INJECTION ONCE
Refills: 0 | Status: COMPLETED | OUTPATIENT
Start: 2022-09-19 | End: 2022-09-19

## 2022-09-19 RX ADMIN — Medication 25 MILLIGRAM(S): at 05:25

## 2022-09-19 RX ADMIN — Medication 25 GRAM(S): at 14:15

## 2022-09-19 RX ADMIN — CEFTRIAXONE 100 MILLIGRAM(S): 500 INJECTION, POWDER, FOR SOLUTION INTRAMUSCULAR; INTRAVENOUS at 10:20

## 2022-09-19 RX ADMIN — PANTOPRAZOLE SODIUM 40 MILLIGRAM(S): 20 TABLET, DELAYED RELEASE ORAL at 05:25

## 2022-09-19 RX ADMIN — AMIODARONE HYDROCHLORIDE 200 MILLIGRAM(S): 400 TABLET ORAL at 17:23

## 2022-09-19 RX ADMIN — Medication 650 MILLIGRAM(S): at 05:32

## 2022-09-19 RX ADMIN — TRAMADOL HYDROCHLORIDE 50 MILLIGRAM(S): 50 TABLET ORAL at 12:31

## 2022-09-19 RX ADMIN — Medication 25 MILLIGRAM(S): at 17:23

## 2022-09-19 RX ADMIN — Medication 100 MILLIGRAM(S): at 17:23

## 2022-09-19 RX ADMIN — ATORVASTATIN CALCIUM 20 MILLIGRAM(S): 80 TABLET, FILM COATED ORAL at 21:48

## 2022-09-19 RX ADMIN — Medication 0.6 MILLIGRAM(S): at 17:24

## 2022-09-19 RX ADMIN — TRAMADOL HYDROCHLORIDE 50 MILLIGRAM(S): 50 TABLET ORAL at 21:48

## 2022-09-19 RX ADMIN — Medication 100 MILLIGRAM(S): at 17:24

## 2022-09-19 RX ADMIN — Medication 650 MILLIGRAM(S): at 05:02

## 2022-09-19 RX ADMIN — Medication 100 MILLIGRAM(S): at 05:24

## 2022-09-19 RX ADMIN — Medication 3 MILLIGRAM(S): at 21:48

## 2022-09-19 RX ADMIN — RIVAROXABAN 20 MILLIGRAM(S): KIT at 17:23

## 2022-09-19 RX ADMIN — Medication 175 MICROGRAM(S): at 05:25

## 2022-09-19 RX ADMIN — AMIODARONE HYDROCHLORIDE 200 MILLIGRAM(S): 400 TABLET ORAL at 05:24

## 2022-09-19 RX ADMIN — Medication 40 MILLIGRAM(S): at 05:25

## 2022-09-19 RX ADMIN — CHLORHEXIDINE GLUCONATE 1 APPLICATION(S): 213 SOLUTION TOPICAL at 05:27

## 2022-09-19 RX ADMIN — LISINOPRIL 20 MILLIGRAM(S): 2.5 TABLET ORAL at 05:25

## 2022-09-19 RX ADMIN — TRAMADOL HYDROCHLORIDE 50 MILLIGRAM(S): 50 TABLET ORAL at 12:11

## 2022-09-19 NOTE — PROGRESS NOTE ADULT - SUBJECTIVE AND OBJECTIVE BOX
Hospital Day:  6d    Subjective:    Patient is a 81y old  Female who was admitted to medicine for a primary diagnosis of PNA, hospitalization complicated by a gout flare. Pt is laying in bed this AM c/o medial L foot pain, which she attributes to a gout flare.          Past Medical Hx:   HTN (hypertension)    High cholesterol    Hypothyroid    Afib    Sleep apnea    Osteoarthritis    Pituitary adenoma    CKD (chronic kidney disease) stage 3, GFR 30-59 ml/min      Past Sx:  No significant past surgical history    H/O thyroidectomy    S/P rotator cuff surgery      Allergies:  No Known Allergies    Current Meds:   Standng Meds:  allopurinol 100 milliGRAM(s) Oral two times a day  aMIOdarone    Tablet 200 milliGRAM(s) Oral two times a day  atorvastatin 20 milliGRAM(s) Oral at bedtime  cefTRIAXone   IVPB 2000 milliGRAM(s) IV Intermittent every 24 hours  chlorhexidine 2% Cloths 1 Application(s) Topical <User Schedule>  colchicine 0.6 milliGRAM(s) Oral two times a day  dextrose 5%. 1000 milliLiter(s) (50 mL/Hr) IV Continuous <Continuous>  doxycycline monohydrate Capsule 100 milliGRAM(s) Oral every 12 hours  levothyroxine 175 MICROGram(s) Oral daily  lisinopril 20 milliGRAM(s) Oral daily  magnesium sulfate  IVPB 2 Gram(s) IV Intermittent once  methylPREDNISolone sodium succinate Injectable 40 milliGRAM(s) IV Push daily  metoprolol tartrate 25 milliGRAM(s) Oral two times a day  pantoprazole    Tablet 40 milliGRAM(s) Oral before breakfast  rivaroxaban 20 milliGRAM(s) Oral with dinner    PRN Meds:  acetaminophen     Tablet .. 650 milliGRAM(s) Oral every 6 hours PRN Temp greater or equal to 38C (100.4F), Mild Pain (1 - 3)  aluminum hydroxide/magnesium hydroxide/simethicone Suspension 30 milliLiter(s) Oral every 4 hours PRN Dyspepsia  melatonin 3 milliGRAM(s) Oral at bedtime PRN Insomnia  ondansetron Injectable 4 milliGRAM(s) IV Push every 8 hours PRN Nausea and/or Vomiting  traMADol 50 milliGRAM(s) Oral every 8 hours PRN Moderate Pain (4 - 6)    HOME MEDICATIONS:  allopurinol 100 mg oral tablet: orally once a day (at bedtime)  amiodarone 200 mg oral tablet: 1 tab(s) orally once a day  amLODIPine 5 mg oral tablet: 1 tab(s) orally once a day (at bedtime)  atorvastatin 20 mg oral tablet: 1 tab(s) orally once a day  benazepril 10 mg oral tablet: 1 tab(s) orally 2 times a day  cabergoline 0.5 mg oral tablet: 1 tab(s) orally once a week, monday night  levothyroxine 175 mcg (0.175 mg) oral tablet: 1 tab(s) orally once a day  metoprolol tartrate 50 mg oral tablet: 1 tab(s) orally 2 times a day  triamterene: 37.5  orally once a day  Xarelto 20 mg oral tablet: 1 tab(s) orally once a day (in the evening)      Vital Signs:   T(F): 98.6 (09-19-22 @ 12:45), Max: 98.6 (09-19-22 @ 12:45)  HR: 62 (09-19-22 @ 12:45) (62 - 66)  BP: 154/65 (09-19-22 @ 12:45) (154/65 - 172/78)  RR: 18 (09-19-22 @ 12:45) (18 - 18)  SpO2: 97% (09-19-22 @ 05:05) (97% - 98%)        Physical Exam:   GENERAL: NAD  HEENT: NCAT  CHEST/LUNG: diffuse crackles b/l  HEART: Regular rate and rhythm; s1 s2 appreciated  ABDOMEN: Soft, Nontender, Nondistended; Bowel sounds present  EXTREMITIES: mild LE edema b/l, L foot pain to palpation and mild swelling in area, normal dorsalis pedis pulses b/l  SKIN: no rashes, no new lesions  NERVOUS SYSTEM:  Alert & Oriented X3        Labs:                         9.6    12.44 )-----------( 306      ( 19 Sep 2022 07:48 )             32.6       19 Sep 2022 07:48    147    |  107    |  24     ----------------------------<  125    4.5     |  26     |  1.0      Ca    8.3        19 Sep 2022 07:48  Mg     1.7       19 Sep 2022 07:48    TPro  5.2    /  Alb  3.1    /  TBili  0.4    /  DBili  x      /  AST  16     /  ALT  14     /  AlkPhos  102    19 Sep 2022 07:48            Serum Pro-Brain Natriuretic Peptide: 1425 pg/mL (09-13-22 @ 19:29)    Troponin <0.01, CKMB --, CK -- 09-16-22 @ 17:46              Culture - Blood (collected 09-14-22 @ 08:40)  Source: .Blood Blood  Preliminary Report (09-15-22 @ 19:01):    No growth to date.    Culture - Blood (collected 09-14-22 @ 08:40)  Source: .Blood Blood  Preliminary Report (09-15-22 @ 19:01):    No growth to date.

## 2022-09-19 NOTE — PROGRESS NOTE ADULT - SUBJECTIVE AND OBJECTIVE BOX
JEFFERY UGALDE  81y, Female  Allergy: No Known Allergies      LOS  6d    CHIEF COMPLAINT: PNA (18 Sep 2022 09:49)      INTERVAL EVENTS/HPI  - No acute events overnight  - T(F): , Max: 97.5 (09-18-22 @ 16:06)  - Denies any worsening symptoms  - Tolerating medication  - WBC Count: 10.18 (09-18-22 @ 05:33)  WBC Count: 11.90 (09-17-22 @ 05:54)     - Creatinine, Serum: 1.0 (09-18-22 @ 12:20)       ROS  General: Denies rigors, nightsweats  HEENT: Denies headache, rhinorrhea, sore throat, eye pain  CV: Denies CP, palpitations  PULM: Denies wheezing, hemoptysis  GI: Denies hematemesis, hematochezia, melena  : Denies discharge, hematuria  MSK: Denies arthralgias, myalgias  SKIN: Denies rash, lesions  NEURO: Denies paresthesias, weakness  PSYCH: Denies depression, anxiety    VITALS:  T(F): 96.5, Max: 97.5 (09-18-22 @ 16:06)  HR: 63  BP: 168/80  RR: 18Vital Signs Last 24 Hrs  T(C): 35.8 (19 Sep 2022 05:05), Max: 36.4 (18 Sep 2022 16:06)  T(F): 96.5 (19 Sep 2022 05:05), Max: 97.5 (18 Sep 2022 16:06)  HR: 63 (19 Sep 2022 05:05) (53 - 66)  BP: 168/80 (19 Sep 2022 05:05) (148/70 - 172/78)  BP(mean): 100 (18 Sep 2022 12:00) (100 - 100)  RR: 18 (19 Sep 2022 05:05) (16 - 18)  SpO2: 97% (19 Sep 2022 05:05) (97% - 98%)    Parameters below as of 19 Sep 2022 05:05  Patient On (Oxygen Delivery Method): nasal cannula  O2 Flow (L/min): 4      PHYSICAL EXAM:  Gen: NAD, resting in bed  HEENT: Normocephalic, atraumatic  Neck: supple, no lymphadenopathy  CV: Regular rate & regular rhythm  Lungs: decreased BS at bases, no fremitus  Abdomen: Soft, BS present  Ext: Warm, well perfused  Neuro: non focal, awake  Skin: no rash, no erythema  Lines: no phlebitis    FH: Non-contributory  Social Hx: Non-contributory    TESTS & MEASUREMENTS:                        9.4    10.18 )-----------( 262      ( 18 Sep 2022 05:33 )             31.1     09-18    142  |  103  |  22<H>  ----------------------------<  121<H>  3.8   |  30  |  1.0    Ca    8.5      18 Sep 2022 12:20              Culture - Blood (collected 09-14-22 @ 08:40)  Source: .Blood Blood  Preliminary Report (09-15-22 @ 19:01):    No growth to date.    Culture - Blood (collected 09-14-22 @ 08:40)  Source: .Blood Blood  Preliminary Report (09-15-22 @ 19:01):    No growth to date.            INFECTIOUS DISEASES TESTING  MRSA PCR Result.: Negative (09-14-22 @ 16:11)  Rapid RVP Result: NotDetec (09-14-22 @ 15:50)  Legionella Antigen, Urine: Negative (09-14-22 @ 10:00)  Procalcitonin, Serum: 24.20 (09-14-22 @ 09:27)  COVID-19 PCR: NotDetec (09-13-22 @ 21:35)  COVID-19 PCR: NotDetec (05-20-22 @ 10:00)      INFLAMMATORY MARKERS      RADIOLOGY & ADDITIONAL TESTS:  I have personally reviewed the last available Chest xray  CXR      CT      CARDIOLOGY TESTING  12 Lead ECG:   Ventricular Rate 61 BPM    Atrial Rate 61 BPM    P-R Interval 224 ms    QRS Duration 132 ms    Q-T Interval 502 ms    QTC Calculation(Bazett) 505 ms    P Axis 45 degrees    R Axis -22 degrees    T Axis 71 degrees    Diagnosis Line Sinus rhythm [id number] degree A-V block  Non-specific intra-ventricular conduction block  Abnormal ECG    Confirmed by KATIE SENA MD ([id number]) on 9/14/2022 2:22:24 PM (09-14-22 @ 10:48)  12 Lead ECG:   Ventricular Rate 70 BPM    Atrial Rate 70 BPM    P-R Interval 260 ms    QRS Duration 114 ms    Q-T Interval 396 ms    QTC Calculation(Bazett) 427 ms    P Axis 46 degrees    R Axis 253 degrees    T Axis 90 degrees    Diagnosis Line Sinus rhythm [id number] degree A-V block with Premature supraventricular  complexes  Incomplete right bundle branch block LAHB  Septal infarct , age undetermined    Abnormal ECG    Confirmed by KAVITHA PHILLIPS MD ([id number]) on 9/14/2022 7:24:56 AM (09-13-22 @ 19:48)      MEDICATIONS  allopurinol 100 Oral two times a day  aMIOdarone    Tablet 200 Oral two times a day  atorvastatin 20 Oral at bedtime  cefTRIAXone   IVPB 2000 IV Intermittent every 24 hours  chlorhexidine 2% Cloths 1 Topical <User Schedule>  colchicine 0.6 Oral two times a day  dextrose 5%. 1000 IV Continuous <Continuous>  doxycycline monohydrate Capsule 100 Oral every 12 hours  levothyroxine 175 Oral daily  lisinopril 20 Oral daily  methylPREDNISolone sodium succinate Injectable 40 IV Push daily  metoprolol tartrate 25 Oral two times a day  pantoprazole    Tablet 40 Oral before breakfast  rivaroxaban 20 Oral with dinner      WEIGHT  Weight (kg): 94.2 (09-14-22 @ 08:41)  Creatinine, Serum: 1.0 mg/dL (09-18-22 @ 12:20)      ANTIBIOTICS:  cefTRIAXone   IVPB 2000 milliGRAM(s) IV Intermittent every 24 hours  doxycycline monohydrate Capsule 100 milliGRAM(s) Oral every 12 hours      All available historical records have been reviewed

## 2022-09-19 NOTE — PROGRESS NOTE ADULT - TIME BILLING
81F PMHx pAF on xarelto s/p cardioversion 9/2022, HFpEF, HTN, thyroid ca s/p resection, HLD here with acute hypoxic resp failure, due to pna.    #L foot pain, possible gout flare  give colchicine 1.2 x1 now then 0.6 x1  then colchicine 0.6 bid   va duplex neg  xray no fx, small effusion  repeat xray if no improvement  PT  #Acute hypoxic resp failure, due to multifocal pna  cta no pe; c/w bl pna  legionella, strep pna neg  bcx ntd  doxy, ceftriaxone  appreciate id  nc to keep spo2 >92  bipap prn, qhs  #pAF  therapeutic lovenox  amio 200 bid  lopressor 25 bid  #HFpEF  tte with preserved ef, grade ii diastolic dysfunction  euvolemic on exam  #HTN  lisinopril 20  #Thyroid ca  synthroid 175  #HLD  lipitor 20  #DVT ppx  lovenox    #Progress Note Handoff:  Pending (specify):  Consults_________, Tests________, Test Results_______, Other_____hypoxia____  Family discussion: d/w pt at bedside re: treatment plan, primary dx  Disposition: Home___/SNF___/Other________/Unknown at this time__x______
81F PMHx pAF on xarelto s/p cardioversion 9/2022, HFpEF, HTN, thyroid ca s/p resection, HLD here with acute hypoxic resp failure, due to pna.    #Acute hypoxic resp failure, due to multifocal pna  cta no pe; c/w bl pna  legionella, strep pna neg  bcx ntd  doxy, ceftriaxone  appreciate id  nc to keep spo2 >92  bipap prn, qhs  #pAF  therapeutic lovenox  amio 200 bid  lopressor 25 bid  #HFpEF  tte with preserved ef, grade ii diastolic dysfunction  euvolemic on exam  #HTN  lisinopril 20  #Thyroid ca  synthroid 175  #HLD  lipitor 20  #DVT ppx  lovenox    #Progress Note Handoff:  Pending (specify):  Consults_________, Tests________, Test Results_______, Other_____hypoxia____  Family discussion: d/w pt at bedside re: treatment plan, primary dx  Disposition: Home___/SNF___/Other________/Unknown at this time__x______
I have personally seen and examined this patient.    I have reviewed all pertinent clinical information and reviewed all relevant imaging and diagnostic studies personally.   I counseled the patient about diagnostic testing and treatment plan. All questions were answered.   I discussed recommendations with the primary team.

## 2022-09-19 NOTE — PROGRESS NOTE ADULT - ASSESSMENT
81F PMHx pAF on xarelto s/p cardioversion 9/2022, HFpEF, HTN, thyroid ca s/p resection, HLD here with acute hypoxic resp failure, due to pna.    #L foot pain, possible gout flare  - S/p colchicine one dose of 1.2 then one dose of 0.6  - Started on colchicine 0.6 bid 09/16  - LE VA duplex neg for DVT  - Xray shows no fx, small effusion  - If no improvement consider CT   - Started on Tramadol for pain control 09/19  - start PT tomorrow once pain controlled    #Acute hypoxic Resp failure, due to multifocal pna  - CTA negative for PE  - Legionella and Strep negative  - BCx NGTD  - ID recs appreciated- C/w doxy, ceftriaxone until 09/20 to finish 7 day course  - On 4L NC this AM, will wean to 3L NC and to keep SpO2>92  - Pt refusing BiPAP, but states she uses it at home nightly    #Paroxysmal A Fib  - Last dose of Lovenox this AM, will restart home Xarelto in PM  - C/w home dose amiodarone and lopressor    #HFpEF  -ECHO 09/15 EF 71%, Grade II diastolic dysfunction      #HTN  #HLD  - C/w lisinopril and Lipitor    #H/o Thyroid ca  - C/w synthroid 175mcg    # Misc  - DVT Prophylaxis: SCDs  - GI Prophylaxis: pantoprazole  - Diet: Regular  - Code Status: Full Code  - Pending: One more day of ABx, PT eval, clinical improvement of L foot gout/pain

## 2022-09-19 NOTE — PROGRESS NOTE ADULT - ASSESSMENT
81F with PMHx of paroxysmal afib (on Xarelto; s/p cardioversion 9/13), CHFpEF, CKD 3b, chronic microcytic anemia, FELIX (on CPAP, ) HTN, DLD, hypothyroidism (history of thyroid cancer s/p resection), gout, and hx hip fracture presents for difficulty breathing, rigors, and hypoxia after recent cardioversion.    #Acute Hypoxic Respiratory Failure  #Sepsis on admission (WBC>12, Tachypnea)   #Multifocal Pneuminia  - CT Angio Chest PE Protocol w/ IV Cont (09.13.22 @ 23:25):  Right and middle lower lobe, and left lower lobe consolidations,  compatible with pneumonia and/or pulmonary edema in the appropriate  clinical setting.  - MRSA PCR Result.: Negative: By: Real-Time PCR (Polymerase Reaction Method) (09.14.22 @ 16:11)  - Urine legionella negative  - Procalcitonin, Serum: 24.20 (09.14.22 @ 09:27)    #Atrial Fibrillation  #CKD Stage III    Recommendations  - continue ceftriaxone 2g daily   - continue doxycycline 100 mg BID   - can stop antibiotics tomorrow to complete 7 day course  - gout management per primary   - O2 supplement per primary     Please call or message on [employer] Teams if with any questions.  Spectra [phone].

## 2022-09-20 ENCOUNTER — TRANSCRIPTION ENCOUNTER (OUTPATIENT)
Age: 82
End: 2022-09-20

## 2022-09-20 LAB
ALBUMIN SERPL ELPH-MCNC: 2.9 G/DL — LOW (ref 3.5–5.2)
ALP SERPL-CCNC: 105 U/L — SIGNIFICANT CHANGE UP (ref 30–115)
ALT FLD-CCNC: 16 U/L — SIGNIFICANT CHANGE UP (ref 0–41)
ANION GAP SERPL CALC-SCNC: 14 MMOL/L — SIGNIFICANT CHANGE UP (ref 7–14)
AST SERPL-CCNC: 17 U/L — SIGNIFICANT CHANGE UP (ref 0–41)
BILIRUB SERPL-MCNC: 0.2 MG/DL — SIGNIFICANT CHANGE UP (ref 0.2–1.2)
BUN SERPL-MCNC: 28 MG/DL — HIGH (ref 10–20)
CALCIUM SERPL-MCNC: 8.4 MG/DL — SIGNIFICANT CHANGE UP (ref 8.4–10.5)
CHLORIDE SERPL-SCNC: 100 MMOL/L — SIGNIFICANT CHANGE UP (ref 98–110)
CO2 SERPL-SCNC: 27 MMOL/L — SIGNIFICANT CHANGE UP (ref 17–32)
CREAT SERPL-MCNC: 1.1 MG/DL — SIGNIFICANT CHANGE UP (ref 0.7–1.5)
EGFR: 51 ML/MIN/1.73M2 — LOW
FOLATE SERPL-MCNC: 4.1 NG/ML — LOW
GLUCOSE SERPL-MCNC: 149 MG/DL — HIGH (ref 70–99)
HCT VFR BLD CALC: 31 % — LOW (ref 37–47)
HGB BLD-MCNC: 9.2 G/DL — LOW (ref 12–16)
IRON SATN MFR SERPL: 13 % — LOW (ref 15–50)
IRON SATN MFR SERPL: 26 UG/DL — LOW (ref 35–150)
MAGNESIUM SERPL-MCNC: 2.3 MG/DL — SIGNIFICANT CHANGE UP (ref 1.8–2.4)
MCHC RBC-ENTMCNC: 22.9 PG — LOW (ref 27–31)
MCHC RBC-ENTMCNC: 29.7 G/DL — LOW (ref 32–37)
MCV RBC AUTO: 77.3 FL — LOW (ref 81–99)
NRBC # BLD: 0 /100 WBCS — SIGNIFICANT CHANGE UP (ref 0–0)
PLATELET # BLD AUTO: 368 K/UL — SIGNIFICANT CHANGE UP (ref 130–400)
POTASSIUM SERPL-MCNC: 4.2 MMOL/L — SIGNIFICANT CHANGE UP (ref 3.5–5)
POTASSIUM SERPL-SCNC: 4.2 MMOL/L — SIGNIFICANT CHANGE UP (ref 3.5–5)
PROT SERPL-MCNC: 5.2 G/DL — LOW (ref 6–8)
RBC # BLD: 4.01 M/UL — LOW (ref 4.2–5.4)
RBC # FLD: 16.7 % — HIGH (ref 11.5–14.5)
SARS-COV-2 RNA SPEC QL NAA+PROBE: SIGNIFICANT CHANGE UP
SODIUM SERPL-SCNC: 141 MMOL/L — SIGNIFICANT CHANGE UP (ref 135–146)
TIBC SERPL-MCNC: 199 UG/DL — LOW (ref 220–430)
UIBC SERPL-MCNC: 173 UG/DL — SIGNIFICANT CHANGE UP (ref 110–370)
VIT B12 SERPL-MCNC: 384 PG/ML — SIGNIFICANT CHANGE UP (ref 232–1245)
WBC # BLD: 15.29 K/UL — HIGH (ref 4.8–10.8)
WBC # FLD AUTO: 15.29 K/UL — HIGH (ref 4.8–10.8)

## 2022-09-20 PROCEDURE — 99233 SBSQ HOSP IP/OBS HIGH 50: CPT

## 2022-09-20 RX ORDER — FERROUS SULFATE 325(65) MG
325 TABLET ORAL DAILY
Refills: 0 | Status: DISCONTINUED | OUTPATIENT
Start: 2022-09-20 | End: 2022-09-23

## 2022-09-20 RX ADMIN — CEFTRIAXONE 100 MILLIGRAM(S): 500 INJECTION, POWDER, FOR SOLUTION INTRAMUSCULAR; INTRAVENOUS at 09:14

## 2022-09-20 RX ADMIN — Medication 40 MILLIGRAM(S): at 05:41

## 2022-09-20 RX ADMIN — Medication 175 MICROGRAM(S): at 05:41

## 2022-09-20 RX ADMIN — ATORVASTATIN CALCIUM 20 MILLIGRAM(S): 80 TABLET, FILM COATED ORAL at 21:31

## 2022-09-20 RX ADMIN — LISINOPRIL 20 MILLIGRAM(S): 2.5 TABLET ORAL at 05:42

## 2022-09-20 RX ADMIN — AMIODARONE HYDROCHLORIDE 200 MILLIGRAM(S): 400 TABLET ORAL at 05:42

## 2022-09-20 RX ADMIN — Medication 0.6 MILLIGRAM(S): at 05:41

## 2022-09-20 RX ADMIN — AMIODARONE HYDROCHLORIDE 200 MILLIGRAM(S): 400 TABLET ORAL at 18:01

## 2022-09-20 RX ADMIN — Medication 0.6 MILLIGRAM(S): at 18:02

## 2022-09-20 RX ADMIN — Medication 100 MILLIGRAM(S): at 18:02

## 2022-09-20 RX ADMIN — Medication 100 MILLIGRAM(S): at 05:41

## 2022-09-20 RX ADMIN — Medication 100 MILLIGRAM(S): at 18:01

## 2022-09-20 RX ADMIN — Medication 100 MILLIGRAM(S): at 05:42

## 2022-09-20 RX ADMIN — Medication 25 MILLIGRAM(S): at 05:42

## 2022-09-20 RX ADMIN — Medication 25 MILLIGRAM(S): at 18:03

## 2022-09-20 RX ADMIN — RIVAROXABAN 20 MILLIGRAM(S): KIT at 18:05

## 2022-09-20 RX ADMIN — PANTOPRAZOLE SODIUM 40 MILLIGRAM(S): 20 TABLET, DELAYED RELEASE ORAL at 05:42

## 2022-09-20 RX ADMIN — CHLORHEXIDINE GLUCONATE 1 APPLICATION(S): 213 SOLUTION TOPICAL at 05:41

## 2022-09-20 NOTE — DISCHARGE NOTE PROVIDER - NSDCFUSCHEDAPPT_GEN_ALL_CORE_FT
[facility] [address]  Scheduled Appointment: 11/14/2022    Jer Ruiz  [facility] [address]  Scheduled Appointment: 11/14/2022

## 2022-09-20 NOTE — PHYSICAL THERAPY INITIAL EVALUATION ADULT - ADDITIONAL COMMENTS
Pt reports her daughter is in [place] and supposed to be returning this Thursday 9/22 and pt would like to stay home with her.

## 2022-09-20 NOTE — PROGRESS NOTE ADULT - SUBJECTIVE AND OBJECTIVE BOX
Hospital Day:  7d    Subjective:    Patient is a 81y old  Female who was admitted to medicine for a primary diagnosis of PNA, hospitalization complicated by a gout flare. Pt is laying in bed this AM in NAD states her pain is very well controlled.      Past Medical Hx:   HTN (hypertension)    High cholesterol    Hypothyroid    Afib    Sleep apnea    Osteoarthritis    Pituitary adenoma    CKD (chronic kidney disease) stage 3, GFR 30-59 ml/min      Past Sx:  No significant past surgical history    H/O thyroidectomy    S/P rotator cuff surgery      Allergies:  No Known Allergies    Current Meds:   Standng Meds:  allopurinol 100 milliGRAM(s) Oral two times a day  aMIOdarone    Tablet 200 milliGRAM(s) Oral two times a day  atorvastatin 20 milliGRAM(s) Oral at bedtime  cefTRIAXone   IVPB 2000 milliGRAM(s) IV Intermittent every 24 hours  chlorhexidine 2% Cloths 1 Application(s) Topical <User Schedule>  colchicine 0.6 milliGRAM(s) Oral two times a day  doxycycline monohydrate Capsule 100 milliGRAM(s) Oral every 12 hours  levothyroxine 175 MICROGram(s) Oral daily  lisinopril 20 milliGRAM(s) Oral daily  methylPREDNISolone sodium succinate Injectable 40 milliGRAM(s) IV Push daily  metoprolol tartrate 25 milliGRAM(s) Oral two times a day  pantoprazole    Tablet 40 milliGRAM(s) Oral before breakfast  rivaroxaban 20 milliGRAM(s) Oral with dinner    PRN Meds:  acetaminophen     Tablet .. 650 milliGRAM(s) Oral every 6 hours PRN Temp greater or equal to 38C (100.4F), Mild Pain (1 - 3)  aluminum hydroxide/magnesium hydroxide/simethicone Suspension 30 milliLiter(s) Oral every 4 hours PRN Dyspepsia  melatonin 3 milliGRAM(s) Oral at bedtime PRN Insomnia  ondansetron Injectable 4 milliGRAM(s) IV Push every 8 hours PRN Nausea and/or Vomiting  traMADol 50 milliGRAM(s) Oral every 8 hours PRN Moderate Pain (4 - 6)    HOME MEDICATIONS:  allopurinol 100 mg oral tablet: orally once a day (at bedtime)  amiodarone 200 mg oral tablet: 1 tab(s) orally once a day  amLODIPine 5 mg oral tablet: 1 tab(s) orally once a day (at bedtime)  atorvastatin 20 mg oral tablet: 1 tab(s) orally once a day  benazepril 10 mg oral tablet: 1 tab(s) orally 2 times a day  cabergoline 0.5 mg oral tablet: 1 tab(s) orally once a week, monday night  levothyroxine 175 mcg (0.175 mg) oral tablet: 1 tab(s) orally once a day  metoprolol tartrate 50 mg oral tablet: 1 tab(s) orally 2 times a day  triamterene: 37.5  orally once a day  Xarelto 20 mg oral tablet: 1 tab(s) orally once a day (in the evening)      Vital Signs:   T(F): 96.2 (09-20-22 @ 12:10), Max: 97.6 (09-19-22 @ 20:30)  HR: 56 (09-20-22 @ 12:10) (53 - 58)  BP: 124/60 (09-20-22 @ 12:10) (118/57 - 134/76)  RR: 18 (09-20-22 @ 12:10) (18 - 18)  SpO2: 96% (09-20-22 @ 05:00) (95% - 96%)      09-20-22 @ 07:01  -  09-20-22 @ 14:45  --------------------------------------------------------  IN: 450 mL / OUT: 300 mL / NET: 150 mL        Physical Exam:   GENERAL: NAD  HEENT: NCAT  CHEST/LUNG: diffuse crackles b/l  HEART: Regular rate and rhythm; s1 s2 appreciated  ABDOMEN: Soft, Nontender, Nondistended; Bowel sounds present  EXTREMITIES: mild LE edema b/l, L foot pain to palpation and mild swelling in area, normal dorsalis pedis pulses b/l  SKIN: no rashes, no new lesions  NERVOUS SYSTEM:  Alert & Oriented X3        Labs:                         9.2    15.29 )-----------( 368      ( 20 Sep 2022 08:26 )             31.0       20 Sep 2022 08:26    141    |  100    |  28     ----------------------------<  149    4.2     |  27     |  1.1      Ca    8.4        20 Sep 2022 08:26  Mg     2.3       20 Sep 2022 08:26    TPro  5.2    /  Alb  2.9    /  TBili  0.2    /  DBili  x      /  AST  17     /  ALT  16     /  AlkPhos  105    20 Sep 2022 08:26            Serum Pro-Brain Natriuretic Peptide: 1425 pg/mL (09-13-22 @ 19:29)    Troponin <0.01, CKMB --, CK -- 09-16-22 @ 17:46    Iron 26, TIBC 199, %Sat 13 Ferritin -- 09-20-22 @ 08:26            Culture - Blood (collected 09-14-22 @ 08:40)  Source: .Blood Blood  Final Report (09-19-22 @ 19:01):    No Growth Final    Culture - Blood (collected 09-14-22 @ 08:40)  Source: .Blood Blood  Final Report (09-19-22 @ 19:01):    No Growth Final

## 2022-09-20 NOTE — DISCHARGE NOTE PROVIDER - CARE PROVIDER_API CALL
Jer Ruiz)  Critical Care Medicine; Pulmonary Disease; Sleep Medicine  [address] [place] [address]  Phone: [phone]  Fax: [phone]  Follow Up Time:     u[facility]sein [address] [address]  Phone: [phone]  Fax: [phone]  Follow Up Time:    Jer Ruiz)  Critical Care Medicine; Pulmonary Disease; Sleep Medicine  [address] [place], [place] [id number]  [place] [address]  Phone: [phone]  Fax: [phone]  Follow Up Time:     [facility] [address] [address]  Phone: [phone]  Fax: [phone]  Follow Up Time:     Madi Quintero)  Cardiovascular Disease; Interventional Cardiology  [address] [place] [address] [address]  [place] [address]  Phone: [phone]  Fax: [phone]  Follow Up Time:

## 2022-09-20 NOTE — PHYSICAL THERAPY INITIAL EVALUATION ADULT - GENERAL OBSERVATIONS, REHAB EVAL
14:55-15:25. Pt encountered semifowler in bed in NAD, + O2 3 lpm via NC, + primafit, pt report pain in L foot by MTP and upper medial arch region with palpation, no pain at rest. , agreeable to PT .

## 2022-09-20 NOTE — PHYSICAL THERAPY INITIAL EVALUATION ADULT - PERTINENT HX OF CURRENT PROBLEM, REHAB EVAL
Patient is a 81 year old female with ho paroxysmal afib s/p cardioversion on Xarelto tx. at home , CHFpEF, HTN, gout presents shortly after cardioversion for dyspnea, rigors and hypoxia requiring Bipap tx, dx. with Sepsis due to suspected aspiration pneumonia.

## 2022-09-20 NOTE — PHYSICAL THERAPY INITIAL EVALUATION ADULT - LEVEL OF INDEPENDENCE: STAIR NEGOTIATION, REHAB EVAL
did not attempt at this time 2* to decrease endurance and pain reported L foot 6/10 during amb. Will assess as appropriate. (Pt did report she will be staying with daughter who has no stairs at home)

## 2022-09-20 NOTE — PROGRESS NOTE ADULT - ASSESSMENT
81F PMHx pAF on xarelto s/p cardioversion 9/2022, HFpEF, HTN, thyroid ca s/p resection, HLD here with acute hypoxic resp failure, due to PNA.    #L foot pain, possible gout flare  - S/p colchicine one dose of 1.2 then one dose of 0.6  - Started on colchicine 0.6 bid 09/19, Tramadol for pain control 09/19  - LE VA duplex neg for DVT  - Xray shows no fx, small effusion  - Pt pain controlled today with current regimen  - Pt consult pending    #Acute hypoxic Resp failure, due to multifocal pna  - CTA negative for PE  - Legionella and Strep negative  - BCx NGTD  - ID recs appreciated- C/w doxy, ceftriaxone until 09/20 to finish 7 day course - last day of Abx today  - On 3L NC this AM, will continue to wean off O2 and to keep SpO2>92  - Pt refusing BiPAP PRN, but states she uses CPAP at home nightly    #Paroxysmal A Fib  - Xarelto restarted 09/19  - C/w home dose amiodarone and lopressor    #HFpEF  -ECHO 09/15 EF 71%, Grade II diastolic dysfunction    #HTN  #HLD  - C/w lisinopril and Lipitor    #H/o Thyroid ca  - C/w synthroid 175mcg    # Misc  - DVT Prophylaxis: SCDs  - GI Prophylaxis: pantoprazole  - Diet: Regular  - Code Status: Full Code  - Pending: PT eval, decreased O2 needs

## 2022-09-20 NOTE — PHYSICAL THERAPY INITIAL EVALUATION ADULT - LIVES WITH, PROFILE
lives in , ~ [address], 1 flight to bedroom and shower. Pt reports she will be staying with her daughter who doesn't have steps./alone

## 2022-09-20 NOTE — DISCHARGE NOTE PROVIDER - PROVIDER TOKENS
PROVIDER:[TOKEN:[[id number]]],PROVIDER:[TOKEN:[[id number]]] PROVIDER:[TOKEN:[[id number]]],PROVIDER:[TOKEN:[[id number]]],PROVIDER:[TOKEN:[[id number]]]

## 2022-09-20 NOTE — DISCHARGE NOTE PROVIDER - HOSPITAL COURSE
ED Course:    80 yo female with PMHx of Paroxysmal AFib on Xarelto (s/p cardioversion 9/13/22), CHFpEF, CKD 3b, chronic microcytic anemia, FELIX (on CPAP) HTN, DLD, hypothyroidism (history of thyroid cancer s/p resection), hx hip fracture.  Pt presents to ED for difficulty breathing s/p cardioversion. Found to be hypoxic to 80s, placed on bipap.  History goes back to 9/13 AM, where patient had outpatient elective cardioversion with Dr. Quintero. Several hours Post procedure pt went to diner with her sister to eat lunch. While at the diner pt suddenly became dyspneic and experienced rigors. Denied cough, no nausea, vomiting, chest pain, palpitations.   In ED, afebrile. Tachypneic to 28, and hypoxic to 80s.  Labs done showed WBC 21.6, Hb 11.5, Mcv 77.7, INR 1.71, Cr 1.7, GFR 30, BUN 28, , Trop neg, BNP 1425 (similar to baseline); VBG lactate 4.2 (repeat 1.5)  CTA PE was performed which showed Right middle and lower lobe as well as left lower lobe lobar consolidations. Bilateral upper lobe patchy peribronchial vascular opacifications.  In ED Pt given Rocephin and azithromycin. Ativan 0.5 mg IV was also given.    On my exam this AM, pt was already transferred to [facility]. Pt was on bipap max settings since admission. Bipap was removed to conduct history and physical. Pt was found to be tachypneic >30, vitals were checked, saturation found to be in 70s. Pt was then placed back on to bipap. D/w pulmonary fellow #[id number], approved for ICU monitoring.    in the ED,pt's VS T(C): 36.8 (09-14-22 @ 05:00), Max: 36.8 (09-14-22 @ 02:44)  HR: 60 (09-14-22 @ 05:00) (60 - 92)  BP: 128/62 (09-14-22 @ 05:00) (122/59 - 161/79)  RR: 18 (09-14-22 @ 05:00) (18 - 28)  SpO2: 98% (09-14-22 @ 02:44) (86% - 99%)    pt is admitted for workup/management bilateral pneumonia    Hospital Course:    Patient was transferred from  to ICU for close monitoring and on continuous BiPAP    Pt was started on broad spectrum Abx.     ID was consulted and followed the case, recommended 7 day course of ceftriaxone and doxycycline which patient completed on 09/20.    Pt was slowly weaned off BiPAP to HFNC, required IV steroids which were tapered, clinically patient began to improve and was downgraded to SICU on 09/18.    MRSA nares neg, legionella urine neg, strep pnuemo urine negative, BCx remained negative.    Pt O2 needs decreased to 4L on NC and pt was downgraded to 3A.    Pt was weaned to 3L NC, could not tolerate lower O2 as pt began to desat.    PT evaluated pt. Pt had an O2 sat of 88% on RA, pt had O2 sat of 88% while ambulating, O2 sat increased to 95% on 3L NC.    Pt can be d/c to home with home O2.       ED Course:    82 yo female with PMHx of Paroxysmal AFib on Xarelto (s/p cardioversion 9/13/22), CHFpEF, CKD 3b, chronic microcytic anemia, FELIX (on CPAP) HTN, DLD, hypothyroidism (history of thyroid cancer s/p resection), hx hip fracture.  Pt presents to ED for difficulty breathing s/p cardioversion. Found to be hypoxic to 80s, placed on bipap.  History goes back to 9/13 AM, where patient had outpatient elective cardioversion with Dr. Quintero. Several hours Post procedure pt went to diner with her sister to eat lunch. While at the diner pt suddenly became dyspneic and experienced rigors. Denied cough, no nausea, vomiting, chest pain, palpitations.   In ED, afebrile. Tachypneic to 28, and hypoxic to 80s.  Labs done showed WBC 21.6, Hb 11.5, Mcv 77.7, INR 1.71, Cr 1.7, GFR 30, BUN 28, , Trop neg, BNP 1425 (similar to baseline); VBG lactate 4.2 (repeat 1.5)  CTA PE was performed which showed Right middle and lower lobe as well as left lower lobe lobar consolidations. Bilateral upper lobe patchy peribronchial vascular opacifications.  In ED Pt given Rocephin and azithromycin. Ativan 0.5 mg IV was also given.    On my exam this AM, pt was already transferred to [facility]. Pt was on bipap max settings since admission. Bipap was removed to conduct history and physical. Pt was found to be tachypneic >30, vitals were checked, saturation found to be in 70s. Pt was then placed back on to bipap. D/w pulmonary fellow #[id number], approved for ICU monitoring.    in the ED,pt's VS T(C): 36.8 (09-14-22 @ 05:00), Max: 36.8 (09-14-22 @ 02:44)  HR: 60 (09-14-22 @ 05:00) (60 - 92)  BP: 128/62 (09-14-22 @ 05:00) (122/59 - 161/79)  RR: 18 (09-14-22 @ 05:00) (18 - 28)  SpO2: 98% (09-14-22 @ 02:44) (86% - 99%)    pt is admitted for workup/management of multilobar pneumonia requiring BiPAP    Hospital Course:    Patient was transferred from  to ICU for close monitoring and on continuous BiPAP    Pt was started on broad spectrum Abx.     ID was consulted and followed the case, recommended 7 day course of ceftriaxone and doxycycline which patient completed on 09/20.    Pt was slowly weaned off BiPAP to HFNC, required IV steroids which were tapered, clinically patient began to improve and was downgraded to SICU on 09/18.    MRSA nares neg, legionella urine neg, strep pnuemo urine negative, BCx remained negative.    Pt O2 needs decreased to 4L on NC and pt was downgraded to 3A.    Pt was weaned to 3L NC, could not tolerate lower O2 as pt began to desat.    PT evaluated pt. Pt had an O2 sat of 88% on RA, pt had O2 sat of 88% while ambulating, O2 sat increased to 95% on 3L NC.    Pt can be d/c to home with home O2.       ED Course:    82 yo female with PMHx of Paroxysmal AFib on Xarelto (s/p cardioversion 9/13/22), CHFpEF, CKD 3b, chronic microcytic anemia, FELIX (on CPAP) HTN, DLD, hypothyroidism (history of thyroid cancer s/p resection), hx hip fracture.  Pt presents to ED for difficulty breathing s/p cardioversion. Found to be hypoxic to 80s, placed on bipap.  History goes back to 9/13 AM, where patient had outpatient elective cardioversion with Dr. Quintero. Several hours Post procedure pt went to diner with her sister to eat lunch. While at the diner pt suddenly became dyspneic and experienced rigors. Denied cough, no nausea, vomiting, chest pain, palpitations.   In ED, afebrile. Tachypneic to 28, and hypoxic to 80s.  Labs done showed WBC 21.6, Hb 11.5, Mcv 77.7, INR 1.71, Cr 1.7, GFR 30, BUN 28, , Trop neg, BNP 1425 (similar to baseline); VBG lactate 4.2 (repeat 1.5)  CTA PE was performed which showed Right middle and lower lobe as well as left lower lobe lobar consolidations. Bilateral upper lobe patchy peribronchial vascular opacifications.  In ED Pt given Rocephin and azithromycin. Ativan 0.5 mg IV was also given.    On my exam this AM, pt was already transferred to [facility]. Pt was on bipap max settings since admission. Bipap was removed to conduct history and physical. Pt was found to be tachypneic >30, vitals were checked, saturation found to be in 70s. Pt was then placed back on to bipap. D/w pulmonary fellow #[id number], approved for ICU monitoring.    in the ED,pt's VS T(C): 36.8 (09-14-22 @ 05:00), Max: 36.8 (09-14-22 @ 02:44)  HR: 60 (09-14-22 @ 05:00) (60 - 92)  BP: 128/62 (09-14-22 @ 05:00) (122/59 - 161/79)  RR: 18 (09-14-22 @ 05:00) (18 - 28)  SpO2: 98% (09-14-22 @ 02:44) (86% - 99%)    pt is admitted for workup/management of multilobar pneumonia requiring BiPAP    Hospital Course:    Patient was transferred from  to ICU for close monitoring and on continuous BiPAP    Pt was started on broad spectrum Abx.     ID was consulted and followed the case, recommended 7 day course of ceftriaxone and doxycycline which patient completed on 09/20.    Pt was slowly weaned off BiPAP to HFNC, required IV steroids which were tapered, clinically patient began to improve and was downgraded to SICU on 09/18.    MRSA nares neg, legionella urine neg, strep pnuemo urine negative, BCx remained negative.    Pt O2 needs decreased to 4L on NC and pt was downgraded to 3A.    Pt was weaned to 3L NC, could not tolerate lower O2 as pt began to desat.    PT evaluated pt. Pt had an O2 sat of 88% on RA, pt had O2 sat of 88% while ambulating, O2 sat increased to 95% on 3L NC.    Pt can be d/c to home with home O2.      Attending notE:  Patient seen and examined independently. I personally had a face-to-face encounter with the patient, examined the patient myself, personally reviewed labs & Radiology images,  and reviewed the plan of care with the housestaff. Agree with resident's note but my note supersedes that of the resident in the matters hereby listed.   D/c to home w/ [facility] ED Course:    80 yo female with PMHx of Paroxysmal AFib on Xarelto (s/p cardioversion 9/13/22), CHFpEF, CKD 3b, chronic microcytic anemia, FELIX (on CPAP) HTN, DLD, hypothyroidism (history of thyroid cancer s/p resection), hx hip fracture.  Pt presents to ED for difficulty breathing s/p cardioversion. Found to be hypoxic to 80s, placed on bipap.  History goes back to 9/13 AM, where patient had outpatient elective cardioversion with Dr. Quintero. Several hours Post procedure pt went to diner with her sister to eat lunch. While at the diner pt suddenly became dyspneic and experienced rigors. Denied cough, no nausea, vomiting, chest pain, palpitations.   In ED, afebrile. Tachypneic to 28, and hypoxic to 80s.  Labs done showed WBC 21.6, Hb 11.5, Mcv 77.7, INR 1.71, Cr 1.7, GFR 30, BUN 28, , Trop neg, BNP 1425 (similar to baseline); VBG lactate 4.2 (repeat 1.5)  CTA PE was performed which showed Right middle and lower lobe as well as left lower lobe lobar consolidations. Bilateral upper lobe patchy peribronchial vascular opacifications.  In ED Pt given Rocephin and azithromycin. Ativan 0.5 mg IV was also given.    On my exam this AM, pt was already transferred to [facility]. Pt was on bipap max settings since admission. Bipap was removed to conduct history and physical. Pt was found to be tachypneic >30, vitals were checked, saturation found to be in 70s. Pt was then placed back on to bipap. D/w pulmonary fellow #[id number], approved for ICU monitoring.    in the ED,pt's VS T(C): 36.8 (09-14-22 @ 05:00), Max: 36.8 (09-14-22 @ 02:44)  HR: 60 (09-14-22 @ 05:00) (60 - 92)  BP: 128/62 (09-14-22 @ 05:00) (122/59 - 161/79)  RR: 18 (09-14-22 @ 05:00) (18 - 28)  SpO2: 98% (09-14-22 @ 02:44) (86% - 99%)    pt is admitted for workup/management of multilobar pneumonia requiring BiPAP    Hospital Course:    Patient was transferred from  to ICU for close monitoring and on continuous BiPAP    Pt was started on broad spectrum Abx.     ID was consulted and followed the case, recommended 7 day course of ceftriaxone and doxycycline which patient completed on 09/20.    Pt was slowly weaned off BiPAP to HFNC, required IV steroids which were tapered, clinically patient began to improve and was downgraded to SICU on 09/18.    MRSA nares neg, legionella urine neg, strep pnuemo urine negative, BCx remained negative.    Pt O2 needs decreased to 4L on NC and pt was downgraded to 3A.    Pt was weaned to 3L NC, could not tolerate lower O2 as pt began to desat.    PT evaluated pt. Pt had an O2 sat of 88% on RA, pt had O2 sat of 88% while ambulating, O2 sat increased to 95% on 3L NC.    Pt can be d/c to home with home O2.    09/22 patient was supposed to be discharged, but patient had 3 loose BM and did not feel well, patient requested to stay, daughter at bedside requested patient stay.    09/23 patient feels better no more episodes of loose BM and can be discharged home.    Attending note:  Patient seen and examined independently. I personally had a face-to-face encounter with the patient, examined the patient myself, personally reviewed labs & Radiology images,  and reviewed the plan of care with the housestaff. Agree with resident's note but my note supersedes that of the resident in the matters hereby listed.   D/c to home w/ [facility] ED Course:    80 yo female with PMHx of Paroxysmal AFib on Xarelto (s/p cardioversion 9/13/22), CHFpEF, CKD 3b, chronic microcytic anemia, FELIX (on CPAP) HTN, DLD, hypothyroidism (history of thyroid cancer s/p resection), hx hip fracture.  Pt presents to ED for difficulty breathing s/p cardioversion. Found to be hypoxic to 80s, placed on bipap.  History goes back to 9/13 AM, where patient had outpatient elective cardioversion with Dr. Quintero. Several hours Post procedure pt went to diner with her sister to eat lunch. While at the diner pt suddenly became dyspneic and experienced rigors. Denied cough, no nausea, vomiting, chest pain, palpitations.   In ED, afebrile. Tachypneic to 28, and hypoxic to 80s.  Labs done showed WBC 21.6, Hb 11.5, Mcv 77.7, INR 1.71, Cr 1.7, GFR 30, BUN 28, , Trop neg, BNP 1425 (similar to baseline); VBG lactate 4.2 (repeat 1.5)  CTA PE was performed which showed Right middle and lower lobe as well as left lower lobe lobar consolidations. Bilateral upper lobe patchy peribronchial vascular opacifications.  In ED Pt given Rocephin and azithromycin. Ativan 0.5 mg IV was also given.    On my exam this AM, pt was already transferred to [facility]. Pt was on bipap max settings since admission. Bipap was removed to conduct history and physical. Pt was found to be tachypneic >30, vitals were checked, saturation found to be in 70s. Pt was then placed back on to bipap. D/w pulmonary fellow #[id number], approved for ICU monitoring.    in the ED,pt's VS T(C): 36.8 (09-14-22 @ 05:00), Max: 36.8 (09-14-22 @ 02:44)  HR: 60 (09-14-22 @ 05:00) (60 - 92)  BP: 128/62 (09-14-22 @ 05:00) (122/59 - 161/79)  RR: 18 (09-14-22 @ 05:00) (18 - 28)  SpO2: 98% (09-14-22 @ 02:44) (86% - 99%)    pt is admitted for workup/management of multilobar pneumonia requiring BiPAP    Hospital Course:    Patient was transferred from  to ICU for close monitoring and on continuous BiPAP    Pt was started on broad spectrum Abx.     ID was consulted and followed the case, recommended 7 day course of ceftriaxone and doxycycline which patient completed on 09/20.    Pt was slowly weaned off BiPAP to HFNC, required IV steroids which were tapered, clinically patient began to improve and was downgraded to SICU on 09/18.    MRSA nares neg, legionella urine neg, strep pnuemo urine negative, BCx remained negative.    Pt O2 needs decreased to 4L on NC and pt was downgraded to 3A.    Pt was weaned to 3L NC, could not tolerate lower O2 as pt began to desat.    PT evaluated pt. Pt had an O2 sat of 88% on RA, pt had O2 sat of 88% while ambulating, O2 sat increased to 95% on 3L NC.    Pt can be d/c to home with home O2.    09/22 patient was supposed to be discharged, but patient had 3 loose BM and did not feel well, patient requested to stay, daughter at bedside requested patient stay.    09/23 patient feels better no more episodes of loose BM and can be discharged home.    Attending note:  Patient seen and examined independently. I personally had a face-to-face encounter with the patient, examined the patient myself, personally reviewed labs & Radiology images,  and reviewed the plan of care with the housestaff. Agree with resident's note but my note supersedes that of the resident in the matters hereby listed.   D/c to home w/ [facility]    Attending Addendum:   Patient seen and examined independently. I personally had a face-to-face encounter with the patient, examined the patient myself, personally reviewed labs & Radiology images,  and reviewed the plan of care with the housestaff. Agree with resident's note but my note supersedes that of the resident in the matters hereby listed.   Did not leave yest d/t diarrhea.  diarrhea improved last night. again few episodes this afternoon.   Most likely 2/2 recent colchicine use. OFFERED  to rule out C diff but patient did not want to delay discharge.   Advised that if diarrrhea doesn't get better in a few days, f/u PCP

## 2022-09-20 NOTE — DISCHARGE NOTE PROVIDER - NSDCCPCAREPLAN_GEN_ALL_CORE_FT
PRINCIPAL DISCHARGE DIAGNOSIS  Diagnosis: Pneumonia  Assessment and Plan of Treatment: You were admitted for multilobar pnuemonia nad you were treated with antibiotics. You required oxygen and a test was done which determined you will need to be on home oxygen.  You have completed  your antibitics course in the hospital.  Follow up with your primary care physician within 7 days.  Coughing up mucus is normal. Don’t use medicines to suppress your cough unless your cough is dry, painful, or interferes with your sleep. Get plenty of rest until your fever, shortness of breath, and chest pain go away. Plan to get a flu shot every year. Ask your primary care doctor about pneumonia vaccines.  Seek immediate medical attention if you experience chest pain, trouble breathing, blue lips or fingernails, fever of 100.4°F  (38°C) or higher, yellow, green, bloody, or smelly sputum, more than normal mucus production, vomiting or diarrhea.        SECONDARY DISCHARGE DIAGNOSES  Diagnosis: Gout  Assessment and Plan of Treatment: During your hospitalization you had a gout flare. You were treated with pain medication and steroids, your gout improved afterwards.  Gout is a form of arthritis that causes severe joint pain, redness, swelling, and stiffness. Acute gout pain starts suddenly, gets worse quickly, and stops on its own. Acute gout can become chronic and cause permanent damage to the joints.  Seek care immediately if:  You have severe pain in one or more of your joints  You have a fever or redness that spreads beyond the joint area.  You have new symptoms, such as a rash, after you start gout treatment.  Your joint pain and swelling do not go away, even after treatment.  You are not urinating as much or as often as you usually do.  Manage gout:  Rest your painful joint so it can heal.   Elevate your joint. Prop your painful joint on pillows to keep it above your heart comfortably.  Prevent gout attacks:  Do not eat high-purine foods. These foods include meats, seafood, asparagus, spinach, cauliflower, and some types of beans. . Milk products may decrease your risk for gout attacks. Vitamin C and coffee may also help. Your healthcare provider or dietitian can help you create a meal plan.  Drink more liquids as directed. Liquids such as water help remove uric acid from your body. Ask how much liquid to drink each day and which liquids are best for you.  Manage your weight. Weight loss may decrease the amount of uric acid in your body. Exercise can help you lose weight. Talk to your healthcare provider about the best exercises for you.  Control your blood sugar level if you have diabetes. Keep your blood sugar level in a normal range. This can help prevent gout attacks.  Limit or do not drink alcohol as directed. Alcohol can trigger a gout attack. Alcohol also increases your risk for dehydration.      Diagnosis: KOBI (acute kidney injury)  Assessment and Plan of Treatment: You were noted to have a temporary insult to your kidney function during your hospital stay. We have monitored your kidney function with blood work during your time here and you are at a level that no longer requires continued hospital level care, but we do recommend that you follow up to continually have your kidney function checked. You can follow up with your primary care doctor within 1-2 weeks       PRINCIPAL DISCHARGE DIAGNOSIS  Diagnosis: Pneumonia  Assessment and Plan of Treatment: You were admitted for multilobar pnuemonia nad you were treated with antibiotics. You required oxygen and a test was done which determined you will need to be on home oxygen.  You have completed  your antibitics course in the hospital.  Follow up with your primary care physician within 7 days.  Coughing up mucus is normal. Don’t use medicines to suppress your cough unless your cough is dry, painful, or interferes with your sleep. Get plenty of rest until your fever, shortness of breath, and chest pain go away. Plan to get a flu shot every year. Ask your primary care doctor about pneumonia vaccines.  Seek immediate medical attention if you experience chest pain, trouble breathing, blue lips or fingernails, fever of 100.4°F  (38°C) or higher, yellow, green, bloody, or smelly sputum, more than normal mucus production, vomiting or diarrhea.        SECONDARY DISCHARGE DIAGNOSES  Diagnosis: Gout  Assessment and Plan of Treatment: During your hospitalization you had a gout flare. You were treated with pain medication and steroids, your gout improved afterwards.  Gout is a form of arthritis that causes severe joint pain, redness, swelling, and stiffness. Acute gout pain starts suddenly, gets worse quickly, and stops on its own. Acute gout can become chronic and cause permanent damage to the joints.  Seek care immediately if:  You have severe pain in one or more of your joints  You have a fever or redness that spreads beyond the joint area.  You have new symptoms, such as a rash, after you start gout treatment.  Your joint pain and swelling do not go away, even after treatment.  You are not urinating as much or as often as you usually do.  Manage gout:  Rest your painful joint so it can heal.   Elevate your joint. Prop your painful joint on pillows to keep it above your heart comfortably.  Prevent gout attacks:  Do not eat high-purine foods. These foods include meats, seafood, asparagus, spinach, cauliflower, and some types of beans. . Milk products may decrease your risk for gout attacks. Vitamin C and coffee may also help. Your healthcare provider or dietitian can help you create a meal plan.  Drink more liquids as directed. Liquids such as water help remove uric acid from your body. Ask how much liquid to drink each day and which liquids are best for you.  Manage your weight. Weight loss may decrease the amount of uric acid in your body. Exercise can help you lose weight. Talk to your healthcare provider about the best exercises for you.  Control your blood sugar level if you have diabetes. Keep your blood sugar level in a normal range. This can help prevent gout attacks.  Limit or do not drink alcohol as directed. Alcohol can trigger a gout attack. Alcohol also increases your risk for dehydration.      Diagnosis: KOBI (acute kidney injury)  Assessment and Plan of Treatment: You were noted to have a temporary insult to your kidney function during your hospital stay. We have monitored your kidney function with blood work during your time here and you are at a level that no longer requires continued hospital level care, but we do recommend that you follow up to continually have your kidney function checked. You can follow up with your primary care doctor within 1-2 weeks      Diagnosis: Paroxysmal atrial fibrillation  Assessment and Plan of Treatment: You were found to be bradycardic to the 50s, your metoprolol was held. On discharge your dose was decreased to 12.5mg twice a day. Please follow up with your cardiologist outpatient for dose adjustment.     PRINCIPAL DISCHARGE DIAGNOSIS  Diagnosis: Pneumonia  Assessment and Plan of Treatment: You were admitted for multilobar pnuemonia nad you were treated with antibiotics. You required oxygen and a test was done which determined you will need to be on home oxygen.  You have completed  your antibitics course in the hospital.  Follow up with your primary care physician within 7 days.  Coughing up mucus is normal. Don’t use medicines to suppress your cough unless your cough is dry, painful, or interferes with your sleep. Get plenty of rest until your fever, shortness of breath, and chest pain go away. Plan to get a flu shot every year. Ask your primary care doctor about pneumonia vaccines.  Seek immediate medical attention if you experience chest pain, trouble breathing, blue lips or fingernails, fever of 100.4°F  (38°C) or higher, yellow, green, bloody, or smelly sputum, more than normal mucus production, vomiting or diarrhea.        SECONDARY DISCHARGE DIAGNOSES  Diagnosis: Gout  Assessment and Plan of Treatment: During your hospitalization you had a gout flare. You were treated with pain medication and steroids, your gout improved afterwards.  Gout is a form of arthritis that causes severe joint pain, redness, swelling, and stiffness. Acute gout pain starts suddenly, gets worse quickly, and stops on its own. Acute gout can become chronic and cause permanent damage to the joints.  Seek care immediately if:  You have severe pain in one or more of your joints  You have a fever or redness that spreads beyond the joint area.  You have new symptoms, such as a rash, after you start gout treatment.  Your joint pain and swelling do not go away, even after treatment.  You are not urinating as much or as often as you usually do.  Manage gout:  Rest your painful joint so it can heal.   Elevate your joint. Prop your painful joint on pillows to keep it above your heart comfortably.  Prevent gout attacks:  Do not eat high-purine foods. These foods include meats, seafood, asparagus, spinach, cauliflower, and some types of beans. . Milk products may decrease your risk for gout attacks. Vitamin C and coffee may also help. Your healthcare provider or dietitian can help you create a meal plan.  Drink more liquids as directed. Liquids such as water help remove uric acid from your body. Ask how much liquid to drink each day and which liquids are best for you.  Manage your weight. Weight loss may decrease the amount of uric acid in your body. Exercise can help you lose weight. Talk to your healthcare provider about the best exercises for you.  Control your blood sugar level if you have diabetes. Keep your blood sugar level in a normal range. This can help prevent gout attacks.  Limit or do not drink alcohol as directed. Alcohol can trigger a gout attack. Alcohol also increases your risk for dehydration.      Diagnosis: KOBI (acute kidney injury)  Assessment and Plan of Treatment: You were noted to have a temporary insult to your kidney function during your hospital stay. We have monitored your kidney function with blood work during your time here and you are at a level that no longer requires continued hospital level care, but we do recommend that you follow up to continually have your kidney function checked. You can follow up with your primary care doctor within 1-2 weeks      Diagnosis: Paroxysmal atrial fibrillation  Assessment and Plan of Treatment: You were found to be bradycardic to the 50s, your metoprolol was held. On discharge your dose was decreased to 12.5mg twice a day. Please follow up with your cardiologist outpatient for dose adjustment.    Diagnosis: Hypernatremia  Assessment and Plan of Treatment: Your BMP before discharge showed a mildly elevated sodium of 151. You need to follow up with your PCP in 1 week to repeat a BMP  Please do not take your diuretic until you are cleared by your PCP and Cardiologist to restart it     PRINCIPAL DISCHARGE DIAGNOSIS  Diagnosis: Pneumonia  Assessment and Plan of Treatment: You were admitted for multilobar pnuemonia nad you were treated with antibiotics. You required oxygen and a test was done which determined you will need to be on home oxygen.  You have completed  your antibitics course in the hospital.  Follow up with your primary care physician within 7 days.  Coughing up mucus is normal. Don’t use medicines to suppress your cough unless your cough is dry, painful, or interferes with your sleep. Get plenty of rest until your fever, shortness of breath, and chest pain go away. Plan to get a flu shot every year. Ask your primary care doctor about pneumonia vaccines.  Seek immediate medical attention if you experience chest pain, trouble breathing, blue lips or fingernails, fever of 100.4°F  (38°C) or higher, yellow, green, bloody, or smelly sputum, more than normal mucus production, vomiting or diarrhea.  Did not leave yest d/t diarrhea.  diarrhea improved last night. again few episodes this afternoon.   Most likely 2/2 recent colchicine use. OFFERED  to rule out C diff but patient did not want to delay discharge.   Advised that if diarrrhea doesn't get better in a few days, f/u PCP        SECONDARY DISCHARGE DIAGNOSES  Diagnosis: Gout  Assessment and Plan of Treatment: During your hospitalization you had a gout flare. You were treated with pain medication and steroids, your gout improved afterwards.  Gout is a form of arthritis that causes severe joint pain, redness, swelling, and stiffness. Acute gout pain starts suddenly, gets worse quickly, and stops on its own. Acute gout can become chronic and cause permanent damage to the joints.  Seek care immediately if:  You have severe pain in one or more of your joints  You have a fever or redness that spreads beyond the joint area.  You have new symptoms, such as a rash, after you start gout treatment.  Your joint pain and swelling do not go away, even after treatment.  You are not urinating as much or as often as you usually do.  Manage gout:  Rest your painful joint so it can heal.   Elevate your joint. Prop your painful joint on pillows to keep it above your heart comfortably.  Prevent gout attacks:  Do not eat high-purine foods. These foods include meats, seafood, asparagus, spinach, cauliflower, and some types of beans. . Milk products may decrease your risk for gout attacks. Vitamin C and coffee may also help. Your healthcare provider or dietitian can help you create a meal plan.  Drink more liquids as directed. Liquids such as water help remove uric acid from your body. Ask how much liquid to drink each day and which liquids are best for you.  Manage your weight. Weight loss may decrease the amount of uric acid in your body. Exercise can help you lose weight. Talk to your healthcare provider about the best exercises for you.  Control your blood sugar level if you have diabetes. Keep your blood sugar level in a normal range. This can help prevent gout attacks.  Limit or do not drink alcohol as directed. Alcohol can trigger a gout attack. Alcohol also increases your risk for dehydration.      Diagnosis: KOBI (acute kidney injury)  Assessment and Plan of Treatment: You were noted to have a temporary insult to your kidney function during your hospital stay. We have monitored your kidney function with blood work during your time here and you are at a level that no longer requires continued hospital level care, but we do recommend that you follow up to continually have your kidney function checked. You can follow up with your primary care doctor within 1-2 weeks      Diagnosis: Paroxysmal atrial fibrillation  Assessment and Plan of Treatment: You were found to be bradycardic to the 50s, your metoprolol was held. On discharge your dose was decreased to 12.5mg twice a day. Please follow up with your cardiologist outpatient for dose adjustment.    Diagnosis: Hypernatremia  Assessment and Plan of Treatment: Your BMP before discharge showed a mildly elevated sodium of 151. You need to follow up with your PCP in 1 week to repeat a BMP  Please do not take your diuretic until you are cleared by your PCP and Cardiologist to restart it

## 2022-09-20 NOTE — DISCHARGE NOTE PROVIDER - NSDCMRMEDTOKEN_GEN_ALL_CORE_FT
allopurinol 100 mg oral tablet: orally once a day (at bedtime)  amiodarone 200 mg oral tablet: 1 tab(s) orally once a day  amLODIPine 5 mg oral tablet: 1 tab(s) orally once a day (at bedtime)  atorvastatin 20 mg oral tablet: 1 tab(s) orally once a day  benazepril 10 mg oral tablet: 1 tab(s) orally 2 times a day  cabergoline 0.5 mg oral tablet: 1 tab(s) orally once a week, monday night  levothyroxine 175 mcg (0.175 mg) oral tablet: 1 tab(s) orally once a day  metoprolol tartrate 50 mg oral tablet: 1 tab(s) orally 2 times a day  triamterene: 37.5  orally once a day  Xarelto 20 mg oral tablet: 1 tab(s) orally once a day (in the evening)   allopurinol 100 mg oral tablet: orally once a day (at bedtime)  amiodarone 200 mg oral tablet: 1 tab(s) orally once a day  amLODIPine 5 mg oral tablet: 1 tab(s) orally once a day (at bedtime)  atorvastatin 20 mg oral tablet: 1 tab(s) orally once a day  benazepril 10 mg oral tablet: 1 tab(s) orally 2 times a day  cabergoline 0.5 mg oral tablet: 1 tab(s) orally once a week, monday night  ferrous sulfate 325 mg (65 mg elemental iron) oral tablet: 1 tab(s) orally once a day  levothyroxine 175 mcg (0.175 mg) oral tablet: 1 tab(s) orally once a day  metoprolol tartrate 50 mg oral tablet: 1 tab(s) orally 2 times a day  triamterene: 37.5  orally once a day  Xarelto 20 mg oral tablet: 1 tab(s) orally once a day (in the evening)   allopurinol 100 mg oral tablet: orally once a day (at bedtime)  amiodarone 200 mg oral tablet: 1 tab(s) orally once a day  amLODIPine 5 mg oral tablet: 1 tab(s) orally once a day (at bedtime)  atorvastatin 20 mg oral tablet: 1 tab(s) orally once a day  benazepril 10 mg oral tablet: 1 tab(s) orally 2 times a day  cabergoline 0.5 mg oral tablet: 1 tab(s) orally once a week, monday night  ferrous sulfate 325 mg (65 mg elemental iron) oral tablet: 1 tab(s) orally once a day  levothyroxine 175 mcg (0.175 mg) oral tablet: 1 tab(s) orally once a day  Metoprolol Tartrate 25 mg oral tablet: 0.5 tab(s) orally 2 times a day  triamterene: 37.5  orally once a day  Xarelto 20 mg oral tablet: 1 tab(s) orally once a day (in the evening)   allopurinol 100 mg oral tablet: orally once a day (at bedtime)  amiodarone 200 mg oral tablet: 1 tab(s) orally once a day  amLODIPine 5 mg oral tablet: 1 tab(s) orally once a day (at bedtime)  atorvastatin 20 mg oral tablet: 1 tab(s) orally once a day  benazepril 10 mg oral tablet: 1 tab(s) orally 2 times a day  cabergoline 0.5 mg oral tablet: 1 tab(s) orally once a week, monday night  ferrous sulfate 325 mg (65 mg elemental iron) oral tablet: 1 tab(s) orally once a day  levothyroxine 175 mcg (0.175 mg) oral tablet: 1 tab(s) orally once a day  Metoprolol Tartrate 25 mg oral tablet: 0.5 tab(s) orally 2 times a day  Xarelto 20 mg oral tablet: 1 tab(s) orally once a day (in the evening)

## 2022-09-21 LAB
ALBUMIN SERPL ELPH-MCNC: 2.8 G/DL — LOW (ref 3.5–5.2)
ALP SERPL-CCNC: 110 U/L — SIGNIFICANT CHANGE UP (ref 30–115)
ALT FLD-CCNC: 38 U/L — SIGNIFICANT CHANGE UP (ref 0–41)
ANION GAP SERPL CALC-SCNC: 11 MMOL/L — SIGNIFICANT CHANGE UP (ref 7–14)
AST SERPL-CCNC: 48 U/L — HIGH (ref 0–41)
BILIRUB SERPL-MCNC: <0.2 MG/DL — SIGNIFICANT CHANGE UP (ref 0.2–1.2)
BUN SERPL-MCNC: 34 MG/DL — HIGH (ref 10–20)
CALCIUM SERPL-MCNC: 8.5 MG/DL — SIGNIFICANT CHANGE UP (ref 8.4–10.4)
CHLORIDE SERPL-SCNC: 101 MMOL/L — SIGNIFICANT CHANGE UP (ref 98–110)
CO2 SERPL-SCNC: 29 MMOL/L — SIGNIFICANT CHANGE UP (ref 17–32)
CREAT SERPL-MCNC: 1.2 MG/DL — SIGNIFICANT CHANGE UP (ref 0.7–1.5)
EGFR: 46 ML/MIN/1.73M2 — LOW
GLUCOSE SERPL-MCNC: 99 MG/DL — SIGNIFICANT CHANGE UP (ref 70–99)
HCT VFR BLD CALC: 31.8 % — LOW (ref 37–47)
HGB BLD-MCNC: 9.4 G/DL — LOW (ref 12–16)
MAGNESIUM SERPL-MCNC: 2.1 MG/DL — SIGNIFICANT CHANGE UP (ref 1.8–2.4)
MCHC RBC-ENTMCNC: 22.7 PG — LOW (ref 27–31)
MCHC RBC-ENTMCNC: 29.6 G/DL — LOW (ref 32–37)
MCV RBC AUTO: 76.6 FL — LOW (ref 81–99)
NRBC # BLD: 0 /100 WBCS — SIGNIFICANT CHANGE UP (ref 0–0)
PLATELET # BLD AUTO: 374 K/UL — SIGNIFICANT CHANGE UP (ref 130–400)
POTASSIUM SERPL-MCNC: 4.3 MMOL/L — SIGNIFICANT CHANGE UP (ref 3.5–5)
POTASSIUM SERPL-SCNC: 4.3 MMOL/L — SIGNIFICANT CHANGE UP (ref 3.5–5)
PROT SERPL-MCNC: 5 G/DL — LOW (ref 6–8)
RBC # BLD: 4.15 M/UL — LOW (ref 4.2–5.4)
RBC # FLD: 16.9 % — HIGH (ref 11.5–14.5)
SODIUM SERPL-SCNC: 141 MMOL/L — SIGNIFICANT CHANGE UP (ref 135–146)
WBC # BLD: 13.56 K/UL — HIGH (ref 4.8–10.8)
WBC # FLD AUTO: 13.56 K/UL — HIGH (ref 4.8–10.8)

## 2022-09-21 PROCEDURE — 99232 SBSQ HOSP IP/OBS MODERATE 35: CPT

## 2022-09-21 RX ADMIN — Medication 0.6 MILLIGRAM(S): at 17:35

## 2022-09-21 RX ADMIN — Medication 175 MICROGRAM(S): at 05:46

## 2022-09-21 RX ADMIN — PANTOPRAZOLE SODIUM 40 MILLIGRAM(S): 20 TABLET, DELAYED RELEASE ORAL at 05:46

## 2022-09-21 RX ADMIN — AMIODARONE HYDROCHLORIDE 200 MILLIGRAM(S): 400 TABLET ORAL at 17:35

## 2022-09-21 RX ADMIN — LISINOPRIL 20 MILLIGRAM(S): 2.5 TABLET ORAL at 05:46

## 2022-09-21 RX ADMIN — Medication 25 MILLIGRAM(S): at 17:41

## 2022-09-21 RX ADMIN — ATORVASTATIN CALCIUM 20 MILLIGRAM(S): 80 TABLET, FILM COATED ORAL at 22:43

## 2022-09-21 RX ADMIN — AMIODARONE HYDROCHLORIDE 200 MILLIGRAM(S): 400 TABLET ORAL at 05:46

## 2022-09-21 RX ADMIN — Medication 325 MILLIGRAM(S): at 11:55

## 2022-09-21 RX ADMIN — Medication 25 MILLIGRAM(S): at 05:49

## 2022-09-21 RX ADMIN — Medication 100 MILLIGRAM(S): at 05:46

## 2022-09-21 RX ADMIN — RIVAROXABAN 20 MILLIGRAM(S): KIT at 17:44

## 2022-09-21 RX ADMIN — Medication 0.6 MILLIGRAM(S): at 05:46

## 2022-09-21 RX ADMIN — CHLORHEXIDINE GLUCONATE 1 APPLICATION(S): 213 SOLUTION TOPICAL at 05:47

## 2022-09-21 RX ADMIN — Medication 100 MILLIGRAM(S): at 17:35

## 2022-09-21 NOTE — PROGRESS NOTE ADULT - ASSESSMENT
81F PMHx pAF on xarelto s/p cardioversion 9/2022, HFpEF, HTN, thyroid ca s/p resection, HLD here with acute hypoxic resp failure, due to PNA. Pt hospital course complicated by a gout flare which is resolving.    #Acute hypoxic Resp failure, due to multifocal pna  - CTA negative for PE  - Legionella and Strep negative  - BCx NGTD  - ID recs appreciated- C/w doxy, ceftriaxone until 09/20 to finish 7 day course - course completed 09/22  - On 3L NC this AM, patient evaluated and will need home O2, RA sat 88% with 3L NC at 96%  - Pt refusing BiPAP PRN, but states she uses CPAP at home nightly    #L foot pain, possible gout flare- resolved  - S/p colchicine one dose of 1.2 then one dose of 0.6  - Started on colchicine 0.6 bid 09/19, Tramadol for pain control 09/19  - LE VA duplex neg for DVT  - Xray shows no fx, small effusion  - Pt pain controlled today with current regimen  - PT recs appreciated, home PT or sub-acute rehab     #Paroxysmal A Fib  - Xarelto restarted 09/19  - C/w home dose amiodarone and lopressor    #HFpEF  -ECHO 09/15 EF 71%, Grade II diastolic dysfunction    #HTN  #HLD  - C/w lisinopril and Lipitor    #H/o Thyroid ca  - C/w synthroid 175mcg    # Misc  - DVT Prophylaxis: SCDs  - GI Prophylaxis: pantoprazole  - Diet: Regular  - Code Status: Full Code  - Pending: dispo planning - with home O2 - patient wants to leave tomorrow as she is awaiting her daughter to return from [place] 81F PMHx pAF on xarelto s/p cardioversion 9/2022, HFpEF, HTN, thyroid ca s/p resection, HLD here with acute hypoxic resp failure, due to PNA. Pt hospital course complicated by a gout flare which is resolving.    #Acute hypoxic Resp failure, due to multifocal pna  - CTA negative for PE  - Legionella and Strep negative  - BCx NGTD  - ID recs appreciated- C/w doxy, ceftriaxone until 09/20 to finish 7 day course - course completed 09/22  - On 3L NC this AM, patient evaluated and will need home O2, RA sat 88% with 3L NC at 96%  - Pt refusing BiPAP PRN, but states she uses CPAP at home nightly    #L foot pain, possible gout flare- resolved  - S/p colchicine one dose of 1.2 then one dose of 0.6  - Started on colchicine 0.6 bid 09/19, Tramadol for pain control 09/19  - LE VA duplex neg for DVT  - Xray shows no fx, small effusion  - Pt pain controlled today with current regimen  - PT recs appreciated, home PT or sub-acute rehab     #Paroxysmal A Fib  - Xarelto restarted 09/19  - C/w home dose amiodarone and lopressor    #HFpEF  -ECHO 09/15 EF 71%, Grade II diastolic dysfunction    #HTN  #HLD  - C/w lisinopril and Lipitor    #H/o Thyroid ca  - C/w synthroid 175mcg    #Iron def Anemia  - Low iron and folate on labs  - C/w supplementation  - Daily CBC    # Misc  - DVT Prophylaxis: SCDs  - GI Prophylaxis: pantoprazole  - Diet: Regular  - Code Status: Full Code  - Pending: dispo planning - with home O2 - patient wants to leave tomorrow as she is awaiting her daughter to return from [place]

## 2022-09-21 NOTE — PROGRESS NOTE ADULT - SUBJECTIVE AND OBJECTIVE BOX
Hospital Day:  8d    Subjective:    Patient is a 81y old  Female who was admitted to medicine for a primary diagnosis of multilobar pneumonia. Pt is sitting up in bed this AM eating breakfast in NAD.    Past Medical Hx:   HTN (hypertension)    High cholesterol    Hypothyroid    Afib    Sleep apnea    Osteoarthritis    Pituitary adenoma    CKD (chronic kidney disease) stage 3, GFR 30-59 ml/min      Past Sx:  No significant past surgical history    H/O thyroidectomy    S/P rotator cuff surgery      Allergies:  No Known Allergies    Current Meds:   Standng Meds:  allopurinol 100 milliGRAM(s) Oral two times a day  aMIOdarone    Tablet 200 milliGRAM(s) Oral two times a day  atorvastatin 20 milliGRAM(s) Oral at bedtime  chlorhexidine 2% Cloths 1 Application(s) Topical <User Schedule>  colchicine 0.6 milliGRAM(s) Oral two times a day  ferrous    sulfate 325 milliGRAM(s) Oral daily  levothyroxine 175 MICROGram(s) Oral daily  lisinopril 20 milliGRAM(s) Oral daily  metoprolol tartrate 25 milliGRAM(s) Oral two times a day  pantoprazole    Tablet 40 milliGRAM(s) Oral before breakfast  rivaroxaban 20 milliGRAM(s) Oral with dinner    PRN Meds:  acetaminophen     Tablet .. 650 milliGRAM(s) Oral every 6 hours PRN Temp greater or equal to 38C (100.4F), Mild Pain (1 - 3)  aluminum hydroxide/magnesium hydroxide/simethicone Suspension 30 milliLiter(s) Oral every 4 hours PRN Dyspepsia  melatonin 3 milliGRAM(s) Oral at bedtime PRN Insomnia  ondansetron Injectable 4 milliGRAM(s) IV Push every 8 hours PRN Nausea and/or Vomiting  traMADol 50 milliGRAM(s) Oral every 8 hours PRN Moderate Pain (4 - 6)    HOME MEDICATIONS:  allopurinol 100 mg oral tablet: orally once a day (at bedtime)  amiodarone 200 mg oral tablet: 1 tab(s) orally once a day  amLODIPine 5 mg oral tablet: 1 tab(s) orally once a day (at bedtime)  atorvastatin 20 mg oral tablet: 1 tab(s) orally once a day  benazepril 10 mg oral tablet: 1 tab(s) orally 2 times a day  cabergoline 0.5 mg oral tablet: 1 tab(s) orally once a week, monday night  levothyroxine 175 mcg (0.175 mg) oral tablet: 1 tab(s) orally once a day  metoprolol tartrate 50 mg oral tablet: 1 tab(s) orally 2 times a day  triamterene: 37.5  orally once a day  Xarelto 20 mg oral tablet: 1 tab(s) orally once a day (in the evening)      Vital Signs:   T(F): 96.6 (09-21-22 @ 05:46), Max: 96.6 (09-20-22 @ 19:21)  HR: 53 (09-21-22 @ 05:46) (53 - 58)  BP: 148/69 (09-21-22 @ 05:46) (120/60 - 148/69)  RR: 18 (09-21-22 @ 05:46) (18 - 18)  SpO2: 97% (09-21-22 @ 05:46) (97% - 97%)      09-20-22 @ 07:01  -  09-21-22 @ 07:00  --------------------------------------------------------  IN: 450 mL / OUT: 300 mL / NET: 150 mL        Physical Exam:   GENERAL: NAD  HEENT: NCAT  CHEST/LUNG: mild diffuse crackles b/l, on 3L NC  HEART: Regular rate and rhythm; s1 s2 appreciated  ABDOMEN: Soft, Nontender, Nondistended; Bowel sounds present  EXTREMITIES: mild LE edema b/l, L foot pain resolved normal dorsalis pedis pulses b/l  SKIN: no rashes, no new lesions  NERVOUS SYSTEM:  Alert & Oriented X3          Labs:                         9.4    13.56 )-----------( 374      ( 21 Sep 2022 06:49 )             31.8       20 Sep 2022 08:26    141    |  100    |  28     ----------------------------<  149    4.2     |  27     |  1.1      Ca    8.4        20 Sep 2022 08:26  Mg     2.3       20 Sep 2022 08:26    TPro  5.2    /  Alb  2.9    /  TBili  0.2    /  DBili  x      /  AST  17     /  ALT  16     /  AlkPhos  105    20 Sep 2022 08:26            Serum Pro-Brain Natriuretic Peptide: 1425 pg/mL (09-13-22 @ 19:29)      Iron 26, TIBC 199, %Sat 13 Ferritin -- 09-20-22 @ 08:26

## 2022-09-21 NOTE — PROGRESS NOTE ADULT - PROVIDER SPECIALTY LIST ADULT
Infectious Disease
Infectious Disease
MICU
Pulmonology
Critical Care
Internal Medicine
MICU
Critical Care
Internal Medicine
Critical Care

## 2022-09-21 NOTE — PROGRESS NOTE ADULT - ATTENDING COMMENTS
Patient is a 81 year old female with ho paroxysmal afib s/p cardioversion on Xarelto tx. at home , CHFpEF, HTN, gout presents shortly after cardioversion for dyspnea, rigors and hypoxia requiring Bipap tx, dx. with Sepsis due to suspected aspiration pneumonia.    # Acute hypoxic respiratory failure due to multifocal pneumonia  # Sepsis was present on admission   - s/p CT chest: b/l lung consolidations, no PE  - Legionella and Strep negative/- Blood CxS NGTD  - started on IV Linezolid, IV Zosyn  - ID recs appreciated- s/p doxy, ceftriaxone until 09/20 completed  7 day course  - On  3L NC saturating 96%, desaturating to 88% on RA, will need home oxygen tx.   - Pulmonary team on board       # KOBI due to sepsis- resolved , restarted on lisinopril tx.   - monitor daily BMP  - avoid nephrotoxic agents  -- u/a normal    # Hypomagnesemia- supplemented       # Paroxysmal afib with s/p Cardioversion/ h/o CHFpEF/HTN  - resumed on  Xarelto ( from 9/19)  -Consulted Cardiology who has completed Cardioversion  -resumed on home amiodarone, lopressor  -trop neg.    # Microcytic anemia- daily MVI, iron tx.    # left ankle pain and edema- s/p  left ankle x ray- shows joint effusion  -started on colchicine/IV steroids tx. woring well with decreased pain for suspected gout flare  - PT tx. session today, patient ambulating well with walker    # Hypothyroid- on synthroid tx.    DVT proph- xarelto tx.    Code status: full code    #Progress Note Handoff: patient with clinical improvement, will need home oxygen tx. arrangement, d/c home planning for tomorrow   Family discussion: yes, medical team Disposition: as per family request, patient to be d/c home tomorrow    Total time spent to complete patient's bedside assessment, review medical chart, discuss medical plan of care with covering medical team was more than 35 minutes .
Patient is a 81 year old female with ho paroxysmal afib s/p cardioversion on Xarelto tx. at home , CHFpEF, HTN, gout presents shortly after cardioversion for dyspnea, rigors and hypoxia requiring Bipap tx, dx. with Sepsis due to suspected aspiration pneumonia.    # Acute hypoxic respiratory failure due to multifocal pneumonia  # Sepsis was present on admission   - s/p CT chest: b/l lung consolidations, no PE  - started on IV Linezolid, IV Zosyn, f/up blood cxs, sputum cxs, serum procal level, f/up MRSA swab , consult ID , monitor CBC daily  - Legionella and Strep negative/- Blood CxS NGTD  - ID recs appreciated- C/w doxy, ceftriaxone until 09/20 to finish 7 day course( last day on 9/20)  - On 4L NC this AM, will wean to 3L NC and to keep SpO2>92  - Pulmonary team on board       # KOBI due to sepsis- resolved , restarted on lisinopril tx.   - monitor daily BMP  - avoid nephrotoxic agents  -- u/a normal    # Hypomagnesemia- supplemented       # Paroxysmal afib with s/p Cardioversion/ h/o CHFpEF/HTN  - resumed on  Xarelto ( from 9/19)  -Consulted Cardiology who has completed Cardioversion  -resumed on home amiodarone, lopressor  -trop neg.    # Microcytic anemia- f/up anemia work up, daily MVI    # left ankle pain and edema- s/p  left ankle x ray- shows joint effusion  -started on colchicine/IV steroids tx. trial. for suspected gout flare  - will f/up PT tomorrow    # Hypothyroid- on synthroid tx.    DVT proph- xarelto tx.    Code status: full code    #Progress Note Handoff: Pending hypoxia resolution, for left lower ext. pain continue pain management, colchicine/steroids tx, PT eval in am, monitor  electrolytes, mg was supplemented today, f/up am mg. level  Family discussion: yes, medical team Disposition: STR once medically stable    Total time spent to complete patient's bedside assessment, review medical chart, discuss medical plan of care with covering medical team was more than 35 minutes
81 year old female with ho paroxysmal afib s/p cardioversion on Xarelto tx. at home , CHFpEF, HTN, gout presents shortly after cardioversion for dyspnea, rigors and hypoxia requiring Bipap tx, dx. with Sepsis due to suspected aspiration pneumonia.    # Acute hypoxic respiratory failure due to multifocal pneumonia  # Sepsis was present on admission   - s/p CT chest: b/l lung consolidations, no PE  - Legionella and Strep negative/- Blood CxS NGTD  - started on IV Linezolid, IV Zosyn  - ID recs appreciated- s/p doxy, ceftriaxone until 09/20 completed  7 day course  - On  3L NC saturating 96%, desaturating to 88% on RA, will need home oxygen tx.   - Pulmonary team on board       # KOBI due to sepsis- resolved , restarted on lisinopril tx.   - monitor daily BMP  - avoid nephrotoxic agents  -- u/a normal    # Hypomagnesemia- supplemented       # Paroxysmal afib with s/p Cardioversion/ h/o CHFpEF/HTN  - resumed on  Xarelto ( from 9/19)  -Consulted Cardiology who has completed Cardioversion  -resumed on home amiodarone, lopressor  -trop neg.    # Microcytic anemia- daily MVI, iron tx.    # left ankle pain and edema- s/p  left ankle x ray- shows joint effusion  -started on colchicine/IV steroids tx. woring well with decreased pain for suspected gout flare  - PT tx. session today, patient ambulating well with walker    # Hypothyroid- on synthroid tx.    DVT proph- xarelto tx.    Code status: full code    #Progress Note Handoff: patient with clinical improvement, will need home oxygen tx. arrangement, d/c home planning for tomorrow   Family discussion: yes, medical team Disposition: as per family request, patient to be d/c home tomorrow  daughter will return from [place] tomorrow (only caregiver)

## 2022-09-22 LAB
ALBUMIN SERPL ELPH-MCNC: 3 G/DL — LOW (ref 3.5–5.2)
ALP SERPL-CCNC: 114 U/L — SIGNIFICANT CHANGE UP (ref 30–115)
ALT FLD-CCNC: 42 U/L — HIGH (ref 0–41)
ANION GAP SERPL CALC-SCNC: 9 MMOL/L — SIGNIFICANT CHANGE UP (ref 7–14)
AST SERPL-CCNC: 34 U/L — SIGNIFICANT CHANGE UP (ref 0–41)
BILIRUB SERPL-MCNC: 0.3 MG/DL — SIGNIFICANT CHANGE UP (ref 0.2–1.2)
BUN SERPL-MCNC: 37 MG/DL — HIGH (ref 10–20)
CALCIUM SERPL-MCNC: 8.5 MG/DL — SIGNIFICANT CHANGE UP (ref 8.4–10.5)
CHLORIDE SERPL-SCNC: 105 MMOL/L — SIGNIFICANT CHANGE UP (ref 98–110)
CO2 SERPL-SCNC: 29 MMOL/L — SIGNIFICANT CHANGE UP (ref 17–32)
CREAT SERPL-MCNC: 1.2 MG/DL — SIGNIFICANT CHANGE UP (ref 0.7–1.5)
EGFR: 46 ML/MIN/1.73M2 — LOW
GLUCOSE SERPL-MCNC: 75 MG/DL — SIGNIFICANT CHANGE UP (ref 70–99)
HCT VFR BLD CALC: 35.2 % — LOW (ref 37–47)
HGB BLD-MCNC: 10.2 G/DL — LOW (ref 12–16)
MAGNESIUM SERPL-MCNC: 1.8 MG/DL — SIGNIFICANT CHANGE UP (ref 1.8–2.4)
MCHC RBC-ENTMCNC: 22.6 PG — LOW (ref 27–31)
MCHC RBC-ENTMCNC: 29 G/DL — LOW (ref 32–37)
MCV RBC AUTO: 78 FL — LOW (ref 81–99)
NRBC # BLD: 0 /100 WBCS — SIGNIFICANT CHANGE UP (ref 0–0)
PLATELET # BLD AUTO: 396 K/UL — SIGNIFICANT CHANGE UP (ref 130–400)
POTASSIUM SERPL-MCNC: 4.1 MMOL/L — SIGNIFICANT CHANGE UP (ref 3.5–5)
POTASSIUM SERPL-SCNC: 4.1 MMOL/L — SIGNIFICANT CHANGE UP (ref 3.5–5)
PROT SERPL-MCNC: 5.1 G/DL — LOW (ref 6–8)
RBC # BLD: 4.51 M/UL — SIGNIFICANT CHANGE UP (ref 4.2–5.4)
RBC # FLD: 17 % — HIGH (ref 11.5–14.5)
SODIUM SERPL-SCNC: 143 MMOL/L — SIGNIFICANT CHANGE UP (ref 135–146)
WBC # BLD: 8.31 K/UL — SIGNIFICANT CHANGE UP (ref 4.8–10.8)
WBC # FLD AUTO: 8.31 K/UL — SIGNIFICANT CHANGE UP (ref 4.8–10.8)

## 2022-09-22 PROCEDURE — 99239 HOSP IP/OBS DSCHRG MGMT >30: CPT

## 2022-09-22 PROCEDURE — 99232 SBSQ HOSP IP/OBS MODERATE 35: CPT

## 2022-09-22 RX ORDER — METOPROLOL TARTRATE 50 MG
1 TABLET ORAL
Qty: 0 | Refills: 0 | DISCHARGE

## 2022-09-22 RX ORDER — FERROUS SULFATE 325(65) MG
1 TABLET ORAL
Qty: 0 | Refills: 0 | DISCHARGE
Start: 2022-09-22

## 2022-09-22 RX ADMIN — ATORVASTATIN CALCIUM 20 MILLIGRAM(S): 80 TABLET, FILM COATED ORAL at 21:12

## 2022-09-22 RX ADMIN — Medication 325 MILLIGRAM(S): at 12:51

## 2022-09-22 RX ADMIN — Medication 0.6 MILLIGRAM(S): at 05:45

## 2022-09-22 RX ADMIN — Medication 100 MILLIGRAM(S): at 18:01

## 2022-09-22 RX ADMIN — RIVAROXABAN 20 MILLIGRAM(S): KIT at 18:00

## 2022-09-22 RX ADMIN — Medication 175 MICROGRAM(S): at 05:45

## 2022-09-22 RX ADMIN — Medication 25 MILLIGRAM(S): at 18:00

## 2022-09-22 RX ADMIN — Medication 0.6 MILLIGRAM(S): at 18:00

## 2022-09-22 RX ADMIN — PANTOPRAZOLE SODIUM 40 MILLIGRAM(S): 20 TABLET, DELAYED RELEASE ORAL at 05:45

## 2022-09-22 RX ADMIN — AMIODARONE HYDROCHLORIDE 200 MILLIGRAM(S): 400 TABLET ORAL at 17:59

## 2022-09-22 RX ADMIN — CHLORHEXIDINE GLUCONATE 1 APPLICATION(S): 213 SOLUTION TOPICAL at 05:47

## 2022-09-22 RX ADMIN — LISINOPRIL 20 MILLIGRAM(S): 2.5 TABLET ORAL at 05:45

## 2022-09-22 RX ADMIN — Medication 100 MILLIGRAM(S): at 05:45

## 2022-09-23 ENCOUNTER — TRANSCRIPTION ENCOUNTER (OUTPATIENT)
Age: 82
End: 2022-09-23

## 2022-09-23 VITALS
HEART RATE: 88 BPM | SYSTOLIC BLOOD PRESSURE: 157 MMHG | TEMPERATURE: 97 F | DIASTOLIC BLOOD PRESSURE: 71 MMHG | RESPIRATION RATE: 18 BRPM | OXYGEN SATURATION: 98 %

## 2022-09-23 LAB
ALBUMIN SERPL ELPH-MCNC: 2.9 G/DL — LOW (ref 3.5–5.2)
ALP SERPL-CCNC: 107 U/L — SIGNIFICANT CHANGE UP (ref 30–115)
ALT FLD-CCNC: 34 U/L — SIGNIFICANT CHANGE UP (ref 0–41)
ANION GAP SERPL CALC-SCNC: 12 MMOL/L — SIGNIFICANT CHANGE UP (ref 7–14)
AST SERPL-CCNC: 25 U/L — SIGNIFICANT CHANGE UP (ref 0–41)
BILIRUB SERPL-MCNC: 0.3 MG/DL — SIGNIFICANT CHANGE UP (ref 0.2–1.2)
BUN SERPL-MCNC: 35 MG/DL — HIGH (ref 10–20)
CALCIUM SERPL-MCNC: 8.5 MG/DL — SIGNIFICANT CHANGE UP (ref 8.4–10.5)
CHLORIDE SERPL-SCNC: 113 MMOL/L — HIGH (ref 98–110)
CO2 SERPL-SCNC: 26 MMOL/L — SIGNIFICANT CHANGE UP (ref 17–32)
CREAT SERPL-MCNC: 1.3 MG/DL — SIGNIFICANT CHANGE UP (ref 0.7–1.5)
EGFR: 42 ML/MIN/1.73M2 — LOW
GLUCOSE SERPL-MCNC: 90 MG/DL — SIGNIFICANT CHANGE UP (ref 70–99)
HCT VFR BLD CALC: 34.7 % — LOW (ref 37–47)
HGB BLD-MCNC: 10.1 G/DL — LOW (ref 12–16)
MAGNESIUM SERPL-MCNC: 1.7 MG/DL — LOW (ref 1.8–2.4)
MCHC RBC-ENTMCNC: 22.2 PG — LOW (ref 27–31)
MCHC RBC-ENTMCNC: 29.1 G/DL — LOW (ref 32–37)
MCV RBC AUTO: 76.4 FL — LOW (ref 81–99)
NRBC # BLD: 0 /100 WBCS — SIGNIFICANT CHANGE UP (ref 0–0)
PLATELET # BLD AUTO: 302 K/UL — SIGNIFICANT CHANGE UP (ref 130–400)
POTASSIUM SERPL-MCNC: 3.9 MMOL/L — SIGNIFICANT CHANGE UP (ref 3.5–5)
POTASSIUM SERPL-SCNC: 3.9 MMOL/L — SIGNIFICANT CHANGE UP (ref 3.5–5)
PROT SERPL-MCNC: 5 G/DL — LOW (ref 6–8)
RBC # BLD: 4.54 M/UL — SIGNIFICANT CHANGE UP (ref 4.2–5.4)
RBC # FLD: 17.1 % — HIGH (ref 11.5–14.5)
SODIUM SERPL-SCNC: 151 MMOL/L — HIGH (ref 135–146)
WBC # BLD: 7.16 K/UL — SIGNIFICANT CHANGE UP (ref 4.8–10.8)
WBC # FLD AUTO: 7.16 K/UL — SIGNIFICANT CHANGE UP (ref 4.8–10.8)

## 2022-09-23 PROCEDURE — 99239 HOSP IP/OBS DSCHRG MGMT >30: CPT

## 2022-09-23 RX ORDER — TRIAMTERENE 100 MG/1
37.5 CAPSULE ORAL
Qty: 0 | Refills: 0 | DISCHARGE

## 2022-09-23 RX ADMIN — Medication 100 MILLIGRAM(S): at 05:01

## 2022-09-23 RX ADMIN — PANTOPRAZOLE SODIUM 40 MILLIGRAM(S): 20 TABLET, DELAYED RELEASE ORAL at 06:12

## 2022-09-23 RX ADMIN — Medication 175 MICROGRAM(S): at 05:00

## 2022-09-23 RX ADMIN — Medication 0.6 MILLIGRAM(S): at 05:01

## 2022-09-23 RX ADMIN — AMIODARONE HYDROCHLORIDE 200 MILLIGRAM(S): 400 TABLET ORAL at 05:01

## 2022-09-23 RX ADMIN — Medication 325 MILLIGRAM(S): at 11:04

## 2022-09-23 RX ADMIN — CHLORHEXIDINE GLUCONATE 1 APPLICATION(S): 213 SOLUTION TOPICAL at 05:01

## 2022-09-23 RX ADMIN — Medication 650 MILLIGRAM(S): at 03:52

## 2022-09-23 RX ADMIN — LISINOPRIL 20 MILLIGRAM(S): 2.5 TABLET ORAL at 05:01

## 2022-09-23 NOTE — DISCHARGE NOTE NURSING/CASE MANAGEMENT/SOCIAL WORK - NSDCPEFALRISK_GEN_ALL_CORE
For information on Fall & Injury Prevention, visit: [email or web address] OR  [email or web address] OR  https://www.cdc.gov/steadi/patient.html

## 2022-09-23 NOTE — DISCHARGE NOTE NURSING/CASE MANAGEMENT/SOCIAL WORK - PATIENT PORTAL LINK FT
You can access the FollowMyHealth Patient Portal offered by [facility] by registering at the following website: [email or web address]. By joining [employer]’s FollowMyHealth portal, you will also be able to view your health information using other applications (apps) compatible with our system.

## 2022-09-27 DIAGNOSIS — E78.00 PURE HYPERCHOLESTEROLEMIA, UNSPECIFIED: ICD-10-CM

## 2022-09-27 DIAGNOSIS — Z79.01 LONG TERM (CURRENT) USE OF ANTICOAGULANTS: ICD-10-CM

## 2022-09-27 DIAGNOSIS — N18.32 CHRONIC KIDNEY DISEASE, STAGE 3B: ICD-10-CM

## 2022-09-27 DIAGNOSIS — I13.0 HYPERTENSIVE HEART AND CHRONIC KIDNEY DISEASE WITH HEART FAILURE AND STAGE 1 THROUGH STAGE 4 CHRONIC KIDNEY DISEASE, OR UNSPECIFIED CHRONIC KIDNEY DISEASE: ICD-10-CM

## 2022-09-27 DIAGNOSIS — Z79.890 HORMONE REPLACEMENT THERAPY: ICD-10-CM

## 2022-09-27 DIAGNOSIS — Z85.850 PERSONAL HISTORY OF MALIGNANT NEOPLASM OF THYROID: ICD-10-CM

## 2022-09-27 DIAGNOSIS — N32.81 OVERACTIVE BLADDER: ICD-10-CM

## 2022-09-27 DIAGNOSIS — D50.9 IRON DEFICIENCY ANEMIA, UNSPECIFIED: ICD-10-CM

## 2022-09-27 DIAGNOSIS — I50.33 ACUTE ON CHRONIC DIASTOLIC (CONGESTIVE) HEART FAILURE: ICD-10-CM

## 2022-09-27 DIAGNOSIS — J69.0 PNEUMONITIS DUE TO INHALATION OF FOOD AND VOMIT: ICD-10-CM

## 2022-09-27 DIAGNOSIS — J18.9 PNEUMONIA, UNSPECIFIED ORGANISM: ICD-10-CM

## 2022-09-27 DIAGNOSIS — N17.9 ACUTE KIDNEY FAILURE, UNSPECIFIED: ICD-10-CM

## 2022-09-27 DIAGNOSIS — M10.9 GOUT, UNSPECIFIED: ICD-10-CM

## 2022-09-27 DIAGNOSIS — A41.9 SEPSIS, UNSPECIFIED ORGANISM: ICD-10-CM

## 2022-09-27 DIAGNOSIS — E89.0 POSTPROCEDURAL HYPOTHYROIDISM: ICD-10-CM

## 2022-09-27 DIAGNOSIS — D35.2 BENIGN NEOPLASM OF PITUITARY GLAND: ICD-10-CM

## 2022-09-27 DIAGNOSIS — R09.02 HYPOXEMIA: ICD-10-CM

## 2022-09-27 DIAGNOSIS — I48.0 PAROXYSMAL ATRIAL FIBRILLATION: ICD-10-CM

## 2022-09-27 DIAGNOSIS — J96.01 ACUTE RESPIRATORY FAILURE WITH HYPOXIA: ICD-10-CM

## 2022-09-27 DIAGNOSIS — G57.33 LESION OF LATERAL POPLITEAL NERVE, BILATERAL LOWER LIMBS: ICD-10-CM

## 2022-09-27 DIAGNOSIS — Z99.89 DEPENDENCE ON OTHER ENABLING MACHINES AND DEVICES: ICD-10-CM

## 2022-11-14 ENCOUNTER — APPOINTMENT (OUTPATIENT)
Age: 82
End: 2022-11-14

## 2022-12-01 ENCOUNTER — APPOINTMENT (OUTPATIENT)
Age: 82
End: 2022-12-01

## 2022-12-01 VITALS
OXYGEN SATURATION: 97 % | HEIGHT: 62 IN | WEIGHT: 196 LBS | BODY MASS INDEX: 36.07 KG/M2 | SYSTOLIC BLOOD PRESSURE: 100 MMHG | RESPIRATION RATE: 12 BRPM | HEART RATE: 86 BPM | DIASTOLIC BLOOD PRESSURE: 60 MMHG

## 2022-12-01 PROCEDURE — 71046 X-RAY EXAM CHEST 2 VIEWS: CPT

## 2022-12-01 PROCEDURE — 99213 OFFICE O/P EST LOW 20 MIN: CPT | Mod: 25

## 2022-12-01 NOTE — ASSESSMENT
[FreeTextEntry1] : Severe FELIX on APAP   \par HO small lung nodules stable for years \par SP therapy for CAP / Aspiration in September\par SP Cardioversion

## 2022-12-02 ENCOUNTER — APPOINTMENT (OUTPATIENT)
Dept: UROGYNECOLOGY | Facility: CLINIC | Age: 82
End: 2022-12-02

## 2022-12-02 VITALS
DIASTOLIC BLOOD PRESSURE: 79 MMHG | SYSTOLIC BLOOD PRESSURE: 122 MMHG | BODY MASS INDEX: 36.07 KG/M2 | HEIGHT: 62 IN | WEIGHT: 196 LBS | HEART RATE: 67 BPM

## 2022-12-02 PROCEDURE — 99213 OFFICE O/P EST LOW 20 MIN: CPT

## 2022-12-02 RX ORDER — ALLOPURINOL 100 MG/1
100 TABLET ORAL
Refills: 0 | Status: COMPLETED | COMMUNITY
End: 2022-12-02

## 2022-12-02 RX ORDER — FESOTERODINE FUMARATE 8 MG/1
8 TABLET, FILM COATED, EXTENDED RELEASE ORAL
Qty: 90 | Refills: 0 | Status: COMPLETED | COMMUNITY
Start: 2022-06-17 | End: 2022-12-02

## 2022-12-07 NOTE — HISTORY OF PRESENT ILLNESS
[FreeTextEntry1] : Patient is here for 20 months med check for urge incontinence.\par Last seen on 4/13/21 for med check.\par \par s/p vesicare (for years, no change in symptoms) \par s/p detrol LA (no change in symptoms), \par  s/p botox x2 (both worked well but does not like the need for injections every 3 months)\par hx of a fib, on xarelto\par \par s/p Myrbetriq 50mg : stopped working\par s/p Toviaz 4mg QD\par \par \par Today, patient states that she stopped Toviaz 4mg in August 2022 as she ran out of it and it wasn't helping her anymore. She was traveling to [place] for few months. C/o urinary frequency, urgency and leakage of urine. Denies side effects. Patient does not feel she has an infection.\par \par Patient would like to try another bladder medication. \par

## 2022-12-07 NOTE — DISCUSSION/SUMMARY
[FreeTextEntry1] : Mixed Incontinence-\par Rx Trospium 20 mg BID, 90 days supply per pt's request\par Precautions reviewed.\par Will return in 6 -8 weeks for follow up or earlier if she has any issues.\par \par \par

## 2022-12-07 NOTE — COUNSELING
[FreeTextEntry1] : If you feel like you have an infection it is important for you to call our office and we will arrange testing of your urine.\par \par Please begin taking Trospium 20 mg twice a day. It takes up to 6 weeks to go into full effect. 90 days supply was mailed to [facility]. \par \par Please call my office if you have any issues with the cost or side effects of the medication. \par \par Schedule a 6-8 weeks follow up med check appointment with AMANDA Ramirez.\par

## 2023-01-31 ENCOUNTER — NON-APPOINTMENT (OUTPATIENT)
Age: 83
End: 2023-01-31

## 2023-04-09 ENCOUNTER — RX RENEWAL (OUTPATIENT)
Age: 83
End: 2023-04-09

## 2023-05-18 ENCOUNTER — APPOINTMENT (OUTPATIENT)
Dept: PULMONOLOGY | Facility: HOSPITAL | Age: 83
End: 2023-05-18

## 2023-06-05 ENCOUNTER — APPOINTMENT (OUTPATIENT)
Dept: PULMONOLOGY | Facility: CLINIC | Age: 83
End: 2023-06-05

## 2023-07-14 ENCOUNTER — APPOINTMENT (OUTPATIENT)
Dept: UROGYNECOLOGY | Facility: CLINIC | Age: 83
End: 2023-07-14
Payer: MEDICARE

## 2023-07-14 VITALS
DIASTOLIC BLOOD PRESSURE: 66 MMHG | HEART RATE: 52 BPM | WEIGHT: 201 LBS | HEIGHT: 62 IN | BODY MASS INDEX: 36.99 KG/M2 | SYSTOLIC BLOOD PRESSURE: 109 MMHG

## 2023-07-14 PROCEDURE — 99213 OFFICE O/P EST LOW 20 MIN: CPT

## 2023-07-14 NOTE — COUNSELING
[FreeTextEntry1] : If you feel like you have an infection it is important for you to call our office and we will arrange testing of your urine.\par \par Please continue taking Trospium 20 mg twice a day for frequency. Refills sent to your pharmacy.\par \par Please follow our recommended bowel recipe to control your constipation. You may go up to 4 tablespoons. \par \par You can try taking stool softeners Colace 100mg twice a day for constipation. We will send a prescription to your pharmacy. \par \par Please call my office if you have any issues with the cost or side effects of the medication. \par \par Schedule a 6 months follow up med check appointment with AMANDA Ramirez.\par

## 2023-07-14 NOTE — HISTORY OF PRESENT ILLNESS
[FreeTextEntry1] : Patient is here for 6 months med check for urge incontinence.\par Last seen on 12/2/22 for med check.\par \par s/p vesicare (for years, no change in symptoms) \par s/p detrol LA (no change in symptoms), \par  s/p botox x2 (both worked well but does not like the need for injections every 3 months)\par hx of a fib, on xarelto\par \par s/p Myrbetriq 50mg : stopped working\par s/p Toviaz 4mg QD: no benefit\par Trospium 20 mg BID\par \par Today, patient states she is happy with Trospium 20 mg BID and is noticing improvement. Feels 90% better. C/o constipation in the last month, has bowel movement every 2-3 days. Patient does not feel she has an infection.\par \par Patient would like to continue Trospium 20mg BID.\par

## 2023-07-14 NOTE — DISCUSSION/SUMMARY
[FreeTextEntry1] : Mixed Incontinence-\par Patient happy with Trospium 20 mg BID.\par Refills provided. 90 days supply with 1 refills.\par Precautions reviewed.\par Will return in 6 months for follow up or earlier if she has any issues.\par \par Constipation\par Bowel recipe\par Rx Colace 100mg BID, 30 days 5 refills

## 2023-07-20 ENCOUNTER — OUTPATIENT (OUTPATIENT)
Dept: OUTPATIENT SERVICES | Facility: HOSPITAL | Age: 83
LOS: 1 days | End: 2023-07-20
Payer: MEDICARE

## 2023-07-20 DIAGNOSIS — Z98.890 OTHER SPECIFIED POSTPROCEDURAL STATES: Chronic | ICD-10-CM

## 2023-07-20 DIAGNOSIS — Z12.31 ENCOUNTER FOR SCREENING MAMMOGRAM FOR MALIGNANT NEOPLASM OF BREAST: ICD-10-CM

## 2023-07-20 DIAGNOSIS — Z00.8 ENCOUNTER FOR OTHER GENERAL EXAMINATION: ICD-10-CM

## 2023-07-20 PROCEDURE — 77063 BREAST TOMOSYNTHESIS BI: CPT

## 2023-07-20 PROCEDURE — 77067 SCR MAMMO BI INCL CAD: CPT

## 2023-07-20 PROCEDURE — 77063 BREAST TOMOSYNTHESIS BI: CPT | Mod: 26

## 2023-07-20 PROCEDURE — 77067 SCR MAMMO BI INCL CAD: CPT | Mod: 26

## 2023-07-21 DIAGNOSIS — Z12.31 ENCOUNTER FOR SCREENING MAMMOGRAM FOR MALIGNANT NEOPLASM OF BREAST: ICD-10-CM

## 2023-09-26 ENCOUNTER — TRANSCRIPTION ENCOUNTER (OUTPATIENT)
Age: 83
End: 2023-09-26

## 2023-09-26 ENCOUNTER — OUTPATIENT (OUTPATIENT)
Dept: OUTPATIENT SERVICES | Facility: HOSPITAL | Age: 83
LOS: 1 days | Discharge: HOME | End: 2023-09-26
Payer: MEDICARE

## 2023-09-26 VITALS
DIASTOLIC BLOOD PRESSURE: 72 MMHG | HEART RATE: 85 BPM | RESPIRATION RATE: 18 BRPM | OXYGEN SATURATION: 97 % | SYSTOLIC BLOOD PRESSURE: 108 MMHG

## 2023-09-26 VITALS
HEART RATE: 80 BPM | TEMPERATURE: 97 F | SYSTOLIC BLOOD PRESSURE: 111 MMHG | RESPIRATION RATE: 18 BRPM | WEIGHT: 199.96 LBS | DIASTOLIC BLOOD PRESSURE: 72 MMHG

## 2023-09-26 DIAGNOSIS — D64.9 ANEMIA, UNSPECIFIED: ICD-10-CM

## 2023-09-26 DIAGNOSIS — Z98.890 OTHER SPECIFIED POSTPROCEDURAL STATES: Chronic | ICD-10-CM

## 2023-09-26 DIAGNOSIS — K64.1 SECOND DEGREE HEMORRHOIDS: ICD-10-CM

## 2023-09-26 PROCEDURE — 88305 TISSUE EXAM BY PATHOLOGIST: CPT | Mod: 26

## 2023-09-26 NOTE — ASU DISCHARGE PLAN (ADULT/PEDIATRIC) - CARE PROVIDER_API CALL
Petr Beth  Gastroenterology  [address] [place] [address]  Phone: [phone]  Fax: [phone]  Follow Up Time:

## 2023-09-26 NOTE — ASU DISCHARGE PLAN (ADULT/PEDIATRIC) - NS MD DC FALL RISK RISK
For information on Fall & Injury Prevention, visit: [email or web address] OR  [email or web address] OR  https://www.cdc.gov/steadi/patient.html

## 2023-09-26 NOTE — ASU PATIENT PROFILE, ADULT - FALL HARM RISK - HARM RISK INTERVENTIONS

## 2023-09-27 ENCOUNTER — INPATIENT (INPATIENT)
Facility: HOSPITAL | Age: 83
LOS: 6 days | Discharge: HOME HEALTH | DRG: 291 | End: 2023-10-04
Attending: INTERNAL MEDICINE | Admitting: INTERNAL MEDICINE
Payer: MEDICARE

## 2023-09-27 VITALS
HEART RATE: 129 BPM | RESPIRATION RATE: 18 BRPM | TEMPERATURE: 98 F | SYSTOLIC BLOOD PRESSURE: 127 MMHG | DIASTOLIC BLOOD PRESSURE: 65 MMHG | OXYGEN SATURATION: 100 %

## 2023-09-27 DIAGNOSIS — Z98.890 OTHER SPECIFIED POSTPROCEDURAL STATES: Chronic | ICD-10-CM

## 2023-09-27 DIAGNOSIS — I50.9 HEART FAILURE, UNSPECIFIED: ICD-10-CM

## 2023-09-27 LAB
ALBUMIN SERPL ELPH-MCNC: 3.6 G/DL — SIGNIFICANT CHANGE UP (ref 3.5–5.2)
ALBUMIN SERPL ELPH-MCNC: 4.1 G/DL — SIGNIFICANT CHANGE UP (ref 3.5–5.2)
ALP SERPL-CCNC: 141 U/L — HIGH (ref 30–115)
ALP SERPL-CCNC: 145 U/L — HIGH (ref 30–115)
ALT FLD-CCNC: 68 U/L — HIGH (ref 0–41)
ALT FLD-CCNC: 79 U/L — HIGH (ref 0–41)
ANION GAP SERPL CALC-SCNC: 13 MMOL/L — SIGNIFICANT CHANGE UP (ref 7–14)
ANION GAP SERPL CALC-SCNC: 14 MMOL/L — SIGNIFICANT CHANGE UP (ref 7–14)
ANION GAP SERPL CALC-SCNC: 15 MMOL/L — HIGH (ref 7–14)
APTT BLD: 31.6 SEC — SIGNIFICANT CHANGE UP (ref 27–39.2)
AST SERPL-CCNC: 42 U/L — HIGH (ref 0–41)
AST SERPL-CCNC: 80 U/L — HIGH (ref 0–41)
BASOPHILS # BLD AUTO: 0.04 K/UL — SIGNIFICANT CHANGE UP (ref 0–0.2)
BASOPHILS NFR BLD AUTO: 0.4 % — SIGNIFICANT CHANGE UP (ref 0–1)
BILIRUB SERPL-MCNC: 0.8 MG/DL — SIGNIFICANT CHANGE UP (ref 0.2–1.2)
BILIRUB SERPL-MCNC: 0.9 MG/DL — SIGNIFICANT CHANGE UP (ref 0.2–1.2)
BUN SERPL-MCNC: 34 MG/DL — HIGH (ref 10–20)
BUN SERPL-MCNC: 35 MG/DL — HIGH (ref 10–20)
BUN SERPL-MCNC: 40 MG/DL — HIGH (ref 10–20)
CALCIUM SERPL-MCNC: 8.5 MG/DL — SIGNIFICANT CHANGE UP (ref 8.4–10.5)
CALCIUM SERPL-MCNC: 8.7 MG/DL — SIGNIFICANT CHANGE UP (ref 8.4–10.5)
CALCIUM SERPL-MCNC: 8.9 MG/DL — SIGNIFICANT CHANGE UP (ref 8.4–10.4)
CHLORIDE SERPL-SCNC: 102 MMOL/L — SIGNIFICANT CHANGE UP (ref 98–110)
CHLORIDE SERPL-SCNC: 103 MMOL/L — SIGNIFICANT CHANGE UP (ref 98–110)
CHLORIDE SERPL-SCNC: 106 MMOL/L — SIGNIFICANT CHANGE UP (ref 98–110)
CO2 SERPL-SCNC: 23 MMOL/L — SIGNIFICANT CHANGE UP (ref 17–32)
CO2 SERPL-SCNC: 24 MMOL/L — SIGNIFICANT CHANGE UP (ref 17–32)
CO2 SERPL-SCNC: 25 MMOL/L — SIGNIFICANT CHANGE UP (ref 17–32)
CREAT SERPL-MCNC: 1.5 MG/DL — SIGNIFICANT CHANGE UP (ref 0.7–1.5)
CREAT SERPL-MCNC: 1.6 MG/DL — HIGH (ref 0.7–1.5)
CREAT SERPL-MCNC: 1.7 MG/DL — HIGH (ref 0.7–1.5)
D DIMER BLD IA.RAPID-MCNC: 350 NG/ML DDU — HIGH
EGFR: 30 ML/MIN/1.73M2 — LOW
EGFR: 32 ML/MIN/1.73M2 — LOW
EGFR: 35 ML/MIN/1.73M2 — LOW
EOSINOPHIL # BLD AUTO: 0.1 K/UL — SIGNIFICANT CHANGE UP (ref 0–0.7)
EOSINOPHIL NFR BLD AUTO: 0.9 % — SIGNIFICANT CHANGE UP (ref 0–8)
GAS PNL BLDV: SIGNIFICANT CHANGE UP
GLUCOSE SERPL-MCNC: 109 MG/DL — HIGH (ref 70–99)
GLUCOSE SERPL-MCNC: 119 MG/DL — HIGH (ref 70–99)
GLUCOSE SERPL-MCNC: 99 MG/DL — SIGNIFICANT CHANGE UP (ref 70–99)
HCT VFR BLD CALC: 37.4 % — SIGNIFICANT CHANGE UP (ref 37–47)
HGB BLD-MCNC: 11.5 G/DL — LOW (ref 12–16)
IMM GRANULOCYTES NFR BLD AUTO: 0.5 % — HIGH (ref 0.1–0.3)
INR BLD: 1.04 RATIO — SIGNIFICANT CHANGE UP (ref 0.65–1.3)
LYMPHOCYTES # BLD AUTO: 1.64 K/UL — SIGNIFICANT CHANGE UP (ref 1.2–3.4)
LYMPHOCYTES # BLD AUTO: 14.8 % — LOW (ref 20.5–51.1)
MAGNESIUM SERPL-MCNC: 1.8 MG/DL — SIGNIFICANT CHANGE UP (ref 1.8–2.4)
MCHC RBC-ENTMCNC: 27.6 PG — SIGNIFICANT CHANGE UP (ref 27–31)
MCHC RBC-ENTMCNC: 30.7 G/DL — LOW (ref 32–37)
MCV RBC AUTO: 89.9 FL — SIGNIFICANT CHANGE UP (ref 81–99)
MONOCYTES # BLD AUTO: 0.71 K/UL — HIGH (ref 0.1–0.6)
MONOCYTES NFR BLD AUTO: 6.4 % — SIGNIFICANT CHANGE UP (ref 1.7–9.3)
NEUTROPHILS # BLD AUTO: 8.52 K/UL — HIGH (ref 1.4–6.5)
NEUTROPHILS NFR BLD AUTO: 77 % — HIGH (ref 42.2–75.2)
NRBC # BLD: 0 /100 WBCS — SIGNIFICANT CHANGE UP (ref 0–0)
NT-PROBNP SERPL-SCNC: 2850 PG/ML — HIGH (ref 0–300)
PLATELET # BLD AUTO: 243 K/UL — SIGNIFICANT CHANGE UP (ref 130–400)
PMV BLD: 12.4 FL — HIGH (ref 7.4–10.4)
POTASSIUM SERPL-MCNC: 3.7 MMOL/L — SIGNIFICANT CHANGE UP (ref 3.5–5)
POTASSIUM SERPL-MCNC: 3.8 MMOL/L — SIGNIFICANT CHANGE UP (ref 3.5–5)
POTASSIUM SERPL-MCNC: 4.2 MMOL/L — SIGNIFICANT CHANGE UP (ref 3.5–5)
POTASSIUM SERPL-SCNC: 3.7 MMOL/L — SIGNIFICANT CHANGE UP (ref 3.5–5)
POTASSIUM SERPL-SCNC: 3.8 MMOL/L — SIGNIFICANT CHANGE UP (ref 3.5–5)
POTASSIUM SERPL-SCNC: 4.2 MMOL/L — SIGNIFICANT CHANGE UP (ref 3.5–5)
PROT SERPL-MCNC: 5.7 G/DL — LOW (ref 6–8)
PROT SERPL-MCNC: 6 G/DL — SIGNIFICANT CHANGE UP (ref 6–8)
PROTHROM AB SERPL-ACNC: 11.9 SEC — SIGNIFICANT CHANGE UP (ref 9.95–12.87)
RBC # BLD: 4.16 M/UL — LOW (ref 4.2–5.4)
RBC # FLD: 18.3 % — HIGH (ref 11.5–14.5)
SODIUM SERPL-SCNC: 140 MMOL/L — SIGNIFICANT CHANGE UP (ref 135–146)
SODIUM SERPL-SCNC: 142 MMOL/L — SIGNIFICANT CHANGE UP (ref 135–146)
SODIUM SERPL-SCNC: 143 MMOL/L — SIGNIFICANT CHANGE UP (ref 135–146)
SURGICAL PATHOLOGY STUDY: SIGNIFICANT CHANGE UP
TROPONIN T SERPL-MCNC: <0.01 NG/ML — SIGNIFICANT CHANGE UP
TROPONIN T SERPL-MCNC: <0.01 NG/ML — SIGNIFICANT CHANGE UP
WBC # BLD: 11.06 K/UL — HIGH (ref 4.8–10.8)
WBC # FLD AUTO: 11.06 K/UL — HIGH (ref 4.8–10.8)

## 2023-09-27 PROCEDURE — 76705 ECHO EXAM OF ABDOMEN: CPT | Mod: 26

## 2023-09-27 PROCEDURE — 97161 PT EVAL LOW COMPLEX 20 MIN: CPT | Mod: GP

## 2023-09-27 PROCEDURE — 71275 CT ANGIOGRAPHY CHEST: CPT | Mod: 26,MA

## 2023-09-27 PROCEDURE — 84300 ASSAY OF URINE SODIUM: CPT

## 2023-09-27 PROCEDURE — 84540 ASSAY OF URINE/UREA-N: CPT

## 2023-09-27 PROCEDURE — 80048 BASIC METABOLIC PNL TOTAL CA: CPT

## 2023-09-27 PROCEDURE — 0225U NFCT DS DNA&RNA 21 SARSCOV2: CPT

## 2023-09-27 PROCEDURE — 84133 ASSAY OF URINE POTASSIUM: CPT

## 2023-09-27 PROCEDURE — 85025 COMPLETE CBC W/AUTO DIFF WBC: CPT

## 2023-09-27 PROCEDURE — 81001 URINALYSIS AUTO W/SCOPE: CPT

## 2023-09-27 PROCEDURE — 80053 COMPREHEN METABOLIC PANEL: CPT

## 2023-09-27 PROCEDURE — 71045 X-RAY EXAM CHEST 1 VIEW: CPT | Mod: 26

## 2023-09-27 PROCEDURE — 82570 ASSAY OF URINE CREATININE: CPT

## 2023-09-27 PROCEDURE — 97116 GAIT TRAINING THERAPY: CPT | Mod: GP

## 2023-09-27 PROCEDURE — 83735 ASSAY OF MAGNESIUM: CPT

## 2023-09-27 PROCEDURE — 99223 1ST HOSP IP/OBS HIGH 75: CPT

## 2023-09-27 PROCEDURE — 36415 COLL VENOUS BLD VENIPUNCTURE: CPT

## 2023-09-27 PROCEDURE — 84439 ASSAY OF FREE THYROXINE: CPT

## 2023-09-27 PROCEDURE — 71045 X-RAY EXAM CHEST 1 VIEW: CPT

## 2023-09-27 PROCEDURE — 76770 US EXAM ABDO BACK WALL COMP: CPT

## 2023-09-27 PROCEDURE — 84156 ASSAY OF PROTEIN URINE: CPT

## 2023-09-27 PROCEDURE — 84100 ASSAY OF PHOSPHORUS: CPT

## 2023-09-27 PROCEDURE — 99285 EMERGENCY DEPT VISIT HI MDM: CPT | Mod: FS

## 2023-09-27 PROCEDURE — 84484 ASSAY OF TROPONIN QUANT: CPT

## 2023-09-27 PROCEDURE — 97530 THERAPEUTIC ACTIVITIES: CPT | Mod: GP

## 2023-09-27 PROCEDURE — 83935 ASSAY OF URINE OSMOLALITY: CPT

## 2023-09-27 PROCEDURE — 73562 X-RAY EXAM OF KNEE 3: CPT | Mod: LT

## 2023-09-27 PROCEDURE — 84443 ASSAY THYROID STIM HORMONE: CPT

## 2023-09-27 PROCEDURE — 93306 TTE W/DOPPLER COMPLETE: CPT

## 2023-09-27 PROCEDURE — 82436 ASSAY OF URINE CHLORIDE: CPT

## 2023-09-27 PROCEDURE — 76705 ECHO EXAM OF ABDOMEN: CPT

## 2023-09-27 PROCEDURE — 85027 COMPLETE CBC AUTOMATED: CPT

## 2023-09-27 RX ORDER — AZITHROMYCIN 500 MG/1
500 TABLET, FILM COATED ORAL ONCE
Refills: 0 | Status: COMPLETED | OUTPATIENT
Start: 2023-09-27 | End: 2023-09-27

## 2023-09-27 RX ORDER — CEFTRIAXONE 500 MG/1
1000 INJECTION, POWDER, FOR SOLUTION INTRAMUSCULAR; INTRAVENOUS ONCE
Refills: 0 | Status: COMPLETED | OUTPATIENT
Start: 2023-09-27 | End: 2023-09-27

## 2023-09-27 RX ORDER — ATORVASTATIN CALCIUM 80 MG/1
20 TABLET, FILM COATED ORAL AT BEDTIME
Refills: 0 | Status: DISCONTINUED | OUTPATIENT
Start: 2023-09-27 | End: 2023-09-27

## 2023-09-27 RX ORDER — FUROSEMIDE 40 MG
40 TABLET ORAL EVERY 12 HOURS
Refills: 0 | Status: DISCONTINUED | OUTPATIENT
Start: 2023-09-27 | End: 2023-09-28

## 2023-09-27 RX ORDER — METOPROLOL TARTRATE 50 MG
50 TABLET ORAL
Refills: 0 | Status: DISCONTINUED | OUTPATIENT
Start: 2023-09-27 | End: 2023-09-30

## 2023-09-27 RX ORDER — BENAZEPRIL HYDROCHLORIDE 40 MG/1
1 TABLET ORAL
Qty: 0 | Refills: 0 | DISCHARGE

## 2023-09-27 RX ORDER — INFLUENZA VIRUS VACCINE 15; 15; 15; 15 UG/.5ML; UG/.5ML; UG/.5ML; UG/.5ML
0.7 SUSPENSION INTRAMUSCULAR ONCE
Refills: 0 | Status: DISCONTINUED | OUTPATIENT
Start: 2023-09-27 | End: 2023-10-04

## 2023-09-27 RX ORDER — ACETAMINOPHEN 500 MG
650 TABLET ORAL EVERY 6 HOURS
Refills: 0 | Status: DISCONTINUED | OUTPATIENT
Start: 2023-09-27 | End: 2023-10-04

## 2023-09-27 RX ORDER — RIVAROXABAN 15 MG-20MG
20 KIT ORAL
Refills: 0 | Status: DISCONTINUED | OUTPATIENT
Start: 2023-09-27 | End: 2023-09-29

## 2023-09-27 RX ORDER — OXYBUTYNIN CHLORIDE 5 MG
5 TABLET ORAL
Refills: 0 | Status: DISCONTINUED | OUTPATIENT
Start: 2023-09-27 | End: 2023-10-04

## 2023-09-27 RX ORDER — AMIODARONE HYDROCHLORIDE 400 MG/1
200 TABLET ORAL DAILY
Refills: 0 | Status: DISCONTINUED | OUTPATIENT
Start: 2023-09-27 | End: 2023-10-04

## 2023-09-27 RX ORDER — LEVOTHYROXINE SODIUM 125 MCG
175 TABLET ORAL DAILY
Refills: 0 | Status: DISCONTINUED | OUTPATIENT
Start: 2023-09-27 | End: 2023-10-04

## 2023-09-27 RX ORDER — ALLOPURINOL 300 MG
300 TABLET ORAL DAILY
Refills: 0 | Status: DISCONTINUED | OUTPATIENT
Start: 2023-09-27 | End: 2023-09-27

## 2023-09-27 RX ORDER — AMLODIPINE BESYLATE 2.5 MG/1
5 TABLET ORAL DAILY
Refills: 0 | Status: DISCONTINUED | OUTPATIENT
Start: 2023-09-27 | End: 2023-10-01

## 2023-09-27 RX ORDER — CHLORHEXIDINE GLUCONATE 213 G/1000ML
1 SOLUTION TOPICAL
Refills: 0 | Status: DISCONTINUED | OUTPATIENT
Start: 2023-09-27 | End: 2023-10-04

## 2023-09-27 RX ORDER — METOPROLOL TARTRATE 50 MG
0.5 TABLET ORAL
Qty: 0 | Refills: 0 | DISCHARGE

## 2023-09-27 RX ADMIN — RIVAROXABAN 20 MILLIGRAM(S): KIT at 17:05

## 2023-09-27 RX ADMIN — Medication 40 MILLIGRAM(S): at 12:12

## 2023-09-27 RX ADMIN — Medication 50 MILLIGRAM(S): at 17:05

## 2023-09-27 RX ADMIN — Medication 5 MILLIGRAM(S): at 17:06

## 2023-09-27 RX ADMIN — CEFTRIAXONE 100 MILLIGRAM(S): 500 INJECTION, POWDER, FOR SOLUTION INTRAMUSCULAR; INTRAVENOUS at 08:32

## 2023-09-27 RX ADMIN — AZITHROMYCIN 255 MILLIGRAM(S): 500 TABLET, FILM COATED ORAL at 08:32

## 2023-09-27 NOTE — ED ADULT NURSE REASSESSMENT NOTE - NS ED NURSE REASSESS COMMENT FT1
Pt A+Ox4, resting comfortably, cardiac monitoring continued, VS are stable, no acute s/s of distress noted or reported at this time. Pt is admitted and awaiting transport to .

## 2023-09-27 NOTE — ED ADULT TRIAGE NOTE - PAIN: PRESENCE, MLM
Moving towards surgical management when they return from their [place] cruise  complains of pain/discomfort

## 2023-09-27 NOTE — ED ADULT NURSE NOTE - CHIEF COMPLAINT QUOTE
Patient [facility] c/o midsternal chest pain radiating to b/l shoulder x 1 day, dyspnea on exertion, wears CPAP at night but did not wear last night, no home o2, 88% on room air on scene PTA. Had colonoscopy done yesterday.

## 2023-09-27 NOTE — PATIENT PROFILE ADULT - FUNCTIONAL ASSESSMENT - BASIC MOBILITY 6.
3-calculated by average/Not able to assess (calculate score using [facility] averaging method)

## 2023-09-27 NOTE — ED PROVIDER NOTE - PHYSICAL EXAMINATION
CONSTITUTIONAL: in no apparent distress.   HEAD: Normocephalic; atraumatic.   EYES: Pupils are round and reactive, extra-ocular muscles are intact. Eyelids are normal in appearance without swelling or lesions.   ENT: Hearing is intact with good acuity to spoken voice.  Patient is speaking clearly, not muffled and airway is intact.   RESPIRATORY: Tachypnea. Lung sounds are clear in all lobes bilaterally without rales, rhonchi, or wheezes.  CARDIOVASCULAR: irregularly irregular.   GI: Abdomen is soft, non-tender, and without distention. Bowel sounds are present and normoactive in all four quadrants. No masses are noted.   NEURO: A & O x 3. Normal speech. No focal deficit.  PSYCHOLOGICAL: Appropriate mood and affect. Good judgement and insight.

## 2023-09-27 NOTE — ED ADULT NURSE NOTE - OBJECTIVE STATEMENT
pt c/o CP and SOB started a few weeks ago, intermittently occurring. Last night she felt much worse and felt like her CPAP was not helping at all. Pt is A+OX4. Pt is ambulatory at baseline- however has been very unsteady on feet within the last 6 months-- has had 4 falls in 6 months.

## 2023-09-27 NOTE — CONSULT NOTE ADULT - SUBJECTIVE AND OBJECTIVE BOX
Date of Admission: 09-27-23    CHIEF COMPLAINT:    HISTORY OF PRESENT ILLNESS:   81-year-old with PMHx of Paroxysmal AFib on Xarelto (s/p cardioversion 9/13/22), CHFpEF, CKD 3b, chronic microcytic anemia, FELIX (on CPAP) HTN, DLD, hypothyroidism (history of thyroid cancer s/p resection), hx hip fracture, presented to the ED complaining of chest pain and shortness of breath for the past week. Per patient, chest pain started last night, dull substernal with radiation to shoulder, and exacerbated by activity. SOB has been progressively worsening for the past week.     In the ED, , /65, Temp 98. Dimer 350, Cr 1.6 (baseline 1-1.3) AST 80, ALT 79, . CXR Bilateral reticular and airspace opacities. No pneumothorax. CTA No filling defect within the main pulmonary arteries or its segmental branches to suggest pulmonary embolus Apparent collapse/narrowing right upper lobe and right middle lobe   subsegmental branches.Cardiomegaly. Bilateral pleural effusions. Interstitial edema. Findings may be consistent with a CHF exacerbation.Imaging of the upper abdomen suggest mild central hepatic biliary duct dilatation. Correlate with right upper quadrant ultrasound.   She was given azithro and rocephin and put on Bipap with improvement of the respiration. She was then weaned off to 6 L oxygen.    (27 Sep 2023 13:46)      PAST MEDICAL & SURGICAL HISTORY:  HTN (hypertension)      High cholesterol      Hypothyroid  s/p thyroid ca surgery      Afib  on xarelto      Sleep apnea      Osteoarthritis      CKD (chronic kidney disease) stage 3, GFR 30-59 ml/min      H/O thyroidectomy      S/P rotator cuff surgery    HEALTH ISSUES - PROBLEM Dx:        VITALS:  T(C): 36.7 (09-27-23 @ 05:44), Max: 36.7 (09-27-23 @ 05:44)  HR: 105 (09-27-23 @ 09:44) (105 - 129)  BP: 127/65 (09-27-23 @ 05:44) (127/65 - 127/65)  RR: 20 (09-27-23 @ 09:44) (18 - 22)  SpO2: 97% (09-27-23 @ 09:44) (83% - 99%)  Wt(kg): --  I&O's Summary    Daily     Daily     PHYSICAL EXAM:  GEN: Not in acute distress  HEENT: EOMI  LUNGS: Diffuse crackles b/l  CARDIOVASCULAR: Irregular. Tachy  ABD: Soft, non-tender, non-distended  EXT: 1+ SHIRA  SKIN: Intact  NEURO: AAOx3    LABS:	 	                          11.5   11.06 )-----------( 243      ( 27 Sep 2023 06:20 )             37.4     09-27    142  |  106  |  40<H>  ----------------------------<  119<H>  4.2   |  23  |  1.6<H>    Ca    8.7      27 Sep 2023 06:20  Mg     2.0     09-27    TPro  6.0  /  Alb  4.1  /  TBili  0.8  /  DBili  x   /  AST  80<H>  /  ALT  79<H>  /  AlkPhos  145<H>  09-27    CARDIAC MARKERS ( 27 Sep 2023 06:20 )  x     / <0.01 ng/mL / x     / x     / x          PT/INR - ( 27 Sep 2023 06:20 )   PT: 11.90 sec;   INR: 1.04 ratio         PTT - ( 27 Sep 2023 06:20 )  PTT:31.6 sec    CARDIAC MARKERS:  Troponin trend: <0.01 (27 Sep 2023 06:20)    TELEMETRY EVENTS:      ECG:  < from: 12 Lead ECG (09.27.23 @ 05:50) >  Left axis deviation  Minimal voltage criteria for LVH, may be normal variant ( [place] product )  Inferior infarct , age undetermined  Anteroseptal infarct , age undetermined  ST & T wave abnormality, consider lateral ischemia  Abnormal ECG    < end of copied text >    RADIOLOGY:    < from: CT Angio Chest PE Protocol w/ IV Cont (09.27.23 @ 08:19) >  No filling defect within the main pulmonary arteries or its segmental   branches to suggest pulmonary embolus.    Apparent collapse/narrowing right upper lobe and right middle lobe   subsegmental branches.    Cardiomegaly. Bilateral pleural effusions. Interstitial edema. Findings   may be consistent with a CHF exacerbation.    Imaging of the upper abdomen suggest mild central hepatic biliary duct   dilatation. Correlate with right upper quadrant ultrasound.    < end of copied text >  OTHER:    Echocardiogram:  < from: TTE Echo Complete w/o Contrast w/ Doppler (09.15.22 @ 08:28) >   1. Moderate to severe left atrial enlargement.   2. LV Ejection Fraction by Lester's Method with a biplane EF of 71 %.   3. Elevated mean left atrial pressure.   4. Severe concentric left ventricular hypertrophy.   5. Spectral Doppler shows pseudonormal pattern of left ventricular   myocardial filling (Grade II diastolic dysfunction).   6. Moderately enlarged right atrium.   7. Moderate mitral annular calcification.   8. Moderate mitral valve regurgitation.   9. Moderate tricuspid regurgitation.  10. Mild to moderate pulmonic valve regurgitation.  11. Dilatation of the ascending aorta.  12. Estimated pulmonary artery systolic pressure is 47.0 mmHg assuming a   right atrial pressure of 8 mmHg, which is consistent with mild pulmonary   hypertension.  13. Moderately dilated pulmonary artery.    < end of copied text >        Catheterization:    Stress Test:  	  	    Home Medications:  allopurinol 100 mg oral tablet: orally once a day (at bedtime) (26 Sep 2023 07:06)  amiodarone 200 mg oral tablet: 1 tab(s) orally once a day (26 Sep 2023 07:06)  amLODIPine 5 mg oral tablet: 1 tab(s) orally once a day (at bedtime) (26 Sep 2023 07:06)  atorvastatin 20 mg oral tablet: 1 tab(s) orally once a day (26 Sep 2023 07:06)  benazepril 10 mg oral tablet: 1 tab(s) orally 2 times a day (26 Sep 2023 07:06)  cabergoline 0.5 mg oral tablet: 1 tab(s) orally once a week, monday night (26 Sep 2023 07:06)  ferrous sulfate 325 mg (65 mg elemental iron) oral tablet: 1 tab(s) orally once a day (26 Sep 2023 07:06)  levothyroxine 175 mcg (0.175 mg) oral tablet: 1 tab(s) orally once a day (26 Sep 2023 07:06)  Metoprolol Tartrate 25 mg oral tablet: 0.5 tab(s) orally 2 times a day (26 Sep 2023 07:06)  Xarelto 20 mg oral tablet: 1 tab(s) orally once a day (in the evening) (26 Sep 2023 07:06)    MEDICATIONS  (STANDING):  allopurinol 300 milliGRAM(s) Oral daily  aMIOdarone    Tablet 200 milliGRAM(s) Oral daily  amLODIPine   Tablet 5 milliGRAM(s) Oral daily  atorvastatin 20 milliGRAM(s) Oral at bedtime  chlorhexidine 2% Cloths 1 Application(s) Topical <User Schedule>  furosemide   Injectable 40 milliGRAM(s) IV Push every 12 hours  levothyroxine 175 MICROGram(s) Oral daily  metoprolol tartrate 50 milliGRAM(s) Oral two times a day  rivaroxaban 20 milliGRAM(s) Oral with dinner    MEDICATIONS  (PRN):  acetaminophen     Tablet .. 650 milliGRAM(s) Oral every 6 hours PRN Temp greater or equal to 38C (100.4F), Mild Pain (1 - 3), Moderate Pain (4 - 6)  aluminum hydroxide/magnesium hydroxide/simethicone Suspension 30 milliLiter(s) Oral every 4 hours PRN Dyspepsia         Date of Admission: 09-27-23    CHIEF COMPLAINT:    HISTORY OF PRESENT ILLNESS:   81-year-old with PMHx of Paroxysmal AFib on Xarelto (s/p cardioversion 9/13/22), CHFpEF, CKD 3b, chronic microcytic anemia, FELIX (on CPAP) HTN, DLD, hypothyroidism (history of thyroid cancer s/p resection), hx hip fracture, presented to the ED complaining of chest pain and shortness of breath for the past week. Per patient, chest pain started last night, dull substernal with radiation to shoulder, and exacerbated by activity. SOB has been progressively worsening for the past week.     In the ED, , /65, Temp 98. Dimer 350, Cr 1.6 (baseline 1-1.3) AST 80, ALT 79, . CXR Bilateral reticular and airspace opacities. No pneumothorax. CTA No filling defect within the main pulmonary arteries or its segmental branches to suggest pulmonary embolus Apparent collapse/narrowing right upper lobe and right middle lobe   subsegmental branches.Cardiomegaly. Bilateral pleural effusions. Interstitial edema. Findings may be consistent with a CHF exacerbation.Imaging of the upper abdomen suggest mild central hepatic biliary duct dilatation. Correlate with right upper quadrant ultrasound.   She was given azithro and rocephin and put on Bipap with improvement of the respiration. She was then weaned off to 6 L oxygen.    (27 Sep 2023 13:46)      PAST MEDICAL & SURGICAL HISTORY:  HTN (hypertension)      High cholesterol      Hypothyroid  s/p thyroid ca surgery      Afib  on xarelto      Sleep apnea      Osteoarthritis      CKD (chronic kidney disease) stage 3, GFR 30-59 ml/min      H/O thyroidectomy      S/P rotator cuff surgery    HEALTH ISSUES - PROBLEM Dx:        VITALS:  T(C): 36.7 (09-27-23 @ 05:44), Max: 36.7 (09-27-23 @ 05:44)  HR: 105 (09-27-23 @ 09:44) (105 - 129)  BP: 127/65 (09-27-23 @ 05:44) (127/65 - 127/65)  RR: 20 (09-27-23 @ 09:44) (18 - 22)  SpO2: 97% (09-27-23 @ 09:44) (83% - 99%)  Wt(kg): --  I&O's Summary    Daily     Daily     PHYSICAL EXAM:  GEN: Not in acute distress  HEENT: EOMI  LUNGS: Diffuse crackles b/l  CARDIOVASCULAR: Irregular. Tachy  ABD: Soft, non-tender, non-distended  EXT: 1+ SHIRA  SKIN: Intact  NEURO: AAOx3    LABS:	 	                          11.5   11.06 )-----------( 243      ( 27 Sep 2023 06:20 )             37.4     09-27    142  |  106  |  40<H>  ----------------------------<  119<H>  4.2   |  23  |  1.6<H>    Ca    8.7      27 Sep 2023 06:20  Mg     2.0     09-27    TPro  6.0  /  Alb  4.1  /  TBili  0.8  /  DBili  x   /  AST  80<H>  /  ALT  79<H>  /  AlkPhos  145<H>  09-27    CARDIAC MARKERS ( 27 Sep 2023 06:20 )  x     / <0.01 ng/mL / x     / x     / x          PT/INR - ( 27 Sep 2023 06:20 )   PT: 11.90 sec;   INR: 1.04 ratio         PTT - ( 27 Sep 2023 06:20 )  PTT:31.6 sec    CARDIAC MARKERS:  Troponin trend: <0.01 (27 Sep 2023 06:20)      TELEMETRY EVENTS:      ECG:  < from: 12 Lead ECG (09.27.23 @ 05:50) >  Left axis deviation  Minimal voltage criteria for LVH, may be normal variant ( [place] product )  Inferior infarct , age undetermined  Anteroseptal infarct , age undetermined  ST & T wave abnormality, consider lateral ischemia  Abnormal ECG    < end of copied text >    RADIOLOGY:    < from: CT Angio Chest PE Protocol w/ IV Cont (09.27.23 @ 08:19) >  No filling defect within the main pulmonary arteries or its segmental   branches to suggest pulmonary embolus.    Apparent collapse/narrowing right upper lobe and right middle lobe   subsegmental branches.    Cardiomegaly. Bilateral pleural effusions. Interstitial edema. Findings   may be consistent with a CHF exacerbation.    Imaging of the upper abdomen suggest mild central hepatic biliary duct   dilatation. Correlate with right upper quadrant ultrasound.    < end of copied text >  OTHER:    Echocardiogram:  < from: TTE Echo Complete w/o Contrast w/ Doppler (09.15.22 @ 08:28) >   1. Moderate to severe left atrial enlargement.   2. LV Ejection Fraction by Lester's Method with a biplane EF of 71 %.   3. Elevated mean left atrial pressure.   4. Severe concentric left ventricular hypertrophy.   5. Spectral Doppler shows pseudonormal pattern of left ventricular   myocardial filling (Grade II diastolic dysfunction).   6. Moderately enlarged right atrium.   7. Moderate mitral annular calcification.   8. Moderate mitral valve regurgitation.   9. Moderate tricuspid regurgitation.  10. Mild to moderate pulmonic valve regurgitation.  11. Dilatation of the ascending aorta.  12. Estimated pulmonary artery systolic pressure is 47.0 mmHg assuming a   right atrial pressure of 8 mmHg, which is consistent with mild pulmonary   hypertension.  13. Moderately dilated pulmonary artery.    < end of copied text >        Catheterization:    Stress Test:  	  	    Home Medications:  allopurinol 100 mg oral tablet: orally once a day (at bedtime) (26 Sep 2023 07:06)  amiodarone 200 mg oral tablet: 1 tab(s) orally once a day (26 Sep 2023 07:06)  amLODIPine 5 mg oral tablet: 1 tab(s) orally once a day (at bedtime) (26 Sep 2023 07:06)  atorvastatin 20 mg oral tablet: 1 tab(s) orally once a day (26 Sep 2023 07:06)  benazepril 10 mg oral tablet: 1 tab(s) orally 2 times a day (26 Sep 2023 07:06)  cabergoline 0.5 mg oral tablet: 1 tab(s) orally once a week, monday night (26 Sep 2023 07:06)  ferrous sulfate 325 mg (65 mg elemental iron) oral tablet: 1 tab(s) orally once a day (26 Sep 2023 07:06)  levothyroxine 175 mcg (0.175 mg) oral tablet: 1 tab(s) orally once a day (26 Sep 2023 07:06)  Metoprolol Tartrate 25 mg oral tablet: 0.5 tab(s) orally 2 times a day (26 Sep 2023 07:06)  Xarelto 20 mg oral tablet: 1 tab(s) orally once a day (in the evening) (26 Sep 2023 07:06)    MEDICATIONS  (STANDING):  allopurinol 300 milliGRAM(s) Oral daily  aMIOdarone    Tablet 200 milliGRAM(s) Oral daily  amLODIPine   Tablet 5 milliGRAM(s) Oral daily  atorvastatin 20 milliGRAM(s) Oral at bedtime  chlorhexidine 2% Cloths 1 Application(s) Topical <User Schedule>  furosemide   Injectable 40 milliGRAM(s) IV Push every 12 hours  levothyroxine 175 MICROGram(s) Oral daily  metoprolol tartrate 50 milliGRAM(s) Oral two times a day  rivaroxaban 20 milliGRAM(s) Oral with dinner    MEDICATIONS  (PRN):  acetaminophen     Tablet .. 650 milliGRAM(s) Oral every 6 hours PRN Temp greater or equal to 38C (100.4F), Mild Pain (1 - 3), Moderate Pain (4 - 6)  aluminum hydroxide/magnesium hydroxide/simethicone Suspension 30 milliLiter(s) Oral every 4 hours PRN Dyspepsia

## 2023-09-27 NOTE — ED ADULT TRIAGE NOTE - CHIEF COMPLAINT QUOTE
Patient [facility] c/o midsternal chest pain radiating to b/l shoulder x 1 day, dyspnea on exertion, wears CPAP at night but did not wear tonight, no home o2, 88% on room air on scene. Had colonoscopy done yesterday. Patient [facility] c/o midsternal chest pain radiating to b/l shoulder x 1 day, dyspnea on exertion, wears CPAP at night but did not wear last night, no home o2, 88% on room air on scene PTA. Had colonoscopy done yesterday.

## 2023-09-27 NOTE — ED PROVIDER NOTE - OBJECTIVE STATEMENT
82-year-old female with past medical history of A-fib on Xarelto,, CHF, CKD, anemia, hypertension, hyperlipidemia, and hypothyroidism who presents with chest pain or shortness of breath.  Reports that chest pain started last night; pain is in the middle of the chest, radiating to her bilateral shoulder, constant, dull sensation, and exertional.  Also endorses shortness of breath along with this symptom.  Reports that she recently had a colonoscopy a few days ago.  Denies fever, nausea, vomiting, abdominal pain, urinary symptoms, and change with bowel movement. Also reports that she normally sleeps with a CPAP, but her CPAP machine was not working.

## 2023-09-27 NOTE — H&P ADULT - NSHPPHYSICALEXAM_GEN_ALL_CORE
VITALS:   T(C): 36.7 (09-27-23 @ 05:44), Max: 36.7 (09-27-23 @ 05:44)  HR: 105 (09-27-23 @ 09:44) (105 - 129)  BP: 127/65 (09-27-23 @ 05:44) (127/65 - 127/65)  RR: 20 (09-27-23 @ 09:44) (18 - 22)  SpO2: 97% (09-27-23 @ 09:44) (83% - 99%)    GENERAL: NAD, lying in bed comfortably  HEAD:  Atraumatic, normocephalic  EYES: EOMI, PERRLA, conjunctiva and sclera clear  ENT: Moist mucous membranes  NECK: Supple, no JVD  HEART: Regular rate and rhythm, no murmurs, rubs, or gallops  LUNGS: Unlabored respirations.  Clear to auscultation bilaterally, no crackles, wheezing, or rhonchi  ABDOMEN: Soft, nontender, nondistended, +BS  EXTREMITIES: 2+ peripheral pulses bilaterally. No clubbing, cyanosis, or edema  NERVOUS SYSTEM:  A&Ox3, no focal deficits   SKIN: No rashes or lesions VITALS:   T(C): 36.7 (09-27-23 @ 05:44), Max: 36.7 (09-27-23 @ 05:44)  HR: 105 (09-27-23 @ 09:44) (105 - 129)  BP: 127/65 (09-27-23 @ 05:44) (127/65 - 127/65)  RR: 20 (09-27-23 @ 09:44) (18 - 22)  SpO2: 97% (09-27-23 @ 09:44) (83% - 99%)    GENERAL: NAD, lying in bed comfortably  HEAD:  Atraumatic, normocephalic  EYES: EOMI, PERRLA, conjunctiva and sclera clear  ENT: Moist mucous membranes  NECK: Supple, no JVD  HEART: Regular rate and rhythm, no murmurs, rubs, or gallops  LUNGS: BL inspiratory crackles appreciated on both sides of the lungs.   ABDOMEN: Soft, nontender, nondistended, +BS  EXTREMITIES: 2+ peripheral pulses bilaterally.  +1 pitting edema BL   NERVOUS SYSTEM:  A&Ox3, no focal deficits   SKIN: No rashes or lesions

## 2023-09-27 NOTE — ED PROVIDER NOTE - PROGRESS NOTE DETAILS
Received sign out from Dr. Ambriz pending labs, imaging. Patient reassessed, feeling more comfortable on bipap. no respiratory distress. Vitals improved. JS: Discussed with cardiology, patient more appropriate for medicine telemetry floor.  Trialed off BiPAP, Saturday saturating 99% on 6 L nasal cannula.  Will admit for further evaluation.

## 2023-09-27 NOTE — H&P ADULT - ATTENDING COMMENTS
81-year-old with PMHx of Paroxysmal AFib on Xarelto (s/p cardioversion 9/13/22), CHFpEF, CKD 3b, chronic microcytic anemia, FELIX (on CPAP) HTN, DLD, hypothyroidism (history of thyroid cancer s/p resection), hx hip fracture, presented to the ED complaining of chest pain and shortness of breath for the past week. Patient describes central chest pressure/discomfort, on and off, not related to activity. She has also been having sob on minimal exertion for the past week. Yesterday she had colonoscopy done and after she went home she couldnt lay flat so she called her daughter who brought her to the ED.     In the ED, , /65, Temp 98. Dimer 350, Cr 1.6 (baseline 1-1.3) AST 80, ALT 79, .   CXR Bilateral reticular and airspace opacities. No pneumothorax.    CTA No filling defect within the main pulmonary arteries or its segmental branches to suggest pulmonary embolus Apparent collapse/narrowing right upper lobe and right middle lobe subsegmental branches. Cardiomegaly. Bilateral pleural effusions. Interstitial edema. Findings may be consistent with a CHF exacerbation. Imaging of the upper abdomen suggest mild central hepatic biliary duct dilatation.     She was given azithro and rocephin and put on Bipap with improvement of the respiration.   She was then weaned off to 6 L oxygen.    Acute hypoxic respiratory failure due to acute on Chronic HFpEF   KOBI on CKD > Possibly Cardio Renal Syndrome   >>>IV lasix, wean O2 as tolerated, Bipap   >> Cardio input noted     PT eval for disposition.  Plan d/w the patient at bedside

## 2023-09-27 NOTE — ED ADULT NURSE NOTE - NSFALLHARMRISKINTERV_ED_ALL_ED

## 2023-09-27 NOTE — PATIENT PROFILE ADULT - FALL HARM RISK - HARM RISK INTERVENTIONS
Assistance with ambulation/Assistance OOB with selected safe patient handling equipment/Communicate Risk of Fall with Harm to all staff/Monitor gait and stability/Reinforce activity limits and safety measures with patient and family/Sit up slowly, dangle for a short time, stand at bedside before walking/Tailored Fall Risk Interventions/Visual Cue: Yellow wristband and red socks/Bed in lowest position, wheels locked, appropriate side rails in place/Call bell, personal items and telephone in reach/Instruct patient to call for assistance before getting out of bed or chair/Non-slip footwear when patient is out of bed/[place] to call system/Physically safe environment - no spills, clutter or unnecessary equipment/Purposeful Proactive Rounding/Room/bathroom lighting operational, light cord in reach

## 2023-09-27 NOTE — CONSULT NOTE ADULT - ASSESSMENT
81-year-old with PMHx of Paroxysmal AFib on Xarelto (s/p cardioversion 9/13/22), CHFpEF, CKD 3b, chronic microcytic anemia, FELIX (on CPAP) HTN, DLD, hypothyroidism (history of thyroid cancer s/p resection), hx hip fracture, presented to the ED complaining of chest pain and shortness of breath for the past week.    TTE 9/15/22: EF 71%. Mod-severe L atrial enlargement. Severe concentric LVH. GIIDD. Mod MR & TR. Mild pHTN    IMPRESSION:  - Decompensated HFpEF; CT chest consistent with fluid overload. BNP 2850.  - Paroxysmal Afib with RVR; on Xeralto; s/p cardioversion 9/13/22  - CKD 3b  - HTN/HLD  - FELIX on CPAP    PLAN:  - Get repeat 2D echo  - Serial cardiac enzymes; -ve x 1  - Continue lasix 40mg IV BID. Monitor UO. Keep 1L negative. Increase diuretics as tolerated   - Continue amiodarone and metoprolol. HR in 90s when evaluated  - Keep Mg > 2.2 and K > 4.

## 2023-09-27 NOTE — ED ADULT NURSE REASSESSMENT NOTE - NS ED NURSE REASSESS COMMENT FT1
Pt received from P shift RN. Pt assessed, she is A&Ox4. Pt denies any pain or discomfort. IV patent. Pt on BIPAP at this time. No signs of distress noted.

## 2023-09-27 NOTE — ED ADULT NURSE NOTE - DOES PATIENT HAVE ADVANCE DIRECTIVE
No 19 yo obese m present for intermittent cp with sob x 3-4 weeks. symptoms occur at rest and with activity without change in symptoms with exertion. denies fever, recent illness. cough, n/v, ha, dizziness, ext numbness weakness, pain.  +fmh of early heart disease. at time of eval pain improved. is pending out-patient cardiology appt. recent ED visit reviewed in emr. exam as above. cardiac pocus with normal ef, no pericardial effusion, no WMA, grossly no  right heart strain or enlargement. ekg sinus sharita similar to prior. cp of unclear etiology, low suspicoin for acs, dissection, pe. stable for dc with out-patient follow up. Return precautions were discussed with patient at bedside and patient expressed understanding.

## 2023-09-27 NOTE — ED PROVIDER NOTE - ATTENDING APP SHARED VISIT CONTRIBUTION OF CARE
82 yo female with PMHx of Paroxysmal AFib on Xarelto (s/p cardioversion 9/13/22), CHFpEF, CKD 3b, chronic microcytic anemia, FELIX (on CPAP) HTN, DLD, hypothyroidism (history of thyroid cancer s/p resection), hx hip fracture who presents to the ER for CP and SOB since last night. STates her CPAP machine was not working last night and she feels this affected her breathing significantly. Pain to middle of chest, radiates to b/l shoulders, worse with exertion. Recent colonoscopy a few days ago. No f/c/n/v/abdomen pain.     Pt hypoxic on ra. Placed on oxygen which improved hr and pulse ox. Remainder of exam per pa note. Agree with PA management.

## 2023-09-27 NOTE — H&P ADULT - ASSESSMENT
81-year-old with PMHx of Paroxysmal AFib on Xarelto (s/p cardioversion 9/13/22), CHFpEF, CKD 3b, chronic microcytic anemia, FELIX (on CPAP) HTN, DLD, hypothyroidism (history of thyroid cancer s/p resection), hx hip fracture, presented to the ED complaining of chest pain and shortness of breath for the past week. Per patient, chest pain started last night, dull substernal with radiation to shoulder, and exacerbated by activity. SOB has been progressively worsening for the past week.     In the ED, , /65, Temp 98. Dimer 350, Cr 1.6 (baseline 1-1.3) AST 80, ALT 79, . CXR Bilateral reticular and airspace opacities. No pneumothorax. CTA No filling defect within the main pulmonary arteries or its segmental branches to suggest pulmonary embolus Apparent collapse/narrowing right upper lobe and right middle lobe   subsegmental branches.Cardiomegaly. Bilateral pleural effusions. Interstitial edema. Findings may be consistent with a CHF exacerbation.Imaging of the upper abdomen suggest mild central hepatic biliary duct dilatation. Correlate with right upper quadrant ultrasound.   She was given azithro and rocephin and put on Bipap with improvement of the respiration. She was then weaned off to 6 L oxygen.      81-year-old with PMHx of Paroxysmal AFib on Xarelto (s/p cardioversion 9/13/22), CHFpEF, CKD 3b, chronic microcytic anemia, FELIX (on CPAP) HTN, DLD, hypothyroidism (history of thyroid cancer s/p resection), hx hip fracture, presented to the ED complaining of chest pain and shortness of breath for the past week      #Acute CHF exacerbation   #SOB   #Chest pressure   - ED VS , /65, Temp 98  - CXR Bilateral reticular and airspace opacities. No pneumothorax.  - CTA No filling defect within the main pulmonary arteries or its segmental branches to suggest pulmonary embolus Apparent collapse/narrowing right upper lobe and right middle lobe subsegmental branches.Cardiomegaly. Bilateral pleural effusions. Interstitial edema. Findings may be consistent with a CHF exacerbation.Imaging of the upper abdomen suggest mild central hepatic biliary duct dilatation. Correlate with right upper quadrant ultrasound.   - Was put on Bipap and improved, now on NC   - Trops -ve x1, BNP 2800s (baseline 1400s)   - last echo Sep 2022: EF 71%, mod-severe LA enlargemenet, Severe LVH, grade II diastolic dysfunction, moderate RA enlargement, moderate MR, moderate TR, mild pulm HTN         Plan:   - Repeat echo  - Lasix 40 IV BID  - strict I&os   - Check electrolytes BID, keep Magn >2 and K>4   - Tele monitoring   - Check one more trops   - Bipap qhs and prn       #transaminitis   #CBD dilation on CT scan  - LFTs : ALT 79, AST 80,   - Possibly due to congestion  - Check RUQ   - monitor LFTs   - avoid hepatotoxic meds     #paroxysmal Afib   - rate better controlled now'  - cw amiodarone and BB (if LFTs continue uptrending, would hold amio)   - cw xarelto     #KOBI on CKD   -Cr 1.6 (baseline 1.3)   - Monitor KFT while on diuretics    #chronic microcytic anemia   - Hb 11.5 (baseline 9-10)    #HTN  #hypothyroidism   # HLD   - cw home meds  - hold statin given transaminitis

## 2023-09-27 NOTE — H&P ADULT - HISTORY OF PRESENT ILLNESS
81-year-old with PMHx of Paroxysmal AFib on Xarelto (s/p cardioversion 9/13/22), CHFpEF, CKD 3b, chronic microcytic anemia, FELIX (on CPAP) HTN, DLD, hypothyroidism (history of thyroid cancer s/p resection), hx hip fracture, presented to the ED complaining of chest pain and shortness of breath for the past week. Per patient, chest pain started last night, dull substernal with radiation to shoulder, and exacerbated by activity. SOB has been progressively worsening for the past week.     In the ED, , /65, Temp 98. Dimer 350, Cr 1.6 (baseline 1-1.3) AST 80, ALT 79, . CXR Bilateral reticular and airspace opacities. No pneumothorax. CTA No filling defect within the main pulmonary arteries or its segmental branches to suggest pulmonary embolus Apparent collapse/narrowing right upper lobe and right middle lobe   subsegmental branches.Cardiomegaly. Bilateral pleural effusions. Interstitial edema. Findings may be consistent with a CHF exacerbation.Imaging of the upper abdomen suggest mild central hepatic biliary duct dilatation. Correlate with right upper quadrant ultrasound.   She was given azithro and rocephin and put on Bipap with improvement of the respiration. She was then weaned off to 6 L oxygen.    81-year-old with PMHx of Paroxysmal AFib on Xarelto (s/p cardioversion 9/13/22), CHFpEF, CKD 3b, chronic microcytic anemia, FELIX (on CPAP) HTN, DLD, hypothyroidism (history of thyroid cancer s/p resection), hx hip fracture, presented to the ED complaining of chest pain and shortness of breath for the past week. Patient describes central chest pressure/discomfort, on and off, not related to activity. She has also been having sob on minimal exertion for the past week. Yesterday she had colonoscopy done and after she went home she couldnt lay flat so she called her daughter who brought her to the ED.     In the ED, , /65, Temp 98. Dimer 350, Cr 1.6 (baseline 1-1.3) AST 80, ALT 79, . CXR Bilateral reticular and airspace opacities. No pneumothorax. CTA No filling defect within the main pulmonary arteries or its segmental branches to suggest pulmonary embolus Apparent collapse/narrowing right upper lobe and right middle lobe subsegmental branches.Cardiomegaly. Bilateral pleural effusions. Interstitial edema. Findings may be consistent with a CHF exacerbation.Imaging of the upper abdomen suggest mild central hepatic biliary duct dilatation. Correlate with right upper quadrant ultrasound.   She was given azithro and rocephin and put on Bipap with improvement of the respiration. She was then weaned off to 6 L oxygen.

## 2023-09-27 NOTE — ED ADULT TRIAGE NOTE - TEMPERATURE IN FAHRENHEIT (DEGREES F)
Detail Level: Simple Price (Do Not Change): 0.00 Instructions: This plan will send the code FBSE to the PM system.  DO NOT or CHANGE the price. 98

## 2023-09-27 NOTE — ED PROVIDER NOTE - CHRONIC CONDITIONS AFFECTING CARE
Caller: Nanette Vásquez    Relationship: Self    Best call back number: [phone]    Who is your current provider: ELLIOTT    Who would you like your new provider to be: SARAH    What are your reasons for transferring care: ELLIOTT NO LONGER IN OFFICE    Additional notes: PATIENT STATED THAT SHE WOULD NEED TO GET INTO SEE  AND TRANSFER HER CARE TO HER AS WELL    PLEASE ADVISE     
Chronic Conditions Affecting Care

## 2023-09-27 NOTE — PATIENT PROFILE ADULT - FUNCTIONAL ASSESSMENT - BASIC MOBILITY SCORE.
Detail Level: Zone Comments: Prescribed by Immunologist Length Of Therapy: 1 month Add High Risk Medication Management Associated Diagnosis?: No 18

## 2023-09-27 NOTE — H&P ADULT - NSHPLABSRESULTS_GEN_ALL_CORE
.  LABS:                         11.5   11.06 )-----------( 243      ( 27 Sep 2023 06:20 )             37.4     09-27    142  |  106  |  40<H>  ----------------------------<  119<H>  4.2   |  23  |  1.6<H>    Ca    8.7      27 Sep 2023 06:20  Mg     2.0     09-27    TPro  6.0  /  Alb  4.1  /  TBili  0.8  /  DBili  x   /  AST  80<H>  /  ALT  79<H>  /  AlkPhos  145<H>  09-27    PT/INR - ( 27 Sep 2023 06:20 )   PT: 11.90 sec;   INR: 1.04 ratio         PTT - ( 27 Sep 2023 06:20 )  PTT:31.6 sec  Urinalysis Basic - ( 27 Sep 2023 06:20 )    Color: x / Appearance: x / SG: x / pH: x  Gluc: 119 mg/dL / Ketone: x  / Bili: x / Urobili: x   Blood: x / Protein: x / Nitrite: x   Leuk Esterase: x / RBC: x / WBC x   Sq Epi: x / Non Sq Epi: x / Bacteria: x            RADIOLOGY, EKG & ADDITIONAL TESTS: Reviewed.

## 2023-09-27 NOTE — ED PROVIDER NOTE - CLINICAL SUMMARY MEDICAL DECISION MAKING FREE TEXT BOX
Patient presents with shortness of breath and chest pain. Found to be hypoxic and placed on bipap on arrival. labs, ekg, cxr, cta done. no PE. + fluid overload. Discussed with ICU. Patient admitted for further management. Labs and EKG were ordered and reviewed.  Imaging was ordered and reviewed by me.  Appropriate medications for patient's presenting complaints were ordered and effects were reassessed.  Patient's records (prior hospital, ED visit, and/or nursing home notes if available) were reviewed.  Additional history was obtained from EMS, family, and/or PCP (where available).  Escalation to admission/observation was considered.   Patient requires inpatient hospitalization - monitored setting.

## 2023-09-28 DIAGNOSIS — D12.0 BENIGN NEOPLASM OF CECUM: ICD-10-CM

## 2023-09-28 DIAGNOSIS — K57.30 DIVERTICULOSIS OF LARGE INTESTINE WITHOUT PERFORATION OR ABSCESS WITHOUT BLEEDING: ICD-10-CM

## 2023-09-28 DIAGNOSIS — I12.9 HYPERTENSIVE CHRONIC KIDNEY DISEASE WITH STAGE 1 THROUGH STAGE 4 CHRONIC KIDNEY DISEASE, OR UNSPECIFIED CHRONIC KIDNEY DISEASE: ICD-10-CM

## 2023-09-28 DIAGNOSIS — E78.00 PURE HYPERCHOLESTEROLEMIA, UNSPECIFIED: ICD-10-CM

## 2023-09-28 DIAGNOSIS — D64.9 ANEMIA, UNSPECIFIED: ICD-10-CM

## 2023-09-28 DIAGNOSIS — E03.9 HYPOTHYROIDISM, UNSPECIFIED: ICD-10-CM

## 2023-09-28 DIAGNOSIS — G47.30 SLEEP APNEA, UNSPECIFIED: ICD-10-CM

## 2023-09-28 DIAGNOSIS — D12.3 BENIGN NEOPLASM OF TRANSVERSE COLON: ICD-10-CM

## 2023-09-28 DIAGNOSIS — M19.90 UNSPECIFIED OSTEOARTHRITIS, UNSPECIFIED SITE: ICD-10-CM

## 2023-09-28 DIAGNOSIS — N18.30 CHRONIC KIDNEY DISEASE, STAGE 3 UNSPECIFIED: ICD-10-CM

## 2023-09-28 DIAGNOSIS — I48.91 UNSPECIFIED ATRIAL FIBRILLATION: ICD-10-CM

## 2023-09-28 DIAGNOSIS — K64.4 RESIDUAL HEMORRHOIDAL SKIN TAGS: ICD-10-CM

## 2023-09-28 PROCEDURE — 99233 SBSQ HOSP IP/OBS HIGH 50: CPT

## 2023-09-28 PROCEDURE — 71045 X-RAY EXAM CHEST 1 VIEW: CPT | Mod: 26

## 2023-09-28 RX ORDER — BENZOCAINE AND MENTHOL 5; 1 G/100ML; G/100ML
1 LIQUID ORAL
Refills: 0 | Status: DISCONTINUED | OUTPATIENT
Start: 2023-09-28 | End: 2023-09-28

## 2023-09-28 RX ORDER — FUROSEMIDE 40 MG
40 TABLET ORAL ONCE
Refills: 0 | Status: COMPLETED | OUTPATIENT
Start: 2023-09-28 | End: 2023-09-28

## 2023-09-28 RX ORDER — FUROSEMIDE 40 MG
60 TABLET ORAL DAILY
Refills: 0 | Status: DISCONTINUED | OUTPATIENT
Start: 2023-09-29 | End: 2023-09-29

## 2023-09-28 RX ORDER — BENZOCAINE 10 %
1 GEL (GRAM) MUCOUS MEMBRANE
Refills: 0 | Status: DISCONTINUED | OUTPATIENT
Start: 2023-09-28 | End: 2023-09-29

## 2023-09-28 RX ADMIN — RIVAROXABAN 20 MILLIGRAM(S): KIT at 17:17

## 2023-09-28 RX ADMIN — CHLORHEXIDINE GLUCONATE 1 APPLICATION(S): 213 SOLUTION TOPICAL at 05:16

## 2023-09-28 RX ADMIN — AMLODIPINE BESYLATE 5 MILLIGRAM(S): 2.5 TABLET ORAL at 05:12

## 2023-09-28 RX ADMIN — Medication 5 MILLIGRAM(S): at 17:17

## 2023-09-28 RX ADMIN — Medication 50 MILLIGRAM(S): at 05:12

## 2023-09-28 RX ADMIN — Medication 50 MILLIGRAM(S): at 17:17

## 2023-09-28 RX ADMIN — Medication 40 MILLIGRAM(S): at 13:04

## 2023-09-28 RX ADMIN — Medication 40 MILLIGRAM(S): at 05:12

## 2023-09-28 RX ADMIN — Medication 175 MICROGRAM(S): at 05:12

## 2023-09-28 RX ADMIN — Medication 5 MILLIGRAM(S): at 05:11

## 2023-09-28 RX ADMIN — AMIODARONE HYDROCHLORIDE 200 MILLIGRAM(S): 400 TABLET ORAL at 05:12

## 2023-09-28 NOTE — PROGRESS NOTE ADULT - SUBJECTIVE AND OBJECTIVE BOX
JEFFERY UGALDE  82y Female    CHIEF COMPLAINT:    Patient is a 82y old  Female who presents with a chief complaint of CHF exacerbation (27 Sep 2023 14:27)      INTERVAL HPI/OVERNIGHT EVENTS:    Patient seen and examined.    ROS: All other systems are negative.    Vital Signs:    T(F): 97.5 (23 @ 04:26), Max: 98.3 (23 @ 15:46)  HR: 95 (23 @ 04:26) (95 - 119)  BP: 125/60 (23 @ 04:26) (107/70 - 125/60)  RR: 18 (23 @ 04:26) (18 - 20)  SpO2: 99% (23 @ 23:16) (97% - 100%)  I&O's Summary    27 Sep 2023 07:01  -  28 Sep 2023 07:00  --------------------------------------------------------  IN: 200 mL / OUT: 300 mL / NET: -100 mL      Daily     Daily Weight in k (28 Sep 2023 04:26)  CAPILLARY BLOOD GLUCOSE          PHYSICAL EXAM:    GENERAL:  NAD  SKIN: No rashes or lesions  HENT: Atraumatic. Normocephalic. PERRL. Moist membranes.  NECK: Supple, No JVD. No lymphadenopathy.  PULMONARY: CTA B/L. No wheezing. No rales  CVS: Normal S1, S2. Rate and Rhythm are regular. No murmurs.  ABDOMEN/GI: Soft, Nontender, Nondistended; BS present  EXTREMITIES: Peripheral pulses intact. No edema B/L LE.  NEUROLOGIC:  No motor or sensory deficit.  PSYCH: Alert & oriented x 3    Consultant(s) Notes Reviewed:  [x ] YES  [ ] NO  Care Discussed with Consultants/Other Providers [ x] YES  [ ] NO    EKG reviewed  Telemetry reviewed    LABS:                        11.5   1106 )-----------( 243      ( 27 Sep 2023 06:20 )             37.4         140  |  102  |  34<H>  ----------------------------<  109<H>  3.8   |  24  |  1.7<H>    Ca    8.5      27 Sep 2023 21:26  Mg     1.8         TPro  5.7<L>  /  Alb  3.6  /  TBili  0.9  /  DBili  x   /  AST  42<H>  /  ALT  68<H>  /  AlkPhos  141<H>      PT/INR - ( 27 Sep 2023 06:20 )   PT: 11.90 sec;   INR: 1.04 ratio         PTT - ( 27 Sep 2023 06:20 )  PTT:31.6 sec    Trop <0.01, CKMB --, CK --, 23 @ 17:10  Trop <0.01, CKMB --, CK --, 23 @ 06:20        RADIOLOGY & ADDITIONAL TESTS:    < from: CT Angio Chest PE Protocol w/ IV Cont (23 @ 08:19) >  IMPRESSION:    No filling defect within the main pulmonary arteries or its segmental   branches to suggest pulmonary embolus.    Apparent collapse/narrowing right upper lobe and right middle lobe   subsegmental branches.    Cardiomegaly. Bilateral pleural effusions. Interstitial edema. Findings   may be consistent with a CHF exacerbation.    Imaging of the upper abdomen suggest mild central hepatic biliary duct   dilatation. Correlate with right upper quadrant ultrasound.    < end of copied text >  < from: US Abdomen Upper Quadrant Right (23 @ 18:00) >  IMPRESSION:  Echogenic right kidney.    No ductal dilatation.    < end of copied text >    Imaging or report Personally Reviewed:  [x ] YES  [ ] NO    Medications:  Standing  aMIOdarone    Tablet 200 milliGRAM(s) Oral daily  amLODIPine   Tablet 5 milliGRAM(s) Oral daily  chlorhexidine 2% Cloths 1 Application(s) Topical <User Schedule>  furosemide   Injectable 40 milliGRAM(s) IV Push every 12 hours  influenza  Vaccine (HIGH DOSE) 0.7 milliLiter(s) IntraMuscular once  levothyroxine 175 MICROGram(s) Oral daily  metoprolol tartrate 50 milliGRAM(s) Oral two times a day  oxybutynin 5 milliGRAM(s) Oral two times a day  rivaroxaban 20 milliGRAM(s) Oral with dinner    PRN Meds  acetaminophen     Tablet .. 650 milliGRAM(s) Oral every 6 hours PRN  aluminum hydroxide/magnesium hydroxide/simethicone Suspension 30 milliLiter(s) Oral every 4 hours PRN      Case discussed with resident    Care discussed with pt/family           JEFFERY UGALDE  82y Female    CHIEF COMPLAINT:    Patient is a 82y old  Female who presents with a chief complaint of CHF exacerbation (27 Sep 2023 14:27)      INTERVAL HPI/OVERNIGHT EVENTS:    Patient seen and examined. Poor historian. C/O sob for one week. No cp. No fever or cough.     ROS: All other systems are negative.    Vital Signs:    T(F): 97.5 (23 @ 04:26), Max: 98.3 (23 @ 15:46)  HR: 95 (23 @ 04:26) (95 - 119)  BP: 125/60 (23 @ 04:26) (107/70 - 125/60)  RR: 18 (23 @ 04:26) (18 - 20)  SpO2: 99% (23 @ 23:16) (97% - 100%)  I&O's Summary    27 Sep 2023 07:01  -  28 Sep 2023 07:00  --------------------------------------------------------  IN: 200 mL / OUT: 300 mL / NET: -100 mL      Daily     Daily Weight in k (28 Sep 2023 04:26)  CAPILLARY BLOOD GLUCOSE          PHYSICAL EXAM:    GENERAL:  NAD  SKIN: No rashes or lesions  HENT: Atraumatic. Normocephalic. PERRL. Moist membranes.  NECK: Supple, No JVD. No lymphadenopathy.  PULMONARY: Decreased BS in the base B/L. No wheezing. No rales  CVS: Normal S1, S2. Rate and Rhythm are IRIR. No murmurs.  ABDOMEN/GI: Soft, Nontender, Nondistended; BS present  EXTREMITIES: Peripheral pulses intact. +ve edema B/L LE.  NEUROLOGIC:  No motor or sensory deficit.  PSYCH: Alert & oriented x 3    Consultant(s) Notes Reviewed:  [x ] YES  [ ] NO  Care Discussed with Consultants/Other Providers [ x] YES  [ ] NO    EKG reviewed  Telemetry reviewed    LABS:                        11.5   11.06 )-----------( 243      ( 27 Sep 2023 06:20 )             37.4         140  |  102  |  34<H>  ----------------------------<  109<H>    Creatinine Trend: 1.7<--, 1.5<--, 1.6<--  3.8   |  24  |  1.7<H>    Ca    8.5      27 Sep 2023 21:26  Mg     1.8         TPro  5.7<L>  /  Alb  3.6  /  TBili  0.9  /  DBili  x   /  AST  42<H>  /  ALT  68<H>  /  AlkPhos  141<H>      PT/INR - ( 27 Sep 2023 06:20 )   PT: 11.90 sec;   INR: 1.04 ratio         PTT - ( 27 Sep 2023 06:20 )  PTT:31.6 sec    Trop <0.01, CKMB --, CK --, 23 @ 17:10  Trop <0.01, CKMB --, CK --, 23 @ 06:20        RADIOLOGY & ADDITIONAL TESTS:    < from: CT Angio Chest PE Protocol w/ IV Cont (23 @ 08:19) >  IMPRESSION:    No filling defect within the main pulmonary arteries or its segmental   branches to suggest pulmonary embolus.    Apparent collapse/narrowing right upper lobe and right middle lobe   subsegmental branches.    Cardiomegaly. Bilateral pleural effusions. Interstitial edema. Findings   may be consistent with a CHF exacerbation.    Imaging of the upper abdomen suggest mild central hepatic biliary duct   dilatation. Correlate with right upper quadrant ultrasound.    < end of copied text >  < from: US Abdomen Upper Quadrant Right (23 @ 18:00) >  IMPRESSION:  Echogenic right kidney.    No ductal dilatation.    < end of copied text >    Imaging or report Personally Reviewed:  [x ] YES  [ ] NO    Medications:  Standing  aMIOdarone    Tablet 200 milliGRAM(s) Oral daily  amLODIPine   Tablet 5 milliGRAM(s) Oral daily  chlorhexidine 2% Cloths 1 Application(s) Topical <User Schedule>  furosemide   Injectable 40 milliGRAM(s) IV Push every 12 hours  influenza  Vaccine (HIGH DOSE) 0.7 milliLiter(s) IntraMuscular once  levothyroxine 175 MICROGram(s) Oral daily  metoprolol tartrate 50 milliGRAM(s) Oral two times a day  oxybutynin 5 milliGRAM(s) Oral two times a day  rivaroxaban 20 milliGRAM(s) Oral with dinner    PRN Meds  acetaminophen     Tablet .. 650 milliGRAM(s) Oral every 6 hours PRN  aluminum hydroxide/magnesium hydroxide/simethicone Suspension 30 milliLiter(s) Oral every 4 hours PRN      Case discussed with resident    Care discussed with pt/family

## 2023-09-28 NOTE — PROGRESS NOTE ADULT - ASSESSMENT
81-year-old with PMHx of Paroxysmal AFib on rivaroxaban (s/p cardioversion 9/13/22), CHFpEF, CKD 3b, chronic microcytic anemia, FELIX (on CPAP) HTN, DLD, hypothyroidism (history of thyroid cancer s/p resection), hx hip fracture, admitted for HFpEF exacerbation    HFpEF exacerbation  -coarse crackles and SHIRA on exam  -BiPAP on admission weaned off to 4L NC  -CXR showed bilateral opacities  -CTPE showed congestion and bilateral pleural effusions  -last echo in 2022 showed an EF of 71%  -BNP 2850    Plan:  ·	repeat echo  ·	furosemide 60 mg IV Qd  ·	attempt to wean off oxygen  ·	daily weights, strict I&O's    Afib on rivaroxaban  -EKG showed Afib w/ RVR at 102 BPM on admission  -rate better controlled now     Plan:  ·	continue Lopressor 50 mg BID         81-year-old with PMHx of Paroxysmal AFib on rivaroxaban (s/p cardioversion 9/13/22), CHFpEF, CKD 3b, chronic microcytic anemia, FELIX (on CPAP) HTN, DLD, hypothyroidism (history of thyroid cancer s/p resection), hx hip fracture, admitted for HFpEF exacerbation    HFpEF exacerbation  -coarse crackles and SHIRA on exam  -BiPAP on admission weaned off to 4L NC  -CXR showed bilateral opacities  -CTPE showed congestion and bilateral pleural effusions  -last echo in 2022 showed an EF of 71%  -BNP 2850  -trops negative    Plan:  ·	repeat echo  ·	furosemide 60 mg IV Qd  ·	attempt to wean off oxygen  ·	daily weights, strict I&O's, low salt diet and fluid restriction <1.5L per day  ·	keep Mg > 2 and K > 4    Afib on rivaroxaban  -EKG showed Afib w/ RVR at 102 BPM on admission  -rate better controlled now     Plan:  ·	continue Lopressor 50 mg BID  ·	continue amiodarone 200 mg Qd  ·	continue rivaroxaban 20 mg Qd    Transamnitis  -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

## 2023-09-28 NOTE — PROGRESS NOTE ADULT - ASSESSMENT
81-year-old with PMHx of Paroxysmal AFib on Xarelto (s/p cardioversion 9/13/22), CHFpEF, CKD 3b, chronic microcytic anemia, FELIX (on CPAP) HTN, DLD, hypothyroidism (history of thyroid cancer s/p resection), hx hip fracture, presented to the ED complaining of chest pain and shortness of breath for the past week. Also c/o orthopnea.     Acute HFpEF  FELIX on CPAP  HTN / DL  CKD-3  PAF on Xarelto            PLAN: 81-year-old with PMHx of Paroxysmal AFib on Xarelto (s/p cardioversion 9/13/22), HFpEF, CKD 3b, chronic microcytic anemia, FELIX (on CPAP) HTN, DLD, hypothyroidism (history of thyroid cancer s/p resection), hx hip fracture, presented to the ED complaining of chest pain and shortness of breath for the past week. Also c/o orthopnea.     Acute HFpEF  FELIX on CPAP  HTN / DL  CKD-3  PAF on Xarelto  Obesity  Transaminitis.             PLAN:    ·	Tele reviewed. Multiple episodes of wide complex tachycardia.   ·	CTA chest reviewed. No acute PE. B/L pleural effusions. interstitial edema.   ·	Change Lasix to 60 mg iv daily  ·	Check i's and o's and daily wt  ·	Low salt diet and water restriction to 1.5 L/D  ·	Cardio f/u for episodes of wide complex tachycardia  ·	Monitor renal function and electrolytes.   ·	Replete Mg. Maintain K >4 and Mg >2.2  ·	Cont Amiodarone and Metoprolol  ·	Check TSH level.   ·	Transaminitis likely due to congestive hepatopathy    Progress Note Handoff    Pending (specify):  Consults_________, Tests________, Test Results_______, Other__Fluid overload_______  Family discussion:  Disposition: Home___/SNF___/Other________/Unknown at this time________    Varun Fisher MD  Spectra: [phone]

## 2023-09-28 NOTE — PROGRESS NOTE ADULT - SUBJECTIVE AND OBJECTIVE BOX
24H events:    Patient is a 82y old Female who presents with a chief complaint of CHF exacerbation (28 Sep 2023 07:30)    Primary diagnosis of Acute CHF      Today is hospital day 1d. This morning patient was seen and examined at bedside, resting comfortably in bed.    No acute or major events overnight.    PAST MEDICAL & SURGICAL HISTORY  HTN (hypertension)    High cholesterol    Hypothyroid  s/p thyroid ca surgery    Afib  on xarelto    Sleep apnea    Osteoarthritis    CKD (chronic kidney disease) stage 3, GFR 30-59 ml/min    H/O thyroidectomy    S/P rotator cuff surgery      SOCIAL HISTORY:  Social History:      ALLERGIES:  No Known Allergies    MEDICATIONS:  STANDING MEDICATIONS  aMIOdarone    Tablet 200 milliGRAM(s) Oral daily  amLODIPine   Tablet 5 milliGRAM(s) Oral daily  chlorhexidine 2% Cloths 1 Application(s) Topical <User Schedule>  furosemide   Injectable 40 milliGRAM(s) IV Push once  influenza  Vaccine (HIGH DOSE) 0.7 milliLiter(s) IntraMuscular once  levothyroxine 175 MICROGram(s) Oral daily  metoprolol tartrate 50 milliGRAM(s) Oral two times a day  oxybutynin 5 milliGRAM(s) Oral two times a day  rivaroxaban 20 milliGRAM(s) Oral with dinner    PRN MEDICATIONS  acetaminophen     Tablet .. 650 milliGRAM(s) Oral every 6 hours PRN  aluminum hydroxide/magnesium hydroxide/simethicone Suspension 30 milliLiter(s) Oral every 4 hours PRN  benzocaine 20% Spray 1 Spray(s) Topical four times a day PRN    VITALS:   T(F): 97.5  HR: 95  BP: 125/60  RR: 18  SpO2: 99%    PHYSICAL EXAM:  GENERAL: NAD    HEART: S1, S2 with no murmurs heard on asucultation    LUNGS: bilaterally clear to auscultation with no added sounds    ABDOMEN: soft, lax, NTND    EXTREMITIES: peripheral pulses palpable, no pitting edema    NERVOUS SYSTEM:  AOx3, non-focal    SKIN: no rashes or lesions noted      LABS:                        11.5   11.06 )-----------( 243      ( 27 Sep 2023 06:20 )             37.4     09-27    140  |  102  |  34<H>  ----------------------------<  109<H>  3.8   |  24  |  1.7<H>    Ca    8.5      27 Sep 2023 21:26  Mg     1.8     09-27    TPro  5.7<L>  /  Alb  3.6  /  TBili  0.9  /  DBili  x   /  AST  42<H>  /  ALT  68<H>  /  AlkPhos  141<H>  09-27    PT/INR - ( 27 Sep 2023 06:20 )   PT: 11.90 sec;   INR: 1.04 ratio         PTT - ( 27 Sep 2023 06:20 )  PTT:31.6 sec  Urinalysis Basic - ( 27 Sep 2023 21:26 )    Color: x / Appearance: x / SG: x / pH: x  Gluc: 109 mg/dL / Ketone: x  / Bili: x / Urobili: x   Blood: x / Protein: x / Nitrite: x   Leuk Esterase: x / RBC: x / WBC x   Sq Epi: x / Non Sq Epi: x / Bacteria: x        Troponin T, Serum: <0.01 ng/mL (09-27-23 @ 17:10)      CARDIAC MARKERS ( 27 Sep 2023 17:10 )  x     / <0.01 ng/mL / x     / x     / x      CARDIAC MARKERS ( 27 Sep 2023 06:20 )  x     / <0.01 ng/mL / x     / x     / x

## 2023-09-29 LAB
ALBUMIN SERPL ELPH-MCNC: 3.7 G/DL — SIGNIFICANT CHANGE UP (ref 3.5–5.2)
ALP SERPL-CCNC: 107 U/L — SIGNIFICANT CHANGE UP (ref 30–115)
ALT FLD-CCNC: 36 U/L — SIGNIFICANT CHANGE UP (ref 0–41)
ANION GAP SERPL CALC-SCNC: 14 MMOL/L — SIGNIFICANT CHANGE UP (ref 7–14)
AST SERPL-CCNC: 12 U/L — SIGNIFICANT CHANGE UP (ref 0–41)
BASOPHILS # BLD AUTO: 0.04 K/UL — SIGNIFICANT CHANGE UP (ref 0–0.2)
BASOPHILS NFR BLD AUTO: 0.4 % — SIGNIFICANT CHANGE UP (ref 0–1)
BILIRUB SERPL-MCNC: 0.6 MG/DL — SIGNIFICANT CHANGE UP (ref 0.2–1.2)
BUN SERPL-MCNC: 46 MG/DL — HIGH (ref 10–20)
CALCIUM SERPL-MCNC: 8.3 MG/DL — LOW (ref 8.4–10.5)
CHLORIDE SERPL-SCNC: 101 MMOL/L — SIGNIFICANT CHANGE UP (ref 98–110)
CO2 SERPL-SCNC: 30 MMOL/L — SIGNIFICANT CHANGE UP (ref 17–32)
CREAT SERPL-MCNC: 2.3 MG/DL — HIGH (ref 0.7–1.5)
EGFR: 21 ML/MIN/1.73M2 — LOW
EOSINOPHIL # BLD AUTO: 0.19 K/UL — SIGNIFICANT CHANGE UP (ref 0–0.7)
EOSINOPHIL NFR BLD AUTO: 1.8 % — SIGNIFICANT CHANGE UP (ref 0–8)
GLUCOSE SERPL-MCNC: 101 MG/DL — HIGH (ref 70–99)
HCT VFR BLD CALC: 34.7 % — LOW (ref 37–47)
HGB BLD-MCNC: 10.7 G/DL — LOW (ref 12–16)
IMM GRANULOCYTES NFR BLD AUTO: 0.3 % — SIGNIFICANT CHANGE UP (ref 0.1–0.3)
LYMPHOCYTES # BLD AUTO: 1.84 K/UL — SIGNIFICANT CHANGE UP (ref 1.2–3.4)
LYMPHOCYTES # BLD AUTO: 17.8 % — LOW (ref 20.5–51.1)
MAGNESIUM SERPL-MCNC: 1.7 MG/DL — LOW (ref 1.8–2.4)
MCHC RBC-ENTMCNC: 27.8 PG — SIGNIFICANT CHANGE UP (ref 27–31)
MCHC RBC-ENTMCNC: 30.8 G/DL — LOW (ref 32–37)
MCV RBC AUTO: 90.1 FL — SIGNIFICANT CHANGE UP (ref 81–99)
MONOCYTES # BLD AUTO: 0.85 K/UL — HIGH (ref 0.1–0.6)
MONOCYTES NFR BLD AUTO: 8.2 % — SIGNIFICANT CHANGE UP (ref 1.7–9.3)
NEUTROPHILS # BLD AUTO: 7.39 K/UL — HIGH (ref 1.4–6.5)
NEUTROPHILS NFR BLD AUTO: 71.5 % — SIGNIFICANT CHANGE UP (ref 42.2–75.2)
NRBC # BLD: 0 /100 WBCS — SIGNIFICANT CHANGE UP (ref 0–0)
PLATELET # BLD AUTO: 215 K/UL — SIGNIFICANT CHANGE UP (ref 130–400)
PMV BLD: 12.1 FL — HIGH (ref 7.4–10.4)
POTASSIUM SERPL-MCNC: 3.2 MMOL/L — LOW (ref 3.5–5)
POTASSIUM SERPL-SCNC: 3.2 MMOL/L — LOW (ref 3.5–5)
PROT SERPL-MCNC: 5.4 G/DL — LOW (ref 6–8)
RAPID RVP RESULT: SIGNIFICANT CHANGE UP
RBC # BLD: 3.85 M/UL — LOW (ref 4.2–5.4)
RBC # FLD: 18 % — HIGH (ref 11.5–14.5)
SARS-COV-2 RNA SPEC QL NAA+PROBE: SIGNIFICANT CHANGE UP
SODIUM SERPL-SCNC: 145 MMOL/L — SIGNIFICANT CHANGE UP (ref 135–146)
WBC # BLD: 10.34 K/UL — SIGNIFICANT CHANGE UP (ref 4.8–10.8)
WBC # FLD AUTO: 10.34 K/UL — SIGNIFICANT CHANGE UP (ref 4.8–10.8)

## 2023-09-29 PROCEDURE — 99233 SBSQ HOSP IP/OBS HIGH 50: CPT

## 2023-09-29 PROCEDURE — 71045 X-RAY EXAM CHEST 1 VIEW: CPT | Mod: 26

## 2023-09-29 PROCEDURE — 93306 TTE W/DOPPLER COMPLETE: CPT | Mod: 26

## 2023-09-29 PROCEDURE — 76770 US EXAM ABDO BACK WALL COMP: CPT | Mod: 26

## 2023-09-29 RX ORDER — METOPROLOL TARTRATE 50 MG
10 TABLET ORAL ONCE
Refills: 0 | Status: DISCONTINUED | OUTPATIENT
Start: 2023-09-29 | End: 2023-09-29

## 2023-09-29 RX ORDER — POLYETHYLENE GLYCOL 3350 17 G/17G
17 POWDER, FOR SOLUTION ORAL DAILY
Refills: 0 | Status: DISCONTINUED | OUTPATIENT
Start: 2023-09-29 | End: 2023-10-04

## 2023-09-29 RX ORDER — MAGNESIUM SULFATE 500 MG/ML
2 VIAL (ML) INJECTION ONCE
Refills: 0 | Status: COMPLETED | OUTPATIENT
Start: 2023-09-29 | End: 2023-09-29

## 2023-09-29 RX ORDER — RIVAROXABAN 15 MG-20MG
15 KIT ORAL
Refills: 0 | Status: DISCONTINUED | OUTPATIENT
Start: 2023-09-29 | End: 2023-10-03

## 2023-09-29 RX ORDER — METOPROLOL TARTRATE 50 MG
5 TABLET ORAL ONCE
Refills: 0 | Status: COMPLETED | OUTPATIENT
Start: 2023-09-29 | End: 2023-09-29

## 2023-09-29 RX ORDER — TRAMADOL HYDROCHLORIDE 50 MG/1
25 TABLET ORAL ONCE
Refills: 0 | Status: DISCONTINUED | OUTPATIENT
Start: 2023-09-29 | End: 2023-09-29

## 2023-09-29 RX ORDER — METOPROLOL TARTRATE 50 MG
25 TABLET ORAL ONCE
Refills: 0 | Status: COMPLETED | OUTPATIENT
Start: 2023-09-29 | End: 2023-09-29

## 2023-09-29 RX ORDER — POTASSIUM CHLORIDE 20 MEQ
40 PACKET (EA) ORAL EVERY 4 HOURS
Refills: 0 | Status: COMPLETED | OUTPATIENT
Start: 2023-09-29 | End: 2023-09-29

## 2023-09-29 RX ADMIN — Medication 5 MILLIGRAM(S): at 17:56

## 2023-09-29 RX ADMIN — Medication 50 MILLIGRAM(S): at 05:40

## 2023-09-29 RX ADMIN — Medication 5 MILLIGRAM(S): at 05:40

## 2023-09-29 RX ADMIN — Medication 25 GRAM(S): at 12:03

## 2023-09-29 RX ADMIN — CHLORHEXIDINE GLUCONATE 1 APPLICATION(S): 213 SOLUTION TOPICAL at 05:41

## 2023-09-29 RX ADMIN — Medication 40 MILLIEQUIVALENT(S): at 12:04

## 2023-09-29 RX ADMIN — TRAMADOL HYDROCHLORIDE 25 MILLIGRAM(S): 50 TABLET ORAL at 03:43

## 2023-09-29 RX ADMIN — Medication 25 MILLIGRAM(S): at 23:19

## 2023-09-29 RX ADMIN — Medication 50 MILLIGRAM(S): at 17:47

## 2023-09-29 RX ADMIN — TRAMADOL HYDROCHLORIDE 25 MILLIGRAM(S): 50 TABLET ORAL at 04:10

## 2023-09-29 RX ADMIN — Medication 60 MILLIGRAM(S): at 05:41

## 2023-09-29 RX ADMIN — Medication 5 MILLIGRAM(S): at 21:45

## 2023-09-29 RX ADMIN — RIVAROXABAN 15 MILLIGRAM(S): KIT at 17:58

## 2023-09-29 RX ADMIN — Medication 40 MILLIEQUIVALENT(S): at 17:47

## 2023-09-29 RX ADMIN — AMLODIPINE BESYLATE 5 MILLIGRAM(S): 2.5 TABLET ORAL at 05:40

## 2023-09-29 RX ADMIN — POLYETHYLENE GLYCOL 3350 17 GRAM(S): 17 POWDER, FOR SOLUTION ORAL at 14:13

## 2023-09-29 RX ADMIN — AMIODARONE HYDROCHLORIDE 200 MILLIGRAM(S): 400 TABLET ORAL at 05:40

## 2023-09-29 RX ADMIN — Medication 175 MICROGRAM(S): at 05:40

## 2023-09-29 NOTE — PROGRESS NOTE ADULT - ASSESSMENT
81-year-old with PMHx of Paroxysmal AFib on Xarelto (s/p cardioversion 9/13/22), HFpEF, CKD 3b, chronic microcytic anemia, FELIX (on CPAP) HTN, DLD, hypothyroidism (history of thyroid cancer s/p resection), hx hip fracture, presented to the ED complaining of chest pain and shortness of breath for the past week. Also c/o orthopnea.     Acute HFpEF  FELIX on CPAP  HTN / DL  KOBI on CKD-3  PAF on Xarelto  Obesity  Transaminitis.             PLAN:    ·	Tele reviewed. Multiple episodes of wide complex tachycardia. D/W the cardiology. Likely A-Fib with aberrant conduction   ·	CTA chest reviewed. No acute PE. B/L pleural effusions. Interstitial edema.   ·	BUN/Cr noted. Trending up  ·	Check U/A and urine lytes.   ·	Hold Lasix  ·	Renal/bladder US. Check PVR.   ·	Pt received IV contrast on 9/27. Could be AIRAM. Nephrology consult  ·	Adjust Xarelto dose for KOBI  ·	Check i's and o's and daily wt  ·	Low salt diet and water restriction to 1.5 L/D  ·	Monitor renal function and electrolytes.   ·	Replete Mg and K. Maintain K >4 and Mg >2.2  ·	Cont Amiodarone and Metoprolol  ·	Check TSH level.   ·	Transaminitis likely due to congestive hepatopathy    Progress Note Handoff    Pending (specify):  Consults__Nephrology consult______, Tests________, Test Results_______, Other_________  Family discussion:  Disposition: Home___/SNF___/Other________/Unknown at this time________    Varun Fisher MD  Spectra: [phone]

## 2023-09-29 NOTE — PROGRESS NOTE ADULT - SUBJECTIVE AND OBJECTIVE BOX
24H events:    Patient is a 82y old Female who presents with a chief complaint of CHF exacerbation (28 Sep 2023 11:12)    Primary diagnosis of Acute CHF      Today is hospital day 2d. This morning patient was seen and examined at bedside, resting comfortably in bed.    No acute or major events overnight.    PAST MEDICAL & SURGICAL HISTORY  HTN (hypertension)    High cholesterol    Hypothyroid  s/p thyroid ca surgery    Afib  on xarelto    Sleep apnea    Osteoarthritis    CKD (chronic kidney disease) stage 3, GFR 30-59 ml/min    H/O thyroidectomy    S/P rotator cuff surgery      SOCIAL HISTORY:  Social History:      ALLERGIES:  No Known Allergies    MEDICATIONS:  STANDING MEDICATIONS  aMIOdarone    Tablet 200 milliGRAM(s) Oral daily  amLODIPine   Tablet 5 milliGRAM(s) Oral daily  chlorhexidine 2% Cloths 1 Application(s) Topical <User Schedule>  influenza  Vaccine (HIGH DOSE) 0.7 milliLiter(s) IntraMuscular once  levothyroxine 175 MICROGram(s) Oral daily  magnesium sulfate  IVPB 2 Gram(s) IV Intermittent once  metoprolol tartrate 50 milliGRAM(s) Oral two times a day  oxybutynin 5 milliGRAM(s) Oral two times a day  potassium chloride   Powder 40 milliEquivalent(s) Oral every 4 hours  rivaroxaban 15 milliGRAM(s) Oral with dinner    PRN MEDICATIONS  acetaminophen     Tablet .. 650 milliGRAM(s) Oral every 6 hours PRN  aluminum hydroxide/magnesium hydroxide/simethicone Suspension 30 milliLiter(s) Oral every 4 hours PRN  benzocaine 20% Spray 1 Spray(s) Topical four times a day PRN    VITALS:   T(F): 96.9  HR: 75  BP: 102/66  RR: 18  SpO2: 96%    PHYSICAL EXAM:  GENERAL: NAD    HEART: S1, S2 with no murmurs heard on asucultation    LUNGS: bilaterally clear to auscultation with no added sounds    ABDOMEN: soft, lax, NTND, stoma bag in place with no blood    EXTREMITIES: peripheral pulses palpable, no pitting edema    NERVOUS SYSTEM:  AOx3, non-focal    SKIN: no rashes or lesions noted      LABS:                        10.7   10.34 )-----------( 215      ( 29 Sep 2023 05:47 )             34.7     09-29    145  |  101  |  46<H>  ----------------------------<  101<H>  3.2<L>   |  30  |  2.3<H>    Ca    8.3<L>      29 Sep 2023 05:47  Mg     1.7     09-29    TPro  5.4<L>  /  Alb  3.7  /  TBili  0.6  /  DBili  x   /  AST  12  /  ALT  36  /  AlkPhos  107  09-29      Urinalysis Basic - ( 29 Sep 2023 05:47 )    Color: x / Appearance: x / SG: x / pH: x  Gluc: 101 mg/dL / Ketone: x  / Bili: x / Urobili: x   Blood: x / Protein: x / Nitrite: x   Leuk Esterase: x / RBC: x / WBC x   Sq Epi: x / Non Sq Epi: x / Bacteria: x            CARDIAC MARKERS ( 27 Sep 2023 17:10 )  x     / <0.01 ng/mL / x     / x     / x

## 2023-09-29 NOTE — PROGRESS NOTE ADULT - SUBJECTIVE AND OBJECTIVE BOX
JEFFERY UGALDE  82y Female    CHIEF COMPLAINT:    Patient is a 82y old  Female who presents with a chief complaint of CHF exacerbation (29 Sep 2023 10:38)      INTERVAL HPI/OVERNIGHT EVENTS:    Patient seen and examined. Pt states that her breathing has improved. No cough. On O2.     ROS: All other systems are negative.    Vital Signs:    T(F): 96.9 (09-29-23 @ 00:00), Max: 98.1 (09-28-23 @ 19:45)  HR: 75 (09-29-23 @ 00:00) (65 - 76)  BP: 102/66 (09-29-23 @ 00:00) (102/66 - 139/65)  RR: 18 (09-29-23 @ 00:00) (18 - 18)  SpO2: 96% (09-28-23 @ 12:07) (96% - 96%)  I&O's Summary    28 Sep 2023 07:01  -  29 Sep 2023 07:00  --------------------------------------------------------  IN: 339 mL / OUT: 1800 mL / NET: -1461 mL    29 Sep 2023 07:01  -  29 Sep 2023 11:23  --------------------------------------------------------  IN: 240 mL / OUT: 1000 mL / NET: -760 mL      Daily     Daily   CAPILLARY BLOOD GLUCOSE          PHYSICAL EXAM:    GENERAL:  NAD  SKIN: No rashes or lesions  HENT: Atraumatic. Normocephalic. PERRL. Moist membranes.  NECK: Supple, No JVD. No lymphadenopathy.  PULMONARY: CTA B/L. No wheezing. No rales  CVS: Normal S1, S2. Rate and Rhythm are regular. No murmurs.  ABDOMEN/GI: Soft, Nontender, Nondistended; BS present  EXTREMITIES: Peripheral pulses intact. No edema B/L LE.  NEUROLOGIC:  No motor or sensory deficit.  PSYCH: Alert & oriented x 3    Consultant(s) Notes Reviewed:  [x ] YES  [ ] NO  Care Discussed with Consultants/Other Providers [ x] YES  [ ] NO    EKG reviewed  Telemetry reviewed    LABS:                        10.7   10.34 )-----------( 215      ( 29 Sep 2023 05:47 )             34.7     09-29    145  |  101  |  46<H>  ----------------------------<  101<H>  3.2<L>   |  30  |  2.3<H>    Ca    8.3<L>      29 Sep 2023 05:47  Mg     1.7     09-29    TPro  5.4<L>  /  Alb  3.7  /  TBili  0.6  /  DBili  x   /  AST  12  /  ALT  36  /  AlkPhos  107  09-29        Trop <0.01, CKMB --, CK --, 09-27-23 @ 17:10  Trop <0.01, CKMB --, CK --, 09-27-23 @ 06:20        RADIOLOGY & ADDITIONAL TESTS:      Imaging or report Personally Reviewed:  [ ] YES  [ ] NO    Medications:  Standing  aMIOdarone    Tablet 200 milliGRAM(s) Oral daily  amLODIPine   Tablet 5 milliGRAM(s) Oral daily  chlorhexidine 2% Cloths 1 Application(s) Topical <User Schedule>  influenza  Vaccine (HIGH DOSE) 0.7 milliLiter(s) IntraMuscular once  levothyroxine 175 MICROGram(s) Oral daily  magnesium sulfate  IVPB 2 Gram(s) IV Intermittent once  metoprolol tartrate 50 milliGRAM(s) Oral two times a day  oxybutynin 5 milliGRAM(s) Oral two times a day  potassium chloride   Powder 40 milliEquivalent(s) Oral every 4 hours  rivaroxaban 15 milliGRAM(s) Oral with dinner    PRN Meds  acetaminophen     Tablet .. 650 milliGRAM(s) Oral every 6 hours PRN  aluminum hydroxide/magnesium hydroxide/simethicone Suspension 30 milliLiter(s) Oral every 4 hours PRN  benzocaine 20% Spray 1 Spray(s) Topical four times a day PRN      Case discussed with resident    Care discussed with pt/family

## 2023-09-29 NOTE — PROGRESS NOTE ADULT - ASSESSMENT
81-year-old with PMHx of Paroxysmal AFib on rivaroxaban (s/p cardioversion 9/13/22), CHFpEF, CKD 3b, chronic microcytic anemia, FELIX (on CPAP) HTN, DLD, hypothyroidism (history of thyroid cancer s/p resection), hx hip fracture, admitted for HFpEF exacerbation    HFpEF exacerbation  -coarse crackles and SHIRA on exam  -BiPAP on admission weaned off to 4L NC  -CXR showed bilateral opacities now improved  -CTPE showed congestion and bilateral pleural effusions  -last echo in 2022 showed an EF of 71%  -BNP 2850  -trops negative  -KOBI: Cr 2.3 from baseline of 1.3    Plan:  ·	repeat echo still not done  ·	furosemide 60 mg IV BID stopped for KOBI  ·	nephro consult for KOBI, RBUS, UA, urine lytes  ·	attempt to wean off oxygen  ·	daily weights, strict I&O's, low salt diet and fluid restriction <1.5L per day  ·	keep Mg > 2 and K > 4    Afib on rivaroxaban  -EKG showed Afib w/ RVR at 102 BPM on admission  -rate better controlled now     Plan:  ·	continue Lopressor 50 mg BID  ·	continue amiodarone 200 mg Qd  ·	adjusted rivaroxaban to 15 mg Qd for KOBI    Transamnitis  -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

## 2023-09-30 LAB
ALBUMIN SERPL ELPH-MCNC: 3.8 G/DL — SIGNIFICANT CHANGE UP (ref 3.5–5.2)
ALP SERPL-CCNC: 108 U/L — SIGNIFICANT CHANGE UP (ref 30–115)
ALT FLD-CCNC: 27 U/L — SIGNIFICANT CHANGE UP (ref 0–41)
ANION GAP SERPL CALC-SCNC: 15 MMOL/L — HIGH (ref 7–14)
AST SERPL-CCNC: 11 U/L — SIGNIFICANT CHANGE UP (ref 0–41)
BASOPHILS # BLD AUTO: 0.03 K/UL — SIGNIFICANT CHANGE UP (ref 0–0.2)
BASOPHILS NFR BLD AUTO: 0.2 % — SIGNIFICANT CHANGE UP (ref 0–1)
BILIRUB SERPL-MCNC: 1.1 MG/DL — SIGNIFICANT CHANGE UP (ref 0.2–1.2)
BUN SERPL-MCNC: 49 MG/DL — HIGH (ref 10–20)
CALCIUM SERPL-MCNC: 8.2 MG/DL — LOW (ref 8.4–10.5)
CHLORIDE SERPL-SCNC: 103 MMOL/L — SIGNIFICANT CHANGE UP (ref 98–110)
CO2 SERPL-SCNC: 27 MMOL/L — SIGNIFICANT CHANGE UP (ref 17–32)
CREAT SERPL-MCNC: 2 MG/DL — HIGH (ref 0.7–1.5)
EGFR: 25 ML/MIN/1.73M2 — LOW
EOSINOPHIL # BLD AUTO: 0.08 K/UL — SIGNIFICANT CHANGE UP (ref 0–0.7)
EOSINOPHIL NFR BLD AUTO: 0.7 % — SIGNIFICANT CHANGE UP (ref 0–8)
GLUCOSE SERPL-MCNC: 118 MG/DL — HIGH (ref 70–99)
HCT VFR BLD CALC: 35.5 % — LOW (ref 37–47)
HGB BLD-MCNC: 11.1 G/DL — LOW (ref 12–16)
IMM GRANULOCYTES NFR BLD AUTO: 0.7 % — HIGH (ref 0.1–0.3)
LYMPHOCYTES # BLD AUTO: 1.48 K/UL — SIGNIFICANT CHANGE UP (ref 1.2–3.4)
LYMPHOCYTES # BLD AUTO: 12.2 % — LOW (ref 20.5–51.1)
MAGNESIUM SERPL-MCNC: 1.9 MG/DL — SIGNIFICANT CHANGE UP (ref 1.8–2.4)
MCHC RBC-ENTMCNC: 28.1 PG — SIGNIFICANT CHANGE UP (ref 27–31)
MCHC RBC-ENTMCNC: 31.3 G/DL — LOW (ref 32–37)
MCV RBC AUTO: 89.9 FL — SIGNIFICANT CHANGE UP (ref 81–99)
MONOCYTES # BLD AUTO: 1.11 K/UL — HIGH (ref 0.1–0.6)
MONOCYTES NFR BLD AUTO: 9.1 % — SIGNIFICANT CHANGE UP (ref 1.7–9.3)
NEUTROPHILS # BLD AUTO: 9.4 K/UL — HIGH (ref 1.4–6.5)
NEUTROPHILS NFR BLD AUTO: 77.1 % — HIGH (ref 42.2–75.2)
NRBC # BLD: 0 /100 WBCS — SIGNIFICANT CHANGE UP (ref 0–0)
PHOSPHATE SERPL-MCNC: 3.9 MG/DL — SIGNIFICANT CHANGE UP (ref 2.1–4.9)
PLATELET # BLD AUTO: 229 K/UL — SIGNIFICANT CHANGE UP (ref 130–400)
PMV BLD: 12 FL — HIGH (ref 7.4–10.4)
POTASSIUM SERPL-MCNC: 3.5 MMOL/L — SIGNIFICANT CHANGE UP (ref 3.5–5)
POTASSIUM SERPL-SCNC: 3.5 MMOL/L — SIGNIFICANT CHANGE UP (ref 3.5–5)
PROT SERPL-MCNC: 5.7 G/DL — LOW (ref 6–8)
RBC # BLD: 3.95 M/UL — LOW (ref 4.2–5.4)
RBC # FLD: 18.1 % — HIGH (ref 11.5–14.5)
SODIUM SERPL-SCNC: 145 MMOL/L — SIGNIFICANT CHANGE UP (ref 135–146)
T4 FREE SERPL-MCNC: 1.8 NG/DL — SIGNIFICANT CHANGE UP (ref 0.9–1.8)
T4 FREE+ TSH PNL SERPL: 5.61 UIU/ML — HIGH (ref 0.27–4.2)
WBC # BLD: 12.18 K/UL — HIGH (ref 4.8–10.8)
WBC # FLD AUTO: 12.18 K/UL — HIGH (ref 4.8–10.8)

## 2023-09-30 PROCEDURE — 99232 SBSQ HOSP IP/OBS MODERATE 35: CPT

## 2023-09-30 PROCEDURE — 73562 X-RAY EXAM OF KNEE 3: CPT | Mod: 26,LT

## 2023-09-30 RX ORDER — METOPROLOL TARTRATE 50 MG
50 TABLET ORAL THREE TIMES A DAY
Refills: 0 | Status: DISCONTINUED | OUTPATIENT
Start: 2023-09-30 | End: 2023-10-03

## 2023-09-30 RX ORDER — ALLOPURINOL 300 MG
100 TABLET ORAL DAILY
Refills: 0 | Status: DISCONTINUED | OUTPATIENT
Start: 2023-09-30 | End: 2023-10-04

## 2023-09-30 RX ADMIN — Medication 50 MILLIGRAM(S): at 06:45

## 2023-09-30 RX ADMIN — AMLODIPINE BESYLATE 5 MILLIGRAM(S): 2.5 TABLET ORAL at 06:45

## 2023-09-30 RX ADMIN — Medication 50 MILLIGRAM(S): at 13:55

## 2023-09-30 RX ADMIN — Medication 175 MICROGRAM(S): at 06:45

## 2023-09-30 RX ADMIN — Medication 100 MILLIGRAM(S): at 17:41

## 2023-09-30 RX ADMIN — Medication 5 MILLIGRAM(S): at 17:41

## 2023-09-30 RX ADMIN — Medication 20 MILLIGRAM(S): at 12:49

## 2023-09-30 RX ADMIN — Medication 50 MILLIGRAM(S): at 21:25

## 2023-09-30 RX ADMIN — RIVAROXABAN 15 MILLIGRAM(S): KIT at 17:41

## 2023-09-30 RX ADMIN — Medication 5 MILLIGRAM(S): at 06:45

## 2023-09-30 RX ADMIN — CHLORHEXIDINE GLUCONATE 1 APPLICATION(S): 213 SOLUTION TOPICAL at 06:44

## 2023-09-30 RX ADMIN — AMIODARONE HYDROCHLORIDE 200 MILLIGRAM(S): 400 TABLET ORAL at 06:45

## 2023-09-30 NOTE — PROGRESS NOTE ADULT - ASSESSMENT
81-year-old with PMHx of Paroxysmal AFib on Xarelto (s/p cardioversion 9/13/22), HFpEF, CKD 3b, chronic microcytic anemia, FELIX (on CPAP) HTN, DLD, hypothyroidism (history of thyroid cancer s/p resection), hx hip fracture, presented to the ED complaining of chest pain and shortness of breath for the past week. Also c/o orthopnea.     Acute HFpEF  FELIX on CPAP  HTN / DL  KOBI on CKD-3  Gout flare  PAF on Xarelto  Obesity  Transaminitis    PLAN:    Tele reviewed. Multiple episodes of wide complex tachycardia. D/W the cardiology. Likely A-Fib with aberrant conduction. Metoprolol increased today to 50mg TID. F/u with cardiology for possible cardiac cath this admission.   CTA chest reviewed. No acute PE. B/L pleural effusions. Interstitial edema.   BUN/Cr noted. Now trending down.   Check U/A and urine lytes.   Continue holding Lasix.   Renal/bladder US: no hydronephrosis  Pt received IV contrast on 9/27. Could be AIRAM. Nephrology consult noted: continue to hold lasix, can restart PO Lasix 20mg daily once Cr downtrends.   Adjust Xarelto dose for KOBI  Check i's and o's and daily wt  Low salt diet and water restriction to 1.5 L/D  Monitor renal function and electrolytes.   Replete Mg and K. Maintain K >4 and Mg >2.2  Cont Amiodarone and Metoprolol  Check TSH level.   Check L knee Xray, and start Prednisone 20mg daily x2 days. Pt takes allopurinol at home, continue with renally dosed allopurinol.   Transaminitis likely due to congestive hepatopathy    PT evaluation. OOB to chair today    Progress Note Handoff    Pending (specify):  Consults______, Tests________, Test Results_______, Other___gout flare rx; KOBI; Cardio f/u  Family discussion:  Disposition: Home___/SNF___/Other________/Unknown at this time________

## 2023-09-30 NOTE — PROGRESS NOTE ADULT - ASSESSMENT
1, KOBI on CKD, likely due to diuresis and IV contrast (9/27)     agree to hold IV Lasix for now  f/u daily BMP, can change to PO lasix 20 mg daily if Cr continues to improve  low Na diet  f/u pending renal/bladder sonogram    2, CHF/FELIX on CPAP    3, HTN and PAF on Xarelto

## 2023-09-30 NOTE — PROGRESS NOTE ADULT - SUBJECTIVE AND OBJECTIVE BOX
[facility]  INITIAL CONSULT NOTE  --------------------------------------------------------------------------------  HPI:  81-year-old with PMHx of Paroxysmal AFib on Xarelto (s/p cardioversion 9/13/22), CHFpEF, CKD 3b with baseline Cr from 1-1.3, chronic microcytic anemia, FELIX (on CPAP) HTN, DLD, hypothyroidism (history of thyroid cancer s/p resection), hx hip fracture, presented to the ED complaining of chest pain and shortness of breath for the past week, treated with IV lasix, found to have worsening Cr, IV lasix on hold    PAST HISTORY  --------------------------------------------------------------------------------  PAST MEDICAL & SURGICAL HISTORY:  HTN (hypertension)      High cholesterol      Hypothyroid  s/p thyroid ca surgery      Afib  on xarelto      Sleep apnea      Osteoarthritis      CKD (chronic kidney disease) stage 3, GFR 30-59 ml/min      H/O thyroidectomy      S/P rotator cuff surgery        FAMILY HISTORY:  Family history of lung cancer (Mother)    Family history of abdominal aortic aneurysm (AAA) (Father)      PAST SOCIAL HISTORY:    ALLERGIES & MEDICATIONS  --------------------------------------------------------------------------------  Allergies    No Known Allergies    Intolerances      Standing Inpatient Medications  aMIOdarone    Tablet 200 milliGRAM(s) Oral daily  amLODIPine   Tablet 5 milliGRAM(s) Oral daily  chlorhexidine 2% Cloths 1 Application(s) Topical <User Schedule>  influenza  Vaccine (HIGH DOSE) 0.7 milliLiter(s) IntraMuscular once  levothyroxine 175 MICROGram(s) Oral daily  metoprolol tartrate 50 milliGRAM(s) Oral three times a day  oxybutynin 5 milliGRAM(s) Oral two times a day  predniSONE   Tablet 20 milliGRAM(s) Oral daily  rivaroxaban 15 milliGRAM(s) Oral with dinner    PRN Inpatient Medications  acetaminophen     Tablet .. 650 milliGRAM(s) Oral every 6 hours PRN  aluminum hydroxide/magnesium hydroxide/simethicone Suspension 30 milliLiter(s) Oral every 4 hours PRN  polyethylene glycol 3350 17 Gram(s) Oral daily PRN      REVIEW OF SYSTEMS  --------------------------------------------------------------------------------    Skin: No rashes  Respiratory: No dyspnea, cough, wheezing, hemoptysis  CV: No chest pain, PND, orthopnea  GI: No abdominal pain, diarrhea, constipation, nausea, vomiting, melena, hematochezia  MSK: No joint pain/swelling; no back pain; no edema  Neuro: No dizziness/lightheadedness, weakness, seizures,   Heme: No easy bruising or bleeding      All other systems were reviewed and are negative, except as noted.    VITALS/PHYSICAL EXAM  --------------------------------------------------------------------------------  T(C): 37 (09-30-23 @ 06:30), Max: 37 (09-30-23 @ 06:30)  HR: 124 (09-30-23 @ 06:30) (114 - 131)  BP: 121/88 (09-30-23 @ 06:30) (104/65 - 136/94)  RR: 18 (09-30-23 @ 06:30) (16 - 18)  SpO2: 99% (09-30-23 @ 06:30) (95% - 99%)  Wt(kg): --        09-29-23 @ 07:01  -  09-30-23 @ 07:00  --------------------------------------------------------  IN: 480 mL / OUT: 1125 mL / NET: -645 mL    09-30-23 @ 07:01  -  09-30-23 @ 12:18  --------------------------------------------------------  IN: 210 mL / OUT: 0 mL / NET: 210 mL      Physical Exam:  	Gen: no distress  	HEENT: PERRL, supple neck,  	Pulm: CTA B/L  	CV: S1S2; no rub  	Abd: +BS, soft, nontender/nondistended  	LE:  no edema  	Neuro: No focal deficits,   	    LABS/STUDIES  --------------------------------------------------------------------------------              11.1   12.18 >-----------<  229      [09-30-23 @ 06:44]              35.5     145  |  103  |  49  ----------------------------<  118      [09-30-23 @ 06:44]  3.5   |  27  |  2.0        Ca     8.2     [09-30-23 @ 06:44]      Mg     1.9     [09-30-23 @ 06:44]      Phos  3.9     [09-30-23 @ 06:44]    TPro  5.7  /  Alb  3.8  /  TBili  1.1  /  DBili  x   /  AST  11  /  ALT  27  /  AlkPhos  108  [09-30-23 @ 06:44]          Creatinine Trend:  SCr 2.0 [09-30 @ 06:44]  SCr 2.3 [09-29 @ 05:47]  SCr 1.7 [09-27 @ 21:26]  SCr 1.5 [09-27 @ 17:10]  SCr 1.6 [09-27 @ 06:20]    Urinalysis - [09-30-23 @ 06:44]      Color  / Appearance  / SG  / pH       Gluc 118 / Ketone   / Bili  / Urobili        Blood  / Protein  / Leuk Est  / Nitrite       RBC  / WBC  / Hyaline  / Gran  / Sq Epi  / Non Sq Epi  / Bacteria

## 2023-09-30 NOTE — PROGRESS NOTE ADULT - SUBJECTIVE AND OBJECTIVE BOX
*IM ATTENDING NOTE*      JEFFERY UGALDE  82y  Female      Patient is a 82y old  Female who presents with a chief complaint of CHF exacerbation (30 Sep 2023 12:18)      INTERVAL HPI/OVERNIGHT EVENTS:  Pt complains of pain in the left knee with difficulty ambulating. She has hx of gout and reports it feels like a gout attack. Otherwise she denied any difficulty breathing, chest pain or dizziness.       Vital Signs Last 24 Hrs  T(C): 37 (30 Sep 2023 06:30), Max: 37 (30 Sep 2023 06:30)  T(F): 98.6 (30 Sep 2023 06:30), Max: 98.6 (30 Sep 2023 06:30)  HR: 124 (30 Sep 2023 06:30) (114 - 131)  BP: 121/88 (30 Sep 2023 06:30) (111/60 - 136/94)  RR: 18 (30 Sep 2023 06:30) (16 - 18)  SpO2: 99% (30 Sep 2023 06:30) (95% - 99%)    Parameters below as of 30 Sep 2023 06:30  Patient On (Oxygen Delivery Method): room air      09-29-23 @ 07:01  -  09-30-23 @ 07:00  --------------------------------------------------------  IN: 480 mL / OUT: 1125 mL / NET: -645 mL    09-30-23 @ 07:01  -  09-30-23 @ 13:45  --------------------------------------------------------  IN: 210 mL / OUT: 0 mL / NET: 210 mL      PHYSICAL EXAM:  GEN: No acute distress  LUNGS: Clear to auscultation bilaterally   HEART: S1/S2 present. irregular HR  ABD/ GI: Soft, non-tender, non-distended. Bowel sounds present  EXT: swelling+ L knee  NEURO: AAOX3    LABS                          11.1   12.18 )-----------( 229      ( 30 Sep 2023 06:44 )             35.5     09-30    145  |  103  |  49<H>  ----------------------------<  118<H>  3.5   |  27  |  2.0<H>    Ca    8.2<L>      30 Sep 2023 06:44  Phos  3.9     09-30  Mg     1.9     09-30    TPro  5.7<L>  /  Alb  3.8  /  TBili  1.1  /  DBili  x   /  AST  11  /  ALT  27  /  AlkPhos  108  09-30

## 2023-10-01 LAB
ALBUMIN SERPL ELPH-MCNC: 3.4 G/DL — LOW (ref 3.5–5.2)
ALP SERPL-CCNC: 95 U/L — SIGNIFICANT CHANGE UP (ref 30–115)
ALT FLD-CCNC: 19 U/L — SIGNIFICANT CHANGE UP (ref 0–41)
ANION GAP SERPL CALC-SCNC: 7 MMOL/L — SIGNIFICANT CHANGE UP (ref 7–14)
APPEARANCE UR: ABNORMAL
AST SERPL-CCNC: 9 U/L — SIGNIFICANT CHANGE UP (ref 0–41)
BACTERIA # UR AUTO: ABNORMAL /HPF
BASOPHILS # BLD AUTO: 0.02 K/UL — SIGNIFICANT CHANGE UP (ref 0–0.2)
BASOPHILS NFR BLD AUTO: 0.2 % — SIGNIFICANT CHANGE UP (ref 0–1)
BILIRUB SERPL-MCNC: 0.8 MG/DL — SIGNIFICANT CHANGE UP (ref 0.2–1.2)
BILIRUB UR-MCNC: NEGATIVE — SIGNIFICANT CHANGE UP
BUN SERPL-MCNC: 52 MG/DL — HIGH (ref 10–20)
CALCIUM SERPL-MCNC: 8.5 MG/DL — SIGNIFICANT CHANGE UP (ref 8.4–10.5)
CHLORIDE SERPL-SCNC: 103 MMOL/L — SIGNIFICANT CHANGE UP (ref 98–110)
CHLORIDE UR-SCNC: <20 — SIGNIFICANT CHANGE UP
CO2 SERPL-SCNC: 28 MMOL/L — SIGNIFICANT CHANGE UP (ref 17–32)
COLOR SPEC: YELLOW — SIGNIFICANT CHANGE UP
CREAT ?TM UR-MCNC: 127 MG/DL — SIGNIFICANT CHANGE UP
CREAT SERPL-MCNC: 1.7 MG/DL — HIGH (ref 0.7–1.5)
DIFF PNL FLD: NEGATIVE — SIGNIFICANT CHANGE UP
EGFR: 30 ML/MIN/1.73M2 — LOW
EOSINOPHIL # BLD AUTO: 0.02 K/UL — SIGNIFICANT CHANGE UP (ref 0–0.7)
EOSINOPHIL NFR BLD AUTO: 0.2 % — SIGNIFICANT CHANGE UP (ref 0–8)
EPI CELLS # UR: PRESENT
GLUCOSE SERPL-MCNC: 117 MG/DL — HIGH (ref 70–99)
GLUCOSE UR QL: NEGATIVE MG/DL — SIGNIFICANT CHANGE UP
HCT VFR BLD CALC: 32 % — LOW (ref 37–47)
HGB BLD-MCNC: 10.1 G/DL — LOW (ref 12–16)
IMM GRANULOCYTES NFR BLD AUTO: 0.7 % — HIGH (ref 0.1–0.3)
KETONES UR-MCNC: NEGATIVE MG/DL — SIGNIFICANT CHANGE UP
LEUKOCYTE ESTERASE UR-ACNC: ABNORMAL
LYMPHOCYTES # BLD AUTO: 1.21 K/UL — SIGNIFICANT CHANGE UP (ref 1.2–3.4)
LYMPHOCYTES # BLD AUTO: 11.3 % — LOW (ref 20.5–51.1)
MAGNESIUM SERPL-MCNC: 2.1 MG/DL — SIGNIFICANT CHANGE UP (ref 1.8–2.4)
MCHC RBC-ENTMCNC: 28.1 PG — SIGNIFICANT CHANGE UP (ref 27–31)
MCHC RBC-ENTMCNC: 31.6 G/DL — LOW (ref 32–37)
MCV RBC AUTO: 88.9 FL — SIGNIFICANT CHANGE UP (ref 81–99)
MONOCYTES # BLD AUTO: 0.88 K/UL — HIGH (ref 0.1–0.6)
MONOCYTES NFR BLD AUTO: 8.2 % — SIGNIFICANT CHANGE UP (ref 1.7–9.3)
NEUTROPHILS # BLD AUTO: 8.51 K/UL — HIGH (ref 1.4–6.5)
NEUTROPHILS NFR BLD AUTO: 79.4 % — HIGH (ref 42.2–75.2)
NITRITE UR-MCNC: NEGATIVE — SIGNIFICANT CHANGE UP
NRBC # BLD: 0 /100 WBCS — SIGNIFICANT CHANGE UP (ref 0–0)
OSMOLALITY UR: 620 MOS/KG — SIGNIFICANT CHANGE UP (ref 50–1200)
PH UR: 5.5 — SIGNIFICANT CHANGE UP (ref 5–8)
PLATELET # BLD AUTO: 237 K/UL — SIGNIFICANT CHANGE UP (ref 130–400)
PMV BLD: 12.2 FL — HIGH (ref 7.4–10.4)
POTASSIUM SERPL-MCNC: 3.3 MMOL/L — LOW (ref 3.5–5)
POTASSIUM SERPL-SCNC: 3.3 MMOL/L — LOW (ref 3.5–5)
POTASSIUM UR-SCNC: 72 MMOL/L — SIGNIFICANT CHANGE UP
PROT ?TM UR-MCNC: 16 MG/DLG/24H — SIGNIFICANT CHANGE UP
PROT SERPL-MCNC: 5.3 G/DL — LOW (ref 6–8)
PROT UR-MCNC: SIGNIFICANT CHANGE UP MG/DL
PROT/CREAT UR-RTO: 0.1 RATIO — SIGNIFICANT CHANGE UP (ref 0–0.2)
RBC # BLD: 3.6 M/UL — LOW (ref 4.2–5.4)
RBC # FLD: 17.9 % — HIGH (ref 11.5–14.5)
RBC CASTS # UR COMP ASSIST: 1 /HPF — SIGNIFICANT CHANGE UP (ref 0–4)
SODIUM SERPL-SCNC: 138 MMOL/L — SIGNIFICANT CHANGE UP (ref 135–146)
SODIUM UR-SCNC: <20 MMOL/L — SIGNIFICANT CHANGE UP
SP GR SPEC: 1.02 — SIGNIFICANT CHANGE UP (ref 1–1.03)
UROBILINOGEN FLD QL: 1 MG/DL — SIGNIFICANT CHANGE UP (ref 0.2–1)
UUN UR-MCNC: 1167 MG/DL — SIGNIFICANT CHANGE UP
WBC # BLD: 10.71 K/UL — SIGNIFICANT CHANGE UP (ref 4.8–10.8)
WBC # FLD AUTO: 10.71 K/UL — SIGNIFICANT CHANGE UP (ref 4.8–10.8)
WBC UR QL: 3 /HPF — SIGNIFICANT CHANGE UP (ref 0–5)

## 2023-10-01 PROCEDURE — 99232 SBSQ HOSP IP/OBS MODERATE 35: CPT

## 2023-10-01 RX ORDER — POTASSIUM CHLORIDE 20 MEQ
40 PACKET (EA) ORAL
Refills: 0 | Status: COMPLETED | OUTPATIENT
Start: 2023-10-01 | End: 2023-10-01

## 2023-10-01 RX ADMIN — AMLODIPINE BESYLATE 5 MILLIGRAM(S): 2.5 TABLET ORAL at 05:30

## 2023-10-01 RX ADMIN — Medication 40 MILLIEQUIVALENT(S): at 14:53

## 2023-10-01 RX ADMIN — Medication 100 MILLIGRAM(S): at 13:05

## 2023-10-01 RX ADMIN — Medication 20 MILLIGRAM(S): at 05:30

## 2023-10-01 RX ADMIN — AMIODARONE HYDROCHLORIDE 200 MILLIGRAM(S): 400 TABLET ORAL at 05:29

## 2023-10-01 RX ADMIN — Medication 5 MILLIGRAM(S): at 17:36

## 2023-10-01 RX ADMIN — POLYETHYLENE GLYCOL 3350 17 GRAM(S): 17 POWDER, FOR SOLUTION ORAL at 21:39

## 2023-10-01 RX ADMIN — Medication 40 MILLIEQUIVALENT(S): at 12:48

## 2023-10-01 RX ADMIN — CHLORHEXIDINE GLUCONATE 1 APPLICATION(S): 213 SOLUTION TOPICAL at 05:30

## 2023-10-01 RX ADMIN — Medication 50 MILLIGRAM(S): at 13:05

## 2023-10-01 RX ADMIN — Medication 175 MICROGRAM(S): at 05:29

## 2023-10-01 RX ADMIN — Medication 50 MILLIGRAM(S): at 05:30

## 2023-10-01 RX ADMIN — Medication 50 MILLIGRAM(S): at 21:28

## 2023-10-01 RX ADMIN — RIVAROXABAN 15 MILLIGRAM(S): KIT at 17:36

## 2023-10-01 RX ADMIN — Medication 5 MILLIGRAM(S): at 05:30

## 2023-10-01 NOTE — PROGRESS NOTE ADULT - ASSESSMENT
1, KOBI on CKD, likely due to diuresis and IV contrast (9/27)     improving Cr off IV lasix  f/u daily BMP, can change to PO lasix 20 mg daily as outpatient  low Na diet  fluid restriction    2, CHF/FELIX on CPAP    3, HTN and PAF on Xarelto

## 2023-10-01 NOTE — PROGRESS NOTE ADULT - ASSESSMENT
81-year-old with PMHx of Paroxysmal AFib on Xarelto (s/p cardioversion 9/13/22), HFpEF, CKD 3b, chronic microcytic anemia, FELIX (on CPAP) HTN, DLD, hypothyroidism   (history of thyroid cancer s/p resection), hx hip fracture, presented to the ED complaining of chest pain and shortness of breath for the past week. Also c/o orthopnea.     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             PLAN:    Multiple episodes of wide complex tachycardia. D/W the cardiology. Likely A-Fib with aberrant conduction   CTA chest >> No acute PE. B/L pleural effusions. Interstitial edema.   Echo with new drop in EF HF mrEF 45%, moderate dec in RV function and wall akinesia of entire septal wall, re call cardiology to evaluate need for inpt/outptt cath, at risk of worsening CKD   KOIB: suspected AIRAM, s/p Lasix  Check U/A and urine lytes.   Renal/bladder US w no obstruction   Adjust Xarelto dose for KOBI  Check i's and o's and daily wt  Low salt diet and water restriction to 1.5 L/D  Monitor renal function and electrolytes.   Replete Mg and K. Maintain K >4 and Mg >2.2  Cont Amiodarone and Metoprolol  hold norvasc given soft BP and EF   Transaminitis likely due to congestive hepatopathy    Progress Note Handoff    cardio re eval given new echo findings, trend Cr  81-year-old with PMHx of Paroxysmal AFib on Xarelto (s/p cardioversion 9/13/22), HFpEF, CKD 3b, chronic microcytic anemia, FELIX (on CPAP) HTN, DLD, hypothyroidism   (history of thyroid cancer s/p resection), hx hip fracture, presented to the ED complaining of chest pain and shortness of breath for the past week. Also c/o orthopnea.     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             PLAN:    Multiple episodes of wide complex tachycardia. D/W the cardiology. Likely A-Fib with aberrant conduction   CTA chest >> No acute PE. B/L pleural effusions. Interstitial edema.   Echo with new drop in EF HFmrEF 45%, moderate dec in RV function and wall akinesia of entire septal wall, re call cardiology to evaluate need for inpt/outptt cath, at risk of worsening CKD   KOBI: suspected AIRAM, s/p Lasix  Check U/A and urine lytes.   Renal/bladder US w no obstruction   Adjust Xarelto dose for KOBI  Check i's and o's and daily wt  Low salt diet and water restriction to 1.5 L/D  Monitor renal function and electrolytes.   Replete Mg and K. Maintain K >4 and Mg >2.2  Cont Amiodarone and Metoprolol  hold norvasc given soft BP and EF   Transaminitis likely due to congestive hepatopathy    Progress Note Handoff    cardio re eval given new echo findings, trend Cr

## 2023-10-01 NOTE — PROGRESS NOTE ADULT - PROVIDER SPECIALTY LIST ADULT
Discharge instructions explained to pt's  and then translated to pt in [facility] [place] Discharge instruction given to pt in [facility] [place] and [place]. Discharged via w/c. Nephrology

## 2023-10-01 NOTE — PROGRESS NOTE ADULT - SUBJECTIVE AND OBJECTIVE BOX
Patient is a 82y old  Female who presents with a chief complaint of CHF exacerbation (30 Sep 2023 13:45)      OVERNIGHT EVENTS:    SUBJECTIVE / INTERVAL HPI: Patient seen and examined at bedside.     VITAL SIGNS:  Vital Signs Last 24 Hrs  T(C): 36.3 (01 Oct 2023 04:18), Max: 37.3 (30 Sep 2023 20:47)  T(F): 97.3 (01 Oct 2023 04:18), Max: 99.1 (30 Sep 2023 20:47)  HR: 117 (01 Oct 2023 04:18) (117 - 127)  BP: 112/64 (01 Oct 2023 04:18) (96/72 - 112/64)  BP(mean): --  RR: 18 (01 Oct 2023 04:18) (18 - 18)  SpO2: --        PHYSICAL EXAM:    General: WDWN  HEENT: NC/AT; PERRL, clear conjunctiva  Neck: supple  Cardiovascular: +S1/S2; RRR  Respiratory: CTA b/l; no W/R/R  Gastrointestinal: soft, NT/ND; +BSx4  Extremities: WWP; 2+ peripheral pulses; no edema   Neurological: AAOx3; no focal deficits    MEDICATIONS:  MEDICATIONS  (STANDING):  allopurinol 100 milliGRAM(s) Oral daily  aMIOdarone    Tablet 200 milliGRAM(s) Oral daily  amLODIPine   Tablet 5 milliGRAM(s) Oral daily  chlorhexidine 2% Cloths 1 Application(s) Topical <User Schedule>  influenza  Vaccine (HIGH DOSE) 0.7 milliLiter(s) IntraMuscular once  levothyroxine 175 MICROGram(s) Oral daily  metoprolol tartrate 50 milliGRAM(s) Oral three times a day  oxybutynin 5 milliGRAM(s) Oral two times a day  rivaroxaban 15 milliGRAM(s) Oral with dinner    MEDICATIONS  (PRN):  acetaminophen     Tablet .. 650 milliGRAM(s) Oral every 6 hours PRN Temp greater or equal to 38C (100.4F), Mild Pain (1 - 3), Moderate Pain (4 - 6)  aluminum hydroxide/magnesium hydroxide/simethicone Suspension 30 milliLiter(s) Oral every 4 hours PRN Dyspepsia  polyethylene glycol 3350 17 Gram(s) Oral daily PRN Constipation      ALLERGIES:  Allergies    No Known Allergies    Intolerances        LABS:                        11.1   12.18 )-----------( 229      ( 30 Sep 2023 06:44 )             35.5     09-30    145  |  103  |  49<H>  ----------------------------<  118<H>  3.5   |  27  |  2.0<H>    Ca    8.2<L>      30 Sep 2023 06:44  Phos  3.9     09-30  Mg     1.9     09-30    TPro  5.7<L>  /  Alb  3.8  /  TBili  1.1  /  DBili  x   /  AST  11  /  ALT  27  /  AlkPhos  108  09-30      Urinalysis Basic - ( 30 Sep 2023 06:44 )    Color: x / Appearance: x / SG: x / pH: x  Gluc: 118 mg/dL / Ketone: x  / Bili: x / Urobili: x   Blood: x / Protein: x / Nitrite: x   Leuk Esterase: x / RBC: x / WBC x   Sq Epi: x / Non Sq Epi: x / Bacteria: x      CAPILLARY BLOOD GLUCOSE      < from: TTE Echo Complete w/ Contrast w/ Doppler (09.29.23 @ 10:45) >   Left ventricularejection   fraction, by visual estimation, is 45 to 50%. Abnormal (paradoxical)   septal motion, consistent with right ventricular volume overload and/or   elevated RV end-diastolic pressure. The left ventricular diastolic   function could not be assessed in this study.      LV Wall Scoring:  The entire septum is hypokinetic. All remaining scored segments are   normal.    Right Ventricle: The right ventricular size is normal. RV systolic   function is moderately reduced.    < end of copied text >      RADIOLOGY & ADDITIONAL TESTS: Reviewed.    ASSESSMENT:    PLAN:  Patient is a 82y old  Female who presents with a chief complaint of CHF exacerbation (30 Sep 2023 13:45)      OVERNIGHT EVENTS: remained stable, SOB resolved     SUBJECTIVE / INTERVAL HPI: Patient seen and examined at bedside.     VITAL SIGNS:  Vital Signs Last 24 Hrs  T(C): 36.3 (01 Oct 2023 04:18), Max: 37.3 (30 Sep 2023 20:47)  T(F): 97.3 (01 Oct 2023 04:18), Max: 99.1 (30 Sep 2023 20:47)  HR: 117 (01 Oct 2023 04:18) (117 - 127)  BP: 112/64 (01 Oct 2023 04:18) (96/72 - 112/64)  BP(mean): --  RR: 18 (01 Oct 2023 04:18) (18 - 18)  SpO2: --        PHYSICAL EXAM:    General: old lady chronically look ill   HEENT: NC/AT; PERRL, clear conjunctiva  Neck: supple  Cardiovascular: +S1/S2; RRR  Respiratory: CTA b/l; no W/R/R  Gastrointestinal: soft, NT/ND; +BSx4  Extremities: WWP; 2+ peripheral pulses; no edema   Neurological: AAOx3; no focal deficits    MEDICATIONS:  MEDICATIONS  (STANDING):  allopurinol 100 milliGRAM(s) Oral daily  aMIOdarone    Tablet 200 milliGRAM(s) Oral daily  amLODIPine   Tablet 5 milliGRAM(s) Oral daily  chlorhexidine 2% Cloths 1 Application(s) Topical <User Schedule>  influenza  Vaccine (HIGH DOSE) 0.7 milliLiter(s) IntraMuscular once  levothyroxine 175 MICROGram(s) Oral daily  metoprolol tartrate 50 milliGRAM(s) Oral three times a day  oxybutynin 5 milliGRAM(s) Oral two times a day  rivaroxaban 15 milliGRAM(s) Oral with dinner    MEDICATIONS  (PRN):  acetaminophen     Tablet .. 650 milliGRAM(s) Oral every 6 hours PRN Temp greater or equal to 38C (100.4F), Mild Pain (1 - 3), Moderate Pain (4 - 6)  aluminum hydroxide/magnesium hydroxide/simethicone Suspension 30 milliLiter(s) Oral every 4 hours PRN Dyspepsia  polyethylene glycol 3350 17 Gram(s) Oral daily PRN Constipation      ALLERGIES:  Allergies    No Known Allergies    Intolerances        LABS:                        11.1   12.18 )-----------( 229      ( 30 Sep 2023 06:44 )             35.5     09-30    145  |  103  |  49<H>  ----------------------------<  118<H>  3.5   |  27  |  2.0<H>    Ca    8.2<L>      30 Sep 2023 06:44  Phos  3.9     09-30  Mg     1.9     09-30    TPro  5.7<L>  /  Alb  3.8  /  TBili  1.1  /  DBili  x   /  AST  11  /  ALT  27  /  AlkPhos  108  09-30      Urinalysis Basic - ( 30 Sep 2023 06:44 )    Color: x / Appearance: x / SG: x / pH: x  Gluc: 118 mg/dL / Ketone: x  / Bili: x / Urobili: x   Blood: x / Protein: x / Nitrite: x   Leuk Esterase: x / RBC: x / WBC x   Sq Epi: x / Non Sq Epi: x / Bacteria: x      CAPILLARY BLOOD GLUCOSE      < from: TTE Echo Complete w/ Contrast w/ Doppler (09.29.23 @ 10:45) >   Left ventricularejection   fraction, by visual estimation, is 45 to 50%. Abnormal (paradoxical)   septal motion, consistent with right ventricular volume overload and/or   elevated RV end-diastolic pressure. The left ventricular diastolic   function could not be assessed in this study.      LV Wall Scoring:  The entire septum is hypokinetic. All remaining scored segments are   normal.    Right Ventricle: The right ventricular size is normal. RV systolic   function is moderately reduced.    < end of copied text >      RADIOLOGY & ADDITIONAL TESTS: Reviewed.    ASSESSMENT:    PLAN:

## 2023-10-01 NOTE — PROGRESS NOTE ADULT - SUBJECTIVE AND OBJECTIVE BOX
[facility] FOLLOW UP NOTE  --------------------------------------------------------------------------------  Chief Complaint/24 hour events/subjective:    pt seen and examined, no SOB, feel better    PAST HISTORY  --------------------------------------------------------------------------------  No significant changes to PMH, PSH, FHx, SHx, unless otherwise noted    ALLERGIES & MEDICATIONS  --------------------------------------------------------------------------------  Allergies    No Known Allergies    Intolerances      Standing Inpatient Medications  allopurinol 100 milliGRAM(s) Oral daily  aMIOdarone    Tablet 200 milliGRAM(s) Oral daily  chlorhexidine 2% Cloths 1 Application(s) Topical <User Schedule>  influenza  Vaccine (HIGH DOSE) 0.7 milliLiter(s) IntraMuscular once  levothyroxine 175 MICROGram(s) Oral daily  metoprolol tartrate 50 milliGRAM(s) Oral three times a day  oxybutynin 5 milliGRAM(s) Oral two times a day  potassium chloride   Powder 40 milliEquivalent(s) Oral every 2 hours  rivaroxaban 15 milliGRAM(s) Oral with dinner    PRN Inpatient Medications  acetaminophen     Tablet .. 650 milliGRAM(s) Oral every 6 hours PRN  aluminum hydroxide/magnesium hydroxide/simethicone Suspension 30 milliLiter(s) Oral every 4 hours PRN  polyethylene glycol 3350 17 Gram(s) Oral daily PRN      REVIEW OF SYSTEMS  --------------------------------------------------------------------------------    All other systems were reviewed and are negative, except as noted.    VITALS/PHYSICAL EXAM  --------------------------------------------------------------------------------  T(C): 36.3 (10-01-23 @ 04:18), Max: 37.3 (09-30-23 @ 20:47)  HR: 117 (10-01-23 @ 04:18) (117 - 127)  BP: 112/64 (10-01-23 @ 04:18) (96/72 - 112/64)  RR: 18 (10-01-23 @ 04:18) (18 - 18)  SpO2: --  Wt(kg): --        09-30-23 @ 07:01  -  10-01-23 @ 07:00  --------------------------------------------------------  IN: 450 mL / OUT: 875 mL / NET: -425 mL    10-01-23 @ 07:01  -  10-01-23 @ 12:25  --------------------------------------------------------  IN: 210 mL / OUT: 0 mL / NET: 210 mL      Physical Exam:    	Gen: no distress   	Pulm: CTA B/L  	CV: S1S2; no rub  	Abd: +BS, soft, nontender/nondistended  	LE:  no  edema      LABS/STUDIES  --------------------------------------------------------------------------------              10.1   10.71 >-----------<  237      [10-01-23 @ 06:53]              32.0     138  |  103  |  52  ----------------------------<  117      [10-01-23 @ 06:53]  3.3   |  28  |  1.7        Ca     8.5     [10-01-23 @ 06:53]      Mg     2.1     [10-01-23 @ 06:53]      Phos  3.9     [09-30-23 @ 06:44]    TPro  5.3  /  Alb  3.4  /  TBili  0.8  /  DBili  x   /  AST  9   /  ALT  19  /  AlkPhos  95  [10-01-23 @ 06:53]          Creatinine Trend:  SCr 1.7 [10-01 @ 06:53]  SCr 2.0 [09-30 @ 06:44]  SCr 2.3 [09-29 @ 05:47]  SCr 1.7 [09-27 @ 21:26]  SCr 1.5 [09-27 @ 17:10]    Urinalysis - [10-01-23 @ 08:38]      Color Yellow / Appearance Cloudy / SG 1.021 / pH 5.5      Gluc Negative / Ketone Negative  / Bili Negative / Urobili 1.0       Blood Negative / Protein Trace / Leuk Est Trace / Nitrite Negative      RBC 1 / WBC 3 / Hyaline  / Gran  / Sq Epi  / Non Sq Epi  / Bacteria Occasional    Urine Sodium <20.0      [10-01-23 @ 08:38]  Urine Potassium 72      [10-01-23 @ 08:38]  Urine Chloride <20      [10-01-23 @ 08:38]  Urine Osmolality 620      [10-01-23 @ 08:38]

## 2023-10-01 NOTE — PROGRESS NOTE ADULT - SUBJECTIVE AND OBJECTIVE BOX
24H events:    Patient is a 82y old Female who presents with a chief complaint of CHF exacerbation (01 Oct 2023 07:49)    Primary diagnosis of Acute CHF      Today is hospital day 4d. This morning patient was seen and examined at bedside, resting comfortably in bed.    No acute or major events overnight.    PAST MEDICAL & SURGICAL HISTORY  HTN (hypertension)    High cholesterol    Hypothyroid  s/p thyroid ca surgery    Afib  on xarelto    Sleep apnea    Osteoarthritis    CKD (chronic kidney disease) stage 3, GFR 30-59 ml/min    H/O thyroidectomy    S/P rotator cuff surgery      SOCIAL HISTORY:  Social History:      ALLERGIES:  No Known Allergies    MEDICATIONS:  STANDING MEDICATIONS  allopurinol 100 milliGRAM(s) Oral daily  aMIOdarone    Tablet 200 milliGRAM(s) Oral daily  chlorhexidine 2% Cloths 1 Application(s) Topical <User Schedule>  influenza  Vaccine (HIGH DOSE) 0.7 milliLiter(s) IntraMuscular once  levothyroxine 175 MICROGram(s) Oral daily  metoprolol tartrate 50 milliGRAM(s) Oral three times a day  oxybutynin 5 milliGRAM(s) Oral two times a day  rivaroxaban 15 milliGRAM(s) Oral with dinner    PRN MEDICATIONS  acetaminophen     Tablet .. 650 milliGRAM(s) Oral every 6 hours PRN  aluminum hydroxide/magnesium hydroxide/simethicone Suspension 30 milliLiter(s) Oral every 4 hours PRN  polyethylene glycol 3350 17 Gram(s) Oral daily PRN    VITALS:   T(F): 97.3  HR: 117  BP: 112/64  RR: 18  SpO2: --    PHYSICAL EXAM:  GENERAL: NAD    HEART: S1, S2 with no murmurs heard on asucultation    LUNGS: bilaterally clear to auscultation with no added sounds    ABDOMEN: soft, lax, NTND    EXTREMITIES: peripheral pulses palpable, no pitting edema    NERVOUS SYSTEM:  AOx3, non-focal    SKIN: no rashes or lesions noted      LABS:                        11.1   12.18 )-----------( 229      ( 30 Sep 2023 06:44 )             35.5     09-30    145  |  103  |  49<H>  ----------------------------<  118<H>  3.5   |  27  |  2.0<H>    Ca    8.2<L>      30 Sep 2023 06:44  Phos  3.9     09-30  Mg     1.9     09-30    TPro  5.7<L>  /  Alb  3.8  /  TBili  1.1  /  DBili  x   /  AST  11  /  ALT  27  /  AlkPhos  108  09-30      Urinalysis Basic - ( 30 Sep 2023 06:44 )    Color: x / Appearance: x / SG: x / pH: x  Gluc: 118 mg/dL / Ketone: x  / Bili: x / Urobili: x   Blood: x / Protein: x / Nitrite: x   Leuk Esterase: x / RBC: x / WBC x   Sq Epi: x / Non Sq Epi: x / Bacteria: x

## 2023-10-02 LAB
ANION GAP SERPL CALC-SCNC: 11 MMOL/L — SIGNIFICANT CHANGE UP (ref 7–14)
ANION GAP SERPL CALC-SCNC: 13 MMOL/L — SIGNIFICANT CHANGE UP (ref 7–14)
BUN SERPL-MCNC: 64 MG/DL — CRITICAL HIGH (ref 10–20)
BUN SERPL-MCNC: 64 MG/DL — CRITICAL HIGH (ref 10–20)
CALCIUM SERPL-MCNC: 8.5 MG/DL — SIGNIFICANT CHANGE UP (ref 8.4–10.5)
CALCIUM SERPL-MCNC: 9.1 MG/DL — SIGNIFICANT CHANGE UP (ref 8.4–10.4)
CHLORIDE SERPL-SCNC: 102 MMOL/L — SIGNIFICANT CHANGE UP (ref 98–110)
CHLORIDE SERPL-SCNC: 102 MMOL/L — SIGNIFICANT CHANGE UP (ref 98–110)
CO2 SERPL-SCNC: 28 MMOL/L — SIGNIFICANT CHANGE UP (ref 17–32)
CO2 SERPL-SCNC: 28 MMOL/L — SIGNIFICANT CHANGE UP (ref 17–32)
CREAT SERPL-MCNC: 1.8 MG/DL — HIGH (ref 0.7–1.5)
CREAT SERPL-MCNC: 2 MG/DL — HIGH (ref 0.7–1.5)
EGFR: 25 ML/MIN/1.73M2 — LOW
EGFR: 28 ML/MIN/1.73M2 — LOW
GLUCOSE SERPL-MCNC: 110 MG/DL — HIGH (ref 70–99)
GLUCOSE SERPL-MCNC: 133 MG/DL — HIGH (ref 70–99)
HCT VFR BLD CALC: 38.2 % — SIGNIFICANT CHANGE UP (ref 37–47)
HGB BLD-MCNC: 11.9 G/DL — LOW (ref 12–16)
MAGNESIUM SERPL-MCNC: 2.2 MG/DL — SIGNIFICANT CHANGE UP (ref 1.8–2.4)
MCHC RBC-ENTMCNC: 28.1 PG — SIGNIFICANT CHANGE UP (ref 27–31)
MCHC RBC-ENTMCNC: 31.2 G/DL — LOW (ref 32–37)
MCV RBC AUTO: 90.3 FL — SIGNIFICANT CHANGE UP (ref 81–99)
NRBC # BLD: 0 /100 WBCS — SIGNIFICANT CHANGE UP (ref 0–0)
PLATELET # BLD AUTO: 339 K/UL — SIGNIFICANT CHANGE UP (ref 130–400)
PMV BLD: 12 FL — HIGH (ref 7.4–10.4)
POTASSIUM SERPL-MCNC: 4.3 MMOL/L — SIGNIFICANT CHANGE UP (ref 3.5–5)
POTASSIUM SERPL-MCNC: 4.7 MMOL/L — SIGNIFICANT CHANGE UP (ref 3.5–5)
POTASSIUM SERPL-SCNC: 4.3 MMOL/L — SIGNIFICANT CHANGE UP (ref 3.5–5)
POTASSIUM SERPL-SCNC: 4.7 MMOL/L — SIGNIFICANT CHANGE UP (ref 3.5–5)
RBC # BLD: 4.23 M/UL — SIGNIFICANT CHANGE UP (ref 4.2–5.4)
RBC # FLD: 17.5 % — HIGH (ref 11.5–14.5)
SODIUM SERPL-SCNC: 141 MMOL/L — SIGNIFICANT CHANGE UP (ref 135–146)
SODIUM SERPL-SCNC: 143 MMOL/L — SIGNIFICANT CHANGE UP (ref 135–146)
WBC # BLD: 9.49 K/UL — SIGNIFICANT CHANGE UP (ref 4.8–10.8)
WBC # FLD AUTO: 9.49 K/UL — SIGNIFICANT CHANGE UP (ref 4.8–10.8)

## 2023-10-02 PROCEDURE — 71045 X-RAY EXAM CHEST 1 VIEW: CPT | Mod: 26

## 2023-10-02 PROCEDURE — 99233 SBSQ HOSP IP/OBS HIGH 50: CPT

## 2023-10-02 RX ORDER — LACTULOSE 10 G/15ML
15 SOLUTION ORAL THREE TIMES A DAY
Refills: 0 | Status: DISCONTINUED | OUTPATIENT
Start: 2023-10-02 | End: 2023-10-03

## 2023-10-02 RX ADMIN — Medication 5 MILLIGRAM(S): at 05:48

## 2023-10-02 RX ADMIN — Medication 100 MILLIGRAM(S): at 11:27

## 2023-10-02 RX ADMIN — RIVAROXABAN 15 MILLIGRAM(S): KIT at 17:26

## 2023-10-02 RX ADMIN — Medication 5 MILLIGRAM(S): at 17:26

## 2023-10-02 RX ADMIN — LACTULOSE 15 GRAM(S): 10 SOLUTION ORAL at 11:25

## 2023-10-02 RX ADMIN — Medication 50 MILLIGRAM(S): at 14:15

## 2023-10-02 RX ADMIN — Medication 50 MILLIGRAM(S): at 05:48

## 2023-10-02 RX ADMIN — Medication 50 MILLIGRAM(S): at 21:15

## 2023-10-02 RX ADMIN — LACTULOSE 15 GRAM(S): 10 SOLUTION ORAL at 14:13

## 2023-10-02 RX ADMIN — CHLORHEXIDINE GLUCONATE 1 APPLICATION(S): 213 SOLUTION TOPICAL at 05:48

## 2023-10-02 RX ADMIN — AMIODARONE HYDROCHLORIDE 200 MILLIGRAM(S): 400 TABLET ORAL at 05:48

## 2023-10-02 RX ADMIN — Medication 175 MICROGRAM(S): at 05:48

## 2023-10-02 NOTE — PHYSICAL THERAPY INITIAL EVALUATION ADULT - NSACTIVITYREC_GEN_A_PT
Patient educated on knee extensions in sitting (x30) and was encouraged to do daily to facilitate strength for stair climbing.

## 2023-10-02 NOTE — PROGRESS NOTE ADULT - ASSESSMENT
81-year-old with PMHx of Paroxysmal AFib on Xarelto (s/p cardioversion 9/13/22), HFpEF, CKD 3b, chronic microcytic anemia, FELIX (on CPAP) HTN, DLD, hypothyroidism (history of thyroid cancer s/p resection), hx hip fracture, presented to the ED complaining of chest pain and shortness of breath for the past week. Also c/o orthopnea.     Acute HFpEF  FELIX on CPAP  HTN / DL  KOBI on CKD-3  PAF on Xarelto  Obesity  Transaminitis.             PLAN:    ·	Tele reviewed. Multiple episodes of wide complex tachycardia. D/W the cardiology. Likely A-Fib with aberrant conduction   ·	CTA chest reviewed. No acute PE. B/L pleural effusions. Interstitial edema.   ·	BUN/Cr noted. Cr is now trending down. Check BMP  ·	Cont to hold Lasix for now. Pt is euvolemic.   ·	CXR from today is unremarkable  ·	Renal/bladder US is negative for hydronephrosis  ·	Pt received IV contrast on 9/27. Could be AIRAM.   ·	Cont Xarelto 15 mg po daily. Dose adjusted for renal function.   ·	Check i's and o's and daily wt  ·	Low salt diet and water restriction to 1.5 L/D  ·	Monitor renal function and electrolytes.   ·	Maintain K >4 and Mg >2.2  ·	Cont Amiodarone and Metoprolol  ·	Check TSH level.   ·	Transaminitis likely due to congestive hepatopathy    Progress Note Handoff    Pending (specify):  Consults________, Tests________, Test Results_______, Other_Cath when renal function improves________  Family discussion:  Disposition: Home___/SNF___/Other________/Unknown at this time________    Varun Fisher MD  Spectra: [id number]

## 2023-10-02 NOTE — PROGRESS NOTE ADULT - SUBJECTIVE AND OBJECTIVE BOX
SUBJECTIVE / OVERNIGHT EVENTS  Patient was seen and examined. She reported no bowel movements for the past few days. No SOB or chest pain reported. On room air. No overnight  events    MEDICATIONS  allopurinol 100 milliGRAM(s) Oral daily  aMIOdarone    Tablet 200 milliGRAM(s) Oral daily  chlorhexidine 2% Cloths 1 Application(s) Topical <User Schedule>  influenza  Vaccine (HIGH DOSE) 0.7 milliLiter(s) IntraMuscular once  lactulose Syrup 15 Gram(s) Oral three times a day  levothyroxine 175 MICROGram(s) Oral daily  metoprolol tartrate 50 milliGRAM(s) Oral three times a day  oxybutynin 5 milliGRAM(s) Oral two times a day  rivaroxaban 15 milliGRAM(s) Oral with dinner    acetaminophen     Tablet .. 650 milliGRAM(s) Oral every 6 hours PRN Temp greater or equal to 38C (100.4F), Mild Pain (1 - 3), Moderate Pain (4 - 6)  aluminum hydroxide/magnesium hydroxide/simethicone Suspension 30 milliLiter(s) Oral every 4 hours PRN Dyspepsia  polyethylene glycol 3350 17 Gram(s) Oral daily PRN Constipation    VITALS /  EXAM    T(C): 35.9 (10-02-23 @ 05:00), Max: 36.7 (10-01-23 @ 20:00)  HR: 101 (10-02-23 @ 05:00) (101 - 121)  BP: 104/55 (10-02-23 @ 05:00) (95/55 - 109/74)  RR: 18 (10-02-23 @ 05:00) (17 - 18)  SpO2: 96% (10-02-23 @ 05:00) (95% - 96%)    GENERAL: NAD, well-developed  CHEST/LUNG: Clear to auscultation bilaterally; No wheezes, rales or rhonchi  HEART: Regular rate and rhythm; No murmurs, rubs, or gallops  ABDOMEN: Soft, Nontender, Nondistended; Bowel sounds present, no masses.  EXTREMITIES:  2+ Peripheral Pulses, No clubbing, cyanosis, or edema    I's & O's     10-01-23 @ 07:01  -  10-02-23 @ 07:00  --------------------------------------------------------  IN:    Oral Fluid: 450 mL  Total IN: 450 mL    OUT:    Voided (mL): 550 mL  Total OUT: 550 mL    Total NET: -100 mL      10-02-23 @ 07:01  -  10-02-23 @ 10:31  --------------------------------------------------------  IN:    Oral Fluid: 210 mL  Total IN: 210 mL    OUT:  Total OUT: 0 mL    Total NET: 210 mL        LABS             10.1   10.71 )-----------( 237      ( 10-01-23 @ 06:53 )             32.0     138  |  103  |  52  -------------------------<  117   10-01-23 @ 06:53  3.3  |  28  |  1.7    Ca      8.5     10-01-23 @ 06:53  Mg     2.1     10-01-23 @ 06:53    TPro  5.3  /  Alb  3.4  /  TBili  0.8  /  DBili  x   /  AST  9   /  ALT  19  /  AlkPhos  95  /  GGT  x     10-01-23 @ 06:53                    Urinalysis Basic - ( 01 Oct 2023 08:38 )    Color: Yellow / Appearance: Cloudy / S.021 / pH: x  Gluc: x / Ketone: Negative mg/dL  / Bili: Negative / Urobili: 1.0 mg/dL   Blood: x / Protein: Trace mg/dL / Nitrite: Negative   Leuk Esterase: Trace / RBC: 1 /HPF / WBC 3 /HPF   Sq Epi: x / Non Sq Epi: x / Bacteria: Occasional /HPF      MICRO / IMAGING / CARDIOLOGY  Telemetry: Reviewed   EKG: Reviewed    CULTURES    IMAGING  PACS Image:  (23 @ 12:55)    CARDIOLOGY

## 2023-10-02 NOTE — PROGRESS NOTE ADULT - ASSESSMENT
81-year-old with PMHx of Paroxysmal AFib on Xarelto (s/p cardioversion 9/13/22), HFpEF, CKD 3b, chronic microcytic anemia, FELIX (on CPAP) HTN, DLD, hypothyroidism (history of thyroid cancer s/p resection), hx hip fracture, presented to the ED complaining of chest pain and shortness of breath for the past week.     #Acute HFpEF  #FELIX on CPAP  # Possible CAD  -  CTA: bilateral pleural effusion. cardiomegaly. Interstitial edema   - Patient was started on lasix but later d/c due to KOBI  -Check i's and o's and daily wt  -Low salt diet and water restriction to 1.5 L/D  - Repeat Chest X ray today  - Monitor Creatinine. If normalizes can go ahead with coronary angiography    HTN / DL  #KOBI on CKD-3  - Lasix were held  - Can be due to contrast administration on 09/27/2023  - Montior today's creatinine  #Gout   - On allopurinol     #PAF   -on Xarelto for AC  - c/w with metoprolol 150 mg for rate controle  - Wide complex tachycardia on monitor are probably A fib with aberration     #Transaminitis likely due to congestion     #Hypothryoirdism  - c/w with levothyroxine 175 mcg      #DVT: on Xarelto   81-year-old with PMHx of Paroxysmal AFib on Xarelto (s/p cardioversion 9/13/22), HFpEF, CKD 3b, chronic microcytic anemia, FELIX (on CPAP) HTN, DLD, hypothyroidism (history of thyroid cancer s/p resection), hx hip fracture, presented to the ED complaining of chest pain and shortness of breath for the past week.     #Acute HFpEF  #FELIX on CPAP  # Possible CAD  -  CTA: bilateral pleural effusion. cardiomegaly. Interstitial edema   - Patient was started on lasix but later d/c due to KOBI  -Check i's and o's and daily wt  -Low salt diet and water restriction to 1.5 L/D  - Repeat Chest X ray today  - Monitor Creatinine. If normalizes can go ahead with coronary angiography    #KOBI on CKD-3  - Lasix were held  - Can be due to contrast administration on 09/27/2023  - Montior today's creatinine    #Gout   - On allopurinol     #PAF   -on Xarelto for AC  - c/w with metoprolol 150 mg for rate controle  - Wide complex tachycardia on monitor are probably A fib with aberration     #Transaminitis likely due to congestion     Hypothyroidism  - c/w with levothyroxine 175 mcg    # Constipation  - Add lactulose and enema  - Patient already on Miralax       #DVT: on Xarelto

## 2023-10-02 NOTE — PROGRESS NOTE ADULT - SUBJECTIVE AND OBJECTIVE BOX
NEPHROLOGY FOLLOW UP NOTE    pt seen and examined    PAST MEDICAL & SURGICAL HISTORY:  HTN (hypertension)      High cholesterol      Hypothyroid  s/p thyroid ca surgery      Afib  on xarelto      Sleep apnea      Osteoarthritis      CKD (chronic kidney disease) stage 3, GFR 30-59 ml/min      H/O thyroidectomy      S/P rotator cuff surgery        Allergies:  No Known Allergies    Home Medications Reviewed    SOCIAL HISTORY:  Denies ETOH,Smoking,   FAMILY HISTORY:  Family history of lung cancer (Mother)    Family history of abdominal aortic aneurysm (AAA) (Father)          REVIEW OF SYSTEMS:  CONSTITUTIONAL: No weakness, fevers or chills  EYES/ENT: No visual changes;  No vertigo or throat pain   NECK: No pain or stiffness  RESPIRATORY: No cough, wheezing, hemoptysis; No shortness of breath  CARDIOVASCULAR: No chest pain or palpitations.  GASTROINTESTINAL: No abdominal or epigastric pain. No nausea, vomiting, or hematemesis; No diarrhea or constipation. No melena or hematochezia.  GENITOURINARY: No dysuria, frequency, foamy urine, urinary urgency, incontinence or hematuria  NEUROLOGICAL: No numbness or weakness  SKIN: No itching, burning, rashes, or lesions   VASCULAR: No bilateral lower extremity edema.   All other review of systems is negative unless indicated above.    PHYSICAL EXAM:  Constitutional: NAD  HEENT: anicteric sclera, oropharynx clear, MMM  Neck: No JVD  Respiratory: CTAB, no wheezes, rales or rhonchi  Cardiovascular: S1, S2, RRR  Gastrointestinal: BS+, soft, NT/ND  Extremities: No cyanosis or clubbing. No peripheral edema  Neurological: A/O x 3, no focal deficits  Psychiatric: Normal mood, normal affect  : No CVA tenderness. No joaquin.   Skin: No rashes    Hospital Medications:   MEDICATIONS  (STANDING):  allopurinol 100 milliGRAM(s) Oral daily  aMIOdarone    Tablet 200 milliGRAM(s) Oral daily  chlorhexidine 2% Cloths 1 Application(s) Topical <User Schedule>  influenza  Vaccine (HIGH DOSE) 0.7 milliLiter(s) IntraMuscular once  lactulose Syrup 15 Gram(s) Oral three times a day  levothyroxine 175 MICROGram(s) Oral daily  metoprolol tartrate 50 milliGRAM(s) Oral three times a day  oxybutynin 5 milliGRAM(s) Oral two times a day  rivaroxaban 15 milliGRAM(s) Oral with dinner        VITALS:  T(F): 96.6 (10-02-23 @ 12:23), Max: 98.1 (10-01-23 @ 20:00)  HR: 94 (10-02-23 @ 12:23)  BP: 110/74 (10-02-23 @ 12:23)  RR: 18 (10-02-23 @ 12:23)  SpO2: 99% (10-02-23 @ 13:31)  Wt(kg): --     @ 07:01  -  10-01 @ 07:00  --------------------------------------------------------  IN: 450 mL / OUT: 875 mL / NET: -425 mL    10-01 @ 07:01  -  10-02 @ 07:00  --------------------------------------------------------  IN: 450 mL / OUT: 550 mL / NET: -100 mL    10-02 @ 07:01  -  10-02 @ 17:10  --------------------------------------------------------  IN: 450 mL / OUT: 150 mL / NET: 300 mL          LABS:  10-01    138  |  103  |  52<H>  ----------------------------<  117<H>  3.3<L>   |  28  |  1.7<H>    Ca    8.5      01 Oct 2023 06:53  Mg     2.1     10-01    TPro  5.3<L>  /  Alb  3.4<L>  /  TBili  0.8  /  DBili      /  AST  9   /  ALT  19  /  AlkPhos  95  10-01                          10.1   10.71 )-----------( 237      ( 01 Oct 2023 06:53 )             32.0       Urine Studies:  Urinalysis Basic - ( 01 Oct 2023 08:38 )    Color: Yellow / Appearance: Cloudy / S.021 / pH:   Gluc:  / Ketone: Negative mg/dL  / Bili: Negative / Urobili: 1.0 mg/dL   Blood:  / Protein: Trace mg/dL / Nitrite: Negative   Leuk Esterase: Trace / RBC: 1 /HPF / WBC 3 /HPF   Sq Epi:  / Non Sq Epi:  / Bacteria: Occasional /HPF      Sodium, Random Urine: <20.0 mmoL/L (10-01 @ 08:38)  Creatinine, Random Urine: 127 mg/dL (10-01 @ 08:38)  Protein/Creatinine Ratio Calculation: 0.1 Ratio (10-01 @ 08:38)  Osmolality, Random Urine: 620 mos/kg (10-01 @ 08:38)  Potassium, Random Urine: 72 mmol/L (10-01 @ 08:38)  Chloride, Random Urine: <20 (10-01 @ 08:38)      RADIOLOGY & ADDITIONAL STUDIES:

## 2023-10-02 NOTE — PROGRESS NOTE ADULT - ASSESSMENT
KOBI   - better   - s/p iv contrast   CKD stage 3  no proteinuria, no hydro, b/l renal cysts   Afib  acute on chronic HFpEF, better  anemia  FELIX  hypothyroidism / thyroid Ca     plan:    hold lasix today  hold benazepril  avoid further iv contrast  replete K+  full code  will f/u

## 2023-10-02 NOTE — PROGRESS NOTE ADULT - SUBJECTIVE AND OBJECTIVE BOX
JEFFERY UGALDE  82y Female    CHIEF COMPLAINT:    Patient is a 82y old  Female who presents with a chief complaint of CHF exacerbation (02 Oct 2023 10:30)      INTERVAL HPI/OVERNIGHT EVENTS:    Patient seen and examined. Sitting in a chair. Denies sob. On RA. No cough. No palpitations    ROS: All other systems are negative.    Vital Signs:    T(F): 96.6 (10-02-23 @ 12:23), Max: 98.1 (10-01-23 @ 20:00)  HR: 94 (10-02-23 @ 12:23) (94 - 121)  BP: 110/74 (10-02-23 @ 12:23) (104/55 - 110/74)  RR: 18 (10-02-23 @ 12:23) (17 - 18)  SpO2: 99% (10-02-23 @ 13:31) (95% - 99%)  I&O's Summary    01 Oct 2023 07:01  -  02 Oct 2023 07:00  --------------------------------------------------------  IN: 450 mL / OUT: 550 mL / NET: -100 mL    02 Oct 2023 07:01  -  02 Oct 2023 15:19  --------------------------------------------------------  IN: 450 mL / OUT: 150 mL / NET: 300 mL      Daily     Daily   CAPILLARY BLOOD GLUCOSE          PHYSICAL EXAM:    GENERAL:  NAD  SKIN: No rashes or lesions  HENT: Atraumatic. Normocephalic. PERRL. Moist membranes.  NECK: Supple, No JVD. No lymphadenopathy.  PULMONARY: CTA B/L. No wheezing. No rales  CVS: Normal S1, S2. Rate and Rhythm are regular. No murmurs.  ABDOMEN/GI: Soft, Nontender, Nondistended; BS present  EXTREMITIES: Peripheral pulses intact. No edema B/L LE.  NEUROLOGIC:  No motor or sensory deficit.  PSYCH: Alert & oriented x 3    Consultant(s) Notes Reviewed:  [x ] YES  [ ] NO  Care Discussed with Consultants/Other Providers [ x] YES  [ ] NO    EKG reviewed  Telemetry reviewed    LABS:                        10.1   10.71 )-----------( 237      ( 01 Oct 2023 06:53 )             32.0     10-01    138  |  103  |  52<H>  ----------------------------<  117<H>  Creatinine Trend: 1.7<--, 2.0<--, 2.3<--, 1.7<--, 1.5<--, 1.6<--  3.3<L>   |  28  |  1.7<H>    Ca    8.5      01 Oct 2023 06:53  Mg     2.1     10-01    TPro  5.3<L>  /  Alb  3.4<L>  /  TBili  0.8  /  DBili  x   /  AST  9   /  ALT  19  /  AlkPhos  95  10-01              RADIOLOGY & ADDITIONAL TESTS:    < from: US Kidney and Bladder (09.29.23 @ 10:03) >  IMPRESSION:    No hydronephrosis on either side.    Bilateral renal cysts.    4 mm echogenicity along the wall of the left renal upper pole cyst.   Differential includes mural calcification versus nonobstructing stone.    < end of copied text >  < from: Xray Chest 1 View- PORTABLE-Urgent (Xray Chest 1 View- PORTABLE-Urgent .) (10.02.23 @ 09:14) >    Impression:    No radiographic evidence of acute cardiopulmonary disease.      < end of copied text >    Imaging or report Personally Reviewed:  [ x] YES  [ ] NO    Medications:  Standing  allopurinol 100 milliGRAM(s) Oral daily  aMIOdarone    Tablet 200 milliGRAM(s) Oral daily  chlorhexidine 2% Cloths 1 Application(s) Topical <User Schedule>  influenza  Vaccine (HIGH DOSE) 0.7 milliLiter(s) IntraMuscular once  lactulose Syrup 15 Gram(s) Oral three times a day  levothyroxine 175 MICROGram(s) Oral daily  metoprolol tartrate 50 milliGRAM(s) Oral three times a day  oxybutynin 5 milliGRAM(s) Oral two times a day  rivaroxaban 15 milliGRAM(s) Oral with dinner    PRN Meds  acetaminophen     Tablet .. 650 milliGRAM(s) Oral every 6 hours PRN  aluminum hydroxide/magnesium hydroxide/simethicone Suspension 30 milliLiter(s) Oral every 4 hours PRN  polyethylene glycol 3350 17 Gram(s) Oral daily PRN      Case discussed with resident    Care discussed with pt/family

## 2023-10-03 LAB
ANION GAP SERPL CALC-SCNC: 11 MMOL/L — SIGNIFICANT CHANGE UP (ref 7–14)
BUN SERPL-MCNC: 59 MG/DL — HIGH (ref 10–20)
CALCIUM SERPL-MCNC: 8.8 MG/DL — SIGNIFICANT CHANGE UP (ref 8.4–10.5)
CHLORIDE SERPL-SCNC: 104 MMOL/L — SIGNIFICANT CHANGE UP (ref 98–110)
CO2 SERPL-SCNC: 28 MMOL/L — SIGNIFICANT CHANGE UP (ref 17–32)
CREAT SERPL-MCNC: 1.9 MG/DL — HIGH (ref 0.7–1.5)
EGFR: 26 ML/MIN/1.73M2 — LOW
GLUCOSE SERPL-MCNC: 94 MG/DL — SIGNIFICANT CHANGE UP (ref 70–99)
HCT VFR BLD CALC: 35.8 % — LOW (ref 37–47)
HGB BLD-MCNC: 11 G/DL — LOW (ref 12–16)
MAGNESIUM SERPL-MCNC: 2.2 MG/DL — SIGNIFICANT CHANGE UP (ref 1.8–2.4)
MCHC RBC-ENTMCNC: 27.7 PG — SIGNIFICANT CHANGE UP (ref 27–31)
MCHC RBC-ENTMCNC: 30.7 G/DL — LOW (ref 32–37)
MCV RBC AUTO: 90.2 FL — SIGNIFICANT CHANGE UP (ref 81–99)
NRBC # BLD: 0 /100 WBCS — SIGNIFICANT CHANGE UP (ref 0–0)
PLATELET # BLD AUTO: 302 K/UL — SIGNIFICANT CHANGE UP (ref 130–400)
PMV BLD: 11.7 FL — HIGH (ref 7.4–10.4)
POTASSIUM SERPL-MCNC: 4.2 MMOL/L — SIGNIFICANT CHANGE UP (ref 3.5–5)
POTASSIUM SERPL-SCNC: 4.2 MMOL/L — SIGNIFICANT CHANGE UP (ref 3.5–5)
RBC # BLD: 3.97 M/UL — LOW (ref 4.2–5.4)
RBC # FLD: 17.4 % — HIGH (ref 11.5–14.5)
SODIUM SERPL-SCNC: 143 MMOL/L — SIGNIFICANT CHANGE UP (ref 135–146)
WBC # BLD: 8.06 K/UL — SIGNIFICANT CHANGE UP (ref 4.8–10.8)
WBC # FLD AUTO: 8.06 K/UL — SIGNIFICANT CHANGE UP (ref 4.8–10.8)

## 2023-10-03 PROCEDURE — 99233 SBSQ HOSP IP/OBS HIGH 50: CPT

## 2023-10-03 RX ORDER — LACTULOSE 10 G/15ML
10 SOLUTION ORAL
Refills: 0 | Status: DISCONTINUED | OUTPATIENT
Start: 2023-10-03 | End: 2023-10-04

## 2023-10-03 RX ORDER — METOPROLOL TARTRATE 50 MG
50 TABLET ORAL EVERY 6 HOURS
Refills: 0 | Status: DISCONTINUED | OUTPATIENT
Start: 2023-10-03 | End: 2023-10-04

## 2023-10-03 RX ADMIN — Medication 5 MILLIGRAM(S): at 17:46

## 2023-10-03 RX ADMIN — Medication 175 MICROGRAM(S): at 05:17

## 2023-10-03 RX ADMIN — Medication 50 MILLIGRAM(S): at 17:46

## 2023-10-03 RX ADMIN — Medication 50 MILLIGRAM(S): at 05:17

## 2023-10-03 RX ADMIN — Medication 5 MILLIGRAM(S): at 05:16

## 2023-10-03 RX ADMIN — AMIODARONE HYDROCHLORIDE 200 MILLIGRAM(S): 400 TABLET ORAL at 05:17

## 2023-10-03 RX ADMIN — Medication 100 MILLIGRAM(S): at 12:20

## 2023-10-03 RX ADMIN — Medication 50 MILLIGRAM(S): at 23:42

## 2023-10-03 NOTE — PROGRESS NOTE ADULT - ASSESSMENT
81-year-old with PMHx of Paroxysmal AFib on Xarelto (s/p cardioversion 9/13/22), HFpEF, CKD 3b, chronic microcytic anemia, FELIX (on CPAP) HTN, DLD, hypothyroidism (history of thyroid cancer s/p resection), hx hip fracture, presented to the ED complaining of chest pain and shortness of breath for the past week. Also c/o orthopnea.     Acute HFpEF  FELIX on CPAP  HTN / DL  KOBI on CKD-3  PAF on Xarelto  Obesity  Transaminitis.             PLAN:    ·	Tele reviewed. Multiple episodes of wide complex tachycardia. D/W the cardiology. Likely A-Fib with aberrant conduction   ·	CTA chest reviewed. No acute PE. B/L pleural effusions. Interstitial edema.   ·	BUN/Cr noted. Stable now.   ·	Cont to hold Lasix for now. Pt is euvolemic.   ·	Renal/bladder US is negative for hydronephrosis  ·	Pt received IV contrast on 9/27. Could be AIRAM.   ·	Care d/w the cardiologist today  ·	Hold Xarelto for possible cath in AM  ·	NPO post MN  ·	Gentle hydration as per renal recommendation  ·	Check i's and o's and daily wt  ·	Low salt diet and water restriction to 1.5 L/D  ·	Monitor renal function and electrolytes.   ·	Maintain K >4 and Mg >2.2  ·	Cont Amiodarone and Metoprolol  ·	TSH is 5.61. Free T4 is normal  ·	Transaminitis likely due to congestive hepatopathy. Resolved    Progress Note Handoff    Pending (specify):  Consults________, Tests________, Test Results_______, Other_Possible cath in AM_______  Family discussion:  Disposition: Home___/SNF___/Other________/Unknown at this time________    Varun Fisher MD  Spectra: [id number]

## 2023-10-03 NOTE — PROGRESS NOTE ADULT - ASSESSMENT
11-year-old with PMHx of Paroxysmal AFib on Xarelto (s/p cardioversion 9/13/22), HFpEF, CKD 3b, chronic microcytic anemia, FELIX (on CPAP) HTN, DLD, hypothyroidism (history of thyroid cancer s/p resection), hx hip fracture, presented to the ED complaining of chest pain and shortness of breath for the past week.     #Acute HFpEF  #FELIX on CPAP  # Possible CAD  -  CTA: bilateral pleural effusion. cardiomegaly. Interstitial edema   - Chest x ray on 10/02/2023: The lung fields are clear.  - Patient was started on lasix but later d/c due to KOBI  -Check i's and o's and daily wt  -Low salt diet and water restriction to 1.5 L/D  - coronary cath on hold because of KOBI.  -Dr Bruno couldn't be reached today regarding cardiac cath     #KOBI on CKD-3  - Lasix were held  - Can be due to contrast administration on 09/27/2023  - Today's creatinine: 1.9  - f/u with nephrology regarding recommendations for coronary cath    #Gout   - On allopurinol     #PAF   -on Xarelto for AC  - c/w with metoprolol 150 mg for rate controle  - Wide complex tachycardia on monitor are probably A fib with aberration     #Transaminitis likely due to congestion- resolved    Hypothyroidism  - c/w with levothyroxine 175 mcg    # Constipation  - Added lactulose and enema  - Patient also on Miralax       #DVT: on Xarelto

## 2023-10-03 NOTE — PROGRESS NOTE ADULT - SUBJECTIVE AND OBJECTIVE BOX
NEPHROLOGY FOLLOW UP NOTE    pt seen and examined    PAST MEDICAL & SURGICAL HISTORY:  HTN (hypertension)      High cholesterol      Hypothyroid  s/p thyroid ca surgery      Afib  on xarelto      Sleep apnea      Osteoarthritis      CKD (chronic kidney disease) stage 3, GFR 30-59 ml/min      H/O thyroidectomy      S/P rotator cuff surgery        Allergies:  No Known Allergies    Home Medications Reviewed    SOCIAL HISTORY:  Denies ETOH,Smoking,   FAMILY HISTORY:  Family history of lung cancer (Mother)    Family history of abdominal aortic aneurysm (AAA) (Father)          REVIEW OF SYSTEMS:  CONSTITUTIONAL: No weakness, fevers or chills  EYES/ENT: No visual changes;  No vertigo or throat pain   NECK: No pain or stiffness  RESPIRATORY: No cough, wheezing, hemoptysis; No shortness of breath  CARDIOVASCULAR: No chest pain or palpitations.  GASTROINTESTINAL: No abdominal or epigastric pain. No nausea, vomiting, or hematemesis; No diarrhea or constipation. No melena or hematochezia.  GENITOURINARY: No dysuria, frequency, foamy urine, urinary urgency, incontinence or hematuria  NEUROLOGICAL: No numbness or weakness  SKIN: No itching, burning, rashes, or lesions   VASCULAR: No bilateral lower extremity edema.   All other review of systems is negative unless indicated above.    PHYSICAL EXAM:  Constitutional: NAD  HEENT: anicteric sclera, oropharynx clear, MMM  Neck: No JVD  Respiratory: CTAB, no wheezes, rales or rhonchi  Cardiovascular: S1, S2, RRR  Gastrointestinal: BS+, soft, NT/ND  Extremities: No cyanosis or clubbing. No peripheral edema  Neurological: A/O x 3, no focal deficits  Psychiatric: Normal mood, normal affect  : No CVA tenderness. No joaquin.   Skin: No rashes    Hospital Medications:   MEDICATIONS  (STANDING):  allopurinol 100 milliGRAM(s) Oral daily  aMIOdarone    Tablet 200 milliGRAM(s) Oral daily  chlorhexidine 2% Cloths 1 Application(s) Topical <User Schedule>  influenza  Vaccine (HIGH DOSE) 0.7 milliLiter(s) IntraMuscular once  levothyroxine 175 MICROGram(s) Oral daily  metoprolol tartrate 50 milliGRAM(s) Oral every 6 hours  oxybutynin 5 milliGRAM(s) Oral two times a day        VITALS:  T(F): 96.9 (10-03-23 @ 19:29), Max: 97.5 (10-03-23 @ 04:28)  HR: 99 (10-03-23 @ 19:29)  BP: 122/83 (10-03-23 @ 19:29)  RR: 18 (10-03-23 @ 19:29)  SpO2: 96% (10-03-23 @ 19:59)  Wt(kg): --    10-01 @ 07:01  -  10-02 @ 07:00  --------------------------------------------------------  IN: 450 mL / OUT: 550 mL / NET: -100 mL    10-02 @ 07:01  -  10-03 @ 07:00  --------------------------------------------------------  IN: 2097 mL / OUT: 450 mL / NET: 1647 mL    10-03 @ 07:01  -  10-03 @ 21:33  --------------------------------------------------------  IN: 600 mL / OUT: 900 mL / NET: -300 mL          LABS:  10-03    143  |  104  |  59<H>  ----------------------------<  94  4.2   |  28  |  1.9<H>    Ca    8.8      03 Oct 2023 06:11  Mg     2.2     10-03                            11.0   8.06  )-----------( 302      ( 03 Oct 2023 06:11 )             35.8       Urine Studies:  Urinalysis Basic - ( 03 Oct 2023 06:11 )    Color:  / Appearance:  / SG:  / pH:   Gluc: 94 mg/dL / Ketone:   / Bili:  / Urobili:    Blood:  / Protein:  / Nitrite:    Leuk Esterase:  / RBC:  / WBC    Sq Epi:  / Non Sq Epi:  / Bacteria:       Sodium, Random Urine: <20.0 mmoL/L (10-01 @ 08:38)  Creatinine, Random Urine: 127 mg/dL (10-01 @ 08:38)  Protein/Creatinine Ratio Calculation: 0.1 Ratio (10-01 @ 08:38)  Osmolality, Random Urine: 620 mos/kg (10-01 @ 08:38)  Potassium, Random Urine: 72 mmol/L (10-01 @ 08:38)  Chloride, Random Urine: <20 (10-01 @ 08:38)      RADIOLOGY & ADDITIONAL STUDIES:

## 2023-10-03 NOTE — PHARMACOTHERAPY INTERVENTION NOTE - COMMENTS
Patient currently ordered for lactulose, but has not been receiving the last few doses. Recommended to consider switching from scheduled to PRN order. 
Patient identified via [facility] list. Spoke with patient and confirmed patient uses [facility] for her medications. Reviewed medication list with patient. She notes that she does not take Toviaz anymore and uses trospium- was not able to recall dose. Otherwise outpatient medication review was accurate. 
Based on height from [facility] (5'2"), weight of 92kg per flowsheets, and Cr of 2.3 from this morning, calculated CrCl is 20-21. CrCl within range of 15-50, and rivaroxaban was appropriately renally dose adjusted from 20mg to 15mg daily.

## 2023-10-03 NOTE — PROGRESS NOTE ADULT - SUBJECTIVE AND OBJECTIVE BOX
SUBJECTIVE / OVERNIGHT EVENTS  Patient was seen and examined. No acute events overnight. No overnight events    MEDICATIONS  allopurinol 100 milliGRAM(s) Oral daily  aMIOdarone    Tablet 200 milliGRAM(s) Oral daily  chlorhexidine 2% Cloths 1 Application(s) Topical <User Schedule>  influenza  Vaccine (HIGH DOSE) 0.7 milliLiter(s) IntraMuscular once  lactulose Syrup 15 Gram(s) Oral three times a day  levothyroxine 175 MICROGram(s) Oral daily  metoprolol tartrate 50 milliGRAM(s) Oral three times a day  oxybutynin 5 milliGRAM(s) Oral two times a day  rivaroxaban 15 milliGRAM(s) Oral with dinner    acetaminophen     Tablet .. 650 milliGRAM(s) Oral every 6 hours PRN Temp greater or equal to 38C (100.4F), Mild Pain (1 - 3), Moderate Pain (4 - 6)  aluminum hydroxide/magnesium hydroxide/simethicone Suspension 30 milliLiter(s) Oral every 4 hours PRN Dyspepsia  polyethylene glycol 3350 17 Gram(s) Oral daily PRN Constipation    VITALS /  EXAM    T(C): 36.4 (10-03-23 @ 04:28), Max: 36.4 (10-03-23 @ 04:28)  HR: 94 (10-03-23 @ 04:28) (94 - 94)  BP: 113/64 (10-03-23 @ 04:28) (101/57 - 113/64)  RR: --  SpO2: 95% (10-02-23 @ 20:01) (95% - 99%)    GENERAL: AAO, Not in acute distress  CHEST/LUNG: Clear to auscultation bilaterally; No wheezes, rales or rhonchi  HEART: Regular rate and rhythm; No murmurs, rubs, or gallops  ABDOMEN: Soft, Nontender, Nondistended; Bowel sounds present, no masses.  EXTREMITIES:  2+ Peripheral Pulses, No clubbing, cyanosis, or edema    I's & O's     10-02-23 @ 07:01  -  10-03-23 @ 07:00  --------------------------------------------------------  IN:    Oral Fluid: 2097 mL  Total IN: 2097 mL    OUT:    Voided (mL): 450 mL  Total OUT: 450 mL    Total NET: 1647 mL        LABS             11.0   8.06  )-----------( 302      ( 10-03-23 @ 06:11 )             35.8     143  |  104  |  59  -------------------------<  94   10-03-23 @ 06:11  4.2  |  28  |  1.9    Ca      8.8     10-03-23 @ 06:11  Mg     2.2     10-03-23 @ 06:11                      Urinalysis Basic - ( 03 Oct 2023 06:11 )    Color: x / Appearance: x / SG: x / pH: x  Gluc: 94 mg/dL / Ketone: x  / Bili: x / Urobili: x   Blood: x / Protein: x / Nitrite: x   Leuk Esterase: x / RBC: x / WBC x   Sq Epi: x / Non Sq Epi: x / Bacteria: x      MICRO / IMAGING / CARDIOLOGY  Telemetry: Reviewed   EKG: Reviewed    CULTURES    IMAGING  PACS Image:  (10-02-23 @ 09:14)    CARDIOLOGY

## 2023-10-03 NOTE — PROGRESS NOTE ADULT - ASSESSMENT
KOBI, improving  CKD stage 3  no proteinuria, no hydro, b/l renal cysts   Afib  acute on chronic HFpEF, better  anemia  FELIX  hypothyroidism / thyroid Ca     plan:    pt a higher risk for contrast-KOBI  Pt counselled and understands risk of angiogram including KOBI requiring dialysis  cont to hold benazepril and lasix.  limit iv contrast volume and recommend NS @ 100cc/hr x 10 hours, starting 4 hours prior to cath

## 2023-10-03 NOTE — PROGRESS NOTE ADULT - SUBJECTIVE AND OBJECTIVE BOX
JEFFERY UGALDE  82y Female    CHIEF COMPLAINT:    Patient is a 82y old  Female who presents with a chief complaint of CHF exacerbation (03 Oct 2023 12:25)      INTERVAL HPI/OVERNIGHT EVENTS:    Patient seen and examined. Feels good. No sob. No cp. No new complaint. Possible cath in AM    ROS: All other systems are negative.    Vital Signs:    T(F): 96.7 (10-03-23 @ 12:34), Max: 97.5 (10-03-23 @ 04:28)  HR: 115 (10-03-23 @ 12:34) (94 - 115)  BP: 107/71 (10-03-23 @ 12:34) (101/57 - 113/64)  RR: 18 (10-03-23 @ 12:34) (18 - 18)  SpO2: 95% (10-02-23 @ 20:01) (95% - 95%)  I&O's Summary    02 Oct 2023 07:  -  03 Oct 2023 07:00  --------------------------------------------------------  IN: 2097 mL / OUT: 450 mL / NET: 1647 mL    03 Oct 2023 07:01  -  03 Oct 2023 14:59  --------------------------------------------------------  IN: 240 mL / OUT: 300 mL / NET: -60 mL      Daily     Daily Weight in k (03 Oct 2023 04:28)  CAPILLARY BLOOD GLUCOSE          PHYSICAL EXAM:    GENERAL:  NAD  SKIN: No rashes or lesions  HENT: Atraumatic. Normocephalic. PERRL. Moist membranes.  NECK: Supple, No JVD. No lymphadenopathy.  PULMONARY: CTA B/L. No wheezing. No rales  CVS: Normal S1, S2. Rate and Rhythm are IRIR. No murmurs.  ABDOMEN/GI: Soft, Nontender, Nondistended; BS present  EXTREMITIES: Peripheral pulses intact. No edema B/L LE.  NEUROLOGIC:  No motor or sensory deficit.  PSYCH: Alert & oriented x 3    Consultant(s) Notes Reviewed:  [x ] YES  [ ] NO  Care Discussed with Consultants/Other Providers [ x] YES  [ ] NO    EKG reviewed  Telemetry reviewed    LABS:                        11.0   8.06  )-----------( 302      ( 03 Oct 2023 06:11 )             35.8     10-03    143  |  104  |  59<H>  ----------------------------<  94  4.2   |  28  |  1.9<H>    Ca    8.8      03 Oct 2023 06:11  Mg     2.2     10-                RADIOLOGY & ADDITIONAL TESTS:    < from: TTE Echo Complete w/ Contrast w/ Doppler (23 @ 10:45) >    Summary:   1. Mildly decreased global left ventricular systolic function.   2. Left ventricular ejection fraction, by visual estimation, is 45 to   50%. There is [id number]-sn-[id number] variability.   3. Entire septum is hypokinetic.   4. Severe concentric left ventricular hypertrophy.   5. The left ventriculardiastolic function could not be assessed in this   study.   6. Moderately reduced RV systolic function.   7. Mild mitral valve regurgitation.   8. Mild pulmonic valve regurgitation.   9. Moderate tricuspid regurgitation.  10. Dilatation of the ascending aorta, measuring 3.8 cm (indexed 2.01   cm/m2).  11. Severely dilated pulmonary artery.  12. Estimated pulmonary artery systolic pressure is 38.0 mmHg assuming a   right atrial pressure of 8 mmHg, which is consistent with borderline   pulmonary hypertension.  13. Small pericardial effusion.  14. Endocardial visualization was enhanced with intravenous echo contrast.  15. Compared to the previous study 9/15/2022, the LV function has   decreased.    < end of copied text >    Imaging or report Personally Reviewed:  [x ] YES  [ ] NO    Medications:  Standing  allopurinol 100 milliGRAM(s) Oral daily  aMIOdarone    Tablet 200 milliGRAM(s) Oral daily  chlorhexidine 2% Cloths 1 Application(s) Topical <User Schedule>  influenza  Vaccine (HIGH DOSE) 0.7 milliLiter(s) IntraMuscular once  lactulose Syrup 15 Gram(s) Oral three times a day  levothyroxine 175 MICROGram(s) Oral daily  metoprolol tartrate 50 milliGRAM(s) Oral three times a day  oxybutynin 5 milliGRAM(s) Oral two times a day    PRN Meds  acetaminophen     Tablet .. 650 milliGRAM(s) Oral every 6 hours PRN  aluminum hydroxide/magnesium hydroxide/simethicone Suspension 30 milliLiter(s) Oral every 4 hours PRN  polyethylene glycol 3350 17 Gram(s) Oral daily PRN      Case discussed with resident    Care discussed with pt/family

## 2023-10-04 ENCOUNTER — TRANSCRIPTION ENCOUNTER (OUTPATIENT)
Age: 83
End: 2023-10-04

## 2023-10-04 VITALS
TEMPERATURE: 97 F | SYSTOLIC BLOOD PRESSURE: 111 MMHG | RESPIRATION RATE: 18 BRPM | HEART RATE: 76 BPM | DIASTOLIC BLOOD PRESSURE: 69 MMHG

## 2023-10-04 LAB
ANION GAP SERPL CALC-SCNC: 11 MMOL/L — SIGNIFICANT CHANGE UP (ref 7–14)
BUN SERPL-MCNC: 51 MG/DL — HIGH (ref 10–20)
CALCIUM SERPL-MCNC: 9 MG/DL — SIGNIFICANT CHANGE UP (ref 8.4–10.4)
CHLORIDE SERPL-SCNC: 106 MMOL/L — SIGNIFICANT CHANGE UP (ref 98–110)
CO2 SERPL-SCNC: 27 MMOL/L — SIGNIFICANT CHANGE UP (ref 17–32)
CREAT SERPL-MCNC: 1.6 MG/DL — HIGH (ref 0.7–1.5)
EGFR: 32 ML/MIN/1.73M2 — LOW
GLUCOSE SERPL-MCNC: 104 MG/DL — HIGH (ref 70–99)
HCT VFR BLD CALC: 36.9 % — LOW (ref 37–47)
HGB BLD-MCNC: 11.2 G/DL — LOW (ref 12–16)
MAGNESIUM SERPL-MCNC: 2.1 MG/DL — SIGNIFICANT CHANGE UP (ref 1.8–2.4)
MCHC RBC-ENTMCNC: 27.7 PG — SIGNIFICANT CHANGE UP (ref 27–31)
MCHC RBC-ENTMCNC: 30.4 G/DL — LOW (ref 32–37)
MCV RBC AUTO: 91.3 FL — SIGNIFICANT CHANGE UP (ref 81–99)
NRBC # BLD: 0 /100 WBCS — SIGNIFICANT CHANGE UP (ref 0–0)
PLATELET # BLD AUTO: 329 K/UL — SIGNIFICANT CHANGE UP (ref 130–400)
PMV BLD: 11.8 FL — HIGH (ref 7.4–10.4)
POTASSIUM SERPL-MCNC: 4 MMOL/L — SIGNIFICANT CHANGE UP (ref 3.5–5)
POTASSIUM SERPL-SCNC: 4 MMOL/L — SIGNIFICANT CHANGE UP (ref 3.5–5)
RBC # BLD: 4.04 M/UL — LOW (ref 4.2–5.4)
RBC # FLD: 17.4 % — HIGH (ref 11.5–14.5)
SODIUM SERPL-SCNC: 144 MMOL/L — SIGNIFICANT CHANGE UP (ref 135–146)
WBC # BLD: 7.86 K/UL — SIGNIFICANT CHANGE UP (ref 4.8–10.8)
WBC # FLD AUTO: 7.86 K/UL — SIGNIFICANT CHANGE UP (ref 4.8–10.8)

## 2023-10-04 PROCEDURE — 99239 HOSP IP/OBS DSCHRG MGMT >30: CPT

## 2023-10-04 RX ORDER — RIVAROXABAN 15 MG-20MG
15 KIT ORAL
Refills: 0 | Status: DISCONTINUED | OUTPATIENT
Start: 2023-10-04 | End: 2023-10-04

## 2023-10-04 RX ORDER — BENAZEPRIL HYDROCHLORIDE 40 MG/1
1 TABLET ORAL
Refills: 0 | DISCHARGE

## 2023-10-04 RX ORDER — RIVAROXABAN 15 MG-20MG
1 KIT ORAL
Qty: 30 | Refills: 1
Start: 2023-10-04 | End: 2023-12-02

## 2023-10-04 RX ORDER — AMLODIPINE BESYLATE 2.5 MG/1
1 TABLET ORAL
Qty: 0 | Refills: 0 | DISCHARGE

## 2023-10-04 RX ORDER — METOPROLOL TARTRATE 50 MG
1 TABLET ORAL
Refills: 0 | DISCHARGE

## 2023-10-04 RX ORDER — RIVAROXABAN 15 MG-20MG
1 KIT ORAL
Qty: 0 | Refills: 0 | DISCHARGE

## 2023-10-04 RX ORDER — SODIUM CHLORIDE 9 MG/ML
1000 INJECTION INTRAMUSCULAR; INTRAVENOUS; SUBCUTANEOUS
Refills: 0 | Status: DISCONTINUED | OUTPATIENT
Start: 2023-10-04 | End: 2023-10-04

## 2023-10-04 RX ORDER — METOPROLOL TARTRATE 50 MG
1 TABLET ORAL
Qty: 30 | Refills: 1
Start: 2023-10-04

## 2023-10-04 RX ORDER — FUROSEMIDE 40 MG
1 TABLET ORAL
Qty: 30 | Refills: 1
Start: 2023-10-04 | End: 2023-12-02

## 2023-10-04 RX ADMIN — Medication 175 MICROGRAM(S): at 05:33

## 2023-10-04 RX ADMIN — Medication 50 MILLIGRAM(S): at 11:17

## 2023-10-04 RX ADMIN — AMIODARONE HYDROCHLORIDE 200 MILLIGRAM(S): 400 TABLET ORAL at 05:32

## 2023-10-04 RX ADMIN — Medication 50 MILLIGRAM(S): at 05:32

## 2023-10-04 RX ADMIN — Medication 5 MILLIGRAM(S): at 05:32

## 2023-10-04 RX ADMIN — Medication 100 MILLIGRAM(S): at 11:17

## 2023-10-04 RX ADMIN — CHLORHEXIDINE GLUCONATE 1 APPLICATION(S): 213 SOLUTION TOPICAL at 05:32

## 2023-10-04 NOTE — DISCHARGE NOTE PROVIDER - PROVIDER TOKENS
PROVIDER:[TOKEN:[[id number]],FOLLOWUP:[1 week]],PROVIDER:[TOKEN:[[id number]],FOLLOWUP:[1 month]]

## 2023-10-04 NOTE — PROGRESS NOTE ADULT - REASON FOR ADMISSION
CHF exacerbation

## 2023-10-04 NOTE — DISCHARGE NOTE PROVIDER - NSDCCPCAREPLAN_GEN_ALL_CORE_FT
PRINCIPAL DISCHARGE DIAGNOSIS  Diagnosis: Acute CHF  Assessment and Plan of Treatment: You were admitted for a decompensated heart failure. You were give diuretic therapyto remove the excess fluids in your lungs and your heart rate medications were adjusted. Please make sure thay you take your medications regulary, maintain a low salt diet, preferably less than 2 g per day, and follow up with your cardiologist as an outpatient.     PRINCIPAL DISCHARGE DIAGNOSIS  Diagnosis: Acute CHF  Assessment and Plan of Treatment: You were admitted for a decompensated heart failure. You were give diuretic therapyto remove the excess fluids in your lungs and your heart rate medications were adjusted. Please make sure thay you take your medications regulary, maintain a low salt diet, preferably less than 2 g per day, and follow up with Dr. Pandya as an outpatient on 10/10/2023 at 4:00 pm     PRINCIPAL DISCHARGE DIAGNOSIS  Diagnosis: Acute CHF  Assessment and Plan of Treatment: You were admitted for a decompensated heart failure. You were give diuretic therapyto remove the excess fluids in your lungs and your heart rate medications were adjusted. You will take lasix 20 mg oraly for diuresis as an outpatient. Your ACE Inhibitor was hled due to worsnening kidney function and your amlodipine was stopped due to low blood pressure. Please make sure thay you take your medications regulary, maintain a low salt diet, preferably less than 2 g per day, and follow up with Dr. Bruno as an outpatient on 10/10/2023 at 4:00 pm

## 2023-10-04 NOTE — PROGRESS NOTE ADULT - SUBJECTIVE AND OBJECTIVE BOX
JEFFERY UGALDE  82y Female    CHIEF COMPLAINT:    Patient is a 82y old  Female who presents with a chief complaint of CHF exacerbation (04 Oct 2023 10:07)      INTERVAL HPI/OVERNIGHT EVENTS:    Patient seen and examined. Sitting in a chair. No palpitations. No sob. No cp.     ROS: All other systems are negative.    Vital Signs:    T(F): 97 (10-04-23 @ 04:44), Max: 97 (10-04-23 @ 04:44)  HR: 76 (10-04-23 @ 04:44) (76 - 115)  BP: 111/69 (10-04-23 @ 04:44) (107/71 - 122/83)  RR: 18 (10-04-23 @ 04:44) (18 - 18)  SpO2: 96% (10-03-23 @ 19:59) (96% - 96%)  I&O's Summary    03 Oct 2023 07:01  -  04 Oct 2023 07:00  --------------------------------------------------------  IN: 838 mL / OUT: 1900 mL / NET: -1062 mL      Daily     Daily   CAPILLARY BLOOD GLUCOSE          PHYSICAL EXAM:    GENERAL:  NAD  SKIN: No rashes or lesions  HENT: Atraumatic. Normocephalic. PERRL. Moist membranes.  NECK: Supple, No JVD. No lymphadenopathy.  PULMONARY: CTA B/L. No wheezing. No rales  CVS: Normal S1, S2. Rate and Rhythm are Irregular. No murmurs.  ABDOMEN/GI: Soft, Nontender, Nondistended; BS present  EXTREMITIES: Peripheral pulses intact. No edema B/L LE.  NEUROLOGIC:  No motor or sensory deficit.  PSYCH: Alert & oriented x 3    Consultant(s) Notes Reviewed:  [x ] YES  [ ] NO  Care Discussed with Consultants/Other Providers [ x] YES  [ ] NO    EKG reviewed  Telemetry reviewed    LABS:                        11.2   7.86  )-----------( 329      ( 04 Oct 2023 06:09 )             36.9     10-04    144  |  106  |  51<H>  ----------------------------<  104<H>    Creatinine Trend: 1.6<--, 1.9<--, 1.8<--, 2.0<--, 1.7<--, 2.0<--  4.0   |  27  |  1.6<H>    Ca    9.0      04 Oct 2023 06:09  Mg     2.1     10-04                RADIOLOGY & ADDITIONAL TESTS:      Imaging or report Personally Reviewed:  [ ] YES  [ ] NO    Medications:  Standing  allopurinol 100 milliGRAM(s) Oral daily  aMIOdarone    Tablet 200 milliGRAM(s) Oral daily  chlorhexidine 2% Cloths 1 Application(s) Topical <User Schedule>  influenza  Vaccine (HIGH DOSE) 0.7 milliLiter(s) IntraMuscular once  levothyroxine 175 MICROGram(s) Oral daily  metoprolol tartrate 50 milliGRAM(s) Oral every 6 hours  oxybutynin 5 milliGRAM(s) Oral two times a day  rivaroxaban 15 milliGRAM(s) Oral with dinner    PRN Meds  acetaminophen     Tablet .. 650 milliGRAM(s) Oral every 6 hours PRN  aluminum hydroxide/magnesium hydroxide/simethicone Suspension 30 milliLiter(s) Oral every 4 hours PRN  lactulose Syrup 10 Gram(s) Oral two times a day PRN  polyethylene glycol 3350 17 Gram(s) Oral daily PRN      Case discussed with resident    Care discussed with pt/family

## 2023-10-04 NOTE — DISCHARGE NOTE PROVIDER - HOSPITAL COURSE
81-year-old with PMHx of Paroxysmal AFib on Xarelto (s/p cardioversion 9/13/22), CHFpEF, CKD 3b, chronic microcytic anemia, FELIX (on CPAP) HTN, DLD, hypothyroidism (history of thyroid cancer s/p resection), hx hip fracture, presented to the ED complaining of chest pain and shortness of breath for 1 week. She reported  sob on minimal exertion for the past week. A day before she had colonoscopy done and after she went home she couldn't lay flat so she called her daughter who brought her to the ED.     In the ED, , /65, Temp 98. Dimer 350, Cr 1.6 (baseline 1-1.3) AST 80, ALT 79, . CXR showed Bilateral reticular and airspace opacities. CTA showed no signs of PE but did reveal apparent cardiomegaly and bilateral pleural effusions. Interstitial edema. Findings consistent with a CHF exacerbation.  She was given azithro and rocephin, later discontinued and was put on Bipap that led to an improvement of the respiration.     Patient was admitted for treatment of decompensated heart failure with preserved ejection fraction. Patient was started on intravenous diuretic therapy  but was later discontinued due to acute kidney injury. Patient clinical status improved and creatinine trended down. Her metoprolol was increased to 100 twice   daily to control her HR which was running high. She was discharged on 10/04/2023 in a stable condition to be followed up by cardiology with  a possible scheduling for cardiac cath at a later point.    #Acute HFpEF  #FELIX on CPAP  # Possible CAD  -  CTA: bilateral pleural effusion. cardiomegaly. Interstitial edema   - Chest x ray on 10/02/2023: The lung fields are clear.  - Patient was started on lasix but later d/c due to KOBI  -Check i's and o's and daily wt  -Low salt diet and water restriction to 1.5 L/D  - coronary cath was put on hold because of KOBI.  - Patient will follow with Dr reyez as an outpatient    #KOBI on CKD-3  - Lasix were held  - Can be due to contrast administration on 09/27/2023  - Today's creatinine upon discharge : 1.6      #Gout   - On allopurinol     #PAF   -on Xarelto for AC  - metoprolol increased to 100 mg q12 to control HR below 100    #Transaminitis likely due to congestion- resolved    Hypothyroidism  - c/w with levothyroxine 175 mcg       81-year-old with PMHx of Paroxysmal AFib on Xarelto (s/p cardioversion 9/13/22), CHFpEF, CKD 3b, chronic microcytic anemia, FELIX (on CPAP) HTN, DLD, hypothyroidism (history of thyroid cancer s/p resection), hx hip fracture, presented to the ED complaining of chest pain and shortness of breath for 1 week. She reported  sob on minimal exertion for the past week. A day before she had colonoscopy done and after she went home she couldn't lay flat so she called her daughter who brought her to the ED.     In the ED, , /65, Temp 98. Dimer 350, Cr 1.6 (baseline 1-1.3) AST 80, ALT 79, . CXR showed Bilateral reticular and airspace opacities. CTA showed no signs of PE but did reveal apparent cardiomegaly and bilateral pleural effusions. Interstitial edema. Findings consistent with a CHF exacerbation.  She was given azithro and rocephin, later discontinued and was put on Bipap that led to an improvement of the respiration.     Patient was admitted for treatment of decompensated heart failure with preserved ejection fraction. Patient was started on intravenous diuretic therapy  but was later discontinued due to acute kidney injury. Patient clinical status improved and creatinine trended down. Her metoprolol was increased to 100 twice   daily to control her HR which was running high. She was discharged on 10/04/2023 in a stable condition to be followed up by cardiology with  a possible scheduling for cardiac cath at a later point.    #Acute HFpEF  #FELIX on CPAP  # Possible CAD  -  CTA: bilateral pleural effusion. cardiomegaly. Interstitial edema   - Chest x ray on 10/02/2023: The lung fields are clear.  - Patient was started on lasix but later d/c due to KOBI  -Check i's and o's and daily wt  -Low salt diet and water restriction to 1.5 L/D  - coronary cath was put on hold because of KOBI.  - Patient will follow with Dr reyez as an outpatient on 10/10/2023 at 4:00 pm    #KOBI on CKD-3  - Lasix were held  - Can be due to contrast administration on 09/27/2023  - Today's creatinine upon discharge : 1.6      #Gout   - On allopurinol     #PAF   -on Xarelto for AC  - metoprolol increased to 100 mg q12 to control HR below 100    #Transaminitis likely due to congestion- resolved    Hypothyroidism  - c/w with levothyroxine 175 mcg       81-year-old with PMHx of Paroxysmal AFib on Xarelto (s/p cardioversion 9/13/22), CHFpEF, CKD 3b, chronic microcytic anemia, FELIX (on CPAP) HTN, DLD, hypothyroidism (history of thyroid cancer s/p resection), hx hip fracture, presented to the ED complaining of chest pain and shortness of breath for 1 week. She reported  sob on minimal exertion for the past week. A day before she had colonoscopy done and after she went home she couldn't lay flat so she called her daughter who brought her to the ED.     In the ED, , /65, Temp 98. Dimer 350, Cr 1.6 (baseline 1-1.3) AST 80, ALT 79, . CXR showed Bilateral reticular and airspace opacities. CTA showed no signs of PE but did reveal apparent cardiomegaly and bilateral pleural effusions. Interstitial edema. Findings consistent with a CHF exacerbation.  She was given azithro and rocephin, later discontinued and was put on Bipap that led to an improvement of the respiration.     Patient was admitted for treatment of decompensated heart failure with preserved ejection fraction. Patient was started on intravenous diuretic therapy  but was later discontinued due to acute kidney injury. Patient clinical status improved and creatinine trended down. Her metoprolol was increased to 100 twice   daily to control her HR which was running high. She was discharged on 10/04/2023 in a stable condition to be followed up by cardiology with  a possible scheduling for cardiac cath at a later point.    #Acute HFpEF  #FELIX on CPAP  # Possible CAD  #HTN  -  CTA: bilateral pleural effusion. cardiomegaly. Interstitial edema   - Chest x ray on 10/02/2023: The lung fields are clear.  - Patient was started on lasix but later d/c due to KOBI.  - Continue diuretic therapy as an outpatient with lasix 20 mg orally  - ACE inhibitors were stopped due to KOBI   - amlodipine was discontinued upon discharge due to low bp  - coronary cath was put on hold because of KOBI.  - Patient will follow with Dr reyez as an outpatient on 10/10/2023 at 4:00 pm    #KOBI on CKD-3  - Lasix were held  - Can be due to contrast administration on 09/27/2023  - Today's creatinine upon discharge : 1.6      #Gout   - On allopurinol     #PAF   -on Xarelto for AC  - metoprolol increased to 100 mg q12 to control HR below 100    #Transaminitis likely due to congestion- resolved    Hypothyroidism  - c/w with levothyroxine 175 mcg

## 2023-10-04 NOTE — DISCHARGE NOTE PROVIDER - NSDCMRMEDTOKEN_GEN_ALL_CORE_FT
allopurinol 100 mg oral tablet: orally once a day (at bedtime)  amiodarone 200 mg oral tablet: 1 tab(s) orally once a day  amLODIPine 5 mg oral tablet: 1 tab(s) orally once a day (at bedtime)  atorvastatin 20 mg oral tablet: 1 tab(s) orally once a day  benazepril 20 mg oral tablet: 1 tab(s) orally 2 times a day  cabergoline 0.5 mg oral tablet: 1 tab(s) orally once a week, monday night  levothyroxine 175 mcg (0.175 mg) oral tablet: 1 tab(s) orally once a day  Lopressor 50 mg oral tablet: 1 tab(s) orally 2 times a day  Toviaz 4 mg oral tablet, extended release: 1 tab(s) orally once a day  Xarelto 20 mg oral tablet: 1 tab(s) orally once a day (in the evening)   allopurinol 100 mg oral tablet: orally once a day (at bedtime)  amiodarone 200 mg oral tablet: 1 tab(s) orally once a day  amLODIPine 5 mg oral tablet: 1 tab(s) orally once a day (at bedtime)  atorvastatin 20 mg oral tablet: 1 tab(s) orally once a day  cabergoline 0.5 mg oral tablet: 1 tab(s) orally once a week, monday night  levothyroxine 175 mcg (0.175 mg) oral tablet: 1 tab(s) orally once a day  Metoprolol Tartrate 100 mg oral tablet: 1 tab(s) orally 2 times a day  rivaroxaban 15 mg oral tablet: 1 tab(s) orally once a day (before a meal)  Toviaz 4 mg oral tablet, extended release: 1 tab(s) orally once a day   allopurinol 100 mg oral tablet: orally once a day (at bedtime)  amiodarone 200 mg oral tablet: 1 tab(s) orally once a day  atorvastatin 20 mg oral tablet: 1 tab(s) orally once a day  cabergoline 0.5 mg oral tablet: 1 tab(s) orally once a week, monday night  levothyroxine 175 mcg (0.175 mg) oral tablet: 1 tab(s) orally once a day  Metoprolol Tartrate 100 mg oral tablet: 1 tab(s) orally 2 times a day  rivaroxaban 15 mg oral tablet: 1 tab(s) orally once a day (before a meal)  Toviaz 4 mg oral tablet, extended release: 1 tab(s) orally once a day   allopurinol 100 mg oral tablet: orally once a day (at bedtime)  amiodarone 200 mg oral tablet: 1 tab(s) orally once a day  atorvastatin 20 mg oral tablet: 1 tab(s) orally once a day  cabergoline 0.5 mg oral tablet: 1 tab(s) orally once a week, monday night  Lasix 20 mg oral tablet: 1 tab(s) orally once a day  levothyroxine 175 mcg (0.175 mg) oral tablet: 1 tab(s) orally once a day  Metoprolol Tartrate 100 mg oral tablet: 1 tab(s) orally 2 times a day  rivaroxaban 15 mg oral tablet: 1 tab(s) orally once a day (before a meal)  Toviaz 4 mg oral tablet, extended release: 1 tab(s) orally once a day

## 2023-10-04 NOTE — DISCHARGE NOTE PROVIDER - CARE PROVIDER_API CALL
Madi Quintero  Interventional Cardiology  [address] [place] [address] [address]  [place] [address]  Phone: [phone]  Fax: [phone]  Follow Up Time: 1 week    Tad Savage  Internal Medicine  [facility] [place] [address]  Phone: [phone]  Fax: [phone]  Follow Up Time: 1 month

## 2023-10-04 NOTE — DISCHARGE NOTE NURSING/CASE MANAGEMENT/SOCIAL WORK - PATIENT PORTAL LINK FT
You can access the FollowMyHealth Patient Portal offered by [facility] by registering at the following website: [email or web address]. By joining [facility]’s FollowMyHealth portal, you will also be able to view your health information using other applications (apps) compatible with our system.

## 2023-10-04 NOTE — PROGRESS NOTE ADULT - ASSESSMENT
81-year-old with PMHx of Paroxysmal AFib on Xarelto (s/p cardioversion 9/13/22), HFpEF, CKD 3b, chronic microcytic anemia, FELIX (on CPAP) HTN, DLD, hypothyroidism (history of thyroid cancer s/p resection), hx hip fracture, presented to the ED complaining of chest pain and shortness of breath for the past week. Also c/o orthopnea.     Acute HFpEF  FELIX on CPAP  HTN / DL  KOBI on CKD-3  PAF on Xarelto  Obesity  Transaminitis.   KOBI (Likely AIRAM) on CKD-3            PLAN:    ·	On D/C switch her to Metoprolol tartrate 100 mg po q 12h  ·	As the BP runs low will d/c Amlodipine.     ·	CTA chest on admission reviewed. No acute PE. B/L pleural effusions. Interstitial edema.   ·	BUN/Cr noted. Stable now. Cr keeps trending down  ·	Lasix 20 mg po daily from tomorrow  ·	Renal/bladder US is negative for hydronephrosis  ·	Pt received IV contrast on 9/27. KOBI likely due to AIRAM.   ·	Care d/w the cardiologist. Out pt f/u with Dr. Amador on 10/10 at 4 PM  ·	Low salt diet and water restriction to 1.5 L/D  ·	Cont her other home meds.  ·	TSH is 5.61. Free T4 is normal  ·	Transaminitis likely due to congestive hepatopathy. Resolved  ·	Cardiology will decide about the cath as an out pt once the renal function is stable  ·	GDMT. On BB. Will hold ACE-I for now due to KOBI. Cardiologist will decide as an out pt  ·	D/C home    * Med rec reviewed. Plan of care d/w the pt. Time spent 38 minutes. Out pt f/u with Dr. Amador on 10/10 at 4PM.

## 2023-10-04 NOTE — DISCHARGE NOTE NURSING/CASE MANAGEMENT/SOCIAL WORK - NSDCFUADDAPPT_GEN_ALL_CORE_FT
F/U CMS star Appt with Dr. Bruno's office tel # [phone] patient was given appointment on 10/10/2023 at 4 pm.

## 2023-10-04 NOTE — PROGRESS NOTE ADULT - ASSESSMENT
KOBI, improving  CKD stage 3  no proteinuria, no hydro, b/l renal cysts   Afib  acute on chronic HFpEF, better  anemia  FELIX  hypothyroidism / thyroid Ca     plan:    no cardiac cath  resume ace-i and oral lasix  ckd counselling  outpt renal f/u

## 2023-10-05 ENCOUNTER — TRANSCRIPTION ENCOUNTER (OUTPATIENT)
Age: 83
End: 2023-10-05

## 2023-10-05 ENCOUNTER — APPOINTMENT (OUTPATIENT)
Dept: CARE COORDINATION | Facility: HOME HEALTH | Age: 83
End: 2023-10-05
Payer: MEDICARE

## 2023-10-05 DIAGNOSIS — N76.2 ACUTE VULVITIS: ICD-10-CM

## 2023-10-05 DIAGNOSIS — N18.9 CHRONIC KIDNEY DISEASE, UNSPECIFIED: ICD-10-CM

## 2023-10-05 DIAGNOSIS — E78.00 PURE HYPERCHOLESTEROLEMIA, UNSPECIFIED: ICD-10-CM

## 2023-10-05 DIAGNOSIS — M25.569 PAIN IN UNSPECIFIED KNEE: ICD-10-CM

## 2023-10-05 DIAGNOSIS — E07.9 DISORDER OF THYROID, UNSPECIFIED: ICD-10-CM

## 2023-10-05 DIAGNOSIS — M19.90 UNSPECIFIED OSTEOARTHRITIS, UNSPECIFIED SITE: ICD-10-CM

## 2023-10-05 PROCEDURE — 99495 TRANSJ CARE MGMT MOD F2F 14D: CPT | Mod: 95

## 2023-10-06 PROBLEM — M25.569 KNEE PAIN: Status: ACTIVE | Noted: 2023-10-06

## 2023-10-06 PROBLEM — E07.9 THYROID DISEASE: Status: ACTIVE | Noted: 2019-12-27

## 2023-10-06 PROBLEM — M19.90 OSTEOARTHRITIS, UNSPECIFIED OSTEOARTHRITIS TYPE, UNSPECIFIED SITE: Status: ACTIVE | Noted: 2023-10-06

## 2023-10-06 PROBLEM — N76.2 ACUTE VULVITIS: Status: RESOLVED | Noted: 2021-09-29 | Resolved: 2023-10-06

## 2023-10-06 PROBLEM — E78.00 HYPERCHOLESTEREMIA: Status: ACTIVE | Noted: 2019-12-27

## 2023-10-06 PROBLEM — N18.9 CKD (CHRONIC KIDNEY DISEASE): Status: ACTIVE | Noted: 2023-10-06

## 2023-10-06 RX ORDER — AMLODIPINE BESYLATE 5 MG/1
TABLET ORAL
Refills: 0 | Status: COMPLETED | COMMUNITY
End: 2023-10-06

## 2023-10-08 ENCOUNTER — TRANSCRIPTION ENCOUNTER (OUTPATIENT)
Age: 83
End: 2023-10-08

## 2023-10-09 ENCOUNTER — TRANSCRIPTION ENCOUNTER (OUTPATIENT)
Age: 83
End: 2023-10-09

## 2023-10-09 DIAGNOSIS — N17.9 ACUTE KIDNEY FAILURE, UNSPECIFIED: ICD-10-CM

## 2023-10-09 DIAGNOSIS — M10.9 GOUT, UNSPECIFIED: ICD-10-CM

## 2023-10-09 DIAGNOSIS — J96.01 ACUTE RESPIRATORY FAILURE WITH HYPOXIA: ICD-10-CM

## 2023-10-09 DIAGNOSIS — E78.5 HYPERLIPIDEMIA, UNSPECIFIED: ICD-10-CM

## 2023-10-09 DIAGNOSIS — I50.33 ACUTE ON CHRONIC DIASTOLIC (CONGESTIVE) HEART FAILURE: ICD-10-CM

## 2023-10-09 DIAGNOSIS — E03.9 HYPOTHYROIDISM, UNSPECIFIED: ICD-10-CM

## 2023-10-09 DIAGNOSIS — Z79.890 HORMONE REPLACEMENT THERAPY: ICD-10-CM

## 2023-10-09 DIAGNOSIS — E66.9 OBESITY, UNSPECIFIED: ICD-10-CM

## 2023-10-09 DIAGNOSIS — I13.0 HYPERTENSIVE HEART AND CHRONIC KIDNEY DISEASE WITH HEART FAILURE AND STAGE 1 THROUGH STAGE 4 CHRONIC KIDNEY DISEASE, OR UNSPECIFIED CHRONIC KIDNEY DISEASE: ICD-10-CM

## 2023-10-09 DIAGNOSIS — I48.0 PAROXYSMAL ATRIAL FIBRILLATION: ICD-10-CM

## 2023-10-09 DIAGNOSIS — Z79.01 LONG TERM (CURRENT) USE OF ANTICOAGULANTS: ICD-10-CM

## 2023-10-09 DIAGNOSIS — K59.00 CONSTIPATION, UNSPECIFIED: ICD-10-CM

## 2023-10-09 DIAGNOSIS — G47.33 OBSTRUCTIVE SLEEP APNEA (ADULT) (PEDIATRIC): ICD-10-CM

## 2023-10-09 DIAGNOSIS — N18.32 CHRONIC KIDNEY DISEASE, STAGE 3B: ICD-10-CM

## 2023-10-17 ENCOUNTER — EMERGENCY (EMERGENCY)
Facility: HOSPITAL | Age: 83
LOS: 0 days | Discharge: HOME | End: 2023-10-18
Attending: EMERGENCY MEDICINE
Payer: MEDICARE

## 2023-10-17 ENCOUNTER — TRANSCRIPTION ENCOUNTER (OUTPATIENT)
Age: 83
End: 2023-10-17

## 2023-10-17 VITALS
SYSTOLIC BLOOD PRESSURE: 148 MMHG | OXYGEN SATURATION: 98 % | RESPIRATION RATE: 17 BRPM | TEMPERATURE: 98 F | HEART RATE: 121 BPM | DIASTOLIC BLOOD PRESSURE: 68 MMHG | WEIGHT: 201.94 LBS

## 2023-10-17 DIAGNOSIS — Z98.890 OTHER SPECIFIED POSTPROCEDURAL STATES: Chronic | ICD-10-CM

## 2023-10-17 DIAGNOSIS — I13.0 HYPERTENSIVE HEART AND CHRONIC KIDNEY DISEASE WITH HEART FAILURE AND STAGE 1 THROUGH STAGE 4 CHRONIC KIDNEY DISEASE, OR UNSPECIFIED CHRONIC KIDNEY DISEASE: ICD-10-CM

## 2023-10-17 DIAGNOSIS — R00.2 PALPITATIONS: ICD-10-CM

## 2023-10-17 DIAGNOSIS — R06.02 SHORTNESS OF BREATH: ICD-10-CM

## 2023-10-17 DIAGNOSIS — G47.33 OBSTRUCTIVE SLEEP APNEA (ADULT) (PEDIATRIC): ICD-10-CM

## 2023-10-17 DIAGNOSIS — E03.9 HYPOTHYROIDISM, UNSPECIFIED: ICD-10-CM

## 2023-10-17 DIAGNOSIS — D64.9 ANEMIA, UNSPECIFIED: ICD-10-CM

## 2023-10-17 DIAGNOSIS — I50.9 HEART FAILURE, UNSPECIFIED: ICD-10-CM

## 2023-10-17 DIAGNOSIS — Z79.01 LONG TERM (CURRENT) USE OF ANTICOAGULANTS: ICD-10-CM

## 2023-10-17 DIAGNOSIS — I48.0 PAROXYSMAL ATRIAL FIBRILLATION: ICD-10-CM

## 2023-10-17 DIAGNOSIS — N18.32 CHRONIC KIDNEY DISEASE, STAGE 3B: ICD-10-CM

## 2023-10-17 DIAGNOSIS — M19.90 UNSPECIFIED OSTEOARTHRITIS, UNSPECIFIED SITE: ICD-10-CM

## 2023-10-17 DIAGNOSIS — E78.5 HYPERLIPIDEMIA, UNSPECIFIED: ICD-10-CM

## 2023-10-17 LAB
ALBUMIN SERPL ELPH-MCNC: 3.8 G/DL — SIGNIFICANT CHANGE UP (ref 3.5–5.2)
ALBUMIN SERPL ELPH-MCNC: 3.8 G/DL — SIGNIFICANT CHANGE UP (ref 3.5–5.2)
ALP SERPL-CCNC: 109 U/L — SIGNIFICANT CHANGE UP (ref 30–115)
ALP SERPL-CCNC: 109 U/L — SIGNIFICANT CHANGE UP (ref 30–115)
ALT FLD-CCNC: 44 U/L — HIGH (ref 0–41)
ALT FLD-CCNC: 44 U/L — HIGH (ref 0–41)
ANION GAP SERPL CALC-SCNC: 11 MMOL/L — SIGNIFICANT CHANGE UP (ref 7–14)
ANION GAP SERPL CALC-SCNC: 11 MMOL/L — SIGNIFICANT CHANGE UP (ref 7–14)
APTT BLD: 40.6 SEC — HIGH (ref 27–39.2)
APTT BLD: 40.6 SEC — HIGH (ref 27–39.2)
AST SERPL-CCNC: 58 U/L — HIGH (ref 0–41)
AST SERPL-CCNC: 58 U/L — HIGH (ref 0–41)
BASOPHILS # BLD AUTO: 0.06 K/UL — SIGNIFICANT CHANGE UP (ref 0–0.2)
BASOPHILS # BLD AUTO: 0.06 K/UL — SIGNIFICANT CHANGE UP (ref 0–0.2)
BASOPHILS NFR BLD AUTO: 0.5 % — SIGNIFICANT CHANGE UP (ref 0–1)
BASOPHILS NFR BLD AUTO: 0.5 % — SIGNIFICANT CHANGE UP (ref 0–1)
BILIRUB SERPL-MCNC: 0.4 MG/DL — SIGNIFICANT CHANGE UP (ref 0.2–1.2)
BILIRUB SERPL-MCNC: 0.4 MG/DL — SIGNIFICANT CHANGE UP (ref 0.2–1.2)
BUN SERPL-MCNC: 33 MG/DL — HIGH (ref 10–20)
BUN SERPL-MCNC: 33 MG/DL — HIGH (ref 10–20)
CALCIUM SERPL-MCNC: 8.2 MG/DL — LOW (ref 8.4–10.5)
CALCIUM SERPL-MCNC: 8.2 MG/DL — LOW (ref 8.4–10.5)
CHLORIDE SERPL-SCNC: 104 MMOL/L — SIGNIFICANT CHANGE UP (ref 98–110)
CHLORIDE SERPL-SCNC: 104 MMOL/L — SIGNIFICANT CHANGE UP (ref 98–110)
CO2 SERPL-SCNC: 28 MMOL/L — SIGNIFICANT CHANGE UP (ref 17–32)
CO2 SERPL-SCNC: 28 MMOL/L — SIGNIFICANT CHANGE UP (ref 17–32)
CREAT SERPL-MCNC: 1.6 MG/DL — HIGH (ref 0.7–1.5)
CREAT SERPL-MCNC: 1.6 MG/DL — HIGH (ref 0.7–1.5)
EGFR: 32 ML/MIN/1.73M2 — LOW
EGFR: 32 ML/MIN/1.73M2 — LOW
EOSINOPHIL # BLD AUTO: 0.1 K/UL — SIGNIFICANT CHANGE UP (ref 0–0.7)
EOSINOPHIL # BLD AUTO: 0.1 K/UL — SIGNIFICANT CHANGE UP (ref 0–0.7)
EOSINOPHIL NFR BLD AUTO: 0.9 % — SIGNIFICANT CHANGE UP (ref 0–8)
EOSINOPHIL NFR BLD AUTO: 0.9 % — SIGNIFICANT CHANGE UP (ref 0–8)
GLUCOSE SERPL-MCNC: 104 MG/DL — HIGH (ref 70–99)
GLUCOSE SERPL-MCNC: 104 MG/DL — HIGH (ref 70–99)
HCT VFR BLD CALC: 37.7 % — SIGNIFICANT CHANGE UP (ref 37–47)
HCT VFR BLD CALC: 37.7 % — SIGNIFICANT CHANGE UP (ref 37–47)
HGB BLD-MCNC: 11.4 G/DL — LOW (ref 12–16)
HGB BLD-MCNC: 11.4 G/DL — LOW (ref 12–16)
IMM GRANULOCYTES NFR BLD AUTO: 0.5 % — HIGH (ref 0.1–0.3)
IMM GRANULOCYTES NFR BLD AUTO: 0.5 % — HIGH (ref 0.1–0.3)
INR BLD: 1.87 RATIO — HIGH (ref 0.65–1.3)
INR BLD: 1.87 RATIO — HIGH (ref 0.65–1.3)
LYMPHOCYTES # BLD AUTO: 1.83 K/UL — SIGNIFICANT CHANGE UP (ref 1.2–3.4)
LYMPHOCYTES # BLD AUTO: 1.83 K/UL — SIGNIFICANT CHANGE UP (ref 1.2–3.4)
LYMPHOCYTES # BLD AUTO: 15.8 % — LOW (ref 20.5–51.1)
LYMPHOCYTES # BLD AUTO: 15.8 % — LOW (ref 20.5–51.1)
MAGNESIUM SERPL-MCNC: 1.9 MG/DL — SIGNIFICANT CHANGE UP (ref 1.8–2.4)
MAGNESIUM SERPL-MCNC: 1.9 MG/DL — SIGNIFICANT CHANGE UP (ref 1.8–2.4)
MCHC RBC-ENTMCNC: 27.7 PG — SIGNIFICANT CHANGE UP (ref 27–31)
MCHC RBC-ENTMCNC: 27.7 PG — SIGNIFICANT CHANGE UP (ref 27–31)
MCHC RBC-ENTMCNC: 30.2 G/DL — LOW (ref 32–37)
MCHC RBC-ENTMCNC: 30.2 G/DL — LOW (ref 32–37)
MCV RBC AUTO: 91.7 FL — SIGNIFICANT CHANGE UP (ref 81–99)
MCV RBC AUTO: 91.7 FL — SIGNIFICANT CHANGE UP (ref 81–99)
MONOCYTES # BLD AUTO: 0.92 K/UL — HIGH (ref 0.1–0.6)
MONOCYTES # BLD AUTO: 0.92 K/UL — HIGH (ref 0.1–0.6)
MONOCYTES NFR BLD AUTO: 8 % — SIGNIFICANT CHANGE UP (ref 1.7–9.3)
MONOCYTES NFR BLD AUTO: 8 % — SIGNIFICANT CHANGE UP (ref 1.7–9.3)
NEUTROPHILS # BLD AUTO: 8.6 K/UL — HIGH (ref 1.4–6.5)
NEUTROPHILS # BLD AUTO: 8.6 K/UL — HIGH (ref 1.4–6.5)
NEUTROPHILS NFR BLD AUTO: 74.3 % — SIGNIFICANT CHANGE UP (ref 42.2–75.2)
NEUTROPHILS NFR BLD AUTO: 74.3 % — SIGNIFICANT CHANGE UP (ref 42.2–75.2)
NRBC # BLD: 0 /100 WBCS — SIGNIFICANT CHANGE UP (ref 0–0)
NRBC # BLD: 0 /100 WBCS — SIGNIFICANT CHANGE UP (ref 0–0)
NT-PROBNP SERPL-SCNC: 2427 PG/ML — HIGH (ref 0–300)
NT-PROBNP SERPL-SCNC: 2427 PG/ML — HIGH (ref 0–300)
PLATELET # BLD AUTO: 322 K/UL — SIGNIFICANT CHANGE UP (ref 130–400)
PLATELET # BLD AUTO: 322 K/UL — SIGNIFICANT CHANGE UP (ref 130–400)
PMV BLD: 11.7 FL — HIGH (ref 7.4–10.4)
PMV BLD: 11.7 FL — HIGH (ref 7.4–10.4)
POTASSIUM SERPL-MCNC: 4.7 MMOL/L — SIGNIFICANT CHANGE UP (ref 3.5–5)
POTASSIUM SERPL-MCNC: 4.7 MMOL/L — SIGNIFICANT CHANGE UP (ref 3.5–5)
POTASSIUM SERPL-SCNC: 4.7 MMOL/L — SIGNIFICANT CHANGE UP (ref 3.5–5)
POTASSIUM SERPL-SCNC: 4.7 MMOL/L — SIGNIFICANT CHANGE UP (ref 3.5–5)
PROT SERPL-MCNC: 5.7 G/DL — LOW (ref 6–8)
PROT SERPL-MCNC: 5.7 G/DL — LOW (ref 6–8)
PROTHROM AB SERPL-ACNC: 21.7 SEC — HIGH (ref 9.95–12.87)
PROTHROM AB SERPL-ACNC: 21.7 SEC — HIGH (ref 9.95–12.87)
RBC # BLD: 4.11 M/UL — LOW (ref 4.2–5.4)
RBC # BLD: 4.11 M/UL — LOW (ref 4.2–5.4)
RBC # FLD: 17.7 % — HIGH (ref 11.5–14.5)
RBC # FLD: 17.7 % — HIGH (ref 11.5–14.5)
SODIUM SERPL-SCNC: 143 MMOL/L — SIGNIFICANT CHANGE UP (ref 135–146)
SODIUM SERPL-SCNC: 143 MMOL/L — SIGNIFICANT CHANGE UP (ref 135–146)
TROPONIN T SERPL-MCNC: <0.01 NG/ML — SIGNIFICANT CHANGE UP
TROPONIN T SERPL-MCNC: <0.01 NG/ML — SIGNIFICANT CHANGE UP
WBC # BLD: 11.57 K/UL — HIGH (ref 4.8–10.8)
WBC # BLD: 11.57 K/UL — HIGH (ref 4.8–10.8)
WBC # FLD AUTO: 11.57 K/UL — HIGH (ref 4.8–10.8)
WBC # FLD AUTO: 11.57 K/UL — HIGH (ref 4.8–10.8)

## 2023-10-17 PROCEDURE — 83880 ASSAY OF NATRIURETIC PEPTIDE: CPT

## 2023-10-17 PROCEDURE — 36415 COLL VENOUS BLD VENIPUNCTURE: CPT

## 2023-10-17 PROCEDURE — 96374 THER/PROPH/DIAG INJ IV PUSH: CPT

## 2023-10-17 PROCEDURE — 71045 X-RAY EXAM CHEST 1 VIEW: CPT | Mod: 26

## 2023-10-17 PROCEDURE — 80053 COMPREHEN METABOLIC PANEL: CPT

## 2023-10-17 PROCEDURE — 97162 PT EVAL MOD COMPLEX 30 MIN: CPT | Mod: GP

## 2023-10-17 PROCEDURE — 85610 PROTHROMBIN TIME: CPT

## 2023-10-17 PROCEDURE — 99284 EMERGENCY DEPT VISIT MOD MDM: CPT

## 2023-10-17 PROCEDURE — 99223 1ST HOSP IP/OBS HIGH 75: CPT | Mod: FS

## 2023-10-17 PROCEDURE — 84484 ASSAY OF TROPONIN QUANT: CPT

## 2023-10-17 PROCEDURE — G0378: CPT

## 2023-10-17 PROCEDURE — 99285 EMERGENCY DEPT VISIT HI MDM: CPT | Mod: 25

## 2023-10-17 PROCEDURE — 71045 X-RAY EXAM CHEST 1 VIEW: CPT

## 2023-10-17 PROCEDURE — 83735 ASSAY OF MAGNESIUM: CPT

## 2023-10-17 PROCEDURE — 85730 THROMBOPLASTIN TIME PARTIAL: CPT

## 2023-10-17 PROCEDURE — 93010 ELECTROCARDIOGRAM REPORT: CPT

## 2023-10-17 PROCEDURE — 96376 TX/PRO/DX INJ SAME DRUG ADON: CPT

## 2023-10-17 PROCEDURE — 85025 COMPLETE CBC W/AUTO DIFF WBC: CPT

## 2023-10-17 PROCEDURE — 93005 ELECTROCARDIOGRAM TRACING: CPT

## 2023-10-17 RX ORDER — RIVAROXABAN 15 MG-20MG
20 KIT ORAL
Refills: 0 | Status: DISCONTINUED | OUTPATIENT
Start: 2023-10-17 | End: 2023-10-17

## 2023-10-17 RX ORDER — LEVOTHYROXINE SODIUM 125 MCG
175 TABLET ORAL DAILY
Refills: 0 | Status: DISCONTINUED | OUTPATIENT
Start: 2023-10-17 | End: 2023-10-18

## 2023-10-17 RX ORDER — AMIODARONE HYDROCHLORIDE 400 MG/1
200 TABLET ORAL DAILY
Refills: 0 | Status: DISCONTINUED | OUTPATIENT
Start: 2023-10-17 | End: 2023-10-18

## 2023-10-17 RX ORDER — FUROSEMIDE 40 MG
40 TABLET ORAL ONCE
Refills: 0 | Status: COMPLETED | OUTPATIENT
Start: 2023-10-17 | End: 2023-10-17

## 2023-10-17 RX ORDER — METOPROLOL TARTRATE 50 MG
100 TABLET ORAL
Refills: 0 | Status: DISCONTINUED | OUTPATIENT
Start: 2023-10-17 | End: 2023-10-18

## 2023-10-17 RX ORDER — ALLOPURINOL 300 MG
100 TABLET ORAL AT BEDTIME
Refills: 0 | Status: DISCONTINUED | OUTPATIENT
Start: 2023-10-17 | End: 2023-10-18

## 2023-10-17 RX ORDER — ATORVASTATIN CALCIUM 80 MG/1
20 TABLET, FILM COATED ORAL AT BEDTIME
Refills: 0 | Status: DISCONTINUED | OUTPATIENT
Start: 2023-10-17 | End: 2023-10-18

## 2023-10-17 RX ORDER — OXYBUTYNIN CHLORIDE 5 MG
5 TABLET ORAL
Refills: 0 | Status: DISCONTINUED | OUTPATIENT
Start: 2023-10-17 | End: 2023-10-18

## 2023-10-17 RX ORDER — RIVAROXABAN 15 MG-20MG
15 KIT ORAL
Refills: 0 | Status: DISCONTINUED | OUTPATIENT
Start: 2023-10-17 | End: 2023-10-18

## 2023-10-17 RX ADMIN — Medication 100 MILLIGRAM(S): at 17:56

## 2023-10-17 RX ADMIN — Medication 5 MILLIGRAM(S): at 17:56

## 2023-10-17 RX ADMIN — RIVAROXABAN 15 MILLIGRAM(S): KIT at 18:14

## 2023-10-17 RX ADMIN — Medication 100 MILLIGRAM(S): at 22:08

## 2023-10-17 RX ADMIN — AMIODARONE HYDROCHLORIDE 200 MILLIGRAM(S): 400 TABLET ORAL at 17:56

## 2023-10-17 RX ADMIN — Medication 40 MILLIGRAM(S): at 16:40

## 2023-10-17 RX ADMIN — ATORVASTATIN CALCIUM 20 MILLIGRAM(S): 80 TABLET, FILM COATED ORAL at 22:30

## 2023-10-17 NOTE — ED CDU PROVIDER INITIAL DAY NOTE - OBJECTIVE STATEMENT
82y F pmh Afib xarelto, Anemia, FELIX, HTN, DLD, Hypothyroid, CHF presents for eval of palpitations. Pt endorses intermittent palpitations over the past couple days with associated worsening sob, aggravated with ambulation and lying supine. No relieving factors. Denies fever, ha, cp, cough, abd pain, weakness, numbness, dysuria, hematuria, n/v/d/c 82 year old female with pmhx of Paroxysmal AFib on Xarelto (s/p cardioversion 9/13/22),  CHFpEF, CKD 3b, chronic microcytic anemia, FELIX (on CPAP) HTN, DLD, hypothyroidism (history of thyroid cancer s/p resection), hx hip fracture, recent admission for CHF presents to ed with sob, leg swelling and palpitations. Pt sent in by PMD for IV diuresis. Pt has been taking her PO lasix with minimal relief. Pt is scheduled next week for ablation with her cardiologist dr reyez. pt denies cp, cough, abd pain, n/v/d, fever, chills, urinary symptoms, calf pain, recent travel or sick contacts.

## 2023-10-17 NOTE — ED CLERICAL - NS ED CLERK NOTE PRE-ARRIVAL INFORMATION; ADDITIONAL PRE-ARRIVAL INFORMATION
This patient is enrolled in the STAR readmission reduction initiative and has active care navigation. This patient can be followed up by the care navigation team within 24 hours.  To arrange close follow-up or to obtain additional clinical information, please call the contact number above. The on-call [facility] Hospitalist has been notified and will coordinate care in concert with the ED Physician including consults as necessary. Please reach out to the Hospitalist on-call directly (schedule on AMiON posted on the intranet). You may also call the Hospitalist Division at [phone] at either site. Consider CDU for management per guideline”

## 2023-10-17 NOTE — CONSULT NOTE ADULT - SUBJECTIVE AND OBJECTIVE BOX
Patient is a 82y old  Female who presents with a chief complaint of     OVERNIGHT EVENTS:  pt reports BECK and palpitation over the past few days  denies fever, chills, syncope, seizure, headache, cough, CP, abd pain, N/V/D, urinary symptoms,     SUBJECTIVE / INTERVAL HPI: Patient seen and examined at bedside.     VITAL SIGNS:  Vital Signs Last 24 Hrs  T(C): 36.8 (17 Oct 2023 15:25), Max: 36.9 (17 Oct 2023 13:12)  T(F): 98.2 (17 Oct 2023 15:25), Max: 98.4 (17 Oct 2023 13:12)  HR: 83 (17 Oct 2023 15:25) (83 - 101)  BP: 110/81 (17 Oct 2023 15:25) (110/81 - 148/68)  BP(mean): --  RR: 18 (17 Oct 2023 15:25) (17 - 18)  SpO2: 98% (17 Oct 2023 15:25) (98% - 98%)    Parameters below as of 17 Oct 2023 15:25  Patient On (Oxygen Delivery Method): room air        PHYSICAL EXAM:    General: WDWN  HEENT: NC/AT; PERRL, clear conjunctiva  Neck: supple  Cardiovascular: +S1/S2; RRR  Respiratory: CTA b/l; no W/R/R  Gastrointestinal: soft, NT/ND; +BSx4  Extremities: WWP; 2+ peripheral pulses; no edema   Neurological: AAOx3; no focal deficits    MEDICATIONS:  MEDICATIONS  (STANDING):  allopurinol 100 milliGRAM(s) Oral at bedtime  aMIOdarone    Tablet 200 milliGRAM(s) Oral daily  atorvastatin 20 milliGRAM(s) Oral at bedtime  levothyroxine 175 MICROGram(s) Oral daily  metoprolol tartrate 100 milliGRAM(s) Oral two times a day  oxybutynin 5 milliGRAM(s) Oral two times a day  rivaroxaban 15 milliGRAM(s) Oral with dinner    MEDICATIONS  (PRN):      ALLERGIES:  Allergies    No Known Allergies    Intolerances        LABS:                        11.4   11.57 )-----------( 322      ( 17 Oct 2023 15:01 )             37.7     10-17    143  |  104  |  33<H>  ----------------------------<  104<H>  4.7   |  28  |  1.6<H>    Ca    8.2<L>      17 Oct 2023 15:01  Mg     1.9     10-17    TPro  5.7<L>  /  Alb  3.8  /  TBili  0.4  /  DBili  x   /  AST  58<H>  /  ALT  44<H>  /  AlkPhos  109  10-17    PT/INR - ( 17 Oct 2023 15:01 )   PT: 21.70 sec;   INR: 1.87 ratio         PTT - ( 17 Oct 2023 15:01 )  PTT:40.6 sec  Urinalysis Basic - ( 17 Oct 2023 15:01 )    Color: x / Appearance: x / SG: x / pH: x  Gluc: 104 mg/dL / Ketone: x  / Bili: x / Urobili: x   Blood: x / Protein: x / Nitrite: x   Leuk Esterase: x / RBC: x / WBC x   Sq Epi: x / Non Sq Epi: x / Bacteria: x      CAPILLARY BLOOD GLUCOSE          RADIOLOGY & ADDITIONAL TESTS: Reviewed.    ASSESSMENT:    PLAN:  Patient is a 82y old  Female who presents with a chief complaint of SOB on exertion     OVERNIGHT EVENTS:  pt reports BECK and palpitation over the past few days  denies fever, chills, syncope, seizure, headache, cough, CP, abd pain, N/V/D, urinary symptoms,     SUBJECTIVE / INTERVAL HPI: Patient seen and examined at bedside.     VITAL SIGNS:  Vital Signs Last 24 Hrs  T(C): 36.8 (17 Oct 2023 15:25), Max: 36.9 (17 Oct 2023 13:12)  T(F): 98.2 (17 Oct 2023 15:25), Max: 98.4 (17 Oct 2023 13:12)  HR: 83 (17 Oct 2023 15:25) (83 - 101)  BP: 110/81 (17 Oct 2023 15:25) (110/81 - 148/68)  BP(mean): --  RR: 18 (17 Oct 2023 15:25) (17 - 18)  SpO2: 98% (17 Oct 2023 15:25) (98% - 98%)    Parameters below as of 17 Oct 2023 15:25  Patient On (Oxygen Delivery Method): room air        PHYSICAL EXAM:    General: old lady chronically looks ill   HEENT: NC/AT; PERRL, clear conjunctiva  Neck: supple  Cardiovascular: +S1/S2; irregular Rhythm    Respiratory: CTA b/l; mild b/l crackles at both bases   Gastrointestinal: soft, NT/ND; +BSx4  Extremities: WWP; 2+ peripheral pulses; no edema   Neurological: AAOx3; no focal deficits    MEDICATIONS:  MEDICATIONS  (STANDING):  allopurinol 100 milliGRAM(s) Oral at bedtime  aMIOdarone    Tablet 200 milliGRAM(s) Oral daily  atorvastatin 20 milliGRAM(s) Oral at bedtime  levothyroxine 175 MICROGram(s) Oral daily  metoprolol tartrate 100 milliGRAM(s) Oral two times a day  oxybutynin 5 milliGRAM(s) Oral two times a day  rivaroxaban 15 milliGRAM(s) Oral with dinner    MEDICATIONS  (PRN):      ALLERGIES:  Allergies    No Known Allergies    Intolerances        LABS:                        11.4   11.57 )-----------( 322      ( 17 Oct 2023 15:01 )             37.7     10-17    143  |  104  |  33<H>  ----------------------------<  104<H>  4.7   |  28  |  1.6<H>    Ca    8.2<L>      17 Oct 2023 15:01  Mg     1.9     10-17    TPro  5.7<L>  /  Alb  3.8  /  TBili  0.4  /  DBili  x   /  AST  58<H>  /  ALT  44<H>  /  AlkPhos  109  10-17    PT/INR - ( 17 Oct 2023 15:01 )   PT: 21.70 sec;   INR: 1.87 ratio         PTT - ( 17 Oct 2023 15:01 )  PTT:40.6 sec  Urinalysis Basic - ( 17 Oct 2023 15:01 )    Color: x / Appearance: x / SG: x / pH: x  Gluc: 104 mg/dL / Ketone: x  / Bili: x / Urobili: x   Blood: x / Protein: x / Nitrite: x   Leuk Esterase: x / RBC: x / WBC x   Sq Epi: x / Non Sq Epi: x / Bacteria: x      CAPILLARY BLOOD GLUCOSE          RADIOLOGY & ADDITIONAL TESTS: Reviewed.    ASSESSMENT:    PLAN:

## 2023-10-17 NOTE — ED ADULT TRIAGE NOTE - CHIEF COMPLAINT QUOTE
sent in by PMD for fluid build up on lungs as well as being in rapid a-fib in office & possibly needing cardioversion sent in by PMD for fluid build up on lungs as well as being in rapid a-fib in office & has appointment for cardioversion next week but may need sooner

## 2023-10-17 NOTE — ED PROVIDER NOTE - CHIEF COMPLAINT
SPOKE with daughter informed her nerve conduction study was normal      ----- Message from CONNOR Gonzales sent at 12/5/2019 11:23 AM EST -----  Inform the patient that her nerve conduction study was normal    
The patient is a 82y Female complaining of rapid heart beat.

## 2023-10-17 NOTE — ED ADULT NURSE NOTE - CHIEF COMPLAINT QUOTE
sent in by PMD for fluid build up on lungs as well as being in rapid a-fib in office & has appointment for cardioversion next week but may need sooner

## 2023-10-17 NOTE — CONSULT NOTE ADULT - ASSESSMENT
81-year-old with PMHx of Paroxysmal AFib on Xarelto (s/p cardioversion 9/13/22), HFpEF, CKD 3b, chronic microcytic anemia, FELIX (on CPAP) HTN, DLD, hypothyroidism (history of thyroid cancer s/p resection), hx hip fracture, presented to the ED complaining of palpitations and shortness of breath  recently discharged for acute decompensated HF     Acute HFpEF  FELIX on CPAP  HTN / DL  KOBI on CKD-3  PAF on Xarelto  Obesity  Transaminitis.   CKD-3    Plans     81-year-old with PMHx of Paroxysmal AFib on Xarelto (s/p cardioversion 9/13/22), HFpEF, CKD 3b, chronic microcytic anemia, FELIX (on CPAP) HTN, DLD, hypothyroidism (history of thyroid cancer s/p resection), hx hip fracture, presented to the ED complaining of palpitations and shortness of breath  recently discharged for acute decompensated HF     Subacute HFpEF  FELIX on CPAP  HTN / DL  KOBI on CKD-3  PAF on Xarelto  Obesity  Transaminitis.   CKD-3    Plans    - subacute Hf, needs up titration of her home lasix from 20 to 40mg qd, assess need of O2 on ambulation before discharge   - agree w IV lasix 40mg qd, repeat CXR in AM  - per previous charts, she was planned for cardiac cath as inpatient last hospital stay, but given KOBI was delayed and to be as outpt (not sure what was the indications)  - can get cardio eval for further assistance   - resume home meds, monitor Cr and lytes

## 2023-10-17 NOTE — ED ADULT NURSE REASSESSMENT NOTE - NS ED NURSE REASSESS COMMENT FT1
Assumed care from previous day shift nurse Tiburcio jaime, OBSERVATION status c/o Afib , CHF exacerbation. Pt p/w palpitations , SOB with exertion . Pt AO x 4  , denies chest pain , abdominal pain , headache , palpitations , nausea this time , no labored breathing noted , fall alarm on , call light within reached , instructed not to get up without any assistance

## 2023-10-17 NOTE — ED PROVIDER NOTE - OBJECTIVE STATEMENT
82y F pmh Afib xarelto, Anemia, FELIX, HTN, DLD, Hypothyroid, CHF presents for eval of palpitations. Pt endorses intermittent palpitations over the past couple days with associated worsening sob, aggravated with ambulation and lying supine. No relieving factors. Denies fever, ha, cp, cough, abd pain, weakness, numbness, dysuria, hematuria, n/v/d/c

## 2023-10-17 NOTE — ED ADULT NURSE NOTE - NSFALLHARMRISKINTERV_ED_ALL_ED

## 2023-10-17 NOTE — ED PROVIDER NOTE - PHYSICAL EXAMINATION
CONST: NAD  CARD: S1 S2; No jvd  RESP: Faint b/l LL crackles. No distress  GI: Soft, non-tender, non-distended. normal BS  MS: Normal ROM in all extremities. pulses 2 +. no calf tenderness or swelling  SKIN: Warm, dry, no acute rashes. Good turgor  NEURO: A&Ox3, No focal deficits. Strength 5/5 with no sensory deficits.

## 2023-10-18 VITALS
SYSTOLIC BLOOD PRESSURE: 116 MMHG | TEMPERATURE: 98 F | RESPIRATION RATE: 18 BRPM | HEART RATE: 77 BPM | DIASTOLIC BLOOD PRESSURE: 72 MMHG | OXYGEN SATURATION: 96 %

## 2023-10-18 LAB
TROPONIN T SERPL-MCNC: <0.01 NG/ML — SIGNIFICANT CHANGE UP
TROPONIN T SERPL-MCNC: <0.01 NG/ML — SIGNIFICANT CHANGE UP

## 2023-10-18 PROCEDURE — 93010 ELECTROCARDIOGRAM REPORT: CPT

## 2023-10-18 PROCEDURE — 71045 X-RAY EXAM CHEST 1 VIEW: CPT | Mod: 26

## 2023-10-18 PROCEDURE — 99239 HOSP IP/OBS DSCHRG MGMT >30: CPT

## 2023-10-18 PROCEDURE — 99232 SBSQ HOSP IP/OBS MODERATE 35: CPT

## 2023-10-18 RX ORDER — FUROSEMIDE 40 MG
40 TABLET ORAL ONCE
Refills: 0 | Status: COMPLETED | OUTPATIENT
Start: 2023-10-18 | End: 2023-10-18

## 2023-10-18 RX ADMIN — Medication 5 MILLIGRAM(S): at 06:51

## 2023-10-18 RX ADMIN — Medication 100 MILLIGRAM(S): at 06:51

## 2023-10-18 RX ADMIN — Medication 175 MICROGRAM(S): at 06:51

## 2023-10-18 RX ADMIN — Medication 40 MILLIGRAM(S): at 08:38

## 2023-10-18 RX ADMIN — AMIODARONE HYDROCHLORIDE 200 MILLIGRAM(S): 400 TABLET ORAL at 06:51

## 2023-10-18 NOTE — CONSULT NOTE ADULT - ASSESSMENT
81 Y/O F with PMHx of HTN, HLD, Hypothyroidism, A Fib persistent, s/p CV 9/22, HFmrEF, CKD III p/w SOB/BECK and palpitations. Pt kept in Obs for possible CHFmrEF exacerbation s/p IV Lasix. EKG and tele reviewed A Fib with rate controlled. Pt is feeling much better today, saturating 98% on RA.  POCUS showed HFmrEF, Collapsed IVC.    IMPRESSION  HFmrEF exacerbation - resolved  Persistent A Fib - rate controlled  HTN, HLD  Hypothyroidism  CKD III    RECOMMENDATIONS  switch diuretics to PO Torsemide 20mg qd  c/w Lopressor 100mg BID and Amio 200mg qd  c/w Xarelto 15mg qd  c/w lipitor 20mg qhs  will start ARB/entresto as outpatient if cret remains stable  pt is scheduled for FAITH/CV on 10/24  follow up with Dr Quintero as outpatient after FAITH/CV  Ambulate as tolerated

## 2023-10-18 NOTE — ED CDU PROVIDER DISPOSITION NOTE - PROVIDER TOKENS
PROVIDER:[TOKEN:[[id number]],FOLLOWUP:[1-3 Days]],PROVIDER:[TOKEN:[[id number]],FOLLOWUP:[1-3 Days]]

## 2023-10-18 NOTE — ED CDU PROVIDER SUBSEQUENT DAY NOTE - HISTORY
FF: pt resting comfortably. pt pending cardio consult and as per medicine note repeat chest xray in the AM after 40mg of lasix given. as per internal medicine note plan is - subacute Hf, needs up titration of her home lasix from 20 to 40mg qd, assess need of O2 on ambulation before discharge, agree w IV lasix 40mg qd, repeat CXR in AM, - per previous charts, she was planned for cardiac cath as inpatient last hospital stay, but given KOBI was delayed and to be as outpt (not sure what was the indications), - can get cardio eval for further assistance, resume home meds, monitor Cr and lytes  :

## 2023-10-18 NOTE — PHYSICAL THERAPY INITIAL EVALUATION ADULT - PERTINENT HX OF CURRENT PROBLEM, REHAB EVAL
81-year-old with PMHx of Paroxysmal AFib on Xarelto (s/p cardioversion 9/13/22), HFpEF, CKD 3b, chronic microcytic anemia, FELIX (on CPAP) HTN, DLD, hypothyroidism (history of thyroid cancer s/p resection), hx hip fracture, presented to the ED complaining of palpitations and shortness of breath  recently discharged for acute decompensated HF

## 2023-10-18 NOTE — ED CDU PROVIDER DISPOSITION NOTE - PATIENT PORTAL LINK FT
You can access the FollowMyHealth Patient Portal offered by [facility] by registering at the following website: [email or web address]. By joining [employer]’s FollowMyHealth portal, you will also be able to view your health information using other applications (apps) compatible with our system.

## 2023-10-18 NOTE — CONSULT NOTE ADULT - ATTENDING COMMENTS
Agree with above. has FAITH/DCCV scheduled as outpatient next tuesday. May be discharged from a cardiac perspective.

## 2023-10-18 NOTE — ED CDU PROVIDER DISPOSITION NOTE - CARE PROVIDER_API CALL
Tad Savage  Internal Medicine  [facility] [place] [address]  Phone: [phone]  Fax: [phone]  Follow Up Time: 1-3 Days    Madi Quintero  Interventional Cardiology  [address] [place] [address], [facility] [address]  [place] [address]  Phone: [phone]  Fax: [phone]  Follow Up Time: 1-3 Days

## 2023-10-18 NOTE — PHYSICAL THERAPY INITIAL EVALUATION ADULT - NSACTIVITYREC_GEN_A_PT
pt performed all mobility tasks with supervision, can be  home with family support.  She uses a rolling walker for ambulation in house and has bilateral hand rails on her stairs.

## 2023-10-18 NOTE — ED CDU PROVIDER DISPOSITION NOTE - CLINICAL COURSE
82-year-old female presenting for evaluation of CHF exacerbation.  Labs EKG imaging reviewed.  Status post cardiology and PT evaluation.  Patient feeling improved status post diuretics.  Ambulatory.  Aware of all results, given a copy of all available results, comfortable with discharge and follow-up outpatient, strict return precautions given. Endorses understanding and aware they can return at any time for further evaluation. No further questions or concerns at this time.

## 2023-10-18 NOTE — CONSULT NOTE ADULT - SUBJECTIVE AND OBJECTIVE BOX
HPI:  81 Y/O F with PMHx of HTN, HLD, Hypothyroidism, A Fib persistent, s/p CV , HFmrEF, CKD III p/w SOB/BECK and palpitations. Pt kept in Obs for possible CHFmrEF exacerbation s/p IV Lasix. EKG and tele reviewed A Fib with rate controlled. Pt is feeling much better today, saturating 98% on RA.  POCUS showed HFmrEF, Collapsed IVC.      PAST MEDICAL & SURGICAL HISTORY  HTN (hypertension)    High cholesterol    Hypothyroid  s/p thyroid ca surgery    Afib  on xarelto    Sleep apnea    Osteoarthritis    CKD (chronic kidney disease) stage 3, GFR 30-59 ml/min    H/O thyroidectomy    S/P rotator cuff surgery        FAMILY HISTORY:  FAMILY HISTORY:  Family history of lung cancer (Mother)    Family history of abdominal aortic aneurysm (AAA) (Father)        SOCIAL HISTORY:  Social History:      ALLERGIES:  No Known Allergies      MEDICATIONS:  allopurinol 100 milliGRAM(s) Oral at bedtime  aMIOdarone    Tablet 200 milliGRAM(s) Oral daily  atorvastatin 20 milliGRAM(s) Oral at bedtime  levothyroxine 175 MICROGram(s) Oral daily  metoprolol tartrate 100 milliGRAM(s) Oral two times a day  oxybutynin 5 milliGRAM(s) Oral two times a day  rivaroxaban 15 milliGRAM(s) Oral with dinner    HOME MEDICATIONS:  Home Medications:  allopurinol 100 mg oral tablet: orally once a day (at bedtime) (26 Sep 2023 07:06)  amiodarone 200 mg oral tablet: 1 tab(s) orally once a day (26 Sep 2023 07:06)  atorvastatin 20 mg oral tablet: 1 tab(s) orally once a day (26 Sep 2023 07:06)  cabergoline 0.5 mg oral tablet: 1 tab(s) orally once a week, monday night (26 Sep 2023 07:06)  levothyroxine 175 mcg (0.175 mg) oral tablet: 1 tab(s) orally once a day (26 Sep 2023 07:06)  Toviaz 4 mg oral tablet, extended release: 1 tab(s) orally once a day (27 Sep 2023 14:56)      VITALS:   T(F): 97.9 (10-18 @ 07:45), Max: 98.4 (10-17 @ 13:12)  HR: 77 (10-18 @ 07:45) (77 - 101)  BP: 116/72 (10-18 @ 07:45) (110/81 - 148/68)  BP(mean): --  RR: 18 (10-18 @ 07:45) (17 - 18)  SpO2: 96% (10-18 @ 07:45) (96% - 99%)    I&O's Summary    17 Oct 2023 07:01  -  18 Oct 2023 07:00  --------------------------------------------------------  IN: 0 mL / OUT: 1000 mL / NET: -1000 mL        REVIEW OF SYSTEMS:  CONSTITUTIONAL: No weakness, fevers or chills  HEENT: No visual changes, neck/ear pain  RESPIRATORY: No cough  CARDIOVASCULAR: See HPI  GASTROINTESTINAL: No abdominal pain. No nausea, vomiting, diarrhea   GENITOURINARY: No dysuria, frequency or hematuria  NEUROLOGICAL: No new focal deficits  SKIN: No new rashes    PHYSICAL EXAM:  General: Not in distress.   HEENT: EOMI  Cardio: regular, S1, S2  Pulm: B/L BS.    Abdomen: Soft, non-tender, non-distended. Normoactive bowel sounds  Extremities: No edema b/l le  Neuro: A&O x3. No focal deficits    LABS:                        11.4   11.57 )-----------( 322      ( 17 Oct 2023 15:01 )             37.7     10-    143  |  104  |  33<H>  ----------------------------<  104<H>  4.7   |  28  |  1.6<H>    Ca    8.2<L>      17 Oct 2023 15:01  Mg     1.9     10-    TPro  5.7<L>  /  Alb  3.8  /  TBili  0.4  /  DBili  x   /  AST  58<H>  /  ALT  44<H>  /  AlkPhos  109  10-    PT/INR - ( 17 Oct 2023 15:01 )   PT: 21.70 sec;   INR: 1.87 ratio         PTT - ( 17 Oct 2023 15:01 )  PTT:40.6 sec  Troponin T, Serum: <0.01 ng/mL (10-18-23 @ 08:33)  Troponin T, Serum: <0.01 ng/mL (10-17-23 @ 15:01)    CARDIAC MARKERS ( 18 Oct 2023 08:33 )  x     / <0.01 ng/mL / x     / x     / x      CARDIAC MARKERS ( 17 Oct 2023 15:01 )  x     / <0.01 ng/mL / x     / x     / x            SARS-CoV-2: NotDetec (28 Sep 2023 11:01)      RADIOLOGY:  -CXR: < from: Xray Chest 1 View- PORTABLE-Urgent (Xray Chest 1 View- PORTABLE-Urgent .) (10.17.23 @ 15:34) >  Impression:    Cardiomegaly without difference.    < end of copied text >    -TTE: < from: TTE Echo Complete w/ Contrast w/ Doppler (23 @ 10:45) >    Summary:   1. Mildly decreased global left ventricular systolic function.   2. Left ventricular ejection fraction, by visual estimation, is 45 to   50%. There is vxnt-he-kjha variability.   3. Entire septum is hypokinetic.   4. Severe concentric left ventricular hypertrophy.   5. The left ventriculardiastolic function could not be assessed in this   study.   6. Moderately reduced RV systolic function.   7. Mild mitral valve regurgitation.   8. Mild pulmonic valve regurgitation.   9. Moderate tricuspid regurgitation.  10. Dilatation of the ascending aorta, measuring 3.8 cm (indexed 2.01   cm/m2).  11. Severely dilated pulmonary artery.  12. Estimated pulmonary artery systolic pressure is 38.0 mmHg assuming a   right atrial pressure of 8 mmHg, which is consistent with borderline   pulmonary hypertension.  13. Small pericardial effusion.  14. Endocardial visualization was enhanced with intravenous echo contrast.  15. Compared to the previous study 9/15/2022, the LV function has   decreased.    PHYSICIAN INTERPRETATION:  Left Ventricle: Endocardial visualization was enhanced with intravenous   echo contrast. Left ventricular wall thickness is severely increased.   There is severe concentric left ventricular hypertrophy. Global LV   systolic function was mildly decreased. Left ventricularejection   fraction, by visual estimation, is 45 to 50%. Abnormal (paradoxical)   septal motion, consistent with right ventricular volume overload and/or   elevated RV end-diastolic pressure. The left ventricular diastolic   function could not be assessed in this study.      LV Wall Scoring:  The entire septum is hypokinetic. All remaining scored segments are   normal.    Right Ventricle: The right ventricular size is normal. RV systolic   function is moderately reduced.  Left Atrium: Severely enlarged left atrium.  Right Atrium: Mildly enlarged right atrium.  Pericardium: A small pericardial effusion is present.  Mitral Valve: There is mild mitral annular calcification. No evidence of   mitral stenosis. Mild mitral valve regurgitation is seen.  Tricuspid Valve: Structurally normal tricuspid valve, with normal leaflet   excursion. Moderate tricuspid regurgitation is visualized. Estimated   pulmonary artery systolic pressure is 38.0 mmHg assuming a right atrial   pressure of 8 mmHg, which is consistent with borderline pulmonary   hypertension.  Aortic Valve: No evidence of aortic stenosis. Sclerotic aortic valve with   normal opening. No evidence of aortic valve regurgitation is seen.  Pulmonic Valve: Structurally normal pulmonic valve, with normal leaflet   excursion. Mild pulmonic valve regurgitation.  Aorta: The aortic root is normal in size and structure. There is   dilatation of the ascending aorta.  Pulmonary Artery: The pulmonary artery is severely dilated.  Venous: The inferior vena cava was dilated, with respiratory size   variation greater than 50%.  In comparison to the previous echocardiogram(s): Prior examinations are   available and were reviewed for comparison purposes. Compared to the   previous study 9/15/2022,the LV function has decreased.    < end of copied text >    -STRESS TEST < from: NM Nuclear Stress Pharmacologic Multiple (22 @ 13:31) >  Impression:  1. NO EVIDENCE FOR ISCHEMIA DURING ADENOSINE INFUSION.  2. NORMAL RESTING LEFT VENTRICULAR WALL MOTION AND WALL THICKENING.  3. LEFT VENTRICULAR EJECTION FRACTION OF  75 % WHICH IS WITHIN RANGE OF   NORMAL.    --- End of Report ---    < end of copied text >    -CATHETERIZATION: NA    -OTHER:  EC Lead ECG:   Ventricular Rate 87 BPM    Atrial Rate 117 BPM    QRS Duration 136 ms    Q-T Interval 432 ms    QTC Calculation(Bazett) 519 ms    R Axis 253 degrees    T Axis 79 degrees    Diagnosis Line Atrial fibrillation  Right superior axis deviation  Non-specific intra-ventricular conduction block  Cannot rule out Septal infarct , age undetermined  Abnormal ECG    Confirmed by Behuria, Supreeti ([id number]) on 10/17/2023 4:11:34 PM (10-17 @ 13:16)

## 2023-10-18 NOTE — PHYSICAL THERAPY INITIAL EVALUATION ADULT - GENERAL OBSERVATIONS, REHAB EVAL
10:40-11:10 pt encountered in ED stretcher Jazlyn, + tiera rodríguez.  Pt had good tolerance for trtansfers, ambulation and stair climbing.  There is no need for skilled PT at this time.

## 2023-10-18 NOTE — ED CDU PROVIDER SUBSEQUENT DAY NOTE - CLINICAL SUMMARY MEDICAL DECISION MAKING FREE TEXT BOX
pt feeling minimally improved at this time. lungs cta b/l with exception of dminished breath sounds lower bases b/l. 2+ pitting edema to the bilateral lower extremities. pt further diuresed. pending cardiology eval.

## 2023-10-18 NOTE — ED CDU PROVIDER SUBSEQUENT DAY NOTE - PROGRESS NOTE DETAILS
Patient seen by hospitalist and PT. Per PT, patient can be dc. Awaiting cardiology eval. Seen by Dr Card, recommends Torsemide 20mg, can dc home

## 2023-10-18 NOTE — PHYSICAL THERAPY INITIAL EVALUATION ADULT - ADDITIONAL COMMENTS
pt lives in private home alone, has supportive family.  She ambulates with RW and was able to climb steps in her home by herself PTA.

## 2023-10-18 NOTE — PROGRESS NOTE ADULT - SUBJECTIVE AND OBJECTIVE BOX
JEFFERY UGALDE  82y  [facility] ED Observation 007 A      Patient is a 82y old  Female who presents with a chief complaint of     INTERVAL HPI/OVERNIGHT EVENTS:      patient reported mild pain in her lower legs  she also stated that she lives alone and might need more help  she denies sob, chest pain...         FAMILY HISTORY:  Family history of lung cancer (Mother)    Family history of abdominal aortic aneurysm (AAA) (Father)      T(C): 36.6 (10-18-23 @ 07:45), Max: 36.9 (10-17-23 @ 13:12)  HR: 77 (10-18-23 @ 07:45) (77 - 101)  BP: 116/72 (10-18-23 @ 07:45) (110/81 - 148/68)  RR: 18 (10-18-23 @ 07:45) (17 - 18)  SpO2: 96% (10-18-23 @ 07:45) (96% - 99%)  Wt(kg): --Vital Signs Last 24 Hrs  T(C): 36.6 (18 Oct 2023 07:45), Max: 36.9 (17 Oct 2023 13:12)  T(F): 97.9 (18 Oct 2023 07:45), Max: 98.4 (17 Oct 2023 13:12)  HR: 77 (18 Oct 2023 07:45) (77 - 101)  BP: 116/72 (18 Oct 2023 07:45) (110/81 - 148/68)  BP(mean): --  RR: 18 (18 Oct 2023 07:45) (17 - 18)  SpO2: 96% (18 Oct 2023 07:45) (96% - 99%)    Parameters below as of 18 Oct 2023 07:45  Patient On (Oxygen Delivery Method): room air        PHYSICAL EXAM:  GENERAL: NAD, well-groomed, well-developed  NERVOUS SYSTEM:  Alert & Oriented X3   PULM: Clear to auscultation bilaterally  CARDIAC: Regular rate and rhythm; No murmurs, rubs, or gallops  GI: Soft, Nontender, Nondistended; Bowel sounds present  EXTREMITIES:  chronic changes     Consultant(s) Notes Reviewed:  [x ] YES  [ ] NO  Care Discussed with Consultants/Other Providers [ x] YES  [ ] NO    LABS:                            11.4   11.57 )-----------( 322      ( 17 Oct 2023 15:01 )             37.7   10-17    143  |  104  |  33<H>  ----------------------------<  104<H>  4.7   |  28  |  1.6<H>    Ca    8.2<L>      17 Oct 2023 15:01  Mg     1.9     10-17    TPro  5.7<L>  /  Alb  3.8  /  TBili  0.4  /  DBili  x   /  AST  58<H>  /  ALT  44<H>  /  AlkPhos  109  10-17            allopurinol 100 milliGRAM(s) Oral at bedtime  aMIOdarone    Tablet 200 milliGRAM(s) Oral daily  atorvastatin 20 milliGRAM(s) Oral at bedtime  levothyroxine 175 MICROGram(s) Oral daily  metoprolol tartrate 100 milliGRAM(s) Oral two times a day  oxybutynin 5 milliGRAM(s) Oral two times a day  rivaroxaban 15 milliGRAM(s) Oral with dinner      81-year-old with PMHx of Paroxysmal AFib on Xarelto (s/p cardioversion 9/13/22), HFpEF, CKD 3b, chronic microcytic anemia, FELIX (on CPAP) HTN, DLD, hypothyroidism (history of thyroid cancer s/p resection), hx hip fracture, presented to the ED complaining of palpitations and shortness of breath  recently discharged for acute decompensated HF       1. Mild acute diastolic CHF resolved  - telemetry no events   - Trop negative*1. but no chest pain   - Cont statin             - Lipid profile   - Cont metoprolol. HR:77    - Was on IV lasix. DC on lasix 40mg po daily or torsemide 20mg po daily   - can't give spironolactone due to low GFR  * Last echo showed hypokinesis and pt was schedule for cath this month   - Cardiac eval :pending  - PT eval:pending     2. FELIX on CPAP    3. hypertension   - cont home meds    4. CKD stage III stable     5. paroxysmal afib   - Cont xarelto     Patient is medically clear for discharge on higher dose of diuretic. Get pt eval to ensure safety at home. cardiac eval before dc to check if it's ok to defer cath for outpatient as initially planned

## 2023-10-20 ENCOUNTER — TRANSCRIPTION ENCOUNTER (OUTPATIENT)
Age: 83
End: 2023-10-20

## 2023-10-24 ENCOUNTER — OUTPATIENT (OUTPATIENT)
Dept: OUTPATIENT SERVICES | Facility: HOSPITAL | Age: 83
LOS: 1 days | Discharge: HOME | End: 2023-10-24
Payer: MEDICARE

## 2023-10-24 DIAGNOSIS — Z98.890 OTHER SPECIFIED POSTPROCEDURAL STATES: Chronic | ICD-10-CM

## 2023-10-24 DIAGNOSIS — I48.91 UNSPECIFIED ATRIAL FIBRILLATION: ICD-10-CM

## 2023-10-24 DIAGNOSIS — I34.9 NONRHEUMATIC MITRAL VALVE DISORDER, UNSPECIFIED: ICD-10-CM

## 2023-10-24 PROCEDURE — 93312 ECHO TRANSESOPHAGEAL: CPT

## 2023-10-24 PROCEDURE — 93010 ELECTROCARDIOGRAM REPORT: CPT

## 2023-10-24 PROCEDURE — 93005 ELECTROCARDIOGRAM TRACING: CPT | Mod: XU

## 2023-10-24 PROCEDURE — 93320 DOPPLER ECHO COMPLETE: CPT

## 2023-10-24 PROCEDURE — 92960 CARDIOVERSION ELECTRIC EXT: CPT

## 2023-10-24 PROCEDURE — 93325 DOPPLER ECHO COLOR FLOW MAPG: CPT

## 2023-10-24 RX ORDER — METOPROLOL TARTRATE 50 MG
100 TABLET ORAL
Refills: 0 | Status: DISCONTINUED | OUTPATIENT
Start: 2023-10-24 | End: 2023-10-24

## 2023-10-24 RX ORDER — FUROSEMIDE 40 MG
20 TABLET ORAL DAILY
Refills: 0 | Status: DISCONTINUED | OUTPATIENT
Start: 2023-10-24 | End: 2023-10-24

## 2023-10-24 RX ORDER — ALLOPURINOL 300 MG
100 TABLET ORAL AT BEDTIME
Refills: 0 | Status: DISCONTINUED | OUTPATIENT
Start: 2023-10-24 | End: 2023-10-24

## 2023-10-24 RX ORDER — LEVOTHYROXINE SODIUM 125 MCG
175 TABLET ORAL DAILY
Refills: 0 | Status: DISCONTINUED | OUTPATIENT
Start: 2023-10-24 | End: 2023-10-24

## 2023-10-24 RX ORDER — AMIODARONE HYDROCHLORIDE 400 MG/1
200 TABLET ORAL DAILY
Refills: 0 | Status: DISCONTINUED | OUTPATIENT
Start: 2023-10-24 | End: 2023-10-24

## 2023-10-24 RX ORDER — RIVAROXABAN 15 MG-20MG
15 KIT ORAL
Refills: 0 | Status: DISCONTINUED | OUTPATIENT
Start: 2023-10-24 | End: 2023-10-24

## 2023-10-24 RX ORDER — ATORVASTATIN CALCIUM 80 MG/1
20 TABLET, FILM COATED ORAL AT BEDTIME
Refills: 0 | Status: DISCONTINUED | OUTPATIENT
Start: 2023-10-24 | End: 2023-10-24

## 2023-10-24 NOTE — ASU PATIENT PROFILE, ADULT - TEACHING/LEARNING FACTORS IMPACT ABILITY TO LEARN
Patient is 23 y.o. Dulce Joseph  has been on [facility] none

## 2023-10-24 NOTE — H&P CARDIOLOGY - HISTORY OF PRESENT ILLNESS
81 yo F with PMHX of Persistent AFib s/p DCCV 2022, HFmrEF, CKD III, HTN, Hypothyroidism presents for FAITH with DCCV.   83 yo F with PMHX of Persistent AFib s/p DCCV 2022, HFmrEF, CKD III, HTN, Hypothyroidism presents for FAITH with DCCV.   83 yo F with PMHX of Persistent AFib s/p DCCV 2022, HFmrEF, CKD III, HTN, Hypothyroidism presents for FAITH with DCCV.  Reports frequent episodes of palpitations and dyspnea on exertion.  Is on Metoprolol 100 mg BID and Amiodarone 200 mg once daily.   Denies social hx.    81 yo F with PMHX of Persistent AFib s/p DCCV 2022, HFmrEF, CKD III, HTN, Hypothyroidism presents for FAITH with DCCV.  Reports frequent episodes of palpitations and dyspnea on exertion.  Is on Metoprolol 100 mg BID and Amiodarone 200 mg once daily.   Denies social hx.

## 2023-10-24 NOTE — CHART NOTE - NSCHARTNOTEFT_GEN_A_CORE
POST OPERATIVE PROCEDURAL DOCUMENTATION  PRE-OP DIAGNOSIS: Atrial Fibrillation      PROCEDURE: Transesophageal Echocardiogram     Primary Physician: Dr. Quintero  Cardiology Fellow: Phoebe    ANESTHESIA TYPE  [  ] General Anesthesia  [ x ] Conscious Sedation  [  ] Local/Regional    CONDITION  [  ] Critical  [  ] Serious  [  ] Fair  [ x ] Good    SPECIMENS REMOVED (IF APPLICABLE): N/A    IMPLANTS (IF APPLICABLE): None    ESTIMATED BLOOD LOSS: None    COMPLICATIONS: None    After risks and benefits of procedures were explained, informed consent was obtained and placed in chart.  Refer to Anesthesia note for sedation details.  The FAITH probe was passed into the esophagus without difficulty.  Transesophageal and transgastric images were obtained.  The FAITH probe was removed without difficulty and examined.  There was no evidence for bleeding.  The patient tolerated the procedure well without any immediate FAITH-related complications.      Preliminary Findings:  LA: Mildly enlarged  JOE: Left atrial appendage was clear of clot and smoke. Normal doppler velocities   LV: LVEF was estimated at 40-45%  MV: Moderate MR  AV: No AI, no  AS  RA: Mildly enlarged  TV: No TR  PV: No FL, no PS    Patient successfully converted to sinus rhythm with 200 J of synchronized direct current cardioversion (DCCV) via AP pads.      PLAN OF CARE:  [x] Discharge home      [x] Continue Xarelto  [x] No eating or drinking for 1 hour    [x] No driving for 24 hours POST OPERATIVE PROCEDURAL DOCUMENTATION  PRE-OP DIAGNOSIS: Atrial Fibrillation      PROCEDURE: Transesophageal Echocardiogram     Primary Physician: Dr. Quintero  Cardiology Fellow: Phoebe    ANESTHESIA TYPE  [  ] General Anesthesia  [ x ] Conscious Sedation  [  ] Local/Regional    CONDITION  [  ] Critical  [  ] Serious  [  ] Fair  [ x ] Good    SPECIMENS REMOVED (IF APPLICABLE): N/A    IMPLANTS (IF APPLICABLE): None    ESTIMATED BLOOD LOSS: None    COMPLICATIONS: None    After risks and benefits of procedures were explained, informed consent was obtained and placed in chart.  Refer to Anesthesia note for sedation details.  The FAITH probe was passed into the esophagus without difficulty.  Transesophageal and transgastric images were obtained.  The FAITH probe was removed without difficulty and examined.  There was no evidence for bleeding.  The patient tolerated the procedure well without any immediate FAITH-related complications.      Preliminary Findings:  LA: Mildly enlarged  JOE: Left atrial appendage was clear of clot and smoke. Normal doppler velocities   LV: LVEF was estimated at 40-45%  MV: Moderate MR  AV: No AI, no  AS  RA: Mildly enlarged  TV: No TR  PV: No AL, no PS    Patient successfully converted to sinus rhythm with 200 J of synchronized direct current cardioversion (DCCV) via AP pads.      PLAN OF CARE:  [x] Discharge home      [x] Continue Xarelto  [x] No eating or drinking for 1 hour    [x] No driving for 24 hours POST OPERATIVE PROCEDURAL DOCUMENTATION  PRE-OP DIAGNOSIS: Atrial Fibrillation      PROCEDURE: Transesophageal Echocardiogram     Primary Physician: Dr. Quintero  Cardiology Fellow: Phoebe    ANESTHESIA TYPE  [  ] General Anesthesia  [ x ] Conscious Sedation  [  ] Local/Regional    CONDITION  [  ] Critical  [  ] Serious  [  ] Fair  [ x ] Good    SPECIMENS REMOVED (IF APPLICABLE): N/A    IMPLANTS (IF APPLICABLE): None    ESTIMATED BLOOD LOSS: None    COMPLICATIONS: None    After risks and benefits of procedures were explained, informed consent was obtained and placed in chart.  Refer to Anesthesia note for sedation details.  The FAITH probe was passed into the esophagus without difficulty.  Transesophageal and transgastric images were obtained.  The FAITH probe was removed without difficulty and examined.  There was no evidence for bleeding.  The patient tolerated the procedure well without any immediate FAITH-related complications.      Preliminary Findings:  LA: Mildly enlarged  JOE: Left atrial appendage was clear of clot and smoke. Normal doppler velocities   LV: LVEF was estimated at 40-45%  MV: Moderate MR  AV: No AI, no  AS  RA: Mildly enlarged  TV: No TR  PV: No LA, no PS    Patient successfully converted to sinus rhythm with 200 J of synchronized direct current cardioversion (DCCV) via AP pads.      PLAN OF CARE:  [x] Discharge home      [x] Continue Xarelto  [x] No eating or drinking for 1 hour    [x] No driving for 24 hours

## 2023-10-24 NOTE — ASU PATIENT PROFILE, ADULT - WHEN WAS YOUR LAST VACCINATION? YEAR
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    Anesthesia Type:   General     Note:    Oropharynx: oropharynx clear of all foreign objects  Level of Consciousness: awake  Oxygen Supplementation: face mask  Level of Supplemental Oxygen (L/min / FiO2): 6  Independent Airway: airway patency satisfactory and stable  Dentition: dentition unchanged  Vital Signs Stable: post-procedure vital signs reviewed and stable  Report to RN Given: handoff report given  Patient transferred to: PACU    Handoff Report: Identifed the Patient, Identified the Reponsible Provider, Reviewed the pertinent medical history, Discussed the surgical course, Reviewed Intra-OP anesthesia mangement and issues during anesthesia, Set expectations for post-procedure period and Allowed opportunity for questions and acknowledgement of understanding      Vitals:  Vitals Value Taken Time   /73 12/07/22 1445   Temp     Pulse 64 12/07/22 1447   Resp 12 12/07/22 1447   SpO2 99 % 12/07/22 1447   Vitals shown include unvalidated device data.    Electronically Signed By: ROBERT Garay CRNA  December 7, 2022  2:48 PM   2021

## 2023-10-25 ENCOUNTER — TRANSCRIPTION ENCOUNTER (OUTPATIENT)
Age: 83
End: 2023-10-25

## 2023-10-25 ENCOUNTER — NON-APPOINTMENT (OUTPATIENT)
Age: 83
End: 2023-10-25

## 2023-10-25 ENCOUNTER — APPOINTMENT (OUTPATIENT)
Dept: CARE COORDINATION | Facility: HOME HEALTH | Age: 83
End: 2023-10-25
Payer: MEDICARE

## 2023-10-25 VITALS
RESPIRATION RATE: 15 BRPM | OXYGEN SATURATION: 94 % | HEART RATE: 55 BPM | DIASTOLIC BLOOD PRESSURE: 70 MMHG | SYSTOLIC BLOOD PRESSURE: 110 MMHG

## 2023-10-25 PROCEDURE — 99348 HOME/RES VST EST LOW MDM 30: CPT

## 2023-10-25 RX ORDER — DICLOFENAC SODIUM 20 MG/G
2 SOLUTION TOPICAL
Qty: 1 | Refills: 3 | Status: DISCONTINUED | COMMUNITY
Start: 2023-10-06 | End: 2023-10-25

## 2023-10-25 RX ORDER — BENAZEPRIL HYDROCHLORIDE 5 MG/1
TABLET ORAL
Refills: 0 | Status: DISCONTINUED | COMMUNITY
End: 2023-10-25

## 2023-10-25 RX ORDER — DOCUSATE SODIUM 100 MG/1
100 CAPSULE ORAL
Qty: 60 | Refills: 5 | Status: DISCONTINUED | COMMUNITY
Start: 2023-07-14 | End: 2023-10-25

## 2023-10-25 RX ORDER — FESOTERODINE FUMARATE 4 MG/1
4 TABLET, FILM COATED, EXTENDED RELEASE ORAL
Refills: 0 | Status: DISCONTINUED | COMMUNITY
End: 2023-10-25

## 2023-10-25 RX ORDER — AMITRIPTYLINE HYDROCHLORIDE 75 MG/1
75 TABLET, FILM COATED ORAL
Refills: 0 | Status: DISCONTINUED | COMMUNITY
End: 2023-10-25

## 2023-10-26 ENCOUNTER — APPOINTMENT (OUTPATIENT)
Dept: OBGYN | Facility: CLINIC | Age: 83
End: 2023-10-26
Payer: MEDICARE

## 2023-10-26 VITALS — WEIGHT: 197 LBS | HEIGHT: 62 IN | BODY MASS INDEX: 36.25 KG/M2

## 2023-10-26 DIAGNOSIS — K59.00 CONSTIPATION, UNSPECIFIED: ICD-10-CM

## 2023-10-26 DIAGNOSIS — N39.46 MIXED INCONTINENCE: ICD-10-CM

## 2023-10-26 DIAGNOSIS — R10.2 PELVIC AND PERINEAL PAIN: ICD-10-CM

## 2023-10-26 LAB
BILIRUB UR QL STRIP: NEGATIVE
CLARITY UR: CLEAR
COLLECTION METHOD: NORMAL
GLUCOSE UR-MCNC: NEGATIVE
HCG UR QL: 0.2 EU/DL
HGB UR QL STRIP.AUTO: NEGATIVE
KETONES UR-MCNC: NEGATIVE
LEUKOCYTE ESTERASE UR QL STRIP: NEGATIVE
NITRITE UR QL STRIP: NEGATIVE
PH UR STRIP: 7
PROT UR STRIP-MCNC: NEGATIVE
SP GR UR STRIP: 1.01

## 2023-10-26 PROCEDURE — G0439: CPT

## 2023-10-26 PROCEDURE — 81003 URINALYSIS AUTO W/O SCOPE: CPT | Mod: QW

## 2023-10-26 RX ORDER — CABERGOLINE 0.5 MG/1
TABLET ORAL
Refills: 0 | Status: COMPLETED | COMMUNITY
End: 2023-10-26

## 2023-10-28 ENCOUNTER — TRANSCRIPTION ENCOUNTER (OUTPATIENT)
Age: 83
End: 2023-10-28

## 2023-10-30 ENCOUNTER — NON-APPOINTMENT (OUTPATIENT)
Age: 83
End: 2023-10-30

## 2023-10-31 ENCOUNTER — APPOINTMENT (OUTPATIENT)
Dept: OBGYN | Facility: CLINIC | Age: 83
End: 2023-10-31

## 2023-11-01 DIAGNOSIS — I48.91 UNSPECIFIED ATRIAL FIBRILLATION: ICD-10-CM

## 2023-11-13 ENCOUNTER — OUTPATIENT (OUTPATIENT)
Dept: OUTPATIENT SERVICES | Facility: HOSPITAL | Age: 83
LOS: 1 days | End: 2023-11-13
Payer: MEDICARE

## 2023-11-13 ENCOUNTER — RESULT REVIEW (OUTPATIENT)
Age: 83
End: 2023-11-13

## 2023-11-13 DIAGNOSIS — R10.30 LOWER ABDOMINAL PAIN, UNSPECIFIED: ICD-10-CM

## 2023-11-13 DIAGNOSIS — Z00.8 ENCOUNTER FOR OTHER GENERAL EXAMINATION: ICD-10-CM

## 2023-11-13 DIAGNOSIS — R10.2 PELVIC AND PERINEAL PAIN: ICD-10-CM

## 2023-11-13 DIAGNOSIS — Z98.890 OTHER SPECIFIED POSTPROCEDURAL STATES: Chronic | ICD-10-CM

## 2023-11-13 PROCEDURE — 74170 CT ABD WO CNTRST FLWD CNTRST: CPT | Mod: 26

## 2023-11-13 PROCEDURE — 74170 CT ABD WO CNTRST FLWD CNTRST: CPT

## 2023-11-14 DIAGNOSIS — R10.2 PELVIC AND PERINEAL PAIN: ICD-10-CM

## 2023-12-18 ENCOUNTER — INPATIENT (INPATIENT)
Facility: HOSPITAL | Age: 83
LOS: 1 days | Discharge: HOME HEALTH | DRG: 291 | End: 2023-12-20
Attending: INTERNAL MEDICINE | Admitting: STUDENT IN AN ORGANIZED HEALTH CARE EDUCATION/TRAINING PROGRAM
Payer: MEDICARE

## 2023-12-18 VITALS
SYSTOLIC BLOOD PRESSURE: 133 MMHG | OXYGEN SATURATION: 96 % | HEART RATE: 132 BPM | TEMPERATURE: 99 F | RESPIRATION RATE: 18 BRPM | DIASTOLIC BLOOD PRESSURE: 73 MMHG | WEIGHT: 199.08 LBS

## 2023-12-18 DIAGNOSIS — Z98.890 OTHER SPECIFIED POSTPROCEDURAL STATES: Chronic | ICD-10-CM

## 2023-12-18 DIAGNOSIS — I48.91 UNSPECIFIED ATRIAL FIBRILLATION: ICD-10-CM

## 2023-12-18 LAB
ALBUMIN SERPL ELPH-MCNC: 3.4 G/DL — LOW (ref 3.5–5.2)
ALBUMIN SERPL ELPH-MCNC: 3.4 G/DL — LOW (ref 3.5–5.2)
ALP SERPL-CCNC: 142 U/L — HIGH (ref 30–115)
ALP SERPL-CCNC: 142 U/L — HIGH (ref 30–115)
ALT FLD-CCNC: 26 U/L — SIGNIFICANT CHANGE UP (ref 0–41)
ALT FLD-CCNC: 26 U/L — SIGNIFICANT CHANGE UP (ref 0–41)
ANION GAP SERPL CALC-SCNC: 10 MMOL/L — SIGNIFICANT CHANGE UP (ref 7–14)
ANION GAP SERPL CALC-SCNC: 10 MMOL/L — SIGNIFICANT CHANGE UP (ref 7–14)
ANION GAP SERPL CALC-SCNC: 17 MMOL/L — HIGH (ref 7–14)
ANION GAP SERPL CALC-SCNC: 17 MMOL/L — HIGH (ref 7–14)
AST SERPL-CCNC: 36 U/L — SIGNIFICANT CHANGE UP (ref 0–41)
AST SERPL-CCNC: 36 U/L — SIGNIFICANT CHANGE UP (ref 0–41)
BASE EXCESS BLDV CALC-SCNC: 4.3 MMOL/L — HIGH (ref -2–3)
BASE EXCESS BLDV CALC-SCNC: 4.3 MMOL/L — HIGH (ref -2–3)
BASOPHILS # BLD AUTO: 0.05 K/UL — SIGNIFICANT CHANGE UP (ref 0–0.2)
BASOPHILS # BLD AUTO: 0.05 K/UL — SIGNIFICANT CHANGE UP (ref 0–0.2)
BASOPHILS NFR BLD AUTO: 0.5 % — SIGNIFICANT CHANGE UP (ref 0–1)
BASOPHILS NFR BLD AUTO: 0.5 % — SIGNIFICANT CHANGE UP (ref 0–1)
BILIRUB SERPL-MCNC: 0.6 MG/DL — SIGNIFICANT CHANGE UP (ref 0.2–1.2)
BILIRUB SERPL-MCNC: 0.6 MG/DL — SIGNIFICANT CHANGE UP (ref 0.2–1.2)
BUN SERPL-MCNC: 38 MG/DL — HIGH (ref 10–20)
BUN SERPL-MCNC: 38 MG/DL — HIGH (ref 10–20)
BUN SERPL-MCNC: 44 MG/DL — HIGH (ref 10–20)
BUN SERPL-MCNC: 44 MG/DL — HIGH (ref 10–20)
CA-I SERPL-SCNC: 1.09 MMOL/L — LOW (ref 1.15–1.33)
CA-I SERPL-SCNC: 1.09 MMOL/L — LOW (ref 1.15–1.33)
CALCIUM SERPL-MCNC: 8.5 MG/DL — SIGNIFICANT CHANGE UP (ref 8.4–10.5)
CALCIUM SERPL-MCNC: 8.5 MG/DL — SIGNIFICANT CHANGE UP (ref 8.4–10.5)
CALCIUM SERPL-MCNC: 9 MG/DL — SIGNIFICANT CHANGE UP (ref 8.4–10.4)
CALCIUM SERPL-MCNC: 9 MG/DL — SIGNIFICANT CHANGE UP (ref 8.4–10.4)
CHLORIDE SERPL-SCNC: 102 MMOL/L — SIGNIFICANT CHANGE UP (ref 98–110)
CHLORIDE SERPL-SCNC: 102 MMOL/L — SIGNIFICANT CHANGE UP (ref 98–110)
CHLORIDE SERPL-SCNC: 103 MMOL/L — SIGNIFICANT CHANGE UP (ref 98–110)
CHLORIDE SERPL-SCNC: 103 MMOL/L — SIGNIFICANT CHANGE UP (ref 98–110)
CO2 SERPL-SCNC: 23 MMOL/L — SIGNIFICANT CHANGE UP (ref 17–32)
CO2 SERPL-SCNC: 23 MMOL/L — SIGNIFICANT CHANGE UP (ref 17–32)
CO2 SERPL-SCNC: 29 MMOL/L — SIGNIFICANT CHANGE UP (ref 17–32)
CO2 SERPL-SCNC: 29 MMOL/L — SIGNIFICANT CHANGE UP (ref 17–32)
CREAT SERPL-MCNC: 1.4 MG/DL — SIGNIFICANT CHANGE UP (ref 0.7–1.5)
CREAT SERPL-MCNC: 1.4 MG/DL — SIGNIFICANT CHANGE UP (ref 0.7–1.5)
CREAT SERPL-MCNC: 1.6 MG/DL — HIGH (ref 0.7–1.5)
CREAT SERPL-MCNC: 1.6 MG/DL — HIGH (ref 0.7–1.5)
EGFR: 32 ML/MIN/1.73M2 — LOW
EGFR: 32 ML/MIN/1.73M2 — LOW
EGFR: 38 ML/MIN/1.73M2 — LOW
EGFR: 38 ML/MIN/1.73M2 — LOW
EOSINOPHIL # BLD AUTO: 0.14 K/UL — SIGNIFICANT CHANGE UP (ref 0–0.7)
EOSINOPHIL # BLD AUTO: 0.14 K/UL — SIGNIFICANT CHANGE UP (ref 0–0.7)
EOSINOPHIL NFR BLD AUTO: 1.3 % — SIGNIFICANT CHANGE UP (ref 0–8)
EOSINOPHIL NFR BLD AUTO: 1.3 % — SIGNIFICANT CHANGE UP (ref 0–8)
GAS PNL BLDV: 138 MMOL/L — SIGNIFICANT CHANGE UP (ref 136–145)
GAS PNL BLDV: 138 MMOL/L — SIGNIFICANT CHANGE UP (ref 136–145)
GAS PNL BLDV: SIGNIFICANT CHANGE UP
GLUCOSE SERPL-MCNC: 108 MG/DL — HIGH (ref 70–99)
GLUCOSE SERPL-MCNC: 108 MG/DL — HIGH (ref 70–99)
GLUCOSE SERPL-MCNC: 110 MG/DL — HIGH (ref 70–99)
GLUCOSE SERPL-MCNC: 110 MG/DL — HIGH (ref 70–99)
HCO3 BLDV-SCNC: 29 MMOL/L — SIGNIFICANT CHANGE UP (ref 22–29)
HCO3 BLDV-SCNC: 29 MMOL/L — SIGNIFICANT CHANGE UP (ref 22–29)
HCT VFR BLD CALC: 35.3 % — LOW (ref 37–47)
HCT VFR BLD CALC: 35.3 % — LOW (ref 37–47)
HCT VFR BLDA CALC: 30 % — LOW (ref 34.5–46.5)
HCT VFR BLDA CALC: 30 % — LOW (ref 34.5–46.5)
HGB BLD-MCNC: 10.8 G/DL — LOW (ref 12–16)
HGB BLD-MCNC: 10.8 G/DL — LOW (ref 12–16)
IMM GRANULOCYTES NFR BLD AUTO: 0.6 % — HIGH (ref 0.1–0.3)
IMM GRANULOCYTES NFR BLD AUTO: 0.6 % — HIGH (ref 0.1–0.3)
LACTATE BLDV-MCNC: 1 MMOL/L — SIGNIFICANT CHANGE UP (ref 0.5–2)
LACTATE BLDV-MCNC: 1 MMOL/L — SIGNIFICANT CHANGE UP (ref 0.5–2)
LYMPHOCYTES # BLD AUTO: 1.85 K/UL — SIGNIFICANT CHANGE UP (ref 1.2–3.4)
LYMPHOCYTES # BLD AUTO: 1.85 K/UL — SIGNIFICANT CHANGE UP (ref 1.2–3.4)
LYMPHOCYTES # BLD AUTO: 17.5 % — LOW (ref 20.5–51.1)
LYMPHOCYTES # BLD AUTO: 17.5 % — LOW (ref 20.5–51.1)
MAGNESIUM SERPL-MCNC: 2.4 MG/DL — SIGNIFICANT CHANGE UP (ref 1.8–2.4)
MAGNESIUM SERPL-MCNC: 2.4 MG/DL — SIGNIFICANT CHANGE UP (ref 1.8–2.4)
MCHC RBC-ENTMCNC: 26.8 PG — LOW (ref 27–31)
MCHC RBC-ENTMCNC: 26.8 PG — LOW (ref 27–31)
MCHC RBC-ENTMCNC: 30.6 G/DL — LOW (ref 32–37)
MCHC RBC-ENTMCNC: 30.6 G/DL — LOW (ref 32–37)
MCV RBC AUTO: 87.6 FL — SIGNIFICANT CHANGE UP (ref 81–99)
MCV RBC AUTO: 87.6 FL — SIGNIFICANT CHANGE UP (ref 81–99)
MONOCYTES # BLD AUTO: 0.65 K/UL — HIGH (ref 0.1–0.6)
MONOCYTES # BLD AUTO: 0.65 K/UL — HIGH (ref 0.1–0.6)
MONOCYTES NFR BLD AUTO: 6.1 % — SIGNIFICANT CHANGE UP (ref 1.7–9.3)
MONOCYTES NFR BLD AUTO: 6.1 % — SIGNIFICANT CHANGE UP (ref 1.7–9.3)
NEUTROPHILS # BLD AUTO: 7.83 K/UL — HIGH (ref 1.4–6.5)
NEUTROPHILS # BLD AUTO: 7.83 K/UL — HIGH (ref 1.4–6.5)
NEUTROPHILS NFR BLD AUTO: 74 % — SIGNIFICANT CHANGE UP (ref 42.2–75.2)
NEUTROPHILS NFR BLD AUTO: 74 % — SIGNIFICANT CHANGE UP (ref 42.2–75.2)
NRBC # BLD: 0 /100 WBCS — SIGNIFICANT CHANGE UP (ref 0–0)
NRBC # BLD: 0 /100 WBCS — SIGNIFICANT CHANGE UP (ref 0–0)
NT-PROBNP SERPL-SCNC: 7493 PG/ML — HIGH (ref 0–300)
NT-PROBNP SERPL-SCNC: 7493 PG/ML — HIGH (ref 0–300)
PCO2 BLDV: 44 MMHG — HIGH (ref 39–42)
PCO2 BLDV: 44 MMHG — HIGH (ref 39–42)
PH BLDV: 7.43 — SIGNIFICANT CHANGE UP (ref 7.32–7.43)
PH BLDV: 7.43 — SIGNIFICANT CHANGE UP (ref 7.32–7.43)
PLATELET # BLD AUTO: 278 K/UL — SIGNIFICANT CHANGE UP (ref 130–400)
PLATELET # BLD AUTO: 278 K/UL — SIGNIFICANT CHANGE UP (ref 130–400)
PMV BLD: 12.2 FL — HIGH (ref 7.4–10.4)
PMV BLD: 12.2 FL — HIGH (ref 7.4–10.4)
PO2 BLDV: 31 MMHG — SIGNIFICANT CHANGE UP (ref 25–45)
PO2 BLDV: 31 MMHG — SIGNIFICANT CHANGE UP (ref 25–45)
POTASSIUM BLDV-SCNC: 3 MMOL/L — LOW (ref 3.5–5.1)
POTASSIUM BLDV-SCNC: 3 MMOL/L — LOW (ref 3.5–5.1)
POTASSIUM SERPL-MCNC: 3.5 MMOL/L — SIGNIFICANT CHANGE UP (ref 3.5–5)
POTASSIUM SERPL-MCNC: 3.5 MMOL/L — SIGNIFICANT CHANGE UP (ref 3.5–5)
POTASSIUM SERPL-MCNC: 3.7 MMOL/L — SIGNIFICANT CHANGE UP (ref 3.5–5)
POTASSIUM SERPL-MCNC: 3.7 MMOL/L — SIGNIFICANT CHANGE UP (ref 3.5–5)
POTASSIUM SERPL-SCNC: 3.5 MMOL/L — SIGNIFICANT CHANGE UP (ref 3.5–5)
POTASSIUM SERPL-SCNC: 3.5 MMOL/L — SIGNIFICANT CHANGE UP (ref 3.5–5)
POTASSIUM SERPL-SCNC: 3.7 MMOL/L — SIGNIFICANT CHANGE UP (ref 3.5–5)
POTASSIUM SERPL-SCNC: 3.7 MMOL/L — SIGNIFICANT CHANGE UP (ref 3.5–5)
PROT SERPL-MCNC: 5.8 G/DL — LOW (ref 6–8)
PROT SERPL-MCNC: 5.8 G/DL — LOW (ref 6–8)
RBC # BLD: 4.03 M/UL — LOW (ref 4.2–5.4)
RBC # BLD: 4.03 M/UL — LOW (ref 4.2–5.4)
RBC # FLD: 17.4 % — HIGH (ref 11.5–14.5)
RBC # FLD: 17.4 % — HIGH (ref 11.5–14.5)
SODIUM SERPL-SCNC: 141 MMOL/L — SIGNIFICANT CHANGE UP (ref 135–146)
SODIUM SERPL-SCNC: 141 MMOL/L — SIGNIFICANT CHANGE UP (ref 135–146)
SODIUM SERPL-SCNC: 143 MMOL/L — SIGNIFICANT CHANGE UP (ref 135–146)
SODIUM SERPL-SCNC: 143 MMOL/L — SIGNIFICANT CHANGE UP (ref 135–146)
TROPONIN T, HIGH SENSITIVITY RESULT: 33 NG/L — HIGH (ref 6–13)
TROPONIN T, HIGH SENSITIVITY RESULT: 33 NG/L — HIGH (ref 6–13)
TROPONIN T, HIGH SENSITIVITY RESULT: 34 NG/L — HIGH (ref 6–13)
TROPONIN T, HIGH SENSITIVITY RESULT: 34 NG/L — HIGH (ref 6–13)
WBC # BLD: 10.58 K/UL — SIGNIFICANT CHANGE UP (ref 4.8–10.8)
WBC # BLD: 10.58 K/UL — SIGNIFICANT CHANGE UP (ref 4.8–10.8)
WBC # FLD AUTO: 10.58 K/UL — SIGNIFICANT CHANGE UP (ref 4.8–10.8)
WBC # FLD AUTO: 10.58 K/UL — SIGNIFICANT CHANGE UP (ref 4.8–10.8)

## 2023-12-18 PROCEDURE — 93312 ECHO TRANSESOPHAGEAL: CPT

## 2023-12-18 PROCEDURE — 80053 COMPREHEN METABOLIC PANEL: CPT

## 2023-12-18 PROCEDURE — 80048 BASIC METABOLIC PNL TOTAL CA: CPT

## 2023-12-18 PROCEDURE — 84436 ASSAY OF TOTAL THYROXINE: CPT

## 2023-12-18 PROCEDURE — 93325 DOPPLER ECHO COLOR FLOW MAPG: CPT

## 2023-12-18 PROCEDURE — 90662 IIV NO PRSV INCREASED AG IM: CPT

## 2023-12-18 PROCEDURE — 93320 DOPPLER ECHO COMPLETE: CPT

## 2023-12-18 PROCEDURE — 99291 CRITICAL CARE FIRST HOUR: CPT

## 2023-12-18 PROCEDURE — 84443 ASSAY THYROID STIM HORMONE: CPT

## 2023-12-18 PROCEDURE — 85025 COMPLETE CBC W/AUTO DIFF WBC: CPT

## 2023-12-18 PROCEDURE — 84439 ASSAY OF FREE THYROXINE: CPT

## 2023-12-18 PROCEDURE — 36415 COLL VENOUS BLD VENIPUNCTURE: CPT

## 2023-12-18 PROCEDURE — 93010 ELECTROCARDIOGRAM REPORT: CPT

## 2023-12-18 PROCEDURE — 71045 X-RAY EXAM CHEST 1 VIEW: CPT | Mod: 26

## 2023-12-18 PROCEDURE — 71045 X-RAY EXAM CHEST 1 VIEW: CPT

## 2023-12-18 PROCEDURE — 93307 TTE W/O DOPPLER COMPLETE: CPT

## 2023-12-18 PROCEDURE — 99222 1ST HOSP IP/OBS MODERATE 55: CPT

## 2023-12-18 PROCEDURE — 99223 1ST HOSP IP/OBS HIGH 75: CPT

## 2023-12-18 PROCEDURE — 93005 ELECTROCARDIOGRAM TRACING: CPT

## 2023-12-18 PROCEDURE — 93308 TTE F-UP OR LMTD: CPT | Mod: 26

## 2023-12-18 PROCEDURE — 83735 ASSAY OF MAGNESIUM: CPT

## 2023-12-18 RX ORDER — MAGNESIUM SULFATE 500 MG/ML
2 VIAL (ML) INJECTION ONCE
Refills: 0 | Status: COMPLETED | OUTPATIENT
Start: 2023-12-18 | End: 2023-12-18

## 2023-12-18 RX ORDER — METOPROLOL TARTRATE 50 MG
5 TABLET ORAL ONCE
Refills: 0 | Status: COMPLETED | OUTPATIENT
Start: 2023-12-18 | End: 2023-12-18

## 2023-12-18 RX ORDER — POTASSIUM CHLORIDE 20 MEQ
20 PACKET (EA) ORAL ONCE
Refills: 0 | Status: COMPLETED | OUTPATIENT
Start: 2023-12-18 | End: 2023-12-18

## 2023-12-18 RX ORDER — ACETAMINOPHEN 500 MG
650 TABLET ORAL EVERY 6 HOURS
Refills: 0 | Status: DISCONTINUED | OUTPATIENT
Start: 2023-12-18 | End: 2023-12-20

## 2023-12-18 RX ORDER — FUROSEMIDE 40 MG
40 TABLET ORAL ONCE
Refills: 0 | Status: COMPLETED | OUTPATIENT
Start: 2023-12-18 | End: 2023-12-18

## 2023-12-18 RX ORDER — AMIODARONE HYDROCHLORIDE 400 MG/1
1 TABLET ORAL
Qty: 450 | Refills: 0 | Status: DISCONTINUED | OUTPATIENT
Start: 2023-12-18 | End: 2023-12-20

## 2023-12-18 RX ORDER — FESOTERODINE FUMARATE 8 MG/1
1 TABLET, FILM COATED, EXTENDED RELEASE ORAL
Refills: 0 | DISCHARGE

## 2023-12-18 RX ORDER — AMIODARONE HYDROCHLORIDE 400 MG/1
150 TABLET ORAL ONCE
Refills: 0 | Status: COMPLETED | OUTPATIENT
Start: 2023-12-18 | End: 2023-12-18

## 2023-12-18 RX ORDER — RIVAROXABAN 15 MG-20MG
15 KIT ORAL
Refills: 0 | Status: DISCONTINUED | OUTPATIENT
Start: 2023-12-18 | End: 2023-12-20

## 2023-12-18 RX ORDER — AMIODARONE HYDROCHLORIDE 400 MG/1
0.5 TABLET ORAL
Qty: 450 | Refills: 0 | Status: DISCONTINUED | OUTPATIENT
Start: 2023-12-18 | End: 2023-12-20

## 2023-12-18 RX ORDER — ATORVASTATIN CALCIUM 80 MG/1
20 TABLET, FILM COATED ORAL AT BEDTIME
Refills: 0 | Status: DISCONTINUED | OUTPATIENT
Start: 2023-12-18 | End: 2023-12-20

## 2023-12-18 RX ADMIN — ATORVASTATIN CALCIUM 20 MILLIGRAM(S): 80 TABLET, FILM COATED ORAL at 21:40

## 2023-12-18 RX ADMIN — Medication 20 MILLIEQUIVALENT(S): at 11:12

## 2023-12-18 RX ADMIN — AMIODARONE HYDROCHLORIDE 150 MILLIGRAM(S): 400 TABLET ORAL at 10:20

## 2023-12-18 RX ADMIN — AMIODARONE HYDROCHLORIDE 33.3 MG/MIN: 400 TABLET ORAL at 11:47

## 2023-12-18 RX ADMIN — Medication 40 MILLIGRAM(S): at 15:06

## 2023-12-18 RX ADMIN — Medication 650 MILLIGRAM(S): at 12:32

## 2023-12-18 RX ADMIN — RIVAROXABAN 15 MILLIGRAM(S): KIT at 17:24

## 2023-12-18 RX ADMIN — AMIODARONE HYDROCHLORIDE 16.7 MG/MIN: 400 TABLET ORAL at 17:00

## 2023-12-18 RX ADMIN — AMIODARONE HYDROCHLORIDE 600 MILLIGRAM(S): 400 TABLET ORAL at 10:06

## 2023-12-18 RX ADMIN — Medication 5 MILLIGRAM(S): at 12:31

## 2023-12-18 RX ADMIN — Medication 150 GRAM(S): at 11:05

## 2023-12-18 NOTE — CONSULT NOTE ADULT - SUBJECTIVE AND OBJECTIVE BOX
Patient is a 83y old  Female who presents with a chief complaint of palpitations    HPI: Patient is an 82 yo F with hx of AF sp multiple DCCV's in the past, most recent 10/23, on Xarelto and Amiodarone, HTN, HLD, hypothyroidism, CKD3, FELIX presenting with complaints of chest discomfort since last night.  Patient reports that since last night she has had some mild chest discomfort, mild associated SOB.  Denies any other symptoms at this time.  No recent fever, URI-like symptoms, chest pain, diaphoresis, fever, chills, nausea, vomiting, changes in urination, or changes in bowel movements.          PAST MEDICAL & SURGICAL HISTORY:  HTN (hypertension)      High cholesterol      Hypothyroid  s/p thyroid ca surgery      Afib  on xarelto      Sleep apnea      Osteoarthritis      CKD (chronic kidney disease) stage 3, GFR 30-59 ml/min      H/O thyroidectomy      S/P rotator cuff surgery                      PREVIOUS DIAGNOSTIC TESTING:      ECHO  FINDINGS:    STRESS  FINDINGS:    CATHETERIZATION  FINDINGS:    ELECTROPHYSIOLOGY STUDY  FINDINGS:    CAROTID ULTRASOUND:  FINDINGS    VENOUS DUPLEX SCAN:  FINDINGS:    CHEST CT PULMONARY ANGIO with IV Contrast:  FINDINGS:    MEDICATIONS  (STANDING):  aMIOdarone Infusion 1 mG/Min (33.3 mL/Hr) IV Continuous <Continuous>    MEDICATIONS  (PRN):      FAMILY HISTORY:  Family history of lung cancer (Mother)    Family history of abdominal aortic aneurysm (AAA) (Father)        SOCIAL HISTORY:    CIGARETTES:    ALCOHOL:    Past Surgical History:    Allergies:    No Known Allergies      REVIEW OF SYSTEMS:    CONSTITUTIONAL: No fever, weight loss, chills, shakes, or fatigue  RESPIRATORY: No cough, wheezing, hemoptysis, or shortness of breath  CARDIOVASCULAR: No chest pain, dyspnea, palpitations, dizziness, syncope, paroxysmal nocturnal dyspnea, orthopnea, or arm or leg swelling  GASTROINTESTINAL: No abdominal  or epigastric pain, nausea, vomiting, hematemesis, diarrhea, constipation, melena or bright red blood.  NEUROLOGICAL: No headaches, memory loss, slurred speech, limb weakness, loss of strength, numbness, or tremors  MUSCULOSKELETAL: No joint pain or swelling, muscle, back, or extremity pain      Vital Signs Last 24 Hrs  T(C): 37.8 (18 Dec 2023 07:39), Max: 37.8 (18 Dec 2023 07:39)  T(F): 100.1 (18 Dec 2023 07:39), Max: 100.1 (18 Dec 2023 07:39)  HR: 132 (18 Dec 2023 07:14) (132 - 132)  BP: 133/73 (18 Dec 2023 07:14) (133/73 - 133/73)  BP(mean): --  RR: 18 (18 Dec 2023 09:40) (18 - 18)  SpO2: 99% (18 Dec 2023 09:40) (96% - 99%)    Parameters below as of 18 Dec 2023 09:40  Patient On (Oxygen Delivery Method): nasal cannula  O2 Flow (L/min): 2      PHYSICAL EXAM:        GENERAL: In no apparent distress, well nourished, and hydrated.  NECK: Supple, No JVD   HEART: Regular rate and rhythm; No murmurs, rubs, or gallops.  PULMONARY: Clear to auscultation and perfusion.  No rales, wheezing, or rhonchi bilaterally.  EXTREMITIES:  2+ Peripheral Pulses, no LE edema BL  NEUROLOGICAL: Grossly nonfocal      INTERPRETATION OF TELEMETRY:    ECG:    I&O's Detail      LABS:                        10.8   10.58 )-----------( 278      ( 18 Dec 2023 08:20 )             35.3     12-18    141  |  102  |  44<H>  ----------------------------<  110<H>  3.5   |  29  |  1.6<H>    Ca    8.5      18 Dec 2023 08:20    TPro  5.8<L>  /  Alb  3.4<L>  /  TBili  0.6  /  DBili  x   /  AST  36  /  ALT  26  /  AlkPhos  142<H>  12-18          Urinalysis Basic - ( 18 Dec 2023 08:20 )    Color: x / Appearance: x / SG: x / pH: x  Gluc: 110 mg/dL / Ketone: x  / Bili: x / Urobili: x   Blood: x / Protein: x / Nitrite: x   Leuk Esterase: x / RBC: x / WBC x   Sq Epi: x / Non Sq Epi: x / Bacteria: x      BNP  I&O's Detail    Daily     Daily     RADIOLOGY & ADDITIONAL STUDIES: Patient is a 83y old  Female who presents with a chief complaint of palpitations    HPI: Patient is an 82 yo F with hx of AF sp multiple DCCV's in the past, most recent 10/23, on Xarelto and Amiodarone, HTN, HLD, hypothyroidism, CKD3, FELIX presenting with complaints of chest discomfort since last night.  Patient reports that since last night she has had some mild chest discomfort, mild associated SOB.  Denies any other symptoms at this time.  No recent fever, URI-like symptoms, chest pain, diaphoresis, fever, chills, nausea, vomiting, changes in urination, or changes in bowel movements. Patient found to be in AF RVR, did not take her dose of Amiodarone today. Patient has never seen EP and reports being on Amiodarone for several years.    PAST MEDICAL & SURGICAL HISTORY:  HTN (hypertension)      High cholesterol      Hypothyroid  s/p thyroid ca surgery      Afib  on xarelto      Sleep apnea      Osteoarthritis      CKD (chronic kidney disease) stage 3, GFR 30-59 ml/min      H/O thyroidectomy      S/P rotator cuff surgery      PREVIOUS DIAGNOSTIC TESTING:      ECHO  FINDINGS:  < from: TTE Echo Complete w/ Contrast w/ Doppler (09.29.23 @ 10:45) >    Summary:   1. Mildly decreased global left ventricular systolic function.   2. Left ventricular ejection fraction, by visual estimation, is 45 to   50%. There is flgx-jr-ibtl variability.   3. Entire septum is hypokinetic.   4. Severe concentric left ventricular hypertrophy.   5. The left ventriculardiastolic function could not be assessed in this   study.   6. Moderately reduced RV systolic function.   7. Mild mitral valve regurgitation.   8. Mild pulmonic valve regurgitation.   9. Moderate tricuspid regurgitation.  10. Dilatation of the ascending aorta, measuring 3.8 cm (indexed 2.01   cm/m2).  11. Severely dilated pulmonary artery.  12. Estimated pulmonary artery systolic pressure is 38.0 mmHg assuming a   right atrial pressure of 8 mmHg, which is consistent with borderline   pulmonary hypertension.  13. Small pericardial effusion.  14. Endocardial visualization was enhanced with intravenous echo contrast.  15. Compared to the previous study 9/15/2022, the LV function has   decreased.    < end of copied text >    STRESS  FINDINGS:  < from: NM Nuclear Stress Pharmacologic Multiple (05.22.22 @ 13:31) >  Impression:  1. NO EVIDENCE FOR ISCHEMIA DURING ADENOSINE INFUSION.  2. NORMAL RESTING LEFT VENTRICULAR WALL MOTION AND WALL THICKENING.  3. LEFT VENTRICULAR EJECTION FRACTION OF  75 % WHICH IS WITHIN RANGE OF   NORMAL.    < end of copied text >    CATHETERIZATION  FINDINGS:    ELECTROPHYSIOLOGY STUDY  FINDINGS:    CAROTID ULTRASOUND:  FINDINGS    VENOUS DUPLEX SCAN:  FINDINGS:    CHEST CT PULMONARY ANGIO with IV Contrast:  FINDINGS:    MEDICATIONS  (STANDING):  aMIOdarone Infusion 1 mG/Min (33.3 mL/Hr) IV Continuous <Continuous>    MEDICATIONS  (PRN):      FAMILY HISTORY:  Family history of lung cancer (Mother)    Family history of abdominal aortic aneurysm (AAA) (Father)    SOCIAL HISTORY: No significant hx    Past Surgical History: See above    Allergies:  No Known Allergies      REVIEW OF SYSTEMS:  CONSTITUTIONAL: No fever, weight loss, chills, shakes, or fatigue  RESPIRATORY: No cough, wheezing, hemoptysis, or shortness of breath  CARDIOVASCULAR: + chest pain, dyspnea, palpitations, No dizziness, syncope, paroxysmal nocturnal dyspnea, orthopnea, or arm or leg swelling  GASTROINTESTINAL: No abdominal  or epigastric pain, nausea, vomiting, hematemesis, diarrhea, constipation, melena or bright red blood.  NEUROLOGICAL: No headaches, memory loss, slurred speech, limb weakness, loss of strength, numbness, or tremors  MUSCULOSKELETAL: No joint pain or swelling, muscle, back, or extremity pain      Vital Signs Last 24 Hrs  T(C): 37.8 (18 Dec 2023 07:39), Max: 37.8 (18 Dec 2023 07:39)  T(F): 100.1 (18 Dec 2023 07:39), Max: 100.1 (18 Dec 2023 07:39)  HR: 132 (18 Dec 2023 07:14) (132 - 132)  BP: 133/73 (18 Dec 2023 07:14) (133/73 - 133/73)  BP(mean): --  RR: 18 (18 Dec 2023 09:40) (18 - 18)  SpO2: 99% (18 Dec 2023 09:40) (96% - 99%)    Parameters below as of 18 Dec 2023 09:40  Patient On (Oxygen Delivery Method): nasal cannula  O2 Flow (L/min): 2      PHYSICAL EXAM:  GENERAL: In no apparent distress, well nourished, and hydrated.  NECK: Supple, No JVD   HEART: Irregular rate and rhythm; No murmurs, rubs, or gallops.  PULMONARY: Clear to auscultation and perfusion.  No rales, wheezing, or rhonchi bilaterally.  EXTREMITIES:  2+ Peripheral Pulses, no LE edema BL  NEUROLOGICAL: Grossly nonfocal      INTERPRETATION OF TELEMETRY:  bpm    ECG:  < from: 12 Lead ECG (10.24.23 @ 13:36) >    Ventricular Rate 47 BPM    Atrial Rate 47 BPM    P-R Interval 263 ms    QRS Duration 150 ms    Q-T Interval 508 ms    QTC Calculation(Bazett) 450 ms    P Axis 44 degrees    R Axis -73 degrees    T Axis 77 degrees    Diagnosis Line Marked sinus bradycardia with 1st degree A-V block  Left bundle branch block  Abnormal ECG    Confirmed by KAVITHA PHILLIPS MD ([id number]) on 10/25/2023 7:07:20 AM    < end of copied text >      I&O's Detail      LABS:                        10.8   10.58 )-----------( 278      ( 18 Dec 2023 08:20 )             35.3     12-18    141  |  102  |  44<H>  ----------------------------<  110<H>  3.5   |  29  |  1.6<H>    Ca    8.5      18 Dec 2023 08:20    TPro  5.8<L>  /  Alb  3.4<L>  /  TBili  0.6  /  DBili  x   /  AST  36  /  ALT  26  /  AlkPhos  142<H>  12-18          Urinalysis Basic - ( 18 Dec 2023 08:20 )    Color: x / Appearance: x / SG: x / pH: x  Gluc: 110 mg/dL / Ketone: x  / Bili: x / Urobili: x   Blood: x / Protein: x / Nitrite: x   Leuk Esterase: x / RBC: x / WBC x   Sq Epi: x / Non Sq Epi: x / Bacteria: x      BNP  I&O's Detail    Daily     Daily     RADIOLOGY & ADDITIONAL STUDIES: Patient is a 83y old  Female who presents with a chief complaint of palpitations    HPI: Patient is an 84 yo F with hx of AF sp multiple DCCV's in the past, most recent 10/23, on Xarelto and Amiodarone, HTN, HLD, hypothyroidism, CKD3, FELIX presenting with complaints of chest discomfort since last night.  Patient reports that since last night she has had some mild chest discomfort, mild associated SOB.  Denies any other symptoms at this time.  No recent fever, URI-like symptoms, chest pain, diaphoresis, fever, chills, nausea, vomiting, changes in urination, or changes in bowel movements. Patient found to be in AF RVR, did not take her dose of Amiodarone today. Patient has never seen EP and reports being on Amiodarone for several years.    PAST MEDICAL & SURGICAL HISTORY:  HTN (hypertension)      High cholesterol      Hypothyroid  s/p thyroid ca surgery      Afib  on xarelto      Sleep apnea      Osteoarthritis      CKD (chronic kidney disease) stage 3, GFR 30-59 ml/min      H/O thyroidectomy      S/P rotator cuff surgery      PREVIOUS DIAGNOSTIC TESTING:      ECHO  FINDINGS:  < from: TTE Echo Complete w/ Contrast w/ Doppler (09.29.23 @ 10:45) >    Summary:   1. Mildly decreased global left ventricular systolic function.   2. Left ventricular ejection fraction, by visual estimation, is 45 to   50%. There is soyq-rc-aecz variability.   3. Entire septum is hypokinetic.   4. Severe concentric left ventricular hypertrophy.   5. The left ventriculardiastolic function could not be assessed in this   study.   6. Moderately reduced RV systolic function.   7. Mild mitral valve regurgitation.   8. Mild pulmonic valve regurgitation.   9. Moderate tricuspid regurgitation.  10. Dilatation of the ascending aorta, measuring 3.8 cm (indexed 2.01   cm/m2).  11. Severely dilated pulmonary artery.  12. Estimated pulmonary artery systolic pressure is 38.0 mmHg assuming a   right atrial pressure of 8 mmHg, which is consistent with borderline   pulmonary hypertension.  13. Small pericardial effusion.  14. Endocardial visualization was enhanced with intravenous echo contrast.  15. Compared to the previous study 9/15/2022, the LV function has   decreased.    < end of copied text >    STRESS  FINDINGS:  < from: NM Nuclear Stress Pharmacologic Multiple (05.22.22 @ 13:31) >  Impression:  1. NO EVIDENCE FOR ISCHEMIA DURING ADENOSINE INFUSION.  2. NORMAL RESTING LEFT VENTRICULAR WALL MOTION AND WALL THICKENING.  3. LEFT VENTRICULAR EJECTION FRACTION OF  75 % WHICH IS WITHIN RANGE OF   NORMAL.    < end of copied text >    CATHETERIZATION  FINDINGS:    ELECTROPHYSIOLOGY STUDY  FINDINGS:    CAROTID ULTRASOUND:  FINDINGS    VENOUS DUPLEX SCAN:  FINDINGS:    CHEST CT PULMONARY ANGIO with IV Contrast:  FINDINGS:    MEDICATIONS  (STANDING):  aMIOdarone Infusion 1 mG/Min (33.3 mL/Hr) IV Continuous <Continuous>    MEDICATIONS  (PRN):      FAMILY HISTORY:  Family history of lung cancer (Mother)    Family history of abdominal aortic aneurysm (AAA) (Father)    SOCIAL HISTORY: No significant hx    Past Surgical History: See above    Allergies:  No Known Allergies      REVIEW OF SYSTEMS:  CONSTITUTIONAL: No fever, weight loss, chills, shakes, or fatigue  RESPIRATORY: No cough, wheezing, hemoptysis, or shortness of breath  CARDIOVASCULAR: + chest pain, dyspnea, palpitations, No dizziness, syncope, paroxysmal nocturnal dyspnea, orthopnea, or arm or leg swelling  GASTROINTESTINAL: No abdominal  or epigastric pain, nausea, vomiting, hematemesis, diarrhea, constipation, melena or bright red blood.  NEUROLOGICAL: No headaches, memory loss, slurred speech, limb weakness, loss of strength, numbness, or tremors  MUSCULOSKELETAL: No joint pain or swelling, muscle, back, or extremity pain      Vital Signs Last 24 Hrs  T(C): 37.8 (18 Dec 2023 07:39), Max: 37.8 (18 Dec 2023 07:39)  T(F): 100.1 (18 Dec 2023 07:39), Max: 100.1 (18 Dec 2023 07:39)  HR: 132 (18 Dec 2023 07:14) (132 - 132)  BP: 133/73 (18 Dec 2023 07:14) (133/73 - 133/73)  BP(mean): --  RR: 18 (18 Dec 2023 09:40) (18 - 18)  SpO2: 99% (18 Dec 2023 09:40) (96% - 99%)    Parameters below as of 18 Dec 2023 09:40  Patient On (Oxygen Delivery Method): nasal cannula  O2 Flow (L/min): 2      PHYSICAL EXAM:  GENERAL: In no apparent distress, well nourished, and hydrated.  NECK: Supple, No JVD   HEART: Irregular rate and rhythm; No murmurs, rubs, or gallops.  PULMONARY: Clear to auscultation and perfusion.  No rales, wheezing, or rhonchi bilaterally.  EXTREMITIES:  2+ Peripheral Pulses, no LE edema BL  NEUROLOGICAL: Grossly nonfocal      INTERPRETATION OF TELEMETRY:  bpm    ECG:  < from: 12 Lead ECG (10.24.23 @ 13:36) >    Ventricular Rate 47 BPM    Atrial Rate 47 BPM    P-R Interval 263 ms    QRS Duration 150 ms    Q-T Interval 508 ms    QTC Calculation(Bazett) 450 ms    P Axis 44 degrees    R Axis -73 degrees    T Axis 77 degrees    Diagnosis Line Marked sinus bradycardia with 1st degree A-V block  Left bundle branch block  Abnormal ECG    Confirmed by KAVITHA PHILLIPS MD ([id number]) on 10/25/2023 7:07:20 AM    < end of copied text >      I&O's Detail      LABS:                        10.8   10.58 )-----------( 278      ( 18 Dec 2023 08:20 )             35.3     12-18    141  |  102  |  44<H>  ----------------------------<  110<H>  3.5   |  29  |  1.6<H>    Ca    8.5      18 Dec 2023 08:20    TPro  5.8<L>  /  Alb  3.4<L>  /  TBili  0.6  /  DBili  x   /  AST  36  /  ALT  26  /  AlkPhos  142<H>  12-18          Urinalysis Basic - ( 18 Dec 2023 08:20 )    Color: x / Appearance: x / SG: x / pH: x  Gluc: 110 mg/dL / Ketone: x  / Bili: x / Urobili: x   Blood: x / Protein: x / Nitrite: x   Leuk Esterase: x / RBC: x / WBC x   Sq Epi: x / Non Sq Epi: x / Bacteria: x      BNP  I&O's Detail    Daily     Daily     RADIOLOGY & ADDITIONAL STUDIES:

## 2023-12-18 NOTE — ED ADULT TRIAGE NOTE - GLASGOW COMA SCALE: SCORE, MLM
Pt is calling back, very upset due to the surgery being canceled and pt is wanting to speak with someone. Pt is requesting a call back. 15

## 2023-12-18 NOTE — ED PROVIDER NOTE - OBJECTIVE STATEMENT
83-year-old female past medical history atrial fibrillation on Xarelto, on amiodarone status post cardioversion by Dr. Quintero 6 weeks ago, gout, CHF (45-50%) coming with complaints of chest discomfort since last night.  Patient reports that since last night she has had some mild chest discomfort, mild associated SOB.  Denies any other symptoms at this time.  No recent fever, URI-like symptoms, chest pain, diaphoresis, fever, chills, nausea, vomiting, changes in urination, or changes in bowel movements. 83-year-old female past medical history atrial fibrillation on Xarelto, on amiodarone status post cardioversion by Dr. Quintero 6 weeks ago, gout, ckd, CHF (45-50%) coming with complaints of chest discomfort since last night.  Patient reports that since last night she has had some mild chest discomfort, mild associated SOB.  Denies any other symptoms at this time.  No recent fever, URI-like symptoms, chest pain, diaphoresis, fever, chills, nausea, vomiting, changes in urination, or changes in bowel movements.

## 2023-12-18 NOTE — ED ADULT NURSE NOTE - NSFALLHARMRISKINTERV_ED_ALL_ED
Assistance with ambulation/Communicate risk of Fall with Harm to all staff, patient, and family/Encourage patient to sit up slowly, dangle for a short time, stand at bedside before walking/Monitor gait and stability/Provide visual cue: red socks, yellow wristband, yellow gown, etc/Reinforce activity limits and safety measures with patient and family/Bed in lowest position, wheels locked, appropriate side rails in place/Call bell, personal items and telephone in reach/Instruct patient to call for assistance before getting out of bed/chair/stretcher/Non-slip footwear applied when patient is off stretcher/[place] to call system/Physically safe environment - no spills, clutter or unnecessary equipment/Purposeful Proactive Rounding/Room/bathroom lighting operational, light cord in reach Assistance with ambulation/Communicate risk of Fall with Harm to all staff, patient, and family/Encourage patient to sit up slowly, dangle for a short time, stand at bedside before walking/Monitor gait and stability/Provide visual cue: red socks, yellow wristband, yellow gown, etc/Reinforce activity limits and safety measures with patient and family/Bed in lowest position, wheels locked, appropriate side rails in place/Call bell, personal items and telephone in reach/Instruct patient to call for assistance before getting out of bed/chair/stretcher/Non-slip footwear applied when patient is off stretcher/[place] to call system/Physically safe environment - no spills, clutter or unnecessary equipment/Purposeful Proactive Rounding/Room/bathroom lighting operational, light cord in reach

## 2023-12-18 NOTE — ED PROVIDER NOTE - CRITICAL CARE ATTENDING CONTRIBUTION TO CARE
98.3 83-year-old female with a history of A-fib presents to the ED for chest discomfort.  EKG triage noted to have A-fib with RVR.  Patient was brought to the clinic.  Immediately seen by me in the team.  Patient was resting comfortable.  Tachycardic.,  Irregularly irregular rhythm.  Blood pressure stable.  Placed on cardiac monitor.  Draw labs.  Case signed out pending labs and final disposition and rate control.

## 2023-12-18 NOTE — ED ADULT NURSE NOTE - OBJECTIVE STATEMENT
Patient presented to ED c/o of mild chest discomfort and mild SOB. Pt reports hx of atrial fibrillation

## 2023-12-18 NOTE — H&P ADULT - NSHPPHYSICALEXAM_GEN_ALL_CORE
PHYSICAL EXAM:  GENERAL: NAD,   HEAD:  Atraumatic, Normocephalic  CHEST/LUNG: Crackles mild at the bases  HEART: tachycardic irregular no murmurs  ABDOMEN: Bowel sounds present; Soft, Nontender, Nondistended.  EXTREMITIES:  No edema  NERVOUS SYSTEM:  Alert & Oriented X3, speech clear. No deficits   MSK: FROM all 4 extremities, full and equal strength PHYSICAL EXAM:  GENERAL: NAD,   HEAD:  Atraumatic, Normocephalic  CHEST/LUNG: Crackles mild at the bases  HEART: tachycardic w irregular Rhythm , no murmurs  ABDOMEN: Bowel sounds present; Soft, Nontender, Nondistended.  EXTREMITIES:  No edema  NERVOUS SYSTEM:  Alert & Oriented X3, speech clear. No deficits   MSK: FROM all 4 extremities, full and equal strength

## 2023-12-18 NOTE — PATIENT PROFILE ADULT - FUNCTIONAL SCREEN CURRENT LEVEL: SWALLOWING (IF SCORE 2 OR MORE FOR ANY ITEM, CONSULT REHAB SERVICES), MLM)
WORKER'S COMPENSATION RETURN TO WORK REPORT   [address]  [place] [address]  [phone]    2018  EMPLOYEE INFORMATION:   EMPLOYER INFORMATION:  NAME: Calvin Trivedi    [employer] ( [employer] SIT[employer])  : 1961     [phone]  DATE OF INJURY/EVENT: 7/3/2018             APPOINTMENT INFORMATION:  Date: 2018.       Time out: 2:26 PM.   [facility] [address]   Treating Provider: LAUREN Daniel    DIAGNOSIS: Right Achilles tendon/gastrocnemius muscle strain    STATUS: This injury/condition is WORK RELATED.    RETURN TO WORK:   Mr. Trivedi is to return to work today WITH the restrictions indicated below.     RESTRICTIONS:   Sitting work only.    TREATMENT PLAN:  Medications for this injury/condition: Tylenol  Referral/Consult: Occupational Medicine  Testing: none  Instructions: Your blood pressure is elevated today. Please have it rechecked in the near future and follow up with her primary care provider if it remains elevated  Tylenol if needed for pain  Rest and elevation  Intermittent icing.  Use crutches with no weight bearing  Wear the walking boot.    FOLLOW-UP VISIT: One week.  Thank you for the privilege of providing medical care for this injury/condition.  If there are any questions, please call the clinic at Dept: [phone].      Electronically signed on 2018 at 2:26 PM by:   LAUREN Daniel      0 = swallows foods/liquids without difficulty

## 2023-12-18 NOTE — CONSULT NOTE ADULT - NS ATTEND AMEND GEN_ALL_CORE FT
Persistent Af    - Recurrent AF needing DCCV  - AF/RVR  - Start Amiodarone  Continue Xarelto  DIuresis  FAITH/DCCV. Risks/benefits including CVA, death discussed. Agreeable  Timing to be decided  Diuresis  Cardio c/s Dr. Quintero  OP f-up for discussion about AF ablation vs Pacemaker/AV Node ablation.  D/w Daughter at bedside

## 2023-12-18 NOTE — ED PROVIDER NOTE - CARE PLAN
1152:  Pt was agreeable to  A [facility] visit for CHF . A referral was sent in [address] [place] [address] to [place] [facility]. TERRANCE Patel     Note:  Pt d/c'd to home transported by family. No NN.   TERRANCE Patel Principal Discharge DX:	Atrial fibrillation with RVR   1

## 2023-12-18 NOTE — ED PROVIDER NOTE - CLINICAL SUMMARY MEDICAL DECISION MAKING FREE TEXT BOX
Attending Statement:  I have personally seen the patient.  I provided critical care for a total of 35 minutes. I provided a substantive portion of the care and the majority of the critical care time."    Rapid A-fib.  PVCs.  Nonsustained V. tach.  Started on amiodarone.  Electrophysiology and EP and cardiology eval.    The following studies were ordered and independently interpreted by me -labs, EKG, XR.  Appropriate medications for patient's presenting complaints were ordered and effects were reassessed.   Patient's external records previous hospitalization were reviewed.  Additional history was obtained from daughter.    Escalation to observation was considered.  Patient requires inpatient hospitalization - monitored setting.

## 2023-12-18 NOTE — H&P ADULT - HISTORY OF PRESENT ILLNESS
83-year-old female past medical history atrial fibrillation on Xarelto and amiodarone status post multiple cardioversion' s most recently done by Dr. Quintero 6 weeks ago, gout, ckd, HFref (45-50%) coming with complaints of chest discomfort since last night.  Patient reports that since last night she has had some mild chest discomfort, mild associated SOB. No recent fever, URI-like symptoms, chest pain, diaphoresis, fever, chills, nausea, vomiting, changes in urination, or changes in bowel movements. Patient found to be in AF RVR, did not take her dose of Amiodarone today.     Of note pt is off lasix and torsemide and is on Triamterene/hctz as a substitute.       Pt will be admitted for further management.

## 2023-12-18 NOTE — ED ADULT TRIAGE NOTE - CHIEF COMPLAINT QUOTE
Pt with hx of afib on xarelto complaining of chest discomfort and shortness of breathe since last night. EKG done in triage Pt with hx of sleep apnea and Afib on Xarelto complaining of chest discomfort and shortness of breathe since last night. had cardioversion done 6 weeks ago. EKG done in triage

## 2023-12-18 NOTE — PATIENT PROFILE ADULT - NSFALLSECTIONLABEL_GEN_A_CORE
77-year-old gentleman with CAD, CKD, diabetes, and bladder cancer who presents with hematuria and penile pain  · Bladder cancer status post TURBT and BCG  · Bilateral nephrostomy tubes present without hematuria  · Hematuria on CBI per urology    Seems to be improving    Results from last 7 days   Lab Units 04/08/22  1030 04/07/22  1615 04/06/22  2336   HEMOGLOBIN g/dL 9 0* 9 7* 9 2* .

## 2023-12-18 NOTE — ED ADULT NURSE NOTE - CHIEF COMPLAINT QUOTE
Pt with hx of sleep apnea and Afib on Xarelto complaining of chest discomfort and shortness of breathe since last night. had cardioversion done 6 weeks ago. EKG done in triage

## 2023-12-18 NOTE — H&P ADULT - ASSESSMENT
83-year-old female past medical history atrial fibrillation on Xarelto and amiodarone status post multiple cardioversion' s came in for chest discomfort was found to be in A fib W RVR        83-year-old female past medical history atrial fibrillation on Xarelto and amiodarone status post multiple cardioversion' s came in for chest discomfort was found to be in A fib W RVR    #Atrial fibrillation W RVR  #Hfref mild overload  - Keep patient NPO for FAITH/DCCV  - Cont Xarelto 15 at bed time  - Amio IV load, 150mg bolus f/b 1mg/min x 6 hours then 0.5mg/min x 18 hours  - Cont tele monitoring  - Monitor electrolytes, maintain K around 4 and Mag around 2  - check TSH/T4 (pt on synthroid)  - 2D echocardiogram  - lasix 40 IV x1 today  - re-assess volume status in the am   - Metoprolol 5 IV Q 6 PRN for HR >110            83-year-old female past medical history atrial fibrillation on Xarelto and amiodarone status post multiple cardioversion' s came in for chest discomfort was found to be in A fib W RVR    #Atrial fibrillation W RVR  #Hfref mild overload  - Keep patient NPO for FAITH/DCCV  - Cont Xarelto 15 at bed time  - Amio IV load, 150mg bolus f/b 1mg/min x 6 hours then 0.5mg/min x 18 hours  - Cont tele monitoring  - Monitor electrolytes, maintain K around 4 and Mag around 2  - check TSH/T4 (pt on synthroid)  - 2D echocardiogram  - lasix 40 IV x1 today  - re-assess volume status in the am   - check RVP (low grade rectal temp not fever yet though)  - If HR persistently elevated will entertain additional av darlene blocking agents    #HTN  - HCTZ 25 mg PO QD with hold parameters  - Monitor BP    #Hypothyroidism   - Check TSH T4  - hold synthroid dosing till TSH back          83-year-old female past medical history atrial fibrillation on Xarelto and amiodarone status post multiple cardioversion' s came in for chest discomfort was found to be in A fib W RVR    #Atrial fibrillation W RVR  #Hfref mild overload  - Keep patient NPO for FAITH/DCCV  - Cont Xarelto 15 at bed time  - Amio IV load, 150mg bolus f/b 1mg/min x 6 hours then 0.5mg/min x 18 hours  - Cont tele monitoring  - Monitor electrolytes, maintain K around 4 and Mag around 2 (labs at 4)  - check TSH/T4 (pt on synthroid)  - 2D echocardiogram  - lasix 40 IV x1 today  - re-assess volume status in the am   - check RVP (low grade rectal temp not fever yet though)  - If HR persistently elevated will entertain additional av darlene blocking agents    #HTN  - HCTZ 25 mg PO QD with hold parameters  - Monitor BP    #Hypothyroidism   - Check TSH T4  - hold synthroid dosing till TSH back    #Pituatry adenoma  - Cabergoline 1/2 tab of 0.5 mg Q weekly    DVT PPX: Xarelto  Diet: NPO for now  Dispo: acute      83-year-old female past medical history atrial fibrillation on Xarelto and amiodarone status post multiple cardioversion' s came in for chest discomfort was found to be in A fib W RVR    #Atrial fibrillation W RVR  #Hfref mild overload  - Keep patient NPO for FAITH/DCCV  - Cont Xarelto 15 at bed time  - Amio IV load, 150mg bolus f/b 1mg/min x 6 hours then 0.5mg/min x 18 hours  - Cont tele monitoring  - Monitor electrolytes, maintain K around 4 and Mag around 2 (labs at 4)  - check TSH/T4 (pt on synthroid)  - 2D echocardiogram  - lasix 40 IV x1 today  - re-assess volume status in the am   - check RVP (low grade rectal temp not fever yet though)  - If HR persistently elevated will entertain additional av darlene blocking agents  - Pt reports being off metoprolol for the past month as directed by one of her doctors.     #HTN  - HCTZ 25 mg PO QD with hold parameters  - Monitor BP    #Hypothyroidism   - Check TSH T4  - hold synthroid dosing till TSH back    #Pituatry adenoma  - Cabergoline 1/2 tab of 0.5 mg Q weekly    DVT PPX: Xarelto  Diet: NPO for now  Dispo: acute

## 2023-12-18 NOTE — PATIENT PROFILE ADULT - FALL HARM RISK - HARM RISK INTERVENTIONS
Assistance with ambulation/Assistance OOB with selected safe patient handling equipment/Communicate Risk of Fall with Harm to all staff/Reinforce activity limits and safety measures with patient and family/Review medications for side effects contributing to fall risk/Sit up slowly, dangle for a short time, stand at bedside before walking/Tailored Fall Risk Interventions/Toileting schedule using arm’s reach rule for commode and bathroom/Visual Cue: Yellow wristband and red socks/Bed in lowest position, wheels locked, appropriate side rails in place/Call bell, personal items and telephone in reach/Instruct patient to call for assistance before getting out of bed or chair/Non-slip footwear when patient is out of bed/[place] to call system/Physically safe environment - no spills, clutter or unnecessary equipment/Purposeful Proactive Rounding/Room/bathroom lighting operational, light cord in reach Assistance with ambulation/Assistance OOB with selected safe patient handling equipment/Communicate Risk of Fall with Harm to all staff/Reinforce activity limits and safety measures with patient and family/Review medications for side effects contributing to fall risk/Sit up slowly, dangle for a short time, stand at bedside before walking/Tailored Fall Risk Interventions/Toileting schedule using arm’s reach rule for commode and bathroom/Visual Cue: Yellow wristband and red socks/Bed in lowest position, wheels locked, appropriate side rails in place/Call bell, personal items and telephone in reach/Instruct patient to call for assistance before getting out of bed or chair/Non-slip footwear when patient is out of bed/[place] to call system/Physically safe environment - no spills, clutter or unnecessary equipment/Purposeful Proactive Rounding/Room/bathroom lighting operational, light cord in reach

## 2023-12-18 NOTE — ED PROVIDER NOTE - PROGRESS NOTE DETAILS
Insert bores Case discussed with patient's cardiologist agrees with management.  Patient amiodarone started.  Electrolytes being replaced.  Electrophysiology consulted. Note authored by Dr. Beltran: Signed out to me by Dr. Novoa.  Patient is 83-year-old with history of paroxysmal A-fib on Xarelto on amiodarone status post cardioversions.  Now with chest discomfort and palpitations.  Found to be in rapid A-fib.  Hemodynamically stable.  Nontoxic.  Abdomen soft.  No Rester distress.  Pending labs and cardiology evaluation.  Potassium low.  Will replace magnesium and potassium.

## 2023-12-18 NOTE — H&P ADULT - ATTENDING COMMENTS
yo F w paroxysmal AF, on Xarelto and Amiodarone, HTN, HLD, hypothyroidism, CKD3, FELIX presenting with complaints of chest discomfort since last night.      #Afib w RVR, hx of multiple cardioversion  #HTN  #HLD  #CKD3  #FELIX     PLANs:    - FAITH/DCCV IN am, start IV amiodarone, c/w Xarelto  - Amio IV load, 150mg bolus f/b 1mg/min x 6 hours then 0.5mg/min x 18 hours  - tele monitoring, keep K > 4 and Mg >2, check TTE  - resume home meds and PO lasix yo F w paroxysmal AF, on Xarelto and Amiodarone, HTN, HLD, hypothyroidism, CKD3, FELIX presenting with complaints of chest discomfort since last night.      #Afib w RVR, hx of multiple cardioversion  #HTN  #HLD  #CKD3  #FELIX     PLANs:    - FAITH/DCCV in AM, c/w Xarelto  - Amio IV load, 150mg bolus f/b 1mg/min x 6 hours then 0.5mg/min x 18 hours  - tele monitoring, keep K > 4 and Mg >2, check TTE, TSH and FT4   - resume home meds and PO lasix

## 2023-12-18 NOTE — CONSULT NOTE ADULT - ASSESSMENT
Cardiologist: Dr Quintero    84 yo F with hx of AF sp multiple DCCV's in the past, most recent 10/23, on Xarelto and Amiodarone, HTN, HLD, hypothyroidism, CKD3, FELIX presenting with complaints of chest discomfort since last night. Patient found to be in AF RVR, did not take her dose of Amiodarone today. Patient has never seen EP and reports being on Amiodarone for several years.    Impression:  PAF RVR  HTN  HLD  CKD3  FELIX    Plan:  - Keep patient NPO for FAITH/DCCV  - Cont Xarelto  - Amio IV load, 150mg bolus f/b 1mg/min x 6 hours then 0.5mg/min x 18 hours  - Will discuss further options with patient and attending  - Cont tele monitoring  - Monitor electrolytes, maintain WNL  - 2D Echo  - Will follow Cardiologist: Dr Quintero    82 yo F with hx of AF sp multiple DCCV's in the past, most recent 10/23, on Xarelto and Amiodarone, HTN, HLD, hypothyroidism, CKD3, FELIX presenting with complaints of chest discomfort since last night. Patient found to be in AF RVR, did not take her dose of Amiodarone today. Patient has never seen EP and reports being on Amiodarone for several years.    Impression:  PAF RVR  HTN  HLD  CKD3  FELIX    Plan:  - Keep patient NPO for FAITH/DCCV  - Cont Xarelto  - Amio IV load, 150mg bolus f/b 1mg/min x 6 hours then 0.5mg/min x 18 hours  - Will discuss further options with patient and attending  - Cont tele monitoring  - Monitor electrolytes, maintain WNL  - 2D Echo  - Will follow Cardiologist: Dr Quintero    84 yo F with hx of AF sp multiple DCCV's in the past, most recent 10/23, on Xarelto and Amiodarone, HTN, HLD, hypothyroidism, CKD3, FELIX presenting with complaints of chest discomfort since last night. Patient found to be in AF RVR, did not take her dose of Amiodarone today. Patient has never seen EP and reports being on Amiodarone for several years.    Impression:  PAF RVR  AF with Aberrancy, no VT  HTN  HLD  CKD3  FELIX    Plan:  - Keep patient NPO for FAITH/DCCV  - Cont Xarelto  - Amio IV load, 150mg bolus f/b 1mg/min x 6 hours then 0.5mg/min x 18 hours  - Will discuss further options with patient and attending  - Cont tele monitoring  - Monitor electrolytes, maintain WNL  - Follow up with Dr Kothari as outpatient to discuss AF Ablation Cardiologist: Dr Quintero    82 yo F with hx of AF sp multiple DCCV's in the past, most recent 10/23, on Xarelto and Amiodarone, HTN, HLD, hypothyroidism, CKD3, FELIX presenting with complaints of chest discomfort since last night. Patient found to be in AF RVR, did not take her dose of Amiodarone today. Patient has never seen EP and reports being on Amiodarone for several years.    Impression:  PAF RVR  AF with Aberrancy, no VT  HTN  HLD  CKD3  FELIX    Plan:  - Keep patient NPO for FAITH/DCCV  - Cont Xarelto  - Amio IV load, 150mg bolus f/b 1mg/min x 6 hours then 0.5mg/min x 18 hours  - Will discuss further options with patient and attending  - Cont tele monitoring  - Monitor electrolytes, maintain WNL  - Follow up with Dr Kothari as outpatient to discuss AF Ablation

## 2023-12-18 NOTE — H&P ADULT - NSHPLABSRESULTS_GEN_ALL_CORE
10.8   10.58 )-----------( 278      ( 18 Dec 2023 08:20 )             35.3       12-18    141  |  102  |  44<H>  ----------------------------<  110<H>  3.5   |  29  |  1.6<H>    Ca    8.5      18 Dec 2023 08:20    TPro  5.8<L>  /  Alb  3.4<L>  /  TBili  0.6  /  DBili  x   /  AST  36  /  ALT  26  /  AlkPhos  142<H>  12-18              Urinalysis Basic - ( 18 Dec 2023 08:20 )    Color: x / Appearance: x / SG: x / pH: x  Gluc: 110 mg/dL / Ketone: x  / Bili: x / Urobili: x   Blood: x / Protein: x / Nitrite: x   Leuk Esterase: x / RBC: x / WBC x   Sq Epi: x / Non Sq Epi: x / Bacteria: x            Lactate Trend            CAPILLARY BLOOD GLUCOSE            Impression:    Interstitial opacities.    Cardiomegaly.        --- End of Report ---          LISANDRA CRUZ MD; Resident Radiologist  This document has been electronically signed.  SEBASTIAN GARCIA MD; Attending Radiologist  This document has been electronically signed. Dec 18 2023  1:00PM

## 2023-12-19 ENCOUNTER — TRANSCRIPTION ENCOUNTER (OUTPATIENT)
Age: 83
End: 2023-12-19

## 2023-12-19 LAB
ALBUMIN SERPL ELPH-MCNC: 3.2 G/DL — LOW (ref 3.5–5.2)
ALBUMIN SERPL ELPH-MCNC: 3.2 G/DL — LOW (ref 3.5–5.2)
ALBUMIN SERPL ELPH-MCNC: 3.5 G/DL — SIGNIFICANT CHANGE UP (ref 3.5–5.2)
ALBUMIN SERPL ELPH-MCNC: 3.5 G/DL — SIGNIFICANT CHANGE UP (ref 3.5–5.2)
ALP SERPL-CCNC: 120 U/L — HIGH (ref 30–115)
ALP SERPL-CCNC: 120 U/L — HIGH (ref 30–115)
ALP SERPL-CCNC: 127 U/L — HIGH (ref 30–115)
ALP SERPL-CCNC: 127 U/L — HIGH (ref 30–115)
ALT FLD-CCNC: 16 U/L — SIGNIFICANT CHANGE UP (ref 0–41)
ALT FLD-CCNC: 16 U/L — SIGNIFICANT CHANGE UP (ref 0–41)
ALT FLD-CCNC: 18 U/L — SIGNIFICANT CHANGE UP (ref 0–41)
ALT FLD-CCNC: 18 U/L — SIGNIFICANT CHANGE UP (ref 0–41)
ANION GAP SERPL CALC-SCNC: 11 MMOL/L — SIGNIFICANT CHANGE UP (ref 7–14)
ANION GAP SERPL CALC-SCNC: 11 MMOL/L — SIGNIFICANT CHANGE UP (ref 7–14)
ANION GAP SERPL CALC-SCNC: 13 MMOL/L — SIGNIFICANT CHANGE UP (ref 7–14)
ANION GAP SERPL CALC-SCNC: 13 MMOL/L — SIGNIFICANT CHANGE UP (ref 7–14)
ANION GAP SERPL CALC-SCNC: 16 MMOL/L — HIGH (ref 7–14)
ANION GAP SERPL CALC-SCNC: 16 MMOL/L — HIGH (ref 7–14)
AST SERPL-CCNC: 12 U/L — SIGNIFICANT CHANGE UP (ref 0–41)
BASOPHILS # BLD AUTO: 0.04 K/UL — SIGNIFICANT CHANGE UP (ref 0–0.2)
BASOPHILS # BLD AUTO: 0.04 K/UL — SIGNIFICANT CHANGE UP (ref 0–0.2)
BASOPHILS NFR BLD AUTO: 0.4 % — SIGNIFICANT CHANGE UP (ref 0–1)
BASOPHILS NFR BLD AUTO: 0.4 % — SIGNIFICANT CHANGE UP (ref 0–1)
BILIRUB SERPL-MCNC: 1 MG/DL — SIGNIFICANT CHANGE UP (ref 0.2–1.2)
BILIRUB SERPL-MCNC: 1 MG/DL — SIGNIFICANT CHANGE UP (ref 0.2–1.2)
BILIRUB SERPL-MCNC: 1.3 MG/DL — HIGH (ref 0.2–1.2)
BILIRUB SERPL-MCNC: 1.3 MG/DL — HIGH (ref 0.2–1.2)
BUN SERPL-MCNC: 31 MG/DL — HIGH (ref 10–20)
BUN SERPL-MCNC: 31 MG/DL — HIGH (ref 10–20)
BUN SERPL-MCNC: 34 MG/DL — HIGH (ref 10–20)
BUN SERPL-MCNC: 34 MG/DL — HIGH (ref 10–20)
BUN SERPL-MCNC: 42 MG/DL — HIGH (ref 10–20)
BUN SERPL-MCNC: 42 MG/DL — HIGH (ref 10–20)
CALCIUM SERPL-MCNC: 8.3 MG/DL — LOW (ref 8.4–10.5)
CALCIUM SERPL-MCNC: 8.3 MG/DL — LOW (ref 8.4–10.5)
CALCIUM SERPL-MCNC: 8.5 MG/DL — SIGNIFICANT CHANGE UP (ref 8.4–10.5)
CALCIUM SERPL-MCNC: 8.5 MG/DL — SIGNIFICANT CHANGE UP (ref 8.4–10.5)
CALCIUM SERPL-MCNC: 8.7 MG/DL — SIGNIFICANT CHANGE UP (ref 8.4–10.5)
CALCIUM SERPL-MCNC: 8.7 MG/DL — SIGNIFICANT CHANGE UP (ref 8.4–10.5)
CHLORIDE SERPL-SCNC: 101 MMOL/L — SIGNIFICANT CHANGE UP (ref 98–110)
CHLORIDE SERPL-SCNC: 101 MMOL/L — SIGNIFICANT CHANGE UP (ref 98–110)
CHLORIDE SERPL-SCNC: 102 MMOL/L — SIGNIFICANT CHANGE UP (ref 98–110)
CO2 SERPL-SCNC: 24 MMOL/L — SIGNIFICANT CHANGE UP (ref 17–32)
CO2 SERPL-SCNC: 24 MMOL/L — SIGNIFICANT CHANGE UP (ref 17–32)
CO2 SERPL-SCNC: 26 MMOL/L — SIGNIFICANT CHANGE UP (ref 17–32)
CO2 SERPL-SCNC: 26 MMOL/L — SIGNIFICANT CHANGE UP (ref 17–32)
CO2 SERPL-SCNC: 28 MMOL/L — SIGNIFICANT CHANGE UP (ref 17–32)
CO2 SERPL-SCNC: 28 MMOL/L — SIGNIFICANT CHANGE UP (ref 17–32)
CREAT SERPL-MCNC: 1.4 MG/DL — SIGNIFICANT CHANGE UP (ref 0.7–1.5)
CREAT SERPL-MCNC: 1.4 MG/DL — SIGNIFICANT CHANGE UP (ref 0.7–1.5)
CREAT SERPL-MCNC: 1.5 MG/DL — SIGNIFICANT CHANGE UP (ref 0.7–1.5)
CREAT SERPL-MCNC: 1.5 MG/DL — SIGNIFICANT CHANGE UP (ref 0.7–1.5)
CREAT SERPL-MCNC: 1.7 MG/DL — HIGH (ref 0.7–1.5)
CREAT SERPL-MCNC: 1.7 MG/DL — HIGH (ref 0.7–1.5)
EGFR: 30 ML/MIN/1.73M2 — LOW
EGFR: 30 ML/MIN/1.73M2 — LOW
EGFR: 35 ML/MIN/1.73M2 — LOW
EGFR: 35 ML/MIN/1.73M2 — LOW
EGFR: 38 ML/MIN/1.73M2 — LOW
EGFR: 38 ML/MIN/1.73M2 — LOW
EOSINOPHIL # BLD AUTO: 0.14 K/UL — SIGNIFICANT CHANGE UP (ref 0–0.7)
EOSINOPHIL # BLD AUTO: 0.14 K/UL — SIGNIFICANT CHANGE UP (ref 0–0.7)
EOSINOPHIL NFR BLD AUTO: 1.5 % — SIGNIFICANT CHANGE UP (ref 0–8)
EOSINOPHIL NFR BLD AUTO: 1.5 % — SIGNIFICANT CHANGE UP (ref 0–8)
GLUCOSE SERPL-MCNC: 102 MG/DL — HIGH (ref 70–99)
GLUCOSE SERPL-MCNC: 102 MG/DL — HIGH (ref 70–99)
GLUCOSE SERPL-MCNC: 210 MG/DL — HIGH (ref 70–99)
GLUCOSE SERPL-MCNC: 210 MG/DL — HIGH (ref 70–99)
GLUCOSE SERPL-MCNC: 88 MG/DL — SIGNIFICANT CHANGE UP (ref 70–99)
GLUCOSE SERPL-MCNC: 88 MG/DL — SIGNIFICANT CHANGE UP (ref 70–99)
HCT VFR BLD CALC: 35.5 % — LOW (ref 37–47)
HCT VFR BLD CALC: 35.5 % — LOW (ref 37–47)
HGB BLD-MCNC: 11.1 G/DL — LOW (ref 12–16)
HGB BLD-MCNC: 11.1 G/DL — LOW (ref 12–16)
IMM GRANULOCYTES NFR BLD AUTO: 0.4 % — HIGH (ref 0.1–0.3)
IMM GRANULOCYTES NFR BLD AUTO: 0.4 % — HIGH (ref 0.1–0.3)
LYMPHOCYTES # BLD AUTO: 1.7 K/UL — SIGNIFICANT CHANGE UP (ref 1.2–3.4)
LYMPHOCYTES # BLD AUTO: 1.7 K/UL — SIGNIFICANT CHANGE UP (ref 1.2–3.4)
LYMPHOCYTES # BLD AUTO: 17.8 % — LOW (ref 20.5–51.1)
LYMPHOCYTES # BLD AUTO: 17.8 % — LOW (ref 20.5–51.1)
MAGNESIUM SERPL-MCNC: 2.1 MG/DL — SIGNIFICANT CHANGE UP (ref 1.8–2.4)
MAGNESIUM SERPL-MCNC: 2.1 MG/DL — SIGNIFICANT CHANGE UP (ref 1.8–2.4)
MCHC RBC-ENTMCNC: 26.5 PG — LOW (ref 27–31)
MCHC RBC-ENTMCNC: 26.5 PG — LOW (ref 27–31)
MCHC RBC-ENTMCNC: 31.3 G/DL — LOW (ref 32–37)
MCHC RBC-ENTMCNC: 31.3 G/DL — LOW (ref 32–37)
MCV RBC AUTO: 84.7 FL — SIGNIFICANT CHANGE UP (ref 81–99)
MCV RBC AUTO: 84.7 FL — SIGNIFICANT CHANGE UP (ref 81–99)
MONOCYTES # BLD AUTO: 0.6 K/UL — SIGNIFICANT CHANGE UP (ref 0.1–0.6)
MONOCYTES # BLD AUTO: 0.6 K/UL — SIGNIFICANT CHANGE UP (ref 0.1–0.6)
MONOCYTES NFR BLD AUTO: 6.3 % — SIGNIFICANT CHANGE UP (ref 1.7–9.3)
MONOCYTES NFR BLD AUTO: 6.3 % — SIGNIFICANT CHANGE UP (ref 1.7–9.3)
NEUTROPHILS # BLD AUTO: 7.05 K/UL — HIGH (ref 1.4–6.5)
NEUTROPHILS # BLD AUTO: 7.05 K/UL — HIGH (ref 1.4–6.5)
NEUTROPHILS NFR BLD AUTO: 73.6 % — SIGNIFICANT CHANGE UP (ref 42.2–75.2)
NEUTROPHILS NFR BLD AUTO: 73.6 % — SIGNIFICANT CHANGE UP (ref 42.2–75.2)
NRBC # BLD: 0 /100 WBCS — SIGNIFICANT CHANGE UP (ref 0–0)
NRBC # BLD: 0 /100 WBCS — SIGNIFICANT CHANGE UP (ref 0–0)
PLATELET # BLD AUTO: 306 K/UL — SIGNIFICANT CHANGE UP (ref 130–400)
PLATELET # BLD AUTO: 306 K/UL — SIGNIFICANT CHANGE UP (ref 130–400)
PMV BLD: 12.3 FL — HIGH (ref 7.4–10.4)
PMV BLD: 12.3 FL — HIGH (ref 7.4–10.4)
POTASSIUM SERPL-MCNC: 2.8 MMOL/L — LOW (ref 3.5–5)
POTASSIUM SERPL-MCNC: 2.8 MMOL/L — LOW (ref 3.5–5)
POTASSIUM SERPL-MCNC: 3.1 MMOL/L — LOW (ref 3.5–5)
POTASSIUM SERPL-MCNC: 3.1 MMOL/L — LOW (ref 3.5–5)
POTASSIUM SERPL-MCNC: 4.1 MMOL/L — SIGNIFICANT CHANGE UP (ref 3.5–5)
POTASSIUM SERPL-MCNC: 4.1 MMOL/L — SIGNIFICANT CHANGE UP (ref 3.5–5)
POTASSIUM SERPL-SCNC: 2.8 MMOL/L — LOW (ref 3.5–5)
POTASSIUM SERPL-SCNC: 2.8 MMOL/L — LOW (ref 3.5–5)
POTASSIUM SERPL-SCNC: 3.1 MMOL/L — LOW (ref 3.5–5)
POTASSIUM SERPL-SCNC: 3.1 MMOL/L — LOW (ref 3.5–5)
POTASSIUM SERPL-SCNC: 4.1 MMOL/L — SIGNIFICANT CHANGE UP (ref 3.5–5)
POTASSIUM SERPL-SCNC: 4.1 MMOL/L — SIGNIFICANT CHANGE UP (ref 3.5–5)
PROT SERPL-MCNC: 5.4 G/DL — LOW (ref 6–8)
RBC # BLD: 4.19 M/UL — LOW (ref 4.2–5.4)
RBC # BLD: 4.19 M/UL — LOW (ref 4.2–5.4)
RBC # FLD: 17.3 % — HIGH (ref 11.5–14.5)
RBC # FLD: 17.3 % — HIGH (ref 11.5–14.5)
SODIUM SERPL-SCNC: 141 MMOL/L — SIGNIFICANT CHANGE UP (ref 135–146)
T4 AB SER-ACNC: 11 UG/DL — SIGNIFICANT CHANGE UP (ref 4.6–12)
T4 AB SER-ACNC: 11 UG/DL — SIGNIFICANT CHANGE UP (ref 4.6–12)
T4 FREE SERPL-MCNC: 2 NG/DL — HIGH (ref 0.9–1.8)
T4 FREE SERPL-MCNC: 2 NG/DL — HIGH (ref 0.9–1.8)
TSH SERPL-MCNC: 3.24 UIU/ML — SIGNIFICANT CHANGE UP (ref 0.27–4.2)
TSH SERPL-MCNC: 3.24 UIU/ML — SIGNIFICANT CHANGE UP (ref 0.27–4.2)
WBC # BLD: 9.57 K/UL — SIGNIFICANT CHANGE UP (ref 4.8–10.8)
WBC # BLD: 9.57 K/UL — SIGNIFICANT CHANGE UP (ref 4.8–10.8)
WBC # FLD AUTO: 9.57 K/UL — SIGNIFICANT CHANGE UP (ref 4.8–10.8)
WBC # FLD AUTO: 9.57 K/UL — SIGNIFICANT CHANGE UP (ref 4.8–10.8)

## 2023-12-19 PROCEDURE — 93320 DOPPLER ECHO COMPLETE: CPT | Mod: 26

## 2023-12-19 PROCEDURE — 99232 SBSQ HOSP IP/OBS MODERATE 35: CPT

## 2023-12-19 PROCEDURE — 93325 DOPPLER ECHO COLOR FLOW MAPG: CPT | Mod: 26

## 2023-12-19 PROCEDURE — 93010 ELECTROCARDIOGRAM REPORT: CPT

## 2023-12-19 PROCEDURE — 71045 X-RAY EXAM CHEST 1 VIEW: CPT | Mod: 26

## 2023-12-19 PROCEDURE — 93312 ECHO TRANSESOPHAGEAL: CPT | Mod: 26

## 2023-12-19 RX ORDER — LEVOTHYROXINE SODIUM 125 MCG
175 TABLET ORAL DAILY
Refills: 0 | Status: DISCONTINUED | OUTPATIENT
Start: 2023-12-19 | End: 2023-12-20

## 2023-12-19 RX ORDER — POTASSIUM CHLORIDE 20 MEQ
20 PACKET (EA) ORAL
Refills: 0 | Status: DISCONTINUED | OUTPATIENT
Start: 2023-12-19 | End: 2023-12-19

## 2023-12-19 RX ORDER — POLYETHYLENE GLYCOL 3350 17 G/17G
17 POWDER, FOR SOLUTION ORAL
Refills: 0 | DISCHARGE

## 2023-12-19 RX ORDER — TRAMADOL HYDROCHLORIDE 50 MG/1
50 TABLET ORAL ONCE
Refills: 0 | Status: DISCONTINUED | OUTPATIENT
Start: 2023-12-19 | End: 2023-12-19

## 2023-12-19 RX ORDER — POTASSIUM CHLORIDE 20 MEQ
20 PACKET (EA) ORAL
Refills: 0 | Status: COMPLETED | OUTPATIENT
Start: 2023-12-19 | End: 2023-12-19

## 2023-12-19 RX ORDER — METOPROLOL TARTRATE 50 MG
12.5 TABLET ORAL ONCE
Refills: 0 | Status: COMPLETED | OUTPATIENT
Start: 2023-12-19 | End: 2023-12-19

## 2023-12-19 RX ORDER — CABERGOLINE 0.5 MG/1
0.25 TABLET ORAL
Refills: 0 | Status: DISCONTINUED | OUTPATIENT
Start: 2023-12-19 | End: 2023-12-20

## 2023-12-19 RX ORDER — METOPROLOL TARTRATE 50 MG
12.5 TABLET ORAL EVERY 12 HOURS
Refills: 0 | Status: DISCONTINUED | OUTPATIENT
Start: 2023-12-19 | End: 2023-12-20

## 2023-12-19 RX ORDER — INFLUENZA VIRUS VACCINE 15; 15; 15; 15 UG/.5ML; UG/.5ML; UG/.5ML; UG/.5ML
0.7 SUSPENSION INTRAMUSCULAR ONCE
Refills: 0 | Status: COMPLETED | OUTPATIENT
Start: 2023-12-19 | End: 2023-12-20

## 2023-12-19 RX ADMIN — RIVAROXABAN 15 MILLIGRAM(S): KIT at 21:51

## 2023-12-19 RX ADMIN — Medication 50 MILLIEQUIVALENT(S): at 10:56

## 2023-12-19 RX ADMIN — TRAMADOL HYDROCHLORIDE 50 MILLIGRAM(S): 50 TABLET ORAL at 09:26

## 2023-12-19 RX ADMIN — Medication 50 MILLIEQUIVALENT(S): at 13:03

## 2023-12-19 RX ADMIN — Medication 12.5 MILLIGRAM(S): at 06:25

## 2023-12-19 RX ADMIN — ATORVASTATIN CALCIUM 20 MILLIGRAM(S): 80 TABLET, FILM COATED ORAL at 21:47

## 2023-12-19 RX ADMIN — CABERGOLINE 0.25 MILLIGRAM(S): 0.5 TABLET ORAL at 22:45

## 2023-12-19 RX ADMIN — Medication 20 MILLIGRAM(S): at 10:55

## 2023-12-19 RX ADMIN — Medication 12.5 MILLIGRAM(S): at 21:47

## 2023-12-19 RX ADMIN — Medication 12.5 MILLIGRAM(S): at 02:40

## 2023-12-19 NOTE — DISCHARGE NOTE PROVIDER - PROVIDER TOKENS
PROVIDER:[TOKEN:[[id number]],FOLLOWUP:[1 week]] PROVIDER:[TOKEN:[[id number]],FOLLOWUP:[1 week]] PROVIDER:[TOKEN:[[id number]],FOLLOWUP:[1 week]],PROVIDER:[TOKEN:[[id number]],FOLLOWUP:[1 week]],PROVIDER:[TOKEN:[[id number]],FOLLOWUP:[1 week]] PROVIDER:[TOKEN:[[id number]],FOLLOWUP:[1 week]],PROVIDER:[TOKEN:[[id number]],FOLLOWUP:[1 week]],PROVIDER:[TOKEN:[[id number]],FOLLOWUP:[1 week]] PROVIDER:[TOKEN:[[id number]],FOLLOWUP:[1 week]],PROVIDER:[TOKEN:[[id number]],FOLLOWUP:[1 week]],PROVIDER:[TOKEN:[[id number]],FOLLOWUP:[1 week]],PROVIDER:[TOKEN:[[id number]],FOLLOWUP:[1 week]] PROVIDER:[TOKEN:[[id number]],FOLLOWUP:[1 week]],PROVIDER:[TOKEN:[[id number]],FOLLOWUP:[1 week]],PROVIDER:[TOKEN:[[id number]],FOLLOWUP:[1 week]],PROVIDER:[TOKEN:[[id number]],FOLLOWUP:[1 week]]

## 2023-12-19 NOTE — PROGRESS NOTE ADULT - ASSESSMENT
83-year-old female past medical history atrial fibrillation on Xarelto and amiodarone status post multiple cardioversion' s came in for chest discomfort was found to be in A fib W RVR    #Atrial fibrillation W RVR  #acute on chronic HFpEF - resolved likley 2/2 Afib   # Mod pulm   - Keep patient NPO for FAITH/DCCV  - Cont Xarelto 15 at bed time  - Amio IV load, 150mg bolus f/b 1mg/min x 6 hours then 0.5mg/min x 18 hours  - Cont tele monitoring  - Monitor electrolytes, maintain K around 4 and Mag around 2 (labs at 4)  - check TSH/T4 (pt on synthroid)  - 2D echocardiogram  - lasix 40 IV x1 today  - RVP neg   - Pt reports being off metoprolol for the past month as directed by one of her doctors.   - follow up cardiology after FAITH    #HTN  - HCTZ 25 mg PO QD with hold parameters  - Monitor BP    #Hypothyroidism 2/2 thyroidectomy 2/2 thyroid CA  - TSH 3.5, Free t4 2  - pt states that she has been on syntorid 175 mcg for 2 yrs now, [place] endo [place] the city   - continue synthroid 175 mcg outpt followup     #Pituatry adenoma  - Cabergoline 1/2 tab of 0.5 mg Q weekly    #Arthritis flare  - started prednisone 20 mg x 5 days   - tylenol prn     #hypokalemia- repleted     #Progress Note Handoff  Pending (specify):  can be DCed home if cleareby cardiology   Family discussion: house staff updated pt family  Disposition: home if cleared by cardiology   Decision to admit the pt is based on acuity as above

## 2023-12-19 NOTE — CONSULT NOTE ADULT - SUBJECTIVE AND OBJECTIVE BOX
NEPHROLOGY CONSULTATION NOTE      83-year-old female past medical history atrial fibrillation on Xarelto and amiodarone status post multiple cardioversion' s most recently done by Dr. Quintero 6 weeks ago, gout, ckd, HFref (45-50%) coming with complaints of chest discomfort since last night.  Patient reports that since last night she has had some mild chest discomfort, mild associated SOB. No recent fever, URI-like symptoms, chest pain, diaphoresis, fever, chills, nausea, vomiting, changes in urination, or changes in bowel movements. Patient found to be in AF RVR, did not take her dose of Amiodarone today.     Of note pt is off lasix and torsemide and is on Triamterene/hctz as a substitute.     PAST MEDICAL & SURGICAL HISTORY:  HTN (hypertension)      High cholesterol      Hypothyroid  s/p thyroid ca surgery      Afib  on xarelto      Sleep apnea      Osteoarthritis      CKD (chronic kidney disease) stage 3, GFR 30-59 ml/min      H/O thyroidectomy      S/P rotator cuff surgery        Allergies:  No Known Allergies    Home Medications Reviewed    SOCIAL HISTORY:  Denies ETOH,Smoking,   FAMILY HISTORY:  Family history of lung cancer (Mother)    Family history of abdominal aortic aneurysm (AAA) (Father)          REVIEW OF SYSTEMS:  CONSTITUTIONAL: No weakness, fevers or chills  EYES/ENT: No visual changes;  No vertigo or throat pain   NECK: No pain or stiffness  RESPIRATORY: No cough, wheezing, hemoptysis; No shortness of breath  CARDIOVASCULAR: + chest pain or palpitations.  GASTROINTESTINAL: No abdominal or epigastric pain. No nausea, vomiting, or hematemesis; No diarrhea or constipation. No melena or hematochezia.  GENITOURINARY: No dysuria, frequency, foamy urine, urinary urgency, incontinence or hematuria  NEUROLOGICAL: No numbness or weakness  SKIN: No itching, burning, rashes, or lesions   VASCULAR: No bilateral lower extremity edema.   All other review of systems is negative unless indicated above.    PHYSICAL EXAM:  Constitutional: NAD  HEENT: anicteric sclera, oropharynx clear, MMM  Neck: No JVD  Respiratory: CTAB, no wheezes, rales or rhonchi  Cardiovascular: irreg, tachy  Gastrointestinal: BS+, soft, NT/ND  Extremities: No cyanosis or clubbing. No peripheral edema  Neurological: A/O x 3, no focal deficits  Psychiatric: Normal mood, normal affect  : No CVA tenderness. No joaquin.   Skin: No rashes    Hospital Medications:   MEDICATIONS  (STANDING):  aMIOdarone Infusion 0.5 mG/Min (16.7 mL/Hr) IV Continuous <Continuous>  aMIOdarone Infusion 1 mG/Min (33.3 mL/Hr) IV Continuous <Continuous>  atorvastatin 20 milliGRAM(s) Oral at bedtime  cabergoline 0.25 milliGRAM(s) Oral every week  hydrochlorothiazide 25 milliGRAM(s) Oral daily  influenza  Vaccine (HIGH DOSE) 0.7 milliLiter(s) IntraMuscular once  levothyroxine 175 MICROGram(s) Oral daily  metoprolol tartrate 12.5 milliGRAM(s) Oral every 12 hours  predniSONE   Tablet 20 milliGRAM(s) Oral daily  rivaroxaban 15 milliGRAM(s) Oral with dinner        VITALS:  T(F): 98.2 (12-19-23 @ 07:35), Max: 98.5 (12-18-23 @ 21:38)  HR: 101 (12-19-23 @ 16:14)  BP: 120/76 (12-19-23 @ 16:14)  RR: 18 (12-19-23 @ 16:14)  SpO2: 97% (12-19-23 @ 16:14)  Wt(kg): --    12-18 @ 07:01  -  12-19 @ 07:00  --------------------------------------------------------  IN: 0 mL / OUT: 1850 mL / NET: -1850 mL        Weight (kg): 90.3 (12-19 @ 16:14)    LABS:  12-19    141  |  102  |  34<H>  ----------------------------<  88  3.1<L>   |  28  |  1.5    Ca    8.3<L>      19 Dec 2023 06:10  Mg     2.1     12-19    TPro  5.4<L>  /  Alb  3.2<L>  /  TBili  1.3<H>  /  DBili      /  AST  12  /  ALT  16  /  AlkPhos  120<H>  12-19                          11.1   9.57  )-----------( 306      ( 19 Dec 2023 06:10 )             35.5       Urine Studies:  Urinalysis Basic - ( 19 Dec 2023 06:10 )    Color:  / Appearance:  / SG:  / pH:   Gluc: 88 mg/dL / Ketone:   / Bili:  / Urobili:    Blood:  / Protein:  / Nitrite:    Leuk Esterase:  / RBC:  / WBC    Sq Epi:  / Non Sq Epi:  / Bacteria:           RADIOLOGY & ADDITIONAL STUDIES:

## 2023-12-19 NOTE — DISCHARGE NOTE PROVIDER - ATTENDING DISCHARGE PHYSICAL EXAMINATION:
Attending attestation  Attending DC note  Pt seen and examined at bedside. No cp or sob.   vitals, labs, exam stable  Hospital course as above.  Plan dw pt and agreed to plan  Medically cleared for DC. Med recc completed.  TIERA resident. Spent 32 mins on case

## 2023-12-19 NOTE — CONSULT NOTE ADULT - ASSESSMENT
CKD stage 3  hypokalemia, corrected   no proteinuria   right renal lesion on CT 11/2023  Afib with RVR / s/p DCC  chronic HFpEF   anemia  FELIX  HTN  hypothyroidism / thyroid Ca / pituitary adenoma     plan:    can dc HCTZ  start lasix 20mg po qd instead  replete K+ PRN  outpt MRI to eval right renal lesion  off ACE-I due to prior KOBI, but can reconsider restart low dose if BP's allow  f/u cardio / EP

## 2023-12-19 NOTE — DISCHARGE NOTE PROVIDER - NSDCFUSCHEDAPPT_GEN_ALL_CORE_FT
[facility]  [address]  Scheduled Appointment: 01/02/2024    [facility]  [address]  Scheduled Appointment: 01/12/2024     [facility]  [address]  Scheduled Appointment: 01/02/2024    [facility]  [address]  Scheduled Appointment: 01/12/2024

## 2023-12-19 NOTE — PHARMACOTHERAPY INTERVENTION NOTE - COMMENTS
I spoke with Dr Bernard. Patient takes Cabergoline 0.25 mg Qweek on Monday nights. As per Dr Bernard patient will take dose on Tues 12/19/23. Dr Bernard filled out non-formulary form

## 2023-12-19 NOTE — CHART NOTE - NSCHARTNOTEFT_GEN_A_CORE
PRE-OP DIAGNOSIS:  - A fib with RVR    POST-OP DIAGNOSIS:  - No JOE thrombus   - Severely enlarged LA     PROCEDURE: Transesophageal echocardiogram    Primary Physician: Dr. Brenner   Assistant: Dr. Nova    ANESTHESIA TYPE:   [ x ] Deep Sedation    CONDITION:  [  ] Good    ESTIMATED BLOOD LOSS: None    COMPLICATIONS: None    FINDINGS:    Limited evaluation to the JOE    After risks and benefits of procedures were explained, informed consent was obtained and placed in chart. Refer to Anesthesia note for sedation details.  The FAITH probe was passed into the esophagus without difficulty.  Transesophageal images were obtained.  The FAITH probe was removed without difficulty and examined.  There was no evidence for bleeding.  The patient tolerated the procedure well without any immediate FAITH-related complications.      Preliminary Findings:  LA: Severely enlarged   JOE: Left atrial appendage was clear of clot and smoke.    Patient successfully converted to sinus rhythm with synchronized  200J of direct current cardioversion.
Notified patient persistently tachycardic. EKG showing afib with aberrancy. Discussed case with cardio fellow. Gave stat dose of metoprolol tartrate 12.5mg and started it bid

## 2023-12-19 NOTE — DISCHARGE NOTE PROVIDER - CARE PROVIDER_API CALL
Tad Savage  Internal Medicine  [address] [place] [address]  Phone: [phone]  Fax: [phone]  Follow Up Time: 1 week   Tad Savage  Internal Medicine  [address] [place] [address]  Phone: [phone]  Fax: [phone]  Follow Up Time: 1 week   Tad Savage  Internal Medicine  [facility] [place] [address]  Phone: [phone]  Fax: [phone]  Follow Up Time: 1 week    Madi Quintero  Interventional Cardiology  [address] [place] [address], [address]  [place] [address]  Phone: [phone]  Fax: [phone]  Follow Up Time: 1 week    Anderson Kothari  Cardiac Electrophysiology  [address] [place] [address]  Phone: [phone]  Fax: [phone]  Follow Up Time: 1 week   Tad Savage  Internal Medicine  [facility] [place] [address]  Phone: [phone]  Fax: [phone]  Follow Up Time: 1 week    Madi Quintero  Interventional Cardiology  [address] [place] [address], [address]  [place] [address]  Phone: [phone]  Fax: [phone]  Follow Up Time: 1 week    Anderson Kothari  Cardiac Electrophysiology  [address] [place] [address]  Phone: [phone]  Fax: [phone]  Follow Up Time: 1 week   Tad Savage  Internal Medicine  [facility] [place] [address]  Phone: [phone]  Fax: [phone]  Follow Up Time: 1 week    Madi Quintero  Interventional Cardiology  [address] [place] [address], [address]  [place] [address]  Phone: [phone]  Fax: [phone]  Follow Up Time: 1 week    Anderson Kothari  Cardiac Electrophysiology  [address] [place] [address]  Phone: [phone]  Fax: [phone]  Follow Up Time: 1 week    Pavan Dong  Nephrology  [address] [place] [address]  Phone: [phone]  Fax: [phone]  Follow Up Time: 1 week   Tad Savage  Internal Medicine  [facility] [place] [address]  Phone: [phone]  Fax: [phone]  Follow Up Time: 1 week    Madi Quintero  Interventional Cardiology  [address] [place] [address], [address]  [place] [address]  Phone: [phone]  Fax: [phone]  Follow Up Time: 1 week    Anderson Kothari  Cardiac Electrophysiology  [address] [place] [address]  Phone: [phone]  Fax: [phone]  Follow Up Time: 1 week    Pavan Dong  Nephrology  [address] [place] [address]  Phone: [phone]  Fax: [phone]  Follow Up Time: 1 week

## 2023-12-19 NOTE — DISCHARGE NOTE PROVIDER - NSDCCPCAREPLAN_GEN_ALL_CORE_FT
PRINCIPAL DISCHARGE DIAGNOSIS  Diagnosis: Atrial fibrillation with RVR  Assessment and Plan of Treatment: Atrial fibrillation or flutter is a common type of abnormal heartbeat. The heart rhythm is fast and irregular.   Have all of your prescriptions filled before you go home. You should take your medicines the way your health care provider has told you to.  Tell your provider about other medicines you are taking including over-the-counter medicines, herbs, or supplements. Ask if it is OK to keep taking these. Also, tell your provider if you are taking antacids.  Never stop taking any of your medicines without first talking to your provider. Do not skip a dose unless you are told to.  Sometimes certain medicines are prescribed to be taken only when you have symptoms. Be sure you understand when to take your medicines.     PRINCIPAL DISCHARGE DIAGNOSIS  Diagnosis: Atrial fibrillation with RVR  Assessment and Plan of Treatment: Atrial fibrillation or flutter is a common type of abnormal heartbeat. The heart rhythm is fast and irregular.   Have all of your prescriptions filled before you go home. You should take your medicines the way your health care provider has told you to.  Tell your provider about other medicines you are taking including over-the-counter medicines, herbs, or supplements. Ask if it is OK to keep taking these. Also, tell your provider if you are taking antacids.  Never stop taking any of your medicines without first talking to your provider. Do not skip a dose unless you are told to.  Sometimes certain medicines are prescribed to be taken only when you have symptoms. Be sure you understand when to take your medicines.      SECONDARY DISCHARGE DIAGNOSES  Diagnosis: Arthritis  Assessment and Plan of Treatment: take your prednisone as directed. Tylenol as needed

## 2023-12-19 NOTE — DISCHARGE NOTE PROVIDER - NPI NUMBER (FOR SYSADMIN USE ONLY) :
[[id number]] [[id number]] [[id number]],[[id number] [[id number]],[[id number]],[[id number]] [[id number]],[[id number]],[[id number]],[[id number]] [[id number]],[[id number]],[[id number]],[[id number]]

## 2023-12-19 NOTE — DISCHARGE NOTE PROVIDER - HOSPITAL COURSE
83-year-old female past medical history atrial fibrillation on Xarelto and amiodarone status post multiple cardioversion' s most recently done by Dr. Quintero 6 weeks ago, gout, ckd, HFref (45-50%) coming with complaints of chest discomfort since last night.    Patient presented for RVR , started on amiodarone drip and metoprolol , rate in the 100 range .  Patient went for a FAITH and a cardioversion     Patient started on prednisone 20 mg for 5 days for arthritis in the Right hand  83-year-old female past medical history atrial fibrillation on Xarelto and amiodarone status post multiple cardioversion' s most recently done by Dr. Quintero 6 weeks ago, gout, ckd, HFref (45-50%) coming with complaints of chest discomfort since last night.    Patient presented for RVR , started on amiodarone drip and metoprolol , rate in the 100 range .  Patient went for a FAITH and a cardioversion     Patient started on prednisone 20 mg for 5 days for arthritis in the Right hand     Follow up outpt endo for possible synthroid change Case of 83-year-old female past medical history atrial fibrillation on Xarelto and amiodarone status post multiple cardioversion' s came in for chest discomfort was found to be in A fib W RVR.    #Atrial fibrillation W RVR  #acute on chronic HFpEF - resolved likley 2/2 Afib   # Mod pulm   -FAITH cardioversion done 12/19, patient back to sinus rhythm  - Cont Xarelto 15 at bed time  - Amio IV load, 150mg bolus f/b 1mg/min x 6 hours then 0.5mg/min x 18 hours  - Amio po daily on dc  - check TSH/T4 (pt on synthroid)      #Hypothyroidism 2/2 thyroidectomy 2/2 thyroid CA  - TSH 3.5, Free t4 2  - pt states that she has been on syntorid 175 mcg for 2 yrs now, [place]   - continue synthroid 175 mcg outpt followup     #Pituatry adenoma  - Cabergoline 1/2 tab of 0.5 mg Q weekly    #Arthritis flare  - started prednisone 20 mg x 5 days   - tylenol prn     patient is stable and can be discharged on Amiodarone, metoprolol, and Xarelto  F/U as out with Dr Quintero and Dr Kothari.  TSH and CMP as outpatient.

## 2023-12-19 NOTE — PROGRESS NOTE ADULT - SUBJECTIVE AND OBJECTIVE BOX
Patient is a 83y old  Female who presents with a chief complaint of A fib W RVR (12-19-23)      Pt seen and examined at bedside. No CP or SOB. Complained of hand pain       PAST MEDICAL & SURGICAL HISTORY:  HTN (hypertension)    High cholesterol    Hypothyroid  s/p thyroid ca surgery    Afib  on xarelto    Sleep apnea    Osteoarthritis    CKD (chronic kidney disease) stage 3, GFR 30-59 ml/min    H/O thyroidectomy    S/P rotator cuff surgery        VITAL SIGNS (Last 24 hrs):  T(C): 36.8 (12-19-23 @ 16:14), Max: 36.9 (12-18-23 @ 21:38)  HR: 101 (12-19-23 @ 16:14) (92 - 103)  BP: 120/76 (12-19-23 @ 16:14) (120/76 - 160/79)  RR: 18 (12-19-23 @ 16:14) (16 - 18)  SpO2: 97% (12-19-23 @ 16:14) (97% - 100%)  Wt(kg): --  Daily     Daily     I&O's Summary    18 Dec 2023 07:01  -  19 Dec 2023 07:00  --------------------------------------------------------  IN: 0 mL / OUT: 1850 mL / NET: -1850 mL        PHYSICAL EXAM:  GENERAL: NAD  HEAD:  Atraumatic, Normocephalic  EYES: EOMI, PERRLA, conjunctiva and sclera clear  NECK: Supple, No JVD  CHEST/LUNG: Clear to auscultation bilaterally; No wheeze  HEART: Regular rate and rhythm; No murmurs, rubs, or gallops  ABDOMEN: Soft, Nontender, Nondistended; Bowel sounds present  EXTREMITIES:  2+ Peripheral Pulses, No clubbing, cyanosis, or edema, tender on hands, swan necking   PSYCH: AAOx3  NEUROLOGY: non-focal  SKIN: No rashes or lesions    Labs Reviewed  Spoke to patient in regards to abnormal labs.    CBC Full  -  ( 19 Dec 2023 06:10 )  WBC Count : 9.57 K/uL  Hemoglobin : 11.1 g/dL  Hematocrit : 35.5 %  Platelet Count - Automated : 306 K/uL  Mean Cell Volume : 84.7 fL  Mean Cell Hemoglobin : 26.5 pg  Mean Cell Hemoglobin Concentration : 31.3 g/dL  Auto Neutrophil # : 7.05 K/uL  Auto Lymphocyte # : 1.70 K/uL  Auto Monocyte # : 0.60 K/uL  Auto Eosinophil # : 0.14 K/uL  Auto Basophil # : 0.04 K/uL  Auto Neutrophil % : 73.6 %  Auto Lymphocyte % : 17.8 %  Auto Monocyte % : 6.3 %  Auto Eosinophil % : 1.5 %  Auto Basophil % : 0.4 %    BMP:    12-19 @ 06:10    Blood Urea Nitrogen - 34  Calcium - 8.3  Carbond Dioxide - 28  Chloride - 102  Creatinine - 1.5  Glucose - 88  Potassium - 3.1  Sodium - 141      Hemoglobin A1c -     Urine Culture:        COVID Labs  CRP:      D-Dimer:            Imaging reviewed independently and reviewed read  < from: Xray Chest 1 View- PORTABLE-Urgent (Xray Chest 1 View- PORTABLE-Urgent .) (12.19.23 @ 11:18) >  Impression:    Interstitial opacities are unchanged.    Cardiomegaly.    < end of copied text >    < from: TTE Echo Complete w/ Contrast w/o Doppler (12.19.23 @ 09:55) >  Summary:   1. Low-normal global left ventricular systolic function with ejection   fraction, by visual estimation, of 50 to 55% (accurate EF is difficult to   obtain due to afgm-sl-cldg variability). The left ventricular diastolic   function could not be assessed in this study due to atrial arrhythmia.   There is severe concentric hypertrophy.   2. Mildly enlarged right ventricle with moderately reduced systolic   function.   3. Severely enlarged left atrium.   4. Right atrial enlargement.   5. Mitral annular calcification.   6. Mild mitral valve regurgitation.   7. Sclerotic aortic valve with normal opening.   8. Mild tricuspid regurgitation.   9. Moderate pulmonary hypertension (PASP = 55mmHg; the IVC is dilated   with <50% respiratory variability indicating increased right atrial   pressure).  10. Dilatation of the ascending aorta (4.2cm, 2.26cm/m2).  11. Trivial pericardial effusion.    < end of copied text >    Respiratory Viral Panel with COVID-19 by JENNIFER (09.28.23 @ 11:01)    Rapid RVP Result: NotDete   SARS-CoV-2: NotDetec: This Respiratory Panel uses polymerase chain reaction (PCR) to detect for  adenovirus; coronavirus (HKU1, NL63, 229E, OC43); human metapneumovirus  (hMPV); human enterovirus/rhinovirus (Entero/RV); influenza A; influenza  A/H1; influenza A/H3; influenza A/H1-2009; influenza B; parainfluenza  viruses 1, 2, 3, 4; respiratory syncytial virus; Mycoplasma pneumoniae;  Chlamydophila pneumoniae; and SARS-CoV-2.  This test was validated by [facility] and is in use under the FDA  Emergency Use Authorization (EUA) for clinical labs CLIA-certified to  perform high complexity testing. Test results should be correlated with  clinical presentation, patient history, and epidemiology.    Home Medications:  allopurinol 100 mg oral tablet: 3 cap(s) orally once a day (at bedtime) (18 Dec 2023 15:36)  atorvastatin 20 mg oral tablet: 1 tab(s) orally once a day (20 Oct 2023 14:22)  cabergoline 0.5 mg oral tablet: 0.5 tab(s) orally once a week , monday night (18 Dec 2023 15:37)  levothyroxine 175 mcg (0.175 mg) oral tablet: 1 tab(s) orally once a day (20 Oct 2023 14:22)  triamterene-hydrochlorothiazide 37.5 mg-25 mg oral capsule: 1 cap(s) orally once a day (18 Dec 2023 15:40)  trospium 20 mg oral tablet: 1 tab(s) orally once a day (18 Dec 2023 15:38)          MEDICATIONS  (STANDING):  aMIOdarone Infusion 1 mG/Min (33.3 mL/Hr) IV Continuous <Continuous>  aMIOdarone Infusion 0.5 mG/Min (16.7 mL/Hr) IV Continuous <Continuous>  atorvastatin 20 milliGRAM(s) Oral at bedtime  cabergoline 0.25 milliGRAM(s) Oral every week  hydrochlorothiazide 25 milliGRAM(s) Oral daily  influenza  Vaccine (HIGH DOSE) 0.7 milliLiter(s) IntraMuscular once  metoprolol tartrate 12.5 milliGRAM(s) Oral every 12 hours  predniSONE   Tablet 20 milliGRAM(s) Oral daily  rivaroxaban 15 milliGRAM(s) Oral with dinner    MEDICATIONS  (PRN):  acetaminophen     Tablet .. 650 milliGRAM(s) Oral every 6 hours PRN Temp greater or equal to 38C (100.4F), Mild Pain (1 - 3), Moderate Pain (4 - 6)       Patient is a 83y old  Female who presents with a chief complaint of A fib W RVR (12-19-23)      Pt seen and examined at bedside. No CP or SOB. Complained of hand pain       PAST MEDICAL & SURGICAL HISTORY:  HTN (hypertension)    High cholesterol    Hypothyroid  s/p thyroid ca surgery    Afib  on xarelto    Sleep apnea    Osteoarthritis    CKD (chronic kidney disease) stage 3, GFR 30-59 ml/min    H/O thyroidectomy    S/P rotator cuff surgery        VITAL SIGNS (Last 24 hrs):  T(C): 36.8 (12-19-23 @ 16:14), Max: 36.9 (12-18-23 @ 21:38)  HR: 101 (12-19-23 @ 16:14) (92 - 103)  BP: 120/76 (12-19-23 @ 16:14) (120/76 - 160/79)  RR: 18 (12-19-23 @ 16:14) (16 - 18)  SpO2: 97% (12-19-23 @ 16:14) (97% - 100%)  Wt(kg): --  Daily     Daily     I&O's Summary    18 Dec 2023 07:01  -  19 Dec 2023 07:00  --------------------------------------------------------  IN: 0 mL / OUT: 1850 mL / NET: -1850 mL        PHYSICAL EXAM:  GENERAL: NAD  HEAD:  Atraumatic, Normocephalic  EYES: EOMI, PERRLA, conjunctiva and sclera clear  NECK: Supple, No JVD  CHEST/LUNG: Clear to auscultation bilaterally; No wheeze  HEART: Regular rate and rhythm; No murmurs, rubs, or gallops  ABDOMEN: Soft, Nontender, Nondistended; Bowel sounds present  EXTREMITIES:  2+ Peripheral Pulses, No clubbing, cyanosis, or edema, tender on hands, swan necking   PSYCH: AAOx3  NEUROLOGY: non-focal  SKIN: No rashes or lesions    Labs Reviewed  Spoke to patient in regards to abnormal labs.    CBC Full  -  ( 19 Dec 2023 06:10 )  WBC Count : 9.57 K/uL  Hemoglobin : 11.1 g/dL  Hematocrit : 35.5 %  Platelet Count - Automated : 306 K/uL  Mean Cell Volume : 84.7 fL  Mean Cell Hemoglobin : 26.5 pg  Mean Cell Hemoglobin Concentration : 31.3 g/dL  Auto Neutrophil # : 7.05 K/uL  Auto Lymphocyte # : 1.70 K/uL  Auto Monocyte # : 0.60 K/uL  Auto Eosinophil # : 0.14 K/uL  Auto Basophil # : 0.04 K/uL  Auto Neutrophil % : 73.6 %  Auto Lymphocyte % : 17.8 %  Auto Monocyte % : 6.3 %  Auto Eosinophil % : 1.5 %  Auto Basophil % : 0.4 %    BMP:    12-19 @ 06:10    Blood Urea Nitrogen - 34  Calcium - 8.3  Carbond Dioxide - 28  Chloride - 102  Creatinine - 1.5  Glucose - 88  Potassium - 3.1  Sodium - 141      Hemoglobin A1c -     Urine Culture:        COVID Labs  CRP:      D-Dimer:            Imaging reviewed independently and reviewed read  < from: Xray Chest 1 View- PORTABLE-Urgent (Xray Chest 1 View- PORTABLE-Urgent .) (12.19.23 @ 11:18) >  Impression:    Interstitial opacities are unchanged.    Cardiomegaly.    < end of copied text >    < from: TTE Echo Complete w/ Contrast w/o Doppler (12.19.23 @ 09:55) >  Summary:   1. Low-normal global left ventricular systolic function with ejection   fraction, by visual estimation, of 50 to 55% (accurate EF is difficult to   obtain due to kcyf-mh-udis variability). The left ventricular diastolic   function could not be assessed in this study due to atrial arrhythmia.   There is severe concentric hypertrophy.   2. Mildly enlarged right ventricle with moderately reduced systolic   function.   3. Severely enlarged left atrium.   4. Right atrial enlargement.   5. Mitral annular calcification.   6. Mild mitral valve regurgitation.   7. Sclerotic aortic valve with normal opening.   8. Mild tricuspid regurgitation.   9. Moderate pulmonary hypertension (PASP = 55mmHg; the IVC is dilated   with <50% respiratory variability indicating increased right atrial   pressure).  10. Dilatation of the ascending aorta (4.2cm, 2.26cm/m2).  11. Trivial pericardial effusion.    < end of copied text >    Respiratory Viral Panel with COVID-19 by JENNIFER (09.28.23 @ 11:01)    Rapid RVP Result: NotDete   SARS-CoV-2: NotDetec: This Respiratory Panel uses polymerase chain reaction (PCR) to detect for  adenovirus; coronavirus (HKU1, NL63, 229E, OC43); human metapneumovirus  (hMPV); human enterovirus/rhinovirus (Entero/RV); influenza A; influenza  A/H1; influenza A/H3; influenza A/H1-2009; influenza B; parainfluenza  viruses 1, 2, 3, 4; respiratory syncytial virus; Mycoplasma pneumoniae;  Chlamydophila pneumoniae; and SARS-CoV-2.  This test was validated by [facility] and is in use under the FDA  Emergency Use Authorization (EUA) for clinical labs CLIA-certified to  perform high complexity testing. Test results should be correlated with  clinical presentation, patient history, and epidemiology.    Home Medications:  allopurinol 100 mg oral tablet: 3 cap(s) orally once a day (at bedtime) (18 Dec 2023 15:36)  atorvastatin 20 mg oral tablet: 1 tab(s) orally once a day (20 Oct 2023 14:22)  cabergoline 0.5 mg oral tablet: 0.5 tab(s) orally once a week , monday night (18 Dec 2023 15:37)  levothyroxine 175 mcg (0.175 mg) oral tablet: 1 tab(s) orally once a day (20 Oct 2023 14:22)  triamterene-hydrochlorothiazide 37.5 mg-25 mg oral capsule: 1 cap(s) orally once a day (18 Dec 2023 15:40)  trospium 20 mg oral tablet: 1 tab(s) orally once a day (18 Dec 2023 15:38)          MEDICATIONS  (STANDING):  aMIOdarone Infusion 1 mG/Min (33.3 mL/Hr) IV Continuous <Continuous>  aMIOdarone Infusion 0.5 mG/Min (16.7 mL/Hr) IV Continuous <Continuous>  atorvastatin 20 milliGRAM(s) Oral at bedtime  cabergoline 0.25 milliGRAM(s) Oral every week  hydrochlorothiazide 25 milliGRAM(s) Oral daily  influenza  Vaccine (HIGH DOSE) 0.7 milliLiter(s) IntraMuscular once  metoprolol tartrate 12.5 milliGRAM(s) Oral every 12 hours  predniSONE   Tablet 20 milliGRAM(s) Oral daily  rivaroxaban 15 milliGRAM(s) Oral with dinner    MEDICATIONS  (PRN):  acetaminophen     Tablet .. 650 milliGRAM(s) Oral every 6 hours PRN Temp greater or equal to 38C (100.4F), Mild Pain (1 - 3), Moderate Pain (4 - 6)

## 2023-12-19 NOTE — DISCHARGE NOTE PROVIDER - NSDCMRMEDTOKEN_GEN_ALL_CORE_FT
allopurinol 100 mg oral tablet: 3 cap(s) orally once a day (at bedtime)  amiodarone 200 mg oral tablet: 1 tab(s) orally once a day  atorvastatin 20 mg oral tablet: 1 tab(s) orally once a day  cabergoline 0.5 mg oral tablet: 0.5 tab(s) orally once a week , monday night  levothyroxine 175 mcg (0.175 mg) oral tablet: 1 tab(s) orally once a day  predniSONE 20 mg oral tablet: 1 tab(s) orally once a day  rivaroxaban 15 mg oral tablet: 1 tab(s) orally once a day (before a meal)  triamterene-hydrochlorothiazide 37.5 mg-25 mg oral capsule: 1 cap(s) orally once a day  trospium 20 mg oral tablet: 1 tab(s) orally once a day   allopurinol 100 mg oral tablet: 3 cap(s) orally once a day (at bedtime)  atorvastatin 20 mg oral tablet: 1 tab(s) orally once a day  cabergoline 0.5 mg oral tablet: 0.5 tab(s) orally once a week , monday night  levothyroxine 175 mcg (0.175 mg) oral tablet: 1 tab(s) orally once a day  predniSONE 20 mg oral tablet: 1 tab(s) orally once a day  rivaroxaban 15 mg oral tablet: 1 tab(s) orally once a day (before a meal)  triamterene-hydrochlorothiazide 37.5 mg-25 mg oral capsule: 1 cap(s) orally once a day  trospium 20 mg oral tablet: 1 tab(s) orally once a day   allopurinol 100 mg oral tablet: 3 cap(s) orally once a day (at bedtime)  amiodarone 200 mg oral tablet: 1 tab(s) orally once a day  atorvastatin 20 mg oral tablet: 1 tab(s) orally once a day  cabergoline 0.5 mg oral tablet: 0.5 tab(s) orally once a week , monday night  levothyroxine 175 mcg (0.175 mg) oral tablet: 1 tab(s) orally once a day  metoprolol succinate 25 mg oral capsule, extended release: 1 cap(s) orally  predniSONE 20 mg oral tablet: 1 tab(s) orally once a day  rivaroxaban 15 mg oral tablet: 1 tab(s) orally once a day (before a meal)  triamterene-hydrochlorothiazide 37.5 mg-25 mg oral capsule: 1 cap(s) orally once a day  trospium 20 mg oral tablet: 1 tab(s) orally once a day   allopurinol 100 mg oral tablet: 3 cap(s) orally once a day (at bedtime)  amiodarone 200 mg oral tablet: 1 tab(s) orally once a day  atorvastatin 20 mg oral tablet: 1 tab(s) orally once a day  cabergoline 0.5 mg oral tablet: 0.5 tab(s) orally once a week , monday night  Lasix 20 mg oral tablet: 1 tab(s) orally once a day  levothyroxine 175 mcg (0.175 mg) oral tablet: 1 tab(s) orally once a day  metoprolol succinate 25 mg oral capsule, extended release: 1 cap(s) orally once a day  predniSONE 20 mg oral tablet: 1 tab(s) orally once a day  rivaroxaban 15 mg oral tablet: 1 tab(s) orally once a day (before a meal)  triamterene-hydrochlorothiazide 37.5 mg-25 mg oral capsule: 1 cap(s) orally once a day  trospium 20 mg oral tablet: 1 tab(s) orally once a day   allopurinol 100 mg oral tablet: 3 cap(s) orally once a day (at bedtime)  amiodarone 200 mg oral tablet: 1 tab(s) orally once a day  atorvastatin 20 mg oral tablet: 1 tab(s) orally once a day  cabergoline 0.5 mg oral tablet: 0.5 tab(s) orally once a week , monday night  Lasix 20 mg oral tablet: 1 tab(s) orally once a day  levothyroxine 175 mcg (0.175 mg) oral tablet: 1 tab(s) orally once a day  metoprolol succinate 25 mg oral capsule, extended release: 1 cap(s) orally once a day  predniSONE 20 mg oral tablet: 1 tab(s) orally once a day  rivaroxaban 15 mg oral tablet: 1 tab(s) orally once a day (before a meal)  trospium 20 mg oral tablet: 1 tab(s) orally once a day

## 2023-12-19 NOTE — CHART NOTE - NSCHARTNOTESELECT_GEN_ALL_CORE
Event Note
VAD Note-PICC Line Insertion Using ECG       PICC Order Assessment      Patient requiring PICC Line Catheter insertion for 2 weeks of antibiotics .     Pre PICC Insertion    Chart reviewed and patient assessed for any contraindications for PICC line placement. Utilizing ultrasound, evaluated vessel for PICC line placement.       PICC line catheter education provided to pt  prior to insertion. PICC line catheter teaching and infection prevention handouts were also given to pt . Education discussion and materials included:  • Explanation of procedure  • Risks and benefits  • Signs and symptoms of infection  • How to prevent infection  • The importance of monitoring for infection  • The importance of promptly reporting signs, symptoms of infection, and any complications to the health care provider    Consent obtained from pt .  Order verified and time-out completed with Iris Jose RN at 2150 .       PICC Line Procedure     Per protocol, and using maximum barrier precautions and sterile technique, a 4 fr.  power Injectable single lumen PICC Catheter was inserted into right upper arm basilic vein X one attempt with good initial blood return noted. Utilized ultrasound guidance to locate, evaluate, and visualize needle cannulation into the vein for PICC insertion. Catheter advanced without difficulty. 3CG technology used to compare expected spikes in internal p-waves to confirm catheter tip location in the SVC. Catheter ports flushed with normal saline, with brisk blood return present. Sterile caps and alcohol swab caps applied to all ports. Catheter dressing, antimicrobial patch, and StatLock securement device applied. Arm circumference 32 cm at 13 cm above olecranon process. Arm circumference 27 cm at 5 cm below olecranon process. Total length 42 cm. External length 1 cm.  Lot Number [id number]  Expiration Ophg83- . Limb precaution band applied. Patient tolerated procedure well, no complications noted. 
Instruction sign placed above bed. Standing orders initiated. Assisted by Iris Jose RN . Primary nurse Veronica notified of above.   
FAITH Report

## 2023-12-20 ENCOUNTER — TRANSCRIPTION ENCOUNTER (OUTPATIENT)
Age: 83
End: 2023-12-20

## 2023-12-20 VITALS
DIASTOLIC BLOOD PRESSURE: 73 MMHG | HEART RATE: 57 BPM | RESPIRATION RATE: 18 BRPM | SYSTOLIC BLOOD PRESSURE: 117 MMHG | TEMPERATURE: 97 F

## 2023-12-20 PROCEDURE — 99238 HOSP IP/OBS DSCHRG MGMT 30/<: CPT

## 2023-12-20 RX ORDER — TRIAMTERENE/HYDROCHLOROTHIAZID 75 MG-50MG
1 TABLET ORAL
Refills: 0 | DISCHARGE

## 2023-12-20 RX ORDER — METOPROLOL TARTRATE 50 MG
1 TABLET ORAL
Qty: 1 | Refills: 0
Start: 2023-12-20 | End: 2024-01-18

## 2023-12-20 RX ORDER — AMIODARONE HYDROCHLORIDE 400 MG/1
1 TABLET ORAL
Qty: 30 | Refills: 0
Start: 2023-12-20 | End: 2024-01-18

## 2023-12-20 RX ORDER — METOPROLOL TARTRATE 50 MG
1 TABLET ORAL
Qty: 30 | Refills: 0
Start: 2023-12-20 | End: 2024-01-18

## 2023-12-20 RX ORDER — AMIODARONE HYDROCHLORIDE 400 MG/1
1 TABLET ORAL
Qty: 30 | Refills: 0 | DISCHARGE
Start: 2023-12-20 | End: 2024-01-18

## 2023-12-20 RX ORDER — FUROSEMIDE 40 MG
2 TABLET ORAL
Qty: 30 | Refills: 0 | DISCHARGE
Start: 2023-12-20 | End: 2024-01-18

## 2023-12-20 RX ORDER — AMIODARONE HYDROCHLORIDE 400 MG/1
200 TABLET ORAL ONCE
Refills: 0 | Status: COMPLETED | OUTPATIENT
Start: 2023-12-20 | End: 2023-12-20

## 2023-12-20 RX ORDER — FUROSEMIDE 40 MG
1 TABLET ORAL
Qty: 30 | Refills: 0
Start: 2023-12-20 | End: 2024-01-18

## 2023-12-20 RX ADMIN — INFLUENZA VIRUS VACCINE 0.7 MILLILITER(S): 15; 15; 15; 15 SUSPENSION INTRAMUSCULAR at 15:52

## 2023-12-20 RX ADMIN — Medication 175 MICROGRAM(S): at 06:09

## 2023-12-20 RX ADMIN — AMIODARONE HYDROCHLORIDE 200 MILLIGRAM(S): 400 TABLET ORAL at 15:38

## 2023-12-20 RX ADMIN — Medication 20 MILLIGRAM(S): at 06:09

## 2023-12-20 NOTE — PROGRESS NOTE ADULT - ASSESSMENT
CKD stage 3  hypokalemia, corrected   no proteinuria   right renal lesion on CT 11/2023  Afib with RVR / s/p DCC  chronic HFpEF   anemia  FELIX  HTN  hypothyroidism / thyroid Ca / pituitary adenoma     plan:    can dc HCTZ  start lasix 20mg po qd instead (pmd recently stopped her lasix as she c/o urinating too frequently)  replete K+ PRN  outpt MRI to eval right renal lesion  outpt renal f/u, has office karl't 1/2   full code             Elidel Pregnancy And Lactation Text: This medication is Pregnancy Category C. It is unknown if this medication is excreted in breast milk.

## 2023-12-20 NOTE — PROGRESS NOTE ADULT - SUBJECTIVE AND OBJECTIVE BOX
NEPHROLOGY FOLLOW UP NOTE    pt seen and examined  no new complaints    PAST MEDICAL & SURGICAL HISTORY:  HTN (hypertension)      High cholesterol      Hypothyroid  s/p thyroid ca surgery      Afib  on xarelto      Sleep apnea      Osteoarthritis      CKD (chronic kidney disease) stage 3, GFR 30-59 ml/min      H/O thyroidectomy      S/P rotator cuff surgery        Allergies:  No Known Allergies    Home Medications Reviewed    SOCIAL HISTORY:  Denies ETOH,Smoking,   FAMILY HISTORY:  Family history of lung cancer (Mother)    Family history of abdominal aortic aneurysm (AAA) (Father)          REVIEW OF SYSTEMS:  CONSTITUTIONAL: No weakness, fevers or chills  EYES/ENT: No visual changes;  No vertigo or throat pain   NECK: No pain or stiffness  RESPIRATORY: No cough, wheezing, hemoptysis; No shortness of breath  CARDIOVASCULAR: + chest pain or palpitations.  GASTROINTESTINAL: No abdominal or epigastric pain. No nausea, vomiting, or hematemesis; No diarrhea or constipation. No melena or hematochezia.  GENITOURINARY: No dysuria, frequency, foamy urine, urinary urgency, incontinence or hematuria  NEUROLOGICAL: No numbness or weakness  SKIN: No itching, burning, rashes, or lesions   VASCULAR: No bilateral lower extremity edema.   All other review of systems is negative unless indicated above.    PHYSICAL EXAM:  Constitutional: NAD  HEENT: anicteric sclera, oropharynx clear, MMM  Neck: No JVD  Respiratory: CTAB, no wheezes, rales or rhonchi  Cardiovascular: irreg, tachy  Gastrointestinal: BS+, soft, NT/ND  Extremities: No cyanosis or clubbing. No peripheral edema  Neurological: A/O x 3, no focal deficits  Psychiatric: Normal mood, normal affect  : No CVA tenderness. No joaquin.   Skin: No rashes    Hospital Medications:   MEDICATIONS  (STANDING):  aMIOdarone Infusion 0.5 mG/Min (16.7 mL/Hr) IV Continuous <Continuous>  aMIOdarone Infusion 1 mG/Min (33.3 mL/Hr) IV Continuous <Continuous>  atorvastatin 20 milliGRAM(s) Oral at bedtime  cabergoline 0.25 milliGRAM(s) Oral every week  hydrochlorothiazide 25 milliGRAM(s) Oral daily  levothyroxine 175 MICROGram(s) Oral daily  metoprolol tartrate 12.5 milliGRAM(s) Oral every 12 hours  predniSONE   Tablet 20 milliGRAM(s) Oral daily  rivaroxaban 15 milliGRAM(s) Oral with dinner        VITALS:  T(F): 97.4 (12-20-23 @ 14:13), Max: 98 (12-19-23 @ 21:56)  HR: 57 (12-20-23 @ 14:13)  BP: 117/73 (12-20-23 @ 14:13)  RR: 18 (12-20-23 @ 14:13)  SpO2: 98% (12-19-23 @ 21:56)  Wt(kg): --    12-18 @ 07:01  -  12-19 @ 07:00  --------------------------------------------------------  IN: 0 mL / OUT: 1850 mL / NET: -1850 mL          LABS:  12-19    141  |  101  |  42<H>  ----------------------------<  210<H>  4.1   |  24  |  1.7<H>    Ca    8.7      19 Dec 2023 20:00  Mg     2.1     12-19    TPro  5.4<L>  /  Alb  3.2<L>  /  TBili  1.3<H>  /  DBili      /  AST  12  /  ALT  16  /  AlkPhos  120<H>  12-19                          11.1   9.57  )-----------( 306      ( 19 Dec 2023 06:10 )             35.5       Urine Studies:  Urinalysis Basic - ( 19 Dec 2023 20:00 )    Color:  / Appearance:  / SG:  / pH:   Gluc: 210 mg/dL / Ketone:   / Bili:  / Urobili:    Blood:  / Protein:  / Nitrite:    Leuk Esterase:  / RBC:  / WBC    Sq Epi:  / Non Sq Epi:  / Bacteria:           RADIOLOGY & ADDITIONAL STUDIES:

## 2023-12-20 NOTE — DISCHARGE NOTE NURSING/CASE MANAGEMENT/SOCIAL WORK - NSDCVIVACCINE_GEN_ALL_CORE_FT
No Vaccines Administered. influenza, high-dose, quadrivalent; 20-Dec-2023 15:52; Shaunna Tucker (RN); [employer]; [id number] (Exp. Date: 20-Jun-2024); IntraMuscular; Deltoid Left.; 0.7 milliLiter(s); VIS (VIS Published: 06-Aug-2021, VIS Presented: 20-Dec-2023);    influenza, high-dose, quadrivalent; 20-Dec-2023 15:52; Shaunna Tucker (RN); [employer]; [id number] (Exp. Date: 20-Jun-2024); IntraMuscular; Deltoid Left.; 0.7 milliLiter(s); VIS (VIS Published: 06-Aug-2021, VIS Presented: 20-Dec-2023);

## 2023-12-20 NOTE — DISCHARGE NOTE NURSING/CASE MANAGEMENT/SOCIAL WORK - PATIENT PORTAL LINK FT
You can access the FollowMyHealth Patient Portal offered by [facility] by registering at the following website: [email or web address]. By joining [employer]’s FollowMyHealth portal, you will also be able to view your health information using other applications (apps) compatible with our system. You can access the FollowMyHealth Patient Portal offered by [facility] by registering at the following website: [email or web address]. By joining [employer]’s FollowMyHealth portal, you will also be able to view your health information using other applications (apps) compatible with our system.

## 2023-12-20 NOTE — DISCHARGE NOTE NURSING/CASE MANAGEMENT/SOCIAL WORK - NSDCPEFALRISK_GEN_ALL_CORE
For information on Fall & Injury Prevention, visit: [email or web address] OR  [email or web address] OR  https://www.cdc.gov/steadi/patient.html For information on Fall & Injury Prevention, visit: [email or web address] OR  [email or web address] OR  https://www.cdc.gov/steadi/patient.html

## 2023-12-21 ENCOUNTER — TRANSCRIPTION ENCOUNTER (OUTPATIENT)
Age: 83
End: 2023-12-21

## 2023-12-26 DIAGNOSIS — I48.91 UNSPECIFIED ATRIAL FIBRILLATION: ICD-10-CM

## 2023-12-26 DIAGNOSIS — E89.0 POSTPROCEDURAL HYPOTHYROIDISM: ICD-10-CM

## 2023-12-26 DIAGNOSIS — M19.90 UNSPECIFIED OSTEOARTHRITIS, UNSPECIFIED SITE: ICD-10-CM

## 2023-12-26 DIAGNOSIS — N18.30 CHRONIC KIDNEY DISEASE, STAGE 3 UNSPECIFIED: ICD-10-CM

## 2023-12-26 DIAGNOSIS — I50.31 ACUTE DIASTOLIC (CONGESTIVE) HEART FAILURE: ICD-10-CM

## 2023-12-26 DIAGNOSIS — M10.9 GOUT, UNSPECIFIED: ICD-10-CM

## 2023-12-26 DIAGNOSIS — Z85.850 PERSONAL HISTORY OF MALIGNANT NEOPLASM OF THYROID: ICD-10-CM

## 2023-12-26 DIAGNOSIS — G47.33 OBSTRUCTIVE SLEEP APNEA (ADULT) (PEDIATRIC): ICD-10-CM

## 2023-12-26 DIAGNOSIS — I13.0 HYPERTENSIVE HEART AND CHRONIC KIDNEY DISEASE WITH HEART FAILURE AND STAGE 1 THROUGH STAGE 4 CHRONIC KIDNEY DISEASE, OR UNSPECIFIED CHRONIC KIDNEY DISEASE: ICD-10-CM

## 2023-12-26 DIAGNOSIS — E78.00 PURE HYPERCHOLESTEROLEMIA, UNSPECIFIED: ICD-10-CM

## 2023-12-26 DIAGNOSIS — E87.6 HYPOKALEMIA: ICD-10-CM

## 2023-12-26 DIAGNOSIS — D35.2 BENIGN NEOPLASM OF PITUITARY GLAND: ICD-10-CM

## 2023-12-28 ENCOUNTER — APPOINTMENT (OUTPATIENT)
Dept: CARE COORDINATION | Facility: HOME HEALTH | Age: 83
End: 2023-12-28
Payer: MEDICARE

## 2023-12-28 VITALS
RESPIRATION RATE: 15 BRPM | OXYGEN SATURATION: 100 % | SYSTOLIC BLOOD PRESSURE: 140 MMHG | HEART RATE: 65 BPM | DIASTOLIC BLOOD PRESSURE: 76 MMHG

## 2023-12-28 DIAGNOSIS — I50.9 HEART FAILURE, UNSPECIFIED: ICD-10-CM

## 2023-12-28 PROCEDURE — 99349 HOME/RES VST EST MOD MDM 40: CPT

## 2023-12-28 RX ORDER — FUROSEMIDE 20 MG/1
20 TABLET ORAL
Refills: 0 | Status: DISCONTINUED | COMMUNITY
End: 2023-12-28

## 2023-12-28 NOTE — PLAN
[FreeTextEntry1] : CHF - chronic. daily weights and low Na diet reviewed. c/w torsemide. pt was in contact with her pcp 12/24, states he stopped lasix and told her to take torsemide. f/u with cardio, appointment pending.    AFib- chronic. s/p successful cardioversion during hospitalization 12/19/23. HR 65 and regular today. c/w amiodarone, Xarelto, metoprolol. f/u with cardio.   htn - chronic. stable. bp 140/70 today. low Na diet reviewed. f/u with pcp.

## 2023-12-28 NOTE — ASSESSMENT
[FreeTextEntry1] : Patient is a 81 year old female enrolled in the [facility] TCM program with a history of  Paroxysmal AFib, HFpEF, CKD 3b, chronic microcytic anemia, FELIX (on CPAP) HTN, DLD, hypothyroidism, pituitary adenoma s/p sx, hx hip fracture. Patient was admitted to [facility] for CHF/AFIB 12/18/23 -12/20/23. Pt went was had successful cardioversion 12/19/23. HCS in place. Today upon arrival patient alert in NAD, denies CP, SOB, edema, fever, chills, or n/v/d. Reports feeling better.  F/U appointment cardio pending, pcp 12/24. Patient was contacted by SANNA Latham from TCM and medication reconciliation was done on 12/21/23, within 48 hours of discharge.

## 2023-12-28 NOTE — COUNSELING
[de-identified] : Pt was informed about CNs role/ ST[facility] program and overview of transitional care reviewed with patient. In addition, yellow contact card was provided. Pt educated on topics of importance such as compliance with all provider visits, prescribed medication regimen, and low salt diet. Pt encouraged calling CN with any issues, concerns or questions, also educated to notify CN if experiencing CP, SOB , cough, increased mucus/phlegm production, abdominal discomfort/swelling, difficulty sleeping or lying flat, fever, chills, fatigue, weight gain of 2-3lbs in 24 hours or 5lbs in one week,  dizziness, lightheadedness, n/v/d/c, swelling to extremities and/or any signs of CHF exacerbation as reviewed. Reassurance provided. Will continue to monitor

## 2023-12-28 NOTE — PHYSICAL EXAM
[No Acute Distress] : no acute distress [Well Nourished] : well nourished [Well Developed] : well developed [Normal Sclera/Conjunctiva] : normal sclera/conjunctiva [Normal Outer Ear/Nose] : the outer ears and nose were normal in appearance [No JVD] : no jugular venous distention [No Edema] : there was no peripheral edema [Non-distended] : non-distended [No Joint Swelling] : no joint swelling [No Rash] : no rash [Normal Gait] : normal gait [Normal Affect] : the affect was normal [Alert and Oriented x3] : oriented to person, place, and time [Normal Insight/Judgement] : insight and judgment were intact [Normal] : affect was normal and insight and judgment were intact [de-identified] : limited due to tele visit  [de-identified] : ambulates with walker

## 2023-12-28 NOTE — HISTORY OF PRESENT ILLNESS
[FreeTextEntry1] : Follow-up after discharge from [facility] for CHF/AFIB.  [de-identified] : Patient is a 81 year old female enrolled in the [facility] TCM program with a history of  Paroxysmal AFib, HFpEF, CKD 3b, chronic microcytic anemia, FELIX (on CPAP) HTN, DLD, hypothyroidism, pituitary adenoma s/p sx, hx hip fracture. Patient was admitted to [facility] [place] for CHF/AFIB 12/18/23 -12/20/23. Pt went was had successful cardioversion 12/19/23. HCS in place. Today upon arrival patient alert in NAD, denies CP, SOB, edema, fever, chills, or n/v/d. Reports feeling better.  F/U appointment cardio pending, pcp 12/24. Patient was contacted by SANNA Latham from TCM and medication reconciliation was done on 12/21/23, within 48 hours of discharge.   Copied from [facility]:  Hospital Course: Case of 83-year-old female past medical history atrial fibrillation on Xarelto and amiodarone status post multiple cardioversion' s came in for chest discomfort was found to be in A fib W RVR. #Atrial fibrillation W RVR #acute on chronic HFpEF - resolved likley 2/2 Afib  # Mod pulm  -FAITH cardioversion done 12/19, patient back to sinus rhythm - Cont Xarelto 15 at bed time - Amio IV load, 150mg bolus f/b 1mg/min x 6 hours then 0.5mg/min x 18 hours - Amio po daily on dc - check TSH/T4 (pt on synthroid) #Hypothyroidism 2/2 thyroidectomy 2/2 thyroid CA - TSH 3.5, Free t4 2 - pt states that she has been on syntorid 175 mcg for 2 yrs now, [place] endo [place]  - continue synthroid 175 mcg outpt followup  #Pituatry adenoma - Cabergoline 1/2 tab of 0.5 mg Q weekly #Arthritis flare - started prednisone 20 mg x 5 days  - tylenol prn  patient is stable and can be discharged on Amiodarone, metoprolol, and Xarelto F/U as out with Dr Quintero and Dr Kothari. TSH and CMP as outpatient.

## 2024-01-02 ENCOUNTER — APPOINTMENT (OUTPATIENT)
Dept: ORTHOPEDIC SURGERY | Facility: CLINIC | Age: 84
End: 2024-01-02
Payer: MEDICARE

## 2024-01-02 DIAGNOSIS — M17.12 UNILATERAL PRIMARY OSTEOARTHRITIS, LEFT KNEE: ICD-10-CM

## 2024-01-02 PROCEDURE — 99203 OFFICE O/P NEW LOW 30 MIN: CPT

## 2024-01-02 PROCEDURE — 73560 X-RAY EXAM OF KNEE 1 OR 2: CPT | Mod: 50

## 2024-01-02 NOTE — DATA REVIEWED
[FreeTextEntry1] : Bilateral knee x-rays for comparison: No acute fractures, subluxations, dislocations.  Right knee surgical hardware intact in good position.  Left knee advanced osteoarthritis with bone-on-bone medial joint line narrowing.

## 2024-01-02 NOTE — HISTORY OF PRESENT ILLNESS
[de-identified] : Patient is a 83-year-old female here for evaluation of left knee pain.  Patient states she has a long history of left knee pain history of osteoarthritis.  Patient states that she has history of right total knee replacement in 2019 at [facility].  At this point patient states her left knee feels the same 1 that her right knee originally did.  Patient states in the past she has had tried cortisone injections to the left knee with minimal relief.  Patient interested in left total knee replacement.  Patient states pain worsens with activities.  Denies reinjury/trauma.

## 2024-01-02 NOTE — IMAGING
[de-identified] : Left knee exam: No effusion, no ecchymosis, no erythema, tenderness to palpation to medial joint line with patellar crepitus, range of motion from 0 to 115 degrees with discomfort, good strength, ligamentously intact, neurovascular intact

## 2024-01-05 ENCOUNTER — TRANSCRIPTION ENCOUNTER (OUTPATIENT)
Age: 84
End: 2024-01-05

## 2024-01-12 ENCOUNTER — TRANSCRIPTION ENCOUNTER (OUTPATIENT)
Age: 84
End: 2024-01-12

## 2024-01-16 NOTE — DISCUSSION/SUMMARY
Referral received from Dr Keith for patient to see Dr Garcia. Called and left message for patient to call back and schedule.     Ok to book on 2/26/24 at 1200 if she calls back.    [de-identified] : Discussed with patient detail that she has severe osteoarthritis of left knee.  Discussed treatment options in detail including conservative versus surgical.  Patient interested in total knee replacement.  We discussed the surgery, including a description of the surgery in layman's terms, preoperative evaluation, the hospital stay, anesthesia, and expected outcome.     Models equivalent to the actual knee replacement prosthesis were used to demonstrate the magnitude and scope of the surgery.  Various modes of implant fixation including cement and biologic fixation were described.  The patient shared in the decision making and agreed to the use of implants.     Additionally surgical risks were discussed. The patient has a good understanding of the inherent risks of surgery which include bleeding, pain, and infection, blood clots, and any medical issues that may arise in the perioperative period.  Additionally, we discussed risks uniquely pertinent to knee replacement surgery, including arthrofibrosis, instability, loosening, numbness around the incision, nerve injury, and possible need for revision surgery should the replacement not function optimally.  All questions were answered and the patient verbalized understanding. I encouraged the patient to contact me should any further questions or issues arise.    Patient will require a rolling walker 2-4 weeks post-op for ambulation and ADLs   Patient will also require commode as they are confined to one level without a bathroom   Seen under supervision of Dr. Vang.

## 2024-02-27 ENCOUNTER — APPOINTMENT (OUTPATIENT)
Dept: ORTHOPEDIC SURGERY | Facility: CLINIC | Age: 84
End: 2024-02-27

## 2024-02-29 ENCOUNTER — INPATIENT (INPATIENT)
Facility: HOSPITAL | Age: 84
LOS: 4 days | Discharge: HOME HEALTH | DRG: 308 | End: 2024-03-05
Attending: INTERNAL MEDICINE | Admitting: INTERNAL MEDICINE
Payer: MEDICARE

## 2024-02-29 VITALS
OXYGEN SATURATION: 95 % | TEMPERATURE: 98 F | HEART RATE: 100 BPM | DIASTOLIC BLOOD PRESSURE: 70 MMHG | RESPIRATION RATE: 18 BRPM | SYSTOLIC BLOOD PRESSURE: 144 MMHG

## 2024-02-29 DIAGNOSIS — Z98.890 OTHER SPECIFIED POSTPROCEDURAL STATES: Chronic | ICD-10-CM

## 2024-02-29 DIAGNOSIS — I48.91 UNSPECIFIED ATRIAL FIBRILLATION: ICD-10-CM

## 2024-02-29 LAB
ALBUMIN SERPL ELPH-MCNC: 3.6 G/DL — SIGNIFICANT CHANGE UP (ref 3.5–5.2)
ALP SERPL-CCNC: 101 U/L — SIGNIFICANT CHANGE UP (ref 30–115)
ALT FLD-CCNC: 18 U/L — SIGNIFICANT CHANGE UP (ref 0–41)
ANION GAP SERPL CALC-SCNC: 16 MMOL/L — HIGH (ref 7–14)
AST SERPL-CCNC: 35 U/L — SIGNIFICANT CHANGE UP (ref 0–41)
BASOPHILS # BLD AUTO: 0.05 K/UL — SIGNIFICANT CHANGE UP (ref 0–0.2)
BASOPHILS NFR BLD AUTO: 0.4 % — SIGNIFICANT CHANGE UP (ref 0–1)
BILIRUB SERPL-MCNC: 0.3 MG/DL — SIGNIFICANT CHANGE UP (ref 0.2–1.2)
BUN SERPL-MCNC: 47 MG/DL — HIGH (ref 10–20)
CALCIUM SERPL-MCNC: 8.4 MG/DL — SIGNIFICANT CHANGE UP (ref 8.4–10.4)
CHLORIDE SERPL-SCNC: 102 MMOL/L — SIGNIFICANT CHANGE UP (ref 98–110)
CO2 SERPL-SCNC: 27 MMOL/L — SIGNIFICANT CHANGE UP (ref 17–32)
CREAT SERPL-MCNC: 2 MG/DL — HIGH (ref 0.7–1.5)
EGFR: 25 ML/MIN/1.73M2 — LOW
EOSINOPHIL # BLD AUTO: 0.01 K/UL — SIGNIFICANT CHANGE UP (ref 0–0.7)
EOSINOPHIL NFR BLD AUTO: 0.1 % — SIGNIFICANT CHANGE UP (ref 0–8)
GLUCOSE SERPL-MCNC: 125 MG/DL — HIGH (ref 70–99)
HCT VFR BLD CALC: 35.4 % — LOW (ref 37–47)
HGB BLD-MCNC: 11 G/DL — LOW (ref 12–16)
IMM GRANULOCYTES NFR BLD AUTO: 0.6 % — HIGH (ref 0.1–0.3)
LYMPHOCYTES # BLD AUTO: 0.97 K/UL — LOW (ref 1.2–3.4)
LYMPHOCYTES # BLD AUTO: 7.3 % — LOW (ref 20.5–51.1)
MCHC RBC-ENTMCNC: 26.8 PG — LOW (ref 27–31)
MCHC RBC-ENTMCNC: 31.1 G/DL — LOW (ref 32–37)
MCV RBC AUTO: 86.3 FL — SIGNIFICANT CHANGE UP (ref 81–99)
MONOCYTES # BLD AUTO: 0.35 K/UL — SIGNIFICANT CHANGE UP (ref 0.1–0.6)
MONOCYTES NFR BLD AUTO: 2.6 % — SIGNIFICANT CHANGE UP (ref 1.7–9.3)
NEUTROPHILS # BLD AUTO: 11.8 K/UL — HIGH (ref 1.4–6.5)
NEUTROPHILS NFR BLD AUTO: 89 % — HIGH (ref 42.2–75.2)
NRBC # BLD: 0 /100 WBCS — SIGNIFICANT CHANGE UP (ref 0–0)
PLATELET # BLD AUTO: 286 K/UL — SIGNIFICANT CHANGE UP (ref 130–400)
PMV BLD: 11.7 FL — HIGH (ref 7.4–10.4)
POTASSIUM SERPL-MCNC: 4.1 MMOL/L — SIGNIFICANT CHANGE UP (ref 3.5–5)
POTASSIUM SERPL-SCNC: 4.1 MMOL/L — SIGNIFICANT CHANGE UP (ref 3.5–5)
PROT SERPL-MCNC: 5.7 G/DL — LOW (ref 6–8)
RBC # BLD: 4.1 M/UL — LOW (ref 4.2–5.4)
RBC # FLD: 18.1 % — HIGH (ref 11.5–14.5)
SODIUM SERPL-SCNC: 145 MMOL/L — SIGNIFICANT CHANGE UP (ref 135–146)
TROPONIN T, HIGH SENSITIVITY RESULT: 35 NG/L — HIGH (ref 6–13)
TROPONIN T, HIGH SENSITIVITY RESULT: 39 NG/L — HIGH (ref 6–13)
WBC # BLD: 13.26 K/UL — HIGH (ref 4.8–10.8)
WBC # FLD AUTO: 13.26 K/UL — HIGH (ref 4.8–10.8)

## 2024-02-29 PROCEDURE — 99223 1ST HOSP IP/OBS HIGH 75: CPT

## 2024-02-29 PROCEDURE — 83735 ASSAY OF MAGNESIUM: CPT

## 2024-02-29 PROCEDURE — 71045 X-RAY EXAM CHEST 1 VIEW: CPT | Mod: 26

## 2024-02-29 PROCEDURE — 92960 CARDIOVERSION ELECTRIC EXT: CPT

## 2024-02-29 PROCEDURE — 84484 ASSAY OF TROPONIN QUANT: CPT

## 2024-02-29 PROCEDURE — 71045 X-RAY EXAM CHEST 1 VIEW: CPT

## 2024-02-29 PROCEDURE — 97163 PT EVAL HIGH COMPLEX 45 MIN: CPT | Mod: GP

## 2024-02-29 PROCEDURE — 93308 TTE F-UP OR LMTD: CPT | Mod: 26

## 2024-02-29 PROCEDURE — 36415 COLL VENOUS BLD VENIPUNCTURE: CPT

## 2024-02-29 PROCEDURE — 80048 BASIC METABOLIC PNL TOTAL CA: CPT

## 2024-02-29 PROCEDURE — 80053 COMPREHEN METABOLIC PANEL: CPT

## 2024-02-29 PROCEDURE — 97116 GAIT TRAINING THERAPY: CPT | Mod: GP

## 2024-02-29 PROCEDURE — 84443 ASSAY THYROID STIM HORMONE: CPT

## 2024-02-29 PROCEDURE — 85027 COMPLETE CBC AUTOMATED: CPT

## 2024-02-29 PROCEDURE — 99285 EMERGENCY DEPT VISIT HI MDM: CPT | Mod: GC

## 2024-02-29 PROCEDURE — 85025 COMPLETE CBC W/AUTO DIFF WBC: CPT

## 2024-02-29 RX ORDER — METOPROLOL TARTRATE 50 MG
25 TABLET ORAL
Refills: 0 | Status: DISCONTINUED | OUTPATIENT
Start: 2024-02-29 | End: 2024-03-02

## 2024-02-29 RX ORDER — ACETAMINOPHEN 500 MG
975 TABLET ORAL EVERY 6 HOURS
Refills: 0 | Status: DISCONTINUED | OUTPATIENT
Start: 2024-02-29 | End: 2024-03-05

## 2024-02-29 RX ORDER — LANOLIN ALCOHOL/MO/W.PET/CERES
3 CREAM (GRAM) TOPICAL AT BEDTIME
Refills: 0 | Status: DISCONTINUED | OUTPATIENT
Start: 2024-02-29 | End: 2024-03-05

## 2024-02-29 RX ORDER — OXYBUTYNIN CHLORIDE 5 MG
5 TABLET ORAL
Refills: 0 | Status: DISCONTINUED | OUTPATIENT
Start: 2024-02-29 | End: 2024-03-05

## 2024-02-29 RX ORDER — METOPROLOL TARTRATE 50 MG
5 TABLET ORAL ONCE
Refills: 0 | Status: COMPLETED | OUTPATIENT
Start: 2024-02-29 | End: 2024-02-29

## 2024-02-29 RX ORDER — METOPROLOL TARTRATE 50 MG
50 TABLET ORAL ONCE
Refills: 0 | Status: COMPLETED | OUTPATIENT
Start: 2024-02-29 | End: 2024-02-29

## 2024-02-29 RX ORDER — ALLOPURINOL 300 MG
100 TABLET ORAL AT BEDTIME
Refills: 0 | Status: DISCONTINUED | OUTPATIENT
Start: 2024-02-29 | End: 2024-03-05

## 2024-02-29 RX ORDER — RIVAROXABAN 15 MG-20MG
15 KIT ORAL DAILY
Refills: 0 | Status: DISCONTINUED | OUTPATIENT
Start: 2024-02-29 | End: 2024-03-05

## 2024-02-29 RX ORDER — PANTOPRAZOLE SODIUM 20 MG/1
40 TABLET, DELAYED RELEASE ORAL
Refills: 0 | Status: DISCONTINUED | OUTPATIENT
Start: 2024-02-29 | End: 2024-03-05

## 2024-02-29 RX ORDER — AMIODARONE HYDROCHLORIDE 400 MG/1
200 TABLET ORAL DAILY
Refills: 0 | Status: DISCONTINUED | OUTPATIENT
Start: 2024-02-29 | End: 2024-03-05

## 2024-02-29 RX ORDER — LEVOTHYROXINE SODIUM 125 MCG
175 TABLET ORAL DAILY
Refills: 0 | Status: DISCONTINUED | OUTPATIENT
Start: 2024-02-29 | End: 2024-03-05

## 2024-02-29 RX ORDER — ACETAMINOPHEN 500 MG
650 TABLET ORAL EVERY 6 HOURS
Refills: 0 | Status: DISCONTINUED | OUTPATIENT
Start: 2024-02-29 | End: 2024-02-29

## 2024-02-29 RX ORDER — FUROSEMIDE 40 MG
40 TABLET ORAL DAILY
Refills: 0 | Status: DISCONTINUED | OUTPATIENT
Start: 2024-02-29 | End: 2024-03-02

## 2024-02-29 RX ORDER — ATORVASTATIN CALCIUM 80 MG/1
20 TABLET, FILM COATED ORAL AT BEDTIME
Refills: 0 | Status: DISCONTINUED | OUTPATIENT
Start: 2024-02-29 | End: 2024-03-05

## 2024-02-29 RX ORDER — FUROSEMIDE 40 MG
40 TABLET ORAL DAILY
Refills: 0 | Status: DISCONTINUED | OUTPATIENT
Start: 2024-02-29 | End: 2024-02-29

## 2024-02-29 RX ADMIN — RIVAROXABAN 15 MILLIGRAM(S): KIT at 20:35

## 2024-02-29 RX ADMIN — Medication 5 MILLIGRAM(S): at 15:38

## 2024-02-29 RX ADMIN — Medication 50 MILLIGRAM(S): at 12:57

## 2024-02-29 RX ADMIN — Medication 100 MILLIGRAM(S): at 23:12

## 2024-02-29 RX ADMIN — Medication 40 MILLIGRAM(S): at 20:35

## 2024-02-29 RX ADMIN — ATORVASTATIN CALCIUM 20 MILLIGRAM(S): 80 TABLET, FILM COATED ORAL at 23:12

## 2024-02-29 NOTE — H&P ADULT - NSHPPHYSICALEXAM_GEN_ALL_CORE
GENERAL: NAD, lying in bed comfortably  HEAD:  Atraumatic, Normocephalic  EYES: conjunctiva and sclera clear  ENT: Moist mucous membranes  NECK: Supple, No JVD  CHEST/LUNG: Clear to auscultation bilaterally; No rales, rhonchi, wheezing, or rubs. Unlabored respirations  HEART: Regular rate and rhythm; No murmurs, rubs, or gallops  ABDOMEN: Soft, nontender, nondistended  EXTREMITIES:  No clubbing, cyanosis, or edema  NERVOUS SYSTEM:  A&Ox3 GENERAL: NAD, lying in bed comfortably  HEAD:  Atraumatic, Normocephalic  EYES: conjunctiva and sclera clear  ENT: Moist mucous membranes  NECK: Supple, No JVD  CHEST/LUNG: Clear to auscultation bilaterally; No rales, rhonchi, wheezing, or rubs. Unlabored respirations  HEART: Regular rate and rhythm; No murmurs, rubs, or gallops  ABDOMEN: Soft, nontender, nondistended  EXTREMITIES: b/l LE edema  NERVOUS SYSTEM:  A&Ox3

## 2024-02-29 NOTE — ED PROVIDER NOTE - OBJECTIVE STATEMENT
83-year-old female with PMH A-fib on Xarelto and amiodarone s/p cardioversion x 2 in December, HF, hypothyroidism 2/2 thyroidectomy 2/2 thyroid CA presenting for palpitations X 1 day.  Patient states she has similar palpitations every time she is in A-fib, saw her cardiologist Dr. Quintero earlier this week who increased her metoprolol from 25 mg to 50 mg for 2 days.  Patient resumed her 25 mg dose this morning prior to onset of palpitations.  Patient denies chest pain, SOB, increased leg swelling, recent fever, cold symptoms, GI symptoms, urinary symptoms

## 2024-02-29 NOTE — H&P ADULT - HISTORY OF PRESENT ILLNESS
83-year-old female past medical history atrial fibrillation on Xarelto and amiodarone status post multiple cardioversion' s came in for chest discomfort was found to be in A fib W RVR            ICU Vital Signs Last 24 Hrs  T(C): 36.7 (29 Feb 2024 10:25), Max: 36.7 (29 Feb 2024 10:25)  T(F): 98 (29 Feb 2024 10:25), Max: 98 (29 Feb 2024 10:25)  HR: 118 (29 Feb 2024 15:42) (100 - 118)  BP: 134/61 (29 Feb 2024 15:42) (134/61 - 144/70)  RR: 19 (29 Feb 2024 15:42) (18 - 19)  SpO2: 98% (29 Feb 2024 15:42) (95% - 98%)  O2 Parameters below as of 29 Feb 2024 15:42  Patient On (Oxygen Delivery Method): room air          83-year-old female past medical history atrial fibrillation on Xarelto s/p 2 ablations( October and November) and amiodarone status post multiple cardioversion' s came in for chest discomfort and palpitations since Tuesday The patient mentioned that she comprehends when she is in A fib. She follows with Dr. Wilkins and was last seen on Tuesday and her metoprolol was increased. She mentioned that this morning she was having racing of the heart and decided to come to ED. She mentioned that she is feeling bloated these days.   Denies any recent fevers, chills, N/V/D, headaches, PND, othopnea        ICU Vital Signs Last 24 Hrs  T(C): 36.7 (29 Feb 2024 10:25), Max: 36.7 (29 Feb 2024 10:25)  T(F): 98 (29 Feb 2024 10:25), Max: 98 (29 Feb 2024 10:25)  HR: 118 (29 Feb 2024 15:42) (100 - 118)  BP: 134/61 (29 Feb 2024 15:42) (134/61 - 144/70)  RR: 19 (29 Feb 2024 15:42) (18 - 19)  SpO2: 98% (29 Feb 2024 15:42) (95% - 98%)  O2 Parameters below as of 29 Feb 2024 15:42  Patient On (Oxygen Delivery Method): room air          83-year-old female past medical history atrial fibrillation on Xarelto s/p 2 ablations( October and November) and amiodarone status post multiple cardioversion' s came in for chest discomfort and palpitations since Tuesday The patient mentioned that she comprehends when she is in A fib. She follows with Dr. Wilkins and was last seen on Tuesday and her metoprolol was increased. She mentioned that this morning she was having racing of the heart and decided to come to ED. She mentioned that she is feeling bloated these days.   Denies any recent fevers, chills, N/V/D, headaches, PND, orthopnea        ICU Vital Signs Last 24 Hrs  T(C): 36.7 (29 Feb 2024 10:25), Max: 36.7 (29 Feb 2024 10:25)  T(F): 98 (29 Feb 2024 10:25), Max: 98 (29 Feb 2024 10:25)  HR: 118 (29 Feb 2024 15:42) (100 - 118)  BP: 134/61 (29 Feb 2024 15:42) (134/61 - 144/70)  RR: 19 (29 Feb 2024 15:42) (18 - 19)  SpO2: 98% (29 Feb 2024 15:42) (95% - 98%)  O2 Parameters below as of 29 Feb 2024 15:42  Patient On (Oxygen Delivery Method): room air

## 2024-02-29 NOTE — H&P ADULT - NSHPLABSRESULTS_GEN_ALL_CORE
11.0   13.26 )-----------( 286      ( 29 Feb 2024 13:12 )             35.4     02-29    145  |  102  |  47<H>  ----------------------------<  125<H>  4.1   |  27  |  2.0<H>    Ca    8.4      29 Feb 2024 13:12    TPro  5.7<L>  /  Alb  3.6  /  TBili  0.3  /  DBili  x   /  AST  35  /  ALT  18  /  AlkPhos  101  02-29          Urinalysis Basic - ( 29 Feb 2024 13:12 )    Color: x / Appearance: x / SG: x / pH: x  Gluc: 125 mg/dL / Ketone: x  / Bili: x / Urobili: x   Blood: x / Protein: x / Nitrite: x   Leuk Esterase: x / RBC: x / WBC x   Sq Epi: x / Non Sq Epi: x / Bacteria: x        Lactate Trend        CAPILLARY BLOOD GLUCOSE

## 2024-02-29 NOTE — ED PROVIDER NOTE - PHYSICAL EXAMINATION
CONSTITUTIONAL: Well-developed; well-nourished; in no acute distress.   SKIN: Warm, dry  HEAD: Normocephalic; atraumatic  EYES: EOMI, normal sclera and conjunctiva   ENT: No nasal discharge; airway clear.   CARD: Tachycardic, irregular rhythm. Normal S1, S2. 2+ radial pulses. 1+ LE edema.  RESP: No increased WOB. CTA b/l without wheezes, crackles, rhonchi  ABD: Soft, nontender, nondistended.  EXT: Normal ROM.   NEURO: Alert, oriented, grossly unremarkable  PSYCH: Cooperative, appropriate.

## 2024-02-29 NOTE — H&P ADULT - ATTENDING COMMENTS
Patient seen and examined independently.     83-year-old female past medical history atrial fibrillation on Xarelto and amiodarone status post multiple cardioversion' came in  for chest discomfort was found to be in A fib W RVR    Vital Signs Last 24 Hrs  T(C): 36.7  T(F): 98  HR: 118  BP: 134/61  BP(mean): --  RR: 19  SpO2: 98%    O/E:  Awake, alert, not in distress.  HEENT: atraumatic, EOMI.  Chest: basal crepts + b/l  CVS: SIS2 +, irregular , no murmur.  P/A: Soft, BS+  CNS: awake, alert  Ext: no edema feet.  All systems reviewed positive findings as above.      # Acute on Chronic systolic and  diastolic CHF  # Chronic afib with RVR  # Hypertension  -  Xray Chest 1 View- PORTABLE-Urgent (24 @ 13:09) >Interstitial opacities versus pulmonary vascular congestion  - Troponin T, High Sensitivity Result: 35 (24 @ 15:56)  - monitor I/o , daily weight , restrict fluids  - Start IV lasix 40 mg daily  - c/w amiodarone, xarelto  - c/w toprol  - cardiac monitoring  - 2D echo  - TSH, free T4  - cardiology eval: plan to cardiovert in AM    # Dyslipidemia  - c/w statin    #Hypothyroidism sec to  thyroidectomy   # H/o thyroid cancer  - c/w synthroid    # H/o Pituatary adenoma  - c/w cabergoline    # OA  - dc prednisone  - tylenol 975mg q8  - ultram prn q8 for pain    # CKD stage3- stable    # FELIX  - CPAP at HS    # Full code    # PendinD echo, TSH, free T4, cardiology eval  # Discussed plan of care with patient and her daughter  # Disposition: Home when stable    -Time spent: 78 minutes Direct patient care. Discussed with Housestaff Patient seen and examined independently.     83-year-old female past medical history atrial fibrillation on Xarelto and amiodarone status post multiple cardioversion' came in  for chest discomfort was found to be in A fib W RVR    Vital Signs Last 24 Hrs  T(C): 36.7  T(F): 98  HR: 118  BP: 134/61  BP(mean): --  RR: 19  SpO2: 98%    O/E:  Awake, alert, not in distress.  HEENT: atraumatic, EOMI.  Chest: basal crepts + b/l  CVS: SIS2 +, irregular , no murmur.  P/A: Soft, BS+  CNS: awake, alert  Ext: no edema feet.  All systems reviewed positive findings as above.      # Acute on Chronic systolic and  diastolic CHF  # Chronic afib with RVR  # Hypertension  -  Xray Chest 1 View- PORTABLE-Urgent (24 @ 13:09) >Interstitial opacities versus pulmonary vascular congestion  - Troponin T, High Sensitivity Result: 35 (24 @ 15:56)  - monitor I/o , daily weight , restrict fluids  - Start IV lasix 40 mg daily  - c/w amiodarone, xarelto  - c/w toprol  - cardiac monitoring  - 2D echo  - TSH, free T4  - cardiology eval: plan to cardiovert in AM    # Dyslipidemia  - c/w statin    #Hypothyroidism sec to  thyroidectomy   # H/o thyroid cancer  - c/w synthroid    # H/o Pituitary adenoma  - c/w cabergoline    # OA  - dc prednisone  - Tylenol 975mg q8  - ultram prn q8 for pain    # CKD stage3- stable    # FELIX  - CPAP at HS    # Full code    # PendinD echo, TSH, free T4, cardiology eval  # Discussed plan of care with patient and her daughter  # Disposition: Home when stable

## 2024-02-29 NOTE — H&P ADULT - ASSESSMENT
. Low-normal global left ventricular systolic function with ejection   fraction, by visual estimation, of 50 to 55% (accurate EF is difficult to   obtain due to wxiv-nh-togb variability). The left ventricular diastolic   function could not be assessed in this study due to atrial arrhythmia.   There is severe concentric hypertrophy.   2. Mildly enlarged right ventricle with moderately reduced systolic   function.          #Atrial fibrillation W RVR  #acute on chronic HFpEF - resolved likley 2/2 Afib   # Mod pulm         - Cont tele monitoring  - Monitor electrolytes, maintain K around 4 and Mag  - 2D echocardiogram    #HTN  - Monitor BP    #Hypothyroidism 2/2 thyroidectomy 2/2 thyroid CA  -   - pt states that she has been on syntorid 175 mcg for 2 yrs now, [place] endo [place]   - continue synthroid 175 mcg outpt followup     #Pituatry adenoma  - Cabergoline 1/2 tab of 0.5 mg Q weekly    #Arthritis flare  - started prednisone 20 mg x 5 days     83-year-old female past medical history atrial fibrillation on Xarelto s/p 2 ablations( October and November) and amiodarone status post multiple cardioversion' s came in for chest discomfort and palpitations since Tuesday The patient mentioned that she comprehends when she is in A fib. She follows with Dr. Wilkins and was last seen on Tuesday and her metoprolol was increased. She mentioned that this morning she was having racing of the heart and decided to come to ED. She mentioned that she is feeling bloated these days.       #Atrial fibrillation W RVR  #acute on chronic HFpEF   # Mod pulm congestion likely due to A fib  - ECHO: Low-normal global left ventricular systolic function with ejection fraction, by visual estimation, of 50 to 55% (accurate EF is difficult to obtain due to ipas-ub-trpd variability). The left ventricular diastolic function could not be assessed in this study due to atrial arrhythmia. There is severe concentric hypertrophy.Mildly enlarged right ventricle with moderately reduced systolic function.  - f/u cardio consult  - c/w metoprolol 25 mg BID  - f/u TSH  - c/w Xarelto 15 mg  - c/w Lasix 40 iv once a day  - cxr Interstitial opacities versus pulmonary vascular   congestion  - Cont tele monitoring  - Monitor electrolytes, maintain K around 4 and Mag    #HTN  - Monitor BP    #Hypothyroidism 2/2 thyroidectomy 2/2 thyroid CA  - continue synthroid 175 mcg output followup     # Pituitary adenoma  - Cabergoline 1/2 tab of 0.5 mg Q weekly      #DVT PPx- xaralto  #GI PPx-protonix  #Diet- DASH  #Activity- AAT  #Dispo- Acute  #Code- full      83-year-old female past medical history atrial fibrillation on Xarelto s/p 2 ablations( October and November) and amiodarone status post multiple cardioversion' s came in for chest discomfort and palpitations since Tuesday The patient mentioned that she comprehends when she is in A fib. She follows with Dr. Wilkins and was last seen on Tuesday and her metoprolol was increased. She mentioned that this morning she was having racing of the heart and decided to come to ED. She mentioned that she is feeling bloated these days.       #Atrial fibrillation W RVR  #acute on chronic HFpEF   # Mod pulm congestion likely due to A fib  # CKD stage 4  # KOBI on CKD likely cardiorenal vs prerenal component?  - ECHO: Low-normal global left ventricular systolic function with ejection fraction, by visual estimation, of 50 to 55% (accurate EF is difficult to obtain due to dnir-jn-mpwo variability). The left ventricular diastolic function could not be assessed in this study due to atrial arrhythmia. There is severe concentric hypertrophy. Mildly enlarged right ventricle with moderately reduced systolic function.  - f/u cardio consult  - c/w metoprolol 25 mg BID  - f/u TSH  - c/w Xarelto 15 mg  - c/w Lasix 40 iv once a day  - cxr Interstitial opacities versus pulmonary vascular congestion  - Cont tele monitoring  - Monitor electrolytes, maintain K around 4 and Mag    #HTN  - Monitor BP    #Hypothyroidism 2/2 thyroidectomy 2/2 thyroid CA  - continue synthroid 175 mcg output followup     # Pituitary adenoma  - Cabergoline 1/2 tab of 0.5 mg Q weekly      #DVT PPx- xaralto  #GI PPx-protonix  #Diet- DASH  #Activity- AAT  #Dispo- Acute  #Code- full

## 2024-02-29 NOTE — ED PROVIDER NOTE - CLINICAL SUMMARY MEDICAL DECISION MAKING FREE TEXT BOX
83-year-old female past medical history atrial fibrillation on Xarelto s/p 2 ablations( October and November) and amiodarone status post multiple cardioversion' s came in for chest discomfort and palpitations since Tuesday The patient mentioned that she comprehends when she is in A fib. She follows with Dr. Qunitero and was last seen on Tuesday and her metoprolol was increased. She mentioned that this morning she was having racing of the heart and decided to come to ED. She mentioned that she is feeling bloated these days. Denies any recent fevers, chills, N/V/D, headaches, PND, orthopnea.  EKG:  Afib with RVR.  [facility].  NPO after midnight for cardioversion.  TELE.

## 2024-03-01 ENCOUNTER — TRANSCRIPTION ENCOUNTER (OUTPATIENT)
Age: 84
End: 2024-03-01

## 2024-03-01 LAB
ANION GAP SERPL CALC-SCNC: 12 MMOL/L — SIGNIFICANT CHANGE UP (ref 7–14)
ANION GAP SERPL CALC-SCNC: 14 MMOL/L — SIGNIFICANT CHANGE UP (ref 7–14)
ANION GAP SERPL CALC-SCNC: 14 MMOL/L — SIGNIFICANT CHANGE UP (ref 7–14)
ANION GAP SERPL CALC-SCNC: 15 MMOL/L — HIGH (ref 7–14)
BASOPHILS # BLD AUTO: 0.04 K/UL — SIGNIFICANT CHANGE UP (ref 0–0.2)
BASOPHILS NFR BLD AUTO: 0.4 % — SIGNIFICANT CHANGE UP (ref 0–1)
BUN SERPL-MCNC: 39 MG/DL — HIGH (ref 10–20)
BUN SERPL-MCNC: 39 MG/DL — HIGH (ref 10–20)
BUN SERPL-MCNC: 40 MG/DL — HIGH (ref 10–20)
BUN SERPL-MCNC: 40 MG/DL — HIGH (ref 10–20)
CALCIUM SERPL-MCNC: 8.3 MG/DL — LOW (ref 8.4–10.4)
CALCIUM SERPL-MCNC: 8.4 MG/DL — SIGNIFICANT CHANGE UP (ref 8.4–10.5)
CALCIUM SERPL-MCNC: 8.5 MG/DL — SIGNIFICANT CHANGE UP (ref 8.4–10.5)
CALCIUM SERPL-MCNC: 8.6 MG/DL — SIGNIFICANT CHANGE UP (ref 8.4–10.5)
CHLORIDE SERPL-SCNC: 103 MMOL/L — SIGNIFICANT CHANGE UP (ref 98–110)
CHLORIDE SERPL-SCNC: 104 MMOL/L — SIGNIFICANT CHANGE UP (ref 98–110)
CHLORIDE SERPL-SCNC: 105 MMOL/L — SIGNIFICANT CHANGE UP (ref 98–110)
CHLORIDE SERPL-SCNC: 107 MMOL/L — SIGNIFICANT CHANGE UP (ref 98–110)
CO2 SERPL-SCNC: 28 MMOL/L — SIGNIFICANT CHANGE UP (ref 17–32)
CO2 SERPL-SCNC: 29 MMOL/L — SIGNIFICANT CHANGE UP (ref 17–32)
CREAT SERPL-MCNC: 1.6 MG/DL — HIGH (ref 0.7–1.5)
CREAT SERPL-MCNC: 1.7 MG/DL — HIGH (ref 0.7–1.5)
EGFR: 30 ML/MIN/1.73M2 — LOW
EGFR: 32 ML/MIN/1.73M2 — LOW
EOSINOPHIL # BLD AUTO: 0.14 K/UL — SIGNIFICANT CHANGE UP (ref 0–0.7)
EOSINOPHIL NFR BLD AUTO: 1.2 % — SIGNIFICANT CHANGE UP (ref 0–8)
GLUCOSE SERPL-MCNC: 106 MG/DL — HIGH (ref 70–99)
GLUCOSE SERPL-MCNC: 110 MG/DL — HIGH (ref 70–99)
GLUCOSE SERPL-MCNC: 148 MG/DL — HIGH (ref 70–99)
GLUCOSE SERPL-MCNC: 151 MG/DL — HIGH (ref 70–99)
HCT VFR BLD CALC: 34.8 % — LOW (ref 37–47)
HGB BLD-MCNC: 10.6 G/DL — LOW (ref 12–16)
IMM GRANULOCYTES NFR BLD AUTO: 0.5 % — HIGH (ref 0.1–0.3)
LYMPHOCYTES # BLD AUTO: 1.54 K/UL — SIGNIFICANT CHANGE UP (ref 1.2–3.4)
LYMPHOCYTES # BLD AUTO: 13.7 % — LOW (ref 20.5–51.1)
MAGNESIUM SERPL-MCNC: 1.8 MG/DL — SIGNIFICANT CHANGE UP (ref 1.8–2.4)
MAGNESIUM SERPL-MCNC: 2.2 MG/DL — SIGNIFICANT CHANGE UP (ref 1.8–2.4)
MCHC RBC-ENTMCNC: 26.8 PG — LOW (ref 27–31)
MCHC RBC-ENTMCNC: 30.5 G/DL — LOW (ref 32–37)
MCV RBC AUTO: 87.9 FL — SIGNIFICANT CHANGE UP (ref 81–99)
MONOCYTES # BLD AUTO: 0.68 K/UL — HIGH (ref 0.1–0.6)
MONOCYTES NFR BLD AUTO: 6.1 % — SIGNIFICANT CHANGE UP (ref 1.7–9.3)
NEUTROPHILS # BLD AUTO: 8.77 K/UL — HIGH (ref 1.4–6.5)
NEUTROPHILS NFR BLD AUTO: 78.1 % — HIGH (ref 42.2–75.2)
NRBC # BLD: 0 /100 WBCS — SIGNIFICANT CHANGE UP (ref 0–0)
PLATELET # BLD AUTO: 272 K/UL — SIGNIFICANT CHANGE UP (ref 130–400)
PMV BLD: 11.6 FL — HIGH (ref 7.4–10.4)
POTASSIUM SERPL-MCNC: 2.8 MMOL/L — LOW (ref 3.5–5)
POTASSIUM SERPL-MCNC: 3.7 MMOL/L — SIGNIFICANT CHANGE UP (ref 3.5–5)
POTASSIUM SERPL-MCNC: 3.7 MMOL/L — SIGNIFICANT CHANGE UP (ref 3.5–5)
POTASSIUM SERPL-MCNC: 4.3 MMOL/L — SIGNIFICANT CHANGE UP (ref 3.5–5)
POTASSIUM SERPL-SCNC: 2.8 MMOL/L — LOW (ref 3.5–5)
POTASSIUM SERPL-SCNC: 3.7 MMOL/L — SIGNIFICANT CHANGE UP (ref 3.5–5)
POTASSIUM SERPL-SCNC: 3.7 MMOL/L — SIGNIFICANT CHANGE UP (ref 3.5–5)
POTASSIUM SERPL-SCNC: 4.3 MMOL/L — SIGNIFICANT CHANGE UP (ref 3.5–5)
RBC # BLD: 3.96 M/UL — LOW (ref 4.2–5.4)
RBC # FLD: 17.7 % — HIGH (ref 11.5–14.5)
SODIUM SERPL-SCNC: 146 MMOL/L — SIGNIFICANT CHANGE UP (ref 135–146)
SODIUM SERPL-SCNC: 146 MMOL/L — SIGNIFICANT CHANGE UP (ref 135–146)
SODIUM SERPL-SCNC: 148 MMOL/L — HIGH (ref 135–146)
SODIUM SERPL-SCNC: 149 MMOL/L — HIGH (ref 135–146)
TROPONIN T, HIGH SENSITIVITY RESULT: 46 NG/L — HIGH (ref 6–13)
TSH SERPL-MCNC: 2.21 UIU/ML — SIGNIFICANT CHANGE UP (ref 0.27–4.2)
WBC # BLD: 11.23 K/UL — HIGH (ref 4.8–10.8)
WBC # FLD AUTO: 11.23 K/UL — HIGH (ref 4.8–10.8)

## 2024-03-01 PROCEDURE — 99233 SBSQ HOSP IP/OBS HIGH 50: CPT

## 2024-03-01 RX ORDER — POTASSIUM CHLORIDE 20 MEQ
20 PACKET (EA) ORAL
Refills: 0 | Status: COMPLETED | OUTPATIENT
Start: 2024-03-01 | End: 2024-03-01

## 2024-03-01 RX ORDER — POTASSIUM CHLORIDE 20 MEQ
40 PACKET (EA) ORAL EVERY 4 HOURS
Refills: 0 | Status: DISCONTINUED | OUTPATIENT
Start: 2024-03-01 | End: 2024-03-01

## 2024-03-01 RX ADMIN — Medication 975 MILLIGRAM(S): at 03:15

## 2024-03-01 RX ADMIN — Medication 50 MILLIEQUIVALENT(S): at 11:50

## 2024-03-01 RX ADMIN — Medication 100 MILLIGRAM(S): at 21:56

## 2024-03-01 RX ADMIN — Medication 25 MILLIGRAM(S): at 05:11

## 2024-03-01 RX ADMIN — ATORVASTATIN CALCIUM 20 MILLIGRAM(S): 80 TABLET, FILM COATED ORAL at 21:56

## 2024-03-01 RX ADMIN — Medication 20 MILLIEQUIVALENT(S): at 11:49

## 2024-03-01 RX ADMIN — Medication 25 MILLIGRAM(S): at 17:12

## 2024-03-01 RX ADMIN — Medication 5 MILLIGRAM(S): at 17:13

## 2024-03-01 RX ADMIN — Medication 175 MICROGRAM(S): at 05:12

## 2024-03-01 RX ADMIN — Medication 20 MILLIEQUIVALENT(S): at 14:19

## 2024-03-01 RX ADMIN — Medication 5 MILLIGRAM(S): at 05:11

## 2024-03-01 RX ADMIN — RIVAROXABAN 15 MILLIGRAM(S): KIT at 11:49

## 2024-03-01 RX ADMIN — PANTOPRAZOLE SODIUM 40 MILLIGRAM(S): 20 TABLET, DELAYED RELEASE ORAL at 05:13

## 2024-03-01 RX ADMIN — Medication 975 MILLIGRAM(S): at 02:42

## 2024-03-01 RX ADMIN — AMIODARONE HYDROCHLORIDE 200 MILLIGRAM(S): 400 TABLET ORAL at 05:11

## 2024-03-01 NOTE — PROGRESS NOTE ADULT - ASSESSMENT
83-year-old female past medical history atrial fibrillation on Xarelto s/p 2 ablations and amiodarone status post multiple cardioversion' s came in for chest discomfort and palpitations found to be in afib with RVR and hypoxic in setting of bilateral opacities and vascular congestion on CXR.       #Atrial fibrillation W RVR  # acute on chronic HFpEF   - on tele still in RVR with HRs variable 110s-140s today  - 12/23 ECHO: Low-normal global left ventricular systolic function with ejection fraction, by visual estimation, of 50 to 55% (accurate EF is difficult to obtain due to nhvj-iz-qscj variability). The left ventricular diastolic function could not be assessed in this study due to atrial arrhythmia. There is severe concentric hypertrophy. Mildly enlarged right ventricle with moderately reduced systolic function.  - c/w metoprolol 25 mg BID  - aMIOdarone    Tablet 200 milliGRAM(s) Oral daily  - rivaroxaban 15 milliGRAM(s) Oral daily  - f/u TSH; pending although in Dec 2023 WNL  - c/w Xarelto 15 mg  - cxr Interstitial opacities versus pulmonary vascular congestion  - likely overloaded d/t RVR  - lasix 40mg IV daily  - Cont tele monitoring  - Monitor electrolytes, maintain K around 4 and Mag  - Cardiology c/s pending; possible repeat cardioversion    # CKD stage 4  # KOBI on CKD likely cardiorenal vs prerenal component?  - baseline sCr ~1.5  - increased to 2.0-->1.6  - improved with diuretics    # Hypokalemia  - 2.8; replete    #HTN  - Monitor BP    # HLD  - atorvastatin 20 milliGRAM(s) Oral at bedtime    #Hypothyroidism 2/2 thyroidectomy 2/2 thyroid CA  - continue levothyroxine 175 MICROGram(s) Oral daily    # Pituitary adenoma  - Cabergoline 1/2 tab of 0.5 mg Q weekly      #DVT PPx- xaralto  #GI PPx-pantoprazole    Tablet 40 milliGRAM(s) Oral before breakfast  #Diet- DASH  #Activity- AAT  #Dispo- Acute  #Code- full    #Progress Note Handoff  Pending (specify):  Cardiology c/s, IV diuresis  Family discussion: updated  Disposition: Unknown at this time________likely home in the next 24-48hrs  83-year-old female past medical history atrial fibrillation on Xarelto s/p 2 ablations and amiodarone status post multiple cardioversion' s came in for chest discomfort and palpitations found to be in afib with RVR and hypoxic in setting of bilateral opacities and vascular congestion on CXR.       #Atrial fibrillation W RVR  # acute on chronic HFpEF   - on tele still in RVR with HRs variable 110s-140s today  - 12/23 ECHO: Low-normal global left ventricular systolic function with ejection fraction, by visual estimation, of 50 to 55% (accurate EF is difficult to obtain due to ulqx-al-oyjg variability). The left ventricular diastolic function could not be assessed in this study due to atrial arrhythmia. There is severe concentric hypertrophy. Mildly enlarged right ventricle with moderately reduced systolic function.  - c/w metoprolol 25 mg BID  - aMIOdarone    Tablet 200 milliGRAM(s) Oral daily  - rivaroxaban 15 milliGRAM(s) Oral daily  - f/u TSH; pending although in Dec 2023 WNL  - c/w Xarelto 15 mg  - cxr Interstitial opacities versus pulmonary vascular congestion  - likely overloaded d/t RVR  - lasix 40mg IV daily  - Cont tele monitoring  - Monitor electrolytes, maintain K around 4 and Mag  - Cardiology c/s pending; possible repeat cardioversion    # CKD stage 4  # KOBI on CKD likely cardiorenal vs prerenal component?  - baseline sCr ~1.5  - increased to 2.0-->1.6  - improved with diuretics    # Hypokalemia  - 2.8; replete    #HTN  - Monitor BP    # HLD  - atorvastatin 20 milliGRAM(s) Oral at bedtime    #Hypothyroidism 2/2 thyroidectomy 2/2 thyroid CA  - continue levothyroxine 175 MICROGram(s) Oral daily    # Pituitary adenoma  - Cabergoline 1/2 tab of 0.5 mg Q weekly      #DVT PPx- xaralto  #GI PPx-pantoprazole    Tablet 40 milliGRAM(s) Oral before breakfast  #Diet- DASH  #Activity- AAT  #Dispo- Acute  #Code- full    #Progress Note Handoff  Pending (specify):  Cardiology c/s, IV diuresis  Family discussion: updated  Disposition: Unknown at this time________likely home in the next 24-48hrs    #Progress Note Handoff  Pending (specify):   cardioversion, potassium repletion, IV diuresis  Family discussion: updated  Disposition:  unknown at this time________

## 2024-03-01 NOTE — DISCHARGE NOTE NURSING/CASE MANAGEMENT/SOCIAL WORK - PATIENT PORTAL LINK FT
You can access the FollowMyHealth Patient Portal offered by [facility] by registering at the following website: [email or web address]. By joining [employer]’s FollowMyHealth portal, you will also be able to view your health information using other applications (apps) compatible with our system.

## 2024-03-01 NOTE — DISCHARGE NOTE NURSING/CASE MANAGEMENT/SOCIAL WORK - NSDCVIVACCINE_GEN_ALL_CORE_FT
influenza, high-dose, quadrivalent; 20-Dec-2023 15:52; Shaunna Tucker (RN); [employer]; [id number] (Exp. Date: 20-Jun-2024); IntraMuscular; Deltoid Left.; 0.7 milliLiter(s); VIS (VIS Published: 06-Aug-2021, VIS Presented: 20-Dec-2023);

## 2024-03-01 NOTE — PATIENT PROFILE ADULT - FALL HARM RISK - RISK INTERVENTIONS
Assistance OOB with selected safe patient handling equipment/Assistance with ambulation/Communicate Fall Risk and Risk Factors to all staff, patient, and family/Reinforce activity limits and safety measures with patient and family/Review medications for side effects contributing to fall risk/Sit up slowly, dangle for a short time, stand at bedside before walking/Toileting schedule using arm’s reach rule for commode and bathroom/Visual Cue: Yellow wristband/Bed in lowest position, wheels locked, appropriate side rails in place/Call bell, personal items and telephone in reach/Instruct patient to call for assistance before getting out of bed or chair/Non-slip footwear when patient is out of bed/[place] to call system/Physically safe environment - no spills, clutter or unnecessary equipment/Purposeful Proactive Rounding/Room/bathroom lighting operational, light cord in reach

## 2024-03-01 NOTE — PROGRESS NOTE ADULT - SUBJECTIVE AND OBJECTIVE BOX
JEFFERY UGALDE  83y  Female      Patient is a 83y old  Female who presents with a chief complaint of Palpitations (29 Feb 2024 15:56)      INTERVAL HPI/OVERNIGHT EVENTS:      REVIEW OF SYSTEMS:  CONSTITUTIONAL: No fever, weight loss, or fatigue  RESPIRATORY: No cough, wheezing, chills or hemoptysis; No shortness of breath  CARDIOVASCULAR: No chest pain, palpitations, dizziness, or leg swelling  GASTROINTESTINAL: No abdominal or epigastric pain. No nausea, vomiting, or hematemesis; No diarrhea or constipation. No melena or hematochezia.  GENITOURINARY: No dysuria, frequency, hematuria, or incontinence  NEUROLOGICAL: No headaches, memory loss, loss of strength, numbness, or tremors  SKIN: No itching, burning, rashes, or lesions   MUSCULOSKELETAL: No joint pain or swelling; No muscle, back, or extremity pain  PSYCHIATRIC: No depression, anxiety, mood swings, or difficulty sleeping  All other review of systems negative    T(C): 36.5 (03-01-24 @ 08:01), Max: 36.7 (02-29-24 @ 10:25)  HR: 60 (03-01-24 @ 08:01) (60 - 118)  BP: 131/74 (03-01-24 @ 08:01) (118/71 - 144/70)  RR: 18 (03-01-24 @ 08:01) (18 - 19)  SpO2: 95% (03-01-24 @ 08:01) (95% - 98%)  Wt(kg): --Vital Signs Last 24 Hrs  T(C): 36.5 (01 Mar 2024 08:01), Max: 36.7 (29 Feb 2024 10:25)  T(F): 97.7 (01 Mar 2024 08:01), Max: 98 (29 Feb 2024 10:25)  HR: 60 (01 Mar 2024 08:01) (60 - 118)  BP: 131/74 (01 Mar 2024 08:01) (118/71 - 144/70)  BP(mean): 97 (01 Mar 2024 08:01) (97 - 97)  RR: 18 (01 Mar 2024 08:01) (18 - 19)  SpO2: 95% (01 Mar 2024 08:01) (95% - 98%)    Parameters below as of 01 Mar 2024 08:01  Patient On (Oxygen Delivery Method): nasal cannula          02-29-24 @ 07:01  -  03-01-24 @ 07:00  --------------------------------------------------------  IN: 0 mL / OUT: 1300 mL / NET: -1300 mL        PHYSICAL EXAM:  GENERAL: NAD, well-groomed, well-developed  PSYCH: no agitation, baseline mentation  NERVOUS SYSTEM:  Alert & Oriented X3, Motor Strength 5/5 B/L upper and lower extremities; Sensory intact; FTN WNL  PULMONARY: Clear to percussion bilaterally; No rales, rhonchi, wheezing, or rubs  CARDIOVASCULAR: Regular rate and rhythm; No murmurs, rubs, or gallops  GI: Soft, Nontender, Nondistended; Bowel sounds present  EXTREMITIES:  2+ Peripheral Pulses, No clubbing, cyanosis, or edema  LYMPH: No lymphadenopathy noted  SKIN: No rashes or lesions    Consultant(s) Notes Reviewed:  [x ] YES  [ ] NO    Discussed with Consultants/Other Providers [ x] YES     LABS                          10.6   11.23 )-----------( 272      ( 01 Mar 2024 08:30 )             34.8     03-01    146  |  103  |  40<H>  ----------------------------<  110<H>  2.8<L>   |  29  |  1.6<H>    Ca    8.6      01 Mar 2024 08:30  Mg     2.2     03-01    TPro  5.7<L>  /  Alb  3.6  /  TBili  0.3  /  DBili  x   /  AST  35  /  ALT  18  /  AlkPhos  101  02-29      Urinalysis Basic - ( 01 Mar 2024 08:30 )    Color: x / Appearance: x / SG: x / pH: x  Gluc: 110 mg/dL / Ketone: x  / Bili: x / Urobili: x   Blood: x / Protein: x / Nitrite: x   Leuk Esterase: x / RBC: x / WBC x   Sq Epi: x / Non Sq Epi: x / Bacteria: x      RADIOLOGY & ADDITIONAL TESTS:   Xray Chest 1 View- PORTABLE-Urgent (02.29.24 @ 13:09) >  Impression:  Interstitial opacities versus pulmonary vascular congestion    Imaging Personally Reviewed:  [ ] YES  [x ] NO    HEALTH ISSUES - PROBLEM Dx:      MEDICATIONS  (STANDING):  allopurinol 100 milliGRAM(s) Oral at bedtime  aMIOdarone    Tablet 200 milliGRAM(s) Oral daily  atorvastatin 20 milliGRAM(s) Oral at bedtime  furosemide   Injectable 40 milliGRAM(s) IV Push daily  levothyroxine 175 MICROGram(s) Oral daily  metoprolol tartrate 25 milliGRAM(s) Oral two times a day  oxybutynin 5 milliGRAM(s) Oral two times a day  pantoprazole    Tablet 40 milliGRAM(s) Oral before breakfast  rivaroxaban 15 milliGRAM(s) Oral daily    MEDICATIONS  (PRN):  acetaminophen     Tablet .. 975 milliGRAM(s) Oral every 6 hours PRN Moderate Pain (4 - 6)  aluminum hydroxide/magnesium hydroxide/simethicone Suspension 30 milliLiter(s) Oral every 4 hours PRN Dyspepsia  melatonin 3 milliGRAM(s) Oral at bedtime PRN Insomnia     JEFFERY UGALDE  83y  Female      Patient is a 83y old  Female who presents with a chief complaint of Palpitations (29 Feb 2024 15:56)      INTERVAL HPI/OVERNIGHT EVENTS: no acute events overnight. no major complaints. NPO since MN      REVIEW OF SYSTEMS:  CONSTITUTIONAL: No fever, weight loss, or fatigue  RESPIRATORY: No cough, wheezing, chills or hemoptysis; No shortness of breath  CARDIOVASCULAR: No chest pain, palpitations, dizziness, or leg swelling  GASTROINTESTINAL: No abdominal or epigastric pain. No nausea, vomiting, or hematemesis; No diarrhea or constipation. No melena or hematochezia.  GENITOURINARY: No dysuria, frequency, hematuria, or incontinence  NEUROLOGICAL: No headaches, memory loss, loss of strength, numbness, or tremors  SKIN: No itching, burning, rashes, or lesions   MUSCULOSKELETAL: No joint pain or swelling; No muscle, back, or extremity pain  PSYCHIATRIC: No depression, anxiety, mood swings, or difficulty sleeping  All other review of systems negative    T(C): 36.5 (03-01-24 @ 08:01), Max: 36.7 (02-29-24 @ 10:25)  HR: 60 (03-01-24 @ 08:01) (60 - 118)  BP: 131/74 (03-01-24 @ 08:01) (118/71 - 144/70)  RR: 18 (03-01-24 @ 08:01) (18 - 19)  SpO2: 95% (03-01-24 @ 08:01) (95% - 98%)  Wt(kg): --Vital Signs Last 24 Hrs  T(C): 36.5 (01 Mar 2024 08:01), Max: 36.7 (29 Feb 2024 10:25)  T(F): 97.7 (01 Mar 2024 08:01), Max: 98 (29 Feb 2024 10:25)  HR: 60 (01 Mar 2024 08:01) (60 - 118)  BP: 131/74 (01 Mar 2024 08:01) (118/71 - 144/70)  BP(mean): 97 (01 Mar 2024 08:01) (97 - 97)  RR: 18 (01 Mar 2024 08:01) (18 - 19)  SpO2: 95% (01 Mar 2024 08:01) (95% - 98%)    Parameters below as of 01 Mar 2024 08:01  Patient On (Oxygen Delivery Method): nasal cannula          02-29-24 @ 07:01  -  03-01-24 @ 07:00  --------------------------------------------------------  IN: 0 mL / OUT: 1300 mL / NET: -1300 mL        PHYSICAL EXAM:  GENERAL: NAD, well-groomed, well-developed  PSYCH: no agitation, baseline mentation  NERVOUS SYSTEM:  Alert & Oriented X3   PULMONARY: symmetrical chest rise, no accessory muscle use  CARDIOVASCULAR: non tachycardic  GI: Nondistended   EXTREMITIES:  No clubbing, cyanosis, or edema  SKIN: No rashes or lesions    Consultant(s) Notes Reviewed:  [x ] YES  [ ] NO    Discussed with Consultants/Other Providers [ x] YES     LABS                          10.6   11.23 )-----------( 272      ( 01 Mar 2024 08:30 )             34.8     03-01    146  |  103  |  40<H>  ----------------------------<  110<H>  2.8<L>   |  29  |  1.6<H>    Ca    8.6      01 Mar 2024 08:30  Mg     2.2     03-01    TPro  5.7<L>  /  Alb  3.6  /  TBili  0.3  /  DBili  x   /  AST  35  /  ALT  18  /  AlkPhos  101  02-29      Urinalysis Basic - ( 01 Mar 2024 08:30 )    Color: x / Appearance: x / SG: x / pH: x  Gluc: 110 mg/dL / Ketone: x  / Bili: x / Urobili: x   Blood: x / Protein: x / Nitrite: x   Leuk Esterase: x / RBC: x / WBC x   Sq Epi: x / Non Sq Epi: x / Bacteria: x      RADIOLOGY & ADDITIONAL TESTS:   Xray Chest 1 View- PORTABLE-Urgent (02.29.24 @ 13:09) >  Impression:  Interstitial opacities versus pulmonary vascular congestion    Imaging Personally Reviewed:  [ ] YES  [x ] NO    HEALTH ISSUES - PROBLEM Dx:      MEDICATIONS  (STANDING):  allopurinol 100 milliGRAM(s) Oral at bedtime  aMIOdarone    Tablet 200 milliGRAM(s) Oral daily  atorvastatin 20 milliGRAM(s) Oral at bedtime  furosemide   Injectable 40 milliGRAM(s) IV Push daily  levothyroxine 175 MICROGram(s) Oral daily  metoprolol tartrate 25 milliGRAM(s) Oral two times a day  oxybutynin 5 milliGRAM(s) Oral two times a day  pantoprazole    Tablet 40 milliGRAM(s) Oral before breakfast  rivaroxaban 15 milliGRAM(s) Oral daily    MEDICATIONS  (PRN):  acetaminophen     Tablet .. 975 milliGRAM(s) Oral every 6 hours PRN Moderate Pain (4 - 6)  aluminum hydroxide/magnesium hydroxide/simethicone Suspension 30 milliLiter(s) Oral every 4 hours PRN Dyspepsia  melatonin 3 milliGRAM(s) Oral at bedtime PRN Insomnia

## 2024-03-01 NOTE — PHYSICAL THERAPY INITIAL EVALUATION ADULT - DID THE PATIENT HAVE SURGERY?
PT plan of care discussed. Pt's baseline: lives alone in private house with 4+ 6 steps to enter; ambulates using cane. Pt is for possible cardioversion today ( pending). Will f/u as appropriate.
n/a

## 2024-03-01 NOTE — PATIENT PROFILE ADULT - FUNCTIONAL ASSESSMENT - DAILY ACTIVITY 3.
[de-identified] : Patient here for 48 hour metal patch test interpretation. 
4 = No assist / stand by assistance

## 2024-03-01 NOTE — PHYSICAL THERAPY INITIAL EVALUATION ADULT - PERTINENT HX OF CURRENT PROBLEM, REHAB EVAL
83-year-old female past medical history atrial fibrillation on Xarelto s/p 2 ablations( October and November) and amiodarone status post multiple cardioversion' s came in for chest discomfort and palpitations since Tuesday The patient mentioned that she comprehends when she is in A fib. She follows with Dr. Wilkins and was last seen on Tuesday and her metoprolol was increased. She mentioned that this morning she was having racing of the heart and decided to come to ED. She mentioned that she is feeling bloated these days.   Denies any recent fevers, chills, N/V/D, headaches, PND, orthopnea

## 2024-03-01 NOTE — PHYSICAL THERAPY INITIAL EVALUATION ADULT - SPECIFY REASON(S)
[phone] am PT attempted to see pt for eval , however, politely declined, at this time requested assistance with hygiene care. SANNA Gil ( [facility]) was notified.

## 2024-03-01 NOTE — PATIENT PROFILE ADULT - NSPROSPHOSPCHAPLAINYN_GEN_A_NUR
The directions are unclear on the original prescription for levothyroxine(s) tab 137 mcg  dated 06/14/2022. Please respond with intended directions or comment to Pharmacy.   The directions are unclear on the original prescription for levothyroxine(s) tab 137 mcg  dated 06/14/2022. Please respond with intended directions or comment to Pharmacy.  
no

## 2024-03-01 NOTE — PHYSICAL THERAPY INITIAL EVALUATION ADULT - GENERAL OBSERVATIONS, REHAB EVAL
[phone] am Chart reviewed. Pt. seen semirecline in bed in No apparent distress , + telemetry , IV lock , Prima fit device , Pt. alert and oriented X 4, Pt. agreed to activity/therapy.

## 2024-03-02 LAB
ALBUMIN SERPL ELPH-MCNC: 3.3 G/DL — LOW (ref 3.5–5.2)
ALP SERPL-CCNC: 84 U/L — SIGNIFICANT CHANGE UP (ref 30–115)
ALT FLD-CCNC: 11 U/L — SIGNIFICANT CHANGE UP (ref 0–41)
ANION GAP SERPL CALC-SCNC: 11 MMOL/L — SIGNIFICANT CHANGE UP (ref 7–14)
AST SERPL-CCNC: 12 U/L — SIGNIFICANT CHANGE UP (ref 0–41)
BILIRUB SERPL-MCNC: 1 MG/DL — SIGNIFICANT CHANGE UP (ref 0.2–1.2)
BUN SERPL-MCNC: 40 MG/DL — HIGH (ref 10–20)
CALCIUM SERPL-MCNC: 8.2 MG/DL — LOW (ref 8.4–10.4)
CHLORIDE SERPL-SCNC: 106 MMOL/L — SIGNIFICANT CHANGE UP (ref 98–110)
CO2 SERPL-SCNC: 31 MMOL/L — SIGNIFICANT CHANGE UP (ref 17–32)
CREAT SERPL-MCNC: 1.8 MG/DL — HIGH (ref 0.7–1.5)
EGFR: 28 ML/MIN/1.73M2 — LOW
GLUCOSE SERPL-MCNC: 103 MG/DL — HIGH (ref 70–99)
HCT VFR BLD CALC: 31.2 % — LOW (ref 37–47)
HGB BLD-MCNC: 9.5 G/DL — LOW (ref 12–16)
MAGNESIUM SERPL-MCNC: 1.9 MG/DL — SIGNIFICANT CHANGE UP (ref 1.8–2.4)
MCHC RBC-ENTMCNC: 26.7 PG — LOW (ref 27–31)
MCHC RBC-ENTMCNC: 30.4 G/DL — LOW (ref 32–37)
MCV RBC AUTO: 87.6 FL — SIGNIFICANT CHANGE UP (ref 81–99)
NRBC # BLD: 0 /100 WBCS — SIGNIFICANT CHANGE UP (ref 0–0)
PLATELET # BLD AUTO: 241 K/UL — SIGNIFICANT CHANGE UP (ref 130–400)
PMV BLD: 11.2 FL — HIGH (ref 7.4–10.4)
POTASSIUM SERPL-MCNC: 3.6 MMOL/L — SIGNIFICANT CHANGE UP (ref 3.5–5)
POTASSIUM SERPL-SCNC: 3.6 MMOL/L — SIGNIFICANT CHANGE UP (ref 3.5–5)
PROT SERPL-MCNC: 5.2 G/DL — LOW (ref 6–8)
RBC # BLD: 3.56 M/UL — LOW (ref 4.2–5.4)
RBC # FLD: 17.5 % — HIGH (ref 11.5–14.5)
SODIUM SERPL-SCNC: 148 MMOL/L — HIGH (ref 135–146)
WBC # BLD: 9.55 K/UL — SIGNIFICANT CHANGE UP (ref 4.8–10.8)
WBC # FLD AUTO: 9.55 K/UL — SIGNIFICANT CHANGE UP (ref 4.8–10.8)

## 2024-03-02 PROCEDURE — 71045 X-RAY EXAM CHEST 1 VIEW: CPT | Mod: 26

## 2024-03-02 PROCEDURE — 99233 SBSQ HOSP IP/OBS HIGH 50: CPT

## 2024-03-02 RX ORDER — FUROSEMIDE 40 MG
40 TABLET ORAL
Refills: 0 | Status: DISCONTINUED | OUTPATIENT
Start: 2024-03-02 | End: 2024-03-04

## 2024-03-02 RX ORDER — METOPROLOL TARTRATE 50 MG
12.5 TABLET ORAL
Refills: 0 | Status: DISCONTINUED | OUTPATIENT
Start: 2024-03-02 | End: 2024-03-05

## 2024-03-02 RX ADMIN — Medication 975 MILLIGRAM(S): at 23:13

## 2024-03-02 RX ADMIN — RIVAROXABAN 15 MILLIGRAM(S): KIT at 11:00

## 2024-03-02 RX ADMIN — Medication 40 MILLIGRAM(S): at 06:03

## 2024-03-02 RX ADMIN — Medication 12.5 MILLIGRAM(S): at 17:06

## 2024-03-02 RX ADMIN — ATORVASTATIN CALCIUM 20 MILLIGRAM(S): 80 TABLET, FILM COATED ORAL at 21:10

## 2024-03-02 RX ADMIN — Medication 175 MICROGRAM(S): at 06:03

## 2024-03-02 RX ADMIN — Medication 30 MILLILITER(S): at 23:14

## 2024-03-02 RX ADMIN — PANTOPRAZOLE SODIUM 40 MILLIGRAM(S): 20 TABLET, DELAYED RELEASE ORAL at 06:02

## 2024-03-02 RX ADMIN — Medication 25 MILLIGRAM(S): at 06:02

## 2024-03-02 RX ADMIN — Medication 100 MILLIGRAM(S): at 21:10

## 2024-03-02 RX ADMIN — Medication 5 MILLIGRAM(S): at 17:08

## 2024-03-02 RX ADMIN — Medication 40 MILLIGRAM(S): at 15:10

## 2024-03-02 RX ADMIN — Medication 5 MILLIGRAM(S): at 06:03

## 2024-03-02 RX ADMIN — AMIODARONE HYDROCHLORIDE 200 MILLIGRAM(S): 400 TABLET ORAL at 06:03

## 2024-03-02 NOTE — PROGRESS NOTE ADULT - SUBJECTIVE AND OBJECTIVE BOX
JEFFERY UGALDE  83y  Female      Patient is a 83y old  Female who presents with a chief complaint of Palpitations (01 Mar 2024 09:52)      INTERVAL HPI/OVERNIGHT EVENTS: no acute events overnight. no major complaints.       REVIEW OF SYSTEMS:  CONSTITUTIONAL: No fever, weight loss, or fatigue  RESPIRATORY: No cough, wheezing, chills or hemoptysis; No shortness of breath  CARDIOVASCULAR: No chest pain, palpitations, dizziness, or leg swelling  GASTROINTESTINAL: No abdominal or epigastric pain. No nausea, vomiting, or hematemesis; No diarrhea or constipation. No melena or hematochezia.  GENITOURINARY: No dysuria, frequency, hematuria, or incontinence  NEUROLOGICAL: No headaches, memory loss, loss of strength, numbness, or tremors  SKIN: No itching, burning, rashes, or lesions   MUSCULOSKELETAL: No joint pain or swelling; No muscle, back, or extremity pain  PSYCHIATRIC: No depression, anxiety, mood swings, or difficulty sleeping  All other review of systems negative    T(C): 36.8 (03-02-24 @ 08:04), Max: 36.9 (03-02-24 @ 00:31)  HR: 55 (03-02-24 @ 08:04) (55 - 101)  BP: 127/63 (03-02-24 @ 08:04) (123/58 - 188/83)  RR: 18 (03-02-24 @ 08:04) (18 - 18)  SpO2: 95% (03-02-24 @ 08:04) (95% - 98%)  Wt(kg): --Vital Signs Last 24 Hrs  T(C): 36.8 (02 Mar 2024 08:04), Max: 36.9 (02 Mar 2024 00:31)  T(F): 98.2 (02 Mar 2024 08:04), Max: 98.4 (02 Mar 2024 00:31)  HR: 55 (02 Mar 2024 08:04) (55 - 101)  BP: 127/63 (02 Mar 2024 08:04) (123/58 - 188/83)  BP(mean): 97 (01 Mar 2024 15:19) (97 - 97)  RR: 18 (02 Mar 2024 08:04) (18 - 18)  SpO2: 95% (02 Mar 2024 08:04) (95% - 98%)    Parameters below as of 02 Mar 2024 08:04  Patient On (Oxygen Delivery Method): nasal cannula  O2 Flow (L/min): 2          PHYSICAL EXAM:  GENERAL: elderly F, NAD, non toxic appearing  PSYCH: no agitation, baseline mentation  NERVOUS SYSTEM:  Alert & Oriented X 3  PULMONARY: symmetrical chest rise, no accessory muscle use  CARDIOVASCULAR: non tachycardic  GI:  Nondistended   EXTREMITIES:  No clubbing, cyanosis, or edema  SKIN: No rashes or lesions    Consultant(s) Notes Reviewed:  [x ] YES  [ ] NO    Discussed with Consultants/Other Providers [ x] YES     LABS                          9.5    9.55  )-----------( 241      ( 02 Mar 2024 05:38 )             31.2     03-02    148<H>  |  106  |  40<H>  ----------------------------<  103<H>  3.6   |  31  |  1.8<H>    Ca    8.2<L>      02 Mar 2024 05:38  Mg     1.9     03-02    TPro  5.2<L>  /  Alb  3.3<L>  /  TBili  1.0  /  DBili  x   /  AST  12  /  ALT  11  /  AlkPhos  84  03-02      Urinalysis Basic - ( 02 Mar 2024 05:38 )    Color: x / Appearance: x / SG: x / pH: x  Gluc: 103 mg/dL / Ketone: x  / Bili: x / Urobili: x   Blood: x / Protein: x / Nitrite: x   Leuk Esterase: x / RBC: x / WBC x   Sq Epi: x / Non Sq Epi: x / Bacteria: x      RADIOLOGY & ADDITIONAL TESTS:  Xray Chest 1 View- PORTABLE-Routine (Xray Chest 1 View- PORTABLE-Routine .) (03.02.24 @ 09:21) >  IMPRESSION:  Slightly increased bilateral interstitial opacities.    Imaging Personally Reviewed:  [ ] YES  [ x] NO    HEALTH ISSUES - PROBLEM Dx:      MEDICATIONS  (STANDING):  allopurinol 100 milliGRAM(s) Oral at bedtime  aMIOdarone    Tablet 200 milliGRAM(s) Oral daily  atorvastatin 20 milliGRAM(s) Oral at bedtime  furosemide   Injectable 40 milliGRAM(s) IV Push two times a day  levothyroxine 175 MICROGram(s) Oral daily  metoprolol tartrate 12.5 milliGRAM(s) Oral two times a day  oxybutynin 5 milliGRAM(s) Oral two times a day  pantoprazole    Tablet 40 milliGRAM(s) Oral before breakfast  rivaroxaban 15 milliGRAM(s) Oral daily    MEDICATIONS  (PRN):  acetaminophen     Tablet .. 975 milliGRAM(s) Oral every 6 hours PRN Moderate Pain (4 - 6)  aluminum hydroxide/magnesium hydroxide/simethicone Suspension 30 milliLiter(s) Oral every 4 hours PRN Dyspepsia  melatonin 3 milliGRAM(s) Oral at bedtime PRN Insomnia

## 2024-03-02 NOTE — PROGRESS NOTE ADULT - ASSESSMENT
83-year-old female past medical history atrial fibrillation on Xarelto s/p 2 ablations and amiodarone status post multiple cardioversion' s came in for chest discomfort and palpitations found to be in afib with RVR and hypoxic in setting of bilateral opacities and vascular congestion on CXR.       #Atrial fibrillation W RVR  # acute on chronic HFpEF   - on tele still in RVR with HRs variable 110s-140s today  - 12/23 ECHO: Low-normal global left ventricular systolic function with ejection fraction, by visual estimation, of 50 to 55% (accurate EF is difficult to obtain due to oynx-au-enxb variability). The left ventricular diastolic function could not be assessed in this study due to atrial arrhythmia. There is severe concentric hypertrophy. Mildly enlarged right ventricle with moderately reduced systolic function.  - c/w metoprolol 25 mg BID  - aMIOdarone    Tablet 200 milliGRAM(s) Oral daily  - rivaroxaban 15 milliGRAM(s) Oral daily  - f/u TSH; pending although in Dec 2023 WNL  - c/w Xarelto 15 mg  - cxr Interstitial opacities versus pulmonary vascular congestion; repeat CXR demonstrated intervally increased opacies  - likely overloaded d/t RVR  - lasix 40mg IV daily-->increased to 40mg BID  - Cont tele monitoring  - Monitor electrolytes, maintain K around 4 and Mag  - Cardiology c/s pending; possible repeat cardioversion    # CKD stage 4  # KOBI on CKD likely cardiorenal vs prerenal component?  - baseline sCr ~1.5  - increased to 2.0-->1.6-->1.8  - improved with diuretics    # Hypokalemia  - 2.8; replete    #HTN  - Monitor BP    # HLD  - atorvastatin 20 milliGRAM(s) Oral at bedtime    #Hypothyroidism 2/2 thyroidectomy 2/2 thyroid CA  - continue levothyroxine 175 MICROGram(s) Oral daily    # Pituitary adenoma  - Cabergoline 1/2 tab of 0.5 mg Q weekly      #DVT PPx- xaralto  #GI PPx-pantoprazole    Tablet 40 milliGRAM(s) Oral before breakfast  #Diet- DASH  #Activity- AAT  #Dispo- Acute  #Code- full    #Progress Note Handoff  Pending (specify): diuresing, wean off 02 as tolerated, monitoring sCr  Family discussion: updated  Disposition: Unknown at this time________likely home in the next 24-48hrs

## 2024-03-03 ENCOUNTER — TRANSCRIPTION ENCOUNTER (OUTPATIENT)
Age: 84
End: 2024-03-03

## 2024-03-03 DIAGNOSIS — I50.33 ACUTE ON CHRONIC DIASTOLIC (CONGESTIVE) HEART FAILURE: ICD-10-CM

## 2024-03-03 LAB
ALBUMIN SERPL ELPH-MCNC: 3.5 G/DL — SIGNIFICANT CHANGE UP (ref 3.5–5.2)
ALP SERPL-CCNC: 90 U/L — SIGNIFICANT CHANGE UP (ref 30–115)
ALT FLD-CCNC: 11 U/L — SIGNIFICANT CHANGE UP (ref 0–41)
ANION GAP SERPL CALC-SCNC: 11 MMOL/L — SIGNIFICANT CHANGE UP (ref 7–14)
AST SERPL-CCNC: 12 U/L — SIGNIFICANT CHANGE UP (ref 0–41)
BILIRUB SERPL-MCNC: 1.5 MG/DL — HIGH (ref 0.2–1.2)
BUN SERPL-MCNC: 43 MG/DL — HIGH (ref 10–20)
CALCIUM SERPL-MCNC: 8.6 MG/DL — SIGNIFICANT CHANGE UP (ref 8.4–10.5)
CHLORIDE SERPL-SCNC: 104 MMOL/L — SIGNIFICANT CHANGE UP (ref 98–110)
CO2 SERPL-SCNC: 31 MMOL/L — SIGNIFICANT CHANGE UP (ref 17–32)
CREAT SERPL-MCNC: 1.8 MG/DL — HIGH (ref 0.7–1.5)
EGFR: 28 ML/MIN/1.73M2 — LOW
GLUCOSE SERPL-MCNC: 107 MG/DL — HIGH (ref 70–99)
HCT VFR BLD CALC: 31.9 % — LOW (ref 37–47)
HGB BLD-MCNC: 9.8 G/DL — LOW (ref 12–16)
MCHC RBC-ENTMCNC: 26.8 PG — LOW (ref 27–31)
MCHC RBC-ENTMCNC: 30.7 G/DL — LOW (ref 32–37)
MCV RBC AUTO: 87.2 FL — SIGNIFICANT CHANGE UP (ref 81–99)
NRBC # BLD: 0 /100 WBCS — SIGNIFICANT CHANGE UP (ref 0–0)
PLATELET # BLD AUTO: 255 K/UL — SIGNIFICANT CHANGE UP (ref 130–400)
PMV BLD: 11.4 FL — HIGH (ref 7.4–10.4)
POTASSIUM SERPL-MCNC: 3.2 MMOL/L — LOW (ref 3.5–5)
POTASSIUM SERPL-SCNC: 3.2 MMOL/L — LOW (ref 3.5–5)
PROT SERPL-MCNC: 5.5 G/DL — LOW (ref 6–8)
RBC # BLD: 3.66 M/UL — LOW (ref 4.2–5.4)
RBC # FLD: 17.7 % — HIGH (ref 11.5–14.5)
SODIUM SERPL-SCNC: 146 MMOL/L — SIGNIFICANT CHANGE UP (ref 135–146)
WBC # BLD: 9.14 K/UL — SIGNIFICANT CHANGE UP (ref 4.8–10.8)
WBC # FLD AUTO: 9.14 K/UL — SIGNIFICANT CHANGE UP (ref 4.8–10.8)

## 2024-03-03 PROCEDURE — 71045 X-RAY EXAM CHEST 1 VIEW: CPT | Mod: 26,76

## 2024-03-03 PROCEDURE — 99232 SBSQ HOSP IP/OBS MODERATE 35: CPT

## 2024-03-03 RX ORDER — METOPROLOL TARTRATE 50 MG
0.5 TABLET ORAL
Qty: 30 | Refills: 0
Start: 2024-03-03 | End: 2024-04-01

## 2024-03-03 RX ORDER — POTASSIUM CHLORIDE 20 MEQ
40 PACKET (EA) ORAL EVERY 4 HOURS
Refills: 0 | Status: COMPLETED | OUTPATIENT
Start: 2024-03-03 | End: 2024-03-03

## 2024-03-03 RX ORDER — METOPROLOL TARTRATE 50 MG
1 TABLET ORAL
Refills: 0 | DISCHARGE

## 2024-03-03 RX ADMIN — Medication 40 MILLIEQUIVALENT(S): at 11:41

## 2024-03-03 RX ADMIN — Medication 5 MILLIGRAM(S): at 17:44

## 2024-03-03 RX ADMIN — Medication 40 MILLIGRAM(S): at 15:07

## 2024-03-03 RX ADMIN — RIVAROXABAN 15 MILLIGRAM(S): KIT at 11:40

## 2024-03-03 RX ADMIN — ATORVASTATIN CALCIUM 20 MILLIGRAM(S): 80 TABLET, FILM COATED ORAL at 21:15

## 2024-03-03 RX ADMIN — Medication 5 MILLIGRAM(S): at 05:19

## 2024-03-03 RX ADMIN — PANTOPRAZOLE SODIUM 40 MILLIGRAM(S): 20 TABLET, DELAYED RELEASE ORAL at 05:19

## 2024-03-03 RX ADMIN — Medication 975 MILLIGRAM(S): at 00:48

## 2024-03-03 RX ADMIN — Medication 12.5 MILLIGRAM(S): at 17:44

## 2024-03-03 RX ADMIN — Medication 100 MILLIGRAM(S): at 21:15

## 2024-03-03 RX ADMIN — AMIODARONE HYDROCHLORIDE 200 MILLIGRAM(S): 400 TABLET ORAL at 05:18

## 2024-03-03 RX ADMIN — Medication 40 MILLIEQUIVALENT(S): at 15:08

## 2024-03-03 RX ADMIN — Medication 40 MILLIGRAM(S): at 05:19

## 2024-03-03 RX ADMIN — Medication 175 MICROGRAM(S): at 05:19

## 2024-03-03 NOTE — DISCHARGE NOTE PROVIDER - HOSPITAL COURSE
History of Present Illness:    83-year-old female past medical history atrial fibrillation on Xarelto s/p 2 ablations( October and November) and amiodarone status post multiple cardioversion' s came in for chest discomfort and palpitations since Tuesday The patient mentioned that she comprehends when she is in A fib. She follows with Dr. Wilkins and was last seen on Tuesday and her metoprolol was increased. She mentioned that this morning she was having racing of the heart and decided to come to ED. She mentioned that she is feeling bloated these days.   Denies any recent fevers, chills, N/V/D, headaches, PND, orthopnea        ICU Vital Signs Last 24 Hrs  T(C): 36.7 (29 Feb 2024 10:25), Max: 36.7 (29 Feb 2024 10:25)  T(F): 98 (29 Feb 2024 10:25), Max: 98 (29 Feb 2024 10:25)  HR: 118 (29 Feb 2024 15:42) (100 - 118)  BP: 134/61 (29 Feb 2024 15:42) (134/61 - 144/70)  RR: 19 (29 Feb 2024 15:42) (18 - 19)  SpO2: 98% (29 Feb 2024 15:42) (95% - 98%)  O2 Parameters below as of 29 Feb 2024 15:42  Patient On (Oxygen Delivery Method): room air      #Atrial fibrillation W RVR  # acute on chronic HFpEF   - on tele still in RVR with HRs variable 110s-140s today  - 12/23 ECHO: Low-normal global left ventricular systolic function with ejection fraction, by visual estimation, of 50 to 55% (accurate EF is difficult to obtain due to ncze-ei-luej variability). The left ventricular diastolic function could not be assessed in this study due to atrial arrhythmia. There is severe concentric hypertrophy. Mildly enlarged right ventricle with moderately reduced systolic function.  - c/w metoprolol 25 mg BID  - aMIOdarone    Tablet 200 milliGRAM(s) Oral daily  - rivaroxaban 15 milliGRAM(s) Oral daily  - c/w Xarelto 15 mg  - cxr Interstitial opacities versus pulmonary vascular congestion; repeat CXR demonstrated intervally increased opacies  - s/p successful cardioversion  - likely overloaded d/t RVR  - lasix 40mg IV daily-->increased to 40mg BID  - weaned to room air, tolerating ambulation on room air        # CKD stage 4  # KOBI on CKD likely cardiorenal vs prerenal component?  - baseline sCr ~1.5  - increased to 2.0-->1.6-->1.8; stable  - improved with diuretics    # Hypokalemia  - 2.8; replete    #HTN  - Monitor BP    # HLD  - atorvastatin 20 milliGRAM(s) Oral at bedtime    #Hypothyroidism 2/2 thyroidectomy 2/2 thyroid CA  - continue levothyroxine 175 MICROGram(s) Oral daily    # Pituitary adenoma  - Cabergoline 1/2 tab of 0.5 mg Q weekly History of Present Illness:    83-year-old female past medical history atrial fibrillation on Xarelto s/p 2 ablations( October and November) and amiodarone status post multiple cardioversion' s came in for chest discomfort and palpitations since Tuesday The patient mentioned that she comprehends when she is in A fib. She follows with Dr. Wilkins and was last seen on Tuesday and her metoprolol was increased. She mentioned that this morning she was having racing of the heart and decided to come to ED. She mentioned that she is feeling bloated these days.   Denies any recent fevers, chills, N/V/D, headaches, PND, orthopnea    ICU Vital Signs Last 24 Hrs  T(C): 36.7 (29 Feb 2024 10:25), Max: 36.7 (29 Feb 2024 10:25)  T(F): 98 (29 Feb 2024 10:25), Max: 98 (29 Feb 2024 10:25)  HR: 118 (29 Feb 2024 15:42) (100 - 118)  BP: 134/61 (29 Feb 2024 15:42) (134/61 - 144/70)  RR: 19 (29 Feb 2024 15:42) (18 - 19)  SpO2: 98% (29 Feb 2024 15:42) (95% - 98%)  O2 Parameters below as of 29 Feb 2024 15:42  Patient On (Oxygen Delivery Method): room air    Hospital Course:     #Atrial fibrillation W RVR  # acute on chronic HFpEF   - on tele still in RVR with HRs variable 110s-140s today  - 12/23 ECHO: Low-normal global left ventricular systolic function with ejection fraction, by visual estimation, of 50 to 55% (accurate EF is difficult to obtain due to bevv-vk-vxqu variability). The left ventricular diastolic function could not be assessed in this study due to atrial arrhythmia. There is severe concentric hypertrophy. Mildly enlarged right ventricle with moderately reduced systolic function.  - c/w metoprolol 25 mg BID  - aMIOdarone    Tablet 200 milliGRAM(s) Oral daily  - rivaroxaban 15 milliGRAM(s) Oral daily  - c/w Xarelto 15 mg  - cxr Interstitial opacities versus pulmonary vascular congestion; repeat CXR demonstrated intervally increased opacies  - s/p successful cardioversion  - likely overloaded d/t RVR  - lasix 40mg IV daily-->increased to 40mg BID>>> Changed to Lasix 20 mg Daily   - weaned to room air, tolerating ambulation on room air    # CKD stage 4  # KOBI on CKD likely cardiorenal vs prerenal component  - baseline sCr ~1.5  - increased to 2.0-->1.6-->1.8; stable  - improved with diuretics    # Hypokalemia  - 2.8; replete    #HTN  - Monitor BP    # HLD  - atorvastatin 20 milliGRAM(s) Oral at bedtime    #Hypothyroidism 2/2 thyroidectomy 2/2 thyroid CA  - continue levothyroxine 175 MICROGram(s) Oral daily    # Pituitary adenoma  - Cabergoline 1/2 tab of 0.5 mg Q weekly History of Present Illness:    83-year-old female past medical history atrial fibrillation on Xarelto s/p 2 ablations( October and November) and amiodarone status post multiple cardioversion' s came in for chest discomfort and palpitations since Tuesday The patient mentioned that she comprehends when she is in A fib. She follows with Dr. Wilkins and was last seen on Tuesday and her metoprolol was increased. She mentioned that this morning she was having racing of the heart and decided to come to ED. She mentioned that she is feeling bloated these days.   Denies any recent fevers, chills, N/V/D, headaches, PND, orthopnea    Hospital Course:     Paroxysmal Atrial fibrillation with Rapid Ventricular Response:   Improved, back to sinus rhythm, s/p Cardioversion 3/1  Continue Metoprolol 12.5mg BID, Amiodarone 200mg po daily and Xarelto     Acute on chronic HFpEF:   Patient with SOB  CXR showed vascular congestion.  Echo 12/23 showed LV50 to 55% (accurate EF is difficult to obtain due to grmz-zg-qzgi variability). The left ventricular diastolic function could not be assessed in this study due to atrial arrhythmia. There is severe concentric hypertrophy. Mildly enlarged right ventricle with moderately reduced systolic function.  s/p Lasix 40mg IV BID, switch to Lasix 20mg po daily.     Acute Kidney Injury on CKD stage 4  Cr baseline sCr ~1.5  Cr 2.0, now 1.6, improved with diuretics    HLD: Continue Lipitor.     Hypothyroidism 2/2 thyroidectomy 2/2 thyroid CA  Continue levothyroxine 175 mcg daily.     Pituitary adenoma  Cabergoline 1/2 tab of 0.5 mg Q weekly

## 2024-03-03 NOTE — DISCHARGE NOTE PROVIDER - NSDCFUSCHEDAPPT_GEN_ALL_CORE_FT
Jer Ruiz  [facility]  PUL[facility] [address] [place] [address]  Scheduled Appointment: 03/13/2024    Romaine-Alex Villagran  [facility]  Scheduled Appointment: 03/18/2024

## 2024-03-03 NOTE — DISCHARGE NOTE PROVIDER - PROVIDER TOKENS
PROVIDER:[TOKEN:[[id number]],FOLLOWUP:[1-3 days],ESTABLISHEDPATIENT:[T]],PROVIDER:[TOKEN:[[id number]],FOLLOWUP:[2 weeks],ESTABLISHEDPATIENT:[T]]

## 2024-03-03 NOTE — DISCHARGE NOTE PROVIDER - NSDCMRMEDTOKEN_GEN_ALL_CORE_FT
allopurinol 100 mg oral tablet: 1 cap(s) orally once a day (at bedtime)  amiodarone 200 mg oral tablet: 1 tab(s) orally once a day  atorvastatin 20 mg oral tablet: 1 tab(s) orally once a day  cabergoline 0.5 mg oral tablet: 0.5 tab(s) orally once a week , monday night  Lasix 20 mg oral tablet: 1 tab(s) orally once a day  levothyroxine 175 mcg (0.175 mg) oral tablet: 1 tab(s) orally once a day  Metoprolol Tartrate 25 mg oral tablet: 0.5 tab(s) orally 2 times a day  rivaroxaban 15 mg oral tablet: 1 tab(s) orally once a day (before a meal)  trospium 20 mg oral tablet: 1 tab(s) orally once a day

## 2024-03-03 NOTE — DISCHARGE NOTE PROVIDER - ATTENDING DISCHARGE PHYSICAL EXAMINATION:
GENERAL: elderly F, NAD, non toxic appearing  PSYCH: no agitation, baseline mentation  NERVOUS SYSTEM:  Alert & Oriented X 3  PULMONARY: symmetrical chest rise, no accessory muscle use  CARDIOVASCULAR: non tachycardic  GI:  Nondistended   EXTREMITIES:  No clubbing, cyanosis, or edema  SKIN: No rashes or lesions

## 2024-03-03 NOTE — DISCHARGE NOTE PROVIDER - CARE PROVIDER_API CALL
Tad Savage  Internal Medicine  [address] [place] [id number]  Phone: [phone]  Fax: [phone]  Established Patient  Follow Up Time: 1-3 days    [facility]  Cardiology  [address] [place] [address] [address]  [place] [address]  Phone: [phone]  Fax: [phone]  Established Patient  Follow Up Time: 2 weeks

## 2024-03-03 NOTE — DISCHARGE NOTE PROVIDER - NSDCCPCAREPLAN_GEN_ALL_CORE_FT
PRINCIPAL DISCHARGE DIAGNOSIS  Diagnosis: Acute hypoxic respiratory failure  Assessment and Plan of Treatment: You presented with shortness of breath and found to have lower than normal oxygen levels. This was likely due to being atrial fibrillation with a rapid heart beat that also led to an acute exacerbation of your known heart failure. You were cardioverted back into a normal heart rhythm successfully by your cardiologist. Additionall you were given medication to help remove excess fluid from your heart failure exacerbation.      SECONDARY DISCHARGE DIAGNOSES  Diagnosis: Atrial fibrillation with rapid ventricular response  Assessment and Plan of Treatment:     Diagnosis: KOBI (acute kidney injury)  Assessment and Plan of Treatment: Your kidney numbers were elevated likely due to your volume overload with your acute heart failure exacerbation. These numbers improved gradually as we removed fluid off of you. You will need to follow up with your primary care doctor within a week of dicharge to repeat labs and monitor your kidney function

## 2024-03-03 NOTE — PROGRESS NOTE ADULT - ASSESSMENT
83-year-old female past medical history atrial fibrillation on Xarelto s/p 2 ablations and amiodarone status post multiple cardioversion' s came in for chest discomfort and palpitations found to be in afib with RVR and hypoxic in setting of bilateral opacities and vascular congestion on CXR.       #Atrial fibrillation W RVR  # acute on chronic HFpEF   - on tele still in RVR with HRs variable 110s-140s today  - 12/23 ECHO: Low-normal global left ventricular systolic function with ejection fraction, by visual estimation, of 50 to 55% (accurate EF is difficult to obtain due to lqyy-qm-zrqc variability). The left ventricular diastolic function could not be assessed in this study due to atrial arrhythmia. There is severe concentric hypertrophy. Mildly enlarged right ventricle with moderately reduced systolic function.  - c/w metoprolol 25 mg BID  - aMIOdarone    Tablet 200 milliGRAM(s) Oral daily  - rivaroxaban 15 milliGRAM(s) Oral daily  - c/w Xarelto 15 mg  - cxr Interstitial opacities versus pulmonary vascular congestion; repeat CXR demonstrated interval increased opacies  - repeat CXR pending  - s/p successful cardioversion  - likely overloaded d/t RVR  - lasix 40mg IV daily-->increased to 40mg BID  - weaned to room air at rest    # CKD stage 4  # KOBI on CKD likely cardiorenal vs prerenal component?  - baseline sCr ~1.5  - increased to 2.0-->1.6-->1.8; stable  - improved with diuretics    # Hypokalemia  - 2.8; replete    #HTN  - Monitor BP    # HLD  - atorvastatin 20 milliGRAM(s) Oral at bedtime    #Hypothyroidism 2/2 thyroidectomy 2/2 thyroid CA  - continue levothyroxine 175 MICROGram(s) Oral daily    # Pituitary adenoma  - Cabergoline 1/2 tab of 0.5 mg Q weekly    #DVT PPx- xaralto  #GI PPx-pantoprazole    Tablet 40 milliGRAM(s) Oral before breakfast  #Diet- DASH  #Activity- AAT  #Dispo- Acute  #Code- full    #Progress Note Handoff  Pending (specify): diuresing, wean off 02 as tolerated, monitoring sCr  Family discussion: updated  Disposition: Unknown at this time________likely home in the next 24-48hrs

## 2024-03-03 NOTE — PROGRESS NOTE ADULT - SUBJECTIVE AND OBJECTIVE BOX
JEFFERY UGALDE  83y  Female      Patient is a 83y old  Female who presents with a chief complaint of Palpitations (03 Mar 2024 11:29)      INTERVAL HPI/OVERNIGHT EVENTS: no acute events overnight. sitting up in bed without nasal cannula. no major complaints or issues. wants to go home. denies any sob or chest pain. desatted to 80s on room air with ambulation      REVIEW OF SYSTEMS:  CONSTITUTIONAL: No fever, weight loss, or fatigue  RESPIRATORY: No cough, wheezing, chills or hemoptysis; No shortness of breath  CARDIOVASCULAR: No chest pain, palpitations, dizziness, or leg swelling  GASTROINTESTINAL: No abdominal or epigastric pain. No nausea, vomiting, or hematemesis; No diarrhea or constipation. No melena or hematochezia.  GENITOURINARY: No dysuria, frequency, hematuria, or incontinence  NEUROLOGICAL: No headaches, memory loss, loss of strength, numbness, or tremors  SKIN: No itching, burning, rashes, or lesions   MUSCULOSKELETAL: No joint pain or swelling; No muscle, back, or extremity pain  PSYCHIATRIC: No depression, anxiety, mood swings, or difficulty sleeping  All other review of systems negative    T(C): 36.7 (03-03-24 @ 04:31), Max: 37.6 (03-02-24 @ 18:34)  HR: 56 (03-03-24 @ 04:31) (56 - 65)  BP: 122/71 (03-03-24 @ 04:31) (122/71 - 149/68)  RR: 18 (03-03-24 @ 11:35) (16 - 18)  SpO2: 86% (03-03-24 @ 11:35) (86% - 99%)  Wt(kg): --Vital Signs Last 24 Hrs  T(C): 36.7 (03 Mar 2024 04:31), Max: 37.6 (02 Mar 2024 18:34)  T(F): 98 (03 Mar 2024 04:31), Max: 99.7 (02 Mar 2024 18:34)  HR: 56 (03 Mar 2024 04:31) (56 - 65)  BP: 122/71 (03 Mar 2024 04:31) (122/71 - 149/68)  BP(mean): 63 (02 Mar 2024 18:34) (63 - 88)  RR: 18 (03 Mar 2024 11:35) (16 - 18)  SpO2: 86% (03 Mar 2024 11:35) (86% - 99%)    Parameters below as of 03 Mar 2024 11:35  Patient On (Oxygen Delivery Method): room air          03-02-24 @ 07:01  -  03-03-24 @ 07:00  --------------------------------------------------------  IN: 236 mL / OUT: 950 mL / NET: -714 mL    03-03-24 @ 07:01  -  03-03-24 @ 11:58  --------------------------------------------------------  IN: 0 mL / OUT: 1000 mL / NET: -1000 mL        PHYSICAL EXAM:  GENERAL: elderly F, NAD, non toxic appearing  PSYCH: no agitation, baseline mentation  NERVOUS SYSTEM:  Alert & Oriented X 3  PULMONARY: symmetrical chest rise, no accessory muscle use  CARDIOVASCULAR: non tachycardic  GI:  Nondistended   EXTREMITIES:  No clubbing, cyanosis, or edema  SKIN: No rashes or lesions    Consultant(s) Notes Reviewed:  [x ] YES  [ ] NO    Discussed with Consultants/Other Providers [ x] YES     LABS                          9.8    9.14  )-----------( 255      ( 03 Mar 2024 06:23 )             31.9     03-03    146  |  104  |  43<H>  ----------------------------<  107<H>  3.2<L>   |  31  |  1.8<H>    Ca    8.6      03 Mar 2024 06:23  Mg     1.9     03-02    TPro  5.5<L>  /  Alb  3.5  /  TBili  1.5<H>  /  DBili  x   /  AST  12  /  ALT  11  /  AlkPhos  90  03-03      Urinalysis Basic - ( 03 Mar 2024 06:23 )    Color: x / Appearance: x / SG: x / pH: x  Gluc: 107 mg/dL / Ketone: x  / Bili: x / Urobili: x   Blood: x / Protein: x / Nitrite: x   Leuk Esterase: x / RBC: x / WBC x   Sq Epi: x / Non Sq Epi: x / Bacteria: x      RADIOLOGY & ADDITIONAL TESTS:  - CXR pending  Imaging Personally Reviewed:  [ ] YES  [ ] NO    HEALTH ISSUES - PROBLEM Dx:  Acute on chronic heart failure with preserved ejection fraction        MEDICATIONS  (STANDING):  allopurinol 100 milliGRAM(s) Oral at bedtime  aMIOdarone    Tablet 200 milliGRAM(s) Oral daily  atorvastatin 20 milliGRAM(s) Oral at bedtime  furosemide   Injectable 40 milliGRAM(s) IV Push two times a day  levothyroxine 175 MICROGram(s) Oral daily  metoprolol tartrate 12.5 milliGRAM(s) Oral two times a day  oxybutynin 5 milliGRAM(s) Oral two times a day  pantoprazole    Tablet 40 milliGRAM(s) Oral before breakfast  potassium chloride    Tablet ER 40 milliEquivalent(s) Oral every 4 hours  rivaroxaban 15 milliGRAM(s) Oral daily    MEDICATIONS  (PRN):  acetaminophen     Tablet .. 975 milliGRAM(s) Oral every 6 hours PRN Moderate Pain (4 - 6)  aluminum hydroxide/magnesium hydroxide/simethicone Suspension 30 milliLiter(s) Oral every 4 hours PRN Dyspepsia  melatonin 3 milliGRAM(s) Oral at bedtime PRN Insomnia

## 2024-03-04 LAB
ALBUMIN SERPL ELPH-MCNC: 3.3 G/DL — LOW (ref 3.5–5.2)
ALP SERPL-CCNC: 89 U/L — SIGNIFICANT CHANGE UP (ref 30–115)
ALT FLD-CCNC: 9 U/L — SIGNIFICANT CHANGE UP (ref 0–41)
ANION GAP SERPL CALC-SCNC: 15 MMOL/L — HIGH (ref 7–14)
AST SERPL-CCNC: 11 U/L — SIGNIFICANT CHANGE UP (ref 0–41)
BILIRUB SERPL-MCNC: 1.3 MG/DL — HIGH (ref 0.2–1.2)
BUN SERPL-MCNC: 42 MG/DL — HIGH (ref 10–20)
CALCIUM SERPL-MCNC: 8.4 MG/DL — SIGNIFICANT CHANGE UP (ref 8.4–10.5)
CHLORIDE SERPL-SCNC: 106 MMOL/L — SIGNIFICANT CHANGE UP (ref 98–110)
CO2 SERPL-SCNC: 27 MMOL/L — SIGNIFICANT CHANGE UP (ref 17–32)
CREAT SERPL-MCNC: 1.6 MG/DL — HIGH (ref 0.7–1.5)
EGFR: 32 ML/MIN/1.73M2 — LOW
GLUCOSE SERPL-MCNC: 102 MG/DL — HIGH (ref 70–99)
HCT VFR BLD CALC: 33.3 % — LOW (ref 37–47)
HGB BLD-MCNC: 10.2 G/DL — LOW (ref 12–16)
MCHC RBC-ENTMCNC: 26.6 PG — LOW (ref 27–31)
MCHC RBC-ENTMCNC: 30.6 G/DL — LOW (ref 32–37)
MCV RBC AUTO: 86.7 FL — SIGNIFICANT CHANGE UP (ref 81–99)
NRBC # BLD: 0 /100 WBCS — SIGNIFICANT CHANGE UP (ref 0–0)
PLATELET # BLD AUTO: 271 K/UL — SIGNIFICANT CHANGE UP (ref 130–400)
PMV BLD: 11.3 FL — HIGH (ref 7.4–10.4)
POTASSIUM SERPL-MCNC: 3.6 MMOL/L — SIGNIFICANT CHANGE UP (ref 3.5–5)
POTASSIUM SERPL-SCNC: 3.6 MMOL/L — SIGNIFICANT CHANGE UP (ref 3.5–5)
PROT SERPL-MCNC: 5.3 G/DL — LOW (ref 6–8)
RBC # BLD: 3.84 M/UL — LOW (ref 4.2–5.4)
RBC # FLD: 17.8 % — HIGH (ref 11.5–14.5)
SODIUM SERPL-SCNC: 148 MMOL/L — HIGH (ref 135–146)
WBC # BLD: 9.18 K/UL — SIGNIFICANT CHANGE UP (ref 4.8–10.8)
WBC # FLD AUTO: 9.18 K/UL — SIGNIFICANT CHANGE UP (ref 4.8–10.8)

## 2024-03-04 PROCEDURE — 99232 SBSQ HOSP IP/OBS MODERATE 35: CPT

## 2024-03-04 RX ORDER — FUROSEMIDE 40 MG
20 TABLET ORAL DAILY
Refills: 0 | Status: DISCONTINUED | OUTPATIENT
Start: 2024-03-04 | End: 2024-03-05

## 2024-03-04 RX ADMIN — Medication 5 MILLIGRAM(S): at 05:28

## 2024-03-04 RX ADMIN — AMIODARONE HYDROCHLORIDE 200 MILLIGRAM(S): 400 TABLET ORAL at 05:27

## 2024-03-04 RX ADMIN — Medication 5 MILLIGRAM(S): at 17:10

## 2024-03-04 RX ADMIN — RIVAROXABAN 15 MILLIGRAM(S): KIT at 13:51

## 2024-03-04 RX ADMIN — ATORVASTATIN CALCIUM 20 MILLIGRAM(S): 80 TABLET, FILM COATED ORAL at 21:27

## 2024-03-04 RX ADMIN — Medication 175 MICROGRAM(S): at 05:27

## 2024-03-04 RX ADMIN — Medication 100 MILLIGRAM(S): at 21:27

## 2024-03-04 RX ADMIN — Medication 40 MILLIGRAM(S): at 05:27

## 2024-03-04 RX ADMIN — Medication 12.5 MILLIGRAM(S): at 05:28

## 2024-03-04 RX ADMIN — Medication 975 MILLIGRAM(S): at 09:49

## 2024-03-04 RX ADMIN — Medication 12.5 MILLIGRAM(S): at 17:10

## 2024-03-04 RX ADMIN — Medication 975 MILLIGRAM(S): at 10:00

## 2024-03-04 RX ADMIN — PANTOPRAZOLE SODIUM 40 MILLIGRAM(S): 20 TABLET, DELAYED RELEASE ORAL at 05:28

## 2024-03-04 NOTE — PROGRESS NOTE ADULT - SUBJECTIVE AND OBJECTIVE BOX
JEFFERY UGALDE  83y  Female      Patient is a 83y old  Female who presents with a chief complaint of Palpitations (03 Mar 2024 11:58)      INTERVAL HPI/OVERNIGHT EVENTS:  She feels ok, SOB improved.   Vital Signs Last 24 Hrs  T(C): 37.2 (04 Mar 2024 04:27), Max: 37.2 (04 Mar 2024 04:27)  T(F): 98.9 (04 Mar 2024 04:27), Max: 98.9 (04 Mar 2024 04:27)  HR: 58 (04 Mar 2024 04:27) (58 - 61)  BP: 146/66 (04 Mar 2024 04:27) (145/63 - 146/66)  BP(mean): --  RR: 18 (04 Mar 2024 04:27) (18 - 18)  SpO2: --          03-03-24 @ 07:01  -  03-04-24 @ 07:00  --------------------------------------------------------  IN: 0 mL / OUT: 2750 mL / NET: -2750 mL            Consultant(s) Notes Reviewed:  [x ] YES  [ ] NO          MEDICATIONS  (STANDING):  allopurinol 100 milliGRAM(s) Oral at bedtime  aMIOdarone    Tablet 200 milliGRAM(s) Oral daily  atorvastatin 20 milliGRAM(s) Oral at bedtime  furosemide   Injectable 40 milliGRAM(s) IV Push two times a day  levothyroxine 175 MICROGram(s) Oral daily  metoprolol tartrate 12.5 milliGRAM(s) Oral two times a day  oxybutynin 5 milliGRAM(s) Oral two times a day  pantoprazole    Tablet 40 milliGRAM(s) Oral before breakfast  rivaroxaban 15 milliGRAM(s) Oral daily    MEDICATIONS  (PRN):  acetaminophen     Tablet .. 975 milliGRAM(s) Oral every 6 hours PRN Moderate Pain (4 - 6)  aluminum hydroxide/magnesium hydroxide/simethicone Suspension 30 milliLiter(s) Oral every 4 hours PRN Dyspepsia  melatonin 3 milliGRAM(s) Oral at bedtime PRN Insomnia      LABS                          10.2   9.18  )-----------( 271      ( 04 Mar 2024 04:30 )             33.3     03-04    148<H>  |  106  |  42<H>  ----------------------------<  102<H>  3.6   |  27  |  1.6<H>    Ca    8.4      04 Mar 2024 04:30    TPro  5.3<L>  /  Alb  3.3<L>  /  TBili  1.3<H>  /  DBili  x   /  AST  11  /  ALT  9   /  AlkPhos  89  03-04      Urinalysis Basic - ( 04 Mar 2024 04:30 )    Color: x / Appearance: x / SG: x / pH: x  Gluc: 102 mg/dL / Ketone: x  / Bili: x / Urobili: x   Blood: x / Protein: x / Nitrite: x   Leuk Esterase: x / RBC: x / WBC x   Sq Epi: x / Non Sq Epi: x / Bacteria: x        Lactate Trend        CAPILLARY BLOOD GLUCOSE            RADIOLOGY & ADDITIONAL TESTS:    Imaging Personally Reviewed:  [ ] YES  [ ] NO    HEALTH ISSUES - PROBLEM Dx:  Acute on chronic heart failure with preserved ejection fraction            PHYSICAL EXAM:  GENERAL: NAD, well-developed.  HEAD:  Atraumatic, Normocephalic.  EYES: EOMI, PERRLA, conjunctiva and sclera clear.  NECK: Supple, No JVD.  CHEST/LUNG: Clear to auscultation bilaterally; No wheeze.  HEART: Regular rate and rhythm; S1 S2.   ABDOMEN: Soft, Nontender, Nondistended; Bowel sounds present.  EXTREMITIES:  2+ Peripheral Pulses, No clubbing, cyanosis, or edema.  PSYCH: AAOx3.  NEUROLOGY: non-focal.  SKIN: No rashes or lesions.

## 2024-03-04 NOTE — CONSULT NOTE ADULT - SUBJECTIVE AND OBJECTIVE BOX
NEPHROLOGY CONSULTATION NOTE    83-year-old female past medical history atrial fibrillation on Xarelto s/p 2 ablations( October and November) and amiodarone status post multiple cardioversion' s came in for chest discomfort and palpitations since Tuesday The patient mentioned that she comprehends when she is in A fib. She follows with Dr. Wilkins and was last seen on Tuesday and her metoprolol was increased. She mentioned that this morning she was having racing of the heart and decided to come to ED. She mentioned that she is feeling bloated these days.   Denies any recent fevers, chills, N/V/D, headaches, PND, orthopnea      PAST MEDICAL & SURGICAL HISTORY:  HTN (hypertension)      High cholesterol      Hypothyroid  s/p thyroid ca surgery      Afib  on xarelto      Sleep apnea      Osteoarthritis      CKD (chronic kidney disease) stage 3, GFR 30-59 ml/min      H/O thyroidectomy      S/P rotator cuff surgery        Allergies:  No Known Allergies    Home Medications Reviewed    SOCIAL HISTORY:  Denies ETOH,Smoking,   FAMILY HISTORY:  Family history of lung cancer (Mother)    Family history of abdominal aortic aneurysm (AAA) (Father)          REVIEW OF SYSTEMS:  CONSTITUTIONAL: No weakness, fevers or chills  EYES/ENT: No visual changes;  No vertigo or throat pain   NECK: No pain or stiffness  RESPIRATORY: No cough, wheezing, hemoptysis; No shortness of breath  CARDIOVASCULAR: no chest pain or palpitations.  GASTROINTESTINAL: No abdominal or epigastric pain. No nausea, vomiting, or hematemesis; No diarrhea or constipation. No melena or hematochezia.  GENITOURINARY: No dysuria, frequency, foamy urine, urinary urgency, incontinence or hematuria  NEUROLOGICAL: No numbness or weakness  SKIN: No itching, burning, rashes, or lesions   VASCULAR: No bilateral lower extremity edema.   All other review of systems is negative unless indicated above.    PHYSICAL EXAM:  Constitutional: NAD  HEENT: anicteric sclera, oropharynx clear, MMM  Neck: No JVD  Respiratory: CTAB, no wheezes, rales or rhonchi  Cardiovascular: irreg, tachy  Gastrointestinal: BS+, soft, NT/ND  Extremities: No cyanosis or clubbing. No peripheral edema  Neurological: A/O x 3, no focal deficits  Psychiatric: Normal mood, normal affect  : No CVA tenderness. No joaquin.   Skin: No rashes    Hospital Medications:   MEDICATIONS  (STANDING):  allopurinol 100 milliGRAM(s) Oral at bedtime  aMIOdarone    Tablet 200 milliGRAM(s) Oral daily  atorvastatin 20 milliGRAM(s) Oral at bedtime  furosemide    Tablet 20 milliGRAM(s) Oral daily  levothyroxine 175 MICROGram(s) Oral daily  metoprolol tartrate 12.5 milliGRAM(s) Oral two times a day  oxybutynin 5 milliGRAM(s) Oral two times a day  pantoprazole    Tablet 40 milliGRAM(s) Oral before breakfast  rivaroxaban 15 milliGRAM(s) Oral daily        VITALS:  T(F): 97.6 (03-04-24 @ 14:16), Max: 98.9 (03-04-24 @ 04:27)  HR: 84 (03-04-24 @ 14:16)  BP: 130/63 (03-04-24 @ 14:16)  RR: 18 (03-04-24 @ 14:16)  SpO2: --  Wt(kg): --    03-02 @ 07:01  -  03-03 @ 07:00  --------------------------------------------------------  IN: 236 mL / OUT: 950 mL / NET: -714 mL    03-03 @ 07:01  -  03-04 @ 07:00  --------------------------------------------------------  IN: 0 mL / OUT: 2750 mL / NET: -2750 mL    03-04 @ 07:01  -  03-04 @ 17:16  --------------------------------------------------------  IN: 670 mL / OUT: 500 mL / NET: 170 mL          LABS:  03-04    148<H>  |  106  |  42<H>  ----------------------------<  102<H>  3.6   |  27  |  1.6<H>    Ca    8.4      04 Mar 2024 04:30    TPro  5.3<L>  /  Alb  3.3<L>  /  TBili  1.3<H>  /  DBili      /  AST  11  /  ALT  9   /  AlkPhos  89  03-04                          10.2   9.18  )-----------( 271      ( 04 Mar 2024 04:30 )             33.3       Urine Studies:  Urinalysis Basic - ( 04 Mar 2024 04:30 )    Color:  / Appearance:  / SG:  / pH:   Gluc: 102 mg/dL / Ketone:   / Bili:  / Urobili:    Blood:  / Protein:  / Nitrite:    Leuk Esterase:  / RBC:  / WBC    Sq Epi:  / Non Sq Epi:  / Bacteria:           RADIOLOGY & ADDITIONAL STUDIES:

## 2024-03-04 NOTE — CONSULT NOTE ADULT - ASSESSMENT
CKD stage 3-4  hypernatremia  right renal lesion   Afib with RVR s/p CV 3/1  chronic HFpEF   anemia  FELIX  HTN  hypothyroidism / thyroid Ca / pituitary adenoma     plan:    lasix 20mg po qd  outpt MRI to eval right renal lesion  CKD counselling  outpt f/u my office  d/w family

## 2024-03-04 NOTE — PROGRESS NOTE ADULT - ASSESSMENT
83-year-old female past medical history atrial fibrillation on Xarelto s/p 2 ablations and amiodarone status post multiple cardioversion' s came in for chest discomfort and palpitations found to be in afib with RVR and hypoxic in setting of bilateral opacities and vascular congestion on CXR.     A/P:   Paroxysmal Atrial fibrillation with Rapid Ventricular Response:   Improved, back to sinus rhythm, s/p Cardioversion 3/1  Continue Metoprolol 25mg BID, Amiodarone 200mg po daily and Xarelto     Acute on chronic HFpEF:   Patient with SOB  CXR showed vascular congestion.  Echo 12/23 showed LV50 to 55% (accurate EF is difficult to obtain due to [id number] variability). The left ventricular diastolic function could not be assessed in this study due to atrial arrhythmia. There is severe concentric hypertrophy. Mildly enlarged right ventricle with moderately reduced systolic function.  s/p Lasix 40mg IV BID, switch to Lasix 20mg po daily.     Acute Kidney Injury on CKD stage 4  Cr baseline sCr ~1.5  Cr 2.0, now 1.6, improved with diuretics    HLD: Continue Lipitor.     Hypothyroidism 2/2 thyroidectomy 2/2 thyroid CA  Continue levothyroxine 175 mcg daily.     Pituitary adenoma  Cabergoline 1/2 tab of 0.5 mg Q weekly    #DVT PPx- xaralto  #GI PPx-pantoprazole    Tablet 40 milliGRAM(s) Oral before breakfast  #Diet- DASH  Code- full    #Progress Note Handoff  Pending (specify):   Family discussion:  Disposition: home today.

## 2024-03-05 VITALS
WEIGHT: 198.64 LBS | DIASTOLIC BLOOD PRESSURE: 62 MMHG | TEMPERATURE: 96 F | RESPIRATION RATE: 18 BRPM | HEART RATE: 60 BPM | SYSTOLIC BLOOD PRESSURE: 108 MMHG

## 2024-03-05 LAB
ANION GAP SERPL CALC-SCNC: 13 MMOL/L — SIGNIFICANT CHANGE UP (ref 7–14)
BUN SERPL-MCNC: 43 MG/DL — HIGH (ref 10–20)
CALCIUM SERPL-MCNC: 8.6 MG/DL — SIGNIFICANT CHANGE UP (ref 8.4–10.5)
CHLORIDE SERPL-SCNC: 103 MMOL/L — SIGNIFICANT CHANGE UP (ref 98–110)
CO2 SERPL-SCNC: 30 MMOL/L — SIGNIFICANT CHANGE UP (ref 17–32)
CREAT SERPL-MCNC: 1.7 MG/DL — HIGH (ref 0.7–1.5)
EGFR: 30 ML/MIN/1.73M2 — LOW
GLUCOSE SERPL-MCNC: 99 MG/DL — SIGNIFICANT CHANGE UP (ref 70–99)
HCT VFR BLD CALC: 33.6 % — LOW (ref 37–47)
HGB BLD-MCNC: 10.3 G/DL — LOW (ref 12–16)
MAGNESIUM SERPL-MCNC: 1.8 MG/DL — SIGNIFICANT CHANGE UP (ref 1.8–2.4)
MCHC RBC-ENTMCNC: 26.7 PG — LOW (ref 27–31)
MCHC RBC-ENTMCNC: 30.7 G/DL — LOW (ref 32–37)
MCV RBC AUTO: 87 FL — SIGNIFICANT CHANGE UP (ref 81–99)
NRBC # BLD: 0 /100 WBCS — SIGNIFICANT CHANGE UP (ref 0–0)
PLATELET # BLD AUTO: 281 K/UL — SIGNIFICANT CHANGE UP (ref 130–400)
PMV BLD: 11.6 FL — HIGH (ref 7.4–10.4)
POTASSIUM SERPL-MCNC: 3.5 MMOL/L — SIGNIFICANT CHANGE UP (ref 3.5–5)
POTASSIUM SERPL-SCNC: 3.5 MMOL/L — SIGNIFICANT CHANGE UP (ref 3.5–5)
RBC # BLD: 3.86 M/UL — LOW (ref 4.2–5.4)
RBC # FLD: 17.2 % — HIGH (ref 11.5–14.5)
SODIUM SERPL-SCNC: 146 MMOL/L — SIGNIFICANT CHANGE UP (ref 135–146)
WBC # BLD: 8.32 K/UL — SIGNIFICANT CHANGE UP (ref 4.8–10.8)
WBC # FLD AUTO: 8.32 K/UL — SIGNIFICANT CHANGE UP (ref 4.8–10.8)

## 2024-03-05 PROCEDURE — 99239 HOSP IP/OBS DSCHRG MGMT >30: CPT

## 2024-03-05 RX ADMIN — Medication 175 MICROGRAM(S): at 06:16

## 2024-03-05 RX ADMIN — RIVAROXABAN 15 MILLIGRAM(S): KIT at 11:42

## 2024-03-05 RX ADMIN — Medication 20 MILLIGRAM(S): at 06:15

## 2024-03-05 RX ADMIN — AMIODARONE HYDROCHLORIDE 200 MILLIGRAM(S): 400 TABLET ORAL at 06:16

## 2024-03-05 RX ADMIN — Medication 975 MILLIGRAM(S): at 01:16

## 2024-03-05 RX ADMIN — Medication 975 MILLIGRAM(S): at 01:47

## 2024-03-05 RX ADMIN — Medication 5 MILLIGRAM(S): at 06:16

## 2024-03-05 RX ADMIN — PANTOPRAZOLE SODIUM 40 MILLIGRAM(S): 20 TABLET, DELAYED RELEASE ORAL at 06:15

## 2024-03-05 NOTE — PROGRESS NOTE ADULT - SUBJECTIVE AND OBJECTIVE BOX
JEFFERY UGALDE  83y  Female      Patient is a 83y old  Female who presents with a chief complaint of Palpitations (04 Mar 2024 17:15)      INTERVAL HPI/OVERNIGHT EVENTS:  She feels ok, She denies chest pain or SOB.   Vital Signs Last 24 Hrs  T(C): 35.8 (05 Mar 2024 04:19), Max: 36.8 (04 Mar 2024 20:00)  T(F): 96.5 (05 Mar 2024 04:19), Max: 98.2 (04 Mar 2024 20:00)  HR: 60 (05 Mar 2024 04:19) (60 - 84)  BP: 108/62 (05 Mar 2024 04:19) (108/62 - 130/63)  BP(mean): --  RR: 18 (05 Mar 2024 04:19) (18 - 18)  SpO2: --          03-04-24 @ 07:01  -  03-05-24 @ 07:00  --------------------------------------------------------  IN: 670 mL / OUT: 975 mL / NET: -305 mL            Consultant(s) Notes Reviewed:  [x ] YES  [ ] NO          MEDICATIONS  (STANDING):  allopurinol 100 milliGRAM(s) Oral at bedtime  aMIOdarone    Tablet 200 milliGRAM(s) Oral daily  atorvastatin 20 milliGRAM(s) Oral at bedtime  furosemide    Tablet 20 milliGRAM(s) Oral daily  levothyroxine 175 MICROGram(s) Oral daily  metoprolol tartrate 12.5 milliGRAM(s) Oral two times a day  oxybutynin 5 milliGRAM(s) Oral two times a day  pantoprazole    Tablet 40 milliGRAM(s) Oral before breakfast  rivaroxaban 15 milliGRAM(s) Oral daily    MEDICATIONS  (PRN):  acetaminophen     Tablet .. 975 milliGRAM(s) Oral every 6 hours PRN Moderate Pain (4 - 6)  aluminum hydroxide/magnesium hydroxide/simethicone Suspension 30 milliLiter(s) Oral every 4 hours PRN Dyspepsia  melatonin 3 milliGRAM(s) Oral at bedtime PRN Insomnia      LABS                          10.3   8.32  )-----------( 281      ( 05 Mar 2024 04:38 )             33.6     03-05    146  |  103  |  43<H>  ----------------------------<  99  3.5   |  30  |  1.7<H>    Ca    8.6      05 Mar 2024 04:38  Mg     1.8     03-05    TPro  5.3<L>  /  Alb  3.3<L>  /  TBili  1.3<H>  /  DBili  x   /  AST  11  /  ALT  9   /  AlkPhos  89  03-04      Urinalysis Basic - ( 05 Mar 2024 04:38 )    Color: x / Appearance: x / SG: x / pH: x  Gluc: 99 mg/dL / Ketone: x  / Bili: x / Urobili: x   Blood: x / Protein: x / Nitrite: x   Leuk Esterase: x / RBC: x / WBC x   Sq Epi: x / Non Sq Epi: x / Bacteria: x        Lactate Trend        CAPILLARY BLOOD GLUCOSE            RADIOLOGY & ADDITIONAL TESTS:    Imaging Personally Reviewed:  [ ] YES  [ ] NO    HEALTH ISSUES - PROBLEM Dx:  Acute on chronic heart failure with preserved ejection fraction            PHYSICAL EXAM:  GENERAL: NAD, well-developed.  HEAD:  Atraumatic, Normocephalic.  EYES: EOMI, PERRLA, conjunctiva and sclera clear.  NECK: Supple, No JVD.  CHEST/LUNG: Clear to auscultation bilaterally; No wheeze.  HEART: Regular rate and rhythm; S1 S2.   ABDOMEN: Soft, Nontender, Nondistended; Bowel sounds present.  EXTREMITIES:  2+ Peripheral Pulses, No clubbing, cyanosis, or edema.  PSYCH: AAOx3.  NEUROLOGY: non-focal.  SKIN: No rashes or lesions.

## 2024-03-05 NOTE — PROGRESS NOTE ADULT - SUBJECTIVE AND OBJECTIVE BOX
NEPHROLOGY FOLLOW UP NOTE    no new complaints      PAST MEDICAL & SURGICAL HISTORY:  HTN (hypertension)      High cholesterol      Hypothyroid  s/p thyroid ca surgery      Afib  on xarelto      Sleep apnea      Osteoarthritis      CKD (chronic kidney disease) stage 3, GFR 30-59 ml/min      H/O thyroidectomy      S/P rotator cuff surgery        Allergies:  No Known Allergies    Home Medications Reviewed    SOCIAL HISTORY:  Denies ETOH,Smoking,   FAMILY HISTORY:  Family history of lung cancer (Mother)    Family history of abdominal aortic aneurysm (AAA) (Father)          REVIEW OF SYSTEMS:  CONSTITUTIONAL: No weakness, fevers or chills  EYES/ENT: No visual changes;  No vertigo or throat pain   NECK: No pain or stiffness  RESPIRATORY: No cough, wheezing, hemoptysis; No shortness of breath  CARDIOVASCULAR: no chest pain or palpitations.  GASTROINTESTINAL: No abdominal or epigastric pain. No nausea, vomiting, or hematemesis; No diarrhea or constipation. No melena or hematochezia.  GENITOURINARY: No dysuria, frequency, foamy urine, urinary urgency, incontinence or hematuria  NEUROLOGICAL: No numbness or weakness  SKIN: No itching, burning, rashes, or lesions   VASCULAR: No bilateral lower extremity edema.   All other review of systems is negative unless indicated above.    PHYSICAL EXAM:  Constitutional: NAD  HEENT: anicteric sclera, oropharynx clear, MMM  Neck: No JVD  Respiratory: CTAB, no wheezes, rales or rhonchi  Cardiovascular: irreg, tachy  Gastrointestinal: BS+, soft, NT/ND  Extremities: No cyanosis or clubbing. No peripheral edema  Neurological: A/O x 3, no focal deficits  Psychiatric: Normal mood, normal affect  : No CVA tenderness. No joaquin.   Skin: No rashes    Hospital Medications:   MEDICATIONS  (STANDING):  allopurinol 100 milliGRAM(s) Oral at bedtime  aMIOdarone    Tablet 200 milliGRAM(s) Oral daily  atorvastatin 20 milliGRAM(s) Oral at bedtime  furosemide    Tablet 20 milliGRAM(s) Oral daily  levothyroxine 175 MICROGram(s) Oral daily  metoprolol tartrate 12.5 milliGRAM(s) Oral two times a day  oxybutynin 5 milliGRAM(s) Oral two times a day  pantoprazole    Tablet 40 milliGRAM(s) Oral before breakfast  rivaroxaban 15 milliGRAM(s) Oral daily        VITALS:  T(F): 96.5 (03-05-24 @ 04:19), Max: 98.2 (03-04-24 @ 20:00)  HR: 60 (03-05-24 @ 04:19)  BP: 108/62 (03-05-24 @ 04:19)  RR: 18 (03-05-24 @ 04:19)  SpO2: --  Wt(kg): --    03-03 @ 07:01  -  03-04 @ 07:00  --------------------------------------------------------  IN: 0 mL / OUT: 2750 mL / NET: -2750 mL    03-04 @ 07:01  -  03-05 @ 07:00  --------------------------------------------------------  IN: 670 mL / OUT: 975 mL / NET: -305 mL          LABS:  03-05    146  |  103  |  43<H>  ----------------------------<  99  3.5   |  30  |  1.7<H>    Ca    8.6      05 Mar 2024 04:38  Mg     1.8     03-05    TPro  5.3<L>  /  Alb  3.3<L>  /  TBili  1.3<H>  /  DBili      /  AST  11  /  ALT  9   /  AlkPhos  89  03-04                          10.3   8.32  )-----------( 281      ( 05 Mar 2024 04:38 )             33.6       Urine Studies:  Urinalysis Basic - ( 05 Mar 2024 04:38 )    Color:  / Appearance:  / SG:  / pH:   Gluc: 99 mg/dL / Ketone:   / Bili:  / Urobili:    Blood:  / Protein:  / Nitrite:    Leuk Esterase:  / RBC:  / WBC    Sq Epi:  / Non Sq Epi:  / Bacteria:           RADIOLOGY & ADDITIONAL STUDIES:

## 2024-03-05 NOTE — PROGRESS NOTE ADULT - ASSESSMENT
83-year-old female past medical history atrial fibrillation on Xarelto s/p 2 ablations and amiodarone status post multiple cardioversion' s came in for chest discomfort and palpitations found to be in afib with RVR and hypoxic in setting of bilateral opacities and vascular congestion on CXR.     A/P:   Paroxysmal Atrial fibrillation with Rapid Ventricular Response:   Improved, back to sinus rhythm, s/p Cardioversion 3/1  Continue Metoprolol 25mg BID, Amiodarone 200mg po daily and Xarelto     Acute on chronic HFpEF:   Patient with SOB  CXR showed vascular congestion.  Echo 12/23 showed LV50 to 55% (accurate EF is difficult to obtain due to [id number] variability). The left ventricular diastolic function could not be assessed in this study due to atrial arrhythmia. There is severe concentric hypertrophy. Mildly enlarged right ventricle with moderately reduced systolic function.  s/p Lasix 40mg IV BID, switch to Lasix 20mg po daily.     Acute Kidney Injury on CKD stage 4  Cr baseline sCr ~1.5  Cr 2.0, now 1.6, improved with diuretics    HLD: Continue Lipitor.     Hypothyroidism 2/2 thyroidectomy 2/2 thyroid CA  Continue levothyroxine 175 mcg daily.     Pituitary adenoma  Cabergoline 1/2 tab of 0.5 mg Q weekly    #DVT PPx- xaralto  #GI PPx-pantoprazole    Tablet 40 milliGRAM(s) Oral before breakfast  #Diet- DASH  Code- full    #Progress Note Handoff  Pending (specify):   Family discussion:  Disposition: home today.

## 2024-03-05 NOTE — PROGRESS NOTE ADULT - ASSESSMENT
CKD stage 3-4  hypernatremia  right renal lesion   Afib with RVR s/p CV 3/1  chronic HFpEF   anemia  FELIX  HTN  hypothyroidism / thyroid Ca / pituitary adenoma     plan:    cont lasix 20mg po qd  outpt MRI to eval right renal lesion  CKD counselling  outpt f/u my office  full code               0

## 2024-03-07 ENCOUNTER — EMERGENCY (EMERGENCY)
Facility: HOSPITAL | Age: 84
LOS: 0 days | Discharge: HOME | End: 2024-03-07
Attending: EMERGENCY MEDICINE
Payer: MEDICARE

## 2024-03-07 VITALS
OXYGEN SATURATION: 95 % | HEART RATE: 69 BPM | TEMPERATURE: 98 F | SYSTOLIC BLOOD PRESSURE: 151 MMHG | DIASTOLIC BLOOD PRESSURE: 73 MMHG | WEIGHT: 195.11 LBS | RESPIRATION RATE: 16 BRPM

## 2024-03-07 VITALS
OXYGEN SATURATION: 95 % | HEART RATE: 71 BPM | RESPIRATION RATE: 16 BRPM | DIASTOLIC BLOOD PRESSURE: 68 MMHG | SYSTOLIC BLOOD PRESSURE: 146 MMHG

## 2024-03-07 DIAGNOSIS — I48.91 UNSPECIFIED ATRIAL FIBRILLATION: ICD-10-CM

## 2024-03-07 DIAGNOSIS — M25.551 PAIN IN RIGHT HIP: ICD-10-CM

## 2024-03-07 DIAGNOSIS — Z98.890 OTHER SPECIFIED POSTPROCEDURAL STATES: Chronic | ICD-10-CM

## 2024-03-07 DIAGNOSIS — M79.671 PAIN IN RIGHT FOOT: ICD-10-CM

## 2024-03-07 DIAGNOSIS — M79.604 PAIN IN RIGHT LEG: ICD-10-CM

## 2024-03-07 DIAGNOSIS — N18.9 CHRONIC KIDNEY DISEASE, UNSPECIFIED: ICD-10-CM

## 2024-03-07 DIAGNOSIS — Z20.822 CONTACT WITH AND (SUSPECTED) EXPOSURE TO COVID-19: ICD-10-CM

## 2024-03-07 DIAGNOSIS — I12.9 HYPERTENSIVE CHRONIC KIDNEY DISEASE WITH STAGE 1 THROUGH STAGE 4 CHRONIC KIDNEY DISEASE, OR UNSPECIFIED CHRONIC KIDNEY DISEASE: ICD-10-CM

## 2024-03-07 DIAGNOSIS — R10.813 RIGHT LOWER QUADRANT ABDOMINAL TENDERNESS: ICD-10-CM

## 2024-03-07 LAB
FLUAV AG NPH QL: SIGNIFICANT CHANGE UP
FLUBV AG NPH QL: SIGNIFICANT CHANGE UP
RSV RNA NPH QL NAA+NON-PROBE: SIGNIFICANT CHANGE UP
SARS-COV-2 RNA SPEC QL NAA+PROBE: SIGNIFICANT CHANGE UP

## 2024-03-07 PROCEDURE — 99283 EMERGENCY DEPT VISIT LOW MDM: CPT | Mod: 25

## 2024-03-07 PROCEDURE — 99284 EMERGENCY DEPT VISIT MOD MDM: CPT | Mod: GC

## 2024-03-07 PROCEDURE — 0241U: CPT

## 2024-03-07 PROCEDURE — 73502 X-RAY EXAM HIP UNI 2-3 VIEWS: CPT | Mod: RT

## 2024-03-07 PROCEDURE — 73502 X-RAY EXAM HIP UNI 2-3 VIEWS: CPT | Mod: 26,RT

## 2024-03-07 RX ORDER — INDOMETHACIN 50 MG
25 CAPSULE ORAL ONCE
Refills: 0 | Status: COMPLETED | OUTPATIENT
Start: 2024-03-07 | End: 2024-03-07

## 2024-03-07 RX ORDER — DEXAMETHASONE 0.5 MG/5ML
10 ELIXIR ORAL ONCE
Refills: 0 | Status: COMPLETED | OUTPATIENT
Start: 2024-03-07 | End: 2024-03-07

## 2024-03-07 RX ADMIN — Medication 25 MILLIGRAM(S): at 13:09

## 2024-03-07 RX ADMIN — Medication 10 MILLIGRAM(S): at 13:12

## 2024-03-07 NOTE — ED PROVIDER NOTE - CONSIDERATION OF ADMISSION OBSERVATION
Consideration of Admission/Observation Results, clinical status, ambulation status, patient preference

## 2024-03-07 NOTE — ED PROVIDER NOTE - OBJECTIVE STATEMENT
83-year-old with history of A-fib recently cardioverted CKD hypertension coming here for hip/foot pain.  Patient states that she has been having intermittent radiating pain from her hip down to her foot on her right side.  Started today.  No other complaints. Also requesting COVID swab.

## 2024-03-07 NOTE — ED PROVIDER NOTE - CLINICAL SUMMARY MEDICAL DECISION MAKING FREE TEXT BOX
83-year-old female presenting for evaluation of right hip/lower extremity pain.  States that she was at [facility] yesterday for evaluation of tensional knee replacement, had many range of motion exercises to right hip, since then has developed pain to site.  That pain radiates from right groin to right foot.  No fall/trauma.  Has been able to ambulate with walker.  No numbness/focal weakness.  Uncomfortable appearing, non toxic. NCAT PERRLA EOMI neck supple non tender normal wob abdomen s nt nd no rebound no guarding [id number] neuro non focal 2+ equal pulses, less than 2-second capillary refill, tenderness to palpation right groin/inguinal crease, no deformities noted to site, no palpable hernias, full active passive range of motion to right hip.  No other bony tenderness palpation throughout right lower extremity.  Imaging reviewed.  Patient ambulatory at baseline status.  Comfortable with discharge and follow-up outpatient, strict return precautions given. Endorses understanding of all of this and aware that they can return at any time for new or concerning symptoms. No further questions or concerns at this time

## 2024-03-07 NOTE — ED PROVIDER NOTE - PATIENT PORTAL LINK FT
You can access the FollowMyHealth Patient Portal offered by [facility] by registering at the following website: [email or web address]. By joining [facility]’s FollowMyHealth portal, you will also be able to view your health information using other applications (apps) compatible with our system.

## 2024-03-07 NOTE — ED ADULT NURSE NOTE - NSFALLHARMRISKINTERV_ED_ALL_ED

## 2024-03-15 ENCOUNTER — RX RENEWAL (OUTPATIENT)
Age: 84
End: 2024-03-15

## 2024-03-18 ENCOUNTER — APPOINTMENT (OUTPATIENT)
Dept: ORTHOPEDIC SURGERY | Facility: HOSPITAL | Age: 84
End: 2024-03-18

## 2024-03-25 NOTE — PROGRESS NOTE ADULT - SUBJECTIVE AND OBJECTIVE BOX
-Keep your wound dressing clean, dry, and intact. Only change dressing if it's over saturated, soiled or falls off.     -Change your dressing if it becomes soiled, soaked, or falls off.    -Should you experience any significant changes in your wound(s), such as infection (redness, swelling, localized heat, increased pain, fever > 101 F, chills) or have any questions regarding your home care instructions, please contact the wound center at [phone]. If after hours, contact your primary care physician or go to the hospital emergency room.      SUBJECTIVE:    Patient is a 78y old Female who presents with a chief complaint of fall, femoral head/neck subacute incomplete fracture (24 Jan 2019 14:17)    Currently admitted to medicine with the primary diagnosis of Hip fracture, right     Today is hospital day 6d. This morning she is resting comfortably in bed and reports no new issues or overnight events.     PAST MEDICAL & SURGICAL HISTORY  CKD (chronic kidney disease) stage 3, GFR 30-59 ml/min  Pituitary adenoma: ??  Osteoarthritis  Sleep apnea  Afib: on xarelto  Hypothyroid: s/p thyroid ca surgery  High cholesterol  HTN (hypertension)  S/P rotator cuff surgery  H/O thyroidectomy    SOCIAL HISTORY:  Negative for smoking/alcohol/drug use.     ALLERGIES:  No Known Allergies    MEDICATIONS:  STANDING MEDICATIONS  amLODIPine   Tablet 5 milliGRAM(s) Oral at bedtime  atorvastatin 20 milliGRAM(s) Oral at bedtime  chlorhexidine 4% Liquid 1 Application(s) Topical <User Schedule>  citalopram 20 milliGRAM(s) Oral daily  levothyroxine 175 MICROGram(s) Oral <User Schedule>  lisinopril 20 milliGRAM(s) Oral two times a day  metoprolol tartrate 50 milliGRAM(s) Oral two times a day  oxybutynin 5 milliGRAM(s) Oral two times a day  predniSONE   Tablet 20 milliGRAM(s) Oral daily  rivaroxaban 20 milliGRAM(s) Oral at bedtime  sodium chloride 0.9%. 1000 milliLiter(s) IV Continuous <Continuous>  triamterene 37.5 mG/hydrochlorothiazide 25 mG Tablet 1 Tablet(s) Oral daily    PRN MEDICATIONS  acetaminophen   Tablet .. 650 milliGRAM(s) Oral every 6 hours PRN    VITALS:   T(F): 96.3  HR: 60  BP: 136/81  RR: 17  SpO2: --    LABS:                        10.1   11.04 )-----------( 436      ( 25 Jan 2019 06:30 )             33.2     01-25    146  |  103  |  72<HH>  ----------------------------<  125<H>  4.5   |  25  |  1.4    Ca    8.8      25 Jan 2019 06:30  Mg     2.5     01-24                    RADIOLOGY:    PHYSICAL EXAM:  GEN: No acute distress  LUNGS: Clear to auscultation bilaterally   HEART: Regular  ABD: Soft, non-tender, non-distended.  EXT: NC/NC/NE/2+PP/MONTIEL/Skin Intact.   NEURO: AAOX3    Intravenous access:   NG tube:   Stewart Catheter:

## 2024-04-01 ENCOUNTER — APPOINTMENT (OUTPATIENT)
Dept: PULMONOLOGY | Facility: CLINIC | Age: 84
End: 2024-04-01
Payer: MEDICARE

## 2024-04-01 DIAGNOSIS — R91.8 OTHER NONSPECIFIC ABNORMAL FINDING OF LUNG FIELD: ICD-10-CM

## 2024-04-01 DIAGNOSIS — G47.33 OBSTRUCTIVE SLEEP APNEA (ADULT) (PEDIATRIC): ICD-10-CM

## 2024-04-01 PROCEDURE — 99213 OFFICE O/P EST LOW 20 MIN: CPT | Mod: 25

## 2024-04-01 PROCEDURE — 71046 X-RAY EXAM CHEST 2 VIEWS: CPT

## 2024-04-01 NOTE — ASSESSMENT
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

## 2024-04-01 NOTE — PHYSICAL EXAM
[No Acute Distress] : no acute distress [Normal Oropharynx] : normal oropharynx [Normal Appearance] : normal appearance [Normal Rate/Rhythm] : normal rate/rhythm [No Neck Mass] : no neck mass [Normal S1, S2] : normal s1, s2 [No Murmurs] : no murmurs [No Resp Distress] : no resp distress [No Abnormalities] : no abnormalities [Clear to Auscultation Bilaterally] : clear to auscultation bilaterally [Benign] : benign [No Clubbing] : no clubbing [Normal Gait] : normal gait [No Cyanosis] : no cyanosis [No Edema] : no edema [FROM] : FROM [Normal Color/ Pigmentation] : normal color/ pigmentation [No Focal Deficits] : no focal deficits [Oriented x3] : oriented x3 [Normal Affect] : normal affect

## 2024-04-02 ENCOUNTER — APPOINTMENT (OUTPATIENT)
Dept: ORTHOPEDIC SURGERY | Facility: CLINIC | Age: 84
End: 2024-04-02

## 2024-04-02 NOTE — PROGRESS NOTE ADULT - SUBJECTIVE AND OBJECTIVE BOX
Pt reports intermittent right inner knee swelling for about 1-2 weeks.  Achy feeling and reports swelling is better over night. Pt is using ice and icy hot. Pt has tried 800mg Ibuprofen intermittently.  Pt thinks that the weather is causing the swelling.  Pt would like to have a knee replacement this fall. Encouraged to continue with the ice and ibuprofen.  Denies numbness, no swelling in feet and little discomfort with walking.  Instructed pt to continue to monitor and if swelling gets worse to reach out.  Pt was appreciative of the call.      NEPHROLOGY FOLLOW UP NOTE    pt seen and examined    PAST MEDICAL & SURGICAL HISTORY:  HTN (hypertension)      High cholesterol      Hypothyroid  s/p thyroid ca surgery      Afib  on xarelto      Sleep apnea      Osteoarthritis      CKD (chronic kidney disease) stage 3, GFR 30-59 ml/min      H/O thyroidectomy      S/P rotator cuff surgery        Allergies:  No Known Allergies    Home Medications Reviewed    SOCIAL HISTORY:  Denies ETOH,Smoking,   FAMILY HISTORY:  Family history of lung cancer (Mother)    Family history of abdominal aortic aneurysm (AAA) (Father)          REVIEW OF SYSTEMS:  CONSTITUTIONAL: No weakness, fevers or chills  EYES/ENT: No visual changes;  No vertigo or throat pain   NECK: No pain or stiffness  RESPIRATORY: No cough, wheezing, hemoptysis; No shortness of breath  CARDIOVASCULAR: No chest pain or palpitations.  GASTROINTESTINAL: No abdominal or epigastric pain. No nausea, vomiting, or hematemesis; No diarrhea or constipation. No melena or hematochezia.  GENITOURINARY: No dysuria, frequency, foamy urine, urinary urgency, incontinence or hematuria  NEUROLOGICAL: No numbness or weakness  SKIN: No itching, burning, rashes, or lesions   VASCULAR: No bilateral lower extremity edema.   All other review of systems is negative unless indicated above.    PHYSICAL EXAM:  Constitutional: NAD  HEENT: anicteric sclera, oropharynx clear, MMM  Neck: No JVD  Respiratory: CTAB, no wheezes, rales or rhonchi  Cardiovascular: S1, S2, RRR  Gastrointestinal: BS+, soft, NT/ND  Extremities: No cyanosis or clubbing. No peripheral edema  Neurological: A/O x 3, no focal deficits  Psychiatric: Normal mood, normal affect  : No CVA tenderness. No joaquin.   Skin: No rashes    Hospital Medications:   MEDICATIONS  (STANDING):  allopurinol 100 milliGRAM(s) Oral daily  aMIOdarone    Tablet 200 milliGRAM(s) Oral daily  chlorhexidine 2% Cloths 1 Application(s) Topical <User Schedule>  influenza  Vaccine (HIGH DOSE) 0.7 milliLiter(s) IntraMuscular once  levothyroxine 175 MICROGram(s) Oral daily  metoprolol tartrate 50 milliGRAM(s) Oral every 6 hours  oxybutynin 5 milliGRAM(s) Oral two times a day  rivaroxaban 15 milliGRAM(s) Oral with dinner        VITALS:  T(F): 97 (10-04-23 @ 04:44), Max: 97 (10-04-23 @ 04:44)  HR: 76 (10-04-23 @ 04:44)  BP: 111/69 (10-04-23 @ 04:44)  RR: 18 (10-04-23 @ 04:44)  SpO2: 96% (10-03-23 @ 19:59)  Wt(kg): --    10-02 @ 07:01  -  10-03 @ 07:00  --------------------------------------------------------  IN: 2097 mL / OUT: 450 mL / NET: 1647 mL    10-03 @ 07:01  -  10-04 @ 07:00  --------------------------------------------------------  IN: 838 mL / OUT: 1900 mL / NET: -1062 mL          LABS:  10-04    144  |  106  |  51<H>  ----------------------------<  104<H>  4.0   |  27  |  1.6<H>    Ca    9.0      04 Oct 2023 06:09  Mg     2.1     10-04                            11.2   7.86  )-----------( 329      ( 04 Oct 2023 06:09 )             36.9       Urine Studies:  Urinalysis Basic - ( 04 Oct 2023 06:09 )    Color:  / Appearance:  / SG:  / pH:   Gluc: 104 mg/dL / Ketone:   / Bili:  / Urobili:    Blood:  / Protein:  / Nitrite:    Leuk Esterase:  / RBC:  / WBC    Sq Epi:  / Non Sq Epi:  / Bacteria:       Sodium, Random Urine: <20.0 mmoL/L (10-01 @ 08:38)  Creatinine, Random Urine: 127 mg/dL (10-01 @ 08:38)  Protein/Creatinine Ratio Calculation: 0.1 Ratio (10-01 @ 08:38)  Osmolality, Random Urine: 620 mos/kg (10-01 @ 08:38)  Potassium, Random Urine: 72 mmol/L (10-01 @ 08:38)  Chloride, Random Urine: <20 (10-01 @ 08:38)      RADIOLOGY & ADDITIONAL STUDIES:

## 2024-04-10 ENCOUNTER — INPATIENT (INPATIENT)
Facility: HOSPITAL | Age: 84
LOS: 8 days | Discharge: HOME HEALTH | DRG: 310 | End: 2024-04-19
Attending: INTERNAL MEDICINE | Admitting: INTERNAL MEDICINE
Payer: MEDICARE

## 2024-04-10 VITALS
RESPIRATION RATE: 20 BRPM | TEMPERATURE: 98 F | OXYGEN SATURATION: 99 % | WEIGHT: 199.96 LBS | DIASTOLIC BLOOD PRESSURE: 67 MMHG | SYSTOLIC BLOOD PRESSURE: 108 MMHG | HEART RATE: 110 BPM

## 2024-04-10 DIAGNOSIS — I48.91 UNSPECIFIED ATRIAL FIBRILLATION: ICD-10-CM

## 2024-04-10 DIAGNOSIS — Z98.890 OTHER SPECIFIED POSTPROCEDURAL STATES: Chronic | ICD-10-CM

## 2024-04-10 LAB
ALBUMIN SERPL ELPH-MCNC: 3.6 G/DL — SIGNIFICANT CHANGE UP (ref 3.5–5.2)
ALP SERPL-CCNC: 168 U/L — HIGH (ref 30–115)
ALT FLD-CCNC: 57 U/L — HIGH (ref 0–41)
ANION GAP SERPL CALC-SCNC: 14 MMOL/L — SIGNIFICANT CHANGE UP (ref 7–14)
APTT BLD: 35.3 SEC — SIGNIFICANT CHANGE UP (ref 27–39.2)
AST SERPL-CCNC: 39 U/L — SIGNIFICANT CHANGE UP (ref 0–41)
BASOPHILS # BLD AUTO: 0.06 K/UL — SIGNIFICANT CHANGE UP (ref 0–0.2)
BASOPHILS NFR BLD AUTO: 0.6 % — SIGNIFICANT CHANGE UP (ref 0–1)
BILIRUB DIRECT SERPL-MCNC: 0.2 MG/DL — SIGNIFICANT CHANGE UP (ref 0–0.3)
BILIRUB INDIRECT FLD-MCNC: 0.5 MG/DL — SIGNIFICANT CHANGE UP (ref 0.2–1.2)
BILIRUB SERPL-MCNC: 0.7 MG/DL — SIGNIFICANT CHANGE UP (ref 0.2–1.2)
BUN SERPL-MCNC: 50 MG/DL — HIGH (ref 10–20)
CALCIUM SERPL-MCNC: 8.6 MG/DL — SIGNIFICANT CHANGE UP (ref 8.4–10.5)
CHLORIDE SERPL-SCNC: 103 MMOL/L — SIGNIFICANT CHANGE UP (ref 98–110)
CO2 SERPL-SCNC: 28 MMOL/L — SIGNIFICANT CHANGE UP (ref 17–32)
CREAT SERPL-MCNC: 2 MG/DL — HIGH (ref 0.7–1.5)
EGFR: 25 ML/MIN/1.73M2 — LOW
EOSINOPHIL # BLD AUTO: 0.16 K/UL — SIGNIFICANT CHANGE UP (ref 0–0.7)
EOSINOPHIL NFR BLD AUTO: 1.7 % — SIGNIFICANT CHANGE UP (ref 0–8)
GLUCOSE BLDC GLUCOMTR-MCNC: 133 MG/DL — HIGH (ref 70–99)
GLUCOSE SERPL-MCNC: 107 MG/DL — HIGH (ref 70–99)
HCT VFR BLD CALC: 33.7 % — LOW (ref 37–47)
HGB BLD-MCNC: 10.2 G/DL — LOW (ref 12–16)
IMM GRANULOCYTES NFR BLD AUTO: 0.6 % — HIGH (ref 0.1–0.3)
INR BLD: 1.9 RATIO — HIGH (ref 0.65–1.3)
LIDOCAIN IGE QN: 17 U/L — SIGNIFICANT CHANGE UP (ref 7–60)
LYMPHOCYTES # BLD AUTO: 1.61 K/UL — SIGNIFICANT CHANGE UP (ref 1.2–3.4)
LYMPHOCYTES # BLD AUTO: 17.2 % — LOW (ref 20.5–51.1)
MCHC RBC-ENTMCNC: 26.2 PG — LOW (ref 27–31)
MCHC RBC-ENTMCNC: 30.3 G/DL — LOW (ref 32–37)
MCV RBC AUTO: 86.6 FL — SIGNIFICANT CHANGE UP (ref 81–99)
MONOCYTES # BLD AUTO: 0.82 K/UL — HIGH (ref 0.1–0.6)
MONOCYTES NFR BLD AUTO: 8.8 % — SIGNIFICANT CHANGE UP (ref 1.7–9.3)
NEUTROPHILS # BLD AUTO: 6.65 K/UL — HIGH (ref 1.4–6.5)
NEUTROPHILS NFR BLD AUTO: 71.1 % — SIGNIFICANT CHANGE UP (ref 42.2–75.2)
NRBC # BLD: 0 /100 WBCS — SIGNIFICANT CHANGE UP (ref 0–0)
NT-PROBNP SERPL-SCNC: 7789 PG/ML — HIGH (ref 0–300)
PLATELET # BLD AUTO: 328 K/UL — SIGNIFICANT CHANGE UP (ref 130–400)
PMV BLD: 12.4 FL — HIGH (ref 7.4–10.4)
POTASSIUM SERPL-MCNC: 3.5 MMOL/L — SIGNIFICANT CHANGE UP (ref 3.5–5)
POTASSIUM SERPL-SCNC: 3.5 MMOL/L — SIGNIFICANT CHANGE UP (ref 3.5–5)
PROT SERPL-MCNC: 5.4 G/DL — LOW (ref 6–8)
PROTHROM AB SERPL-ACNC: 21.8 SEC — HIGH (ref 9.95–12.87)
RBC # BLD: 3.89 M/UL — LOW (ref 4.2–5.4)
RBC # FLD: 17.4 % — HIGH (ref 11.5–14.5)
SODIUM SERPL-SCNC: 145 MMOL/L — SIGNIFICANT CHANGE UP (ref 135–146)
TROPONIN T, HIGH SENSITIVITY RESULT: 29 NG/L — HIGH (ref 6–13)
WBC # BLD: 9.36 K/UL — SIGNIFICANT CHANGE UP (ref 4.8–10.8)
WBC # FLD AUTO: 9.36 K/UL — SIGNIFICANT CHANGE UP (ref 4.8–10.8)

## 2024-04-10 PROCEDURE — 82306 VITAMIN D 25 HYDROXY: CPT

## 2024-04-10 PROCEDURE — 97162 PT EVAL MOD COMPLEX 30 MIN: CPT | Mod: GP

## 2024-04-10 PROCEDURE — 99285 EMERGENCY DEPT VISIT HI MDM: CPT | Mod: FS

## 2024-04-10 PROCEDURE — 99223 1ST HOSP IP/OBS HIGH 75: CPT | Mod: 57

## 2024-04-10 PROCEDURE — 93306 TTE W/DOPPLER COMPLETE: CPT

## 2024-04-10 PROCEDURE — C1894: CPT

## 2024-04-10 PROCEDURE — 85610 PROTHROMBIN TIME: CPT

## 2024-04-10 PROCEDURE — 36415 COLL VENOUS BLD VENIPUNCTURE: CPT

## 2024-04-10 PROCEDURE — 71046 X-RAY EXAM CHEST 2 VIEWS: CPT

## 2024-04-10 PROCEDURE — 97110 THERAPEUTIC EXERCISES: CPT | Mod: GP

## 2024-04-10 PROCEDURE — 82962 GLUCOSE BLOOD TEST: CPT

## 2024-04-10 PROCEDURE — 83540 ASSAY OF IRON: CPT

## 2024-04-10 PROCEDURE — 85027 COMPLETE CBC AUTOMATED: CPT

## 2024-04-10 PROCEDURE — 93325 DOPPLER ECHO COLOR FLOW MAPG: CPT

## 2024-04-10 PROCEDURE — 71045 X-RAY EXAM CHEST 1 VIEW: CPT

## 2024-04-10 PROCEDURE — 83935 ASSAY OF URINE OSMOLALITY: CPT

## 2024-04-10 PROCEDURE — 84443 ASSAY THYROID STIM HORMONE: CPT

## 2024-04-10 PROCEDURE — 80053 COMPREHEN METABOLIC PANEL: CPT

## 2024-04-10 PROCEDURE — 33225 L VENTRIC PACING LEAD ADD-ON: CPT

## 2024-04-10 PROCEDURE — C1887: CPT

## 2024-04-10 PROCEDURE — 71250 CT THORAX DX C-: CPT | Mod: MC

## 2024-04-10 PROCEDURE — C1769: CPT

## 2024-04-10 PROCEDURE — 84484 ASSAY OF TROPONIN QUANT: CPT

## 2024-04-10 PROCEDURE — 83970 ASSAY OF PARATHORMONE: CPT

## 2024-04-10 PROCEDURE — 93281 PM DEVICE PROGR EVAL MULTI: CPT

## 2024-04-10 PROCEDURE — 84540 ASSAY OF URINE/UREA-N: CPT

## 2024-04-10 PROCEDURE — 93312 ECHO TRANSESOPHAGEAL: CPT

## 2024-04-10 PROCEDURE — 82310 ASSAY OF CALCIUM: CPT

## 2024-04-10 PROCEDURE — 85025 COMPLETE CBC W/AUTO DIFF WBC: CPT

## 2024-04-10 PROCEDURE — C1900: CPT

## 2024-04-10 PROCEDURE — 87899 AGENT NOS ASSAY W/OPTIC: CPT

## 2024-04-10 PROCEDURE — 82570 ASSAY OF URINE CREATININE: CPT

## 2024-04-10 PROCEDURE — 93010 ELECTROCARDIOGRAM REPORT: CPT

## 2024-04-10 PROCEDURE — 84100 ASSAY OF PHOSPHORUS: CPT

## 2024-04-10 PROCEDURE — 93005 ELECTROCARDIOGRAM TRACING: CPT

## 2024-04-10 PROCEDURE — 93320 DOPPLER ECHO COMPLETE: CPT

## 2024-04-10 PROCEDURE — 86850 RBC ANTIBODY SCREEN: CPT

## 2024-04-10 PROCEDURE — 33249 INSJ/RPLCMT DEFIB W/LEAD(S): CPT

## 2024-04-10 PROCEDURE — C1898: CPT

## 2024-04-10 PROCEDURE — 97116 GAIT TRAINING THERAPY: CPT | Mod: GP

## 2024-04-10 PROCEDURE — 84300 ASSAY OF URINE SODIUM: CPT

## 2024-04-10 PROCEDURE — 83735 ASSAY OF MAGNESIUM: CPT

## 2024-04-10 PROCEDURE — 99223 1ST HOSP IP/OBS HIGH 75: CPT

## 2024-04-10 PROCEDURE — 87449 NOS EACH ORGANISM AG IA: CPT

## 2024-04-10 PROCEDURE — 80048 BASIC METABOLIC PNL TOTAL CA: CPT

## 2024-04-10 PROCEDURE — 85730 THROMBOPLASTIN TIME PARTIAL: CPT

## 2024-04-10 PROCEDURE — 84156 ASSAY OF PROTEIN URINE: CPT

## 2024-04-10 PROCEDURE — C1892: CPT

## 2024-04-10 PROCEDURE — 92960 CARDIOVERSION ELECTRIC EXT: CPT

## 2024-04-10 PROCEDURE — 83550 IRON BINDING TEST: CPT

## 2024-04-10 PROCEDURE — 0225U NFCT DS DNA&RNA 21 SARSCOV2: CPT

## 2024-04-10 PROCEDURE — 71045 X-RAY EXAM CHEST 1 VIEW: CPT | Mod: 26

## 2024-04-10 PROCEDURE — C1730: CPT

## 2024-04-10 PROCEDURE — 81001 URINALYSIS AUTO W/SCOPE: CPT

## 2024-04-10 PROCEDURE — 76770 US EXAM ABDO BACK WALL COMP: CPT

## 2024-04-10 PROCEDURE — 84145 PROCALCITONIN (PCT): CPT

## 2024-04-10 PROCEDURE — C2621: CPT

## 2024-04-10 PROCEDURE — C1889: CPT

## 2024-04-10 PROCEDURE — 86901 BLOOD TYPING SEROLOGIC RH(D): CPT

## 2024-04-10 PROCEDURE — 82728 ASSAY OF FERRITIN: CPT

## 2024-04-10 PROCEDURE — 86900 BLOOD TYPING SEROLOGIC ABO: CPT

## 2024-04-10 RX ORDER — CALCIUM CARBONATE 500(1250)
1 TABLET ORAL ONCE
Refills: 0 | Status: COMPLETED | OUTPATIENT
Start: 2024-04-10 | End: 2024-04-10

## 2024-04-10 RX ORDER — METOPROLOL TARTRATE 50 MG
50 TABLET ORAL
Refills: 0 | Status: DISCONTINUED | OUTPATIENT
Start: 2024-04-10 | End: 2024-04-12

## 2024-04-10 RX ORDER — OXYBUTYNIN CHLORIDE 5 MG
5 TABLET ORAL
Refills: 0 | Status: DISCONTINUED | OUTPATIENT
Start: 2024-04-10 | End: 2024-04-19

## 2024-04-10 RX ORDER — BENZOCAINE AND MENTHOL 5; 1 G/100ML; G/100ML
1 LIQUID ORAL DAILY
Refills: 0 | Status: DISCONTINUED | OUTPATIENT
Start: 2024-04-10 | End: 2024-04-19

## 2024-04-10 RX ORDER — FUROSEMIDE 40 MG
40 TABLET ORAL DAILY
Refills: 0 | Status: DISCONTINUED | OUTPATIENT
Start: 2024-04-11 | End: 2024-04-11

## 2024-04-10 RX ORDER — ATORVASTATIN CALCIUM 80 MG/1
20 TABLET, FILM COATED ORAL AT BEDTIME
Refills: 0 | Status: DISCONTINUED | OUTPATIENT
Start: 2024-04-10 | End: 2024-04-19

## 2024-04-10 RX ORDER — ALLOPURINOL 300 MG
100 TABLET ORAL AT BEDTIME
Refills: 0 | Status: DISCONTINUED | OUTPATIENT
Start: 2024-04-11 | End: 2024-04-19

## 2024-04-10 RX ORDER — LEVOTHYROXINE SODIUM 125 MCG
175 TABLET ORAL DAILY
Refills: 0 | Status: DISCONTINUED | OUTPATIENT
Start: 2024-04-11 | End: 2024-04-19

## 2024-04-10 RX ADMIN — Medication 30 MILLILITER(S): at 22:59

## 2024-04-10 RX ADMIN — Medication 50 MILLIGRAM(S): at 22:59

## 2024-04-10 RX ADMIN — Medication 1 TABLET(S): at 22:59

## 2024-04-10 NOTE — H&P ADULT - NSHPLABSRESULTS_GEN_ALL_CORE
.  LABS:                         10.2   9.36  )-----------( 328      ( 10 Apr 2024 15:31 )             33.7     04-10    145  |  103  |  50<H>  ----------------------------<  107<H>  3.5   |  28  |  2.0<H>    Ca    8.6      10 Apr 2024 15:31    TPro  5.4<L>  /  Alb  3.6  /  TBili  0.7  /  DBili  0.2  /  AST  39  /  ALT  57<H>  /  AlkPhos  168<H>  04-10    PT/INR - ( 10 Apr 2024 15:31 )   PT: 21.80 sec;   INR: 1.90 ratio         PTT - ( 10 Apr 2024 15:31 )  PTT:35.3 sec  Urinalysis Basic - ( 10 Apr 2024 15:31 )    Color: x / Appearance: x / SG: x / pH: x  Gluc: 107 mg/dL / Ketone: x  / Bili: x / Urobili: x   Blood: x / Protein: x / Nitrite: x   Leuk Esterase: x / RBC: x / WBC x   Sq Epi: x / Non Sq Epi: x / Bacteria: x            RADIOLOGY, EKG & ADDITIONAL TESTS: Reviewed.

## 2024-04-10 NOTE — H&P ADULT - HISTORY OF PRESENT ILLNESS
84yo female with PMHx AFIB on Xarelto, amiodarone, metoprolol presents c/o palpitations and SOB x 1 week. Pt reports she was recently seen yesterday by her PMD Dr Savage and cardiologist Dr Quintero. her Lasix was increased from 20-40mg and her metoprolol was increased from 25mg BID to 50mg BID. Pt has had multiple failed cardioversions for AFIB and reports increasing medication dosages has not relieved symptoms so she presented to the ED today. She denies CP, leg swelling, dizziness or lightheadedness. Denies any specific aggravating or relieving factors.    Vitals in ED:  T 98.4  /67    SPO2 99%RA   EKG on admission Afib w/ RVR

## 2024-04-10 NOTE — H&P ADULT - NSHPPHYSICALEXAM_GEN_ALL_CORE
T(C): 36.6 (04-10-24 @ 19:07), Max: 36.9 (04-10-24 @ 13:02)  HR: 98 (04-10-24 @ 19:07) (90 - 110)  BP: 115/72 (04-10-24 @ 19:07) (108/67 - 140/72)  RR: 18 (04-10-24 @ 19:07) (18 - 20)  SpO2: 98% (04-10-24 @ 19:07) (92% - 100%)    CONSTITUTIONAL: NAD   RESP: No respiratory distress, no use of accessory muscles; CTA b/l, no WRR  CV: RRR, +S1S2,  no peripheral edema  GI: Soft, NT, ND  MSK: moves all extremities   SKIN: No rashes or ulcers   PSYCH:  A+O x 3 T(C): 36.6 (04-10-24 @ 19:07), Max: 36.9 (04-10-24 @ 13:02)  HR: 98 (04-10-24 @ 19:07) (90 - 110)  BP: 115/72 (04-10-24 @ 19:07) (108/67 - 140/72)  RR: 18 (04-10-24 @ 19:07) (18 - 20)  SpO2: 98% (04-10-24 @ 19:07) (92% - 100%)    CONSTITUTIONAL: NAD   RESP: No respiratory distress, no use of accessory muscles; CTA b/l, no WRR  CV: irregular rate and rhythm, S1, S2   GI: Soft, NT, ND  MSK: moves all extremities   SKIN: No rashes or ulcers   PSYCH:  A+O x 3

## 2024-04-10 NOTE — ED ADULT TRIAGE NOTE - CHIEF COMPLAINT QUOTE
palpitations and shortness of breath started today. pt. states MD doubled her lasix from 20 mg to 40 mg and not urinating fully and c'o abdominal distention. pt. on xarelto and amiodarone

## 2024-04-10 NOTE — ED PROVIDER NOTE - CLINICAL SUMMARY MEDICAL DECISION MAKING FREE TEXT BOX
Patient presented with worsening dyspnea over the past week as documented, history of A-fib in the past.  Patient was scheduled to have outpatient ablation, but states that symptoms worsened so she came to the ED for evaluation.  Otherwise on arrival, patient afebrile, hemodynamically stable, grossly neurovascularly intact.  EKG was obtained and showed A-fib with slightly increased rate to the 110s, nonspecific ST abnormalities, but no evidence of STEMI.  Obtained labs which were remarkable for slight KOBI as well as elevated troponin at 29 with a proBNP of 7700 but otherwise were grossly unremarkable including no significant leukocytosis, anemia, transaminitis or significant electrolyte abnormalities.  Chest x-ray showed bilateral congestion, but no focal consolidations to suggest pneumonia.  Consulted both cardiology and EP who evaluated the patient in the ED and recommended admission for likely ablation.  Patient agreeable with plan.  Hemodynamically stable at time of admission.

## 2024-04-10 NOTE — ED PROVIDER NOTE - CARE PLAN
1 Principal Discharge DX:	Longstanding persistent atrial fibrillation  Secondary Diagnosis:	Palpitations  Secondary Diagnosis:	Renal failure, acute on chronic

## 2024-04-10 NOTE — H&P ADULT - ATTENDING COMMENTS
83 yo F with PMHX of Persistent AFib s/p DCCV 2022, HFmrEF, CKD III, HTN, Hypothyroidism presents for palpitations.     Agree  with assessment  except for changes below.   Vital Signs Last 24 Hrs  T(C): 36.5 (10 Apr 2024 23:48), Max: 36.9 (10 Apr 2024 13:02)  T(F): 97.7 (10 Apr 2024 23:48), Max: 98.4 (10 Apr 2024 13:02)  HR: 110 (10 Apr 2024 23:48) (90 - 110)  BP: 141/80 (10 Apr 2024 23:48) (108/67 - 141/80)  BP(mean): --  RR: 18 (10 Apr 2024 23:48) (18 - 20)  SpO2: 98% (10 Apr 2024 23:48) (92% - 100%)    Parameters below as of 10 Apr 2024 23:48  Patient On (Oxygen Delivery Method): nasal cannula      ECHO 12/23:  Low-normal global left ventricular systolic function with ejection fraction, by visual estimation, of 50 to 55% (accurate EF is difficult to obtain due to hjdu-vl-ssty variability). The left ventricular diastolic function could not be assessed in this study due to atrial arrhythmia. There is severe concentric hypertrophy. Mildly enlarged right ventricle with moderately reduced systolic function      IMPRESSION   Long Standing persistent Afib With , Failed DCCV in the Past   Hx  HF with  Signs Vascular Congestion In the Setting of Afib RVR on Chest X-Ray   Hx HLD  Hemodynamically Stable   EP following  for  OR Procedure tomorrow   Keep on telemetry   Hold Xarelto tonight  Keep NPO after MN   Arrange for DC PPM and AV node ablation tomorrow in AM  Will amiodarone  Will DC metoprolol post PPM   c/w Metoprolol 50 BID and Lasix 40mg daily, Lipitor   f/u  Echo ordered    KOBI on CKD  4 in the Seting of  RVR   Baseline creatinine: 1.6  Creatinine today2.0 , Consider Renal US,  Avoid nephrotoxic agents, Monitor BUN/creatinine, Send  Urine Lytes       Hx Hypothyroidism 2/2 thyroidectomy 2/2 thyroid CA  -TSH 2.21 (3/24)  - Synthroid 175 mcg     Hx  Pituitary Adenoma  - Cabergoline 1/2 tab of 0.5 mg Q weekly Monday 83 yo F with PMHX of Persistent AFib s/p DCCV 2022, HFmrEF, CKD III, HTN, Hypothyroidism presents for palpitations.     Agree  with assessment  except for changes below.   Vital Signs Last 24 Hrs  T(C): 36.5 (10 Apr 2024 23:48), Max: 36.9 (10 Apr 2024 13:02)  T(F): 97.7 (10 Apr 2024 23:48), Max: 98.4 (10 Apr 2024 13:02)  HR: 110 (10 Apr 2024 23:48) (90 - 110)  BP: 141/80 (10 Apr 2024 23:48) (108/67 - 141/80)  BP(mean): --  RR: 18 (10 Apr 2024 23:48) (18 - 20)  SpO2: 98% (10 Apr 2024 23:48) (92% - 100%)    Parameters below as of 10 Apr 2024 23:48  Patient On (Oxygen Delivery Method): nasal cannula      ECHO 12/23:  Low-normal global left ventricular systolic function with ejection fraction, by visual estimation, of 50 to 55% (accurate EF is difficult to obtain due to xapi-vo-skkx variability). The left ventricular diastolic function could not be assessed in this study due to atrial arrhythmia. There is severe concentric hypertrophy. Mildly enlarged right ventricle with moderately reduced systolic function      IMPRESSION   Long Standing persistent Afib With , Failed DCCV in the Past   Hx  HF with  Signs Vascular Congestion In the Setting of Afib RVR on Chest X-Ray   Hx HLD  Hemodynamically Stable   EP following  for  OR Procedure tomorrow   Keep on telemetry   Hold Xarelto tonight  Keep NPO after MN   Arrange for DC PPM and AV node ablation tomorrow in AM  Will amiodarone  Will DC metoprolol post PPM   c/w Metoprolol 50 BID and Lasix 40mg daily, Lipitor   f/u  Echo ordered    KOBI on CKD  4 in the Seting of  RVR   Baseline creatinine: 1.6  Creatinine today2.0 , Consider Renal US,  Avoid nephrotoxic agents, Monitor BUN/creatinine, Send  Urine Lytes       Hx Hypothyroidism 2/2 thyroidectomy 2/2 thyroid CA  -TSH 2.21 (3/24)  - Synthroid 175 mcg     Hx  Pituitary Adenoma  - Cabergoline 1/2 tab of 0.5 mg Q weekly Monday    Liliana on 04/10

## 2024-04-10 NOTE — H&P ADULT - ASSESSMENT
82yo female with PMHx AFIB on Xarelto, amiodarone, metoprolol presents c/o palpitations and SOB x 1 week. Pt reports she was recently seen yesterday by her PMD Dr Savage and cardiologist Dr Quintero. her Lasix was increased from 20-40mg and her metoprolol was increased from 25mg BID to 50mg BID. Pt has had multiple failed cardioversions for AFIB and reports increasing medication dosages has not relieved symptoms so she presented to the ED today. She denies CP, leg swelling, dizziness or lightheadedness. Denies any specific aggravating or relieving factors.    #Atrial fibrillation W RVR  -Pt with long history of Afib with multiple failed  DCCV (last in 3/2024)  -admit to tele   -Evaluated by cardio and EP  - Planned for PPM and AV node ablation tomorrow in AM  -Stop amiodarone  -Holding Xarelto tonight for procedure in AM  -Continue Metoprolol 50mg BID  -can DC metoprolol post PPM   -NPO after MN       #CHF systolic and diastolic   #HTN  -CXR- pulm congestion  - ECHO 12/23:  Low-normal global left ventricular systolic function with ejection fraction, by visual estimation, of 50 to 55% (accurate EF is difficult to obtain due to iugq-rq-ythr variability). The left ventricular diastolic function could not be assessed in this study due to atrial arrhythmia. There is severe concentric hypertrophy. Mildly enlarged right ventricle with moderately reduced systolic function  -Metoprolol 50 BID and Lasix 40mg daily  -Repeat Echo ordered    #HLD  -Continue statin     #KOBI on CKD stage 4  -Cr 2.0 (baseline 1.6)  -Repeat BMP     #Hypothyroidism 2/2 thyroidectomy 2/2 thyroid CA  -TSH 2.21 (3/24)  - continue synthroid 175 mcg     # Pituitary adenoma  - Cabergoline 1/2 tab of 0.5 mg Q weekly Monday      #DVT PPx- holding AC   #Diet- NPO MN   #Activity- AAT  #Dispo- Acute  #Code- full   82yo female with PMHx AFIB on Xarelto, amiodarone, metoprolol presents c/o palpitations and SOB x 1 week. Pt reports she was recently seen yesterday by her PMD Dr Savage and cardiologist Dr Quintero. her Lasix was increased from 20-40mg and her metoprolol was increased from 25mg BID to 50mg BID. Pt has had multiple failed cardioversions for AFIB and reports increasing medication dosages has not relieved symptoms so she presented to the ED today. She denies CP, leg swelling, dizziness or lightheadedness. Denies any specific aggravating or relieving factors.    #Atrial fibrillation W RVR  -Pt with long history of Afib with multiple failed  DCCV (last in 3/2024)  -admit to tele   -pt still currently in Afib (HR )  -Evaluated by cardio and EP  - Planned for PPM and AV node ablation tomorrow in AM  -Stop amiodarone  -Holding Xarelto tonight for procedure in AM  -Continue Metoprolol 50mg BID  -can DC metoprolol post PPM   -NPO after MN       #CHF systolic and diastolic   #HTN  -CXR- pulm congestion  - ECHO 12/23:  Low-normal global left ventricular systolic function with ejection fraction, by visual estimation, of 50 to 55% (accurate EF is difficult to obtain due to pvyg-pi-nvhs variability). The left ventricular diastolic function could not be assessed in this study due to atrial arrhythmia. There is severe concentric hypertrophy. Mildly enlarged right ventricle with moderately reduced systolic function  -Metoprolol 50 BID and Lasix 40mg daily  -Repeat Echo ordered    #HLD  -Continue statin     #KOBI on CKD stage 4  -Cr 2.0 (baseline 1.6)  -Repeat BMP     #Hypothyroidism 2/2 thyroidectomy 2/2 thyroid CA  -TSH 2.21 (3/24)  - continue synthroid 175 mcg     # Pituitary adenoma  - Cabergoline 1/2 tab of 0.5 mg Q weekly Monday      #DVT PPx- holding AC   #Diet- NPO MN   #Activity- AAT  #Dispo- Acute  #Code- full   84yo female with PMHx AFIB on Xarelto, amiodarone, metoprolol presents c/o palpitations and SOB x 1 week. Pt reports she was recently seen yesterday by her PMD Dr Savage and cardiologist Dr Quintero. her Lasix was increased from 20-40mg and her metoprolol was increased from 25mg BID to 50mg BID. Pt has had multiple failed cardioversions for AFIB and reports increasing medication dosages has not relieved symptoms so she presented to the ED today. She denies CP, leg swelling, dizziness or lightheadedness. Denies any specific aggravating or relieving factors.    #Atrial fibrillation W RVR  -Pt with long history of Afib with multiple failed  DCCV (last in 3/2024)  -admit to tele   -pt still currently in Afib (HR )  -Evaluated by cardio and EP  - Planned for PPM and AV node ablation tomorrow in AM  -Stop amiodarone  -Holding Xarelto tonight for procedure in AM  -Continue Metoprolol 50mg BID  -can DC metoprolol post PPM   -NPO after MN       #CHF systolic and diastolic   #HTN  -CXR- pulm congestion  -BNP 7789   - ECHO 12/23:  Low-normal global left ventricular systolic function with ejection fraction, by visual estimation, of 50 to 55% (accurate EF is difficult to obtain due to gveg-md-zpjl variability). The left ventricular diastolic function could not be assessed in this study due to atrial arrhythmia. There is severe concentric hypertrophy. Mildly enlarged right ventricle with moderately reduced systolic function  -Metoprolol 50 BID and Lasix 40mg daily  -Repeat Echo ordered    #HLD  -Continue statin     #KOBI on CKD stage 4  #Hypokalemia   -Cr 2.0> 2.3  (baseline 1.6)  -replete K    #Hypothyroidism 2/2 thyroidectomy 2/2 thyroid CA  -TSH 2.21 (3/24)  - continue synthroid 175 mcg     # Pituitary adenoma  - Cabergoline 1/2 tab of 0.5 mg Q weekly Monday      #DVT PPx- holding AC   #Diet- NPO MN   #Activity- AAT  #Dispo- Acute  #Code- full   84yo female with PMHx AFIB on Xarelto, amiodarone, metoprolol presents c/o palpitations and SOB x 1 week. Pt reports she was recently seen yesterday by her PMD Dr Savage and cardiologist Dr Quintero. her Lasix was increased from 20-40mg and her metoprolol was increased from 25mg BID to 50mg BID. Pt has had multiple failed cardioversions for AFIB and reports increasing medication dosages has not relieved symptoms so she presented to the ED today. She denies CP, leg swelling, dizziness or lightheadedness. Denies any specific aggravating or relieving factors.    #Atrial fibrillation W RVR  -Pt with long history of Afib with multiple failed  DCCV (last in 3/2024)  -admit to tele   -pt still currently in Afib (HR )  -Evaluated by cardio and EP  - Planned for PPM and AV node ablation tomorrow in AM  -Stop amiodarone  -Holding Xarelto tonight for procedure in AM  -Continue Metoprolol 50mg BID  -can DC metoprolol post PPM   -NPO after MN       #CHF systolic and diastolic   #HTN  -CXR- pulm congestion  -BNP 7789   - ECHO 12/23:  Low-normal global left ventricular systolic function with ejection fraction, by visual estimation, of 50 to 55% (accurate EF is difficult to obtain due to liyh-mh-pswa variability). The left ventricular diastolic function could not be assessed in this study due to atrial arrhythmia. There is severe concentric hypertrophy. Mildly enlarged right ventricle with moderately reduced systolic function  -Metoprolol 50 BID and Lasix 40mg daily  -Repeat Echo ordered    #HLD  -Continue statin     #KOBI on CKD stage 4 (cardiorenal vs poor po intake)  #Hypokalemia   -Cr 2.0> 2.3  (baseline 1.6)  -replete K    #Hypothyroidism 2/2 thyroidectomy 2/2 thyroid CA  -TSH 2.21 (3/24)  - continue synthroid 175 mcg     # Pituitary adenoma  - Cabergoline 1/2 tab of 0.5 mg Q weekly Monday      #DVT PPx- holding AC   #Diet- NPO MN   #Activity- AAT  #Dispo- Acute  #Code- full

## 2024-04-10 NOTE — ED PROVIDER NOTE - CONSIDERATION OF ADMISSION OBSERVATION
Patient with persistent symptomatic afib - to be admitted for ablation Consideration of Admission/Observation

## 2024-04-10 NOTE — ED PROVIDER NOTE - PHYSICAL EXAMINATION
CONST: Well appearing in NAD  EYES: PERRL, EOMI, Sclera and conjunctiva clear.   ENT: Oropharynx normal appearing, no erythema or exudates. Uvula midline.  CARD: Normal S1 S2; irregulary irregular  RESP: Equal BS B/L, No wheezes, rhonchi or rales. No distress  GI: Soft, non-tender, non-distended.  MS: Normal ROM in all extremities. No midline spinal tenderness.  SKIN: Warm, dry, no acute rashes. Good turgor  NEURO: A&Ox3, No focal deficits. Strength 5/5 with no sensory deficits. Steady gait

## 2024-04-10 NOTE — ED PROVIDER NOTE - OBJECTIVE STATEMENT
82yo female with PMHx AFIB on Xarelto, amiodarone, metoprolol presents c/o palpitations and SOB x 1 week. Pt reports she was recently seen yesterday by her PMD Dr Savage and cardiologist Dr Quintero. her Lasix was increased from 20-40mg and her metoprolol was increased from 25mg BID to 50mg BID. Pt has had multiple failed cardioversions for AFIB and reports increasing medication dosages has not relieved symptoms so she presented to the ED today. She states she was scheduled to see EP outpatient for PPM/ablation. She denies CP, leg swelling, dizziness or lightheadedness. Denies any specific aggravating or relieving factors

## 2024-04-10 NOTE — ED ADULT NURSE REASSESSMENT NOTE - NS ED NURSE REASSESS COMMENT FT1
Called report to ED 3 three times, RN not answering the phone, pt family demanding to be moved, VS in the chart, call for report number in status bord.

## 2024-04-10 NOTE — ED PROVIDER NOTE - DIFFERENTIAL DIAGNOSIS
Differential Diagnosis Dyspnea, consider ACS vs PE vs dissection vs tamponade vs esophageal rupture vs pneumothorax vs pneumonia vs fluid overload

## 2024-04-10 NOTE — ED PROVIDER NOTE - EKG/XRAY ADDITIONAL INFORMATION
Afib with non-specific st-t abnormalities, no STEMI    Chest xray negative for pneumothorax, pneumonia, widened mediastinum, evidence of rib fractures, enlarged cardiac silhouette or any other emergent pathologies.

## 2024-04-11 ENCOUNTER — RESULT REVIEW (OUTPATIENT)
Age: 84
End: 2024-04-11

## 2024-04-11 DIAGNOSIS — Z87.39 PERSONAL HISTORY OF OTHER DISEASES OF THE MUSCULOSKELETAL SYSTEM AND CONNECTIVE TISSUE: ICD-10-CM

## 2024-04-11 DIAGNOSIS — E78.5 HYPERLIPIDEMIA, UNSPECIFIED: ICD-10-CM

## 2024-04-11 DIAGNOSIS — R00.2 PALPITATIONS: ICD-10-CM

## 2024-04-11 DIAGNOSIS — Z79.899 OTHER LONG TERM (CURRENT) DRUG THERAPY: ICD-10-CM

## 2024-04-11 DIAGNOSIS — N18.4 CHRONIC KIDNEY DISEASE, STAGE 4 (SEVERE): ICD-10-CM

## 2024-04-11 DIAGNOSIS — E03.9 HYPOTHYROIDISM, UNSPECIFIED: ICD-10-CM

## 2024-04-11 LAB
ALBUMIN SERPL ELPH-MCNC: 3.3 G/DL — LOW (ref 3.5–5.2)
ALBUMIN SERPL ELPH-MCNC: 3.6 G/DL — SIGNIFICANT CHANGE UP (ref 3.5–5.2)
ALP SERPL-CCNC: 153 U/L — HIGH (ref 30–115)
ALP SERPL-CCNC: 180 U/L — HIGH (ref 30–115)
ALT FLD-CCNC: 46 U/L — HIGH (ref 0–41)
ALT FLD-CCNC: 48 U/L — HIGH (ref 0–41)
ANION GAP SERPL CALC-SCNC: 13 MMOL/L — SIGNIFICANT CHANGE UP (ref 7–14)
ANION GAP SERPL CALC-SCNC: 16 MMOL/L — HIGH (ref 7–14)
APTT BLD: 34.8 SEC — SIGNIFICANT CHANGE UP (ref 27–39.2)
AST SERPL-CCNC: 23 U/L — SIGNIFICANT CHANGE UP (ref 0–41)
AST SERPL-CCNC: 23 U/L — SIGNIFICANT CHANGE UP (ref 0–41)
BASOPHILS # BLD AUTO: 0.05 K/UL — SIGNIFICANT CHANGE UP (ref 0–0.2)
BASOPHILS # BLD AUTO: 0.06 K/UL — SIGNIFICANT CHANGE UP (ref 0–0.2)
BASOPHILS NFR BLD AUTO: 0.5 % — SIGNIFICANT CHANGE UP (ref 0–1)
BASOPHILS NFR BLD AUTO: 0.5 % — SIGNIFICANT CHANGE UP (ref 0–1)
BILIRUB SERPL-MCNC: 0.7 MG/DL — SIGNIFICANT CHANGE UP (ref 0.2–1.2)
BILIRUB SERPL-MCNC: 1 MG/DL — SIGNIFICANT CHANGE UP (ref 0.2–1.2)
BLD GP AB SCN SERPL QL: SIGNIFICANT CHANGE UP
BUN SERPL-MCNC: 50 MG/DL — HIGH (ref 10–20)
BUN SERPL-MCNC: 51 MG/DL — HIGH (ref 10–20)
CALCIUM SERPL-MCNC: 8.4 MG/DL — SIGNIFICANT CHANGE UP (ref 8.4–10.5)
CALCIUM SERPL-MCNC: 8.6 MG/DL — SIGNIFICANT CHANGE UP (ref 8.4–10.5)
CHLORIDE SERPL-SCNC: 102 MMOL/L — SIGNIFICANT CHANGE UP (ref 98–110)
CHLORIDE SERPL-SCNC: 103 MMOL/L — SIGNIFICANT CHANGE UP (ref 98–110)
CO2 SERPL-SCNC: 25 MMOL/L — SIGNIFICANT CHANGE UP (ref 17–32)
CO2 SERPL-SCNC: 30 MMOL/L — SIGNIFICANT CHANGE UP (ref 17–32)
CREAT SERPL-MCNC: 2.2 MG/DL — HIGH (ref 0.7–1.5)
CREAT SERPL-MCNC: 2.3 MG/DL — HIGH (ref 0.7–1.5)
EGFR: 21 ML/MIN/1.73M2 — LOW
EGFR: 22 ML/MIN/1.73M2 — LOW
EOSINOPHIL # BLD AUTO: 0.1 K/UL — SIGNIFICANT CHANGE UP (ref 0–0.7)
EOSINOPHIL # BLD AUTO: 0.18 K/UL — SIGNIFICANT CHANGE UP (ref 0–0.7)
EOSINOPHIL NFR BLD AUTO: 0.8 % — SIGNIFICANT CHANGE UP (ref 0–8)
EOSINOPHIL NFR BLD AUTO: 1.7 % — SIGNIFICANT CHANGE UP (ref 0–8)
GLUCOSE SERPL-MCNC: 107 MG/DL — HIGH (ref 70–99)
GLUCOSE SERPL-MCNC: 123 MG/DL — HIGH (ref 70–99)
HCT VFR BLD CALC: 33.2 % — LOW (ref 37–47)
HCT VFR BLD CALC: 35.1 % — LOW (ref 37–47)
HGB BLD-MCNC: 10.3 G/DL — LOW (ref 12–16)
HGB BLD-MCNC: 10.6 G/DL — LOW (ref 12–16)
IMM GRANULOCYTES NFR BLD AUTO: 0.5 % — HIGH (ref 0.1–0.3)
IMM GRANULOCYTES NFR BLD AUTO: 0.5 % — HIGH (ref 0.1–0.3)
INR BLD: 1.42 RATIO — HIGH (ref 0.65–1.3)
INR BLD: 1.63 RATIO — HIGH (ref 0.65–1.3)
LYMPHOCYTES # BLD AUTO: 1.22 K/UL — SIGNIFICANT CHANGE UP (ref 1.2–3.4)
LYMPHOCYTES # BLD AUTO: 1.65 K/UL — SIGNIFICANT CHANGE UP (ref 1.2–3.4)
LYMPHOCYTES # BLD AUTO: 10.1 % — LOW (ref 20.5–51.1)
LYMPHOCYTES # BLD AUTO: 16 % — LOW (ref 20.5–51.1)
MAGNESIUM SERPL-MCNC: 1.7 MG/DL — LOW (ref 1.8–2.4)
MAGNESIUM SERPL-MCNC: 2.9 MG/DL — HIGH (ref 1.8–2.4)
MCHC RBC-ENTMCNC: 25.9 PG — LOW (ref 27–31)
MCHC RBC-ENTMCNC: 26.6 PG — LOW (ref 27–31)
MCHC RBC-ENTMCNC: 30.2 G/DL — LOW (ref 32–37)
MCHC RBC-ENTMCNC: 31 G/DL — LOW (ref 32–37)
MCV RBC AUTO: 85.8 FL — SIGNIFICANT CHANGE UP (ref 81–99)
MCV RBC AUTO: 85.8 FL — SIGNIFICANT CHANGE UP (ref 81–99)
MONOCYTES # BLD AUTO: 0.74 K/UL — HIGH (ref 0.1–0.6)
MONOCYTES # BLD AUTO: 0.81 K/UL — HIGH (ref 0.1–0.6)
MONOCYTES NFR BLD AUTO: 6.7 % — SIGNIFICANT CHANGE UP (ref 1.7–9.3)
MONOCYTES NFR BLD AUTO: 7.2 % — SIGNIFICANT CHANGE UP (ref 1.7–9.3)
NEUTROPHILS # BLD AUTO: 7.65 K/UL — HIGH (ref 1.4–6.5)
NEUTROPHILS # BLD AUTO: 9.87 K/UL — HIGH (ref 1.4–6.5)
NEUTROPHILS NFR BLD AUTO: 74.1 % — SIGNIFICANT CHANGE UP (ref 42.2–75.2)
NEUTROPHILS NFR BLD AUTO: 81.4 % — HIGH (ref 42.2–75.2)
NRBC # BLD: 0 /100 WBCS — SIGNIFICANT CHANGE UP (ref 0–0)
NRBC # BLD: 0 /100 WBCS — SIGNIFICANT CHANGE UP (ref 0–0)
PLATELET # BLD AUTO: 296 K/UL — SIGNIFICANT CHANGE UP (ref 130–400)
PLATELET # BLD AUTO: 317 K/UL — SIGNIFICANT CHANGE UP (ref 130–400)
PMV BLD: 11.7 FL — HIGH (ref 7.4–10.4)
PMV BLD: 11.7 FL — HIGH (ref 7.4–10.4)
POTASSIUM SERPL-MCNC: 3.2 MMOL/L — LOW (ref 3.5–5)
POTASSIUM SERPL-MCNC: 3.8 MMOL/L — SIGNIFICANT CHANGE UP (ref 3.5–5)
POTASSIUM SERPL-SCNC: 3.2 MMOL/L — LOW (ref 3.5–5)
POTASSIUM SERPL-SCNC: 3.8 MMOL/L — SIGNIFICANT CHANGE UP (ref 3.5–5)
PROT SERPL-MCNC: 5 G/DL — LOW (ref 6–8)
PROT SERPL-MCNC: 5.6 G/DL — LOW (ref 6–8)
PROTHROM AB SERPL-ACNC: 16.2 SEC — HIGH (ref 9.95–12.87)
PROTHROM AB SERPL-ACNC: 18.7 SEC — HIGH (ref 9.95–12.87)
RBC # BLD: 3.87 M/UL — LOW (ref 4.2–5.4)
RBC # BLD: 4.09 M/UL — LOW (ref 4.2–5.4)
RBC # FLD: 17.4 % — HIGH (ref 11.5–14.5)
RBC # FLD: 17.6 % — HIGH (ref 11.5–14.5)
SODIUM SERPL-SCNC: 143 MMOL/L — SIGNIFICANT CHANGE UP (ref 135–146)
SODIUM SERPL-SCNC: 146 MMOL/L — SIGNIFICANT CHANGE UP (ref 135–146)
TROPONIN T, HIGH SENSITIVITY RESULT: 29 NG/L — HIGH (ref 6–13)
WBC # BLD: 10.32 K/UL — SIGNIFICANT CHANGE UP (ref 4.8–10.8)
WBC # BLD: 12.12 K/UL — HIGH (ref 4.8–10.8)
WBC # FLD AUTO: 10.32 K/UL — SIGNIFICANT CHANGE UP (ref 4.8–10.8)
WBC # FLD AUTO: 12.12 K/UL — HIGH (ref 4.8–10.8)

## 2024-04-11 PROCEDURE — 71045 X-RAY EXAM CHEST 1 VIEW: CPT | Mod: 26

## 2024-04-11 PROCEDURE — 93312 ECHO TRANSESOPHAGEAL: CPT | Mod: 26,XU

## 2024-04-11 PROCEDURE — 99233 SBSQ HOSP IP/OBS HIGH 50: CPT

## 2024-04-11 PROCEDURE — 93306 TTE W/DOPPLER COMPLETE: CPT | Mod: 26

## 2024-04-11 PROCEDURE — 93325 DOPPLER ECHO COLOR FLOW MAPG: CPT | Mod: 26,59

## 2024-04-11 RX ORDER — CEFAZOLIN SODIUM 1 G
2000 VIAL (EA) INJECTION ONCE
Refills: 0 | Status: DISCONTINUED | OUTPATIENT
Start: 2024-04-11 | End: 2024-04-11

## 2024-04-11 RX ORDER — METOPROLOL TARTRATE 50 MG
2.5 TABLET ORAL ONCE
Refills: 0 | Status: COMPLETED | OUTPATIENT
Start: 2024-04-11 | End: 2024-04-11

## 2024-04-11 RX ORDER — CEFAZOLIN SODIUM 1 G
VIAL (EA) INJECTION
Refills: 0 | Status: DISCONTINUED | OUTPATIENT
Start: 2024-04-11 | End: 2024-04-11

## 2024-04-11 RX ORDER — MAGNESIUM SULFATE 500 MG/ML
2 VIAL (ML) INJECTION ONCE
Refills: 0 | Status: COMPLETED | OUTPATIENT
Start: 2024-04-11 | End: 2024-04-11

## 2024-04-11 RX ORDER — POTASSIUM CHLORIDE 20 MEQ
20 PACKET (EA) ORAL
Refills: 0 | Status: DISCONTINUED | OUTPATIENT
Start: 2024-04-11 | End: 2024-04-16

## 2024-04-11 RX ORDER — POTASSIUM CHLORIDE 20 MEQ
20 PACKET (EA) ORAL
Refills: 0 | Status: COMPLETED | OUTPATIENT
Start: 2024-04-11 | End: 2024-04-11

## 2024-04-11 RX ORDER — FUROSEMIDE 40 MG
40 TABLET ORAL DAILY
Refills: 0 | Status: DISCONTINUED | OUTPATIENT
Start: 2024-04-11 | End: 2024-04-13

## 2024-04-11 RX ORDER — CEFAZOLIN SODIUM 1 G
1000 VIAL (EA) INJECTION EVERY 12 HOURS
Refills: 0 | Status: DISCONTINUED | OUTPATIENT
Start: 2024-04-11 | End: 2024-04-11

## 2024-04-11 RX ADMIN — ATORVASTATIN CALCIUM 20 MILLIGRAM(S): 80 TABLET, FILM COATED ORAL at 21:45

## 2024-04-11 RX ADMIN — Medication 100 MILLIGRAM(S): at 21:45

## 2024-04-11 RX ADMIN — Medication 40 MILLIGRAM(S): at 05:34

## 2024-04-11 RX ADMIN — Medication 5 MILLIGRAM(S): at 19:03

## 2024-04-11 RX ADMIN — Medication 25 GRAM(S): at 04:55

## 2024-04-11 RX ADMIN — Medication 2.5 MILLIGRAM(S): at 05:33

## 2024-04-11 RX ADMIN — Medication 20 MILLIEQUIVALENT(S): at 05:33

## 2024-04-11 RX ADMIN — Medication 50 MILLIEQUIVALENT(S): at 08:37

## 2024-04-11 RX ADMIN — Medication 25 GRAM(S): at 05:34

## 2024-04-11 RX ADMIN — Medication 5 MILLIGRAM(S): at 05:34

## 2024-04-11 RX ADMIN — Medication 20 MILLIEQUIVALENT(S): at 05:35

## 2024-04-11 RX ADMIN — Medication 50 MILLIEQUIVALENT(S): at 05:33

## 2024-04-11 RX ADMIN — Medication 50 MILLIGRAM(S): at 05:34

## 2024-04-11 RX ADMIN — Medication 50 MILLIGRAM(S): at 19:03

## 2024-04-11 NOTE — ASU PATIENT PROFILE, ADULT - FALL HARM RISK - HARM RISK INTERVENTIONS
Assistance with ambulation/Assistance OOB with selected safe patient handling equipment/Communicate Risk of Fall with Harm to all staff/Discuss with provider need for PT consult/Monitor gait and stability/Provide patient with walking aids - walker, cane, crutches/Reinforce activity limits and safety measures with patient and family/Sit up slowly, dangle for a short time, stand at bedside before walking/Tailored Fall Risk Interventions/Use of alarms - bed, chair and/or voice tab/Visual Cue: Yellow wristband and red socks/Bed in lowest position, wheels locked, appropriate side rails in place/Call bell, personal items and telephone in reach/Instruct patient to call for assistance before getting out of bed or chair/Non-slip footwear when patient is out of bed/[place] to call system/Physically safe environment - no spills, clutter or unnecessary equipment/Purposeful Proactive Rounding/Room/bathroom lighting operational, light cord in reach

## 2024-04-11 NOTE — PROGRESS NOTE ADULT - PROBLEM SELECTOR PLAN 1
with shortness of breath  cxr with volume overload  repeat cxr  cont lasix 40 po for now  ekg with AFlutter with RVR; h/o failed cardioversion  anyi with preserved ef  ppm, ablation with eps today  lopressor 50 bid

## 2024-04-11 NOTE — PROGRESS NOTE ADULT - ASSESSMENT
83F PMHx AFib on xarelto, gout, hypothyroidism, HLD here with palpitations, shortness of breath.

## 2024-04-11 NOTE — CHART NOTE - NSCHARTNOTEFT_GEN_A_CORE
POST OPERATIVE PROCEDURAL DOCUMENTATION  PRE-OP DIAGNOSIS: Atrial fibrillation    POST-OP DIAGNOSIS: Thickening and calcification of anterior and posterior mitral valve leaflets. No evidence of vegetation. Atrial fibrillation.     PROCEDURE: Transesophageal echocardiogram    Primary Physician: Dr. Barrios   Fellow: Dr. Jorge    ANESTHESIA TYPE  [  ] General Anesthesia  [ x ] Conscious Sedation  [  ] Local/Regional    CONDITION  [  ] Critical  [  ] Serious  [  ] Fair  [ x ] Good    SPECIMENS REMOVED (IF APPLICABLE): N/A    IMPLANTS (IF APPLICABLE): None    ESTIMATED BLOOD LOSS: None    COMPLICATIONS: None      FINDINGS:    After risks and benefits of procedures were explained, informed consent was obtained and placed in chart. Refer to Anesthesia note for sedation details.  The FAITH probe was passed into the esophagus without difficulty.  Transesophageal and transgastric images were obtained.  The FAITH probe was removed without difficulty and examined.  There was no evidence for bleeding.  The patient tolerated the procedure well without any immediate FAITH-related complications.      Preliminary Findings:  LA: severely enlarged  JOE: Left atrial appendage was clear of clot. Decreased doppler velocities.  LV: LVEF was estimated at 50%  MV: Mild MR, no evidence of MS.   AV: Trileaflet. Trace AI, no evidence of AS.   RA: Mildly enlarged  TV: Mild TR.   PV: Moderate PI.   IAS: no PFO. No R-> L shunt.   There was mild, non-mobile atheroma seen in the thoracic aorta.   Dilated Thoracic Ascending Aorta        DIAGNOSIS/IMPRESSION: Thickening and calcification of anterior and posterior mitral valve leaflets. No evidence of vegetation.    PLAN OF CARE:  Return to inpt floor  EP follow up for planning of AVN ablation and PPM  Continue anticoagulation for stroke prevention POST OPERATIVE PROCEDURAL DOCUMENTATION    PRE-OP DIAGNOSIS: Atrial fibrillation    POST-OP DIAGNOSIS: Thickening and calcification of anterior and posterior mitral valve leaflets. No evidence of vegetation. Atrial fibrillation.     PROCEDURE: Transesophageal echocardiogram    Primary Physician: Dr. Barrios   Fellow: Dr. Jorge    ANESTHESIA TYPE  [  ] General Anesthesia  [ x ] Conscious Sedation  [  ] Local/Regional    CONDITION  [  ] Critical  [  ] Serious  [  ] Fair  [ x ] Good    SPECIMENS REMOVED (IF APPLICABLE): N/A    IMPLANTS (IF APPLICABLE): None    ESTIMATED BLOOD LOSS: None    COMPLICATIONS: None      FINDINGS:    After risks and benefits of procedures were explained, informed consent was obtained and placed in chart. Refer to Anesthesia note for sedation details.  The FAITH probe was passed into the esophagus without difficulty.  Transesophageal and transgastric images were obtained.  The FAITH probe was removed without difficulty and examined.  There was no evidence for bleeding.  The patient tolerated the procedure well without any immediate FAITH-related complications.      Preliminary Findings:  LA: severely enlarged  JOE: Left atrial appendage was clear of clot. Decreased doppler velocities.  LV: LVEF was estimated at 50%  MV: Moderate MR, no evidence of MS. Annular calcification.  AV: Trileaflet. Trace AI, no evidence of AS. Sclerotic.  RA: Mildly enlarged  TV: Mild TR.   PV: Moderate PI.   IAS: no PFO. No R-> L shunt.   There was mild, non-mobile atheroma seen in the thoracic aorta.   Dilated Thoracic Ascending Aorta  Dilated PA        DIAGNOSIS/IMPRESSION: Thickening and calcification of anterior and posterior mitral valve leaflets. No evidence of vegetation.    PLAN OF CARE:  Return to inpt floor  EP follow up for planning of AVN ablation and PPM  Continue anticoagulation for stroke prevention

## 2024-04-11 NOTE — PATIENT PROFILE ADULT - FALL HARM RISK - RISK INTERVENTIONS
Bed in lowest position, wheels locked, appropriate side rails in place/Call bell, personal items and telephone in reach/Instruct patient to call for assistance before getting out of bed or chair/Non-slip footwear when patient is out of bed/[place] to call system/Physically safe environment - no spills, clutter or unnecessary equipment/Purposeful Proactive Rounding/Room/bathroom lighting operational, light cord in reach

## 2024-04-11 NOTE — PROGRESS NOTE ADULT - NSPROGADDITIONALINFOA_GEN_ALL_CORE
#Progress Note Handoff  Pending (specify): ppm, ablation with eps; repeat cxr  Family discussion: d/w pt at bedside  Disposition: snf vs. home

## 2024-04-12 ENCOUNTER — TRANSCRIPTION ENCOUNTER (OUTPATIENT)
Age: 84
End: 2024-04-12

## 2024-04-12 LAB
ALBUMIN SERPL ELPH-MCNC: 3.5 G/DL — SIGNIFICANT CHANGE UP (ref 3.5–5.2)
ALP SERPL-CCNC: 168 U/L — HIGH (ref 30–115)
ALT FLD-CCNC: 40 U/L — SIGNIFICANT CHANGE UP (ref 0–41)
ANION GAP SERPL CALC-SCNC: 18 MMOL/L — HIGH (ref 7–14)
AST SERPL-CCNC: 19 U/L — SIGNIFICANT CHANGE UP (ref 0–41)
BASOPHILS # BLD AUTO: 0.05 K/UL — SIGNIFICANT CHANGE UP (ref 0–0.2)
BASOPHILS NFR BLD AUTO: 0.5 % — SIGNIFICANT CHANGE UP (ref 0–1)
BILIRUB SERPL-MCNC: 1.3 MG/DL — HIGH (ref 0.2–1.2)
BUN SERPL-MCNC: 51 MG/DL — HIGH (ref 10–20)
CALCIUM SERPL-MCNC: 8.6 MG/DL — SIGNIFICANT CHANGE UP (ref 8.4–10.4)
CHLORIDE SERPL-SCNC: 107 MMOL/L — SIGNIFICANT CHANGE UP (ref 98–110)
CO2 SERPL-SCNC: 23 MMOL/L — SIGNIFICANT CHANGE UP (ref 17–32)
CREAT SERPL-MCNC: 2.3 MG/DL — HIGH (ref 0.7–1.5)
EGFR: 21 ML/MIN/1.73M2 — LOW
EOSINOPHIL # BLD AUTO: 0.15 K/UL — SIGNIFICANT CHANGE UP (ref 0–0.7)
EOSINOPHIL NFR BLD AUTO: 1.5 % — SIGNIFICANT CHANGE UP (ref 0–8)
GLUCOSE SERPL-MCNC: 153 MG/DL — HIGH (ref 70–99)
HCT VFR BLD CALC: 33 % — LOW (ref 37–47)
HGB BLD-MCNC: 10.2 G/DL — LOW (ref 12–16)
IMM GRANULOCYTES NFR BLD AUTO: 0.5 % — HIGH (ref 0.1–0.3)
LYMPHOCYTES # BLD AUTO: 1.99 K/UL — SIGNIFICANT CHANGE UP (ref 1.2–3.4)
LYMPHOCYTES # BLD AUTO: 19.4 % — LOW (ref 20.5–51.1)
MAGNESIUM SERPL-MCNC: 2.4 MG/DL — SIGNIFICANT CHANGE UP (ref 1.8–2.4)
MCHC RBC-ENTMCNC: 26.4 PG — LOW (ref 27–31)
MCHC RBC-ENTMCNC: 30.9 G/DL — LOW (ref 32–37)
MCV RBC AUTO: 85.5 FL — SIGNIFICANT CHANGE UP (ref 81–99)
MONOCYTES # BLD AUTO: 0.86 K/UL — HIGH (ref 0.1–0.6)
MONOCYTES NFR BLD AUTO: 8.4 % — SIGNIFICANT CHANGE UP (ref 1.7–9.3)
NEUTROPHILS # BLD AUTO: 7.16 K/UL — HIGH (ref 1.4–6.5)
NEUTROPHILS NFR BLD AUTO: 69.7 % — SIGNIFICANT CHANGE UP (ref 42.2–75.2)
NRBC # BLD: 0 /100 WBCS — SIGNIFICANT CHANGE UP (ref 0–0)
PLATELET # BLD AUTO: 348 K/UL — SIGNIFICANT CHANGE UP (ref 130–400)
PMV BLD: 11.9 FL — HIGH (ref 7.4–10.4)
POTASSIUM SERPL-MCNC: 3.6 MMOL/L — SIGNIFICANT CHANGE UP (ref 3.5–5)
POTASSIUM SERPL-SCNC: 3.6 MMOL/L — SIGNIFICANT CHANGE UP (ref 3.5–5)
PROT SERPL-MCNC: 5.3 G/DL — LOW (ref 6–8)
RBC # BLD: 3.86 M/UL — LOW (ref 4.2–5.4)
RBC # FLD: 17.9 % — HIGH (ref 11.5–14.5)
SODIUM SERPL-SCNC: 148 MMOL/L — HIGH (ref 135–146)
TSH SERPL-MCNC: 3.1 UIU/ML — SIGNIFICANT CHANGE UP (ref 0.27–4.2)
WBC # BLD: 10.26 K/UL — SIGNIFICANT CHANGE UP (ref 4.8–10.8)
WBC # FLD AUTO: 10.26 K/UL — SIGNIFICANT CHANGE UP (ref 4.8–10.8)

## 2024-04-12 PROCEDURE — 93010 ELECTROCARDIOGRAM REPORT: CPT

## 2024-04-12 PROCEDURE — 99232 SBSQ HOSP IP/OBS MODERATE 35: CPT

## 2024-04-12 RX ORDER — ACETAMINOPHEN 500 MG
650 TABLET ORAL ONCE
Refills: 0 | Status: COMPLETED | OUTPATIENT
Start: 2024-04-12 | End: 2024-04-12

## 2024-04-12 RX ORDER — METOPROLOL TARTRATE 50 MG
50 TABLET ORAL EVERY 8 HOURS
Refills: 0 | Status: DISCONTINUED | OUTPATIENT
Start: 2024-04-12 | End: 2024-04-16

## 2024-04-12 RX ORDER — APIXABAN 2.5 MG/1
2.5 TABLET, FILM COATED ORAL
Refills: 0 | Status: COMPLETED | OUTPATIENT
Start: 2024-04-12 | End: 2024-04-13

## 2024-04-12 RX ORDER — APIXABAN 2.5 MG/1
2.5 TABLET, FILM COATED ORAL
Refills: 0 | Status: DISCONTINUED | OUTPATIENT
Start: 2024-04-12 | End: 2024-04-12

## 2024-04-12 RX ADMIN — APIXABAN 2.5 MILLIGRAM(S): 2.5 TABLET, FILM COATED ORAL at 17:14

## 2024-04-12 RX ADMIN — Medication 5 MILLIGRAM(S): at 17:14

## 2024-04-12 RX ADMIN — Medication 5 MILLIGRAM(S): at 05:17

## 2024-04-12 RX ADMIN — Medication 40 MILLIGRAM(S): at 05:17

## 2024-04-12 RX ADMIN — Medication 650 MILLIGRAM(S): at 13:52

## 2024-04-12 RX ADMIN — Medication 175 MICROGRAM(S): at 05:17

## 2024-04-12 RX ADMIN — Medication 100 MILLIGRAM(S): at 21:41

## 2024-04-12 RX ADMIN — Medication 50 MILLIGRAM(S): at 21:41

## 2024-04-12 RX ADMIN — Medication 50 MILLIGRAM(S): at 05:17

## 2024-04-12 RX ADMIN — ATORVASTATIN CALCIUM 20 MILLIGRAM(S): 80 TABLET, FILM COATED ORAL at 21:41

## 2024-04-12 NOTE — CONSULT NOTE ADULT - SUBJECTIVE AND OBJECTIVE BOX
CHIEF COMPLAINT:Patient is a 83y old  Female who presents with a chief complaint of AFIB with RVR     We discussed multiple admisison for AFIB RVR Multiple CV.  SHe is back again with rapid afib.     HISTORY OF PRESENT ILLNESS:   HPI:    PAST MEDICAL & SURGICAL HISTORY:  HTN (hypertension)      High cholesterol      Hypothyroid  s/p thyroid ca surgery      Afib  on xarelto      Sleep apnea      Osteoarthritis      CKD (chronic kidney disease) stage 3, GFR 30-59 ml/min      H/O thyroidectomy      S/P rotator cuff surgery        FAMILY HISTORY:  Family history of lung cancer (Mother)    Family history of abdominal aortic aneurysm (AAA) (Father)      Allergies    No Known Allergies    Intolerances    	  Home Medications:  allopurinol 100 mg oral tablet: 1 cap(s) orally once a day (at bedtime) (29 Feb 2024 19:13)  amiodarone 200 mg oral tablet: 1 tab(s) orally once a day (29 Feb 2024 19:14)  atorvastatin 20 mg oral tablet: 1 tab(s) orally once a day (29 Feb 2024 19:14)  cabergoline 0.5 mg oral tablet: 0.5 tab(s) orally once a week , monday night (29 Feb 2024 19:14)  levothyroxine 175 mcg (0.175 mg) oral tablet: 1 tab(s) orally once a day (29 Feb 2024 18:15)  trospium 20 mg oral tablet: 1 tab(s) orally once a day (29 Feb 2024 18:15)    MEDICATIONS  (STANDING):    MEDICATIONS  (PRN):        SOCIAL HISTORY:    [ ] Non-smoker  [ ] Smoker  [ ] Alcohol      REVIEW OF SYSTEMS:    PHYSICAL EXAM:  T(C): 36.9 (04-10-24 @ 13:02), Max: 36.9 (04-10-24 @ 13:02)  HR: 110 (04-10-24 @ 13:02) (110 - 110)  BP: 108/67 (04-10-24 @ 13:02) (108/67 - 108/67)  RR: 20 (04-10-24 @ 13:02) (20 - 20)  SpO2: 99% (04-10-24 @ 13:02) (99% - 99%)  Wt(kg): --  I&O's Summary    Daily     Daily     General Appearance: Normal	  Cardiovascular: Normal S1 S2, No JVD, No murmurs, No edema  Respiratory: Lungs clear to auscultation	  Psychiatry: A & O x 3, Mood & affect appropriate  Gastrointestinal:  Soft, Non-tender  Skin: No rashes, No ecchymoses, No cyanosis	  Neurologic: Non-focal  Extremities: Normal range of motion, No clubbing, cyanosis or edema  Vascular: Peripheral pulses palpable 2+ bilaterally        LABS:	 	                        10.2   9.36  )-----------( 328      ( 10 Apr 2024 15:31 )             33.7             proBNP:   Lipid Profile:   HgA1c:   TSH:       CARDIAC MARKERS:            TELEMETRY EVENTS: 	    ECG:  	  RADIOLOGY:  	    PREVIOUS DIAGNOSTIC TESTING:    [ ] Echocardiogram:  [ ]  Catheterization:  [ ] Stress Test:  	  	  	    
HPI:  81 yo F with PMHX of Persistent AFib s/p DCCV 2022, HFmrEF, CKD III, HTN, Hypothyroidism presents for palpitations  patient failed DCCV three times in the past     EP consulted for PPM and AVN ablation. patient seen and examined at bedside today. still in afib with RVR with HR reaching 110 to 120   denies any chest pain, no sob, no syncope  she is scheduled for PPM placement tomorrow     PAST MEDICAL & SURGICAL HISTORY  HTN (hypertension)    High cholesterol    Hypothyroid  s/p thyroid ca surgery    Afib  on xarelto    Sleep apnea    Osteoarthritis    CKD (chronic kidney disease) stage 3, GFR 30-59 ml/min    H/O thyroidectomy    S/P rotator cuff surgery        FAMILY HISTORY:  FAMILY HISTORY:  Family history of lung cancer (Mother)    Family history of abdominal aortic aneurysm (AAA) (Father)        SOCIAL HISTORY:  []smoker  []Alcohol  []Drug    ALLERGIES:  No Known Allergies      MEDICATIONS:  MEDICATIONS  (STANDING):    MEDICATIONS  (PRN):      HOME MEDICATIONS:  Home Medications:  allopurinol 100 mg oral tablet: 1 cap(s) orally once a day (at bedtime) (29 Feb 2024 19:13)  amiodarone 200 mg oral tablet: 1 tab(s) orally once a day (29 Feb 2024 19:14)  atorvastatin 20 mg oral tablet: 1 tab(s) orally once a day (29 Feb 2024 19:14)  cabergoline 0.5 mg oral tablet: 0.5 tab(s) orally once a week , monday night (29 Feb 2024 19:14)  levothyroxine 175 mcg (0.175 mg) oral tablet: 1 tab(s) orally once a day (29 Feb 2024 18:15)  trospium 20 mg oral tablet: 1 tab(s) orally once a day (29 Feb 2024 18:15)      VITALS:   T(F): 98.4 (04-10 @ 13:02), Max: 98.4 (04-10 @ 13:02)  HR: 110 (04-10 @ 13:02) (110 - 110)  BP: 108/67 (04-10 @ 13:02) (108/67 - 108/67)  BP(mean): --  RR: 20 (04-10 @ 13:02) (20 - 20)  SpO2: 99% (04-10 @ 13:02) (99% - 99%)    I&O's Summary      REVIEW OF SYSTEMS:  CONSTITUTIONAL: No weakness, fevers or chills  EYES: No visual changes  ENT: No vertigo or throat pain   NECK: No pain or stiffness  RESPIRATORY: No cough, wheezing, hemoptysis; No shortness of breath  CARDIOVASCULAR: No chest pain or palpitations  GASTROINTESTINAL: No abdominal or epigastric pain. No nausea, vomiting, or hematemesis; No diarrhea or constipation. No melena or hematochezia.  GENITOURINARY: No dysuria, frequency or hematuria  NEUROLOGICAL: No numbness or weakness  SKIN: No itching, no rashes  MSK: No pain    PHYSICAL EXAM:  NEURO: patient is awake , alert and oriented  GEN: Not in acute distress  NECK: no thyroid enlargement, no JVD  LUNGS: Clear to auscultation bilaterally   CARDIOVASCULAR: irregular s1s2,   ABD: Soft, non-tender, non-distended, +BS  EXT: No SHIRA  SKIN: Intact    LABS:                        10.2   9.36  )-----------( 328      ( 10 Apr 2024 15:31 )             33.7     04-10    145  |  103  |  50<H>  ----------------------------<  107<H>  3.5   |  28  |  2.0<H>    Ca    8.6      10 Apr 2024 15:31    TPro  5.4<L>  /  Alb  3.6  /  TBili  0.7  /  DBili  0.2  /  AST  39  /  ALT  57<H>  /  AlkPhos  168<H>  04-10    PT/INR - ( 10 Apr 2024 15:31 )   PT: 21.80 sec;   INR: 1.90 ratio         PTT - ( 10 Apr 2024 15:31 )  PTT:35.3 sec          Troponin trend:            RADIOLOGY:  -CXR:  -TTE:    1. Low-normal global left ventricular systolic function with ejection   fraction, by visual estimation, of 50 to 55% (accurate EF is difficult to   obtain due to [id number] variability). The left ventricular diastolic   function could not be assessed in this study due to atrial arrhythmia.   There is severe concentric hypertrophy.   2. Mildly enlarged right ventricle with moderately reduced systolic   function.   3. Severely enlarged left atrium.   4. Right atrial enlargement.   5. Mitral annular calcification.   6. Mild mitral valve regurgitation.   7. Sclerotic aortic valve with normal opening.   8. Mild tricuspid regurgitation.   9. Moderate pulmonary hypertension (PASP = 55mmHg; the IVC is dilated   with <50% respiratory variability indicating increased right atrial   pressure).  10. Dilatation of the ascending aorta (4.2cm, 2.26cm/m2).  11. Trivial pericardial effusion.    -CCTA:  -STRESS TEST:  -CATHETERIZATION:    ECG: Afib     TELEMETRY EVENTS:  
NEPHROLOGY CONSULTATION NOTE     84yo female with PMHx AFIB on Xarelto, amiodarone, metoprolol presents c/o palpitations and SOB x 1 week. Pt reports she was recently seen yesterday by her PMD Dr Savage and cardiologist Dr Quintero. her Lasix was increased from 20-40mg and her metoprolol was increased from 25mg BID to 50mg BID. Pt has had multiple failed cardioversions for AFIB and reports increasing medication dosages has not relieved symptoms so she presented to the ED today. She denies CP, leg swelling, dizziness or lightheadedness. Denies any specific aggravating or relieving factors.    PAST MEDICAL & SURGICAL HISTORY:  HTN (hypertension)      High cholesterol      Hypothyroid  s/p thyroid ca surgery      Afib  on xarelto      Sleep apnea      Osteoarthritis      CKD (chronic kidney disease) stage 3, GFR 30-59 ml/min      H/O thyroidectomy      S/P rotator cuff surgery        Allergies:  No Known Allergies    Home Medications Reviewed    SOCIAL HISTORY:  Denies ETOH,Smoking,   FAMILY HISTORY:  Family history of lung cancer (Mother)    Family history of abdominal aortic aneurysm (AAA) (Father)          REVIEW OF SYSTEMS:  All other review of systems is negative unless indicated above.    PHYSICAL EXAM:  Constitutional: NAD  HEENT: anicteric sclera, oropharynx clear, MMM  Neck: No JVD  Respiratory: few bibasilar rales   Cardiovascular: S1, S2, irreg  Gastrointestinal: BS+, soft, NT/ND  Extremities: No cyanosis or clubbing. No peripheral edema  Neurological: A/O x 2  : No CVA tenderness. No joaquin.   Skin: No rashes    Hospital Medications:   MEDICATIONS  (STANDING):  allopurinol 100 milliGRAM(s) Oral at bedtime  apixaban 2.5 milliGRAM(s) Oral two times a day  atorvastatin 20 milliGRAM(s) Oral at bedtime  benzocaine/menthol Lozenge 1 Lozenge Oral daily  furosemide    Tablet 40 milliGRAM(s) Oral daily  levothyroxine 175 MICROGram(s) Oral daily  metoprolol tartrate 50 milliGRAM(s) Oral every 8 hours  oxybutynin 5 milliGRAM(s) Oral two times a day  potassium chloride  20 mEq/100 mL IVPB 20 milliEquivalent(s) IV Intermittent every 2 hours        VITALS:  T(F): 96.4 (04-12-24 @ 05:16), Max: 98 (04-11-24 @ 20:15)  HR: 122 (04-12-24 @ 15:10)  BP: 107/64 (04-12-24 @ 15:10)  RR: 16 (04-12-24 @ 15:10)  SpO2: 98% (04-12-24 @ 15:10)  Wt(kg): --    04-12 @ 07:01  -  04-12 @ 17:33  --------------------------------------------------------  IN: 0 mL / OUT: 0 mL / NET: 0 mL      Height (cm): 152.4 (04-11 @ 20:15)  Weight (kg): 85.3 (04-11 @ 20:15)  BMI (kg/m2): 36.7 (04-11 @ 20:15)  BSA (m2): 1.82 (04-11 @ 20:15)    LABS:  04-12    148<H>  |  107  |  51<H>  ----------------------------<  153<H>  3.6   |  23  |  2.3<H>    Ca    8.6      12 Apr 2024 06:43  Mg     2.4     04-12    TPro  5.3<L>  /  Alb  3.5  /  TBili  1.3<H>  /  DBili      /  AST  19  /  ALT  40  /  AlkPhos  168<H>  04-12                          10.2   10.26 )-----------( 348      ( 12 Apr 2024 06:43 )             33.0       Urine Studies:  Urinalysis Basic - ( 12 Apr 2024 06:43 )    Color:  / Appearance:  / SG:  / pH:   Gluc: 153 mg/dL / Ketone:   / Bili:  / Urobili:    Blood:  / Protein:  / Nitrite:    Leuk Esterase:  / RBC:  / WBC    Sq Epi:  / Non Sq Epi:  / Bacteria:           RADIOLOGY & ADDITIONAL STUDIES:     
Patient is an 82 yo F with hx of AF s/p multiple DCCV's in the past, most recent 10/23, on Xarelto and Amiodarone, HTN, HLD, hypothyroidism, CKD3, FELIX presenting with complaints of palpitations. Patient found to be in AF RVR, she is on Amiodarone 200 mg daily for rhythm control, on Lopressor 12.5 mg BID for rate control, and on Xarelto 20 mg for thromboembolic prophylaxis. The last dose of Xarelto 20 mg was yesterday.   Patient offers no other complaints at this time.   EP was consulted for AFIB management and rate control.     REVIEW OF SYSTEMS  [x] A ten-point review of systems was otherwise negative except as noted.  [ ] Due to altered mental status/intubation, subjective information were not able to be obtained from the patient. History was obtained, to the extent possible, from review of the chart and collateral sources of information.      PAST MEDICAL & SURGICAL HISTORY:  HTN (hypertension)  High cholesterol  Hypothyroid  s/p thyroid ca surgery  Afib  Sleep apnea  Osteoarthritis  CKD (chronic kidney disease) stage 3, GFR 30-59 ml/min  H/O thyroidectomy  S/P rotator cuff surgery      Home Medications:  allopurinol 100 mg oral tablet: 1 cap(s) orally once a day (at bedtime) (29 Feb 2024 19:13)  amiodarone 200 mg oral tablet: 1 tab(s) orally once a day (29 Feb 2024 19:14)  atorvastatin 20 mg oral tablet: 1 tab(s) orally once a day (29 Feb 2024 19:14)  cabergoline 0.5 mg oral tablet: 0.5 tab(s) orally once a week , monday night (29 Feb 2024 19:14)  levothyroxine 175 mcg (0.175 mg) oral tablet: 1 tab(s) orally once a day (29 Feb 2024 18:15)  trospium 20 mg oral tablet: 1 tab(s) orally once a day (29 Feb 2024 18:15)      Allergies:  No Known Allergies      FAMILY HISTORY:  Family history of lung cancer (Mother)    Family history of abdominal aortic aneurysm (AAA) (Father)      SOCIAL HISTORY:    CIGARETTES:  ALCOHOL:  MARIJUANA:  ILLICIT DRUGS:      PREVIOUS DIAGNOSTIC TESTING:      ECHO  FINDINGS:    STRESS  FINDINGS:    CATHETERIZATION  FINDINGS:    ELECTROPHYSIOLOGY STUDY  FINDINGS:    CAROTID ULTRASOUND:  FINDINGS    VENOUS DUPLEX SCAN:  FINDINGS:    CHEST CT PULMONARY ANGIO with IV Contrast:  FINDINGS:      MEDICATIONS  (STANDING):    MEDICATIONS  (PRN):      Vital Signs Last 24 Hrs  T(C): 36.9 (10 Apr 2024 13:02), Max: 36.9 (10 Apr 2024 13:02)  T(F): 98.4 (10 Apr 2024 13:02), Max: 98.4 (10 Apr 2024 13:02)  HR: 110 (10 Apr 2024 13:02) (110 - 110)  BP: 108/67 (10 Apr 2024 13:02) (108/67 - 108/67)  BP(mean): --  RR: 20 (10 Apr 2024 13:02) (20 - 20)  SpO2: 99% (10 Apr 2024 13:02) (99% - 99%)      PHYSICAL EXAM:  GENERAL: In no apparent distress, well nourished, and hydrated.  EYES: EOMI, PERRLA  HEART: Irregularly irregular rate  PULMONARY: Clear to auscultation and perfusion.   ABDOMEN: Soft, Nontender, Nondistended  EXTREMITIES:  2+ Peripheral Pulses  NEUROLOGICAL: Grossly nonfocal    INTERPRETATION OF TELEMETRY: AF  bpm    LABS:                        10.2   9.36  )-----------( 328      ( 10 Apr 2024 15:31 )             33.7       BNP    RADIOLOGY & ADDITIONAL STUDIES:

## 2024-04-12 NOTE — PROGRESS NOTE ADULT - ATTENDING COMMENTS
I saw and examined the patient at bedside   HR is not well controlled.   Increase Metoprolol to 50mg q 8hrs  Pending AVN ablation and pacemaker placement, next week.

## 2024-04-12 NOTE — PROGRESS NOTE ADULT - ASSESSMENT
84yo female with PMHx AFIB on Xarelto, amiodarone, metoprolol presents c/o palpitations and SOB x 1 week. Pt reports she was recently seen yesterday by her PMD Dr Savage and cardiologist Dr Quintero. her Lasix was increased from 20-40mg and her metoprolol was increased from 25mg BID to 50mg BID. Pt has had multiple failed cardioversions for AFIB and reports increasing medication dosages has not relieved symptoms so she presented to the ED today. She denies CP, leg swelling, dizziness or lightheadedness. Denies any specific aggravating or relieving factors.    #Atrial fibrillation W RVR  - cxr with volume overload  - repeat cxr  - cont lasix 40 po for now  - lopressor 50 bid  - ekg with AFlutter with RVR; h/o failed cardioversion  - FAITH with preserved ef-Thickening and calcification of anterior and posterior mitral valve leaflets. No evidence of vegetation.  - ppm, ablation with eps today- Dr. Mcleod unavail, Dr. Kothari may do procedure today-- if no procedure today, plan for monday, start eliquis 2.5 BID--> then dc on sunday for procedure    #CHF systolic and diastolic   #HTN  -CXR- pulm congestion  -BNP 7789   - ECHO 12/23:  Low-normal global left ventricular systolic function with ejection fraction, by visual estimation, of 50 to 55% (accurate EF is difficult to obtain due to [id number] variability). The left ventricular diastolic function could not be assessed in this study due to atrial arrhythmia. There is severe concentric hypertrophy. Mildly enlarged right ventricle with moderately reduced systolic function  -Metoprolol 50 BID and Lasix 40mg daily  - FAITH- EF 50-55%, mitral valve thickening/calcifications, severely dilated pulm artery mod pHTN    #HLD  -Continue statin     #KOBI on CKD stage 4 (cardiorenal vs poor po intake)  #Hypokalemia   -Cr 2.0> 2.3  (baseline 1.6)  -replete K    #Hypothyroidism 2/2 thyroidectomy 2/2 thyroid CA  -TSH 2.21 (3/24)  - continue synthroid 175 mcg   - f/u TSH    # Pituitary adenoma  - Cabergoline 1/2 tab of 0.5 mg Q weekly Monday    Pending: PPM/ablation w/ EP, TSH,  KOBI resolution, PT    #DVT PPx- holding AC   #Diet- NPO for procedure  #Activity- AAT  #Dispo- Acute  #Code- full

## 2024-04-12 NOTE — DISCHARGE NOTE NURSING/CASE MANAGEMENT/SOCIAL WORK - PATIENT PORTAL LINK FT
You can access the FollowMyHealth Patient Portal offered by [facility] by registering at the following website: [email or web address]. By joining [facility]’s FollowMyHealth portal, you will also be able to view your health information using other applications (apps) compatible with our system.

## 2024-04-12 NOTE — CONSULT NOTE ADULT - ASSESSMENT
Patient is an 82 yo F with hx of AF s/p multiple DCCV's in the past, most recent 10/23, on Xarelto and Amiodarone, HTN, HLD, hypothyroidism, CKD3, FELIX presenting with complaints of palpitations. Patient found to be in AF RVR, she is on Amiodarone 200 mg daily for rhythm control, on Lopressor 12.5 mg BID for rate control, and on Xarelto 20 mg for thromboembolic prophylaxis. The last dose of Xarelto 20 mg was yesterday.   Patient offers no other complaints at this time.   EP was consulted for AFIB management and rate control.     - long standing persistent AFIB  - HTN  - hypothyroidism  - CKD    Patient with h/o long standing persistent AF with RVR, symptomatic. Multiple DCCV in the past, on Amiodarone for a long time.   Plan for PPM implant (LB pacing) tomorrow on 4/11/24 with Dr. Mcleod with the plan to have AV node ablation in 1 month.   Xarelto last dose was yesterday, please hold today, tomorrow, and 48 hrs post implant.  NPO after the midnight  TTE stat was ordered to assess LV EF prior the implant.  Please order pre op labs including coags and type and screen  Continue telemetry  Keep electrolytes K<4 and Mg<2  Admit to tele
81 yo F with PMHX of Persistent AFib s/p DCCV 2022, HFmrEF, CKD III, HTN, Hypothyroidism presents for palpitations  patient failed DCCV three times in the past     EP consulted for PPM and AVN ablation. patient seen and examined at bedside today. still in afib with RVR with HR reaching 110 to 120   denies any chest pain, no sob, no syncope  she is scheduled for PPM placement tomorrow     # Impression:  - Long standing persistent Afib, failed DCCV in the past   - HTN, hypothyroidism, CKD stage 3    # Recs:  - keep on telemetry   - hold xarelto tonight  - keep NPO after MN   - will arrange for DC PPM and AV node ablation tomorrow in AM  - stop amiodarone  - can DC metoprolol post PPM   - will follow   
Rapid AFIB multiple admissions with failed FAITH CV multiple times on meds.     We discussed with patient would consider AV node ablation and pacemaker    SHe has agreed as well as family .    We will call EPS for eval. 
CKD stage 3-4  b/l renal cysts  Afib   chronic, intermittent, mild hypernatremia  chronic HFpEF   anemia  FELIX  HTN  hypothyroidism / thyroid Ca / pituitary adenoma     plan:    cont lasix PO  recent outpt [facility] kidney [facility] 4/4 reviewed, no renal mass, just proteinaceous and hemorrhagic renal cysts  d/w daughter  d/w Dr. Mcleod  f/u cardio and EP   outpt renal f/u

## 2024-04-12 NOTE — DISCHARGE NOTE NURSING/CASE MANAGEMENT/SOCIAL WORK - NSDCVIVACCINE_GEN_ALL_CORE_FT
Prescription sent to pharmacy   influenza, high-dose, quadrivalent; 20-Dec-2023 15:52; Shaunna Tucker (RN); [employer]; [id number] (Exp. Date: 20-Jun-2024); IntraMuscular; Deltoid Left.; 0.7 milliLiter(s); VIS (VIS Published: 06-Aug-2021, VIS Presented: 20-Dec-2023);

## 2024-04-13 LAB
ALBUMIN SERPL ELPH-MCNC: 3.5 G/DL — SIGNIFICANT CHANGE UP (ref 3.5–5.2)
ALP SERPL-CCNC: 171 U/L — HIGH (ref 30–115)
ALT FLD-CCNC: 33 U/L — SIGNIFICANT CHANGE UP (ref 0–41)
ANION GAP SERPL CALC-SCNC: 19 MMOL/L — HIGH (ref 7–14)
AST SERPL-CCNC: 19 U/L — SIGNIFICANT CHANGE UP (ref 0–41)
BASOPHILS # BLD AUTO: 0.06 K/UL — SIGNIFICANT CHANGE UP (ref 0–0.2)
BASOPHILS NFR BLD AUTO: 0.5 % — SIGNIFICANT CHANGE UP (ref 0–1)
BILIRUB SERPL-MCNC: 1.3 MG/DL — HIGH (ref 0.2–1.2)
BUN SERPL-MCNC: 60 MG/DL — HIGH (ref 10–20)
CALCIUM SERPL-MCNC: 8.4 MG/DL — SIGNIFICANT CHANGE UP (ref 8.4–10.5)
CHLORIDE SERPL-SCNC: 105 MMOL/L — SIGNIFICANT CHANGE UP (ref 98–110)
CO2 SERPL-SCNC: 23 MMOL/L — SIGNIFICANT CHANGE UP (ref 17–32)
CREAT SERPL-MCNC: 2.6 MG/DL — HIGH (ref 0.7–1.5)
EGFR: 18 ML/MIN/1.73M2 — LOW
EOSINOPHIL # BLD AUTO: 0.19 K/UL — SIGNIFICANT CHANGE UP (ref 0–0.7)
EOSINOPHIL NFR BLD AUTO: 1.5 % — SIGNIFICANT CHANGE UP (ref 0–8)
FERRITIN SERPL-MCNC: 53 NG/ML — SIGNIFICANT CHANGE UP (ref 13–330)
GLUCOSE SERPL-MCNC: 110 MG/DL — HIGH (ref 70–99)
HCT VFR BLD CALC: 34.7 % — LOW (ref 37–47)
HGB BLD-MCNC: 10.4 G/DL — LOW (ref 12–16)
IMM GRANULOCYTES NFR BLD AUTO: 0.5 % — HIGH (ref 0.1–0.3)
IRON SATN MFR SERPL: 11 % — LOW (ref 15–50)
IRON SATN MFR SERPL: 25 UG/DL — LOW (ref 35–150)
LYMPHOCYTES # BLD AUTO: 1.75 K/UL — SIGNIFICANT CHANGE UP (ref 1.2–3.4)
LYMPHOCYTES # BLD AUTO: 13.5 % — LOW (ref 20.5–51.1)
MAGNESIUM SERPL-MCNC: 2.3 MG/DL — SIGNIFICANT CHANGE UP (ref 1.8–2.4)
MCHC RBC-ENTMCNC: 26.1 PG — LOW (ref 27–31)
MCHC RBC-ENTMCNC: 30 G/DL — LOW (ref 32–37)
MCV RBC AUTO: 87 FL — SIGNIFICANT CHANGE UP (ref 81–99)
MONOCYTES # BLD AUTO: 1.11 K/UL — HIGH (ref 0.1–0.6)
MONOCYTES NFR BLD AUTO: 8.5 % — SIGNIFICANT CHANGE UP (ref 1.7–9.3)
NEUTROPHILS # BLD AUTO: 9.81 K/UL — HIGH (ref 1.4–6.5)
NEUTROPHILS NFR BLD AUTO: 75.5 % — HIGH (ref 42.2–75.2)
NRBC # BLD: 0 /100 WBCS — SIGNIFICANT CHANGE UP (ref 0–0)
PLATELET # BLD AUTO: 348 K/UL — SIGNIFICANT CHANGE UP (ref 130–400)
PMV BLD: 11.8 FL — HIGH (ref 7.4–10.4)
POTASSIUM SERPL-MCNC: 3.9 MMOL/L — SIGNIFICANT CHANGE UP (ref 3.5–5)
POTASSIUM SERPL-SCNC: 3.9 MMOL/L — SIGNIFICANT CHANGE UP (ref 3.5–5)
PROT SERPL-MCNC: 5.3 G/DL — LOW (ref 6–8)
RBC # BLD: 3.99 M/UL — LOW (ref 4.2–5.4)
RBC # FLD: 17.8 % — HIGH (ref 11.5–14.5)
SODIUM SERPL-SCNC: 147 MMOL/L — HIGH (ref 135–146)
TIBC SERPL-MCNC: 233 UG/DL — SIGNIFICANT CHANGE UP (ref 220–430)
UIBC SERPL-MCNC: 208 UG/DL — SIGNIFICANT CHANGE UP (ref 110–370)
WBC # BLD: 12.99 K/UL — HIGH (ref 4.8–10.8)
WBC # FLD AUTO: 12.99 K/UL — HIGH (ref 4.8–10.8)

## 2024-04-13 PROCEDURE — 99232 SBSQ HOSP IP/OBS MODERATE 35: CPT

## 2024-04-13 RX ORDER — POLYETHYLENE GLYCOL 3350 17 G/17G
17 POWDER, FOR SOLUTION ORAL DAILY
Refills: 0 | Status: DISCONTINUED | OUTPATIENT
Start: 2024-04-13 | End: 2024-04-19

## 2024-04-13 RX ORDER — SENNA PLUS 8.6 MG/1
2 TABLET ORAL AT BEDTIME
Refills: 0 | Status: DISCONTINUED | OUTPATIENT
Start: 2024-04-13 | End: 2024-04-19

## 2024-04-13 RX ADMIN — Medication 175 MICROGRAM(S): at 05:24

## 2024-04-13 RX ADMIN — Medication 5 MILLIGRAM(S): at 05:24

## 2024-04-13 RX ADMIN — Medication 50 MILLIGRAM(S): at 05:24

## 2024-04-13 RX ADMIN — Medication 40 MILLIGRAM(S): at 05:24

## 2024-04-13 RX ADMIN — APIXABAN 2.5 MILLIGRAM(S): 2.5 TABLET, FILM COATED ORAL at 18:01

## 2024-04-13 RX ADMIN — SENNA PLUS 2 TABLET(S): 8.6 TABLET ORAL at 21:21

## 2024-04-13 RX ADMIN — APIXABAN 2.5 MILLIGRAM(S): 2.5 TABLET, FILM COATED ORAL at 05:24

## 2024-04-13 RX ADMIN — Medication 5 MILLIGRAM(S): at 18:02

## 2024-04-13 RX ADMIN — BENZOCAINE AND MENTHOL 1 LOZENGE: 5; 1 LIQUID ORAL at 11:20

## 2024-04-13 RX ADMIN — Medication 50 MILLIGRAM(S): at 21:21

## 2024-04-13 RX ADMIN — Medication 100 MILLIGRAM(S): at 21:22

## 2024-04-13 RX ADMIN — Medication 50 MILLIGRAM(S): at 14:55

## 2024-04-13 RX ADMIN — POLYETHYLENE GLYCOL 3350 17 GRAM(S): 17 POWDER, FOR SOLUTION ORAL at 11:19

## 2024-04-13 RX ADMIN — ATORVASTATIN CALCIUM 20 MILLIGRAM(S): 80 TABLET, FILM COATED ORAL at 21:21

## 2024-04-13 NOTE — PROGRESS NOTE ADULT - ASSESSMENT
82yo female with PMHx AFIB on Xarelto, amiodarone, metoprolol presents c/o palpitations and SOB x 1 week. Pt reports she was recently seen yesterday by her PMD Dr Savage and cardiologist Dr Quintero. her Lasix was increased from 20-40mg and her metoprolol was increased from 25mg BID to 50mg BID. Pt has had multiple failed cardioversions for AFIB and reports increasing medication dosages has not relieved symptoms so she presented to the ED, found with AFIB with RVR",     A/P:   Chronic Atrial Fibrillation/Flutter with Rapid Ventricular Response:   HR is not well controlled.   s/p failed DCCV in the past.   Increase Metoprolol to 50mg q 8hrs. Switch Xarelto to Eliquis due to CKD.   EP plan for AV node ablation and pacemaker placement on Monday (Hold Eliquis on Sunday).     Chronic HFpEF:   Patient with mild SOB, no leg edema. Pro-BNP 7789  CXR 4/11 showed bilateral hilar opacities, pulmonary congestion.  Echo showed LVEF 50-55%, severe asymmetric LVH, mod reduced RV systolic function, mild to mod MR, mild to mod TR, mod SD, mod pulmonary HTN.    Lasix dose was increased recently outpatient from 20 to 40mg po daily.   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     Iron Deficiency anemia:   Iron level 25, iron sat 11%  Start on Venofer 200mg IV for 5 days.     Hypothyroidism  History of Thyroid cancer s/p thyroidectomy.   TSH 3.0, continue synthroid 175 mcg       Pituitary adenoma  Cabergoline 1/2 tab of 0.5 mg Q weekly Monday    DVT Prophylaxis: Eliquis.   #Progress Note Handoff:  Pending (specify):  EP plan for AVN ablation, PPM, improving Acute Kidney Injury.   Family discussion:  Disposition: Home vs SNF.

## 2024-04-13 NOTE — PROGRESS NOTE ADULT - ASSESSMENT
81 yo F with PMHX of Persistent AFib s/p DCCV 2022, HFmrEF, CKD III, HTN, Hypothyroidism presents for palpitations  patient failed DCCV three times in the past,    recommended for Biv PPM placement and subsequent AVN ablation at the later date      Long standing persistent Afib, failed DCCV in the past   HTN, hypothyroidism, CKD stage 3      con't tele  plan for PPM on Monday  NPO after MN on _Sunday  please send full set of labs including coags and T&S on _Sunday PM  hold Xarelto, and other AC including DVT prophylaxis on _ Sunday PM  Maintain electrolytes K>4.0 Mg >2.0  will follow

## 2024-04-14 LAB
ALBUMIN SERPL ELPH-MCNC: 3.4 G/DL — LOW (ref 3.5–5.2)
ALBUMIN SERPL ELPH-MCNC: 3.5 G/DL — SIGNIFICANT CHANGE UP (ref 3.5–5.2)
ALP SERPL-CCNC: 231 U/L — HIGH (ref 30–115)
ALP SERPL-CCNC: 271 U/L — HIGH (ref 30–115)
ALT FLD-CCNC: 33 U/L — SIGNIFICANT CHANGE UP (ref 0–41)
ALT FLD-CCNC: 34 U/L — SIGNIFICANT CHANGE UP (ref 0–41)
ANION GAP SERPL CALC-SCNC: 18 MMOL/L — HIGH (ref 7–14)
ANION GAP SERPL CALC-SCNC: 18 MMOL/L — HIGH (ref 7–14)
APTT BLD: 31.2 SEC — SIGNIFICANT CHANGE UP (ref 27–39.2)
AST SERPL-CCNC: 22 U/L — SIGNIFICANT CHANGE UP (ref 0–41)
AST SERPL-CCNC: 23 U/L — SIGNIFICANT CHANGE UP (ref 0–41)
BASOPHILS # BLD AUTO: 0.07 K/UL — SIGNIFICANT CHANGE UP (ref 0–0.2)
BASOPHILS NFR BLD AUTO: 0.5 % — SIGNIFICANT CHANGE UP (ref 0–1)
BILIRUB SERPL-MCNC: 1.1 MG/DL — SIGNIFICANT CHANGE UP (ref 0.2–1.2)
BILIRUB SERPL-MCNC: 1.4 MG/DL — HIGH (ref 0.2–1.2)
BUN SERPL-MCNC: 65 MG/DL — CRITICAL HIGH (ref 10–20)
CALCIUM SERPL-MCNC: 8.3 MG/DL — LOW (ref 8.4–10.5)
CALCIUM SERPL-MCNC: 8.6 MG/DL — SIGNIFICANT CHANGE UP (ref 8.4–10.5)
CHLORIDE SERPL-SCNC: 101 MMOL/L — SIGNIFICANT CHANGE UP (ref 98–110)
CHLORIDE SERPL-SCNC: 104 MMOL/L — SIGNIFICANT CHANGE UP (ref 98–110)
CO2 SERPL-SCNC: 23 MMOL/L — SIGNIFICANT CHANGE UP (ref 17–32)
CO2 SERPL-SCNC: 23 MMOL/L — SIGNIFICANT CHANGE UP (ref 17–32)
CREAT SERPL-MCNC: 2.6 MG/DL — HIGH (ref 0.7–1.5)
CREAT SERPL-MCNC: 2.8 MG/DL — HIGH (ref 0.7–1.5)
EGFR: 18 ML/MIN/1.73M2 — LOW
EOSINOPHIL # BLD AUTO: 0.18 K/UL — SIGNIFICANT CHANGE UP (ref 0–0.7)
EOSINOPHIL NFR BLD AUTO: 1.4 % — SIGNIFICANT CHANGE UP (ref 0–8)
GLUCOSE SERPL-MCNC: 107 MG/DL — HIGH (ref 70–99)
GLUCOSE SERPL-MCNC: 115 MG/DL — HIGH (ref 70–99)
HCT VFR BLD CALC: 34 % — LOW (ref 37–47)
HCT VFR BLD CALC: 35.1 % — LOW (ref 37–47)
HGB BLD-MCNC: 10.2 G/DL — LOW (ref 12–16)
HGB BLD-MCNC: 10.7 G/DL — LOW (ref 12–16)
IMM GRANULOCYTES NFR BLD AUTO: 0.8 % — HIGH (ref 0.1–0.3)
INR BLD: 1.63 RATIO — HIGH (ref 0.65–1.3)
LYMPHOCYTES # BLD AUTO: 17.4 % — LOW (ref 20.5–51.1)
LYMPHOCYTES # BLD AUTO: 2.26 K/UL — SIGNIFICANT CHANGE UP (ref 1.2–3.4)
MAGNESIUM SERPL-MCNC: 2.2 MG/DL — SIGNIFICANT CHANGE UP (ref 1.8–2.4)
MAGNESIUM SERPL-MCNC: 2.5 MG/DL — HIGH (ref 1.8–2.4)
MCHC RBC-ENTMCNC: 26 PG — LOW (ref 27–31)
MCHC RBC-ENTMCNC: 26.4 PG — LOW (ref 27–31)
MCHC RBC-ENTMCNC: 30 G/DL — LOW (ref 32–37)
MCHC RBC-ENTMCNC: 30.5 G/DL — LOW (ref 32–37)
MCV RBC AUTO: 86.5 FL — SIGNIFICANT CHANGE UP (ref 81–99)
MCV RBC AUTO: 86.5 FL — SIGNIFICANT CHANGE UP (ref 81–99)
MONOCYTES # BLD AUTO: 1.15 K/UL — HIGH (ref 0.1–0.6)
MONOCYTES NFR BLD AUTO: 8.8 % — SIGNIFICANT CHANGE UP (ref 1.7–9.3)
NEUTROPHILS # BLD AUTO: 9.25 K/UL — HIGH (ref 1.4–6.5)
NEUTROPHILS NFR BLD AUTO: 71.1 % — SIGNIFICANT CHANGE UP (ref 42.2–75.2)
NRBC # BLD: 0 /100 WBCS — SIGNIFICANT CHANGE UP (ref 0–0)
NRBC # BLD: 0 /100 WBCS — SIGNIFICANT CHANGE UP (ref 0–0)
PLATELET # BLD AUTO: 361 K/UL — SIGNIFICANT CHANGE UP (ref 130–400)
PLATELET # BLD AUTO: 367 K/UL — SIGNIFICANT CHANGE UP (ref 130–400)
PMV BLD: 11.6 FL — HIGH (ref 7.4–10.4)
PMV BLD: 12 FL — HIGH (ref 7.4–10.4)
POTASSIUM SERPL-MCNC: 3.8 MMOL/L — SIGNIFICANT CHANGE UP (ref 3.5–5)
POTASSIUM SERPL-MCNC: 3.9 MMOL/L — SIGNIFICANT CHANGE UP (ref 3.5–5)
POTASSIUM SERPL-SCNC: 3.8 MMOL/L — SIGNIFICANT CHANGE UP (ref 3.5–5)
POTASSIUM SERPL-SCNC: 3.9 MMOL/L — SIGNIFICANT CHANGE UP (ref 3.5–5)
PROT SERPL-MCNC: 5.2 G/DL — LOW (ref 6–8)
PROT SERPL-MCNC: 5.3 G/DL — LOW (ref 6–8)
PROTHROM AB SERPL-ACNC: 18.7 SEC — HIGH (ref 9.95–12.87)
RBC # BLD: 3.93 M/UL — LOW (ref 4.2–5.4)
RBC # BLD: 4.06 M/UL — LOW (ref 4.2–5.4)
RBC # FLD: 17.7 % — HIGH (ref 11.5–14.5)
RBC # FLD: 18 % — HIGH (ref 11.5–14.5)
SODIUM SERPL-SCNC: 142 MMOL/L — SIGNIFICANT CHANGE UP (ref 135–146)
SODIUM SERPL-SCNC: 145 MMOL/L — SIGNIFICANT CHANGE UP (ref 135–146)
WBC # BLD: 12.59 K/UL — HIGH (ref 4.8–10.8)
WBC # BLD: 13.01 K/UL — HIGH (ref 4.8–10.8)
WBC # FLD AUTO: 12.59 K/UL — HIGH (ref 4.8–10.8)
WBC # FLD AUTO: 13.01 K/UL — HIGH (ref 4.8–10.8)

## 2024-04-14 PROCEDURE — 99223 1ST HOSP IP/OBS HIGH 75: CPT

## 2024-04-14 PROCEDURE — 99233 SBSQ HOSP IP/OBS HIGH 50: CPT

## 2024-04-14 PROCEDURE — 93010 ELECTROCARDIOGRAM REPORT: CPT

## 2024-04-14 PROCEDURE — 71250 CT THORAX DX C-: CPT | Mod: 26

## 2024-04-14 PROCEDURE — 71045 X-RAY EXAM CHEST 1 VIEW: CPT | Mod: 26

## 2024-04-14 RX ORDER — CEFTRIAXONE 500 MG/1
1000 INJECTION, POWDER, FOR SOLUTION INTRAMUSCULAR; INTRAVENOUS EVERY 24 HOURS
Refills: 0 | Status: DISCONTINUED | OUTPATIENT
Start: 2024-04-15 | End: 2024-04-19

## 2024-04-14 RX ORDER — CEFTRIAXONE 500 MG/1
INJECTION, POWDER, FOR SOLUTION INTRAMUSCULAR; INTRAVENOUS
Refills: 0 | Status: DISCONTINUED | OUTPATIENT
Start: 2024-04-14 | End: 2024-04-19

## 2024-04-14 RX ORDER — LANOLIN ALCOHOL/MO/W.PET/CERES
5 CREAM (GRAM) TOPICAL AT BEDTIME
Refills: 0 | Status: DISCONTINUED | OUTPATIENT
Start: 2024-04-14 | End: 2024-04-19

## 2024-04-14 RX ORDER — METOPROLOL TARTRATE 50 MG
5 TABLET ORAL ONCE
Refills: 0 | Status: COMPLETED | OUTPATIENT
Start: 2024-04-14 | End: 2024-04-14

## 2024-04-14 RX ORDER — CEFTRIAXONE 500 MG/1
1000 INJECTION, POWDER, FOR SOLUTION INTRAMUSCULAR; INTRAVENOUS ONCE
Refills: 0 | Status: COMPLETED | OUTPATIENT
Start: 2024-04-14 | End: 2024-04-14

## 2024-04-14 RX ORDER — AZITHROMYCIN 500 MG/1
500 TABLET, FILM COATED ORAL DAILY
Refills: 0 | Status: DISCONTINUED | OUTPATIENT
Start: 2024-04-14 | End: 2024-04-19

## 2024-04-14 RX ADMIN — Medication 5 MILLIGRAM(S): at 17:12

## 2024-04-14 RX ADMIN — Medication 5 MILLIGRAM(S): at 05:39

## 2024-04-14 RX ADMIN — Medication 100 MILLIGRAM(S): at 21:17

## 2024-04-14 RX ADMIN — Medication 50 MILLIGRAM(S): at 06:08

## 2024-04-14 RX ADMIN — BENZOCAINE AND MENTHOL 1 LOZENGE: 5; 1 LIQUID ORAL at 11:15

## 2024-04-14 RX ADMIN — Medication 50 MILLIGRAM(S): at 21:18

## 2024-04-14 RX ADMIN — ATORVASTATIN CALCIUM 20 MILLIGRAM(S): 80 TABLET, FILM COATED ORAL at 21:17

## 2024-04-14 RX ADMIN — Medication 50 MILLIGRAM(S): at 14:59

## 2024-04-14 RX ADMIN — Medication 5 MILLIGRAM(S): at 05:46

## 2024-04-14 RX ADMIN — Medication 175 MICROGRAM(S): at 05:39

## 2024-04-14 RX ADMIN — SENNA PLUS 2 TABLET(S): 8.6 TABLET ORAL at 21:18

## 2024-04-14 RX ADMIN — POLYETHYLENE GLYCOL 3350 17 GRAM(S): 17 POWDER, FOR SOLUTION ORAL at 11:15

## 2024-04-14 RX ADMIN — CEFTRIAXONE 100 MILLIGRAM(S): 500 INJECTION, POWDER, FOR SOLUTION INTRAMUSCULAR; INTRAVENOUS at 22:53

## 2024-04-14 RX ADMIN — Medication 5 MILLIGRAM(S): at 21:21

## 2024-04-14 RX ADMIN — AZITHROMYCIN 500 MILLIGRAM(S): 500 TABLET, FILM COATED ORAL at 22:53

## 2024-04-14 NOTE — PHYSICAL THERAPY INITIAL EVALUATION ADULT - SPECIFY REASON(S)
Pt remains in afib with uncontrolled HR, not appropriate for PT at this time. PT to f/u at next available session.

## 2024-04-14 NOTE — DIETITIAN INITIAL EVALUATION ADULT - OTHER INFO
Pertinent Medical Information: Per H&P, pt is an 84 y/o female with PMHx of AFIB on Xarelto, amiodarone, metoprolol presents c/o palpitations and SOB x 1 week    Pertinent Subjective Information: Per staff, pt has varying appetite; poor to fair. Per flow sheet, pt consuming between 25-75% of meals provided in-house. Will recommend appropriate supplements below. No nausea or vomiting reported.    Weight hx: UBW unknown. Current dosing weight is 85.3 KG. Previous admission weight was 90.3 KG on 12/19/23. 5.5% unintentional weight loss in over 3 months compared to current dosing weight. Pt does not meet weight criteria for malnutrition at this time.

## 2024-04-14 NOTE — DIETITIAN INITIAL EVALUATION ADULT - OTHER CALCULATIONS
Using ABW and IBW: ENERGY: 1787-6473 kcal/day (15-20 kcal/kg); PROTEIN: 68-90 g/day (1.5-2 g/kg IBW 45 KG); FLUID: 1mL/kcal -- with consideration for age, BMI

## 2024-04-14 NOTE — DIETITIAN INITIAL EVALUATION ADULT - ADD RECOMMEND
1. ADD Ensure Plus 2X/DAILY -- provides 700 kcal, 40g pro total  2. Continue with current diet order  3. Encourage PO intake     Pt is at moderate nutrition risk; will f/u in 5-7 days or prn.

## 2024-04-14 NOTE — DIETITIAN INITIAL EVALUATION ADULT - PERTINENT LABORATORY DATA
04-14    145  |  104  |  65<HH>  ----------------------------<  107<H>  3.9   |  23  |  2.6<H>    Ca    8.6      14 Apr 2024 07:55  Mg     2.5     04-14    TPro  5.3<L>  /  Alb  3.5  /  TBili  1.4<H>  /  DBili  x   /  AST  22  /  ALT  34  /  AlkPhos  231<H>  04-14

## 2024-04-14 NOTE — DIETITIAN INITIAL EVALUATION ADULT - PERTINENT MEDS FT
MEDICATIONS  (STANDING):  allopurinol 100 milliGRAM(s) Oral at bedtime  atorvastatin 20 milliGRAM(s) Oral at bedtime  benzocaine/menthol Lozenge 1 Lozenge Oral daily  levothyroxine 175 MICROGram(s) Oral daily  metoprolol tartrate 50 milliGRAM(s) Oral every 8 hours  oxybutynin 5 milliGRAM(s) Oral two times a day  polyethylene glycol 3350 17 Gram(s) Oral daily  potassium chloride  20 mEq/100 mL IVPB 20 milliEquivalent(s) IV Intermittent every 2 hours  senna 2 Tablet(s) Oral at bedtime    MEDICATIONS  (PRN):

## 2024-04-14 NOTE — PROGRESS NOTE ADULT - ASSESSMENT
84yo female with PMHx AFIB on Xarelto, amiodarone, metoprolol presents c/o palpitations and SOB x 1 week. Pt reports she was recently seen yesterday by her PMD Dr Savage and cardiologist Dr Quintero. her Lasix was increased from 20-40mg and her metoprolol was increased from 25mg BID to 50mg BID. Pt has had multiple failed cardioversions for AFIB and reports increasing medication dosages has not relieved symptoms so she presented to the ED, found with AFIB with RVR",     A/P:   Chronic Atrial Fibrillation/Flutter with Rapid Ventricular Response:   HR is not well controlled.   s/p failed DCCV in the past.   Increase Metoprolol to 50mg q 6hrs. Switch Xarelto to Eliquis due to CKD.   EP plan for AV node ablation and pacemaker placement on Monday (Hold Eliquis on Sunday).     Chronic HFpEF:   Patient with mild SOB, no leg edema. Pro-BNP 7789  CXR 4/11 showed bilateral hilar opacities, pulmonary congestion.  Echo showed LVEF 50-55%, severe asymmetric LVH, mod reduced RV systolic function, mild to mod MR, mild to mod TR, mod [place], mod pulmonary HTN.    Lasix dose was increased recently outpatient from 20 to 40mg po daily.  CXR 4/13 still with bilateral opacities.   Patient with leukocytosis, looks dry, concern if this finding is pneumonia, send RVP, legionella, CT chest, Procalcitonin.    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     Iron Deficiency anemia:   Iron level 25, iron sat 11%  Start on Venofer 200mg IV for 5 days.     Hypothyroidism  History of Thyroid cancer s/p thyroidectomy.   TSH 3.0, continue synthroid 175 mcg     Pituitary adenoma  Cabergoline 1/2 tab of 0.5 mg Q weekly Monday    DVT Prophylaxis: Eliquis.   #Progress Note Handoff:  Pending (specify):  EP plan for AVN ablation, PPM, improving Acute Kidney Injury.   Family discussion:  Disposition: Home vs SNF.

## 2024-04-14 NOTE — DIETITIAN INITIAL EVALUATION ADULT - NAME AND PHONE
Temi [id number] or TEAMS    Nutrition Interventions: Meals, snacks, supplements; Nutrition Monitoring: Diet order, PO intake, weights, labs, NFPF, body composition, BM and tolerance to medical food supplements

## 2024-04-14 NOTE — DIETITIAN INITIAL EVALUATION ADULT - NS FNS DIET ORDER
Diet, DASH/TLC:   Sodium & Cholesterol Restricted (04-12-24 @ 13:18) [Active]  Diet, NPO after Midnight:      NPO Start Date: 11-Apr-2024,   NPO Start Time: 23:59 (04-11-24 @ 15:30) [Active]  Diet, NPO after Midnight:      NPO Start Date: 10-Apr-2024,   NPO Start Time: 23:59 (04-10-24 @ 21:48) [Active]

## 2024-04-15 LAB
ALBUMIN SERPL ELPH-MCNC: 3.4 G/DL — LOW (ref 3.5–5.2)
ALP SERPL-CCNC: 272 U/L — HIGH (ref 30–115)
ALT FLD-CCNC: 37 U/L — SIGNIFICANT CHANGE UP (ref 0–41)
ANION GAP SERPL CALC-SCNC: 19 MMOL/L — HIGH (ref 7–14)
APPEARANCE UR: CLEAR — SIGNIFICANT CHANGE UP
AST SERPL-CCNC: 26 U/L — SIGNIFICANT CHANGE UP (ref 0–41)
BACTERIA # UR AUTO: NEGATIVE /HPF — SIGNIFICANT CHANGE UP
BASOPHILS # BLD AUTO: 0.06 K/UL — SIGNIFICANT CHANGE UP (ref 0–0.2)
BASOPHILS NFR BLD AUTO: 0.5 % — SIGNIFICANT CHANGE UP (ref 0–1)
BILIRUB SERPL-MCNC: 1.2 MG/DL — SIGNIFICANT CHANGE UP (ref 0.2–1.2)
BILIRUB UR-MCNC: NEGATIVE — SIGNIFICANT CHANGE UP
BUN SERPL-MCNC: 68 MG/DL — CRITICAL HIGH (ref 10–20)
BUN SERPL-MCNC: 70 MG/DL — CRITICAL HIGH (ref 10–20)
CALCIUM SERPL-MCNC: 8.4 MG/DL — SIGNIFICANT CHANGE UP (ref 8.4–10.5)
CAST: 5 /LPF — HIGH (ref 0–4)
CHLORIDE SERPL-SCNC: 101 MMOL/L — SIGNIFICANT CHANGE UP (ref 98–110)
CO2 SERPL-SCNC: 22 MMOL/L — SIGNIFICANT CHANGE UP (ref 17–32)
COLOR SPEC: YELLOW — SIGNIFICANT CHANGE UP
CREAT ?TM UR-MCNC: 81 MG/DL — SIGNIFICANT CHANGE UP
CREAT ?TM UR-MCNC: 87 MG/DL — SIGNIFICANT CHANGE UP
CREAT SERPL-MCNC: 2.8 MG/DL — HIGH (ref 0.7–1.5)
DIFF PNL FLD: NEGATIVE — SIGNIFICANT CHANGE UP
EGFR: 16 ML/MIN/1.73M2 — LOW
EGFR: 16 ML/MIN/1.73M2 — LOW
EOSINOPHIL # BLD AUTO: 0.28 K/UL — SIGNIFICANT CHANGE UP (ref 0–0.7)
EOSINOPHIL NFR BLD AUTO: 2.2 % — SIGNIFICANT CHANGE UP (ref 0–8)
GLUCOSE SERPL-MCNC: 107 MG/DL — HIGH (ref 70–99)
GLUCOSE UR QL: NEGATIVE MG/DL — SIGNIFICANT CHANGE UP
HCT VFR BLD CALC: 32.7 % — LOW (ref 37–47)
HGB BLD-MCNC: 10.1 G/DL — LOW (ref 12–16)
IMM GRANULOCYTES NFR BLD AUTO: 0.5 % — HIGH (ref 0.1–0.3)
KETONES UR-MCNC: NEGATIVE MG/DL — SIGNIFICANT CHANGE UP
LEUKOCYTE ESTERASE UR-ACNC: ABNORMAL
LYMPHOCYTES # BLD AUTO: 19.8 % — LOW (ref 20.5–51.1)
LYMPHOCYTES # BLD AUTO: 2.56 K/UL — SIGNIFICANT CHANGE UP (ref 1.2–3.4)
MAGNESIUM SERPL-MCNC: 2.4 MG/DL — SIGNIFICANT CHANGE UP (ref 1.8–2.4)
MCHC RBC-ENTMCNC: 26.5 PG — LOW (ref 27–31)
MCHC RBC-ENTMCNC: 30.9 G/DL — LOW (ref 32–37)
MCV RBC AUTO: 85.8 FL — SIGNIFICANT CHANGE UP (ref 81–99)
MONOCYTES # BLD AUTO: 1.12 K/UL — HIGH (ref 0.1–0.6)
MONOCYTES NFR BLD AUTO: 8.6 % — SIGNIFICANT CHANGE UP (ref 1.7–9.3)
NEUTROPHILS # BLD AUTO: 8.86 K/UL — HIGH (ref 1.4–6.5)
NEUTROPHILS NFR BLD AUTO: 68.4 % — SIGNIFICANT CHANGE UP (ref 42.2–75.2)
NITRITE UR-MCNC: NEGATIVE — SIGNIFICANT CHANGE UP
NRBC # BLD: 0 /100 WBCS — SIGNIFICANT CHANGE UP (ref 0–0)
OSMOLALITY UR: 481 MOS/KG — SIGNIFICANT CHANGE UP (ref 50–1200)
PH UR: 5.5 — SIGNIFICANT CHANGE UP (ref 5–8)
PLATELET # BLD AUTO: 367 K/UL — SIGNIFICANT CHANGE UP (ref 130–400)
PMV BLD: 11.7 FL — HIGH (ref 7.4–10.4)
POTASSIUM SERPL-MCNC: 3.7 MMOL/L — SIGNIFICANT CHANGE UP (ref 3.5–5)
POTASSIUM SERPL-SCNC: 3.7 MMOL/L — SIGNIFICANT CHANGE UP (ref 3.5–5)
PROCALCITONIN SERPL-MCNC: 0.14 NG/ML — HIGH (ref 0.02–0.1)
PROT ?TM UR-MCNC: 19 MG/DLG/24H — SIGNIFICANT CHANGE UP
PROT SERPL-MCNC: 5.2 G/DL — LOW (ref 6–8)
PROT UR-MCNC: 30 MG/DL
PROT/CREAT UR-RTO: 0.2 RATIO — SIGNIFICANT CHANGE UP (ref 0–0.2)
RAPID RVP RESULT: SIGNIFICANT CHANGE UP
RBC # BLD: 3.81 M/UL — LOW (ref 4.2–5.4)
RBC # FLD: 17.6 % — HIGH (ref 11.5–14.5)
RBC CASTS # UR COMP ASSIST: 0 /HPF — SIGNIFICANT CHANGE UP (ref 0–4)
S PNEUM AG UR QL: NEGATIVE — SIGNIFICANT CHANGE UP
SARS-COV-2 RNA SPEC QL NAA+PROBE: SIGNIFICANT CHANGE UP
SODIUM SERPL-SCNC: 142 MMOL/L — SIGNIFICANT CHANGE UP (ref 135–146)
SODIUM UR-SCNC: <20 MMOL/L — SIGNIFICANT CHANGE UP
SP GR SPEC: 1.02 — SIGNIFICANT CHANGE UP (ref 1–1.03)
SQUAMOUS # UR AUTO: 1 /HPF — SIGNIFICANT CHANGE UP (ref 0–5)
UROBILINOGEN FLD QL: 1 MG/DL — SIGNIFICANT CHANGE UP (ref 0.2–1)
WBC # BLD: 12.95 K/UL — HIGH (ref 4.8–10.8)
WBC # FLD AUTO: 12.95 K/UL — HIGH (ref 4.8–10.8)
WBC UR QL: 1 /HPF — SIGNIFICANT CHANGE UP (ref 0–5)

## 2024-04-15 PROCEDURE — 99233 SBSQ HOSP IP/OBS HIGH 50: CPT | Mod: 57

## 2024-04-15 PROCEDURE — 33208 INSRT HEART PM ATRIAL & VENT: CPT

## 2024-04-15 PROCEDURE — 71045 X-RAY EXAM CHEST 1 VIEW: CPT | Mod: 26

## 2024-04-15 PROCEDURE — 76770 US EXAM ABDO BACK WALL COMP: CPT | Mod: 26

## 2024-04-15 PROCEDURE — 99233 SBSQ HOSP IP/OBS HIGH 50: CPT

## 2024-04-15 PROCEDURE — 33225 L VENTRIC PACING LEAD ADD-ON: CPT

## 2024-04-15 RX ORDER — MORPHINE SULFATE 50 MG/1
2 CAPSULE, EXTENDED RELEASE ORAL DAILY
Refills: 0 | Status: DISCONTINUED | OUTPATIENT
Start: 2024-04-15 | End: 2024-04-19

## 2024-04-15 RX ORDER — VANCOMYCIN HCL 1 G
1000 VIAL (EA) INTRAVENOUS ONCE
Refills: 0 | Status: COMPLETED | OUTPATIENT
Start: 2024-04-15 | End: 2024-04-15

## 2024-04-15 RX ORDER — SODIUM CHLORIDE 9 MG/ML
1000 INJECTION, SOLUTION INTRAVENOUS
Refills: 0 | Status: DISCONTINUED | OUTPATIENT
Start: 2024-04-15 | End: 2024-04-15

## 2024-04-15 RX ORDER — ONDANSETRON 8 MG/1
4 TABLET, FILM COATED ORAL ONCE
Refills: 0 | Status: DISCONTINUED | OUTPATIENT
Start: 2024-04-15 | End: 2024-04-19

## 2024-04-15 RX ORDER — CABERGOLINE 0.5 MG/1
0.25 TABLET ORAL
Refills: 0 | Status: DISCONTINUED | OUTPATIENT
Start: 2024-04-15 | End: 2024-04-19

## 2024-04-15 RX ORDER — OXYCODONE AND ACETAMINOPHEN 5; 325 MG/1; MG/1
1 TABLET ORAL ONCE
Refills: 0 | Status: DISCONTINUED | OUTPATIENT
Start: 2024-04-15 | End: 2024-04-16

## 2024-04-15 RX ORDER — VANCOMYCIN HCL 1 G
1000 VIAL (EA) INTRAVENOUS ONCE
Refills: 0 | Status: DISCONTINUED | OUTPATIENT
Start: 2024-04-15 | End: 2024-04-18

## 2024-04-15 RX ORDER — VANCOMYCIN HCL 1 G
1000 VIAL (EA) INTRAVENOUS ONCE
Refills: 0 | Status: COMPLETED | OUTPATIENT
Start: 2024-04-16 | End: 2024-04-16

## 2024-04-15 RX ORDER — FUROSEMIDE 40 MG
40 TABLET ORAL ONCE
Refills: 0 | Status: COMPLETED | OUTPATIENT
Start: 2024-04-15 | End: 2024-04-15

## 2024-04-15 RX ADMIN — POLYETHYLENE GLYCOL 3350 17 GRAM(S): 17 POWDER, FOR SOLUTION ORAL at 13:43

## 2024-04-15 RX ADMIN — BENZOCAINE AND MENTHOL 1 LOZENGE: 5; 1 LIQUID ORAL at 13:41

## 2024-04-15 RX ADMIN — Medication 5 MILLIGRAM(S): at 18:10

## 2024-04-15 RX ADMIN — ATORVASTATIN CALCIUM 20 MILLIGRAM(S): 80 TABLET, FILM COATED ORAL at 21:38

## 2024-04-15 RX ADMIN — Medication 5 MILLIGRAM(S): at 05:27

## 2024-04-15 RX ADMIN — Medication 100 MILLIGRAM(S): at 21:38

## 2024-04-15 RX ADMIN — Medication 50 MILLIGRAM(S): at 05:15

## 2024-04-15 RX ADMIN — CEFTRIAXONE 100 MILLIGRAM(S): 500 INJECTION, POWDER, FOR SOLUTION INTRAMUSCULAR; INTRAVENOUS at 21:42

## 2024-04-15 RX ADMIN — Medication 50 MILLIGRAM(S): at 13:43

## 2024-04-15 RX ADMIN — Medication 40 MILLIGRAM(S): at 14:56

## 2024-04-15 RX ADMIN — Medication 175 MICROGRAM(S): at 05:16

## 2024-04-15 RX ADMIN — Medication 50 MILLIGRAM(S): at 21:38

## 2024-04-15 RX ADMIN — SENNA PLUS 2 TABLET(S): 8.6 TABLET ORAL at 21:52

## 2024-04-15 RX ADMIN — AZITHROMYCIN 500 MILLIGRAM(S): 500 TABLET, FILM COATED ORAL at 13:41

## 2024-04-15 NOTE — PROGRESS NOTE ADULT - ASSESSMENT
KOBI, Cr worsening, no hydro on renal sono, bland UA with trace proteinuria and low Cristopher+   CKD stage 3-4  b/l proteinaceous and hemorrhagic renal cysts  Afib   chronic, intermittent, mild hypernatremia  chronic HFpEF   anemia  FELIX  HTN  hypothyroidism / thyroid Ca / pituitary adenoma     plan:    lasix on hold  trend renal function and lytes  avoid iv contrast  PPM today  will follow

## 2024-04-15 NOTE — PRE-ANESTHESIA EVALUATION ADULT - NSANTHOSAYNRD_GEN_A_CORE
No. FELIX screening performed.  STOP BANG Legend: 0-2 = LOW Risk; 3-4 = INTERMEDIATE Risk; 5-8 = HIGH Risk

## 2024-04-15 NOTE — CHART NOTE - NSCHARTNOTEFT_GEN_A_CORE
PACU ANESTHESIA ADMISSION NOTE      Procedure:   Post op diagnosis:      ____  Intubated  TV:______       Rate: ______      FiO2: ______    _x___  Patent Airway    _x___  Full return of protective reflexes    _x___  Full recovery from anesthesia / back to baseline     Vitals:   T: 98          R: 17                 BP:117/67                  Sat:96                   P: 107      Mental Status:  _x___ Awake   ___x__ Alert   _____ Drowsy   _____ Sedated    Nausea/Vomiting:  ____ NO  ______Yes,   See Post - Op Orders          Pain Scale (0-10):  _____    Treatment: ____ None    ____ See Post - Op/PCA Orders    Post - Operative Fluids:   ____ Oral   ____ See Post - Op Orders    Plan: Discharge:   ____Home   x    _____Floor     _____Critical Care    _____  Other:_________________    Comments:

## 2024-04-15 NOTE — PROGRESS NOTE ADULT - ASSESSMENT
82yo female with PMHx AFIB on Xarelto, amiodarone, metoprolol presents c/o palpitations and SOB x 1 week. Pt reports she was recently seen yesterday by her PMD Dr Savage and cardiologist Dr Quintero. her Lasix was increased from 20-40mg and her metoprolol was increased from 25mg BID to 50mg BID. Pt has had multiple failed cardioversions for AFIB and reports increasing medication dosages has not relieved symptoms so she presented to the ED, found with AFIB with RVR",     Assessment and Plan  :   #Chronic Atrial Fibrillation/Flutter with Rapid Ventricular Response: -s/p failed DCCV in the past.   -CHADVasc = 5 , was on home Xarelto   -HR is not well controlled > patient going for AVN ablation w/ ppm placement today with EP   -on Metoprolol tartrate 50mg q8 s. Switched Xarelto to Eliquis due to CKD ( Now held for procedure , resume tomorrow AM or as per EP).     #Chronic HFpEF > now warm and dry   -on admission > Patient with mild SOB, no leg edema. Pro-BNP 7789 and CXR 4/11 showed bilateral hilar opacities, pulmonary congestion.  -Echo showed LVEF 50-55%, severe asymmetric LVH, mod reduced RV systolic function, mild to mod MR, mild to mod TR, mod [place], mod pulmonary HTN.    -Lasix dose was increased recently outpatient from 20 to 40mg po daily. ( now off lasix since the 13th of april )   - CXR 4/13 still with bilateral opacities, CT chest on 4/14 showed consolidations on right lung w/ mild b/l pleural effusion ( pending official read )   - Patient with leukocytosis, appears dry, concern for  pneumonia, sent RVP, legionella Procalcitonin and started on rocephin/ azithromycin ( start date 4/14 )       #Acute Kidney Injury on CKD [id number] " KDIGO stage I "  #Mild hypernatremia ( resolved )   #b/l renal cysts   -Cr is increasing to 2.8  Cr baseline Around 1.6-1.7  -Holding diuretic for now.   -send urine studies , Renal US   -recent outpt MRI kidney from [facility] 4/4 reviewed, no renal mass, just proteinaceous and hemorrhagic renal cysts    #Iron Deficiency anemia:   -Iron level 25, iron sat 11% , ferritin 53   -Start on iron ( IV vs PO ) once infection resolves     #Hypothyroidism  -History of Thyroid cancer s/p thyroidectomy.   -TSH 3.0, continue synthroid 175 mcg     #Pituitary adenoma  -on Cabergoline 1/2 tab of 0.5 mg Q weekly Monday > non formulary     #DVT Prophylaxis: Eliquis. on hold for procedure  #GI ppx : no need  #Diet : DASH  # Full code    pending > KOBI improvement , EP PPM and AVN ablation today , Urine studies , procal , RVP ,Renal US and urine studies

## 2024-04-15 NOTE — PROGRESS NOTE ADULT - ASSESSMENT
84yo female with PMHx AFIB on Xarelto, amiodarone, metoprolol presents c/o palpitations and SOB x 1 week. Pt reports she was recently seen yesterday by her PMD Dr Savage and cardiologist Dr Quintero. her Lasix was increased from 20-40mg and her metoprolol was increased from 25mg BID to 50mg BID. Pt has had multiple failed cardioversions for AFIB and reports increasing medication dosages has not relieved symptoms so she presented to the ED, found with AFIB with RVR",     A/P:   Chronic Atrial Fibrillation/Flutter with Rapid Ventricular Response:   HR is not well controlled.   s/p failed DCCV in the past.   On Metoprolol to 50mg q 8hrs. Switch Xarelto to Eliquis due to CKD.   Patient went for AV node ablation and pacemaker placement today.      Acute on Chronic HFpEF:   Possible bilateral multifocal pneumonia:   Patient with mild SOB, no leg edema. Pro-BNP 7789  CXR 4/11 showed bilateral hilar opacities, pulmonary congestion.  Echo showed LVEF 50-55%, severe asymmetric LVH, mod reduced RV systolic function, mild to mod MR, mild to mod TR, mod [place], mod pulmonary HTN.    Lasix dose was increased recently outpatient from 20 to 40mg po daily.  CXR 4/13 still with bilateral opacities.   Patient with leukocytosis, looks dry, concern if this finding is pneumonia,   Ct chest 4/14 showed Multifocal bilateral groundglass opacities and consolidative opacities,   Pending RVP, legionella, Procalcitonin.    Started on Zithromax and Rocephin.   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     Iron Deficiency anemia:   Iron level 25, iron sat 11%  Start on Venofer 200mg IV for 5 days.     Hypothyroidism  History of Thyroid cancer s/p thyroidectomy.   TSH 3.0, continue synthroid 175 mcg     Pituitary adenoma  Cabergoline 1/2 tab of 0.5 mg Q weekly Monday    DVT Prophylaxis: Eliquis.   #Progress Note Handoff:  Pending (specify): improving Acute Kidney Injury, SOB, PNA.   Family discussion:  Disposition: Home vs SNF.

## 2024-04-15 NOTE — PRE-ANESTHESIA EVALUATION ADULT - NSDENTALSD_ENT_ALL_CORE
wears upper dentures, will remain in place for sedation case/appears normal and intact
missing teeth
caps/bridge/implants

## 2024-04-16 LAB
24R-OH-CALCIDIOL SERPL-MCNC: 32 NG/ML — SIGNIFICANT CHANGE UP (ref 30–80)
ALBUMIN SERPL ELPH-MCNC: 3.3 G/DL — LOW (ref 3.5–5.2)
ALP SERPL-CCNC: 272 U/L — HIGH (ref 30–115)
ALT FLD-CCNC: 36 U/L — SIGNIFICANT CHANGE UP (ref 0–41)
ANION GAP SERPL CALC-SCNC: 14 MMOL/L — SIGNIFICANT CHANGE UP (ref 7–14)
ANION GAP SERPL CALC-SCNC: 17 MMOL/L — HIGH (ref 7–14)
AST SERPL-CCNC: 24 U/L — SIGNIFICANT CHANGE UP (ref 0–41)
BASOPHILS # BLD AUTO: 0.06 K/UL — SIGNIFICANT CHANGE UP (ref 0–0.2)
BASOPHILS NFR BLD AUTO: 0.5 % — SIGNIFICANT CHANGE UP (ref 0–1)
BILIRUB SERPL-MCNC: 0.9 MG/DL — SIGNIFICANT CHANGE UP (ref 0.2–1.2)
BUN SERPL-MCNC: 52 MG/DL — HIGH (ref 10–20)
BUN SERPL-MCNC: 59 MG/DL — HIGH (ref 10–20)
CALCIUM SERPL-MCNC: 7.8 MG/DL — LOW (ref 8.4–10.5)
CALCIUM SERPL-MCNC: 7.9 MG/DL — LOW (ref 8.4–10.5)
CALCIUM SERPL-MCNC: 8.1 MG/DL — LOW (ref 8.4–10.5)
CHLORIDE SERPL-SCNC: 104 MMOL/L — SIGNIFICANT CHANGE UP (ref 98–110)
CHLORIDE SERPL-SCNC: 108 MMOL/L — SIGNIFICANT CHANGE UP (ref 98–110)
CO2 SERPL-SCNC: 21 MMOL/L — SIGNIFICANT CHANGE UP (ref 17–32)
CO2 SERPL-SCNC: 27 MMOL/L — SIGNIFICANT CHANGE UP (ref 17–32)
CREAT SERPL-MCNC: 2 MG/DL — HIGH (ref 0.7–1.5)
CREAT SERPL-MCNC: 2.1 MG/DL — HIGH (ref 0.7–1.5)
EGFR: 23 ML/MIN/1.73M2 — LOW
EGFR: 25 ML/MIN/1.73M2 — LOW
EOSINOPHIL # BLD AUTO: 0.36 K/UL — SIGNIFICANT CHANGE UP (ref 0–0.7)
EOSINOPHIL NFR BLD AUTO: 2.9 % — SIGNIFICANT CHANGE UP (ref 0–8)
GLUCOSE SERPL-MCNC: 101 MG/DL — HIGH (ref 70–99)
GLUCOSE SERPL-MCNC: 91 MG/DL — SIGNIFICANT CHANGE UP (ref 70–99)
HCT VFR BLD CALC: 35.8 % — LOW (ref 37–47)
HGB BLD-MCNC: 10.8 G/DL — LOW (ref 12–16)
IMM GRANULOCYTES NFR BLD AUTO: 0.6 % — HIGH (ref 0.1–0.3)
LEGIONELLA AG UR QL: NEGATIVE — SIGNIFICANT CHANGE UP
LYMPHOCYTES # BLD AUTO: 1.52 K/UL — SIGNIFICANT CHANGE UP (ref 1.2–3.4)
LYMPHOCYTES # BLD AUTO: 12.2 % — LOW (ref 20.5–51.1)
MAGNESIUM SERPL-MCNC: 2.1 MG/DL — SIGNIFICANT CHANGE UP (ref 1.8–2.4)
MAGNESIUM SERPL-MCNC: 2.4 MG/DL — SIGNIFICANT CHANGE UP (ref 1.8–2.4)
MCHC RBC-ENTMCNC: 26.2 PG — LOW (ref 27–31)
MCHC RBC-ENTMCNC: 30.2 G/DL — LOW (ref 32–37)
MCV RBC AUTO: 86.9 FL — SIGNIFICANT CHANGE UP (ref 81–99)
MONOCYTES # BLD AUTO: 0.95 K/UL — HIGH (ref 0.1–0.6)
MONOCYTES NFR BLD AUTO: 7.6 % — SIGNIFICANT CHANGE UP (ref 1.7–9.3)
NEUTROPHILS # BLD AUTO: 9.48 K/UL — HIGH (ref 1.4–6.5)
NEUTROPHILS NFR BLD AUTO: 76.2 % — HIGH (ref 42.2–75.2)
NRBC # BLD: 0 /100 WBCS — SIGNIFICANT CHANGE UP (ref 0–0)
PHOSPHATE SERPL-MCNC: 4.6 MG/DL — SIGNIFICANT CHANGE UP (ref 2.1–4.9)
PLATELET # BLD AUTO: 332 K/UL — SIGNIFICANT CHANGE UP (ref 130–400)
PMV BLD: 11.6 FL — HIGH (ref 7.4–10.4)
POTASSIUM SERPL-MCNC: 3.3 MMOL/L — LOW (ref 3.5–5)
POTASSIUM SERPL-MCNC: 4.5 MMOL/L — SIGNIFICANT CHANGE UP (ref 3.5–5)
POTASSIUM SERPL-SCNC: 3.3 MMOL/L — LOW (ref 3.5–5)
POTASSIUM SERPL-SCNC: 4.5 MMOL/L — SIGNIFICANT CHANGE UP (ref 3.5–5)
PROT SERPL-MCNC: 5.1 G/DL — LOW (ref 6–8)
PTH-INTACT FLD-MCNC: 156 PG/ML — HIGH (ref 15–65)
RBC # BLD: 4.12 M/UL — LOW (ref 4.2–5.4)
RBC # FLD: 17.7 % — HIGH (ref 11.5–14.5)
SODIUM SERPL-SCNC: 145 MMOL/L — SIGNIFICANT CHANGE UP (ref 135–146)
SODIUM SERPL-SCNC: 146 MMOL/L — SIGNIFICANT CHANGE UP (ref 135–146)
UUN UR-MCNC: 420 MG/DL — SIGNIFICANT CHANGE UP
WBC # BLD: 12.45 K/UL — HIGH (ref 4.8–10.8)
WBC # FLD AUTO: 12.45 K/UL — HIGH (ref 4.8–10.8)

## 2024-04-16 PROCEDURE — 93284 PRGRMG EVAL IMPLANTABLE DFB: CPT | Mod: 26

## 2024-04-16 PROCEDURE — 99233 SBSQ HOSP IP/OBS HIGH 50: CPT

## 2024-04-16 PROCEDURE — 93010 ELECTROCARDIOGRAM REPORT: CPT

## 2024-04-16 PROCEDURE — 71046 X-RAY EXAM CHEST 2 VIEWS: CPT | Mod: 26

## 2024-04-16 PROCEDURE — 99233 SBSQ HOSP IP/OBS HIGH 50: CPT | Mod: 24

## 2024-04-16 RX ORDER — POTASSIUM CHLORIDE 20 MEQ
20 PACKET (EA) ORAL ONCE
Refills: 0 | Status: COMPLETED | OUTPATIENT
Start: 2024-04-16 | End: 2024-04-16

## 2024-04-16 RX ORDER — FUROSEMIDE 40 MG
40 TABLET ORAL ONCE
Refills: 0 | Status: COMPLETED | OUTPATIENT
Start: 2024-04-16 | End: 2024-04-16

## 2024-04-16 RX ORDER — METOPROLOL TARTRATE 50 MG
50 TABLET ORAL EVERY 6 HOURS
Refills: 0 | Status: DISCONTINUED | OUTPATIENT
Start: 2024-04-16 | End: 2024-04-19

## 2024-04-16 RX ORDER — POTASSIUM CHLORIDE 20 MEQ
40 PACKET (EA) ORAL ONCE
Refills: 0 | Status: COMPLETED | OUTPATIENT
Start: 2024-04-16 | End: 2024-04-16

## 2024-04-16 RX ADMIN — Medication 5 MILLIGRAM(S): at 17:20

## 2024-04-16 RX ADMIN — Medication 50 MILLIGRAM(S): at 17:20

## 2024-04-16 RX ADMIN — Medication 250 MILLIGRAM(S): at 09:03

## 2024-04-16 RX ADMIN — Medication 50 MILLIGRAM(S): at 05:43

## 2024-04-16 RX ADMIN — AZITHROMYCIN 500 MILLIGRAM(S): 500 TABLET, FILM COATED ORAL at 12:22

## 2024-04-16 RX ADMIN — Medication 100 MILLIGRAM(S): at 21:59

## 2024-04-16 RX ADMIN — Medication 20 MILLIEQUIVALENT(S): at 17:50

## 2024-04-16 RX ADMIN — POLYETHYLENE GLYCOL 3350 17 GRAM(S): 17 POWDER, FOR SOLUTION ORAL at 12:22

## 2024-04-16 RX ADMIN — Medication 175 MICROGRAM(S): at 05:43

## 2024-04-16 RX ADMIN — Medication 5 MILLIGRAM(S): at 05:43

## 2024-04-16 RX ADMIN — CABERGOLINE 0.25 MILLIGRAM(S): 0.5 TABLET ORAL at 14:27

## 2024-04-16 RX ADMIN — Medication 40 MILLIGRAM(S): at 12:24

## 2024-04-16 RX ADMIN — CEFTRIAXONE 100 MILLIGRAM(S): 500 INJECTION, POWDER, FOR SOLUTION INTRAMUSCULAR; INTRAVENOUS at 21:59

## 2024-04-16 RX ADMIN — ATORVASTATIN CALCIUM 20 MILLIGRAM(S): 80 TABLET, FILM COATED ORAL at 21:58

## 2024-04-16 RX ADMIN — Medication 40 MILLIEQUIVALENT(S): at 12:24

## 2024-04-16 RX ADMIN — SENNA PLUS 2 TABLET(S): 8.6 TABLET ORAL at 21:58

## 2024-04-16 RX ADMIN — Medication 50 MILLIGRAM(S): at 12:25

## 2024-04-16 RX ADMIN — BENZOCAINE AND MENTHOL 1 LOZENGE: 5; 1 LIQUID ORAL at 12:21

## 2024-04-16 NOTE — PROGRESS NOTE ADULT - ASSESSMENT
KOBI, Cr worsening, no hydro on renal sono, bland UA with trace proteinuria and low Cristopher+   CKD stage 3-4  b/l proteinaceous and hemorrhagic renal cysts  Afib / s/p PPM 4/15  chronic, intermittent, mild hypernatremia  chronic HFpEF   anemia  FELIX  HTN  hypothyroidism / thyroid Ca / pituitary adenoma     plan:    may resume outpatient po lasix  kcl supplementation   trend renal function and lytes  outpt renal f/u  full code

## 2024-04-16 NOTE — PROGRESS NOTE ADULT - ASSESSMENT
A/P   Patient  s/p MDT PPM    - CXR shows leads in good position.  No ptx  - Device interrogation done.  No events.  Device is functioning properly.  - Pt cannot raise left arm above shoulder level x 6 weeks  - Pt cannot lift >5 pounds with left arm x 6 weeks  - no shower x 1 week  - no bath/swimming x 6 weeks  - ok to discharge home today  - Follow up in 1 week with EP  - Resume anticoagulation tomorrow evening  - give one dose of lasix this morning

## 2024-04-16 NOTE — PROGRESS NOTE ADULT - ASSESSMENT
84yo female with PMHx AFIB on Xarelto, amiodarone, metoprolol presents c/o palpitations and SOB x 1 week. Pt reports she was recently seen yesterday by her PMD Dr Savage and cardiologist Dr Quintero. her Lasix was increased from 20-40mg and her metoprolol was increased from 25mg BID to 50mg BID. Pt has had multiple failed cardioversions for AFIB and reports increasing medication dosages has not relieved symptoms so she presented to the ED, found with AFIB with RVR",     Assessment and Plan  :   #Chronic Atrial Fibrillation/Flutter with Rapid Ventricular Response: -s/p failed DCCV in the past.   -CHADVasc = 5 , was on home Xarelto , switched to eliquis for KOBI   -HR is not well controlled > patient was going for AVN ablation w/ ppm placement with EP ( PPM placed but doesn't appear to have done an AVN ablation > will check w/ EP today as there_   - resume eliquis if cleared by EP.   -  inc metoprolol to 50 mg q6 as HR is not controlled yet      #Chronic HFpEF > now warm and dry   -on admission > Patient with mild SOB, no leg edema. Pro-BNP 7789 and CXR 4/11 showed bilateral hilar opacities, pulmonary congestion.  -Echo showed LVEF 50-55%, severe asymmetric LVH, mod reduced RV systolic function, mild to mod MR, mild to mod TR, mod [place], mod pulmonary HTN.    -Lasix dose was increased recently outpatient from 20 to 40mg po daily. ( now off lasix since the 13th of april )   - CXR 4/13 still with bilateral opacities, CT chest on 4/14 showed consolidations on right lung w/ mild b/l pleural effusion ( pending official read )   - Patient with leukocytosis, appears dry, concern for  pneumonia, sent RVP, legionella Procalcitonin and started on rocephin/ azithromycin ( start date 4/14 )   - procal is 0.14 ( not high ) , RVP Negative   - hold off diuretics for now       #Acute Kidney Injury on CKD [id number] " KDIGO stage I "  #Mild hypernatremia ( resolved )   #b/l renal cysts   -Crimproving today 2.1 down from 2.8 (s/p IV lasix one dose yesterday )   Cr baseline Around 1.6-1.7  -Holding diuretic for now.   -send urine studies ( urine urea pending  , Renal US with out hydronephrosis   -recent outpt MRI kidney from [facility] 4/4 reviewed, no renal mass, just proteinaceous and hemorrhagic renal cysts    #Iron Deficiency anemia:   -Iron level 25, iron sat 11% , ferritin 53   -Start on iron ( IV vs PO ) once infection resolves     #Hypothyroidism  -History of Thyroid cancer s/p thyroidectomy.   -TSH 3.0, continue synthroid 175 mcg     #Pituitary adenoma  -on Cabergoline 1/2 tab of 0.5 mg Q weekly Monday > non formulary filled     #DVT Prophylaxis: Eliquis. on hold for procedure  #GI ppx : no need  #Diet : DASH  # Full code    pending > KOBI improvement , HR control , follow w/ EP , Urine Urea

## 2024-04-16 NOTE — PHYSICAL THERAPY INITIAL EVALUATION ADULT - TRANSFER SAFETY CONCERNS NOTED: SIT/STAND, REHAB EVAL
Addended by: Diana Lindquist on: 6/22/2023 10:54 AM     Modules accepted: Orders
decreased sequencing ability/decreased weight-shifting ability

## 2024-04-16 NOTE — PROGRESS NOTE ADULT - TIME BILLING
speaking to the patient, physical exam, review labs, CXR, CT chest, images, tele, discussed with cardiology attending, acute care.
Speaking to the patient, physical exam, review labs, images, tele, acute care.
speaking to the patient, physical exam, review labs, acute care
Complex pt, care coordination

## 2024-04-16 NOTE — PHYSICAL THERAPY INITIAL EVALUATION ADULT - GAIT DEVIATIONS NOTED, PT EVAL
Narrow Angle Anatomy-Explained narrow anatomy and what it can mean if it is left untreated. -Reviewed the signs and symptoms of a narrow angle attack. s/p yag pi oupatent oumonitor*PRIMARY OPEN ANGLE GLAUCOMA:-stable iop-cont timolol 0.5% qam ou-monitor for changesCataract(s)-Visually significant.-Cataract(s) causing symptomatic impairment of visual function not correctable with a tolerable change in glasses or contact lenses lighting or non-operative means resulting in specific activity limitations and/or participation restrictions including but not limited to reading viewing television driving or meeting vocational or recreational needs. -Expectation is clearer vision and reduced glare disability after cataract removal.-Refer to Dr Marlena Calvin for cataract evaluation; 's Notes: 40 dollar visits
decreased mian/increased time in double stance/decreased step length/decreased stride length

## 2024-04-16 NOTE — PROGRESS NOTE ADULT - ASSESSMENT
82yo female with PMHx AFIB on Xarelto, amiodarone, metoprolol presents c/o palpitations and SOB x 1 week. Pt reports she was recently seen yesterday by her PMD Dr Savage and cardiologist Dr Quintero. her Lasix was increased from 20-40mg and her metoprolol was increased from 25mg BID to 50mg BID. Pt has had multiple failed cardioversions for AFIB and reports increasing medication dosages has not relieved symptoms so she presented to the ED, found with AFIB with RVR",     A/P:   Chronic Atrial Fibrillation/Flutter with Rapid Ventricular Response:   HR is not well controlled.   s/p failed DCCV in the past.   Increase Metoprolol to 50mg q 6hrs. Switch Xarelto to Eliquis due to CKD.   S/p Pacemaker placement 4/15. AVN ablation outpatient.     Acute on Chronic HFpEF:   Possible bilateral multifocal pneumonia:   Patient with mild SOB, no leg edema. Pro-BNP 7789  CXR 4/11 showed bilateral hilar opacities, pulmonary congestion.  Echo showed LVEF 50-55%, severe asymmetric LVH, mod reduced RV systolic function, mild to mod MR, mild to mod TR, mod [place], mod pulmonary HTN.    Lasix dose was increased recently outpatient from 20 to 40mg po daily.  CXR 4/13 still with bilateral opacities.   Patient with leukocytosis, looks dry, concern if this finding is pneumonia,   Ct chest 4/14 showed Multifocal bilateral groundglass opacities and consolidative opacities,   RVP, legionella and Strep Ag in urine negative,  Procalcitonin 0.14.    Continue on Zithromax and Rocephin.   Lasix 40mg Iv today.     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     Hypothyroidism  History of Thyroid cancer s/p thyroidectomy.   TSH 3.0, continue synthroid 175 mcg     Pituitary adenoma  Cabergoline 1/2 tab of 0.5 mg Q weekly Monday    DVT Prophylaxis: Eliquis.   #Progress Note Handoff:  Pending (specify): improving Acute Kidney Injury, SOB, PNA.   Family discussion:  Disposition: Home vs SNF.

## 2024-04-16 NOTE — PHYSICAL THERAPY INITIAL EVALUATION ADULT - GENERAL OBSERVATIONS, REHAB EVAL
Chart reviewed, pt off unit for PPM, will f/u as karl.
Chart reviewed, spoke with RN, pt encountered supine, NAD, +prima fit, +tele, agreeable. Pt educated in precautions post PPM, verbalizes understanding. IE completed

## 2024-04-16 NOTE — PHYSICAL THERAPY INITIAL EVALUATION ADULT - PERTINENT HX OF CURRENT PROBLEM, REHAB EVAL
82yo female with PMHx AFIB on Xarelto, amiodarone, metoprolol presents c/o palpitations and SOB x 1 week. s/p PPM 4/15

## 2024-04-16 NOTE — PHYSICAL THERAPY INITIAL EVALUATION ADULT - ADDITIONAL COMMENTS
Lives alone, 5 steps BHR outside to enter 1st floor with half bath, 1 flight BHR to bedrooms and full bath. Independent w/o AD at baseline, uses RW/ SC as needed for community amb.

## 2024-04-16 NOTE — PROGRESS NOTE ADULT - NS ATTEND AMEND GEN_ALL_CORE FT
Persistent AF/RVR  Tachy-kayode syndrome    Increase Metoprolol to 50 mg q8  CRT-P on Monday  NPO p MN  Hold Xarelto starting audra am  I discussed the risks, benefits and alternatives for device implantation.    I reported a risk of major complications at 1% and minor complications at 3%. The details of the procedure and risks associated with undergoing the procedure were discussed in detail including but not limited to, death, myocardial ischemia, stroke, cardiac perforation, potential need for temporary pacemaker implantation, lung damage requiring chest tube (pneumothorax), blood clot, blood collection requiring blood transfusion or repeat surgery (hematoma), infection, or vascular injury. All questions were answered to satisfaction and the patient expressed verbal understanding of the procedure, the benefits, and risks. The patient agreed for the procedure.
s/p CRT-P  AF/RVR    AF better today, but remains   Cont Metoprolol at current dose. Increase if BP tolerates  OAC in 48 hours  Lasix  AVN ablation as OP  Ok to DC from EP standpoint of view

## 2024-04-17 LAB
ALBUMIN SERPL ELPH-MCNC: 3.1 G/DL — LOW (ref 3.5–5.2)
ALP SERPL-CCNC: 228 U/L — HIGH (ref 30–115)
ALT FLD-CCNC: 31 U/L — SIGNIFICANT CHANGE UP (ref 0–41)
ANION GAP SERPL CALC-SCNC: 13 MMOL/L — SIGNIFICANT CHANGE UP (ref 7–14)
AST SERPL-CCNC: 17 U/L — SIGNIFICANT CHANGE UP (ref 0–41)
BASOPHILS # BLD AUTO: 0.05 K/UL — SIGNIFICANT CHANGE UP (ref 0–0.2)
BASOPHILS NFR BLD AUTO: 0.4 % — SIGNIFICANT CHANGE UP (ref 0–1)
BILIRUB SERPL-MCNC: 0.6 MG/DL — SIGNIFICANT CHANGE UP (ref 0.2–1.2)
BUN SERPL-MCNC: 51 MG/DL — HIGH (ref 10–20)
CALCIUM SERPL-MCNC: 8.2 MG/DL — LOW (ref 8.4–10.4)
CHLORIDE SERPL-SCNC: 105 MMOL/L — SIGNIFICANT CHANGE UP (ref 98–110)
CO2 SERPL-SCNC: 26 MMOL/L — SIGNIFICANT CHANGE UP (ref 17–32)
CREAT SERPL-MCNC: 2.1 MG/DL — HIGH (ref 0.7–1.5)
EGFR: 23 ML/MIN/1.73M2 — LOW
EOSINOPHIL # BLD AUTO: 0.34 K/UL — SIGNIFICANT CHANGE UP (ref 0–0.7)
EOSINOPHIL NFR BLD AUTO: 2.7 % — SIGNIFICANT CHANGE UP (ref 0–8)
GLUCOSE SERPL-MCNC: 96 MG/DL — SIGNIFICANT CHANGE UP (ref 70–99)
HCT VFR BLD CALC: 34.8 % — LOW (ref 37–47)
HGB BLD-MCNC: 10.5 G/DL — LOW (ref 12–16)
IMM GRANULOCYTES NFR BLD AUTO: 0.6 % — HIGH (ref 0.1–0.3)
LYMPHOCYTES # BLD AUTO: 1.81 K/UL — SIGNIFICANT CHANGE UP (ref 1.2–3.4)
LYMPHOCYTES # BLD AUTO: 14.4 % — LOW (ref 20.5–51.1)
MAGNESIUM SERPL-MCNC: 2.2 MG/DL — SIGNIFICANT CHANGE UP (ref 1.8–2.4)
MCHC RBC-ENTMCNC: 26.4 PG — LOW (ref 27–31)
MCHC RBC-ENTMCNC: 30.2 G/DL — LOW (ref 32–37)
MCV RBC AUTO: 87.4 FL — SIGNIFICANT CHANGE UP (ref 81–99)
MONOCYTES # BLD AUTO: 1.15 K/UL — HIGH (ref 0.1–0.6)
MONOCYTES NFR BLD AUTO: 9.1 % — SIGNIFICANT CHANGE UP (ref 1.7–9.3)
NEUTROPHILS # BLD AUTO: 9.16 K/UL — HIGH (ref 1.4–6.5)
NEUTROPHILS NFR BLD AUTO: 72.8 % — SIGNIFICANT CHANGE UP (ref 42.2–75.2)
NRBC # BLD: 0 /100 WBCS — SIGNIFICANT CHANGE UP (ref 0–0)
PHOSPHATE SERPL-MCNC: 4.4 MG/DL — SIGNIFICANT CHANGE UP (ref 2.1–4.9)
PLATELET # BLD AUTO: 283 K/UL — SIGNIFICANT CHANGE UP (ref 130–400)
PMV BLD: 11.7 FL — HIGH (ref 7.4–10.4)
POTASSIUM SERPL-MCNC: 3.5 MMOL/L — SIGNIFICANT CHANGE UP (ref 3.5–5)
POTASSIUM SERPL-SCNC: 3.5 MMOL/L — SIGNIFICANT CHANGE UP (ref 3.5–5)
PROT SERPL-MCNC: 4.8 G/DL — LOW (ref 6–8)
RBC # BLD: 3.98 M/UL — LOW (ref 4.2–5.4)
RBC # FLD: 17.7 % — HIGH (ref 11.5–14.5)
SODIUM SERPL-SCNC: 144 MMOL/L — SIGNIFICANT CHANGE UP (ref 135–146)
WBC # BLD: 12.58 K/UL — HIGH (ref 4.8–10.8)
WBC # FLD AUTO: 12.58 K/UL — HIGH (ref 4.8–10.8)

## 2024-04-17 PROCEDURE — 99232 SBSQ HOSP IP/OBS MODERATE 35: CPT

## 2024-04-17 RX ORDER — ALPRAZOLAM 0.25 MG
0.25 TABLET ORAL ONCE
Refills: 0 | Status: DISCONTINUED | OUTPATIENT
Start: 2024-04-17 | End: 2024-04-17

## 2024-04-17 RX ORDER — APIXABAN 2.5 MG/1
2.5 TABLET, FILM COATED ORAL EVERY 12 HOURS
Refills: 0 | Status: DISCONTINUED | OUTPATIENT
Start: 2024-04-17 | End: 2024-04-19

## 2024-04-17 RX ORDER — POTASSIUM CHLORIDE 20 MEQ
40 PACKET (EA) ORAL ONCE
Refills: 0 | Status: COMPLETED | OUTPATIENT
Start: 2024-04-17 | End: 2024-04-17

## 2024-04-17 RX ORDER — APIXABAN 2.5 MG/1
2.5 TABLET, FILM COATED ORAL EVERY 12 HOURS
Refills: 0 | Status: DISCONTINUED | OUTPATIENT
Start: 2024-04-17 | End: 2024-04-17

## 2024-04-17 RX ORDER — POTASSIUM CHLORIDE 20 MEQ
20 PACKET (EA) ORAL ONCE
Refills: 0 | Status: DISCONTINUED | OUTPATIENT
Start: 2024-04-17 | End: 2024-04-17

## 2024-04-17 RX ORDER — CHLORHEXIDINE GLUCONATE 213 G/1000ML
1 SOLUTION TOPICAL
Refills: 0 | Status: DISCONTINUED | OUTPATIENT
Start: 2024-04-17 | End: 2024-04-19

## 2024-04-17 RX ADMIN — CHLORHEXIDINE GLUCONATE 1 APPLICATION(S): 213 SOLUTION TOPICAL at 11:04

## 2024-04-17 RX ADMIN — Medication 5 MILLIGRAM(S): at 06:28

## 2024-04-17 RX ADMIN — Medication 50 MILLIGRAM(S): at 02:11

## 2024-04-17 RX ADMIN — AZITHROMYCIN 500 MILLIGRAM(S): 500 TABLET, FILM COATED ORAL at 11:05

## 2024-04-17 RX ADMIN — Medication 5 MILLIGRAM(S): at 21:00

## 2024-04-17 RX ADMIN — Medication 175 MICROGRAM(S): at 06:28

## 2024-04-17 RX ADMIN — Medication 40 MILLIEQUIVALENT(S): at 11:07

## 2024-04-17 RX ADMIN — BENZOCAINE AND MENTHOL 1 LOZENGE: 5; 1 LIQUID ORAL at 11:05

## 2024-04-17 RX ADMIN — Medication 50 MILLIGRAM(S): at 23:41

## 2024-04-17 RX ADMIN — APIXABAN 2.5 MILLIGRAM(S): 2.5 TABLET, FILM COATED ORAL at 18:13

## 2024-04-17 RX ADMIN — POLYETHYLENE GLYCOL 3350 17 GRAM(S): 17 POWDER, FOR SOLUTION ORAL at 11:05

## 2024-04-17 RX ADMIN — Medication 50 MILLIGRAM(S): at 18:13

## 2024-04-17 RX ADMIN — Medication 5 MILLIGRAM(S): at 18:14

## 2024-04-17 RX ADMIN — CEFTRIAXONE 100 MILLIGRAM(S): 500 INJECTION, POWDER, FOR SOLUTION INTRAMUSCULAR; INTRAVENOUS at 20:59

## 2024-04-17 RX ADMIN — ATORVASTATIN CALCIUM 20 MILLIGRAM(S): 80 TABLET, FILM COATED ORAL at 21:01

## 2024-04-17 RX ADMIN — Medication 50 MILLIGRAM(S): at 11:07

## 2024-04-17 RX ADMIN — Medication 100 MILLIGRAM(S): at 21:01

## 2024-04-17 RX ADMIN — Medication 50 MILLIGRAM(S): at 06:28

## 2024-04-17 RX ADMIN — Medication 0.25 MILLIGRAM(S): at 19:09

## 2024-04-17 RX ADMIN — SENNA PLUS 2 TABLET(S): 8.6 TABLET ORAL at 21:01

## 2024-04-17 NOTE — PROGRESS NOTE ADULT - ASSESSMENT
84yo female with PMHx AFIB on Xarelto, amiodarone, metoprolol presents c/o palpitations and SOB x 1 week. Pt reports she was recently seen yesterday by her PMD Dr Savage and cardiologist Dr Quintero. her Lasix was increased from 20-40mg and her metoprolol was increased from 25mg BID to 50mg BID. Pt has had multiple failed cardioversions for AFIB and reports increasing medication dosages has not relieved symptoms so she presented to the ED, found with AFIB with RVR",     A/P:   Chronic Atrial Fibrillation/Flutter with Rapid Ventricular Response:   HR is not well controlled.   s/p failed DCCV in the past.   Continue Metoprolol to 50mg q 6hrs. Switch Xarelto to Eliquis due to CKD.   S/p Pacemaker placement 4/15. AVN ablation outpatient.     Acute on Chronic HFpEF:   Possible bilateral multifocal pneumonia:   Patient with mild SOB, no leg edema. Pro-BNP 7789  CXR 4/11 showed bilateral hilar opacities, pulmonary congestion.  Echo showed LVEF 50-55%, severe asymmetric LVH, mod reduced RV systolic function, mild to mod MR, mild to mod TR, mod MT, mod pulmonary HTN.    Lasix dose was increased recently outpatient from 20 to 40mg po daily.  CXR 4/13 still with bilateral opacities.   Patient with leukocytosis, looks dry, concern if this finding is pneumonia,   Ct chest 4/14 showed Multifocal bilateral groundglass opacities and consolidative opacities,   RVP, legionella and Strep Ag in urine negative,  Procalcitonin 0.14.    Continue on Zithromax and Rocephin.   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     Hypothyroidism  History of Thyroid cancer s/p thyroidectomy.   TSH 3.0, continue synthroid 175 mcg     Pituitary adenoma  Cabergoline 1/2 tab of 0.5 mg Q weekly Monday    DVT Prophylaxis: Eliquis.   #Progress Note Handoff:  Pending (specify): improving Acute Kidney Injury, SOB, PNA.   Family discussion:  Disposition: Home in 24hrs

## 2024-04-17 NOTE — PROGRESS NOTE ADULT - ASSESSMENT
KOBI, better  CKD stage 3-4  b/l proteinaceous and hemorrhagic renal cysts  Afib / s/p PPM 4/15  chronic, intermittent, mild hypernatremia  chronic HFpEF   anemia  FELIX  HTN  hypothyroidism / thyroid Ca / pituitary adenoma     plan:    kcl supplementation prn  trend renal function and lytes  outpt renal f/u  d/w family

## 2024-04-17 NOTE — PROGRESS NOTE ADULT - ASSESSMENT
82yo female with PMHx AFIB on Xarelto, amiodarone, metoprolol presents c/o palpitations and SOB x 1 week. Pt reports she was recently seen yesterday by her PMD Dr Savage and cardiologist Dr Quintero. her Lasix was increased from 20-40mg and her metoprolol was increased from 25mg BID to 50mg BID. Pt has had multiple failed cardioversions for AFIB and reports increasing medication dosages has not relieved symptoms so she presented to the ED, found with AFIB with RVR",     Assessment and Plan  :   #Chronic Atrial Fibrillation/Flutter with Rapid Ventricular Response: -s/p failed DCCV in the past.   -CHADVasc = 5 , was on home Xarelto , switched to eliquis for KOBI   -HR is not well controlled > patient was going for AVN ablation w/ ppm placement with EP ( PPM placed but doesn't appear to have done an AVN ablation > will check w/ EP today as there_   - resume eliquis if cleared by EP.   -  inc metoprolol to 50 mg q6 as HR is not controlled yet      #Chronic HFpEF > now warm and dry   -on admission > Patient with mild SOB, no leg edema. Pro-BNP 7789 and CXR 4/11 showed bilateral hilar opacities, pulmonary congestion.  -Echo showed LVEF 50-55%, severe asymmetric LVH, mod reduced RV systolic function, mild to mod MR, mild to mod TR, mod IL, mod pulmonary HTN.    -Lasix dose was increased recently outpatient from 20 to 40mg po daily. ( now off lasix since the 13th of april )   - CXR 4/13 still with bilateral opacities, CT chest on 4/14 showed consolidations on right lung w/ mild b/l pleural effusion ( pending official read )   - Patient with leukocytosis, appears dry, concern for  pneumonia, sent RVP, legionella Procalcitonin and started on rocephin/ azithromycin ( start date 4/14 )   - procal is 0.14 ( not high ) , RVP Negative   - hold off diuretics for now       #Acute Kidney Injury on CKD [id number] " KDIGO stage I "  #Mild hypernatremia ( resolved )   #b/l renal cysts   -Crimproving today 2.1 down from 2.8 (s/p IV lasix one dose yesterday )   Cr baseline Around 1.6-1.7  -Holding diuretic for now.   -send urine studies ( urine urea pending  , Renal US with out hydronephrosis   -recent outpt MRI kidney from [facility] 4/4 reviewed, no renal mass, just proteinaceous and hemorrhagic renal cysts    #Iron Deficiency anemia:   -Iron level 25, iron sat 11% , ferritin 53   -Start on iron ( IV vs PO ) once infection resolves     #Hypothyroidism  -History of Thyroid cancer s/p thyroidectomy.   -TSH 3.0, continue synthroid 175 mcg     #Pituitary adenoma  -on Cabergoline 1/2 tab of 0.5 mg Q weekly Monday > non formulary filled     #DVT Prophylaxis: Eliquis. on hold for procedure  #GI ppx : no need  #Diet : DASH  # Full code    pending > KOBI improvement , HR control , follow w/ EP    84yo female with PMHx AFIB on Xarelto, amiodarone, metoprolol presents c/o palpitations and SOB x 1 week. Pt reports she was recently seen yesterday by her PMD Dr Savage and cardiologist Dr Quintero. her Lasix was increased from 20-40mg and her metoprolol was increased from 25mg BID to 50mg BID. Pt has had multiple failed cardioversions for AFIB and reports increasing medication dosages has not relieved symptoms so she presented to the ED, found with AFIB with RVR",     Assessment and Plan  :   #Chronic Atrial Fibrillation/Flutter with Rapid Ventricular Response: -s/p failed DCCV in the past. , now s/p CRT-P on 4/15   -CHADVasc = 5 , was on home Xarelto , switched to eliquis for KOBI   -HR is not well controlled > patient s/p CRT-P on 4/15 by EP " Dr Kothari"   - has high burden PVC's w. lots of runs of NSVTs  -  inc metoprolol to 50 mg q6 and EP follow up   - resume eliquis today       #Chronic HFpEF > now warm and dry   -on admission > Patient with mild SOB, no leg edema. Pro-BNP 7789 and CXR 4/11 showed bilateral hilar opacities, pulmonary congestion.  -Echo showed LVEF 50-55%, severe asymmetric LVH, mod reduced RV systolic function, mild to mod MR, mild to mod TR, mod SC, mod pulmonary HTN.    -Lasix dose was increased recently outpatient from 20 to 40mg po daily. ( now off lasix since the 13th of april )   - CXR 4/13 still with bilateral opacities, CT chest on 4/14 showed consolidations on right lung w/ mild b/l pleural effusion ( pending official read )   - Patient with leukocytosis, appears dry, concern for  pneumonia, sent RVP, legionella Procalcitonin and started on rocephin/ azithromycin ( start date 4/14 ) > finish 5 day course   - procal is 0.14 ( not high ) , RVP Negative   - hold off diuretics for now       #Acute Kidney Injury on CKD [id number] " KDIGO stage I "  #Mild hypernatremia ( resolved )   #b/l renal cysts   -Cr improving s/p IV lasix one dose yesterday )   Cr baseline Around 1.6-1.7  - FeUrea = 21% ( consistent w/ pre-renal KOBI )   -renal US with out hydronephrosis   -Holding diuretic for now as she is euvolemic on exam   -recent outpt MRI kidney from [facility] 4/4 reviewed, no renal mass, just proteinaceous and hemorrhagic renal cysts    #Iron Deficiency anemia:   -Iron level 25, iron sat 11% , ferritin 53   -Start on iron ( IV vs PO ) once finished antibiotics     #Hypothyroidism  -History of Thyroid cancer s/p thyroidectomy.   -TSH 3.0, continue synthroid 175 mcg     #Pituitary adenoma  -on Cabergoline 1/2 tab of 0.5 mg Q weekly Monday > non formulary filled     #DVT Prophylaxis: Eliquis. on hold for procedure  #GI ppx : no need  #Diet : DASH  # Full code    pending > KOBI , HR control , follow w/ EP

## 2024-04-18 ENCOUNTER — TRANSCRIPTION ENCOUNTER (OUTPATIENT)
Age: 84
End: 2024-04-18

## 2024-04-18 LAB
ALBUMIN SERPL ELPH-MCNC: 3.1 G/DL — LOW (ref 3.5–5.2)
ALP SERPL-CCNC: 186 U/L — HIGH (ref 30–115)
ALT FLD-CCNC: 23 U/L — SIGNIFICANT CHANGE UP (ref 0–41)
ANION GAP SERPL CALC-SCNC: 13 MMOL/L — SIGNIFICANT CHANGE UP (ref 7–14)
AST SERPL-CCNC: 14 U/L — SIGNIFICANT CHANGE UP (ref 0–41)
BASOPHILS # BLD AUTO: 0.06 K/UL — SIGNIFICANT CHANGE UP (ref 0–0.2)
BASOPHILS NFR BLD AUTO: 0.5 % — SIGNIFICANT CHANGE UP (ref 0–1)
BILIRUB SERPL-MCNC: 0.4 MG/DL — SIGNIFICANT CHANGE UP (ref 0.2–1.2)
BUN SERPL-MCNC: 44 MG/DL — HIGH (ref 10–20)
CALCIUM SERPL-MCNC: 8.4 MG/DL — SIGNIFICANT CHANGE UP (ref 8.4–10.5)
CHLORIDE SERPL-SCNC: 107 MMOL/L — SIGNIFICANT CHANGE UP (ref 98–110)
CO2 SERPL-SCNC: 27 MMOL/L — SIGNIFICANT CHANGE UP (ref 17–32)
CREAT SERPL-MCNC: 1.9 MG/DL — HIGH (ref 0.7–1.5)
EGFR: 26 ML/MIN/1.73M2 — LOW
EOSINOPHIL # BLD AUTO: 0.41 K/UL — SIGNIFICANT CHANGE UP (ref 0–0.7)
EOSINOPHIL NFR BLD AUTO: 3.6 % — SIGNIFICANT CHANGE UP (ref 0–8)
GLUCOSE SERPL-MCNC: 137 MG/DL — HIGH (ref 70–99)
HCT VFR BLD CALC: 32.4 % — LOW (ref 37–47)
HGB BLD-MCNC: 9.6 G/DL — LOW (ref 12–16)
IMM GRANULOCYTES NFR BLD AUTO: 0.6 % — HIGH (ref 0.1–0.3)
LYMPHOCYTES # BLD AUTO: 1.58 K/UL — SIGNIFICANT CHANGE UP (ref 1.2–3.4)
LYMPHOCYTES # BLD AUTO: 14 % — LOW (ref 20.5–51.1)
MAGNESIUM SERPL-MCNC: 2.2 MG/DL — SIGNIFICANT CHANGE UP (ref 1.8–2.4)
MCHC RBC-ENTMCNC: 26.3 PG — LOW (ref 27–31)
MCHC RBC-ENTMCNC: 29.6 G/DL — LOW (ref 32–37)
MCV RBC AUTO: 88.8 FL — SIGNIFICANT CHANGE UP (ref 81–99)
MONOCYTES # BLD AUTO: 0.93 K/UL — HIGH (ref 0.1–0.6)
MONOCYTES NFR BLD AUTO: 8.2 % — SIGNIFICANT CHANGE UP (ref 1.7–9.3)
NEUTROPHILS # BLD AUTO: 8.25 K/UL — HIGH (ref 1.4–6.5)
NEUTROPHILS NFR BLD AUTO: 73.1 % — SIGNIFICANT CHANGE UP (ref 42.2–75.2)
NRBC # BLD: 0 /100 WBCS — SIGNIFICANT CHANGE UP (ref 0–0)
PHOSPHATE SERPL-MCNC: 3.5 MG/DL — SIGNIFICANT CHANGE UP (ref 2.1–4.9)
PLATELET # BLD AUTO: 261 K/UL — SIGNIFICANT CHANGE UP (ref 130–400)
PMV BLD: 11.8 FL — HIGH (ref 7.4–10.4)
POTASSIUM SERPL-MCNC: 3.6 MMOL/L — SIGNIFICANT CHANGE UP (ref 3.5–5)
POTASSIUM SERPL-SCNC: 3.6 MMOL/L — SIGNIFICANT CHANGE UP (ref 3.5–5)
PROT SERPL-MCNC: 4.9 G/DL — LOW (ref 6–8)
RBC # BLD: 3.65 M/UL — LOW (ref 4.2–5.4)
RBC # FLD: 17.8 % — HIGH (ref 11.5–14.5)
SODIUM SERPL-SCNC: 147 MMOL/L — HIGH (ref 135–146)
WBC # BLD: 11.3 K/UL — HIGH (ref 4.8–10.8)
WBC # FLD AUTO: 11.3 K/UL — HIGH (ref 4.8–10.8)

## 2024-04-18 PROCEDURE — 99232 SBSQ HOSP IP/OBS MODERATE 35: CPT

## 2024-04-18 RX ORDER — APIXABAN 2.5 MG/1
1 TABLET, FILM COATED ORAL
Qty: 0 | Refills: 0 | DISCHARGE
Start: 2024-04-18

## 2024-04-18 RX ORDER — FUROSEMIDE 40 MG
1 TABLET ORAL
Refills: 0 | DISCHARGE

## 2024-04-18 RX ORDER — IRON SUCROSE 20 MG/ML
200 INJECTION, SOLUTION INTRAVENOUS EVERY 24 HOURS
Refills: 0 | Status: DISCONTINUED | OUTPATIENT
Start: 2024-04-18 | End: 2024-04-19

## 2024-04-18 RX ORDER — FUROSEMIDE 40 MG
1 TABLET ORAL
Qty: 30 | Refills: 0
Start: 2024-04-18 | End: 2024-05-17

## 2024-04-18 RX ORDER — METOPROLOL TARTRATE 50 MG
1 TABLET ORAL
Refills: 0 | DISCHARGE

## 2024-04-18 RX ORDER — APIXABAN 2.5 MG/1
1 TABLET, FILM COATED ORAL
Qty: 60 | Refills: 0
Start: 2024-04-18 | End: 2024-05-17

## 2024-04-18 RX ORDER — FERROUS SULFATE 325(65) MG
1 TABLET ORAL
Qty: 30 | Refills: 0
Start: 2024-04-18 | End: 2024-05-17

## 2024-04-18 RX ORDER — METOPROLOL TARTRATE 50 MG
1 TABLET ORAL
Qty: 60 | Refills: 0
Start: 2024-04-18 | End: 2024-05-17

## 2024-04-18 RX ORDER — POTASSIUM CHLORIDE 20 MEQ
40 PACKET (EA) ORAL ONCE
Refills: 0 | Status: COMPLETED | OUTPATIENT
Start: 2024-04-18 | End: 2024-04-18

## 2024-04-18 RX ADMIN — Medication 50 MILLIGRAM(S): at 05:20

## 2024-04-18 RX ADMIN — APIXABAN 2.5 MILLIGRAM(S): 2.5 TABLET, FILM COATED ORAL at 17:06

## 2024-04-18 RX ADMIN — BENZOCAINE AND MENTHOL 1 LOZENGE: 5; 1 LIQUID ORAL at 11:12

## 2024-04-18 RX ADMIN — ATORVASTATIN CALCIUM 20 MILLIGRAM(S): 80 TABLET, FILM COATED ORAL at 21:55

## 2024-04-18 RX ADMIN — Medication 100 MILLIGRAM(S): at 21:55

## 2024-04-18 RX ADMIN — AZITHROMYCIN 500 MILLIGRAM(S): 500 TABLET, FILM COATED ORAL at 11:09

## 2024-04-18 RX ADMIN — Medication 175 MICROGRAM(S): at 05:52

## 2024-04-18 RX ADMIN — CEFTRIAXONE 100 MILLIGRAM(S): 500 INJECTION, POWDER, FOR SOLUTION INTRAMUSCULAR; INTRAVENOUS at 21:55

## 2024-04-18 RX ADMIN — POLYETHYLENE GLYCOL 3350 17 GRAM(S): 17 POWDER, FOR SOLUTION ORAL at 11:09

## 2024-04-18 RX ADMIN — Medication 5 MILLIGRAM(S): at 05:20

## 2024-04-18 RX ADMIN — Medication 50 MILLIGRAM(S): at 15:47

## 2024-04-18 RX ADMIN — CHLORHEXIDINE GLUCONATE 1 APPLICATION(S): 213 SOLUTION TOPICAL at 05:21

## 2024-04-18 RX ADMIN — Medication 50 MILLIGRAM(S): at 11:09

## 2024-04-18 RX ADMIN — IRON SUCROSE 110 MILLIGRAM(S): 20 INJECTION, SOLUTION INTRAVENOUS at 13:38

## 2024-04-18 RX ADMIN — Medication 50 MILLIGRAM(S): at 23:38

## 2024-04-18 RX ADMIN — Medication 40 MILLIEQUIVALENT(S): at 17:06

## 2024-04-18 RX ADMIN — SENNA PLUS 2 TABLET(S): 8.6 TABLET ORAL at 21:55

## 2024-04-18 RX ADMIN — APIXABAN 2.5 MILLIGRAM(S): 2.5 TABLET, FILM COATED ORAL at 05:20

## 2024-04-18 RX ADMIN — Medication 5 MILLIGRAM(S): at 17:06

## 2024-04-18 NOTE — PROGRESS NOTE ADULT - ASSESSMENT
82yo female with PMHx AFIB on Xarelto, amiodarone, metoprolol presents c/o palpitations and SOB x 1 week. Pt reports she was recently seen yesterday by her PMD Dr Savage and cardiologist Dr Quintero. her Lasix was increased from 20-40mg and her metoprolol was increased from 25mg BID to 50mg BID. Pt has had multiple failed cardioversions for AFIB and reports increasing medication dosages has not relieved symptoms so she presented to the ED, found with AFIB with RVR",     A/P:   Chronic Atrial Fibrillation/Flutter with Rapid Ventricular Response:   HR is not well controlled.   s/p failed DCCV in the past.   Continue Metoprolol to 50mg q 6hrs. Switch Xarelto to Eliquis due to CKD.   S/p Pacemaker placement 4/15. AVN ablation outpatient.     Acute on Chronic HFpEF:   Possible bilateral multifocal pneumonia:   Patient with mild SOB, no leg edema. Pro-BNP 7789  CXR 4/11 showed bilateral hilar opacities, pulmonary congestion.  Echo showed LVEF 50-55%, severe asymmetric LVH, mod reduced RV systolic function, mild to mod MR, mild to mod TR, mod GA, mod pulmonary HTN.    Lasix dose was increased recently outpatient from 20 to 40mg po daily.  CXR 4/13 still with bilateral opacities.   Patient with leukocytosis, looks dry, concern if this finding is pneumonia,   Ct chest 4/14 showed Multifocal bilateral groundglass opacities and consolidative opacities,   RVP, legionella and Strep Ag in urine negative,  Procalcitonin 0.14.    Continue on Zithromax and Rocephin.   s/p Lasix IV, discharge on Lasix 20mg po daily.     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     Hypothyroidism  History of Thyroid cancer s/p thyroidectomy.   TSH 3.0, continue synthroid 175 mcg     Pituitary adenoma  Cabergoline 1/2 tab of 0.5 mg Q weekly Monday    DVT Prophylaxis: Eliquis.   #Progress Note Handoff:  Pending (specify):   Family discussion:  Disposition: Home in 24hrs

## 2024-04-18 NOTE — DISCHARGE NOTE PROVIDER - NSDCCPCAREPLAN_GEN_ALL_CORE_FT
PRINCIPAL DISCHARGE DIAGNOSIS  Diagnosis: Longstanding persistent atrial fibrillation  Assessment and Plan of Treatment: Atrial Fibrillation  Atrial fibrillation is a type of irregular heartbeat (arrhythmia) where the heart quivers continuously in a chaotic pattern that makes the heart unable to pump blood normally. This can increase the risk for stroke, heart failure, and other heart-related conditions. Atrial fibrillation can be caused by a variety of conditions and may be temporary, intermittent, or permanent. Symptoms include feeling that your heart is beating rapidly or irregularly, chest discomfort, shortness of breath, or dizziness/lightheadedness that may be worse with exertion. Treatment is varied but may involve medication or electrical shock (cardioversion).  SEEK IMMEDIATE MEDICAL CARE IF YOU HAVE ANY OF THE FOLLOWING SYMPTOMS: chest pain, shortness of breath, abdominal pain, sweating, vomiting, blood in vomit/bowel movements/urine, dizziness/lightheadedness, weakness or numbness to face/arm/leg, trouble speaking or understanding, facial droop.  While in the hospital, you were evaluated by Cardiac Electrophysiology and a pacemaker was placed on April 15, 2024. Your medications were also adjusted to better control your heart rate. Your Metoprolol dosing was adjusted. Xarelto was changed to Eliquis due to your Chronic Kidney Disease. Please follow up with your primary care doctor and follow up with Cardiac Electrophysiologist Dr. Anderson Kothari 1 week after discharge for evaluation of AV Valentino ablation.        SECONDARY DISCHARGE DIAGNOSES  Diagnosis: Chronic diastolic congestive heart failure  Assessment and Plan of Treatment: Congestive Heart Failure (CHF)  Congestive heart failure is a chronic condition in which the heart has trouble pumping blood. In some cases of heart failure, fluid may back up into your lungs or you may have swelling (edema) in your lower legs. There are many causes of heart failure including high blood pressure, coronary artery disease, abnormal heart valves, heart muscle disease, lung disease, diabetes, etc. Symptoms include shortness of breath with activity or when lying flat, cough, swelling of the legs, fatigue, or increased urination during the night.   Treatment is aimed at managing the symptoms of heart failure and may include lifestyle changes, medications, or surgical procedures. Take medicines only as directed by your health care provider and do not stop unless instructed to do so. Eat heart-healthy foods with low or no trans/saturated fats, cholesterol and salt. Weigh yourself every day for early recognition of fluid accumulation.  SEEK IMMEDIATE MEDICAL CARE IF YOU HAVE ANY OF THE FOLLOWING SYMPTOMS: shortness of breath, change in mental status, chest pain, lightheadedness/dizziness/fainting, or worsening of symptoms including not being able to conduct normal physical activity.  Please follow up with your Cardiologist in 1-2 weeks following discharge. Your Lasix dosing was adjusted to 20mg Daily to avoid overdiuresis.    Diagnosis: KOBI (acute kidney injury)  Assessment and Plan of Treatment: During your hospital stay you were found to have an Acute Kidney Injury. The exact cause of this injury is unknown but it is likely that you were somewhat dehydrated, causing lack of blood flow to your kidneys and causing some mild reversible kidney injury. The Blood levels have returned back to your baseline.  Follow up with your PMD regarding this issue and if there is any further assessment needed to follow up and or medication adjustments needed.  Keep Hydrated as directed by your physician to prevent future occurences of any Kidney Injury.   SEEK IMMEDIATE MEDICAL CARE IF YOU HAVE ANY OF THE FOLLOWING SYMPTOMS: chest pain, shortness of breath, abdominal pain, sweating, vomiting, blood in vomit/bowel movements/urine, dizziness/lightheadedness, weakness or numbness to face/arm/leg, trouble speaking or understanding, facial droop.

## 2024-04-18 NOTE — DISCHARGE NOTE PROVIDER - NSDCFUSCHEDAPPT_GEN_ALL_CORE_FT
Ivone Augustine  [facility] Physician [facility]  ELECTROPH [address] [facility]  Scheduled Appointment: 05/17/2024    [facility]  UROGYN [address]  Scheduled Appointment: 07/12/2024

## 2024-04-18 NOTE — DISCHARGE NOTE PROVIDER - CARE PROVIDER_API CALL
Tad Savage  Internal Medicine  [facility] [place] [address]  Phone: [phone]  Fax: [phone]  Follow Up Time: 2 weeks    Anderson Kothari  Cardiac Electrophysiology  [address] [place] [id number]  Phone: [phone]  Fax: [phone]  Follow Up Time: 1 week    Madi Quintero  Interventional Cardiology  [address] [place] [address], [address]  [place] [address]  Phone: [phone]  Fax: [phone]  Follow Up Time: 2 weeks

## 2024-04-18 NOTE — PROGRESS NOTE ADULT - ASSESSMENT
KOBI, better  CKD stage 3-4  b/l proteinaceous and hemorrhagic renal cysts  Afib / s/p PPM 4/15  chronic, intermittent, mild hypernatremia  chronic HFpEF   anemia  FELIX  HTN  hypothyroidism / thyroid Ca / pituitary adenoma     plan:    kcl supplementation prn  trend renal function and lytes  CKD counselling   f/u EP  outpt renal f/u  d/w hospitalist

## 2024-04-18 NOTE — CHART NOTE - NSCHARTNOTEFT_GEN_A_CORE
This patient will require a hospital bed for discharge to home due to a diagnosis of  CHF   Pt is not able to make position changes without assistance. It's medically necessary for this patient to receive a hospital bed for CHF.  This patient requires postioning of the body in ways not feasible with an ordinary bed in order to facilitate breathing. This patient required head of the bed to be elevated more than 30 degrees most of the time due to CHF. Pillows and wedges have been considered and ruled out

## 2024-04-18 NOTE — DISCHARGE NOTE PROVIDER - NSDCMRMEDTOKEN_GEN_ALL_CORE_FT
allopurinol 100 mg oral tablet: 1 cap(s) orally once a day (at bedtime)  amiodarone 200 mg oral tablet: 1 tab(s) orally once a day  apixaban 2.5 mg oral tablet: 1 tab(s) orally every 12 hours  atorvastatin 20 mg oral tablet: 1 tab(s) orally once a day  cabergoline 0.5 mg oral tablet: 0.5 tab(s) orally once a week , monday night  furosemide 20 mg oral tablet: 1 tab(s) orally once a day  levothyroxine 175 mcg (0.175 mg) oral tablet: 1 tab(s) orally once a day  Metoprolol Tartrate 100 mg oral tablet: 1 tab(s) orally every 12 hours  trospium 20 mg oral tablet: 1 tab(s) orally once a day   allopurinol 100 mg oral tablet: 1 cap(s) orally once a day (at bedtime)  amiodarone 200 mg oral tablet: 1 tab(s) orally once a day  apixaban 2.5 mg oral tablet: 1 tab(s) orally every 12 hours  atorvastatin 20 mg oral tablet: 1 tab(s) orally once a day  cabergoline 0.5 mg oral tablet: 0.5 tab(s) orally once a week , monday night  ferrous sulfate 325 mg (65 mg elemental iron) oral tablet: 1 tab(s) orally once a day  furosemide 20 mg oral tablet: 1 tab(s) orally once a day  levothyroxine 175 mcg (0.175 mg) oral tablet: 1 tab(s) orally once a day  Metoprolol Tartrate 100 mg oral tablet: 1 tab(s) orally every 12 hours  trospium 20 mg oral tablet: 1 tab(s) orally once a day

## 2024-04-18 NOTE — DISCHARGE NOTE PROVIDER - CARE PROVIDERS DIRECT ADDRESSES
,DirectAddress_Unknown,[email or web address],DirectAddress_Unknown

## 2024-04-18 NOTE — DISCHARGE NOTE PROVIDER - PROVIDER TOKENS
PROVIDER:[TOKEN:[[id number]],FOLLOWUP:[2 weeks]],PROVIDER:[TOKEN:[[id number]],FOLLOWUP:[1 week]],PROVIDER:[TOKEN:[[id number]],FOLLOWUP:[2 weeks]]

## 2024-04-18 NOTE — DISCHARGE NOTE PROVIDER - HOSPITAL COURSE
An 83-year-old female with a past medical history of atrial fibrillation managed with Xarelto, amiodarone, and metoprolol presented to the emergency department complaining of palpitations and shortness of breath persisting for a week. The patient had been evaluated the previous day by her primary care physician and cardiologist, who increased her Lasix from 20 to 40mg and metoprolol from 25mg BID to 50mg BID, but her symptoms persisted. She reported multiple unsuccessful cardioversions for atrial fibrillation and stated that adjusting medication doses did not alleviate her symptoms, prompting her ED visit. She denied chest pain, leg swelling, dizziness, or lightheadedness and did not identify specific triggers for her symptoms.    Upon admission, her vital signs were within normal limits except for a heart rate of 110 beats per minute. An electrocardiogram confirmed atrial fibrillation with a rapid ventricular response, with a heart rate of 121 beats per minute.    The patient's medical history revealed chronic atrial fibrillation/flutter with poorly controlled heart rate, as evidenced by multiple failed direct current cardioversions in the past. The decision was made to continue metoprolol at an increased dose of 50mg every 6 hours and switch Xarelto to Eliquis due to chronic kidney disease. She had also undergone pacemaker placement with atrioventricular node ablation recently.    Additionally, the patient presented with acute exacerbation of chronic heart failure with preserved ejection fraction, with concern for bilateral multifocal pneumonia. She exhibited mild shortness of breath without leg edema. Laboratory tests showed an elevated pro-BNP level of 7789 and imaging revealed bilateral hilar opacities consistent with pulmonary congestion. Despite an outpatient increase in Lasix dosage, her condition persisted. Further investigation with a chest CT scan suggested multifocal bilateral groundglass opacities and consolidative opacities, with negative results for respiratory pathogens. The patient received intravenous Lasix therapy.    Furthermore, she experienced acute kidney injury superimposed on chronic kidney disease, as evidenced by a rising creatinine level. Intravenous fluid and Lasix were administered to manage the condition.    The patient also had iron deficiency anemia, with low iron levels and saturation, necessitating intravenous iron supplementation with Venofer.    Her medical history included hypothyroidism secondary to thyroid cancer, managed with thyroid hormone replacement therapy. She was also being treated for a pituitary adenoma with cabergoline.    Patient is medically stable for discharge.

## 2024-04-19 VITALS
WEIGHT: 203.71 LBS | SYSTOLIC BLOOD PRESSURE: 121 MMHG | DIASTOLIC BLOOD PRESSURE: 58 MMHG | RESPIRATION RATE: 18 BRPM | HEART RATE: 102 BPM | TEMPERATURE: 96 F

## 2024-04-19 LAB
ALBUMIN SERPL ELPH-MCNC: 3.1 G/DL — LOW (ref 3.5–5.2)
ALP SERPL-CCNC: 172 U/L — HIGH (ref 30–115)
ALT FLD-CCNC: 21 U/L — SIGNIFICANT CHANGE UP (ref 0–41)
ANION GAP SERPL CALC-SCNC: 14 MMOL/L — SIGNIFICANT CHANGE UP (ref 7–14)
AST SERPL-CCNC: 12 U/L — SIGNIFICANT CHANGE UP (ref 0–41)
BASOPHILS # BLD AUTO: 0.07 K/UL — SIGNIFICANT CHANGE UP (ref 0–0.2)
BASOPHILS NFR BLD AUTO: 0.6 % — SIGNIFICANT CHANGE UP (ref 0–1)
BILIRUB SERPL-MCNC: 0.4 MG/DL — SIGNIFICANT CHANGE UP (ref 0.2–1.2)
BUN SERPL-MCNC: 36 MG/DL — HIGH (ref 10–20)
CALCIUM SERPL-MCNC: 8.4 MG/DL — SIGNIFICANT CHANGE UP (ref 8.4–10.5)
CHLORIDE SERPL-SCNC: 106 MMOL/L — SIGNIFICANT CHANGE UP (ref 98–110)
CO2 SERPL-SCNC: 23 MMOL/L — SIGNIFICANT CHANGE UP (ref 17–32)
CREAT SERPL-MCNC: 1.8 MG/DL — HIGH (ref 0.7–1.5)
EGFR: 28 ML/MIN/1.73M2 — LOW
EOSINOPHIL # BLD AUTO: 0.41 K/UL — SIGNIFICANT CHANGE UP (ref 0–0.7)
EOSINOPHIL NFR BLD AUTO: 3.7 % — SIGNIFICANT CHANGE UP (ref 0–8)
GLUCOSE SERPL-MCNC: 87 MG/DL — SIGNIFICANT CHANGE UP (ref 70–99)
HCT VFR BLD CALC: 34.4 % — LOW (ref 37–47)
HGB BLD-MCNC: 10.2 G/DL — LOW (ref 12–16)
IMM GRANULOCYTES NFR BLD AUTO: 0.4 % — HIGH (ref 0.1–0.3)
LYMPHOCYTES # BLD AUTO: 2.41 K/UL — SIGNIFICANT CHANGE UP (ref 1.2–3.4)
LYMPHOCYTES # BLD AUTO: 21.7 % — SIGNIFICANT CHANGE UP (ref 20.5–51.1)
MAGNESIUM SERPL-MCNC: 2.3 MG/DL — SIGNIFICANT CHANGE UP (ref 1.8–2.4)
MCHC RBC-ENTMCNC: 26.3 PG — LOW (ref 27–31)
MCHC RBC-ENTMCNC: 29.7 G/DL — LOW (ref 32–37)
MCV RBC AUTO: 88.7 FL — SIGNIFICANT CHANGE UP (ref 81–99)
MONOCYTES # BLD AUTO: 1.1 K/UL — HIGH (ref 0.1–0.6)
MONOCYTES NFR BLD AUTO: 9.9 % — HIGH (ref 1.7–9.3)
NEUTROPHILS # BLD AUTO: 7.08 K/UL — HIGH (ref 1.4–6.5)
NEUTROPHILS NFR BLD AUTO: 63.7 % — SIGNIFICANT CHANGE UP (ref 42.2–75.2)
NRBC # BLD: 0 /100 WBCS — SIGNIFICANT CHANGE UP (ref 0–0)
PHOSPHATE SERPL-MCNC: 3.5 MG/DL — SIGNIFICANT CHANGE UP (ref 2.1–4.9)
PLATELET # BLD AUTO: 253 K/UL — SIGNIFICANT CHANGE UP (ref 130–400)
PMV BLD: 11.3 FL — HIGH (ref 7.4–10.4)
POTASSIUM SERPL-MCNC: 4.2 MMOL/L — SIGNIFICANT CHANGE UP (ref 3.5–5)
POTASSIUM SERPL-SCNC: 4.2 MMOL/L — SIGNIFICANT CHANGE UP (ref 3.5–5)
PROT SERPL-MCNC: 4.9 G/DL — LOW (ref 6–8)
RBC # BLD: 3.88 M/UL — LOW (ref 4.2–5.4)
RBC # FLD: 17.8 % — HIGH (ref 11.5–14.5)
SODIUM SERPL-SCNC: 143 MMOL/L — SIGNIFICANT CHANGE UP (ref 135–146)
WBC # BLD: 11.12 K/UL — HIGH (ref 4.8–10.8)
WBC # FLD AUTO: 11.12 K/UL — HIGH (ref 4.8–10.8)

## 2024-04-19 PROCEDURE — 99238 HOSP IP/OBS DSCHRG MGMT 30/<: CPT

## 2024-04-19 RX ADMIN — APIXABAN 2.5 MILLIGRAM(S): 2.5 TABLET, FILM COATED ORAL at 05:38

## 2024-04-19 RX ADMIN — Medication 5 MILLIGRAM(S): at 05:38

## 2024-04-19 RX ADMIN — Medication 175 MICROGRAM(S): at 05:37

## 2024-04-19 RX ADMIN — Medication 50 MILLIGRAM(S): at 05:39

## 2024-04-19 RX ADMIN — CHLORHEXIDINE GLUCONATE 1 APPLICATION(S): 213 SOLUTION TOPICAL at 05:39

## 2024-04-19 NOTE — PROGRESS NOTE ADULT - SUBJECTIVE AND OBJECTIVE BOX
JEFFERY UGALDE  83y  Female      Patient is a 83y old  Female who presents with a chief complaint of Afib (19 Apr 2024 11:03)      INTERVAL HPI/OVERNIGHT EVENTS:  She feels ok, no new complaints.   Vital Signs Last 24 Hrs  T(C): 35.8 (19 Apr 2024 04:50), Max: 36.1 (18 Apr 2024 12:45)  T(F): 96.5 (19 Apr 2024 04:50), Max: 96.9 (18 Apr 2024 12:45)  HR: 102 (19 Apr 2024 04:50) (94 - 102)  BP: 121/58 (19 Apr 2024 04:50) (92/66 - 130/65)  BP(mean): 87 (18 Apr 2024 16:30) (87 - 87)  RR: 18 (19 Apr 2024 04:50) (18 - 18)  SpO2: 99% (18 Apr 2024 16:30) (99% - 99%)    Parameters below as of 18 Apr 2024 16:30  Patient On (Oxygen Delivery Method): nasal cannula  O2 Flow (L/min): 2        04-18-24 @ 07:01  -  04-19-24 @ 07:00  --------------------------------------------------------  IN: 552 mL / OUT: 350 mL / NET: 202 mL            Consultant(s) Notes Reviewed:  [x ] YES  [ ] NO          MEDICATIONS  (STANDING):  allopurinol 100 milliGRAM(s) Oral at bedtime  apixaban 2.5 milliGRAM(s) Oral every 12 hours  atorvastatin 20 milliGRAM(s) Oral at bedtime  azithromycin   Tablet 500 milliGRAM(s) Oral daily  benzocaine/menthol Lozenge 1 Lozenge Oral daily  cabergoline 0.25 milliGRAM(s) Oral every week  cefTRIAXone   IVPB      cefTRIAXone   IVPB 1000 milliGRAM(s) IV Intermittent every 24 hours  chlorhexidine 2% Cloths 1 Application(s) Topical <User Schedule>  iron sucrose IVPB 200 milliGRAM(s) IV Intermittent every 24 hours  levothyroxine 175 MICROGram(s) Oral daily  metoprolol tartrate 50 milliGRAM(s) Oral every 6 hours  oxybutynin 5 milliGRAM(s) Oral two times a day  polyethylene glycol 3350 17 Gram(s) Oral daily  senna 2 Tablet(s) Oral at bedtime    MEDICATIONS  (PRN):  melatonin 5 milliGRAM(s) Oral at bedtime PRN Insomnia  morphine  - Injectable 2 milliGRAM(s) IV Push daily PRN Severe Pain (7 - 10)  ondansetron Injectable 4 milliGRAM(s) IV Push once PRN Nausea and/or Vomiting      LABS                          10.2   11.12 )-----------( 253      ( 19 Apr 2024 04:48 )             34.4     04-19    143  |  106  |  36<H>  ----------------------------<  87  4.2   |  23  |  1.8<H>    Ca    8.4      19 Apr 2024 04:48  Phos  3.5     04-19  Mg     2.3     04-19    TPro  4.9<L>  /  Alb  3.1<L>  /  TBili  0.4  /  DBili  x   /  AST  12  /  ALT  21  /  AlkPhos  172<H>  04-19      Urinalysis Basic - ( 19 Apr 2024 04:48 )    Color: x / Appearance: x / SG: x / pH: x  Gluc: 87 mg/dL / Ketone: x  / Bili: x / Urobili: x   Blood: x / Protein: x / Nitrite: x   Leuk Esterase: x / RBC: x / WBC x   Sq Epi: x / Non Sq Epi: x / Bacteria: x        Lactate Trend        CAPILLARY BLOOD GLUCOSE            RADIOLOGY & ADDITIONAL TESTS:    Imaging Personally Reviewed:  [ ] YES  [ ] NO    HEALTH ISSUES - PROBLEM Dx:  Palpitations    On deep vein thrombosis (DVT) prophylaxis    H/O: gout    Hypothyroidism    HLD (hyperlipidemia)    CKD (chronic kidney disease), stage IV            PHYSICAL EXAM:  GENERAL: NAD, well-developed.  HEAD:  Atraumatic, Normocephalic.  EYES: EOMI, PERRLA, conjunctiva and sclera clear.  NECK: Supple, No JVD.  CHEST/LUNG: bilateral fine rales, mild ronchi  HEART: Irregular rate and rhythm; S1 S2.   ABDOMEN: Soft, Nontender, Nondistended; Bowel sounds present.  EXTREMITIES:  2+ Peripheral Pulses, No clubbing, cyanosis, or edema.  PSYCH: AAOx3.  NEUROLOGY: non-focal.  SKIN: No rashes or lesions.
Patient is a 83y old  Female who presents with a chief complaint of Afib (2024 12:26)        HPI:    Remains in AF/RVR      PAST MEDICAL & SURGICAL HISTORY:  HTN (hypertension)      High cholesterol      Hypothyroid  s/p thyroid ca surgery      Afib  on xarelto      Sleep apnea      Osteoarthritis      CKD (chronic kidney disease) stage 3, GFR 30-59 ml/min      H/O thyroidectomy      S/P rotator cuff surgery                      PREVIOUS DIAGNOSTIC TESTING:      ECHO  FINDINGS:    STRESS  FINDINGS:    CATHETERIZATION  FINDINGS:    ELECTROPHYSIOLOGY STUDY  FINDINGS:    CAROTID ULTRASOUND:  FINDINGS    VENOUS DUPLEX SCAN:  FINDINGS:    CHEST CT PULMONARY ANGIO with IV Contrast:  FINDINGS:    MEDICATIONS  (STANDING):  allopurinol 100 milliGRAM(s) Oral at bedtime  atorvastatin 20 milliGRAM(s) Oral at bedtime  azithromycin   Tablet 500 milliGRAM(s) Oral daily  benzocaine/menthol Lozenge 1 Lozenge Oral daily  cefTRIAXone   IVPB      cefTRIAXone   IVPB 1000 milliGRAM(s) IV Intermittent every 24 hours  levothyroxine 175 MICROGram(s) Oral daily  metoprolol tartrate 50 milliGRAM(s) Oral every 8 hours  oxybutynin 5 milliGRAM(s) Oral two times a day  polyethylene glycol 3350 17 Gram(s) Oral daily  potassium chloride  20 mEq/100 mL IVPB 20 milliEquivalent(s) IV Intermittent every 2 hours  senna 2 Tablet(s) Oral at bedtime  vancomycin  IVPB 1000 milliGRAM(s) IV Intermittent once  vancomycin  IVPB 1000 milliGRAM(s) IV Intermittent once    MEDICATIONS  (PRN):  melatonin 5 milliGRAM(s) Oral at bedtime PRN Insomnia      FAMILY HISTORY:  Family history of lung cancer (Mother)    Family history of abdominal aortic aneurysm (AAA) (Father)        SOCIAL HISTORY:    CIGARETTES:    ALCOHOL:    Past Surgical History:    Allergies:    No Known Allergies      REVIEW OF SYSTEMS:    As Above.    Vital Signs Last 24 Hrs  T(C): 36.4 (15 Apr 2024 07:38), Max: 36.6 (2024 20:15)  T(F): 97.5 (15 Apr 2024 07:38), Max: 97.8 (2024 20:15)  HR: 127 (15 Apr 2024 07:38) (69 - 127)  BP: 113/88 (15 Apr 2024 07:38) (113/64 - 128/64)  BP(mean): 81 (15 Apr 2024 07:34) (81 - 97)  RR: 26 (15 Apr 2024 07:38) (17 - 26)  SpO2: 96% (15 Apr 2024 07:38) (96% - 97%)    Parameters below as of 15 Apr 2024 07:34    O2 Flow (L/min): 2      PHYSICAL EXAM:        GENERAL: In no apparent distress, well nourished, and hydrated.  HEAD:  Atraumatic, Normocephalic  HEART: Regular rate and rhythm; No murmurs, rubs, or gallops.  PULMONARY: Clear to auscultation and perfusion.  No rales, wheezing, or rhonchi bilaterally.  EXTREMITIES:  2+ Peripheral Pulses, No clubbing, cyanosis, or edema  NEUROLOGICAL: Grossly nonfocal      INTERPRETATION OF TELEMETRY:    ECG:    I&O's Detail    2024 07:01  -  15 Apr 2024 07:00  --------------------------------------------------------  IN:    Oral Fluid: 570 mL  Total IN: 570 mL    OUT:    Voided (mL): 700 mL  Total OUT: 700 mL    Total NET: -130 mL          LABS:                        10.1   12.95 )-----------( 367      ( 15 Apr 2024 04:57 )             32.7     04-15    142  |  101  |  x   ----------------------------<  107<H>  3.7   |  22  |  2.8<H>    Ca    8.4      15 Apr 2024 04:57  Mg     2.4     04-15    TPro  5.2<L>  /  Alb  3.4<L>  /  TBili  1.2  /  DBili  x   /  AST  26  /  ALT  37  /  AlkPhos  272<H>  04-15        PT/INR - ( 2024 22:03 )   PT: 18.70 sec;   INR: 1.63 ratio         PTT - ( 2024 22:03 )  PTT:31.2 sec  Urinalysis Basic - ( 15 Apr 2024 04:57 )    Color: x / Appearance: x / SG: x / pH: x  Gluc: 107 mg/dL / Ketone: x  / Bili: x / Urobili: x   Blood: x / Protein: x / Nitrite: x   Leuk Esterase: x / RBC: x / WBC x   Sq Epi: x / Non Sq Epi: x / Bacteria: x      BNP  I&O's Detail    2024 07:01  -  15 Apr 2024 07:00  --------------------------------------------------------  IN:    Oral Fluid: 570 mL  Total IN: 570 mL    OUT:    Voided (mL): 700 mL  Total OUT: 700 mL    Total NET: -130 mL        Daily Height in cm: 152.4 (15 Apr 2024 07:34)    Daily Weight in k.9 (15 Apr 2024 04:55)    RADIOLOGY & ADDITIONAL STUDIES:
NEPHROLOGY FOLLOW UP NOTE    no new events     PAST MEDICAL & SURGICAL HISTORY:  HTN (hypertension)      High cholesterol      Hypothyroid  s/p thyroid ca surgery      Afib  on xarelto      Sleep apnea      Osteoarthritis      CKD (chronic kidney disease) stage 3, GFR 30-59 ml/min      H/O thyroidectomy      S/P rotator cuff surgery        Allergies:  No Known Allergies    Home Medications Reviewed    SOCIAL HISTORY:  Denies ETOH,Smoking,   FAMILY HISTORY:  Family history of lung cancer (Mother)    Family history of abdominal aortic aneurysm (AAA) (Father)          REVIEW OF SYSTEMS:  All other review of systems is negative unless indicated above.    PHYSICAL EXAM:  Constitutional: NAD  HEENT: anicteric sclera, oropharynx clear, MMM  Neck: No JVD  Respiratory: few bibasilar rales   Cardiovascular: S1, S2, irreg  Gastrointestinal: BS+, soft, NT/ND  Extremities: No cyanosis or clubbing. No peripheral edema  Neurological: A/O x 2  : No CVA tenderness. No joaquin.   Skin: No rashes    Hospital Medications:   MEDICATIONS  (STANDING):  allopurinol 100 milliGRAM(s) Oral at bedtime  apixaban 2.5 milliGRAM(s) Oral every 12 hours  atorvastatin 20 milliGRAM(s) Oral at bedtime  azithromycin   Tablet 500 milliGRAM(s) Oral daily  benzocaine/menthol Lozenge 1 Lozenge Oral daily  cabergoline 0.25 milliGRAM(s) Oral every week  cefTRIAXone   IVPB      cefTRIAXone   IVPB 1000 milliGRAM(s) IV Intermittent every 24 hours  chlorhexidine 2% Cloths 1 Application(s) Topical <User Schedule>  iron sucrose IVPB 200 milliGRAM(s) IV Intermittent every 24 hours  levothyroxine 175 MICROGram(s) Oral daily  metoprolol tartrate 50 milliGRAM(s) Oral every 6 hours  oxybutynin 5 milliGRAM(s) Oral two times a day  polyethylene glycol 3350 17 Gram(s) Oral daily  senna 2 Tablet(s) Oral at bedtime        VITALS:  T(F): 96.9 (04-18-24 @ 12:45), Max: 98.3 (04-17-24 @ 20:51)  HR: 95 (04-18-24 @ 13:37)  BP: 93/72 (04-18-24 @ 13:37)  RR: 18 (04-18-24 @ 12:45)  SpO2: 99% (04-18-24 @ 07:30)  Wt(kg): --    04-16 @ 07:01  -  04-17 @ 07:00  --------------------------------------------------------  IN: 722 mL / OUT: 350 mL / NET: 372 mL    04-17 @ 07:01  -  04-18 @ 07:00  --------------------------------------------------------  IN: 354 mL / OUT: 700 mL / NET: -346 mL    04-18 @ 07:01  -  04-18 @ 15:00  --------------------------------------------------------  IN: 222 mL / OUT: 0 mL / NET: 222 mL          LABS:  04-18    147<H>  |  107  |  44<H>  ----------------------------<  137<H>  3.6   |  27  |  1.9<H>    Ca    8.4      18 Apr 2024 05:35  Phos  3.5     04-18  Mg     2.2     04-18    TPro  4.9<L>  /  Alb  3.1<L>  /  TBili  0.4  /  DBili      /  AST  14  /  ALT  23  /  AlkPhos  186<H>  04-18                          9.6    11.30 )-----------( 261      ( 18 Apr 2024 05:35 )             32.4       Urine Studies:  Urinalysis Basic - ( 18 Apr 2024 05:35 )    Color:  / Appearance:  / SG:  / pH:   Gluc: 137 mg/dL / Ketone:   / Bili:  / Urobili:    Blood:  / Protein:  / Nitrite:    Leuk Esterase:  / RBC:  / WBC    Sq Epi:  / Non Sq Epi:  / Bacteria:       Sodium, Random Urine: <20.0 mmoL/L (04-15 @ 15:30)  Creatinine, Random Urine: 87 mg/dL (04-15 @ 15:30)  Protein/Creatinine Ratio Calculation: 0.2 Ratio (04-15 @ 15:30)  Osmolality, Random Urine: 481 mos/kg (04-15 @ 15:30)  Creatinine, Random Urine: 81 mg/dL (04-15 @ 15:30)      RADIOLOGY & ADDITIONAL STUDIES:      
NEPHROLOGY FOLLOW UP NOTE    no new events   for dc home    PAST MEDICAL & SURGICAL HISTORY:  HTN (hypertension)      High cholesterol      Hypothyroid  s/p thyroid ca surgery      Afib  on xarelto      Sleep apnea      Osteoarthritis      CKD (chronic kidney disease) stage 3, GFR 30-59 ml/min      H/O thyroidectomy      S/P rotator cuff surgery        Allergies:  No Known Allergies    Home Medications Reviewed    SOCIAL HISTORY:  Denies ETOH,Smoking,   FAMILY HISTORY:  Family history of lung cancer (Mother)    Family history of abdominal aortic aneurysm (AAA) (Father)          REVIEW OF SYSTEMS:  All other review of systems is negative unless indicated above.    PHYSICAL EXAM:  Constitutional: NAD  HEENT: anicteric sclera, oropharynx clear, MMM  Neck: No JVD  Respiratory: few bibasilar rales   Cardiovascular: S1, S2, irreg  Gastrointestinal: BS+, soft, NT/ND  Extremities: No cyanosis or clubbing. No peripheral edema  Neurological: A/O x 2  : No CVA tenderness. No joaquin.   Skin: No rashes    Hospital Medications:   MEDICATIONS  (STANDING):  allopurinol 100 milliGRAM(s) Oral at bedtime  apixaban 2.5 milliGRAM(s) Oral every 12 hours  atorvastatin 20 milliGRAM(s) Oral at bedtime  azithromycin   Tablet 500 milliGRAM(s) Oral daily  benzocaine/menthol Lozenge 1 Lozenge Oral daily  cabergoline 0.25 milliGRAM(s) Oral every week  cefTRIAXone   IVPB      cefTRIAXone   IVPB 1000 milliGRAM(s) IV Intermittent every 24 hours  chlorhexidine 2% Cloths 1 Application(s) Topical <User Schedule>  iron sucrose IVPB 200 milliGRAM(s) IV Intermittent every 24 hours  levothyroxine 175 MICROGram(s) Oral daily  metoprolol tartrate 50 milliGRAM(s) Oral every 6 hours  oxybutynin 5 milliGRAM(s) Oral two times a day  polyethylene glycol 3350 17 Gram(s) Oral daily  senna 2 Tablet(s) Oral at bedtime        VITALS:  T(F): 96.5 (04-19-24 @ 04:50), Max: 96.9 (04-18-24 @ 12:45)  HR: 102 (04-19-24 @ 04:50)  BP: 121/58 (04-19-24 @ 04:50)  RR: 18 (04-19-24 @ 04:50)  SpO2: 99% (04-18-24 @ 16:30)  Wt(kg): --    04-17 @ 07:01  -  04-18 @ 07:00  --------------------------------------------------------  IN: 354 mL / OUT: 700 mL / NET: -346 mL    04-18 @ 07:01  -  04-19 @ 07:00  --------------------------------------------------------  IN: 552 mL / OUT: 350 mL / NET: 202 mL          LABS:  04-19    143  |  106  |  36<H>  ----------------------------<  87  4.2   |  23  |  1.8<H>    Ca    8.4      19 Apr 2024 04:48  Phos  3.5     04-19  Mg     2.3     04-19    TPro  4.9<L>  /  Alb  3.1<L>  /  TBili  0.4  /  DBili      /  AST  12  /  ALT  21  /  AlkPhos  172<H>  04-19                          10.2   11.12 )-----------( 253      ( 19 Apr 2024 04:48 )             34.4       Urine Studies:  Urinalysis Basic - ( 19 Apr 2024 04:48 )    Color:  / Appearance:  / SG:  / pH:   Gluc: 87 mg/dL / Ketone:   / Bili:  / Urobili:    Blood:  / Protein:  / Nitrite:    Leuk Esterase:  / RBC:  / WBC    Sq Epi:  / Non Sq Epi:  / Bacteria:       Sodium, Random Urine: <20.0 mmoL/L (04-15 @ 15:30)  Creatinine, Random Urine: 87 mg/dL (04-15 @ 15:30)  Protein/Creatinine Ratio Calculation: 0.2 Ratio (04-15 @ 15:30)  Osmolality, Random Urine: 481 mos/kg (04-15 @ 15:30)  Creatinine, Random Urine: 81 mg/dL (04-15 @ 15:30)      RADIOLOGY & ADDITIONAL STUDIES:        
NEPHROLOGY FOLLOW UP NOTE    pt seen and examined     PAST MEDICAL & SURGICAL HISTORY:  HTN (hypertension)      High cholesterol      Hypothyroid  s/p thyroid ca surgery      Afib  on xarelto      Sleep apnea      Osteoarthritis      CKD (chronic kidney disease) stage 3, GFR 30-59 ml/min      H/O thyroidectomy      S/P rotator cuff surgery        Allergies:  No Known Allergies    Home Medications Reviewed    SOCIAL HISTORY:  Denies ETOH,Smoking,   FAMILY HISTORY:  Family history of lung cancer (Mother)    Family history of abdominal aortic aneurysm (AAA) (Father)          REVIEW OF SYSTEMS:  All other review of systems is negative unless indicated above.    PHYSICAL EXAM:  Constitutional: NAD  HEENT: anicteric sclera, oropharynx clear, MMM  Neck: No JVD  Respiratory: few bibasilar rales   Cardiovascular: S1, S2, irreg  Gastrointestinal: BS+, soft, NT/ND  Extremities: No cyanosis or clubbing. No peripheral edema  Neurological: A/O x 2  : No CVA tenderness. No joaquin.   Skin: No rashes    Hospital Medications:   MEDICATIONS  (STANDING):  allopurinol 100 milliGRAM(s) Oral at bedtime  atorvastatin 20 milliGRAM(s) Oral at bedtime  azithromycin   Tablet 500 milliGRAM(s) Oral daily  benzocaine/menthol Lozenge 1 Lozenge Oral daily  cabergoline 0.25 milliGRAM(s) Oral every week  cefTRIAXone   IVPB      cefTRIAXone   IVPB 1000 milliGRAM(s) IV Intermittent every 24 hours  levothyroxine 175 MICROGram(s) Oral daily  metoprolol tartrate 50 milliGRAM(s) Oral every 6 hours  oxybutynin 5 milliGRAM(s) Oral two times a day  polyethylene glycol 3350 17 Gram(s) Oral daily  potassium chloride  20 mEq/100 mL IVPB 20 milliEquivalent(s) IV Intermittent once  senna 2 Tablet(s) Oral at bedtime  vancomycin  IVPB 1000 milliGRAM(s) IV Intermittent once        VITALS:  T(F): 96.6 (04-16-24 @ 12:10), Max: 97.6 (04-15-24 @ 19:39)  HR: 95 (04-16-24 @ 12:10)  BP: 112/73 (04-16-24 @ 12:10)  RR: 18 (04-16-24 @ 12:10)  SpO2: 97% (04-16-24 @ 12:29)  Wt(kg): --    04-14 @ 07:01  -  04-15 @ 07:00  --------------------------------------------------------  IN: 570 mL / OUT: 700 mL / NET: -130 mL    04-15 @ 07:01  -  04-16 @ 07:00  --------------------------------------------------------  IN: 511 mL / OUT: 1775 mL / NET: -1264 mL    04-16 @ 07:01  -  04-16 @ 17:01  --------------------------------------------------------  IN: 486 mL / OUT: 350 mL / NET: 136 mL          LABS:  04-16    145  |  104  |  59<H>  ----------------------------<  101<H>  3.3<L>   |  27  |  2.1<H>    Ca    7.9<L>      16 Apr 2024 05:26  Phos  4.6     04-16  Mg     2.4     04-16    TPro  5.1<L>  /  Alb  3.3<L>  /  TBili  0.9  /  DBili      /  AST  24  /  ALT  36  /  AlkPhos  272<H>  04-16                          10.8   12.45 )-----------( 332      ( 16 Apr 2024 05:26 )             35.8       Urine Studies:  Urinalysis Basic - ( 16 Apr 2024 05:26 )    Color:  / Appearance:  / SG:  / pH:   Gluc: 101 mg/dL / Ketone:   / Bili:  / Urobili:    Blood:  / Protein:  / Nitrite:    Leuk Esterase:  / RBC:  / WBC    Sq Epi:  / Non Sq Epi:  / Bacteria:       Sodium, Random Urine: <20.0 mmoL/L (04-15 @ 15:30)  Creatinine, Random Urine: 87 mg/dL (04-15 @ 15:30)  Protein/Creatinine Ratio Calculation: 0.2 Ratio (04-15 @ 15:30)  Osmolality, Random Urine: 481 mos/kg (04-15 @ 15:30)  Creatinine, Random Urine: 81 mg/dL (04-15 @ 15:30)      RADIOLOGY & ADDITIONAL STUDIES:  
SUBJECTIVE:  HPI:  82yo female with PMHx AFIB on Xarelto, amiodarone, metoprolol presents c/o palpitations and SOB x 1 week. Pt reports she was recently seen yesterday by her PMD Dr Savage and cardiologist Dr Quintero. her Lasix was increased from 20-40mg and her metoprolol was increased from 25mg BID to 50mg BID. Pt has had multiple failed cardioversions for AFIB and reports increasing medication dosages has not relieved symptoms so she presented to the ED today. She denies CP, leg swelling, dizziness or lightheadedness. Denies any specific aggravating or relieving factors.    Vitals in ED:  T 98.4  /67    SPO2 99%RA   EKG on admission Afib w/ RVR           (10 Apr 2024 22:09)      Patient is a 83y old Female who presents with a chief complaint of Afib (15 Apr 2024 18:29)    Currently admitted to medicine with the primary diagnosis of Longstanding persistent atrial fibrillation       Today is hospital day 6d.     PAST MEDICAL & SURGICAL HISTORY  HTN (hypertension)    High cholesterol    Hypothyroid  s/p thyroid ca surgery    Afib  on xarelto    Sleep apnea    Osteoarthritis    CKD (chronic kidney disease) stage 3, GFR 30-59 ml/min    H/O thyroidectomy    S/P rotator cuff surgery        ALLERGIES:  No Known Allergies    MEDICATIONS:  ACTIVE MEDICATIONS  allopurinol 100 milliGRAM(s) Oral at bedtime  atorvastatin 20 milliGRAM(s) Oral at bedtime  azithromycin   Tablet 500 milliGRAM(s) Oral daily  benzocaine/menthol Lozenge 1 Lozenge Oral daily  cabergoline 0.25 milliGRAM(s) Oral every week  cefTRIAXone   IVPB      cefTRIAXone   IVPB 1000 milliGRAM(s) IV Intermittent every 24 hours  levothyroxine 175 MICROGram(s) Oral daily  melatonin 5 milliGRAM(s) Oral at bedtime PRN  metoprolol tartrate 50 milliGRAM(s) Oral every 6 hours  morphine  - Injectable 2 milliGRAM(s) IV Push daily PRN  ondansetron Injectable 4 milliGRAM(s) IV Push once PRN  oxybutynin 5 milliGRAM(s) Oral two times a day  oxycodone    5 mG/acetaminophen 325 mG 1 Tablet(s) Oral once PRN  oxycodone    5 mG/acetaminophen 325 mG 2 Tablet(s) Oral once PRN  polyethylene glycol 3350 17 Gram(s) Oral daily  potassium chloride  20 mEq/100 mL IVPB 20 milliEquivalent(s) IV Intermittent every 2 hours  senna 2 Tablet(s) Oral at bedtime  vancomycin  IVPB 1000 milliGRAM(s) IV Intermittent once  vancomycin  IVPB 1000 milliGRAM(s) IV Intermittent once      VITALS:   T(F): 96.4  HR: 104  BP: 118/71  RR: 18  SpO2: 96%    LABS:                        10.8   12.45 )-----------( 332      ( 16 Apr 2024 05:26 )             35.8     04-16    145  |  104  |  59<H>  ----------------------------<  101<H>  3.3<L>   |  27  |  2.1<H>    Ca    7.9<L>      16 Apr 2024 05:26  Phos  4.6     04-16  Mg     2.4     04-16    TPro  5.1<L>  /  Alb  3.3<L>  /  TBili  0.9  /  DBili  x   /  AST  24  /  ALT  36  /  AlkPhos  272<H>  04-16    PT/INR - ( 14 Apr 2024 22:03 )   PT: 18.70 sec;   INR: 1.63 ratio         PTT - ( 14 Apr 2024 22:03 )  PTT:31.2 sec  Urinalysis Basic - ( 16 Apr 2024 05:26 )    Color: x / Appearance: x / SG: x / pH: x  Gluc: 101 mg/dL / Ketone: x  / Bili: x / Urobili: x   Blood: x / Protein: x / Nitrite: x   Leuk Esterase: x / RBC: x / WBC x   Sq Epi: x / Non Sq Epi: x / Bacteria: x                    PHYSICAL EXAM:  GENERAL: NAD, well-developed.  HEAD:  Atraumatic, Normocephalic.  EYES: EOMI, PERRLA, conjunctiva and sclera clear.  NECK: Supple, No JVD.  CHEST/LUNG: bilateral crackles/rales , with exp wheeze   HEART: Irregular rate and rhythm; S1 S2.   ABDOMEN: Soft, Nontender, Nondistended; Bowel sounds present.  EXTREMITIES:  2+ Peripheral Pulses, No clubbing, cyanosis, or edema.  PSYCH: AAOx3.  NEUROLOGY: non-focal.  SKIN: No rashes or lesions.    
  JEFFERY UGALDE  83y  Female      Patient is a 83y old  Female who presents with a chief complaint of Afib (12 Apr 2024 17:31)      INTERVAL HPI/OVERNIGHT EVENTS:  She feels weak, feel abdominal distention after meals.   Vital Signs Last 24 Hrs  T(C): 36.1 (13 Apr 2024 13:32), Max: 36.6 (13 Apr 2024 05:08)  T(F): 97 (13 Apr 2024 13:32), Max: 97.8 (13 Apr 2024 05:08)  HR: 131 (13 Apr 2024 13:32) (112 - 131)  BP: 132/78 (13 Apr 2024 13:32) (107/64 - 133/84)  BP(mean): 81 (12 Apr 2024 15:10) (81 - 81)  RR: 18 (13 Apr 2024 13:32) (16 - 18)  SpO2: 97% (13 Apr 2024 05:08) (97% - 98%)    Parameters below as of 13 Apr 2024 05:08  Patient On (Oxygen Delivery Method): nasal cannula  O2 Flow (L/min): 2        04-12-24 @ 07:01  -  04-13-24 @ 07:00  --------------------------------------------------------  IN: 200 mL / OUT: 350 mL / NET: -150 mL    04-13-24 @ 07:01  -  04-13-24 @ 14:07  --------------------------------------------------------  IN: 812 mL / OUT: 550 mL / NET: 262 mL            Consultant(s) Notes Reviewed:  [x ] YES  [ ] NO          MEDICATIONS  (STANDING):  allopurinol 100 milliGRAM(s) Oral at bedtime  apixaban 2.5 milliGRAM(s) Oral two times a day  atorvastatin 20 milliGRAM(s) Oral at bedtime  benzocaine/menthol Lozenge 1 Lozenge Oral daily  levothyroxine 175 MICROGram(s) Oral daily  metoprolol tartrate 50 milliGRAM(s) Oral every 8 hours  oxybutynin 5 milliGRAM(s) Oral two times a day  polyethylene glycol 3350 17 Gram(s) Oral daily  potassium chloride  20 mEq/100 mL IVPB 20 milliEquivalent(s) IV Intermittent every 2 hours  senna 2 Tablet(s) Oral at bedtime    MEDICATIONS  (PRN):      LABS                          10.4   12.99 )-----------( 348      ( 13 Apr 2024 06:27 )             34.7     04-13    147<H>  |  105  |  60<H>  ----------------------------<  110<H>  3.9   |  23  |  2.6<H>    Ca    8.4      13 Apr 2024 06:27  Mg     2.3     04-13    TPro  5.3<L>  /  Alb  3.5  /  TBili  1.3<H>  /  DBili  x   /  AST  19  /  ALT  33  /  AlkPhos  171<H>  04-13      Urinalysis Basic - ( 13 Apr 2024 06:27 )    Color: x / Appearance: x / SG: x / pH: x  Gluc: 110 mg/dL / Ketone: x  / Bili: x / Urobili: x   Blood: x / Protein: x / Nitrite: x   Leuk Esterase: x / RBC: x / WBC x   Sq Epi: x / Non Sq Epi: x / Bacteria: x        Lactate Trend        CAPILLARY BLOOD GLUCOSE            RADIOLOGY & ADDITIONAL TESTS:    Imaging Personally Reviewed:  [ ] YES  [ ] NO    HEALTH ISSUES - PROBLEM Dx:  Palpitations    On deep vein thrombosis (DVT) prophylaxis    H/O: gout    Hypothyroidism    HLD (hyperlipidemia)    CKD (chronic kidney disease), stage IV            PHYSICAL EXAM:  GENERAL: NAD, well-developed.  HEAD:  Atraumatic, Normocephalic.  EYES: EOMI, PERRLA, conjunctiva and sclera clear.  NECK: Supple, No JVD.  CHEST/LUNG: Clear to auscultation bilaterally; No wheeze.  HEART: Irregular rate and rhythm; S1 S2.   ABDOMEN: Soft, Nontender, Nondistended; Bowel sounds present.  EXTREMITIES:  2+ Peripheral Pulses, No clubbing, cyanosis, or edema.  PSYCH: AAOx3.  NEUROLOGY: non-focal.  SKIN: No rashes or lesions.
  JEFFERY UGALDE  83y  Female      Patient is a 83y old  Female who presents with a chief complaint of Afib (16 Apr 2024 11:16)      INTERVAL HPI/OVERNIGHT EVENTS:  She felt SOb yesterday after the pacemaker, no chest pain, no fever.   Vital Signs Last 24 Hrs  T(C): 35.8 (16 Apr 2024 05:10), Max: 36.4 (15 Apr 2024 13:20)  T(F): 96.4 (16 Apr 2024 05:10), Max: 97.6 (15 Apr 2024 13:20)  HR: 104 (16 Apr 2024 05:10) (87 - 104)  BP: 118/71 (16 Apr 2024 05:10) (118/71 - 138/90)  BP(mean): --  RR: 18 (16 Apr 2024 05:10) (18 - 18)  SpO2: --          04-15-24 @ 07:01  -  04-16-24 @ 07:00  --------------------------------------------------------  IN: 511 mL / OUT: 1775 mL / NET: -1264 mL    04-16-24 @ 07:01  - 04-16-24 @ 11:43  --------------------------------------------------------  IN: 250 mL / OUT: 0 mL / NET: 250 mL            Consultant(s) Notes Reviewed:  [x ] YES  [ ] NO          MEDICATIONS  (STANDING):  allopurinol 100 milliGRAM(s) Oral at bedtime  atorvastatin 20 milliGRAM(s) Oral at bedtime  azithromycin   Tablet 500 milliGRAM(s) Oral daily  benzocaine/menthol Lozenge 1 Lozenge Oral daily  cabergoline 0.25 milliGRAM(s) Oral every week  cefTRIAXone   IVPB      cefTRIAXone   IVPB 1000 milliGRAM(s) IV Intermittent every 24 hours  furosemide   Injectable 40 milliGRAM(s) IV Push once  levothyroxine 175 MICROGram(s) Oral daily  metoprolol tartrate 50 milliGRAM(s) Oral every 6 hours  oxybutynin 5 milliGRAM(s) Oral two times a day  polyethylene glycol 3350 17 Gram(s) Oral daily  potassium chloride   Powder 40 milliEquivalent(s) Oral once  potassium chloride  20 mEq/100 mL IVPB 20 milliEquivalent(s) IV Intermittent every 2 hours  senna 2 Tablet(s) Oral at bedtime  vancomycin  IVPB 1000 milliGRAM(s) IV Intermittent once    MEDICATIONS  (PRN):  melatonin 5 milliGRAM(s) Oral at bedtime PRN Insomnia  morphine  - Injectable 2 milliGRAM(s) IV Push daily PRN Severe Pain (7 - 10)  ondansetron Injectable 4 milliGRAM(s) IV Push once PRN Nausea and/or Vomiting  oxycodone    5 mG/acetaminophen 325 mG 1 Tablet(s) Oral once PRN Mild Pain (1 - 3)  oxycodone    5 mG/acetaminophen 325 mG 2 Tablet(s) Oral once PRN Moderate Pain (4 - 6)      LABS                          10.8   12.45 )-----------( 332      ( 16 Apr 2024 05:26 )             35.8     04-16    145  |  104  |  59<H>  ----------------------------<  101<H>  3.3<L>   |  27  |  2.1<H>    Ca    7.9<L>      16 Apr 2024 05:26  Phos  4.6     04-16  Mg     2.4     04-16    TPro  5.1<L>  /  Alb  3.3<L>  /  TBili  0.9  /  DBili  x   /  AST  24  /  ALT  36  /  AlkPhos  272<H>  04-16      Urinalysis Basic - ( 16 Apr 2024 05:26 )    Color: x / Appearance: x / SG: x / pH: x  Gluc: 101 mg/dL / Ketone: x  / Bili: x / Urobili: x   Blood: x / Protein: x / Nitrite: x   Leuk Esterase: x / RBC: x / WBC x   Sq Epi: x / Non Sq Epi: x / Bacteria: x      PT/INR - ( 14 Apr 2024 22:03 )   PT: 18.70 sec;   INR: 1.63 ratio         PTT - ( 14 Apr 2024 22:03 )  PTT:31.2 sec  Lactate Trend        CAPILLARY BLOOD GLUCOSE            RADIOLOGY & ADDITIONAL TESTS:    Imaging Personally Reviewed:  [ ] YES  [ ] NO    HEALTH ISSUES - PROBLEM Dx:  Palpitations    On deep vein thrombosis (DVT) prophylaxis    H/O: gout    Hypothyroidism    HLD (hyperlipidemia)    CKD (chronic kidney disease), stage IV            PHYSICAL EXAM:  GENERAL: NAD, well-developed.  HEAD:  Atraumatic, Normocephalic.  EYES: EOMI, PERRLA, conjunctiva and sclera clear.  NECK: Supple, No JVD.  CHEST/LUNG: bilateral fine rales, mild ronchi  HEART: Irregular rate and rhythm; S1 S2.   ABDOMEN: Soft, Nontender, Nondistended; Bowel sounds present.  EXTREMITIES:  2+ Peripheral Pulses, No clubbing, cyanosis, or edema.  PSYCH: AAOx3.  NEUROLOGY: non-focal.  SKIN: No rashes or lesions.
  JEFFERY UGALDE  83y  Female      Patient is a 83y old  Female who presents with a chief complaint of Afib (17 Apr 2024 09:28)      INTERVAL HPI/OVERNIGHT EVENTS:  She feels ok, no chest pain or SOB  Vital Signs Last 24 Hrs  T(C): 35.9 (17 Apr 2024 12:09), Max: 35.9 (17 Apr 2024 12:09)  T(F): 96.6 (17 Apr 2024 12:09), Max: 96.6 (17 Apr 2024 12:09)  HR: 96 (17 Apr 2024 12:09) (93 - 96)  BP: 112/78 (17 Apr 2024 12:09) (105/61 - 117/66)  BP(mean): 78 (16 Apr 2024 20:36) (78 - 78)  RR: 18 (17 Apr 2024 12:09) (18 - 18)  SpO2: 98% (17 Apr 2024 13:10) (98% - 98%)    Parameters below as of 17 Apr 2024 13:10  Patient On (Oxygen Delivery Method): room air          04-16-24 @ 07:01  -  04-17-24 @ 07:00  --------------------------------------------------------  IN: 722 mL / OUT: 350 mL / NET: 372 mL            Consultant(s) Notes Reviewed:  [x ] YES  [ ] NO          MEDICATIONS  (STANDING):  allopurinol 100 milliGRAM(s) Oral at bedtime  apixaban 2.5 milliGRAM(s) Oral every 12 hours  atorvastatin 20 milliGRAM(s) Oral at bedtime  azithromycin   Tablet 500 milliGRAM(s) Oral daily  benzocaine/menthol Lozenge 1 Lozenge Oral daily  cabergoline 0.25 milliGRAM(s) Oral every week  cefTRIAXone   IVPB      cefTRIAXone   IVPB 1000 milliGRAM(s) IV Intermittent every 24 hours  chlorhexidine 2% Cloths 1 Application(s) Topical <User Schedule>  levothyroxine 175 MICROGram(s) Oral daily  metoprolol tartrate 50 milliGRAM(s) Oral every 6 hours  oxybutynin 5 milliGRAM(s) Oral two times a day  polyethylene glycol 3350 17 Gram(s) Oral daily  senna 2 Tablet(s) Oral at bedtime  vancomycin  IVPB 1000 milliGRAM(s) IV Intermittent once    MEDICATIONS  (PRN):  melatonin 5 milliGRAM(s) Oral at bedtime PRN Insomnia  morphine  - Injectable 2 milliGRAM(s) IV Push daily PRN Severe Pain (7 - 10)  ondansetron Injectable 4 milliGRAM(s) IV Push once PRN Nausea and/or Vomiting      LABS                          10.5   12.58 )-----------( 283      ( 17 Apr 2024 05:33 )             34.8     04-17    144  |  105  |  51<H>  ----------------------------<  96  3.5   |  26  |  2.1<H>    Ca    8.2<L>      17 Apr 2024 05:33  Phos  4.4     04-17  Mg     2.2     04-17    TPro  4.8<L>  /  Alb  3.1<L>  /  TBili  0.6  /  DBili  x   /  AST  17  /  ALT  31  /  AlkPhos  228<H>  04-17      Urinalysis Basic - ( 17 Apr 2024 05:33 )    Color: x / Appearance: x / SG: x / pH: x  Gluc: 96 mg/dL / Ketone: x  / Bili: x / Urobili: x   Blood: x / Protein: x / Nitrite: x   Leuk Esterase: x / RBC: x / WBC x   Sq Epi: x / Non Sq Epi: x / Bacteria: x        Lactate Trend        CAPILLARY BLOOD GLUCOSE            RADIOLOGY & ADDITIONAL TESTS:    Imaging Personally Reviewed:  [ ] YES  [ ] NO    HEALTH ISSUES - PROBLEM Dx:  Palpitations    On deep vein thrombosis (DVT) prophylaxis    H/O: gout    Hypothyroidism    HLD (hyperlipidemia)    CKD (chronic kidney disease), stage IV            PHYSICAL EXAM:  GENERAL: NAD, well-developed.  HEAD:  Atraumatic, Normocephalic.  EYES: EOMI, PERRLA, conjunctiva and sclera clear.  NECK: Supple, No JVD.  CHEST/LUNG: bilateral fine rales, mild ronchi  HEART: Irregular rate and rhythm; S1 S2.   ABDOMEN: Soft, Nontender, Nondistended; Bowel sounds present.  EXTREMITIES:  2+ Peripheral Pulses, No clubbing, cyanosis, or edema.  PSYCH: AAOx3.  NEUROLOGY: non-focal.  SKIN: No rashes or lesions.
  JEFFERY UGALDE  83y  Female      Patient is a 83y old  Female who presents with a chief complaint of Afib (18 Apr 2024 09:59)      INTERVAL HPI/OVERNIGHT EVENTS:  She feels ok, no chest pain or SOB.  Vital Signs Last 24 Hrs  T(C): 36.1 (18 Apr 2024 12:45), Max: 36.8 (17 Apr 2024 20:51)  T(F): 96.9 (18 Apr 2024 12:45), Max: 98.3 (17 Apr 2024 20:51)  HR: 98 (18 Apr 2024 12:45) (89 - 110)  BP: 112/79 (18 Apr 2024 12:45) (99/63 - 120/71)  BP(mean): --  RR: 18 (18 Apr 2024 12:45) (18 - 18)  SpO2: 99% (18 Apr 2024 07:30) (98% - 99%)    Parameters below as of 18 Apr 2024 07:30  Patient On (Oxygen Delivery Method): room air          04-17-24 @ 07:01  -  04-18-24 @ 07:00  --------------------------------------------------------  IN: 354 mL / OUT: 700 mL / NET: -346 mL    04-18-24 @ 07:01  - 04-18-24 @ 12:46  --------------------------------------------------------  IN: 222 mL / OUT: 0 mL / NET: 222 mL            Consultant(s) Notes Reviewed:  [x ] YES  [ ] NO          MEDICATIONS  (STANDING):  allopurinol 100 milliGRAM(s) Oral at bedtime  apixaban 2.5 milliGRAM(s) Oral every 12 hours  atorvastatin 20 milliGRAM(s) Oral at bedtime  azithromycin   Tablet 500 milliGRAM(s) Oral daily  benzocaine/menthol Lozenge 1 Lozenge Oral daily  cabergoline 0.25 milliGRAM(s) Oral every week  cefTRIAXone   IVPB      cefTRIAXone   IVPB 1000 milliGRAM(s) IV Intermittent every 24 hours  chlorhexidine 2% Cloths 1 Application(s) Topical <User Schedule>  levothyroxine 175 MICROGram(s) Oral daily  metoprolol tartrate 50 milliGRAM(s) Oral every 6 hours  oxybutynin 5 milliGRAM(s) Oral two times a day  polyethylene glycol 3350 17 Gram(s) Oral daily  senna 2 Tablet(s) Oral at bedtime    MEDICATIONS  (PRN):  melatonin 5 milliGRAM(s) Oral at bedtime PRN Insomnia  morphine  - Injectable 2 milliGRAM(s) IV Push daily PRN Severe Pain (7 - 10)  ondansetron Injectable 4 milliGRAM(s) IV Push once PRN Nausea and/or Vomiting      LABS                          9.6    11.30 )-----------( 261      ( 18 Apr 2024 05:35 )             32.4     04-17    144  |  105  |  51<H>  ----------------------------<  96  3.5   |  26  |  2.1<H>    Ca    8.2<L>      17 Apr 2024 05:33  Phos  4.4     04-17  Mg     2.2     04-17    TPro  4.8<L>  /  Alb  3.1<L>  /  TBili  0.6  /  DBili  x   /  AST  17  /  ALT  31  /  AlkPhos  228<H>  04-17      Urinalysis Basic - ( 17 Apr 2024 05:33 )    Color: x / Appearance: x / SG: x / pH: x  Gluc: 96 mg/dL / Ketone: x  / Bili: x / Urobili: x   Blood: x / Protein: x / Nitrite: x   Leuk Esterase: x / RBC: x / WBC x   Sq Epi: x / Non Sq Epi: x / Bacteria: x        Lactate Trend        CAPILLARY BLOOD GLUCOSE            RADIOLOGY & ADDITIONAL TESTS:    Imaging Personally Reviewed:  [ ] YES  [ ] NO    HEALTH ISSUES - PROBLEM Dx:  Palpitations    On deep vein thrombosis (DVT) prophylaxis    H/O: gout    Hypothyroidism    HLD (hyperlipidemia)    CKD (chronic kidney disease), stage IV            PHYSICAL EXAM:  GENERAL: NAD, well-developed.  HEAD:  Atraumatic, Normocephalic.  EYES: EOMI, PERRLA, conjunctiva and sclera clear.  NECK: Supple, No JVD.  CHEST/LUNG: bilateral fine rales, mild ronchi  HEART: Irregular rate and rhythm; S1 S2.   ABDOMEN: Soft, Nontender, Nondistended; Bowel sounds present.  EXTREMITIES:  2+ Peripheral Pulses, No clubbing, cyanosis, or edema.  PSYCH: AAOx3.  NEUROLOGY: non-focal.  SKIN: No rashes or lesions.
  JEFFERY UGALDE  83y  Female      Patient is a 83y old  Female who presents with a chief complaint of Atrial fibrillation     (14 Apr 2024 10:15)      INTERVAL HPI/OVERNIGHT EVENTS:  She feels weak, no cough, no chest pain or SOB, no fever.   Vital Signs Last 24 Hrs  T(C): 36.2 (14 Apr 2024 12:29), Max: 36.6 (13 Apr 2024 20:05)  T(F): 97.1 (14 Apr 2024 12:29), Max: 97.8 (13 Apr 2024 20:05)  HR: 69 (14 Apr 2024 12:29) (69 - 138)  BP: 120/92 (14 Apr 2024 12:29) (92/67 - 152/82)  BP(mean): --  RR: 17 (14 Apr 2024 12:29) (17 - 18)  SpO2: --          04-13-24 @ 07:01  -  04-14-24 @ 07:00  --------------------------------------------------------  IN: 1132 mL / OUT: 550 mL / NET: 582 mL            Consultant(s) Notes Reviewed:  [x ] YES  [ ] NO          MEDICATIONS  (STANDING):  allopurinol 100 milliGRAM(s) Oral at bedtime  atorvastatin 20 milliGRAM(s) Oral at bedtime  benzocaine/menthol Lozenge 1 Lozenge Oral daily  levothyroxine 175 MICROGram(s) Oral daily  metoprolol tartrate 50 milliGRAM(s) Oral every 8 hours  oxybutynin 5 milliGRAM(s) Oral two times a day  polyethylene glycol 3350 17 Gram(s) Oral daily  potassium chloride  20 mEq/100 mL IVPB 20 milliEquivalent(s) IV Intermittent every 2 hours  senna 2 Tablet(s) Oral at bedtime    MEDICATIONS  (PRN):      LABS                          10.7   13.01 )-----------( 367      ( 14 Apr 2024 07:55 )             35.1     04-14    145  |  104  |  65<HH>  ----------------------------<  107<H>  3.9   |  23  |  2.6<H>    Ca    8.6      14 Apr 2024 07:55  Mg     2.5     04-14    TPro  5.3<L>  /  Alb  3.5  /  TBili  1.4<H>  /  DBili  x   /  AST  22  /  ALT  34  /  AlkPhos  231<H>  04-14      Urinalysis Basic - ( 14 Apr 2024 07:55 )    Color: x / Appearance: x / SG: x / pH: x  Gluc: 107 mg/dL / Ketone: x  / Bili: x / Urobili: x   Blood: x / Protein: x / Nitrite: x   Leuk Esterase: x / RBC: x / WBC x   Sq Epi: x / Non Sq Epi: x / Bacteria: x        Lactate Trend        CAPILLARY BLOOD GLUCOSE            RADIOLOGY & ADDITIONAL TESTS:    Imaging Personally Reviewed:  [ ] YES  [ ] NO    HEALTH ISSUES - PROBLEM Dx:  Palpitations    On deep vein thrombosis (DVT) prophylaxis    H/O: gout    Hypothyroidism    HLD (hyperlipidemia)    CKD (chronic kidney disease), stage IV            PHYSICAL EXAM:  GENERAL: NAD, well-developed.  HEAD:  Atraumatic, Normocephalic.  EYES: EOMI, PERRLA, conjunctiva and sclera clear.  NECK: Supple, No JVD.  CHEST/LUNG: bilateral fine rales worse on the left.   HEART: Irregular rate and rhythm; S1 S2.   ABDOMEN: Soft, Nontender, Nondistended; Bowel sounds present.  EXTREMITIES:  2+ Peripheral Pulses, No clubbing, cyanosis, or edema.  PSYCH: AAOx3.  NEUROLOGY: non-focal.  SKIN: No rashes or lesions.
JEFFERY UGALDE 83y Female  MRN#: [id number]   Hospital Day: 2d    SUBJECTIVE  82yo female with PMHx AFIB on Xarelto, amiodarone, metoprolol presents c/o palpitations and SOB x 1 week. Pt reports she was recently seen yesterday by her PMD Dr Savage and cardiologist Dr Quintero. her Lasix was increased from 20-40mg and her metoprolol was increased from 25mg BID to 50mg BID. Pt has had multiple failed cardioversions for AFIB and reports increasing medication dosages has not relieved symptoms so she presented to the ED today. She denies CP, leg swelling, dizziness or lightheadedness. Denies any specific aggravating or relieving factors.    Vitals in ED:  T 98.4  /67    SPO2 99%RA   EKG on admission Afib w/ RVR        Patient is a 83y old Female who presents with a chief complaint of Afib (11 Apr 2024 17:02)  Currently admitted to medicine with the primary diagnosis of Longstanding persistent atrial fibrillation      INTERVAL HPI AND OVERNIGHT EVENTS:  Pt was in afib RVR w/ SVT. Adenosine 6mg given x1 overnight with good response. No further SVT episodes.   Patient was examined and seen at bedside. This morning she is resting comfortably in bed and reports no issues or overnight events.    OBJECTIVE  PAST MEDICAL & SURGICAL HISTORY  HTN (hypertension)    High cholesterol    Hypothyroid  s/p thyroid ca surgery    Afib  on xarelto    Sleep apnea    Osteoarthritis    CKD (chronic kidney disease) stage 3, GFR 30-59 ml/min    H/O thyroidectomy    S/P rotator cuff surgery      ALLERGIES:  No Known Allergies    MEDICATIONS:  STANDING MEDICATIONS  allopurinol 100 milliGRAM(s) Oral at bedtime  atorvastatin 20 milliGRAM(s) Oral at bedtime  benzocaine/menthol Lozenge 1 Lozenge Oral daily  furosemide    Tablet 40 milliGRAM(s) Oral daily  levothyroxine 175 MICROGram(s) Oral daily  metoprolol tartrate 50 milliGRAM(s) Oral two times a day  oxybutynin 5 milliGRAM(s) Oral two times a day  potassium chloride  20 mEq/100 mL IVPB 20 milliEquivalent(s) IV Intermittent every 2 hours    PRN MEDICATIONS      VITAL SIGNS: Last 24 Hours  T(C): 35.8 (12 Apr 2024 05:16), Max: 36.8 (11 Apr 2024 09:32)  T(F): 96.4 (12 Apr 2024 05:16), Max: 98.2 (11 Apr 2024 09:32)  HR: 122 (12 Apr 2024 05:16) (75 - 122)  BP: 109/64 (12 Apr 2024 05:16) (109/64 - 132/72)  BP(mean): --  RR: 18 (12 Apr 2024 05:16) (18 - 20)  SpO2: 97% (11 Apr 2024 20:15) (96% - 97%)    LABS:                        10.2   10.26 )-----------( 348      ( 12 Apr 2024 06:43 )             33.0     04-12    148<H>  |  107  |  51<H>  ----------------------------<  153<H>  3.6   |  23  |  2.3<H>    Ca    8.6      12 Apr 2024 06:43  Mg     2.4     04-12    TPro  5.3<L>  /  Alb  3.5  /  TBili  1.3<H>  /  DBili  x   /  AST  19  /  ALT  40  /  AlkPhos  168<H>  04-12    PT/INR - ( 11 Apr 2024 06:55 )   PT: 16.20 sec;   INR: 1.42 ratio         PTT - ( 11 Apr 2024 00:48 )  PTT:34.8 sec  Urinalysis Basic - ( 12 Apr 2024 06:43 )    Color: x / Appearance: x / SG: x / pH: x  Gluc: 153 mg/dL / Ketone: x  / Bili: x / Urobili: x   Blood: x / Protein: x / Nitrite: x   Leuk Esterase: x / RBC: x / WBC x   Sq Epi: x / Non Sq Epi: x / Bacteria: x                RADIOLOGY:  < from: Transesophageal Echocardiogram (04.11.24 @ 11:39) >  Summary:   1. Left ventricular ejection fraction, by visual estimation, is 50 to   55%.   2. Mild to moderate mitral valve regurgitation.   3. Thickening and calcification of the anterior and posterior mitral   valve leaflets.   4. Mild tricuspid regurgitation.   5. Moderate pulmonic valve regurgitation.   6. Dilatation of the ascending aorta 3.99 cm.   7. No evidence of vegetations.   8. Severely dilated pulmonary artery.   9. Normal global left ventricular systolic function.  10. Mildly increased LV wall thickness.  11. The left ventricular diastolic function could not be assessed in this   study.  12. Mild aortic regurgitation.    < end of copied text >      PHYSICAL EXAM:  CONSTITUTIONAL: asleep in bed, No acute distress, AAOx3  HEAD: Atraumatic, normocephalic  EYES: EOM intact, PERRLA, conjunctiva and sclera clear  ENT: moist mucous membranes  PULMONARY: bibasilar crackles, no wheeze, no resp distress  CARDIOVASCULAR: Regular rate and rhythm  GASTROINTESTINAL: Soft, non-tender, non-distended; bowel sounds present  MUSCULOSKELETAL: trace LE edema/myxedema  NEUROLOGY: non-focal  SKIN: warm and dry    
NEPHROLOGY FOLLOW UP NOTE    pt seen and examined  no new complaints    PAST MEDICAL & SURGICAL HISTORY:  HTN (hypertension)      High cholesterol      Hypothyroid  s/p thyroid ca surgery      Afib  on xarelto      Sleep apnea      Osteoarthritis      CKD (chronic kidney disease) stage 3, GFR 30-59 ml/min      H/O thyroidectomy      S/P rotator cuff surgery        Allergies:  No Known Allergies    Home Medications Reviewed    SOCIAL HISTORY:  Denies ETOH,Smoking,   FAMILY HISTORY:  Family history of lung cancer (Mother)    Family history of abdominal aortic aneurysm (AAA) (Father)          REVIEW OF SYSTEMS:  All other review of systems is negative unless indicated above.    PHYSICAL EXAM:  Constitutional: NAD  HEENT: anicteric sclera, oropharynx clear, MMM  Neck: No JVD  Respiratory: few bibasilar rales   Cardiovascular: S1, S2, irreg  Gastrointestinal: BS+, soft, NT/ND  Extremities: No cyanosis or clubbing. No peripheral edema  Neurological: A/O x 2  : No CVA tenderness. No joaquin.   Skin: No rashes    Hospital Medications:   MEDICATIONS  (STANDING):  allopurinol 100 milliGRAM(s) Oral at bedtime  apixaban 2.5 milliGRAM(s) Oral every 12 hours  atorvastatin 20 milliGRAM(s) Oral at bedtime  azithromycin   Tablet 500 milliGRAM(s) Oral daily  benzocaine/menthol Lozenge 1 Lozenge Oral daily  cabergoline 0.25 milliGRAM(s) Oral every week  cefTRIAXone   IVPB      cefTRIAXone   IVPB 1000 milliGRAM(s) IV Intermittent every 24 hours  chlorhexidine 2% Cloths 1 Application(s) Topical <User Schedule>  levothyroxine 175 MICROGram(s) Oral daily  metoprolol tartrate 50 milliGRAM(s) Oral every 6 hours  oxybutynin 5 milliGRAM(s) Oral two times a day  polyethylene glycol 3350 17 Gram(s) Oral daily  senna 2 Tablet(s) Oral at bedtime  vancomycin  IVPB 1000 milliGRAM(s) IV Intermittent once        VITALS:  T(F): 96.6 (04-17-24 @ 12:09), Max: 96.6 (04-17-24 @ 12:09)  HR: 96 (04-17-24 @ 12:09)  BP: 112/78 (04-17-24 @ 12:09)  RR: 18 (04-17-24 @ 12:09)  SpO2: 98% (04-17-24 @ 13:10)  Wt(kg): --    04-15 @ 07:01  -  04-16 @ 07:00  --------------------------------------------------------  IN: 511 mL / OUT: 1775 mL / NET: -1264 mL    04-16 @ 07:01  -  04-17 @ 07:00  --------------------------------------------------------  IN: 722 mL / OUT: 350 mL / NET: 372 mL    04-17 @ 07:01  -  04-17 @ 15:27  --------------------------------------------------------  IN: 354 mL / OUT: 300 mL / NET: 54 mL          LABS:  04-17    144  |  105  |  51<H>  ----------------------------<  96  3.5   |  26  |  2.1<H>    Ca    8.2<L>      17 Apr 2024 05:33  Phos  4.4     04-17  Mg     2.2     04-17    TPro  4.8<L>  /  Alb  3.1<L>  /  TBili  0.6  /  DBili      /  AST  17  /  ALT  31  /  AlkPhos  228<H>  04-17                          10.5   12.58 )-----------( 283      ( 17 Apr 2024 05:33 )             34.8       Urine Studies:  Urinalysis Basic - ( 17 Apr 2024 05:33 )    Color:  / Appearance:  / SG:  / pH:   Gluc: 96 mg/dL / Ketone:   / Bili:  / Urobili:    Blood:  / Protein:  / Nitrite:    Leuk Esterase:  / RBC:  / WBC    Sq Epi:  / Non Sq Epi:  / Bacteria:       Sodium, Random Urine: <20.0 mmoL/L (04-15 @ 15:30)  Creatinine, Random Urine: 87 mg/dL (04-15 @ 15:30)  Protein/Creatinine Ratio Calculation: 0.2 Ratio (04-15 @ 15:30)  Osmolality, Random Urine: 481 mos/kg (04-15 @ 15:30)  Creatinine, Random Urine: 81 mg/dL (04-15 @ 15:30)      RADIOLOGY & ADDITIONAL STUDIES:    
SUBJECTIVE:  HPI:  84yo female with PMHx AFIB on Xarelto, amiodarone, metoprolol presents c/o palpitations and SOB x 1 week. Pt reports she was recently seen yesterday by her PMD Dr Savage and cardiologist Dr Quintero. her Lasix was increased from 20-40mg and her metoprolol was increased from 25mg BID to 50mg BID. Pt has had multiple failed cardioversions for AFIB and reports increasing medication dosages has not relieved symptoms so she presented to the ED today. She denies CP, leg swelling, dizziness or lightheadedness. Denies any specific aggravating or relieving factors.    Vitals in ED:  T 98.4  /67    SPO2 99%RA   EKG on admission Afib w/ RVR           (10 Apr 2024 22:09)      Patient is a 83y old Female who presents with a chief complaint of Afib (15 Apr 2024 08:30)    Currently admitted to medicine with the primary diagnosis of Longstanding persistent atrial fibrillation       Today is hospital day 5d.     PAST MEDICAL & SURGICAL HISTORY  HTN (hypertension)    High cholesterol    Hypothyroid  s/p thyroid ca surgery    Afib  on xarelto    Sleep apnea    Osteoarthritis    CKD (chronic kidney disease) stage 3, GFR 30-59 ml/min    H/O thyroidectomy    S/P rotator cuff surgery        ALLERGIES:  No Known Allergies    MEDICATIONS:  ACTIVE MEDICATIONS  allopurinol 100 milliGRAM(s) Oral at bedtime  atorvastatin 20 milliGRAM(s) Oral at bedtime  azithromycin   Tablet 500 milliGRAM(s) Oral daily  benzocaine/menthol Lozenge 1 Lozenge Oral daily  cefTRIAXone   IVPB 1000 milliGRAM(s) IV Intermittent every 24 hours  cefTRIAXone   IVPB      levothyroxine 175 MICROGram(s) Oral daily  melatonin 5 milliGRAM(s) Oral at bedtime PRN  metoprolol tartrate 50 milliGRAM(s) Oral every 8 hours  oxybutynin 5 milliGRAM(s) Oral two times a day  polyethylene glycol 3350 17 Gram(s) Oral daily  potassium chloride  20 mEq/100 mL IVPB 20 milliEquivalent(s) IV Intermittent every 2 hours  senna 2 Tablet(s) Oral at bedtime  vancomycin  IVPB 1000 milliGRAM(s) IV Intermittent once  vancomycin  IVPB 1000 milliGRAM(s) IV Intermittent once      VITALS:   T(F): 97.5  HR: 127  BP: 113/88  RR: 26  SpO2: 96%    LABS:                        10.1   12.95 )-----------( 367      ( 15 Apr 2024 04:57 )             32.7     04-15    142  |  101  |  x   ----------------------------<  107<H>  3.7   |  22  |  2.8<H>    Ca    8.4      15 Apr 2024 04:57  Mg     2.4     04-15    TPro  5.2<L>  /  Alb  3.4<L>  /  TBili  1.2  /  DBili  x   /  AST  26  /  ALT  37  /  AlkPhos  272<H>  04-15    PT/INR - ( 14 Apr 2024 22:03 )   PT: 18.70 sec;   INR: 1.63 ratio         PTT - ( 14 Apr 2024 22:03 )  PTT:31.2 sec  Urinalysis Basic - ( 15 Apr 2024 04:57 )    Color: x / Appearance: x / SG: x / pH: x  Gluc: 107 mg/dL / Ketone: x  / Bili: x / Urobili: x   Blood: x / Protein: x / Nitrite: x   Leuk Esterase: x / RBC: x / WBC x   Sq Epi: x / Non Sq Epi: x / Bacteria: x                    PHYSICAL EXAM:  GENERAL: NAD, well-developed.  HEAD:  Atraumatic, Normocephalic.  EYES: EOMI, PERRLA, conjunctiva and sclera clear.  NECK: Supple, No JVD.  CHEST/LUNG: bilateral fine rales worse on the left.   HEART: Irregular rate and rhythm; S1 S2.   ABDOMEN: Soft, Nontender, Nondistended; Bowel sounds present.  EXTREMITIES:  2+ Peripheral Pulses, No clubbing, cyanosis, or edema.  PSYCH: AAOx3.  NEUROLOGY: non-focal.  SKIN: No rashes or lesions.        
INTERVAL HPI/OVERNIGHT EVENTS:    Patient s/p MDT PPM implant  No events over night. Pt without complaints    MEDICATIONS  (STANDING):  allopurinol 100 milliGRAM(s) Oral at bedtime  atorvastatin 20 milliGRAM(s) Oral at bedtime  azithromycin   Tablet 500 milliGRAM(s) Oral daily  benzocaine/menthol Lozenge 1 Lozenge Oral daily  cabergoline 0.25 milliGRAM(s) Oral every week  cefTRIAXone   IVPB      cefTRIAXone   IVPB 1000 milliGRAM(s) IV Intermittent every 24 hours  levothyroxine 175 MICROGram(s) Oral daily  metoprolol tartrate 50 milliGRAM(s) Oral every 6 hours  oxybutynin 5 milliGRAM(s) Oral two times a day  polyethylene glycol 3350 17 Gram(s) Oral daily  potassium chloride   Powder 40 milliEquivalent(s) Oral once  potassium chloride  20 mEq/100 mL IVPB 20 milliEquivalent(s) IV Intermittent every 2 hours  senna 2 Tablet(s) Oral at bedtime  vancomycin  IVPB 1000 milliGRAM(s) IV Intermittent once    MEDICATIONS  (PRN):  melatonin 5 milliGRAM(s) Oral at bedtime PRN Insomnia  morphine  - Injectable 2 milliGRAM(s) IV Push daily PRN Severe Pain (7 - 10)  ondansetron Injectable 4 milliGRAM(s) IV Push once PRN Nausea and/or Vomiting  oxycodone    5 mG/acetaminophen 325 mG 2 Tablet(s) Oral once PRN Moderate Pain (4 - 6)  oxycodone    5 mG/acetaminophen 325 mG 1 Tablet(s) Oral once PRN Mild Pain (1 - 3)      Allergies    No Known Allergies    Intolerances      Vital Signs Last 24 Hrs  T(C): 35.8 (16 Apr 2024 05:10), Max: 36.4 (15 Apr 2024 13:20)  T(F): 96.4 (16 Apr 2024 05:10), Max: 97.6 (15 Apr 2024 13:20)  HR: 104 (16 Apr 2024 05:10) (87 - 118)  BP: 118/71 (16 Apr 2024 05:10) (107/78 - 138/90)  BP(mean): --  RR: 18 (16 Apr 2024 05:10) (18 - 18)  SpO2: 96% (15 Apr 2024 11:42) (96% - 96%)      Wound healing well; No hematoma; no bleeding      RADIOLOGY & ADDITIONAL TESTS:  CXR reviewed.  Leads in good position.        
INTERVAL HPI/OVERNIGHT EVENTS:  no events on tele   -115s    MEDICATIONS  (STANDING):  allopurinol 100 milliGRAM(s) Oral at bedtime  apixaban 2.5 milliGRAM(s) Oral two times a day  atorvastatin 20 milliGRAM(s) Oral at bedtime  benzocaine/menthol Lozenge 1 Lozenge Oral daily  levothyroxine 175 MICROGram(s) Oral daily  metoprolol tartrate 50 milliGRAM(s) Oral every 8 hours  oxybutynin 5 milliGRAM(s) Oral two times a day  polyethylene glycol 3350 17 Gram(s) Oral daily  potassium chloride  20 mEq/100 mL IVPB 20 milliEquivalent(s) IV Intermittent every 2 hours  senna 2 Tablet(s) Oral at bedtime    MEDICATIONS  (PRN):      Allergies    No Known Allergies    Intolerances        REVIEW OF SYSTEMS    x[ ] A ten-point review of systems was otherwise negative except as noted.  [ ] Due to altered mental status/intubation, subjective information were not able to be obtained from the patient. History was obtained, to the extent possible, from review of the chart and collateral sources of information.      Vital Signs Last 24 Hrs  T(C): 36.1 (13 Apr 2024 13:32), Max: 36.6 (13 Apr 2024 05:08)  T(F): 97 (13 Apr 2024 13:32), Max: 97.8 (13 Apr 2024 05:08)  HR: 110 (13 Apr 2024 15:00) (110 - 131)  BP: 152/82 (13 Apr 2024 15:00) (131/87 - 152/82)  BP(mean): --  RR: 18 (13 Apr 2024 13:32) (18 - 18)  SpO2: 97% (13 Apr 2024 05:08) (97% - 97%)    Parameters below as of 13 Apr 2024 05:08  Patient On (Oxygen Delivery Method): nasal cannula  O2 Flow (L/min): 2      04-12-24 @ 07:01  -  04-13-24 @ 07:00  --------------------------------------------------------  IN: 200 mL / OUT: 350 mL / NET: -150 mL    04-13-24 @ 07:01  -  04-13-24 @ 15:51  --------------------------------------------------------  IN: 812 mL / OUT: 550 mL / NET: 262 mL        PHYSICAL EXAM:    GENERAL: In no apparent distress, well nourished, and hydrated.  HEART: IRRegular rate and rhythm; No murmur; NO rubs, or gallops.  PULMONARY: Clear to auscultation and percussion.  Normal expansion/effort. No rales, wheezing, or rhonchi bilaterally.  ABDOMEN: Soft, Nontender, Nondistended; Bowel sounds present  EXTREMITIES:  Extremities warm, pink, well-perfused, 2+ Peripheral Pulses, No clubbing, cyanosis, or edema  NEUROLOGICAL: alert & oriented x 3, no focal deficits, PERRLA, EOMI    LABS:                        10.4   12.99 )-----------( 348      ( 13 Apr 2024 06:27 )             34.7     04-13    147<H>  |  105  |  60<H>  ----------------------------<  110<H>  3.9   |  23  |  2.6<H>    Ca    8.4      13 Apr 2024 06:27  Mg     2.3     04-13    TPro  5.3<L>  /  Alb  3.5  /  TBili  1.3<H>  /  DBili  x   /  AST  19  /  ALT  33  /  AlkPhos  171<H>  04-13            12 Lead ECG:   Ventricular Rate 125 BPM    Atrial Rate 245 BPM    QRS Duration 126 ms    Q-T Interval 360 ms    QTC Calculation(Bazett) 519 ms    R Axis -74 degrees    T Axis 113 degrees    Diagnosis Line Atrial flutter with variable A-V block  Left axis deviation  Left ventricular hypertrophy with QRS widening  IVCD    Abnormal ECG    Confirmed by KAVITHA PHILLIPS MD ([id number]) on 4/12/2024 12:59:14 PM (04-12-24 @ 08:33)      RADIOLOGY & ADDITIONAL TESTS:      
NEPHROLOGY FOLLOW UP NOTE    for EP today    PAST MEDICAL & SURGICAL HISTORY:  HTN (hypertension)      High cholesterol      Hypothyroid  s/p thyroid ca surgery      Afib  on xarelto      Sleep apnea      Osteoarthritis      CKD (chronic kidney disease) stage 3, GFR 30-59 ml/min      H/O thyroidectomy      S/P rotator cuff surgery        Allergies:  No Known Allergies    Home Medications Reviewed    SOCIAL HISTORY:  Denies ETOH,Smoking,   FAMILY HISTORY:  Family history of lung cancer (Mother)    Family history of abdominal aortic aneurysm (AAA) (Father)          REVIEW OF SYSTEMS:  All other review of systems is negative unless indicated above.    PHYSICAL EXAM:  Constitutional: NAD  HEENT: anicteric sclera, oropharynx clear, MMM  Neck: No JVD  Respiratory: few bibasilar rales   Cardiovascular: S1, S2, irreg  Gastrointestinal: BS+, soft, NT/ND  Extremities: No cyanosis or clubbing. No peripheral edema  Neurological: A/O x 2  : No CVA tenderness. No joaquin.   Skin: No rashes    Hospital Medications:   MEDICATIONS  (STANDING):  allopurinol 100 milliGRAM(s) Oral at bedtime  atorvastatin 20 milliGRAM(s) Oral at bedtime  azithromycin   Tablet 500 milliGRAM(s) Oral daily  benzocaine/menthol Lozenge 1 Lozenge Oral daily  cabergoline 0.25 milliGRAM(s) Oral every week  cefTRIAXone   IVPB      cefTRIAXone   IVPB 1000 milliGRAM(s) IV Intermittent every 24 hours  levothyroxine 175 MICROGram(s) Oral daily  metoprolol tartrate 50 milliGRAM(s) Oral every 8 hours  oxybutynin 5 milliGRAM(s) Oral two times a day  polyethylene glycol 3350 17 Gram(s) Oral daily  potassium chloride  20 mEq/100 mL IVPB 20 milliEquivalent(s) IV Intermittent every 2 hours  senna 2 Tablet(s) Oral at bedtime  vancomycin  IVPB 1000 milliGRAM(s) IV Intermittent once        VITALS:  T(F): 97.6 (04-15-24 @ 13:20), Max: 97.8 (24 @ 20:15)  HR: 87 (04-15-24 @ 13:20)  BP: 133/85 (04-15-24 @ 13:20)  RR: 18 (04-15-24 @ 13:20)  SpO2: 96% (04-15-24 @ 11:42)  Wt(kg): --     @ 07:01  -   @ 07:00  --------------------------------------------------------  IN: 1132 mL / OUT: 550 mL / NET: 582 mL     @ 07:01  -  04-15 @ 07:00  --------------------------------------------------------  IN: 570 mL / OUT: 700 mL / NET: -130 mL    04-15 @ 07:01  -  04-15 @ 18:31  --------------------------------------------------------  IN: 225 mL / OUT: 675 mL / NET: -450 mL      Height (cm): 152.4 (04-15 @ 07:34)  Weight (kg): 85.3 (04-15 @ 07:34)  BMI (kg/m2): 36.7 (04-15 @ 07:34)  BSA (m2): 1.82 (04-15 @ 07:34)    LABS:  04-15    142  |  101  |  70<HH>  ----------------------------<  107<H>  3.7   |  22  |  2.8<H>    Ca    8.4      15 Apr 2024 04:57  Mg     2.4     04-15    TPro  5.2<L>  /  Alb  3.4<L>  /  TBili  1.2  /  DBili      /  AST  26  /  ALT  37  /  AlkPhos  272<H>  04-15                          10.1   12.95 )-----------( 367      ( 15 Apr 2024 04:57 )             32.7       Urine Studies:  Urinalysis Basic - ( 15 Apr 2024 15:30 )    Color: Yellow / Appearance: Clear / S.018 / pH:   Gluc:  / Ketone: Negative mg/dL  / Bili: Negative / Urobili: 1.0 mg/dL   Blood:  / Protein: 30 mg/dL / Nitrite: Negative   Leuk Esterase: Trace / RBC: 0 /HPF / WBC 1 /HPF   Sq Epi:  / Non Sq Epi: 1 /HPF / Bacteria: Negative /HPF      Sodium, Random Urine: <20.0 mmoL/L (04-15 @ 15:30)  Osmolality, Random Urine: 481 mos/kg (04-15 @ 15:30)  Creatinine, Random Urine: 81 mg/dL (04-15 @ 15:30)      RADIOLOGY & ADDITIONAL STUDIES:  
SUBJECTIVE:  HPI:  82yo female with PMHx AFIB on Xarelto, amiodarone, metoprolol presents c/o palpitations and SOB x 1 week. Pt reports she was recently seen yesterday by her PMD Dr Savage and cardiologist Dr Quintero. her Lasix was increased from 20-40mg and her metoprolol was increased from 25mg BID to 50mg BID. Pt has had multiple failed cardioversions for AFIB and reports increasing medication dosages has not relieved symptoms so she presented to the ED today. She denies CP, leg swelling, dizziness or lightheadedness. Denies any specific aggravating or relieving factors.    Vitals in ED:  T 98.4  /67    SPO2 99%RA   EKG on admission Afib w/ RVR           (10 Apr 2024 22:09)      Patient is a 83y old Female who presents with a chief complaint of Afib (16 Apr 2024 17:00)    Currently admitted to medicine with the primary diagnosis of Longstanding persistent atrial fibrillation       Today is hospital day 7d.     PAST MEDICAL & SURGICAL HISTORY  HTN (hypertension)    High cholesterol    Hypothyroid  s/p thyroid ca surgery    Afib  on xarelto    Sleep apnea    Osteoarthritis    CKD (chronic kidney disease) stage 3, GFR 30-59 ml/min    H/O thyroidectomy    S/P rotator cuff surgery        ALLERGIES:  No Known Allergies    MEDICATIONS:  ACTIVE MEDICATIONS  allopurinol 100 milliGRAM(s) Oral at bedtime  atorvastatin 20 milliGRAM(s) Oral at bedtime  azithromycin   Tablet 500 milliGRAM(s) Oral daily  benzocaine/menthol Lozenge 1 Lozenge Oral daily  cabergoline 0.25 milliGRAM(s) Oral every week  cefTRIAXone   IVPB      cefTRIAXone   IVPB 1000 milliGRAM(s) IV Intermittent every 24 hours  levothyroxine 175 MICROGram(s) Oral daily  melatonin 5 milliGRAM(s) Oral at bedtime PRN  metoprolol tartrate 50 milliGRAM(s) Oral every 6 hours  morphine  - Injectable 2 milliGRAM(s) IV Push daily PRN  ondansetron Injectable 4 milliGRAM(s) IV Push once PRN  oxybutynin 5 milliGRAM(s) Oral two times a day  polyethylene glycol 3350 17 Gram(s) Oral daily  potassium chloride  20 mEq/100 mL IVPB 20 milliEquivalent(s) IV Intermittent once  senna 2 Tablet(s) Oral at bedtime  vancomycin  IVPB 1000 milliGRAM(s) IV Intermittent once      VITALS:   T(F): 96  HR: 93  BP: 117/66  RR: 18  SpO2: 97%    LABS:                        10.5   12.58 )-----------( 283      ( 17 Apr 2024 05:33 )             34.8     04-17    144  |  105  |  51<H>  ----------------------------<  96  3.5   |  26  |  2.1<H>    Ca    8.2<L>      17 Apr 2024 05:33  Phos  4.4     04-17  Mg     2.2     04-17    TPro  4.8<L>  /  Alb  3.1<L>  /  TBili  0.6  /  DBili  x   /  AST  17  /  ALT  31  /  AlkPhos  228<H>  04-17      Urinalysis Basic - ( 17 Apr 2024 05:33 )    Color: x / Appearance: x / SG: x / pH: x  Gluc: 96 mg/dL / Ketone: x  / Bili: x / Urobili: x   Blood: x / Protein: x / Nitrite: x   Leuk Esterase: x / RBC: x / WBC x   Sq Epi: x / Non Sq Epi: x / Bacteria: x                    PHYSICAL EXAM:  GENERAL: NAD, well-developed.  HEAD:  Atraumatic, Normocephalic.  EYES: EOMI, PERRLA, conjunctiva and sclera clear.  NECK: Supple, No JVD.  CHEST/LUNG: bilateral fine rales, mild ronchi  HEART: Irregular rate and rhythm; S1 S2.   ABDOMEN: Soft, Nontender, Nondistended; Bowel sounds present.  EXTREMITIES:  2+ Peripheral Pulses, No clubbing, cyanosis, or edema.  PSYCH: AAOx3.  NEUROLOGY: non-focal.  SKIN: No rashes or lesions.        
  JEFFERY UGALDE  83y  Female      Patient is a 83y old  Female who presents with a chief complaint of Afib (15 Apr 2024 08:38)      INTERVAL HPI/OVERNIGHT EVENTS:  She feels weak, no chest pain or fever, she went for EP procedure today.   Vital Signs Last 24 Hrs  T(C): 36.4 (15 Apr 2024 10:17), Max: 36.6 (14 Apr 2024 20:15)  T(F): 97.6 (15 Apr 2024 10:17), Max: 97.8 (14 Apr 2024 20:15)  HR: 118 (15 Apr 2024 11:42) (69 - 127)  BP: 107/78 (15 Apr 2024 11:42) (107/78 - 128/64)  BP(mean): 91 (15 Apr 2024 11:02) (81 - 97)  RR: 18 (15 Apr 2024 11:42) (17 - 26)  SpO2: 96% (15 Apr 2024 11:42) (92% - 97%)    Parameters below as of 15 Apr 2024 11:02  Patient On (Oxygen Delivery Method): room air          04-14-24 @ 07:01  -  04-15-24 @ 07:00  --------------------------------------------------------  IN: 570 mL / OUT: 700 mL / NET: -130 mL    04-15-24 @ 07:01  -  04-15-24 @ 12:21  --------------------------------------------------------  IN: 0 mL / OUT: 300 mL / NET: -300 mL            Consultant(s) Notes Reviewed:  [x ] YES  [ ] NO          MEDICATIONS  (STANDING):  allopurinol 100 milliGRAM(s) Oral at bedtime  atorvastatin 20 milliGRAM(s) Oral at bedtime  azithromycin   Tablet 500 milliGRAM(s) Oral daily  benzocaine/menthol Lozenge 1 Lozenge Oral daily  cefTRIAXone   IVPB      cefTRIAXone   IVPB 1000 milliGRAM(s) IV Intermittent every 24 hours  lactated ringers. 1000 milliLiter(s) (75 mL/Hr) IV Continuous <Continuous>  levothyroxine 175 MICROGram(s) Oral daily  metoprolol tartrate 50 milliGRAM(s) Oral every 8 hours  oxybutynin 5 milliGRAM(s) Oral two times a day  polyethylene glycol 3350 17 Gram(s) Oral daily  potassium chloride  20 mEq/100 mL IVPB 20 milliEquivalent(s) IV Intermittent every 2 hours  senna 2 Tablet(s) Oral at bedtime  vancomycin  IVPB 1000 milliGRAM(s) IV Intermittent once    MEDICATIONS  (PRN):  melatonin 5 milliGRAM(s) Oral at bedtime PRN Insomnia  morphine  - Injectable 2 milliGRAM(s) IV Push daily PRN Severe Pain (7 - 10)  ondansetron Injectable 4 milliGRAM(s) IV Push once PRN Nausea and/or Vomiting  oxycodone    5 mG/acetaminophen 325 mG 1 Tablet(s) Oral once PRN Mild Pain (1 - 3)  oxycodone    5 mG/acetaminophen 325 mG 2 Tablet(s) Oral once PRN Moderate Pain (4 - 6)      LABS                          10.1   12.95 )-----------( 367      ( 15 Apr 2024 04:57 )             32.7     04-15    142  |  101  |  70<HH>  ----------------------------<  107<H>  3.7   |  22  |  2.8<H>    Ca    8.4      15 Apr 2024 04:57  Mg     2.4     04-15    TPro  5.2<L>  /  Alb  3.4<L>  /  TBili  1.2  /  DBili  x   /  AST  26  /  ALT  37  /  AlkPhos  272<H>  04-15      Urinalysis Basic - ( 15 Apr 2024 04:57 )    Color: x / Appearance: x / SG: x / pH: x  Gluc: 107 mg/dL / Ketone: x  / Bili: x / Urobili: x   Blood: x / Protein: x / Nitrite: x   Leuk Esterase: x / RBC: x / WBC x   Sq Epi: x / Non Sq Epi: x / Bacteria: x      PT/INR - ( 14 Apr 2024 22:03 )   PT: 18.70 sec;   INR: 1.63 ratio         PTT - ( 14 Apr 2024 22:03 )  PTT:31.2 sec  Lactate Trend        CAPILLARY BLOOD GLUCOSE            RADIOLOGY & ADDITIONAL TESTS:    Imaging Personally Reviewed:  [ ] YES  [ ] NO    HEALTH ISSUES - PROBLEM Dx:  Palpitations    On deep vein thrombosis (DVT) prophylaxis    H/O: gout    Hypothyroidism    HLD (hyperlipidemia)    CKD (chronic kidney disease), stage IV            PHYSICAL EXAM:  GENERAL: NAD, well-developed.  HEAD:  Atraumatic, Normocephalic.  EYES: EOMI, PERRLA, conjunctiva and sclera clear.  NECK: Supple, No JVD.  CHEST/LUNG: bilateral fine rales worse on the left.   HEART: Irregular rate and rhythm; S1 S2.   ABDOMEN: Soft, Nontender, Nondistended; Bowel sounds present.  EXTREMITIES:  2+ Peripheral Pulses, No clubbing, cyanosis, or edema.  PSYCH: AAOx3.  NEUROLOGY: non-focal.  SKIN: No rashes or lesions.
INTERVAL HPI/OVERNIGHT EVENTS:    SUBJECTIVE: Patient seen and examined at bedside.     no cp, sob, abd pain, fever  no sob, orthopnea, pnd, cough    OBJECTIVE:    VITAL SIGNS:  Vital Signs Last 24 Hrs  T(C): 36.8 (11 Apr 2024 12:33), Max: 36.8 (11 Apr 2024 09:32)  T(F): 98.2 (11 Apr 2024 09:32), Max: 98.2 (11 Apr 2024 09:32)  HR: 107 (11 Apr 2024 12:33) (97 - 110)  BP: 125/90 (11 Apr 2024 12:33) (115/72 - 141/80)  BP(mean): --  RR: 20 (11 Apr 2024 12:33) (18 - 20)  SpO2: 96% (11 Apr 2024 12:33) (92% - 98%)    Parameters below as of 11 Apr 2024 12:33    O2 Flow (L/min): 2        PHYSICAL EXAM:    General: NAD  HEENT: NC/AT; PERRL, clear conjunctiva  Neck: supple  Respiratory: CTA b/l  Cardiovascular: +S1/S2; RRR  Abdomen: soft, NT/ND; +BS x4  Extremities: WWP, 2+ peripheral pulses b/l; no LE edema  Skin: normal color and turgor; no rash  Neurological:    MEDICATIONS:  MEDICATIONS  (STANDING):  allopurinol 100 milliGRAM(s) Oral at bedtime  atorvastatin 20 milliGRAM(s) Oral at bedtime  benzocaine/menthol Lozenge 1 Lozenge Oral daily  furosemide    Tablet 40 milliGRAM(s) Oral daily  levothyroxine 175 MICROGram(s) Oral daily  metoprolol tartrate 50 milliGRAM(s) Oral two times a day  oxybutynin 5 milliGRAM(s) Oral two times a day  potassium chloride  20 mEq/100 mL IVPB 20 milliEquivalent(s) IV Intermittent every 2 hours    MEDICATIONS  (PRN):      ALLERGIES:  Allergies    No Known Allergies    Intolerances        LABS:                        10.6   12.12 )-----------( 317      ( 11 Apr 2024 06:55 )             35.1     Hemoglobin: 10.6 g/dL (04-11 @ 06:55)  Hemoglobin: 10.3 g/dL (04-11 @ 00:48)  Hemoglobin: 10.2 g/dL (04-10 @ 15:31)    CBC Full  -  ( 11 Apr 2024 06:55 )  WBC Count : 12.12 K/uL  RBC Count : 4.09 M/uL  Hemoglobin : 10.6 g/dL  Hematocrit : 35.1 %  Platelet Count - Automated : 317 K/uL  Mean Cell Volume : 85.8 fL  Mean Cell Hemoglobin : 25.9 pg  Mean Cell Hemoglobin Concentration : 30.2 g/dL  Auto Neutrophil # : 9.87 K/uL  Auto Lymphocyte # : 1.22 K/uL  Auto Monocyte # : 0.81 K/uL  Auto Eosinophil # : 0.10 K/uL  Auto Basophil # : 0.06 K/uL  Auto Neutrophil % : 81.4 %  Auto Lymphocyte % : 10.1 %  Auto Monocyte % : 6.7 %  Auto Eosinophil % : 0.8 %  Auto Basophil % : 0.5 %    04-11    146  |  103  |  50<H>  ----------------------------<  123<H>  3.8   |  30  |  2.2<H>    Ca    8.6      11 Apr 2024 06:55  Mg     2.9     04-11    TPro  5.6<L>  /  Alb  3.6  /  TBili  1.0  /  DBili  x   /  AST  23  /  ALT  48<H>  /  AlkPhos  180<H>  04-11    Creatinine Trend: 2.2<--, 2.3<--, 2.0<--  LIVER FUNCTIONS - ( 11 Apr 2024 06:55 )  Alb: 3.6 g/dL / Pro: 5.6 g/dL / ALK PHOS: 180 U/L / ALT: 48 U/L / AST: 23 U/L / GGT: x           PT/INR - ( 11 Apr 2024 06:55 )   PT: 16.20 sec;   INR: 1.42 ratio         PTT - ( 11 Apr 2024 00:48 )  PTT:34.8 sec    hs Troponin:                29 <<== 04-11-24 @ 06:55                29 <<== 04-10-24 @ 15:31            Urinalysis Basic - ( 11 Apr 2024 06:55 )    Color: x / Appearance: x / SG: x / pH: x  Gluc: 123 mg/dL / Ketone: x  / Bili: x / Urobili: x   Blood: x / Protein: x / Nitrite: x   Leuk Esterase: x / RBC: x / WBC x   Sq Epi: x / Non Sq Epi: x / Bacteria: x      CSF:                      EKG:   MICROBIOLOGY:    IMAGING:      Labs, imaging, EKG personally reviewed    RADIOLOGY & ADDITIONAL TESTS: Reviewed.

## 2024-04-19 NOTE — PROGRESS NOTE ADULT - PROVIDER SPECIALTY LIST ADULT
Hospitalist
Internal Medicine
Nephrology
Electrophysiology
Electrophysiology
Nephrology
Electrophysiology
Hospitalist
Internal Medicine
Nephrology
Internal Medicine

## 2024-04-19 NOTE — PROGRESS NOTE ADULT - ASSESSMENT
82yo female with PMHx AFIB on Xarelto, amiodarone, metoprolol presents c/o palpitations and SOB x 1 week. Pt reports she was recently seen yesterday by her PMD Dr Savage and cardiologist Dr Quintero. her Lasix was increased from 20-40mg and her metoprolol was increased from 25mg BID to 50mg BID. Pt has had multiple failed cardioversions for AFIB and reports increasing medication dosages has not relieved symptoms so she presented to the ED, found with AFIB with RVR",     A/P:   Chronic Atrial Fibrillation/Flutter with Rapid Ventricular Response:   HR is not well controlled.   s/p failed DCCV in the past.   Continue Metoprolol to 50mg q 6hrs. Switch Xarelto to Eliquis due to CKD.   S/p Pacemaker placement 4/15. AVN ablation outpatient.     Acute on Chronic HFpEF:   Possible bilateral multifocal pneumonia:   Patient with mild SOB, no leg edema. Pro-BNP 7789  CXR 4/11 showed bilateral hilar opacities, pulmonary congestion.  Echo showed LVEF 50-55%, severe asymmetric LVH, mod reduced RV systolic function, mild to mod MR, mild to mod TR, mod PA, mod pulmonary HTN.    Lasix dose was increased recently outpatient from 20 to 40mg po daily.  CXR 4/13 still with bilateral opacities.   Patient with leukocytosis, looks dry, concern if this finding is pneumonia,   Ct chest 4/14 showed Multifocal bilateral groundglass opacities and consolidative opacities,   RVP, legionella and Strep Ag in urine negative,  Procalcitonin 0.14.    Completed on Zithromax and Rocephin.   s/p Lasix IV, discharge on Lasix 20mg po daily.     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     Hypothyroidism  History of Thyroid cancer s/p thyroidectomy.   TSH 3.0, continue synthroid 175 mcg     Pituitary adenoma  Cabergoline 1/2 tab of 0.5 mg Q weekly Monday    DVT Prophylaxis: Eliquis.   #Progress Note Handoff:  Pending (specify):   Family discussion:  Disposition: Home today

## 2024-04-19 NOTE — CHART NOTE - NSCHARTNOTEFT_GEN_A_CORE
Patient seen and examined , ready for DC     <<<RESIDENT DISCHARGE NOTE>>>     JEFFERY UGALDE  MRN-[id number]    VITAL SIGNS:  T(F): 96.5 (04-19-24 @ 04:50), Max: 96.9 (04-18-24 @ 12:45)  HR: 102 (04-19-24 @ 04:50)  BP: 121/58 (04-19-24 @ 04:50)  SpO2: 99% (04-18-24 @ 16:30)      PHYSICAL EXAMINATION:  General: NAD  Head & Neck: NCAT  Pulmonary: CTABL  Cardiovascular: s1, s2 audibly present; RRR   Gastrointestinal/Abdomen & Pelvis: soft NTTP  Neurologic/Motor: NFD    TEST RESULTS:                        9.6    11.30 )-----------( 261      ( 18 Apr 2024 05:35 )             32.4       04-19    143  |  106  |  36<H>  ----------------------------<  87  4.2   |  23  |  1.8<H>    Ca    8.4      19 Apr 2024 04:48  Phos  3.5     04-19  Mg     2.3     04-19    TPro  4.9<L>  /  Alb  3.1<L>  /  TBili  0.4  /  DBili  x   /  AST  12  /  ALT  21  /  AlkPhos  172<H>  04-19      FINAL DISCHARGE INTERVIEW:  Resident(s) Present: (Name:Alphonse Parker__________), RN Present: (Name:  ___________)    DISCHARGE MEDICATION RECONCILIATION  reviewed with Attending (Name:_Haleigh Causey_________)    DISPOSITION:   [  ] Home,    [ x ] Home with Visiting Nursing Services,   [    ]  SNF/ NH,    [   ] Acute Rehab (4A),   [   ] Other (Specify:_________)

## 2024-04-19 NOTE — PROGRESS NOTE ADULT - ASSESSMENT
KOBI, better  CKD stage 3-4  b/l proteinaceous and hemorrhagic renal cysts  Afib / s/p PPM 4/15  chronic, intermittent, mild hypernatremia  chronic HFpEF   anemia  FELIX  HTN  hypothyroidism / thyroid Ca / pituitary adenoma     plan:    for dc home today  CKD counselling   may resume lasix  outpt renal f/u

## 2024-05-01 DIAGNOSIS — E78.00 PURE HYPERCHOLESTEROLEMIA, UNSPECIFIED: ICD-10-CM

## 2024-05-01 DIAGNOSIS — I77.810 THORACIC AORTIC ECTASIA: ICD-10-CM

## 2024-05-01 DIAGNOSIS — I48.11 LONGSTANDING PERSISTENT ATRIAL FIBRILLATION: ICD-10-CM

## 2024-05-01 DIAGNOSIS — I50.33 ACUTE ON CHRONIC DIASTOLIC (CONGESTIVE) HEART FAILURE: ICD-10-CM

## 2024-05-01 DIAGNOSIS — M19.90 UNSPECIFIED OSTEOARTHRITIS, UNSPECIFIED SITE: ICD-10-CM

## 2024-05-01 DIAGNOSIS — I13.0 HYPERTENSIVE HEART AND CHRONIC KIDNEY DISEASE WITH HEART FAILURE AND STAGE 1 THROUGH STAGE 4 CHRONIC KIDNEY DISEASE, OR UNSPECIFIED CHRONIC KIDNEY DISEASE: ICD-10-CM

## 2024-05-01 DIAGNOSIS — D35.2 BENIGN NEOPLASM OF PITUITARY GLAND: ICD-10-CM

## 2024-05-01 DIAGNOSIS — E87.0 HYPEROSMOLALITY AND HYPERNATREMIA: ICD-10-CM

## 2024-05-01 DIAGNOSIS — J18.9 PNEUMONIA, UNSPECIFIED ORGANISM: ICD-10-CM

## 2024-05-01 DIAGNOSIS — I47.20 VENTRICULAR TACHYCARDIA, UNSPECIFIED: ICD-10-CM

## 2024-05-01 DIAGNOSIS — Z79.01 LONG TERM (CURRENT) USE OF ANTICOAGULANTS: ICD-10-CM

## 2024-05-01 DIAGNOSIS — E87.6 HYPOKALEMIA: ICD-10-CM

## 2024-05-01 DIAGNOSIS — I48.92 UNSPECIFIED ATRIAL FLUTTER: ICD-10-CM

## 2024-05-01 DIAGNOSIS — N28.1 CYST OF KIDNEY, ACQUIRED: ICD-10-CM

## 2024-05-01 DIAGNOSIS — I08.1 RHEUMATIC DISORDERS OF BOTH MITRAL AND TRICUSPID VALVES: ICD-10-CM

## 2024-05-01 DIAGNOSIS — I27.20 PULMONARY HYPERTENSION, UNSPECIFIED: ICD-10-CM

## 2024-05-01 DIAGNOSIS — Z82.49 FAMILY HISTORY OF ISCHEMIC HEART DISEASE AND OTHER DISEASES OF THE CIRCULATORY SYSTEM: ICD-10-CM

## 2024-05-01 DIAGNOSIS — G47.33 OBSTRUCTIVE SLEEP APNEA (ADULT) (PEDIATRIC): ICD-10-CM

## 2024-05-01 DIAGNOSIS — Z79.890 HORMONE REPLACEMENT THERAPY: ICD-10-CM

## 2024-05-01 DIAGNOSIS — I49.5 SICK SINUS SYNDROME: ICD-10-CM

## 2024-05-01 DIAGNOSIS — N17.9 ACUTE KIDNEY FAILURE, UNSPECIFIED: ICD-10-CM

## 2024-05-01 DIAGNOSIS — Z85.850 PERSONAL HISTORY OF MALIGNANT NEOPLASM OF THYROID: ICD-10-CM

## 2024-05-01 DIAGNOSIS — M10.9 GOUT, UNSPECIFIED: ICD-10-CM

## 2024-05-01 DIAGNOSIS — N18.4 CHRONIC KIDNEY DISEASE, STAGE 4 (SEVERE): ICD-10-CM

## 2024-05-01 DIAGNOSIS — E03.9 HYPOTHYROIDISM, UNSPECIFIED: ICD-10-CM

## 2024-05-01 DIAGNOSIS — D50.9 IRON DEFICIENCY ANEMIA, UNSPECIFIED: ICD-10-CM

## 2024-05-01 DIAGNOSIS — E83.42 HYPOMAGNESEMIA: ICD-10-CM

## 2024-05-07 ENCOUNTER — RX RENEWAL (OUTPATIENT)
Age: 84
End: 2024-05-07

## 2024-05-17 ENCOUNTER — APPOINTMENT (OUTPATIENT)
Dept: ELECTROPHYSIOLOGY | Facility: CLINIC | Age: 84
End: 2024-05-17
Payer: MEDICARE

## 2024-05-17 VITALS
TEMPERATURE: 98 F | BODY MASS INDEX: 36.44 KG/M2 | HEART RATE: 90 BPM | WEIGHT: 198 LBS | SYSTOLIC BLOOD PRESSURE: 119 MMHG | DIASTOLIC BLOOD PRESSURE: 84 MMHG | HEIGHT: 62 IN

## 2024-05-17 DIAGNOSIS — Z45.018 ENCOUNTER FOR ADJUSTMENT AND MANAGEMENT OF OTHER PART OF CARDIAC PACEMAKER: ICD-10-CM

## 2024-05-17 DIAGNOSIS — Z48.89 ENCOUNTER FOR OTHER SPECIFIED SURGICAL AFTERCARE: ICD-10-CM

## 2024-05-17 DIAGNOSIS — I48.91 UNSPECIFIED ATRIAL FIBRILLATION: ICD-10-CM

## 2024-05-17 DIAGNOSIS — I10 ESSENTIAL (PRIMARY) HYPERTENSION: ICD-10-CM

## 2024-05-17 PROCEDURE — 93283 PRGRMG EVAL IMPLANTABLE DFB: CPT

## 2024-05-17 PROCEDURE — 99024 POSTOP FOLLOW-UP VISIT: CPT

## 2024-05-17 RX ORDER — APIXABAN 2.5 MG/1
2.5 TABLET, FILM COATED ORAL
Qty: 180 | Refills: 3 | Status: ACTIVE | COMMUNITY

## 2024-05-17 RX ORDER — RIVAROXABAN 15 MG/1
15 TABLET, FILM COATED ORAL
Refills: 0 | Status: COMPLETED | COMMUNITY
End: 2024-05-17

## 2024-05-17 RX ORDER — CLOTRIMAZOLE AND BETAMETHASONE DIPROPIONATE 10; .5 MG/G; MG/G
1-0.05 CREAM TOPICAL TWICE DAILY
Qty: 45 | Refills: 25 | Status: COMPLETED | COMMUNITY
Start: 2021-09-29 | End: 2024-05-17

## 2024-05-17 RX ORDER — AMIODARONE HYDROCHLORIDE 200 MG/1
200 TABLET ORAL
Qty: 90 | Refills: 3 | Status: ACTIVE | COMMUNITY

## 2024-05-17 RX ORDER — LEVOTHYROXINE SODIUM 175 UG/1
175 TABLET ORAL DAILY
Refills: 0 | Status: ACTIVE | COMMUNITY

## 2024-05-17 RX ORDER — TROSPIUM CHLORIDE 20 MG/1
20 TABLET, FILM COATED ORAL DAILY
Refills: 0 | Status: ACTIVE | COMMUNITY

## 2024-05-17 RX ORDER — FUROSEMIDE 20 MG/1
20 TABLET ORAL
Qty: 30 | Refills: 3 | Status: ACTIVE | COMMUNITY

## 2024-05-17 RX ORDER — TROSPIUM CHLORIDE 20 MG/1
20 TABLET, FILM COATED ORAL
Qty: 180 | Refills: 0 | Status: COMPLETED | COMMUNITY
Start: 2022-12-02 | End: 2024-05-17

## 2024-05-17 RX ORDER — FERROUS SULFATE TAB EC 325 MG (65 MG FE EQUIVALENT) 325 (65 FE) MG
325 (65 FE) TABLET DELAYED RESPONSE ORAL DAILY
Refills: 0 | Status: ACTIVE | COMMUNITY

## 2024-05-17 RX ORDER — TORSEMIDE 20 MG/1
20 TABLET ORAL DAILY
Refills: 0 | Status: COMPLETED | COMMUNITY
Start: 2023-12-28 | End: 2024-05-17

## 2024-05-17 RX ORDER — ALLOPURINOL 300 MG/1
300 TABLET ORAL DAILY
Refills: 0 | Status: ACTIVE | COMMUNITY

## 2024-05-17 RX ORDER — METOPROLOL TARTRATE 100 MG/1
100 TABLET ORAL
Refills: 0 | Status: ACTIVE | COMMUNITY

## 2024-05-17 RX ORDER — CABERGOLINE 0.5 MG/1
0.5 TABLET ORAL WEEKLY
Refills: 0 | Status: ACTIVE | COMMUNITY
Start: 2023-12-28

## 2024-05-17 RX ORDER — ATORVASTATIN CALCIUM 20 MG/1
20 TABLET, FILM COATED ORAL
Qty: 90 | Refills: 1 | Status: ACTIVE | COMMUNITY

## 2024-05-17 NOTE — HISTORY OF PRESENT ILLNESS
[de-identified] : Cardio: Dr. Quintero PCP: Dr. Savage EP: Dr. Mcleod  The patient is an 84 yo F with hx of AF s/p multiple DCCV's in the past, most recent 10/23, on Xarelto and Amiodarone, HTN, HLD, hypothyroidism, CKD3, OSAwho went to the hospital for palpitations. Patient found to be in AF RVR. She underwent implant of CRT-P with plans for AV node ablation.  She presents for WCK and follow up. She is with her daughter.   She reports feeling tired but has felt this way prior to the device implant.

## 2024-05-17 NOTE — PROCEDURE
[VVIR] : VVIR [Sensing Amplitude ___mv] : sensing amplitude was [unfilled] mv [Lead Imp:  ___ohms] : lead impedance was [unfilled] ohms [___V @] : [unfilled] V [___ ms] : [unfilled] ms [de-identified] : GREG [de-identified] : [employer] Quad CRT-P [id number] [de-identified] : [id number] [de-identified] : 4/15/24 [de-identified] : 60/120 [de-identified] : 7.7 years [de-identified] : : 67.7% Effective 67.6%

## 2024-05-17 NOTE — ASSESSMENT
[FreeTextEntry1] : AF with RVR s/p CRT-P implant with plans for AV node ablation  - Wound is well healed. There are no signs or symptoms of infection of inflammation. There is no redness, no swelling, no erythema. The patient has no pain.  - Device interrogation normal. I interrogated and reprogrammed the device as described above.  - Patient is enrolled in remote monitoring services. I reviewed remote transmission process with the patient, as well as schedule and ability to do manual transmission. We also spoke about associated co-payments with remote monitoring that may not be covered by insurance.   AV node ablation  - We briefly discussed the procedure. Will d/w Dr. Mcleod what the plans are for scheduling.  RTO in 3-4 months

## 2024-05-17 NOTE — PROCEDURE
[VVIR] : VVIR [Sensing Amplitude ___mv] : sensing amplitude was [unfilled] mv [Lead Imp:  ___ohms] : lead impedance was [unfilled] ohms [___V @] : [unfilled] V [___ ms] : [unfilled] ms [de-identified] : GREG [de-identified] : Conor Quad CRT-P [id number] [de-identified] : [id number] [de-identified] : 4/15/24 [de-identified] : 60/120 [de-identified] : 7.7 years [de-identified] : : 67.7% Effective 67.6%

## 2024-05-17 NOTE — HISTORY OF PRESENT ILLNESS
[de-identified] : Cardio: Dr. Quintero PCP: Dr. Savage EP: Dr. Mcleod  The patient is an 82 yo F with hx of AF s/p multiple DCCV's in the past, most recent 10/23, on Xarelto and Amiodarone, HTN, HLD, hypothyroidism, CKD3, OSAwho went to the hospital for palpitations. Patient found to be in AF RVR. She underwent implant of CRT-P with plans for AV node ablation.  She presents for WCK and follow up. She is with her daughter.   She reports feeling tired but has felt this way prior to the device implant.

## 2024-05-17 NOTE — PHYSICAL EXAM
[General Appearance - Well Developed] : well developed [Normal Appearance] : normal appearance [Well Groomed] : well groomed [General Appearance - Well Nourished] : well nourished [No Deformities] : no deformities [General Appearance - In No Acute Distress] : no acute distress [Heart Sounds] : normal S1 and S2 [] : no respiratory distress [Left Infraclavicular] : left infraclavicular area [Clean] : clean [Dry] : dry [Well-Healed] : well-healed [FreeTextEntry2] : Small visible suture on distal portion of incision [Abdomen Soft] : soft [Nail Clubbing] : no clubbing of the fingernails [Cyanosis, Localized] : no localized cyanosis

## 2024-05-24 ENCOUNTER — OUTPATIENT (OUTPATIENT)
Dept: OUTPATIENT SERVICES | Facility: HOSPITAL | Age: 84
LOS: 1 days | End: 2024-05-24
Payer: MEDICARE

## 2024-05-24 VITALS
HEART RATE: 96 BPM | SYSTOLIC BLOOD PRESSURE: 116 MMHG | TEMPERATURE: 98 F | OXYGEN SATURATION: 98 % | RESPIRATION RATE: 96 BRPM | HEIGHT: 62 IN | WEIGHT: 197.09 LBS | DIASTOLIC BLOOD PRESSURE: 85 MMHG

## 2024-05-24 DIAGNOSIS — Z98.890 OTHER SPECIFIED POSTPROCEDURAL STATES: Chronic | ICD-10-CM

## 2024-05-24 DIAGNOSIS — Z01.818 ENCOUNTER FOR OTHER PREPROCEDURAL EXAMINATION: ICD-10-CM

## 2024-05-24 DIAGNOSIS — I48.19 OTHER PERSISTENT ATRIAL FIBRILLATION: ICD-10-CM

## 2024-05-24 DIAGNOSIS — Z96.659 PRESENCE OF UNSPECIFIED ARTIFICIAL KNEE JOINT: Chronic | ICD-10-CM

## 2024-05-24 DIAGNOSIS — Z95.0 PRESENCE OF CARDIAC PACEMAKER: Chronic | ICD-10-CM

## 2024-05-24 LAB
ALBUMIN SERPL ELPH-MCNC: 3.7 G/DL — SIGNIFICANT CHANGE UP (ref 3.5–5.2)
ALP SERPL-CCNC: 140 U/L — HIGH (ref 30–115)
ALT FLD-CCNC: 24 U/L — SIGNIFICANT CHANGE UP (ref 0–41)
ANION GAP SERPL CALC-SCNC: 17 MMOL/L — HIGH (ref 7–14)
AST SERPL-CCNC: 25 U/L — SIGNIFICANT CHANGE UP (ref 0–41)
BASOPHILS # BLD AUTO: 0.09 K/UL — SIGNIFICANT CHANGE UP (ref 0–0.2)
BASOPHILS NFR BLD AUTO: 0.9 % — SIGNIFICANT CHANGE UP (ref 0–1)
BILIRUB SERPL-MCNC: 0.8 MG/DL — SIGNIFICANT CHANGE UP (ref 0.2–1.2)
BLD GP AB SCN SERPL QL: SIGNIFICANT CHANGE UP
BUN SERPL-MCNC: 31 MG/DL — HIGH (ref 10–20)
CALCIUM SERPL-MCNC: 8.7 MG/DL — SIGNIFICANT CHANGE UP (ref 8.4–10.5)
CHLORIDE SERPL-SCNC: 103 MMOL/L — SIGNIFICANT CHANGE UP (ref 98–110)
CO2 SERPL-SCNC: 23 MMOL/L — SIGNIFICANT CHANGE UP (ref 17–32)
CREAT SERPL-MCNC: 1.8 MG/DL — HIGH (ref 0.7–1.5)
EGFR: 28 ML/MIN/1.73M2 — LOW
EOSINOPHIL # BLD AUTO: 0.18 K/UL — SIGNIFICANT CHANGE UP (ref 0–0.7)
EOSINOPHIL NFR BLD AUTO: 1.8 % — SIGNIFICANT CHANGE UP (ref 0–8)
GLUCOSE SERPL-MCNC: 84 MG/DL — SIGNIFICANT CHANGE UP (ref 70–99)
HCT VFR BLD CALC: 35.8 % — LOW (ref 37–47)
HGB BLD-MCNC: 11 G/DL — LOW (ref 12–16)
IMM GRANULOCYTES NFR BLD AUTO: 0.6 % — HIGH (ref 0.1–0.3)
LYMPHOCYTES # BLD AUTO: 1.85 K/UL — SIGNIFICANT CHANGE UP (ref 1.2–3.4)
LYMPHOCYTES # BLD AUTO: 19 % — LOW (ref 20.5–51.1)
MCHC RBC-ENTMCNC: 25.6 PG — LOW (ref 27–31)
MCHC RBC-ENTMCNC: 30.7 G/DL — LOW (ref 32–37)
MCV RBC AUTO: 83.4 FL — SIGNIFICANT CHANGE UP (ref 81–99)
MONOCYTES # BLD AUTO: 0.89 K/UL — HIGH (ref 0.1–0.6)
MONOCYTES NFR BLD AUTO: 9.1 % — SIGNIFICANT CHANGE UP (ref 1.7–9.3)
NEUTROPHILS # BLD AUTO: 6.66 K/UL — HIGH (ref 1.4–6.5)
NEUTROPHILS NFR BLD AUTO: 68.6 % — SIGNIFICANT CHANGE UP (ref 42.2–75.2)
NRBC # BLD: 0 /100 WBCS — SIGNIFICANT CHANGE UP (ref 0–0)
PLATELET # BLD AUTO: 261 K/UL — SIGNIFICANT CHANGE UP (ref 130–400)
PMV BLD: 10.5 FL — HIGH (ref 7.4–10.4)
POTASSIUM SERPL-MCNC: 3.8 MMOL/L — SIGNIFICANT CHANGE UP (ref 3.5–5)
POTASSIUM SERPL-SCNC: 3.8 MMOL/L — SIGNIFICANT CHANGE UP (ref 3.5–5)
PROT SERPL-MCNC: 5.3 G/DL — LOW (ref 6–8)
RBC # BLD: 4.29 M/UL — SIGNIFICANT CHANGE UP (ref 4.2–5.4)
RBC # FLD: 18.5 % — HIGH (ref 11.5–14.5)
SODIUM SERPL-SCNC: 143 MMOL/L — SIGNIFICANT CHANGE UP (ref 135–146)
WBC # BLD: 9.73 K/UL — SIGNIFICANT CHANGE UP (ref 4.8–10.8)
WBC # FLD AUTO: 9.73 K/UL — SIGNIFICANT CHANGE UP (ref 4.8–10.8)

## 2024-05-24 PROCEDURE — 86850 RBC ANTIBODY SCREEN: CPT

## 2024-05-24 PROCEDURE — 99214 OFFICE O/P EST MOD 30 MIN: CPT | Mod: 25

## 2024-05-24 PROCEDURE — 86900 BLOOD TYPING SEROLOGIC ABO: CPT

## 2024-05-24 PROCEDURE — 80053 COMPREHEN METABOLIC PANEL: CPT

## 2024-05-24 PROCEDURE — 36415 COLL VENOUS BLD VENIPUNCTURE: CPT

## 2024-05-24 PROCEDURE — 86901 BLOOD TYPING SEROLOGIC RH(D): CPT

## 2024-05-24 PROCEDURE — 85025 COMPLETE CBC W/AUTO DIFF WBC: CPT

## 2024-05-24 RX ORDER — ATORVASTATIN CALCIUM 80 MG/1
1 TABLET, FILM COATED ORAL
Refills: 0 | DISCHARGE

## 2024-05-24 RX ORDER — TROSPIUM CHLORIDE 20 MG/1
1 TABLET, FILM COATED ORAL
Refills: 0 | DISCHARGE

## 2024-05-24 RX ORDER — CABERGOLINE 0.5 MG/1
0.5 TABLET ORAL
Refills: 0 | DISCHARGE

## 2024-05-24 RX ORDER — LEVOTHYROXINE SODIUM 125 MCG
1 TABLET ORAL
Qty: 0 | Refills: 0 | DISCHARGE

## 2024-05-24 RX ORDER — ALLOPURINOL 300 MG
1 TABLET ORAL
Refills: 0 | DISCHARGE

## 2024-05-24 NOTE — H&P PST ADULT - HISTORY OF PRESENT ILLNESS
84 y/o female presents to PAST in preparation for AVN Node ablation in EPS on 6/3/24    The patient is an 82 yo F with hx of AF s/p multiple DCCV's in the past, most recent 10/23, on Xarelto and Amiodarone, HTN, HLD, hypothyroidism, CKD3, OSAwho went to the hospital for palpitations. Patient found to be in AF RVR. She underwent implant of CRT-P with plans for AV node ablation.       PATIENT CURRENTLY DENIES CHEST PAIN,  DYSURIA, OR UPPER RESPIRATORY INFECTION IN PAST 2 WEEKS  Patient understands instructions and was given the opportunity to ask questions and have them answered.  As per patient/guardian, this is their complete medical and surgical history, including medications both prescribed or over the counter.  written and verbal instructions with teach back on chlorhexidine shampoo provided,  pt verbalized understanding with returned demonstration    Anesthesia Alert  NO--Difficult Airway  NO--History of neck surgery or radiation-Thyroidectomy 2016   NO--Limited ROM of neck  NO--History of Malignant hyperthermia  NO--Personal or family history of Pseudocholinesterase deficiency.  NO--Prior Anesthesia Complication  NO--Latex Allergy  NO--Loose teeth-upper dentures   NO--History of Rheumatoid Arthritis  Yes--FELIX on cpap  NO--Bleeding risk-Eliquis  NO--Other_____  Mallampati airway: Class IV     [facility] Activity Status Index (DASI)    RESULT SUMMARY:  18.95 points  The higher the score (maximum 58.2), the higher the functional status.    5.07 METs    INPUTS:  Take care of self —> 2.75 = Yes  Walk indoors —> 1.75 = Yes  Walk 1&ndash;2 blocks on level ground —> 2.75 = Yes  Climb a flight of stairs or walk up a hill —> 5.5 = Yes  Run a short distance —> 0 = No  Do light work around the house —> 2.7 = Yes  Do moderate work around the house —> 3.5 = Yes  Do heavy work around the house —> 0 = No  Do yardwork —> 0 = No  Have sexual relations —> 0 = No  Participate in moderate recreational activities —> 0 = No  Participate in strenuous sports —> 0 = No    Revised Cardiac Risk Index for Pre-Operative Risk    RESULT SUMMARY:  2 points  Class III Risk    10.1 %  30-day risk of death, MI, or cardiac arrest    INPUTS:  Elevated-risk surgery —> 1 = Yes  History of ischemic heart disease —> 1 = Yes  History of congestive heart failure —> 0 = No  History of cerebrovascular disease —> 0 = No  Pre-operative treatment with insulin —> 0 = No  Pre-operative creatinine >2 mg/dL / 176.8 µmol/L —> 0 = No      Other persistent atrial fibrillation    Encounter for other preprocedural examination    Family history of lung cancer (Mother)    Family history of abdominal aortic aneurysm (AAA) (Father)    HTN (hypertension)    High cholesterol    Hypothyroid    Afib    Sleep apnea    Osteoarthritis    Pituitary adenoma    CKD (chronic kidney disease) stage 3, GFR 30-59 ml/min    No significant past surgical history    H/O thyroidectomy    S/P rotator cuff surgery    [id number]_[email or web address]

## 2024-05-24 NOTE — H&P PST ADULT - NSICDXPASTMEDICALHX_GEN_ALL_CORE_FT
PAST MEDICAL HISTORY:  Afib on xarelto    CKD (chronic kidney disease) stage 3, GFR 30-59 ml/min     High cholesterol     HTN (hypertension)     Hypothyroid s/p thyroid ca surgery    Osteoarthritis     Pacemaker     Sleep apnea

## 2024-05-24 NOTE — H&P PST ADULT - NSICDXPASTSURGICALHX_GEN_ALL_CORE_FT
PAST SURGICAL HISTORY:  H/O thyroidectomy     H/O total knee replacement     Pacemaker     S/P rotator cuff surgery

## 2024-05-25 DIAGNOSIS — Z01.818 ENCOUNTER FOR OTHER PREPROCEDURAL EXAMINATION: ICD-10-CM

## 2024-05-25 DIAGNOSIS — I48.19 OTHER PERSISTENT ATRIAL FIBRILLATION: ICD-10-CM

## 2024-05-30 ENCOUNTER — INPATIENT (INPATIENT)
Facility: HOSPITAL | Age: 84
LOS: 4 days | Discharge: HOME HEALTH | DRG: 274 | End: 2024-06-04
Attending: HOSPITALIST | Admitting: STUDENT IN AN ORGANIZED HEALTH CARE EDUCATION/TRAINING PROGRAM
Payer: MEDICARE

## 2024-05-30 VITALS
OXYGEN SATURATION: 95 % | HEIGHT: 62 IN | RESPIRATION RATE: 18 BRPM | SYSTOLIC BLOOD PRESSURE: 135 MMHG | DIASTOLIC BLOOD PRESSURE: 95 MMHG | WEIGHT: 192.9 LBS | HEART RATE: 97 BPM | TEMPERATURE: 98 F

## 2024-05-30 DIAGNOSIS — Z96.659 PRESENCE OF UNSPECIFIED ARTIFICIAL KNEE JOINT: Chronic | ICD-10-CM

## 2024-05-30 DIAGNOSIS — Z95.0 PRESENCE OF CARDIAC PACEMAKER: Chronic | ICD-10-CM

## 2024-05-30 DIAGNOSIS — Z98.890 OTHER SPECIFIED POSTPROCEDURAL STATES: Chronic | ICD-10-CM

## 2024-05-30 DIAGNOSIS — I48.91 UNSPECIFIED ATRIAL FIBRILLATION: ICD-10-CM

## 2024-05-30 LAB
ALBUMIN SERPL ELPH-MCNC: 3.8 G/DL — SIGNIFICANT CHANGE UP (ref 3.5–5.2)
ALP SERPL-CCNC: 170 U/L — HIGH (ref 30–115)
ALT FLD-CCNC: 24 U/L — SIGNIFICANT CHANGE UP (ref 0–41)
ANION GAP SERPL CALC-SCNC: 11 MMOL/L — SIGNIFICANT CHANGE UP (ref 7–14)
AST SERPL-CCNC: 28 U/L — SIGNIFICANT CHANGE UP (ref 0–41)
BASE EXCESS BLDV CALC-SCNC: 3.3 MMOL/L — HIGH (ref -2–3)
BASOPHILS # BLD AUTO: 0.04 K/UL — SIGNIFICANT CHANGE UP (ref 0–0.2)
BASOPHILS NFR BLD AUTO: 0.5 % — SIGNIFICANT CHANGE UP (ref 0–1)
BILIRUB SERPL-MCNC: 0.9 MG/DL — SIGNIFICANT CHANGE UP (ref 0.2–1.2)
BUN SERPL-MCNC: 30 MG/DL — HIGH (ref 10–20)
CA-I SERPL-SCNC: 1.07 MMOL/L — LOW (ref 1.15–1.33)
CALCIUM SERPL-MCNC: 8.8 MG/DL — SIGNIFICANT CHANGE UP (ref 8.4–10.5)
CHLORIDE SERPL-SCNC: 107 MMOL/L — SIGNIFICANT CHANGE UP (ref 98–110)
CO2 SERPL-SCNC: 27 MMOL/L — SIGNIFICANT CHANGE UP (ref 17–32)
CREAT SERPL-MCNC: 1.5 MG/DL — SIGNIFICANT CHANGE UP (ref 0.7–1.5)
EGFR: 35 ML/MIN/1.73M2 — LOW
EOSINOPHIL # BLD AUTO: 0.09 K/UL — SIGNIFICANT CHANGE UP (ref 0–0.7)
EOSINOPHIL NFR BLD AUTO: 1.1 % — SIGNIFICANT CHANGE UP (ref 0–8)
GAS PNL BLDV: 140 MMOL/L — SIGNIFICANT CHANGE UP (ref 136–145)
GAS PNL BLDV: SIGNIFICANT CHANGE UP
GAS PNL BLDV: SIGNIFICANT CHANGE UP
GLUCOSE SERPL-MCNC: 100 MG/DL — HIGH (ref 70–99)
HCO3 BLDV-SCNC: 28 MMOL/L — SIGNIFICANT CHANGE UP (ref 22–29)
HCT VFR BLD CALC: 38.7 % — SIGNIFICANT CHANGE UP (ref 37–47)
HCT VFR BLDA CALC: 35 % — SIGNIFICANT CHANGE UP (ref 34.5–46.5)
HGB BLD CALC-MCNC: 11.7 G/DL — SIGNIFICANT CHANGE UP (ref 11.7–16.1)
HGB BLD-MCNC: 11.5 G/DL — LOW (ref 12–16)
IMM GRANULOCYTES NFR BLD AUTO: 0.4 % — HIGH (ref 0.1–0.3)
LACTATE BLDV-MCNC: 1.2 MMOL/L — SIGNIFICANT CHANGE UP (ref 0.5–2)
LIDOCAIN IGE QN: 16 U/L — SIGNIFICANT CHANGE UP (ref 7–60)
LYMPHOCYTES # BLD AUTO: 1.5 K/UL — SIGNIFICANT CHANGE UP (ref 1.2–3.4)
LYMPHOCYTES # BLD AUTO: 18.2 % — LOW (ref 20.5–51.1)
MCHC RBC-ENTMCNC: 26.1 PG — LOW (ref 27–31)
MCHC RBC-ENTMCNC: 29.7 G/DL — LOW (ref 32–37)
MCV RBC AUTO: 87.8 FL — SIGNIFICANT CHANGE UP (ref 81–99)
MONOCYTES # BLD AUTO: 0.71 K/UL — HIGH (ref 0.1–0.6)
MONOCYTES NFR BLD AUTO: 8.6 % — SIGNIFICANT CHANGE UP (ref 1.7–9.3)
NEUTROPHILS # BLD AUTO: 5.86 K/UL — SIGNIFICANT CHANGE UP (ref 1.4–6.5)
NEUTROPHILS NFR BLD AUTO: 71.2 % — SIGNIFICANT CHANGE UP (ref 42.2–75.2)
NRBC # BLD: 0 /100 WBCS — SIGNIFICANT CHANGE UP (ref 0–0)
NT-PROBNP SERPL-SCNC: 5832 PG/ML — HIGH (ref 0–300)
PCO2 BLDV: 45 MMHG — HIGH (ref 39–42)
PH BLDV: 7.41 — SIGNIFICANT CHANGE UP (ref 7.32–7.43)
PLATELET # BLD AUTO: 274 K/UL — SIGNIFICANT CHANGE UP (ref 130–400)
PMV BLD: 12 FL — HIGH (ref 7.4–10.4)
PO2 BLDV: 31 MMHG — SIGNIFICANT CHANGE UP (ref 25–45)
POTASSIUM BLDV-SCNC: 5.9 MMOL/L — HIGH (ref 3.5–5.1)
POTASSIUM SERPL-MCNC: 3.8 MMOL/L — SIGNIFICANT CHANGE UP (ref 3.5–5)
POTASSIUM SERPL-SCNC: 3.8 MMOL/L — SIGNIFICANT CHANGE UP (ref 3.5–5)
PROT SERPL-MCNC: 5.5 G/DL — LOW (ref 6–8)
RBC # BLD: 4.41 M/UL — SIGNIFICANT CHANGE UP (ref 4.2–5.4)
RBC # FLD: 19.4 % — HIGH (ref 11.5–14.5)
SAO2 % BLDV: 48.9 % — LOW (ref 67–88)
SODIUM SERPL-SCNC: 145 MMOL/L — SIGNIFICANT CHANGE UP (ref 135–146)
TROPONIN T, HIGH SENSITIVITY RESULT: 18 NG/L — HIGH (ref 6–13)
TROPONIN T, HIGH SENSITIVITY RESULT: 20 NG/L — HIGH (ref 6–13)
WBC # BLD: 8.23 K/UL — SIGNIFICANT CHANGE UP (ref 4.8–10.8)
WBC # FLD AUTO: 8.23 K/UL — SIGNIFICANT CHANGE UP (ref 4.8–10.8)

## 2024-05-30 PROCEDURE — 93306 TTE W/DOPPLER COMPLETE: CPT

## 2024-05-30 PROCEDURE — 93281 PM DEVICE PROGR EVAL MULTI: CPT

## 2024-05-30 PROCEDURE — 93005 ELECTROCARDIOGRAM TRACING: CPT

## 2024-05-30 PROCEDURE — 82977 ASSAY OF GGT: CPT

## 2024-05-30 PROCEDURE — 86901 BLOOD TYPING SEROLOGIC RH(D): CPT

## 2024-05-30 PROCEDURE — 85730 THROMBOPLASTIN TIME PARTIAL: CPT

## 2024-05-30 PROCEDURE — 80053 COMPREHEN METABOLIC PANEL: CPT

## 2024-05-30 PROCEDURE — 82248 BILIRUBIN DIRECT: CPT

## 2024-05-30 PROCEDURE — 82247 BILIRUBIN TOTAL: CPT

## 2024-05-30 PROCEDURE — C1769: CPT

## 2024-05-30 PROCEDURE — 83735 ASSAY OF MAGNESIUM: CPT

## 2024-05-30 PROCEDURE — 85610 PROTHROMBIN TIME: CPT

## 2024-05-30 PROCEDURE — 71275 CT ANGIOGRAPHY CHEST: CPT | Mod: 26,MC

## 2024-05-30 PROCEDURE — 74177 CT ABD & PELVIS W/CONTRAST: CPT | Mod: 26,MC

## 2024-05-30 PROCEDURE — 84100 ASSAY OF PHOSPHORUS: CPT

## 2024-05-30 PROCEDURE — 36415 COLL VENOUS BLD VENIPUNCTURE: CPT

## 2024-05-30 PROCEDURE — 93650 ICAR CATH ABLTJ AV NODE FUNC: CPT

## 2024-05-30 PROCEDURE — C1732: CPT

## 2024-05-30 PROCEDURE — 76705 ECHO EXAM OF ABDOMEN: CPT | Mod: 26

## 2024-05-30 PROCEDURE — C1894: CPT

## 2024-05-30 PROCEDURE — 86900 BLOOD TYPING SEROLOGIC ABO: CPT

## 2024-05-30 PROCEDURE — C1766: CPT

## 2024-05-30 PROCEDURE — C1760: CPT

## 2024-05-30 PROCEDURE — 86850 RBC ANTIBODY SCREEN: CPT

## 2024-05-30 PROCEDURE — 80048 BASIC METABOLIC PNL TOTAL CA: CPT

## 2024-05-30 PROCEDURE — 99285 EMERGENCY DEPT VISIT HI MDM: CPT | Mod: GC

## 2024-05-30 PROCEDURE — 85025 COMPLETE CBC W/AUTO DIFF WBC: CPT

## 2024-05-30 RX ORDER — METOPROLOL TARTRATE 50 MG
5 TABLET ORAL ONCE
Refills: 0 | Status: COMPLETED | OUTPATIENT
Start: 2024-05-30 | End: 2024-05-30

## 2024-05-30 RX ORDER — FUROSEMIDE 40 MG
40 TABLET ORAL ONCE
Refills: 0 | Status: COMPLETED | OUTPATIENT
Start: 2024-05-30 | End: 2024-05-30

## 2024-05-30 RX ADMIN — Medication 40 MILLIGRAM(S): at 20:20

## 2024-05-30 RX ADMIN — Medication 5 MILLIGRAM(S): at 20:17

## 2024-05-30 NOTE — ED PROVIDER NOTE - CARE PLAN
Principal Discharge DX:	Atrial fibrillation with RVR  Secondary Diagnosis:	Acute CHF   1 Principal Discharge DX:	Atrial fibrillation with RVR  Secondary Diagnosis:	Acute CHF  Secondary Diagnosis:	Thickening of wall of gallbladder  Secondary Diagnosis:	Pericardial effusion

## 2024-05-30 NOTE — ED ADULT NURSE NOTE - NSFALLRISKINTERV_ED_ALL_ED

## 2024-05-30 NOTE — CONSULT NOTE ADULT - ASSESSMENT
83-year-old female history of A-fib s/p CRTP on Xarelto and amiodarone status post multiple failed DCCV, hypertension, hyperlipidemia, hypothyroidism, CKD, FELIX, scheduled for AV node ablation on Monday presenting for evaluation of progressive dyspnea on exertion of 2 weeks duration, starts to get worse recently.  Denies fever, chills, chest pain, cough    Cardiology consulted for HFrEF exacerbation.   patient seen and examined at bedside, c/o sob while speaking, requiring oxygen support.   Lung exam consistent with bilateral congestion and crackles   EKG WITH V paced rhythm with occasional PVC. trop trending down. denies any chest pain, no palpitations    # Impression:  - HFrEF exacerbation  - long standing persistent Afib s/p CRTP. failed DCCV multiple times in the past  - HTN, DLD, CKD   - FELIX    # Recs:  - keep on telemetry   - start IV lasix 40 mg IV BID, titrate to reach a Net neg 1.5 liters, acc i& O, daily body weight   - cw amiodarone 200 mg po OD + eliquis 2,5 mg po BID + metoprolol tartrate 100 mg po BID  - no need to repeat TTE  - recent device interrogation 5/17: VVIR, 60/120, Vpaced 67%  - f/u with Dr Kothari, supposed to get AVN ablation 6/3/2024

## 2024-05-30 NOTE — CONSULT NOTE ADULT - SUBJECTIVE AND OBJECTIVE BOX
HPI:  83-year-old female history of A-fib s/p CRTP on Xarelto and amiodarone status post multiple failed DCCV, hypertension, hyperlipidemia, hypothyroidism, CKD, FELIX, scheduled for AV node ablation on Monday presenting for evaluation of progressive dyspnea on exertion of 2 weeks duration, starts to get worse recently.  Denies fever, chills, chest pain, cough    Cardiology consulted for HFrEF exacerbation.   patient seen and examined at bedside, c/o sob while speaking, requiring oxygen support.   Lung exam consistent with bilateral congestion and crackles   EKG WITH V paced rhythm with occasional PVC. trop trending down. denies any chest pain     PAST MEDICAL & SURGICAL HISTORY  HTN (hypertension)    High cholesterol    Hypothyroid  s/p thyroid ca surgery    Afib  on xarelto    Sleep apnea    Osteoarthritis    CKD (chronic kidney disease) stage 3, GFR 30-59 ml/min    Pacemaker    H/O thyroidectomy    S/P rotator cuff surgery    Pacemaker    H/O total knee replacement        FAMILY HISTORY:  FAMILY HISTORY:  Family history of lung cancer (Mother)    Family history of abdominal aortic aneurysm (AAA) (Father)        SOCIAL HISTORY:  []smoker  []Alcohol  []Drug    ALLERGIES:  No Known Allergies      MEDICATIONS:  MEDICATIONS  (STANDING):    MEDICATIONS  (PRN):      HOME MEDICATIONS:  Home Medications:  allopurinol 100 mg oral tablet: 1 cap(s) orally once a day (at bedtime) (24 May 2024 08:21)  amiodarone 200 mg oral tablet: 1 tab(s) orally once a day (24 May 2024 08:21)  atorvastatin 20 mg oral tablet: 1 tab(s) orally once a day (24 May 2024 08:21)  cabergoline 0.5 mg oral tablet: 0.5 tab(s) orally once a week , monday night (24 May 2024 08:21)  levothyroxine 175 mcg (0.175 mg) oral tablet: 1 tab(s) orally once a day (24 May 2024 08:21)  trospium 20 mg oral tablet: 1 tab(s) orally once a day (24 May 2024 08:21)      VITALS:   T(F): 98.2 (05-30 @ 12:24), Max: 98.2 (05-30 @ 12:24)  HR: 97 (05-30 @ 12:24) (97 - 97)  BP: 135/95 (05-30 @ 12:24) (135/95 - 135/95)  BP(mean): --  RR: 18 (05-30 @ 12:24) (18 - 18)  SpO2: 95% (05-30 @ 12:24) (95% - 95%)    I&O's Summary      REVIEW OF SYSTEMS:  CONSTITUTIONAL: No weakness, fevers or chills  EYES: No visual changes  ENT: No vertigo or throat pain   NECK: No pain or stiffness  RESPIRATORY: ++ sob on exertion   CARDIOVASCULAR: No chest pain or palpitations  GASTROINTESTINAL: No abdominal or epigastric pain. No nausea, vomiting, or hematemesis; No diarrhea or constipation. No melena or hematochezia.  GENITOURINARY: No dysuria, frequency or hematuria  NEUROLOGICAL: No numbness or weakness  SKIN: No itching, no rashes  MSK: No pain    PHYSICAL EXAM:  NEURO: patient is awake , alert and oriented  GEN: Not in acute distress  NECK: + JVD  LUNGS: bilateral lung crackles  CARDIOVASCULAR: irregular s1s2  ABD: Soft, non-tender, non-distended, +BS  EXT: No SHIRA  SKIN: Intact    LABS:                        11.5   8.23  )-----------( 274      ( 30 May 2024 14:06 )             38.7     05-30    145  |  107  |  30<H>  ----------------------------<  100<H>  3.8   |  27  |  1.5    Ca    8.8      30 May 2024 14:06    TPro  5.5<L>  /  Alb  3.8  /  TBili  0.9  /  DBili  x   /  AST  28  /  ALT  24  /  AlkPhos  170<H>  05-30              Troponin trend:            RADIOLOGY:  -CXR:  -TTE:     1. Mildly decreased global left ventricular systolic function.   2. Severely enlarged left atrium.   3. LV Ejection Fraction by Lester's Method with a biplane EF of 45 %.   4. Severe asymmetric left ventricular hypertrophy.   5. The mitral in-flow pattern reveals no discernable A-wave, therefore   no comment on diastolic function can be made.   6. Moderately enlarged right ventricle.   7. Moderately reduced RV systolic function.   8. Moderately enlarged right atrium.   9. Degenerative mitral valve.  10. Mild thickening of the anterior and posterior mitral valve leaflets.  11. Mild to moderate mitral valve regurgitation.  12. Mitral annular calcification.  13. Mild-moderate tricuspid regurgitation.  14. Sclerotic aortic valve with normal opening.  15. Moderate pulmonic valve regurgitation.  16. Dilatation of the ascending aorta.  17. Estimated pulmonary artery systolic pressure is 50.0 mmHg assuming a   right atrial pressure of 15 mmHg, which is consistent with moderate   pulmonary hypertension.  18. Severely dilated pulmonary artery.    -CCTA:  -STRESS TEST:  1. IV adenosine Dual Isotope Study which was negative with respect to symptoms and EKG changes.  2. Myocardial perfusion imaging reveals no evidence of ischemia, normal isotope distribution. Prior study in 2017 was reported abnormal for an anterolateral wall defect, not appreciated in this study.  3. Gated imaging reveals normal left ventricular systolic function, ejection fraction calculated at 75%.  Recommendation:  Based on this study medical therapy is recommended.    -CATHETERIZATION:    ECG: Vpaced rhythm     TELEMETRY EVENTS:

## 2024-05-30 NOTE — ED PROVIDER NOTE - OBJECTIVE STATEMENT
83-year-old female history of A-fib on Xarelto amiodarone status post multiple failed DCCV, hypertension, hyperlipidemia, hypothyroidism, CKD, FELIX, scheduled for AV node ablation on Monday presenting for evaluation of progressive dyspnea on exertion and shortness of breath for the past few days.  Denies fever, chills, chest pain, cough.

## 2024-05-30 NOTE — ED PROVIDER NOTE - PHYSICAL EXAMINATION
Vital Signs: I have reviewed the initial vital signs.  Constitutional: appears stated age, no acute distress.  HEENT: Airway patent, moist MM, EOMI,   CV: irregularly irregular, no murmurs  Lungs: Clear to ascultation bilaterally, no crackles, no wheezes, (+) mildly increased work of breathing. no cough  ABD: Non-tender, Non-distended,   MSK: Neck supple, nontender,   INTEG: Skin warm, dry, no rash.  NEURO: A&Ox3, moving all extremities, normal speech

## 2024-05-30 NOTE — ED PROVIDER NOTE - CLINICAL SUMMARY MEDICAL DECISION MAKING FREE TEXT BOX
A-fib RVR.  Seen by cardiology.  Gallbladder wall edema without evidence of acute cholecystitis at this time.  Admitted to med/tele.

## 2024-05-30 NOTE — ED PROVIDER NOTE - PROGRESS NOTE DETAILS
MM: Call placed to cardiology Dr. Quintero. ADDENDUM by Raheel Garcia M.D.: I received sign-off from Dr. CODY Salgado. 83-year-old female with history of refractory A-fib on Eliquis, metoprolol, amiodarone, HTN, HLD, CKD, presents for shortness of breath and abdominal pain, I was to follow-up CTs and admit. On my assessment, with daughter at bedside, patient reports dyspnea on exertion for the past few weeks, as well as upper abdominal pressure with eating. Denies all other symptoms including vomiting, fever, cough, urinary symptoms, leg pain or swelling. On exam, NAD, appearance of chronic illness, no tachypnea or work of breathing, RRR, no M/R/G, lungs bibasilar rales, no lower extremity edema, abdomen soft, right lower quadrant TTP, negative Trujillo's, no rebound or guarding. Patient with intermittent A-fib with RVR. ECG morphology appears similar to baseline and low suspicion for acute MI, and troponin downtrending. I discussed with Dr. Brenner who is covering for Dr. Bruno, and requests 5 mg IV metoprolol and 40 g Lasix now, 100 mg of metoprolol 3 times daily as a standing water while admitted, as well as request EP to be consulted during the inpatient stay. CT shows small to moderate pericardial effusion. Bedside echo shows trace pericardial effusion with no evidence of tamponade. CT gallbladder wall edema and will send for right upper quadrant ultrasound. SS Cardiology fellow consulted regarding VT vs. pacemaker failure.   Patient to be admitted to an inpatient floor. Pt notified and agreeable to plan. Case discussed with and care endorsed to medical admitting resident. Ultrasound shows gallbladder wall edema without evidence of acute cholecystitis otherwise.  Clinically low suspicion for acute cholecystitis at this time.

## 2024-05-31 LAB
ALBUMIN SERPL ELPH-MCNC: 3.5 G/DL — SIGNIFICANT CHANGE UP (ref 3.5–5.2)
ALP SERPL-CCNC: 157 U/L — HIGH (ref 30–115)
ALT FLD-CCNC: 20 U/L — SIGNIFICANT CHANGE UP (ref 0–41)
ANION GAP SERPL CALC-SCNC: 16 MMOL/L — HIGH (ref 7–14)
AST SERPL-CCNC: 17 U/L — SIGNIFICANT CHANGE UP (ref 0–41)
BASOPHILS # BLD AUTO: 0.05 K/UL — SIGNIFICANT CHANGE UP (ref 0–0.2)
BASOPHILS NFR BLD AUTO: 0.6 % — SIGNIFICANT CHANGE UP (ref 0–1)
BILIRUB DIRECT SERPL-MCNC: 0.4 MG/DL — HIGH (ref 0–0.3)
BILIRUB INDIRECT FLD-MCNC: 1 MG/DL — SIGNIFICANT CHANGE UP (ref 0.2–1.2)
BILIRUB SERPL-MCNC: 1.4 MG/DL — HIGH (ref 0.2–1.2)
BILIRUB SERPL-MCNC: 1.4 MG/DL — HIGH (ref 0.2–1.2)
BUN SERPL-MCNC: 27 MG/DL — HIGH (ref 10–20)
CALCIUM SERPL-MCNC: 8.5 MG/DL — SIGNIFICANT CHANGE UP (ref 8.4–10.5)
CHLORIDE SERPL-SCNC: 105 MMOL/L — SIGNIFICANT CHANGE UP (ref 98–110)
CO2 SERPL-SCNC: 27 MMOL/L — SIGNIFICANT CHANGE UP (ref 17–32)
CREAT SERPL-MCNC: 1.4 MG/DL — SIGNIFICANT CHANGE UP (ref 0.7–1.5)
EGFR: 38 ML/MIN/1.73M2 — LOW
EOSINOPHIL # BLD AUTO: 0.1 K/UL — SIGNIFICANT CHANGE UP (ref 0–0.7)
EOSINOPHIL NFR BLD AUTO: 1.2 % — SIGNIFICANT CHANGE UP (ref 0–8)
GGT SERPL-CCNC: 64 U/L — HIGH (ref 1–40)
GLUCOSE SERPL-MCNC: 78 MG/DL — SIGNIFICANT CHANGE UP (ref 70–99)
HCT VFR BLD CALC: 37.5 % — SIGNIFICANT CHANGE UP (ref 37–47)
HGB BLD-MCNC: 11.1 G/DL — LOW (ref 12–16)
IMM GRANULOCYTES NFR BLD AUTO: 0.5 % — HIGH (ref 0.1–0.3)
LYMPHOCYTES # BLD AUTO: 1.03 K/UL — LOW (ref 1.2–3.4)
LYMPHOCYTES # BLD AUTO: 12.1 % — LOW (ref 20.5–51.1)
MAGNESIUM SERPL-MCNC: 1.8 MG/DL — SIGNIFICANT CHANGE UP (ref 1.8–2.4)
MCHC RBC-ENTMCNC: 25.9 PG — LOW (ref 27–31)
MCHC RBC-ENTMCNC: 29.6 G/DL — LOW (ref 32–37)
MCV RBC AUTO: 87.4 FL — SIGNIFICANT CHANGE UP (ref 81–99)
MONOCYTES # BLD AUTO: 0.68 K/UL — HIGH (ref 0.1–0.6)
MONOCYTES NFR BLD AUTO: 8 % — SIGNIFICANT CHANGE UP (ref 1.7–9.3)
NEUTROPHILS # BLD AUTO: 6.58 K/UL — HIGH (ref 1.4–6.5)
NEUTROPHILS NFR BLD AUTO: 77.6 % — HIGH (ref 42.2–75.2)
NRBC # BLD: 0 /100 WBCS — SIGNIFICANT CHANGE UP (ref 0–0)
PLATELET # BLD AUTO: 238 K/UL — SIGNIFICANT CHANGE UP (ref 130–400)
PMV BLD: 11.5 FL — HIGH (ref 7.4–10.4)
POTASSIUM SERPL-MCNC: 3 MMOL/L — LOW (ref 3.5–5)
POTASSIUM SERPL-SCNC: 3 MMOL/L — LOW (ref 3.5–5)
PROT SERPL-MCNC: 5.2 G/DL — LOW (ref 6–8)
RBC # BLD: 4.29 M/UL — SIGNIFICANT CHANGE UP (ref 4.2–5.4)
RBC # FLD: 19.2 % — HIGH (ref 11.5–14.5)
SODIUM SERPL-SCNC: 148 MMOL/L — HIGH (ref 135–146)
WBC # BLD: 8.48 K/UL — SIGNIFICANT CHANGE UP (ref 4.8–10.8)
WBC # FLD AUTO: 8.48 K/UL — SIGNIFICANT CHANGE UP (ref 4.8–10.8)

## 2024-05-31 PROCEDURE — 99223 1ST HOSP IP/OBS HIGH 75: CPT | Mod: AI

## 2024-05-31 PROCEDURE — 99222 1ST HOSP IP/OBS MODERATE 55: CPT | Mod: GC

## 2024-05-31 PROCEDURE — 93010 ELECTROCARDIOGRAM REPORT: CPT

## 2024-05-31 RX ORDER — FUROSEMIDE 40 MG
20 TABLET ORAL DAILY
Refills: 0 | Status: DISCONTINUED | OUTPATIENT
Start: 2024-05-31 | End: 2024-06-04

## 2024-05-31 RX ORDER — LEVOTHYROXINE SODIUM 125 MCG
175 TABLET ORAL DAILY
Refills: 0 | Status: DISCONTINUED | OUTPATIENT
Start: 2024-05-31 | End: 2024-06-04

## 2024-05-31 RX ORDER — ATORVASTATIN CALCIUM 80 MG/1
20 TABLET, FILM COATED ORAL AT BEDTIME
Refills: 0 | Status: DISCONTINUED | OUTPATIENT
Start: 2024-05-31 | End: 2024-06-04

## 2024-05-31 RX ORDER — POTASSIUM CHLORIDE 20 MEQ
20 PACKET (EA) ORAL
Refills: 0 | Status: DISCONTINUED | OUTPATIENT
Start: 2024-05-31 | End: 2024-05-31

## 2024-05-31 RX ORDER — METOPROLOL TARTRATE 50 MG
100 TABLET ORAL
Refills: 0 | Status: DISCONTINUED | OUTPATIENT
Start: 2024-05-31 | End: 2024-06-04

## 2024-05-31 RX ORDER — AMIODARONE HYDROCHLORIDE 400 MG/1
200 TABLET ORAL DAILY
Refills: 0 | Status: DISCONTINUED | OUTPATIENT
Start: 2024-05-31 | End: 2024-06-04

## 2024-05-31 RX ORDER — MAGNESIUM SULFATE 500 MG/ML
1 VIAL (ML) INJECTION ONCE
Refills: 0 | Status: DISCONTINUED | OUTPATIENT
Start: 2024-05-31 | End: 2024-06-04

## 2024-05-31 RX ORDER — FERROUS SULFATE 325(65) MG
325 TABLET ORAL DAILY
Refills: 0 | Status: DISCONTINUED | OUTPATIENT
Start: 2024-05-31 | End: 2024-06-04

## 2024-05-31 RX ORDER — APIXABAN 2.5 MG/1
2.5 TABLET, FILM COATED ORAL EVERY 12 HOURS
Refills: 0 | Status: DISCONTINUED | OUTPATIENT
Start: 2024-05-31 | End: 2024-06-02

## 2024-05-31 RX ORDER — FUROSEMIDE 40 MG
40 TABLET ORAL
Refills: 0 | Status: DISCONTINUED | OUTPATIENT
Start: 2024-05-31 | End: 2024-05-31

## 2024-05-31 RX ORDER — ALLOPURINOL 300 MG
100 TABLET ORAL AT BEDTIME
Refills: 0 | Status: DISCONTINUED | OUTPATIENT
Start: 2024-05-31 | End: 2024-06-04

## 2024-05-31 RX ORDER — POTASSIUM CHLORIDE 20 MEQ
40 PACKET (EA) ORAL
Refills: 0 | Status: COMPLETED | OUTPATIENT
Start: 2024-05-31 | End: 2024-05-31

## 2024-05-31 RX ADMIN — Medication 100 MILLIGRAM(S): at 00:27

## 2024-05-31 RX ADMIN — Medication 100 MILLIGRAM(S): at 05:32

## 2024-05-31 RX ADMIN — Medication 60 MILLIGRAM(S): at 09:05

## 2024-05-31 RX ADMIN — Medication 100 MILLIGRAM(S): at 17:44

## 2024-05-31 RX ADMIN — Medication 100 MILLIGRAM(S): at 21:34

## 2024-05-31 RX ADMIN — APIXABAN 2.5 MILLIGRAM(S): 2.5 TABLET, FILM COATED ORAL at 05:33

## 2024-05-31 RX ADMIN — Medication 50 MILLIEQUIVALENT(S): at 09:05

## 2024-05-31 RX ADMIN — Medication 40 MILLIGRAM(S): at 05:33

## 2024-05-31 RX ADMIN — Medication 40 MILLIEQUIVALENT(S): at 11:27

## 2024-05-31 RX ADMIN — Medication 175 MICROGRAM(S): at 05:33

## 2024-05-31 RX ADMIN — Medication 20 MILLIGRAM(S): at 11:37

## 2024-05-31 RX ADMIN — Medication 60 MILLIGRAM(S): at 17:41

## 2024-05-31 RX ADMIN — ATORVASTATIN CALCIUM 20 MILLIGRAM(S): 80 TABLET, FILM COATED ORAL at 21:34

## 2024-05-31 RX ADMIN — AMIODARONE HYDROCHLORIDE 200 MILLIGRAM(S): 400 TABLET ORAL at 05:33

## 2024-05-31 RX ADMIN — Medication 40 MILLIEQUIVALENT(S): at 17:44

## 2024-05-31 RX ADMIN — APIXABAN 2.5 MILLIGRAM(S): 2.5 TABLET, FILM COATED ORAL at 17:44

## 2024-05-31 RX ADMIN — Medication 325 MILLIGRAM(S): at 11:28

## 2024-05-31 NOTE — CONSULT NOTE ADULT - ASSESSMENT
CKD stage 3-4, pt well known to me  s/p iv contrast in ED  b/l proteinaceous and hemorrhagic renal cysts (on outpt MRI kidneys)  hypokalemia from diuretics  chronic hypernatremia from diuretics   dyspnea / acute on chronic HFpEF  / b/l lung nodular opacities  increased small to moderate pericardial effusion  Afib / s/p PPM 4/15  anemia  FELIX  HTN  hypothyroidism / thyroid Ca / pituitary adenoma     plan:    avoid overdiuresis especially in setting of recent iv contrast as higher risk for KOBI  transitioned to PO lasix today  KCl repletion  no need to treat slight hypernatremia, if worsens can encourage po hydration vs short course of gentle D5W with additional lasix   trend renal function s/p iv contrast, f/u lytes   f/u cardio and pulm  outpt f/u my office, pt missed her last appointment

## 2024-05-31 NOTE — H&P ADULT - HISTORY OF PRESENT ILLNESS
Mrs. Loera is an 83-year-old woman with a medical history significant for atrial fibrillation (A-fib) status post cardiac resynchronization therapy pacemaker (CRTP) implantation, managed with Xarelto and amiodarone, and a history of multiple unsuccessful attempts at direct current cardioversion (DCCV), as well as hypertension, hyperlipidemia, hypothyroidism, chronic kidney disease (CKD), and obstructive sleep apnea (FELIX), is scheduled for atrioventricular (AV) node ablation on Monday. She presents for evaluation due to progressively worsening dyspnea on exertion over the past two weeks, with recent exacerbation. The patient denies experiencing fever, chills, chest pain, or cough.    Vital Signs Last 12 Hrs  T(F): 97.4 (05-31-24 @ 00:11), Max: 97.7 (05-30-24 @ 23:16)  HR: 69 (05-31-24 @ 00:11) (69 - 88)  BP: 157/110 (05-31-24 @ 00:11) (152/93 - 157/110)  RR: 18 (05-31-24 @ 00:11) (18 - 20)  SpO2: 95% (05-31-24 @ 00:11) (95% - 95%)    Labs in the ED are significant for Hgb 11.5 (Baseline 11.0), Trop 20 -> 18, Alk Phos 170, BNP 5832 (7789 in April 2024).    - CT C/A/P in the ED showed No evidence of central pulmonary embolism. Evaluation of the segmental and subsegmental branches is limited by motion. Small to moderate pericardial effusion, increased since prior examination. New bilateral lung nodular opacities with a new spiculated opacity in the left lung measuring 1.3 cm and the largest right lung nodular opacity measures 1 cm. Findings are felt to likely be infectious or inflammatory etiologies and   less likely neoplastic. However, follow to resolutionis necessary. GallBladder wall edema and further evaluation with right upper quadrant ultrasound Indeterminate 2 cm hyperdense lesion in the spleen, not seen on prior   examinations. 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  - RUQ US showed Mild gallbladder wall edema without sonographic evidence of cholelithiasis or sludge. Common bile duct within normal limits.

## 2024-05-31 NOTE — CONSULT NOTE ADULT - ATTENDING COMMENTS
Ms. Loera was seen and examined at bedside with her family member at bedside, pulmonary was consulted for a spiculated lung nodule seen on CT chest.  Patient generally follows with Dr Ruiz.  Due to the groundglass opacities and likely pulmonary edema seen on CT of the chest, suspect that this may be the etiology of this new lung nodule.  Recommend 6-week follow-up CT chest with follow-up appointment after discharge with Dr Ruiz.  Diuresis to euvolemia as directed per primary team.  I agree with the fellow note, with the exceptions listed in my attestation above.  The remainder of impression and plan per fellow note.

## 2024-05-31 NOTE — CONSULT NOTE ADULT - SUBJECTIVE AND OBJECTIVE BOX
NEPHROLOGY CONSULTATION NOTE    Mrs. Loera is an 83-year-old woman with a medical history significant for atrial fibrillation (A-fib) status post cardiac resynchronization therapy pacemaker (CRTP) implantation, managed with Xarelto and amiodarone, and a history of multiple unsuccessful attempts at direct current cardioversion (DCCV), as well as hypertension, hyperlipidemia, hypothyroidism, chronic kidney disease (CKD), and obstructive sleep apnea (FELIX), is scheduled for atrioventricular (AV) node ablation on Monday. She presents for evaluation due to progressively worsening dyspnea on exertion over the past two weeks, with recent exacerbation. The patient denies experiencing fever, chills, chest pain, or cough.    PAST MEDICAL & SURGICAL HISTORY:  HTN (hypertension)      High cholesterol      Hypothyroid  s/p thyroid ca surgery      Afib  on xarelto      Sleep apnea      Osteoarthritis      CKD (chronic kidney disease) stage 3, GFR 30-59 ml/min      Pacemaker      H/O thyroidectomy      S/P rotator cuff surgery      Pacemaker      H/O total knee replacement        Allergies:  No Known Allergies    Home Medications Reviewed    SOCIAL HISTORY:  Denies ETOH,Smoking,   FAMILY HISTORY:  Family history of lung cancer (Mother)    Family history of abdominal aortic aneurysm (AAA) (Father)          REVIEW OF SYSTEMS:  CONSTITUTIONAL: No weakness, fevers or chills  EYES/ENT: No visual changes;  No vertigo or throat pain   NECK: No pain or stiffness  RESPIRATORY: No cough, wheezing, hemoptysis; No shortness of breath  CARDIOVASCULAR: No chest pain or palpitations.  GASTROINTESTINAL: No abdominal or epigastric pain. No nausea, vomiting, or hematemesis; No diarrhea or constipation. No melena or hematochezia.  GENITOURINARY: No dysuria, frequency, foamy urine, urinary urgency, incontinence or hematuria  NEUROLOGICAL: No numbness or weakness  SKIN: No itching, burning, rashes, or lesions   VASCULAR: No bilateral lower extremity edema.   All other review of systems is negative unless indicated above.    PHYSICAL EXAM:  Constitutional: NAD  HEENT: anicteric sclera, oropharynx clear, MMM  Neck: No JVD  Respiratory: b/l decreased BS  Cardiovascular: S1, S2, RRR + ppm  Gastrointestinal: BS+, soft, NT/ND  Extremities: No cyanosis or clubbing. No peripheral edema  Neurological: A/O x 3, no focal deficits  Psychiatric: Normal mood, normal affect  : No CVA tenderness. No joaquin.   Skin: No rashes    Hospital Medications:   MEDICATIONS  (STANDING):  allopurinol 100 milliGRAM(s) Oral at bedtime  aMIOdarone    Tablet 200 milliGRAM(s) Oral daily  apixaban 2.5 milliGRAM(s) Oral every 12 hours  atorvastatin 20 milliGRAM(s) Oral at bedtime  ferrous    sulfate 325 milliGRAM(s) Oral daily  furosemide    Tablet 20 milliGRAM(s) Oral daily  levothyroxine 175 MICROGram(s) Oral daily  magnesium sulfate  IVPB 1 Gram(s) IV Intermittent once  methylPREDNISolone sodium succinate Injectable 60 milliGRAM(s) IV Push two times a day  metoprolol tartrate 100 milliGRAM(s) Oral two times a day  potassium chloride   Powder 40 milliEquivalent(s) Oral every 2 hours        VITALS:  T(F): 97.5 (05-31-24 @ 12:10), Max: 97.7 (05-30-24 @ 23:16)  HR: 102 (05-31-24 @ 12:10)  BP: 149/84 (05-31-24 @ 12:10)  RR: 16 (05-31-24 @ 12:10)  SpO2: 95% (05-31-24 @ 12:10)  Wt(kg): --    05-30 @ 07:01  -  05-31 @ 07:00  --------------------------------------------------------  IN: 120 mL / OUT: 825 mL / NET: -705 mL    05-31 @ 07:01  -  05-31 @ 14:53  --------------------------------------------------------  IN: 650 mL / OUT: 1400 mL / NET: -750 mL        Weight (kg): 86.2 (05-31 @ 00:11)    LABS:  05-31    148<H>  |  105  |  27<H>  ----------------------------<  78  3.0<L>   |  27  |  1.4    Ca    8.5      31 May 2024 05:29  Mg     1.8     05-31    TPro      /  Alb      /  TBili  1.4<H>  /  DBili  0.4<H>  /  AST      /  ALT      /  AlkPhos      05-31                          11.1   8.48  )-----------( 238      ( 31 May 2024 05:29 )             37.5       Urine Studies:  Urinalysis Basic - ( 31 May 2024 05:29 )    Color:  / Appearance:  / SG:  / pH:   Gluc: 78 mg/dL / Ketone:   / Bili:  / Urobili:    Blood:  / Protein:  / Nitrite:    Leuk Esterase:  / RBC:  / WBC    Sq Epi:  / Non Sq Epi:  / Bacteria:           RADIOLOGY & ADDITIONAL STUDIES:

## 2024-05-31 NOTE — H&P ADULT - NSHPLABSRESULTS_GEN_ALL_CORE
LABS:                          11.5   8.23  )-----------( 274      ( 30 May 2024 14:06 )             38.7     05-30    145  |  107  |  30<H>  ----------------------------<  100<H>  3.8   |  27  |  1.5    Ca    8.8      30 May 2024 14:06    TPro  5.5<L>  /  Alb  3.8  /  TBili  0.9  /  DBili  x   /  AST  28  /  ALT  24  /  AlkPhos  170<H>  05-30

## 2024-05-31 NOTE — PATIENT PROFILE ADULT - FLU SEASON?
40yo F with hx of MR and seizures since age ~9years  - controlled on current AED regimen with no seizures > 12 years    Plan:  - continue same regimen:  1) Topiramate 200 mg bid  2) Carbamazepine 400 mg bid   3) Gabapentin 1200 mg tid  - check AED levels: pt will have PCP visit in 1 week - will get it done and send us the results    Return in 1 year or earlier prn    Plan of care was discussed with patient and her father.  
No

## 2024-05-31 NOTE — H&P ADULT - ATTENDING COMMENTS
Pt seen and examined at bedside with family present. Pt has been having worsening SOB on exertion for several weeks. SHe has had multiple admission for PNA over past few months. On CT chest she was found to have right spiculated mass and left nodule. Given worsening appearance on prior CT chest suspect spiculated mass post pneumonia changes, not necessarily malignancy. Pt and family aware of this, agreeable to follow up repeat CT chest in several weeks and if spciulated mass still present then will consider it malignancy and may consider work up. For now it does not appear that pt has CHF exacerbation. She had trace pleural effusion on CT chest, no JVD, or LE edema. BNP is lower than baseline. Solumedrol initiated for presumed bronchospasm and pt states she feels significantly improved. C/w solumedrol for now and wean off o2. Will likely need ambulatory pulseox in AM. Pt has moderate pericardial effusion. Had trivial pericardial effusion on prior echo. Repeat limited echo to assess true size. IF enlarged then c/s cardiology.  Possible dc n 24 hours. If not then will c/s EP for possible in pt ablation rather than outpt.

## 2024-05-31 NOTE — PATIENT PROFILE ADULT - FUNCTIONAL ASSESSMENT - BASIC MOBILITY 6.
3-calculated by average/Not able to assess (calculate score using [facility] averaging method)

## 2024-05-31 NOTE — CONSULT NOTE ADULT - ASSESSMENT
Impression    CHF exacerbation  BL nodular opacities  Atrial fibrillation s/p CRTP on Xarelto   HO hypothyroidism  CKD  FELIX    Plan Impression    CHF exacerbation  BL nodular opacities  Atrial fibrillation s/p CRTP on Xarelto   HO hypothyroidism  CKD  FELIX    Plan    CTA chest reviewed, improved BL opacities compared to prior ct chest  Pt on 2l NC; improvement in breathing since yesterday s/p diuresis  BNP, echo noted  Diuresis as tolerated  CPAP QHS, restart home settings  Observe off abx  Repeat CT chest 6-8 weeks  Followup with Dr. Ruiz outpatient

## 2024-05-31 NOTE — H&P ADULT - ASSESSMENT
Mrs. Loera is an 83-year-old woman with a medical history significant for atrial fibrillation (A-fib) status post cardiac resynchronization therapy pacemaker (CRTP) implantation, managed with Xarelto and amiodarone, and a history of multiple unsuccessful attempts at direct current cardioversion (DCCV), as well as hypertension, hyperlipidemia, hypothyroidism, chronic kidney disease (CKD), and obstructive sleep apnea (FELIX), is scheduled for atrioventricular (AV) node ablation on Monday. She presents for evaluation due to progressively worsening dyspnea on exertion over the past two weeks, with recent exacerbation.     #Acute HFmrEF exacerbation  - Admit to telemetry  - Echo: EF of 45%  - Pro-BNP: 5832 (7789 in April 2024).  - Troponin: Trop 20 -> 18  - Chest CT: small to moderate pericardial effusion, increased since prior examination. New bilateral lung nodular opacities with a new spiculated opacity in the left lung measuring 1.3 cm and the largest right lung nodular opacity measures 1 cm. Findings are felt to likely be infectious or inflammatory etiologies and less likely neoplastic.   - CXR 4/13 showed bilateral opacities  Patient with leukocytosis, looks dry, concern if this finding is pneumonia,   - CT chest 4/14 showed Multifocal bilateral groundglass opacities and consolidative opacities  - Pt completed course of ceftriaxone and azithromycin  - Cardiology recs appreciated  - Strict intake and outputs, daily weights  - Supplemental oxygen PRN  - EKG WITH V paced rhythm with occasional PVC. trop trending down. denies any chest pain, no palpitations  - keep on telemetry   - start IV lasix 40 mg IV BID, titrate to reach a Net neg 1.5 liters, acc i&o, daily body weight   - no need to repeat TTE    #Long standing persistent Afib s/p CRTP. failed DCCV multiple times in the past  - recent device interrogation 5/17: VVIR, 60/120, Vpaced 67%  - f/u with Dr Kothari, supposed to get AVN ablation 6/3/2024  -CHADVasc = 5   - cw amiodarone 200 mg po OD + eliquis 2,5 mg po BID + metoprolol tartrate 100 mg po BID    #Elevated Alkaline Phos  #Gallbladder wall Edema on CT  - Alk Phos 170  - RUQ US showed Mild gallbladder wall edema without sonographic evidence of cholelithiasis or sludge. Common bile duct within normal limits.  - Monitor for now    #HTN  #DLD  - c/w home meds    #FELIX  - c/w CPAP    #CKD Stage 3-4  - Cr at baseline  - Avoid nephrotoxic agents  - Renally dose meds    #Iron Deficiency anemia  - Recent Iron level 25, iron sat 11% , ferritin 53   - Start on iron ( IV vs PO ) once finished antibiotics     #Hypothyroidism  - History of Thyroid cancer s/p thyroidectomy.   -TSH 3.0, continue synthroid 175 mcg     #Pituitary adenoma  - on Cabergoline 1/2 tab of 0.5 mg Q weekly Monday > non formulary filled     #Misc  - DVT Prophylaxis: Eliquis  - GI Prophylaxis: None  - Diet: DASH/ Fluid resc  - Activity: IAT  - IV Fluids: None  - Code Status: Full code     Mrs. Loera is an 83-year-old woman with a medical history significant for atrial fibrillation (A-fib) status post cardiac resynchronization therapy pacemaker (CRTP) implantation, managed with Xarelto and amiodarone, and a history of multiple unsuccessful attempts at direct current cardioversion (DCCV), as well as hypertension, hyperlipidemia, hypothyroidism, chronic kidney disease (CKD), and obstructive sleep apnea (FELIX), is scheduled for atrioventricular (AV) node ablation on Monday. She presents for evaluation due to progressively worsening dyspnea on exertion over the past two weeks, with recent exacerbation.     #Acute HFmrEF exacerbation  - Admit to telemetry  - Echo: EF of 45%  - Pro-BNP: 5832 (7789 in April 2024).  - Troponin: Trop 20 -> 18  - Chest CT: small to moderate pericardial effusion, increased since prior examination. New bilateral lung nodular opacities with a new spiculated opacity in the left lung measuring 1.3 cm and the largest right lung nodular opacity measures 1 cm. Findings are felt to likely be infectious or inflammatory etiologies and less likely neoplastic.   - CXR 4/13 showed bilateral opacities  Patient with leukocytosis, looks dry, concern if this finding is pneumonia,   - CT chest 4/14 showed Multifocal bilateral groundglass opacities and consolidative opacities  - Pt completed course of ceftriaxone and azithromycin  - Cardiology recs appreciated  - Strict intake and outputs, daily weights  - Supplemental oxygen PRN  - EKG WITH V paced rhythm with occasional PVC. trop trending down. denies any chest pain, no palpitations  - keep on telemetry   - start IV lasix 40 mg IV BID, titrate to reach a Net neg 1.5 liters, acc i&o, daily body weight   - no need to repeat TTE    #Long standing persistent Afib s/p CRTP. failed DCCV multiple times in the past  - recent device interrogation 5/17: VVIR, 60/120, Vpaced 67%  - f/u with Dr Kothari, supposed to get AVN ablation 6/3/2024  -CHADVasc = 5   - cw amiodarone 200 mg po OD + eliquis 2,5 mg po BID + metoprolol tartrate 100 mg po BID    #Elevated Alkaline Phos  #Gallbladder wall Edema on CT  - Alk Phos 170  - RUQ US showed Mild gallbladder wall edema without sonographic evidence of cholelithiasis or sludge. Common bile duct within normal limits.  - Monitor for now    #HTN  #DLD  - c/w home meds    #FELIX  - c/w CPAP    #CKD Stage 3-4  - Cr at baseline  - Avoid nephrotoxic agents  - Renally dose meds    #Iron Deficiency anemia  - Recent Iron level 25, iron sat 11% , ferritin 53   - c/w PO Iron    #Hypothyroidism  - History of Thyroid cancer s/p thyroidectomy.   -TSH 3.0, continue synthroid 175 mcg     #Pituitary adenoma  - on Cabergoline 1/2 tab of 0.5 mg Q weekly Monday > non formulary filled     #Misc  - DVT Prophylaxis: Eliquis  - GI Prophylaxis: None  - Diet: DASH/ Fluid resc  - Activity: IAT  - IV Fluids: None  - Code Status: Full code

## 2024-05-31 NOTE — H&P ADULT - NSHPPHYSICALEXAM_GEN_ALL_CORE
PHYSICAL EXAM:  GENERAL: NAD, lying in bed comfortably  HEAD:  Atraumatic, Normocephalic  EYES: EOMI, PERRLA, conjunctiva and sclera clear  ENT: Moist mucous membranes  NECK: Supple, No JVD  CHEST/LUNG: Bibasilar crackles  HEART: Irregular rate and rhythm; No murmurs, rubs, or gallops  ABDOMEN: Bowel sounds present; Soft, Nontender, Nondistended. No hepatomegaly  EXTREMITIES:  2+ Peripheral Pulses, brisk capillary refill. No clubbing, cyanosis, or edema  NERVOUS SYSTEM:  Alert & Oriented X3, speech clear. No deficits   MSK: FROM all 4 extremities, full and equal strength  SKIN: No rashes or lesions PHYSICAL EXAM:  GENERAL: NAD, lying in bed comfortably  HEAD:  Atraumatic, Normocephalic  EYES: EOMI, PERRLA, conjunctiva and sclera clear  ENT: Moist mucous membranes  NECK: Supple, No JVD  CHEST/LUNG: Bibasilar crackles  HEART: Irregular rate and rhythm; No murmurs, rubs, or gallops  ABDOMEN: Bowel sounds present; Soft, Nontender, Nondistended. No hepatomegaly  EXTREMITIES:  2+ Peripheral Pulses, brisk capillary refill. No clubbing, cyanosis, or edema  NERVOUS SYSTEM:   speech clear. No deficits   PSYCH: Alert & Oriented X3,  MSK: FROM all 4 extremities, full and equal strength  SKIN: No rashes or lesions

## 2024-05-31 NOTE — CONSULT NOTE ADULT - SUBJECTIVE AND OBJECTIVE BOX
Patient is a 83y old  Female who presents with a chief complaint of     HPI:  Mrs. Loera is an 83-year-old woman with a medical history significant for atrial fibrillation (A-fib) status post cardiac resynchronization therapy pacemaker (CRTP) implantation, managed with Xarelto and amiodarone, and a history of multiple unsuccessful attempts at direct current cardioversion (DCCV), as well as hypertension, hyperlipidemia, hypothyroidism, chronic kidney disease (CKD), and obstructive sleep apnea (FELIX), is scheduled for atrioventricular (AV) node ablation on Monday. She presents for evaluation due to progressively worsening dyspnea on exertion over the past two weeks, with recent exacerbation. The patient denies experiencing fever, chills, chest pain, or cough.    Vital Signs Last 12 Hrs  T(F): 97.4 (05-31-24 @ 00:11), Max: 97.7 (05-30-24 @ 23:16)  HR: 69 (05-31-24 @ 00:11) (69 - 88)  BP: 157/110 (05-31-24 @ 00:11) (152/93 - 157/110)  RR: 18 (05-31-24 @ 00:11) (18 - 20)  SpO2: 95% (05-31-24 @ 00:11) (95% - 95%)    Labs in the ED are significant for Hgb 11.5 (Baseline 11.0), Trop 20 -> 18, Alk Phos 170, BNP 5832 (7789 in April 2024).    - CT C/A/P in the ED showed No evidence of central pulmonary embolism. Evaluation of the segmental and subsegmental branches is limited by motion. Small to moderate pericardial effusion, increased since prior examination. New bilateral lung nodular opacities with a new spiculated opacity in the left lung measuring 1.3 cm and the largest right lung nodular opacity measures 1 cm. Findings are felt to likely be infectious or inflammatory etiologies and   less likely neoplastic. However, follow to resolutionis necessary. GallBladder wall edema and further evaluation with right upper quadrant ultrasound Indeterminate 2 cm hyperdense lesion in the spleen, not seen on prior   examinations. 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  - RUQ US showed Mild gallbladder wall edema without sonographic evidence of cholelithiasis or sludge. Common bile duct within normal limits.     (31 May 2024 00:19)      PAST MEDICAL & SURGICAL HISTORY:  HTN (hypertension)      High cholesterol      Hypothyroid  s/p thyroid ca surgery      Afib  on xarelto      Sleep apnea      Osteoarthritis      CKD (chronic kidney disease) stage 3, GFR 30-59 ml/min      Pacemaker      H/O thyroidectomy      S/P rotator cuff surgery      Pacemaker      H/O total knee replacement          SOCIAL HX:   Smoking                         ETOH                            Other    FAMILY HISTORY:  Family history of lung cancer (Mother)    Family history of abdominal aortic aneurysm (AAA) (Father)    .  No cardiovascular or pulmonary family history     REVIEW OF SYSTEMS:    All ROS are negative exept per HPI       Allergies    No Known Allergies    Intolerances          PHYSICAL EXAM  Vital Signs Last 24 Hrs  T(C): 36.4 (31 May 2024 04:51), Max: 36.8 (30 May 2024 12:24)  T(F): 97.6 (31 May 2024 04:51), Max: 98.2 (30 May 2024 12:24)  HR: 79 (31 May 2024 04:51) (69 - 97)  BP: 148/81 (31 May 2024 04:51) (135/95 - 157/110)  BP(mean): --  RR: 18 (31 May 2024 04:51) (18 - 20)  SpO2: 95% (31 May 2024 00:11) (95% - 95%)    Parameters below as of 31 May 2024 00:11  Patient On (Oxygen Delivery Method): room air        CONSTITUTIONAL:  Well nourished.  NAD    ENT:   Airway patent,   No thrush    EYES:   Clear bilaterally,   pupils equal,   round and reactive to light.    CARDIAC:   Normal rate,   regular rhythm.    no edema      RESPIRATORY:   No wheezing   Normal chest expansion  Not tachypneic,  No use of accessory muscles    GASTROINTESTINAL:  Abdomen soft, non-tender,   No guarding,   Positive BS    MUSCULOSKELETAL:   Range of motion is not limited,  No clubbing, cyanosis    NEUROLOGICAL:   Alert and oriented   No motor deficits.    SKIN:   Skin normal color for race,   No evidence of rash.      HEME LYMPH:   No cervical  lymphadenopathy.  no inguinal lymphadenopathy          LABS:                          11.1   8.48  )-----------( 238      ( 31 May 2024 05:29 )             37.5                                               05-31    148<H>  |  105  |  27<H>  ----------------------------<  78  3.0<L>   |  27  |  1.4    Ca    8.5      31 May 2024 05:29  Mg     1.8     05-31    TPro  x   /  Alb  x   /  TBili  1.4<H>  /  DBili  0.4<H>  /  AST  x   /  ALT  x   /  AlkPhos  x   05-31                                             Urinalysis Basic - ( 31 May 2024 05:29 )    Color: x / Appearance: x / SG: x / pH: x  Gluc: 78 mg/dL / Ketone: x  / Bili: x / Urobili: x   Blood: x / Protein: x / Nitrite: x   Leuk Esterase: x / RBC: x / WBC x   Sq Epi: x / Non Sq Epi: x / Bacteria: x                                                  LIVER FUNCTIONS - ( 31 May 2024 07:50 )  Alb: x     / Pro: x     / ALK PHOS: x     / ALT: x     / AST: x     / GGT: 64 U/L                                                                                            MEDICATIONS  (STANDING):  allopurinol 100 milliGRAM(s) Oral at bedtime  aMIOdarone    Tablet 200 milliGRAM(s) Oral daily  apixaban 2.5 milliGRAM(s) Oral every 12 hours  atorvastatin 20 milliGRAM(s) Oral at bedtime  ferrous    sulfate 325 milliGRAM(s) Oral daily  furosemide    Tablet 20 milliGRAM(s) Oral daily  levothyroxine 175 MICROGram(s) Oral daily  magnesium sulfate  IVPB 1 Gram(s) IV Intermittent once  methylPREDNISolone sodium succinate Injectable 60 milliGRAM(s) IV Push two times a day  metoprolol tartrate 100 milliGRAM(s) Oral two times a day  potassium chloride  20 mEq/100 mL IVPB 20 milliEquivalent(s) IV Intermittent every 2 hours    MEDICATIONS  (PRN):      X-Rays reviewed: Patient is a 83y old  Female who presents with a chief complaint of     HPI:  Mrs. Loera is an 83-year-old woman with a medical history significant for atrial fibrillation (A-fib) status post cardiac resynchronization therapy pacemaker (CRTP) implantation, managed with Xarelto and amiodarone, and a history of multiple unsuccessful attempts at direct current cardioversion (DCCV), as well as hypertension, hyperlipidemia, hypothyroidism, chronic kidney disease (CKD), and obstructive sleep apnea (FELIX), is scheduled for atrioventricular (AV) node ablation on Monday. She presents for evaluation due to progressively worsening dyspnea on exertion over the past two weeks, with recent exacerbation. The patient denies experiencing fever, chills, chest pain, or cough.    Vital Signs Last 12 Hrs  T(F): 97.4 (05-31-24 @ 00:11), Max: 97.7 (05-30-24 @ 23:16)  HR: 69 (05-31-24 @ 00:11) (69 - 88)  BP: 157/110 (05-31-24 @ 00:11) (152/93 - 157/110)  RR: 18 (05-31-24 @ 00:11) (18 - 20)  SpO2: 95% (05-31-24 @ 00:11) (95% - 95%)    Labs in the ED are significant for Hgb 11.5 (Baseline 11.0), Trop 20 -> 18, Alk Phos 170, BNP 5832 (7789 in April 2024).    - CT C/A/P in the ED showed No evidence of central pulmonary embolism. Evaluation of the segmental and subsegmental branches is limited by motion. Small to moderate pericardial effusion, increased since prior examination. New bilateral lung nodular opacities with a new spiculated opacity in the left lung measuring 1.3 cm and the largest right lung nodular opacity measures 1 cm. Findings are felt to likely be infectious or inflammatory etiologies and   less likely neoplastic. However, follow to resolutionis necessary. GallBladder wall edema and further evaluation with right upper quadrant ultrasound Indeterminate 2 cm hyperdense lesion in the spleen, not seen on prior   examinations. 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  - RUQ US showed Mild gallbladder wall edema without sonographic evidence of cholelithiasis or sludge. Common bile duct within normal limits.     (31 May 2024 00:19)      PAST MEDICAL & SURGICAL HISTORY:  HTN (hypertension)      High cholesterol      Hypothyroid  s/p thyroid ca surgery      Afib  on xarelto      Sleep apnea      Osteoarthritis      CKD (chronic kidney disease) stage 3, GFR 30-59 ml/min      Pacemaker      H/O thyroidectomy      S/P rotator cuff surgery      Pacemaker      H/O total knee replacement          SOCIAL HX:   Smoking          never               ETOH                            Other    FAMILY HISTORY:  Family history of lung cancer (Mother)    Family history of abdominal aortic aneurysm (AAA) (Father)    .  No cardiovascular or pulmonary family history     REVIEW OF SYSTEMS:    All ROS are negative exept per HPI       Allergies    No Known Allergies    Intolerances          PHYSICAL EXAM  Vital Signs Last 24 Hrs  T(C): 36.4 (31 May 2024 04:51), Max: 36.8 (30 May 2024 12:24)  T(F): 97.6 (31 May 2024 04:51), Max: 98.2 (30 May 2024 12:24)  HR: 79 (31 May 2024 04:51) (69 - 97)  BP: 148/81 (31 May 2024 04:51) (135/95 - 157/110)  BP(mean): --  RR: 18 (31 May 2024 04:51) (18 - 20)  SpO2: 95% (31 May 2024 00:11) (95% - 95%)    Parameters below as of 31 May 2024 00:11  Patient On (Oxygen Delivery Method): room air        CONSTITUTIONAL:  NAD    CARDIAC:   Normal rate,   regular rhythm.    no edema\    RESPIRATORY:   BL crackles    GASTROINTESTINAL:  Abdomen soft, non-tender,   No guarding,     MUSCULOSKELETAL:   Range of motion is not limited,  No clubbing, cyanosis    NEUROLOGICAL:   Alert and oriented   No motor deficits.    LABS:                          11.1   8.48  )-----------( 238      ( 31 May 2024 05:29 )             37.5                                               05-31    148<H>  |  105  |  27<H>  ----------------------------<  78  3.0<L>   |  27  |  1.4    Ca    8.5      31 May 2024 05:29  Mg     1.8     05-31    TPro  x   /  Alb  x   /  TBili  1.4<H>  /  DBili  0.4<H>  /  AST  x   /  ALT  x   /  AlkPhos  x   05-31                                             Urinalysis Basic - ( 31 May 2024 05:29 )    Color: x / Appearance: x / SG: x / pH: x  Gluc: 78 mg/dL / Ketone: x  / Bili: x / Urobili: x   Blood: x / Protein: x / Nitrite: x   Leuk Esterase: x / RBC: x / WBC x   Sq Epi: x / Non Sq Epi: x / Bacteria: x                                                  LIVER FUNCTIONS - ( 31 May 2024 07:50 )  Alb: x     / Pro: x     / ALK PHOS: x     / ALT: x     / AST: x     / GGT: 64 U/L                                                                                            MEDICATIONS  (STANDING):  allopurinol 100 milliGRAM(s) Oral at bedtime  aMIOdarone    Tablet 200 milliGRAM(s) Oral daily  apixaban 2.5 milliGRAM(s) Oral every 12 hours  atorvastatin 20 milliGRAM(s) Oral at bedtime  ferrous    sulfate 325 milliGRAM(s) Oral daily  furosemide    Tablet 20 milliGRAM(s) Oral daily  levothyroxine 175 MICROGram(s) Oral daily  magnesium sulfate  IVPB 1 Gram(s) IV Intermittent once  methylPREDNISolone sodium succinate Injectable 60 milliGRAM(s) IV Push two times a day  metoprolol tartrate 100 milliGRAM(s) Oral two times a day  potassium chloride  20 mEq/100 mL IVPB 20 milliEquivalent(s) IV Intermittent every 2 hours    MEDICATIONS  (PRN):

## 2024-06-01 ENCOUNTER — RESULT REVIEW (OUTPATIENT)
Age: 84
End: 2024-06-01

## 2024-06-01 LAB
ALBUMIN SERPL ELPH-MCNC: 3.5 G/DL — SIGNIFICANT CHANGE UP (ref 3.5–5.2)
ALP SERPL-CCNC: 148 U/L — HIGH (ref 30–115)
ALT FLD-CCNC: 20 U/L — SIGNIFICANT CHANGE UP (ref 0–41)
ANION GAP SERPL CALC-SCNC: 13 MMOL/L — SIGNIFICANT CHANGE UP (ref 7–14)
AST SERPL-CCNC: 16 U/L — SIGNIFICANT CHANGE UP (ref 0–41)
BASOPHILS # BLD AUTO: 0.01 K/UL — SIGNIFICANT CHANGE UP (ref 0–0.2)
BASOPHILS NFR BLD AUTO: 0.1 % — SIGNIFICANT CHANGE UP (ref 0–1)
BILIRUB SERPL-MCNC: 1.1 MG/DL — SIGNIFICANT CHANGE UP (ref 0.2–1.2)
BUN SERPL-MCNC: 34 MG/DL — HIGH (ref 10–20)
CALCIUM SERPL-MCNC: 8.4 MG/DL — SIGNIFICANT CHANGE UP (ref 8.4–10.5)
CHLORIDE SERPL-SCNC: 106 MMOL/L — SIGNIFICANT CHANGE UP (ref 98–110)
CO2 SERPL-SCNC: 28 MMOL/L — SIGNIFICANT CHANGE UP (ref 17–32)
CREAT SERPL-MCNC: 1.4 MG/DL — SIGNIFICANT CHANGE UP (ref 0.7–1.5)
EGFR: 38 ML/MIN/1.73M2 — LOW
EOSINOPHIL # BLD AUTO: 0 K/UL — SIGNIFICANT CHANGE UP (ref 0–0.7)
EOSINOPHIL NFR BLD AUTO: 0 % — SIGNIFICANT CHANGE UP (ref 0–8)
GLUCOSE SERPL-MCNC: 170 MG/DL — HIGH (ref 70–99)
HCT VFR BLD CALC: 36.8 % — LOW (ref 37–47)
HGB BLD-MCNC: 11.2 G/DL — LOW (ref 12–16)
IMM GRANULOCYTES NFR BLD AUTO: 0.7 % — HIGH (ref 0.1–0.3)
LYMPHOCYTES # BLD AUTO: 0.82 K/UL — LOW (ref 1.2–3.4)
LYMPHOCYTES # BLD AUTO: 9.1 % — LOW (ref 20.5–51.1)
MAGNESIUM SERPL-MCNC: 3.4 MG/DL — CRITICAL HIGH (ref 1.8–2.4)
MCHC RBC-ENTMCNC: 26 PG — LOW (ref 27–31)
MCHC RBC-ENTMCNC: 30.4 G/DL — LOW (ref 32–37)
MCV RBC AUTO: 85.4 FL — SIGNIFICANT CHANGE UP (ref 81–99)
MONOCYTES # BLD AUTO: 0.21 K/UL — SIGNIFICANT CHANGE UP (ref 0.1–0.6)
MONOCYTES NFR BLD AUTO: 2.3 % — SIGNIFICANT CHANGE UP (ref 1.7–9.3)
NEUTROPHILS # BLD AUTO: 7.94 K/UL — HIGH (ref 1.4–6.5)
NEUTROPHILS NFR BLD AUTO: 87.8 % — HIGH (ref 42.2–75.2)
NRBC # BLD: 0 /100 WBCS — SIGNIFICANT CHANGE UP (ref 0–0)
PHOSPHATE SERPL-MCNC: 4 MG/DL — SIGNIFICANT CHANGE UP (ref 2.1–4.9)
PLATELET # BLD AUTO: 281 K/UL — SIGNIFICANT CHANGE UP (ref 130–400)
PMV BLD: 11.6 FL — HIGH (ref 7.4–10.4)
POTASSIUM SERPL-MCNC: 4.1 MMOL/L — SIGNIFICANT CHANGE UP (ref 3.5–5)
POTASSIUM SERPL-SCNC: 4.1 MMOL/L — SIGNIFICANT CHANGE UP (ref 3.5–5)
PROT SERPL-MCNC: 5.3 G/DL — LOW (ref 6–8)
RBC # BLD: 4.31 M/UL — SIGNIFICANT CHANGE UP (ref 4.2–5.4)
RBC # FLD: 19 % — HIGH (ref 11.5–14.5)
SODIUM SERPL-SCNC: 147 MMOL/L — HIGH (ref 135–146)
WBC # BLD: 9.04 K/UL — SIGNIFICANT CHANGE UP (ref 4.8–10.8)
WBC # FLD AUTO: 9.04 K/UL — SIGNIFICANT CHANGE UP (ref 4.8–10.8)

## 2024-06-01 PROCEDURE — 99223 1ST HOSP IP/OBS HIGH 75: CPT | Mod: 24

## 2024-06-01 PROCEDURE — 99233 SBSQ HOSP IP/OBS HIGH 50: CPT

## 2024-06-01 PROCEDURE — 93306 TTE W/DOPPLER COMPLETE: CPT | Mod: 26

## 2024-06-01 RX ORDER — POLYETHYLENE GLYCOL 3350 17 G/17G
17 POWDER, FOR SOLUTION ORAL DAILY
Refills: 0 | Status: DISCONTINUED | OUTPATIENT
Start: 2024-06-01 | End: 2024-06-04

## 2024-06-01 RX ADMIN — Medication 20 MILLIGRAM(S): at 05:19

## 2024-06-01 RX ADMIN — Medication 100 MILLIGRAM(S): at 21:12

## 2024-06-01 RX ADMIN — Medication 325 MILLIGRAM(S): at 12:08

## 2024-06-01 RX ADMIN — APIXABAN 2.5 MILLIGRAM(S): 2.5 TABLET, FILM COATED ORAL at 05:19

## 2024-06-01 RX ADMIN — Medication 100 MILLIGRAM(S): at 05:18

## 2024-06-01 RX ADMIN — Medication 60 MILLIGRAM(S): at 17:47

## 2024-06-01 RX ADMIN — Medication 60 MILLIGRAM(S): at 05:18

## 2024-06-01 RX ADMIN — Medication 100 MILLIGRAM(S): at 17:46

## 2024-06-01 RX ADMIN — ATORVASTATIN CALCIUM 20 MILLIGRAM(S): 80 TABLET, FILM COATED ORAL at 21:12

## 2024-06-01 RX ADMIN — POLYETHYLENE GLYCOL 3350 17 GRAM(S): 17 POWDER, FOR SOLUTION ORAL at 13:55

## 2024-06-01 RX ADMIN — APIXABAN 2.5 MILLIGRAM(S): 2.5 TABLET, FILM COATED ORAL at 17:46

## 2024-06-01 RX ADMIN — AMIODARONE HYDROCHLORIDE 200 MILLIGRAM(S): 400 TABLET ORAL at 05:19

## 2024-06-01 RX ADMIN — Medication 175 MICROGRAM(S): at 05:20

## 2024-06-01 NOTE — CONSULT NOTE ADULT - ASSESSMENT
Card Dr Spencer  EP Dr Kothari    83y Female with HFmrEF, CKD III, HTN, Hypothyroidism, Persistent AFib s/p multiple DCCV, recently unsuccessful, s/p  BiV PPM placement 4/15/24 MDT, and planned for AVN ablation 6/3/24, admitted with acute on chronic HF exacerbation    persistent AF, now RVR  [id number] Score is 5  BiV PPM [employer]  HFmr EF  acute on chronic HF exacerbation  hypothyroidism  CKD  HTN      con't tele  increase Metorolol as bp tolerates  con't management  AVN ablation on monday  please send full set of labs including coags and T&S tomorrow AM  NPO after MN Sunday for procedure on Monday  will follow Card Dr Spencer  EP Dr Kothari    83y Female with HFmrEF, CKD III, HTN, Hypothyroidism, Persistent AFib s/p multiple DCCV, recently unsuccessful, s/p  BiV PPM placement 4/15/24 MDT, and planned for AVN ablation 6/3/24, admitted with acute on chronic HF exacerbation    persistent AF, now RVR  [id number] Score is 5  BiV PPM [employer]  HFmr EF  acute on chronic HF exacerbation  hypothyroidism  CKD  HTN      con't tele  increase Metoprolol as bp tolerates  con't management  AVN ablation on Monday  please send full set of labs including coags and T&S tomorrow AM  NPO after MN Sunday for procedure on Monday  will follow

## 2024-06-01 NOTE — PROGRESS NOTE ADULT - ASSESSMENT
82 yo F PMHx chronic afib, hypothyroidism, recurrent pneumonia, hypothyroidism, pituitary adenoma presented for evaluation of recurring SOB. Upon ER arrival pt found to have several nodules. She additionally developed afib with rvr. She has had multiple admission for pneumonia and RVR in the past.    Chronic Atrial Fibrillation/Flutter with Rapid Ventricular Response:   -HR is not well controlled.   -increase metoprolol  -planned for AVN ablation on MOnday  -s/p failed DCCV in the past.   -has PPM  -c/w Eliquis    SOB  -likely from residual inflammatory changes from pneumonia as well as afib rvr  - clinically no active PNA or CHF decompesnation  - BNP lower than baseline  - CT chest showed Given worsening appearance on prior CT chest suspect spiculated mass post pneumonia changes, not necessarily malignancy. Pt and family aware of this, agreeable to follow up repeat CT chest in several weeks and if spciulated mass still present then will consider it malignancy and may consider work up. For now it does not appear that pt has CHF exacerbation. She had trace pleural effusion on CT chest, no JVD, or LE edema. BNP is lower than baseline. Solumedrol initiated for presumed bronchospasm and pt states she feels significantly improved. C/w solumedrol for now and wean off o2. Will likely need ambulatory pulseox in AM. Pt has moderate pericardial effusion. Had trivial pericardial effusion on prior echo. Repeat limited echo to assess true size. IF enlarged then c/s cardiology.  Possible dc n 24 hours. If not then will c/s EP for possible in pt ablation rather than outpt.   Possible bilateral multifocal pneumonia:   Patient with mild SOB, no leg edema. Pro-BNP 7789  CXR 4/11 showed bilateral hilar opacities, pulmonary congestion.  Echo showed LVEF 50-55%, severe asymmetric LVH, mod reduced RV systolic function, mild to mod MR, mild to mod TR, mod MI, mod pulmonary HTN.    Lasix dose was increased recently outpatient from 20 to 40mg po daily.  CXR 4/13 still with bilateral opacities.   Patient with leukocytosis, looks dry, concern if this finding is pneumonia,   Ct chest 4/14 showed Multifocal bilateral groundglass opacities and consolidative opacities,   RVP, legionella and Strep Ag in urine negative,  Procalcitonin 0.14.    Completed on Zithromax and Rocephin.   s/p Lasix IV, discharge on Lasix 20mg po daily.     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     Hypothyroidism  History of Thyroid cancer s/p thyroidectomy.   TSH 3.0, continue synthroid 175 mcg     Pituitary adenoma  Cabergoline 1/2 tab of 0.5 mg Q weekly Monday    DVT Prophylaxis: Eliquis.   #Progress Note Handoff:  Pending (specify):   Family discussion:  Disposition: Home today       82 yo F PMHx chronic afib, hypothyroidism, recurrent pneumonia, hypothyroidism, pituitary adenoma presented for evaluation of recurring SOB. Upon ER arrival pt found to have several nodules. She additionally developed afib with rvr. She has had multiple admission for pneumonia and RVR in the past.    Chronic Atrial Fibrillation/Flutter with Rapid Ventricular Response:   -HR is not well controlled.   -increase metoprolol  -planned for AVN ablation on MOnday  -s/p failed DCCV in the past.   -has PPM  -c/w Eliquis    SOB  -likely from residual inflammatory changes from pneumonia as well as afib rvr  - clinically no active PNA or CHF decompensation  - BNP lower than baseline  - CT chest showed Given worsening appearance on prior CT chest suspect spiculated mass post pneumonia changes, not necessarily malignancy. Pt and family aware of this, agreeable to follow up repeat CT chest in several weeks and if spiculated mass still present then will consider it malignancy and may consider work up.   - She had trace pleural effusion on CT chest, no JVD, or LE edema. BNP is lower than baseline making CHF decomensation unlikely.  - solumedrol initiated for presumed bronchospasm-can change to prednsione  - wean off o2.     Moderate pericardial effusion  - Had trivial pericardial effusion on prior echo.   -Repeat limited echo to assess true size.   - echo showed SMALL effusion  - EF now 44%, likely tachycardia induced-f/u EP      Iron Deficiency anemia:   -recently had venofer IV     Hypothyroidism  -History of Thyroid cancer s/p thyroidectomy.   -continue synthroid 175 mcg     Pituitary adenoma  -Cabergoline 1/2 tab of 0.5 mg Q weekly Monday    DVT Prophylaxis: Eliquis.   #Progress Note Handoff:  Pending (specify): AVN ablation  Family discussion: as above with pt  Disposition: home

## 2024-06-01 NOTE — PROGRESS NOTE ADULT - SUBJECTIVE AND OBJECTIVE BOX
CHIEF COMPLAINT:    Patient is a 83y old  Female who presents with a chief complaint of SOB.     INTERVAL HPI/OVERNIGHT EVENTS:    Patient seen and examined at bedside. No acute overnight events occurred.    ROS: Denies SOB, palpitations. All other systems are negative.    Medications:  Standing  allopurinol 100 milliGRAM(s) Oral at bedtime  aMIOdarone    Tablet 200 milliGRAM(s) Oral daily  apixaban 2.5 milliGRAM(s) Oral every 12 hours  atorvastatin 20 milliGRAM(s) Oral at bedtime  ferrous    sulfate 325 milliGRAM(s) Oral daily  furosemide    Tablet 20 milliGRAM(s) Oral daily  levothyroxine 175 MICROGram(s) Oral daily  magnesium sulfate  IVPB 1 Gram(s) IV Intermittent once  methylPREDNISolone sodium succinate Injectable 60 milliGRAM(s) IV Push two times a day  metoprolol tartrate 100 milliGRAM(s) Oral two times a day  polyethylene glycol 3350 17 Gram(s) Oral daily    PRN Meds        Vital Signs:    T(F): 97.9 (24 @ 12:21), Max: 99.2 (24 @ 19:56)  HR: 105 (24 @ 12:21) (97 - 130)  BP: 105/73 (24 @ 12:21) (105/73 - 124/85)  RR: 16 (24 @ 12:21) (16 - 18)  SpO2: 95% (24 @ 05:22) (95% - 95%)  I&O's Summary    31 May 2024 07:01  -  2024 07:00  --------------------------------------------------------  IN: 770 mL / OUT: 1600 mL / NET: -830 mL    :  -  2024 15:08  --------------------------------------------------------  IN: 650 mL / OUT: 200 mL / NET: 450 mL      Daily     Daily Weight in k.3 (2024 05:21)  CAPILLARY BLOOD GLUCOSE          PHYSICAL EXAM:  GENERAL:  NAD  SKIN: No rashes or lesions  HEENT: Atraumatic. Normocephalic. Anicteric  NECK:  No JVD.   PULMONARY: Clear to ausculation bilaterally. No wheezing. No rales  CVS: Normal S1, S2. Iregular rate and rhythm. No murmurs.  ABDOMEN/GI: Soft, Nontender, Nondistended; Bowel sounds are present  EXTREMITIES:  No edema B/L LE.  NEUROLOGIC:  No motor deficit.  PSYCH: Alert & oriented x 3, normal affect      LABS:                        11.2   9.04  )-----------( 281      ( 2024 06:15 )             36.8     06-    147<H>  |  106  |  34<H>  ----------------------------<  170<H>  4.1   |  28  |  1.4    Ca    8.4      2024 06:15  Phos  4.0     06-  Mg     3.4     06-    TPro  5.3<L>  /  Alb  3.5  /  TBili  1.1  /  DBili  x   /  AST  16  /  ALT  20  /  AlkPhos  148<H>  06-              RADIOLOGY & ADDITIONAL TESTS:  Imaging or report Personally Reviewed:  [ ] YES  [ ] NO -->no new images    Telemetry reviewed independently - afib rvr  EKG reviewed independently -->no new EKGs    Consultant(s) Notes Reviewed:  [ ] YES  [ ] NO  Care Discussed with Consultants/Other Providers [ ] YES  [ ] NO    Case discussed with resident  Care discussed with pt

## 2024-06-01 NOTE — CONSULT NOTE ADULT - SUBJECTIVE AND OBJECTIVE BOX
Patient is a 83y old  Female who presents with a chief complaint of       HPI:  Mrs. Loera is an 83-year-old woman with a medical history significant for atrial fibrillation (A-fib) status post cardiac resynchronization therapy pacemaker (CRTP) implantation, managed with Xarelto and amiodarone, and a history of multiple unsuccessful attempts at direct current cardioversion (DCCV), as well as hypertension, hyperlipidemia, hypothyroidism, chronic kidney disease (CKD), and obstructive sleep apnea (FELIX), is scheduled for atrioventricular (AV) node ablation on Monday. She presents for evaluation due to progressively worsening dyspnea on exertion over the past two weeks, with recent exacerbation. The patient denies experiencing fever, chills, chest pain, or cough.    Vital Signs Last 12 Hrs  T(F): 97.4 (24 @ 00:11), Max: 97.7 (24 @ 23:16)  HR: 69 (24 @ 00:11) (69 - 88)  BP: 157/110 (24 @ 00:11) (152/93 - 157/110)  RR: 18 (24 @ 00:11) (18 - 20)  SpO2: 95% (24 @ 00:11) (95% - 95%)    Labs in the ED are significant for Hgb 11.5 (Baseline 11.0), Trop 20 -> 18, Alk Phos 170, BNP 5832 (7789 in 2024).    - CT C/A/P in the ED showed No evidence of central pulmonary embolism. Evaluation of the segmental and subsegmental branches is limited by motion. Small to moderate pericardial effusion, increased since prior examination. New bilateral lung nodular opacities with a new spiculated opacity in the left lung measuring 1.3 cm and the largest right lung nodular opacity measures 1 cm. Findings are felt to likely be infectious or inflammatory etiologies and   less likely neoplastic. However, follow to resolutionis necessary. GallBladder wall edema and further evaluation with right upper quadrant ultrasound Indeterminate 2 cm hyperdense lesion in the spleen, not seen on prior   examinations. 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  - RUQ US showed Mild gallbladder wall edema without sonographic evidence of cholelithiasis or sludge. Common bile duct within normal limits.     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 Patient was admitted, noted to be in HF exacerbation was diuresed, now doing well. On tele noted to be in AF [id number].     REVIEW OF SYSTEMS    [x] A ten-point review of systems was otherwise negative except as noted.  [ ] Due to altered mental status/intubation, subjective information were not able to be obtained from the patient. History was obtained, to the extent possible, from review of the chart and collateral sources of information.      PAST MEDICAL & SURGICAL HISTORY:  HTN (hypertension)      High cholesterol      Hypothyroid  s/p thyroid ca surgery      Afib  on xarelto      Sleep apnea      Osteoarthritis      CKD (chronic kidney disease) stage 3, GFR 30-59 ml/min      Pacemaker      H/O thyroidectomy      S/P rotator cuff surgery      Pacemaker      H/O total knee replacement          Home Medications:  allopurinol 100 mg oral tablet: 1 cap(s) orally once a day (at bedtime) (24 May 2024 08:21)  amiodarone 200 mg oral tablet: 1 tab(s) orally once a day (24 May 2024 08:21)  atorvastatin 20 mg oral tablet: 1 tab(s) orally once a day (24 May 2024 08:21)  cabergoline 0.5 mg oral tablet: 0.5 tab(s) orally once a week , monday night (24 May 2024 08:21)  levothyroxine 175 mcg (0.175 mg) oral tablet: 1 tab(s) orally once a day (24 May 2024 08:21)  trospium 20 mg oral tablet: 1 tab(s) orally once a day (24 May 2024 08:21)      Allergies:  No Known Allergies      FAMILY HISTORY:  Family history of lung cancer (Mother)    Family history of abdominal aortic aneurysm (AAA) (Father)        SOCIAL HISTORY: denies tobacco / ETOH / illicit drug use     CIGARETTES:  ALCOHOL:  MARIJUANA:  ILLICIT DRUGS:        PREVIOUS DIAGNOSTIC TESTING:      ECHO  FINDINGS:  < from: TTE Echo Complete w/o Contrast w/ Doppler (24 @ 08:17) >  Summary:   1. Mildly decreased global left ventricular systolic function.   2. Severely enlarged left atrium.   3. LV Ejection Fraction by Lester's Method with a biplane EF of 45 %.   4. Severe asymmetric left ventricular hypertrophy.   5. The mitral in-flow pattern reveals no discernable A-wave, therefore   no comment on diastolic function can be made.   6. Moderately enlarged right ventricle.   7. Moderately reduced RV systolic function.   8. Moderately enlarged right atrium.   9. Degenerative mitral valve.  10. Mild thickening of the anterior and posterior mitral valve leaflets.  11. Mild to moderate mitral valve regurgitation.  12. Mitral annular calcification.  13. Mild-moderate tricuspid regurgitation.  14. Sclerotic aortic valve with normal opening.  15. Moderate pulmonic valve regurgitation.  16. Dilatation of the ascending aorta.  17. Estimated pulmonary artery systolic pressure is 50.0 mmHg assuming a   right atrial pressure of 15 mmHg, which is consistent with moderate   pulmonary hypertension.  18. Severely dilated pulmonary artery.    < end of copied text >    STRESS  FINDINGS:    CATHETERIZATION  FINDINGS:    ELECTROPHYSIOLOGY STUDY  FINDINGS:  < from: Cardiac Rhythm Management (04.15.24 @ 08:33) >  Successful biventricular defibrillator Implant (CRT-P) ([employer],  Non-dependent)    < end of copied text >    CAROTID ULTRASOUND:  FINDINGS    VENOUS DUPLEX SCAN:  FINDINGS:    CHEST CT PULMONARY ANGIO with IV Contrast:  FINDINGS:  < from: CT Angio Chest PE Protocol w/ IV Cont (24 @ 15:43) >  IMPRESSION:    No evidence of central pulmonary embolism. Evaluation of the segmental   and subsegmental branches is limited by motion    Small to moderate pericardial effusion, increased since prior   examination..    New bilateral lung nodular opacities with anew spiculated opacity in   the left lung measuring 1.3 cm and the largest right lung nodular opacity   measures 1 cm.    Findings are felt to likely be infectious or inflammatory etiologies and   less likely neoplastic. However, follow to resolutionis necessary.    GallBladder wall edema and further evaluation with right upper quadrant   ultrasound    Indeterminate 2 cm hyperdense lesion in the spleen, not seen on prior   examinations. This is felt to likely represent a benign splenic   hemangioma, but outpatient MRI is recommended    < end of copied text >        MEDICATIONS  (STANDING):  allopurinol 100 milliGRAM(s) Oral at bedtime  aMIOdarone    Tablet 200 milliGRAM(s) Oral daily  apixaban 2.5 milliGRAM(s) Oral every 12 hours  atorvastatin 20 milliGRAM(s) Oral at bedtime  ferrous    sulfate 325 milliGRAM(s) Oral daily  furosemide    Tablet 20 milliGRAM(s) Oral daily  levothyroxine 175 MICROGram(s) Oral daily  magnesium sulfate  IVPB 1 Gram(s) IV Intermittent once  methylPREDNISolone sodium succinate Injectable 60 milliGRAM(s) IV Push two times a day  metoprolol tartrate 100 milliGRAM(s) Oral two times a day    MEDICATIONS  (PRN):      Vital Signs Last 24 Hrs  T(C): 36.6 (2024 12:21), Max: 37.3 (31 May 2024 19:56)  T(F): 97.9 (2024 12:21), Max: 99.2 (31 May 2024 19:56)  HR: 105 (:) (97 - 130)  BP: 105/73 (2024 12:) (105/73 - 124/85)  BP(mean): 98 (2024 05:22) (98 - 98)  RR: 16 (:21) (16 - 18)  SpO2: 95% (2024 05:22) (95% - 95%)    Parameters below as of 2024 05:22  Patient On (Oxygen Delivery Method): nasal cannula  O2 Flow (L/min): 2      PHYSICAL EXAM:    GENERAL: In no apparent distress, well nourished, and hydrated.  HEAD:  Atraumatic, Normocephalic  EYES: EOMI, PERRLA, conjunctiva and sclera clear  NECK: Supple and normal thyroid.  No JVD or carotid bruit.  Carotid pulse is 2+ bilaterally.  HEART: Regular rate and rhythm; No murmurs, rubs, or gallops.  PULMONARY: Clear to auscultation and perfusion.  No rales, wheezing, or rhonchi bilaterally.  ABDOMEN: Soft, Nontender, Nondistended; Bowel sounds present  EXTREMITIES:  2+ Peripheral Pulses, No clubbing, cyanosis, or edema  NEUROLOGICAL: Grossly nonfocal    I&O's Detail    31 May 2024 07:01  -  2024 07:00  --------------------------------------------------------  IN:    Oral Fluid: 770 mL  Total IN: 770 mL    OUT:    Voided (mL): 1600 mL  Total OUT: 1600 mL    Total NET: -830 mL      2024 07:01  -  2024 12:42  --------------------------------------------------------  IN:    Oral Fluid: 325 mL  Total IN: 325 mL    OUT:  Total OUT: 0 mL    Total NET: 325 mL        Daily     Daily Weight in k.3 (2024 05:21)    INTERPRETATION OF TELEMETRY:    EC Lead ECG:   Ventricular Rate 102 BPM    Atrial Rate 20 BPM    QRS Duration 116 ms    Q-T Interval 338 ms    QTC Calculation(Bazett) 440 ms    R Axis 261 degrees    T Axis 110 degrees    Diagnosis Line Ventricular-paced rhythm  Abnormal ECG    Confirmed by Behuria, Supreeti ([id number]) on 2024 5:24:53 PM (24 @ 15:30)  12 Lead ECG:   Ventricular Rate 100 BPM    Atrial Rate 96 BPM    QRS Duration 156 ms    Q-T Interval 432 ms    QTC Calculation(Bazett) 557 ms    R Axis 151 degrees    T Axis -26 degrees    Diagnosis Line Ventricular-paced rhythm with occasional Premature ventricular and fusion  complexes  Abnormal ECG    Confirmed by José Miguel Barrios ([id number]) on 2024 2:17:44 PM (24 @ 12:26)        LABS:                        11.2   9.04  )-----------( 281      ( 2024 06:15 )             36.8     06-01    147<H>  |  106  |  34<H>  ----------------------------<  170<H>  4.1   |  28  |  1.4    Ca    8.4      2024 06:15  Phos  4.0     06-01  Mg     3.4     06-01    TPro  5.3<L>  /  Alb  3.5  /  TBili  1.1  /  DBili  x   /  AST  16  /  ALT  20  /  AlkPhos  148<H>  06-01            BNP            RADIOLOGY & ADDITIONAL STUDIES:

## 2024-06-02 LAB
ALBUMIN SERPL ELPH-MCNC: 3.6 G/DL — SIGNIFICANT CHANGE UP (ref 3.5–5.2)
ALP SERPL-CCNC: 122 U/L — HIGH (ref 30–115)
ALT FLD-CCNC: 21 U/L — SIGNIFICANT CHANGE UP (ref 0–41)
ANION GAP SERPL CALC-SCNC: 14 MMOL/L — SIGNIFICANT CHANGE UP (ref 7–14)
APTT BLD: 29.8 SEC — SIGNIFICANT CHANGE UP (ref 27–39.2)
AST SERPL-CCNC: 22 U/L — SIGNIFICANT CHANGE UP (ref 0–41)
BASOPHILS # BLD AUTO: 0.01 K/UL — SIGNIFICANT CHANGE UP (ref 0–0.2)
BASOPHILS NFR BLD AUTO: 0.1 % — SIGNIFICANT CHANGE UP (ref 0–1)
BILIRUB SERPL-MCNC: 0.7 MG/DL — SIGNIFICANT CHANGE UP (ref 0.2–1.2)
BUN SERPL-MCNC: 47 MG/DL — HIGH (ref 10–20)
CALCIUM SERPL-MCNC: 8.2 MG/DL — LOW (ref 8.4–10.5)
CHLORIDE SERPL-SCNC: 102 MMOL/L — SIGNIFICANT CHANGE UP (ref 98–110)
CO2 SERPL-SCNC: 26 MMOL/L — SIGNIFICANT CHANGE UP (ref 17–32)
CREAT SERPL-MCNC: 1.7 MG/DL — HIGH (ref 0.7–1.5)
EGFR: 30 ML/MIN/1.73M2 — LOW
EOSINOPHIL # BLD AUTO: 0 K/UL — SIGNIFICANT CHANGE UP (ref 0–0.7)
EOSINOPHIL NFR BLD AUTO: 0 % — SIGNIFICANT CHANGE UP (ref 0–8)
GLUCOSE SERPL-MCNC: 166 MG/DL — HIGH (ref 70–99)
HCT VFR BLD CALC: 37.4 % — SIGNIFICANT CHANGE UP (ref 37–47)
HGB BLD-MCNC: 11.5 G/DL — LOW (ref 12–16)
IMM GRANULOCYTES NFR BLD AUTO: 0.9 % — HIGH (ref 0.1–0.3)
INR BLD: 1.36 RATIO — HIGH (ref 0.65–1.3)
LYMPHOCYTES # BLD AUTO: 0.68 K/UL — LOW (ref 1.2–3.4)
LYMPHOCYTES # BLD AUTO: 4.9 % — LOW (ref 20.5–51.1)
MAGNESIUM SERPL-MCNC: 1.8 MG/DL — SIGNIFICANT CHANGE UP (ref 1.8–2.4)
MCHC RBC-ENTMCNC: 26.3 PG — LOW (ref 27–31)
MCHC RBC-ENTMCNC: 30.7 G/DL — LOW (ref 32–37)
MCV RBC AUTO: 85.4 FL — SIGNIFICANT CHANGE UP (ref 81–99)
MONOCYTES # BLD AUTO: 0.37 K/UL — SIGNIFICANT CHANGE UP (ref 0.1–0.6)
MONOCYTES NFR BLD AUTO: 2.7 % — SIGNIFICANT CHANGE UP (ref 1.7–9.3)
NEUTROPHILS # BLD AUTO: 12.65 K/UL — HIGH (ref 1.4–6.5)
NEUTROPHILS NFR BLD AUTO: 91.4 % — HIGH (ref 42.2–75.2)
NRBC # BLD: 0 /100 WBCS — SIGNIFICANT CHANGE UP (ref 0–0)
PLATELET # BLD AUTO: 294 K/UL — SIGNIFICANT CHANGE UP (ref 130–400)
PMV BLD: 11.5 FL — HIGH (ref 7.4–10.4)
POTASSIUM SERPL-MCNC: 4.4 MMOL/L — SIGNIFICANT CHANGE UP (ref 3.5–5)
POTASSIUM SERPL-SCNC: 4.4 MMOL/L — SIGNIFICANT CHANGE UP (ref 3.5–5)
PROT SERPL-MCNC: 5.2 G/DL — LOW (ref 6–8)
PROTHROM AB SERPL-ACNC: 15.5 SEC — HIGH (ref 9.95–12.87)
RBC # BLD: 4.38 M/UL — SIGNIFICANT CHANGE UP (ref 4.2–5.4)
RBC # FLD: 19.2 % — HIGH (ref 11.5–14.5)
SODIUM SERPL-SCNC: 142 MMOL/L — SIGNIFICANT CHANGE UP (ref 135–146)
WBC # BLD: 13.83 K/UL — HIGH (ref 4.8–10.8)
WBC # FLD AUTO: 13.83 K/UL — HIGH (ref 4.8–10.8)

## 2024-06-02 PROCEDURE — 99232 SBSQ HOSP IP/OBS MODERATE 35: CPT

## 2024-06-02 RX ORDER — ENOXAPARIN SODIUM 100 MG/ML
80 INJECTION SUBCUTANEOUS EVERY 12 HOURS
Refills: 0 | Status: COMPLETED | OUTPATIENT
Start: 2024-06-02 | End: 2024-06-02

## 2024-06-02 RX ORDER — PANTOPRAZOLE SODIUM 20 MG/1
40 TABLET, DELAYED RELEASE ORAL
Refills: 0 | Status: DISCONTINUED | OUTPATIENT
Start: 2024-06-02 | End: 2024-06-04

## 2024-06-02 RX ADMIN — Medication 100 MILLIGRAM(S): at 17:01

## 2024-06-02 RX ADMIN — PANTOPRAZOLE SODIUM 40 MILLIGRAM(S): 20 TABLET, DELAYED RELEASE ORAL at 05:36

## 2024-06-02 RX ADMIN — Medication 40 MILLIGRAM(S): at 05:36

## 2024-06-02 RX ADMIN — ENOXAPARIN SODIUM 80 MILLIGRAM(S): 100 INJECTION SUBCUTANEOUS at 11:34

## 2024-06-02 RX ADMIN — Medication 175 MICROGRAM(S): at 05:36

## 2024-06-02 RX ADMIN — Medication 100 MILLIGRAM(S): at 21:19

## 2024-06-02 RX ADMIN — ATORVASTATIN CALCIUM 20 MILLIGRAM(S): 80 TABLET, FILM COATED ORAL at 21:19

## 2024-06-02 RX ADMIN — POLYETHYLENE GLYCOL 3350 17 GRAM(S): 17 POWDER, FOR SOLUTION ORAL at 11:34

## 2024-06-02 RX ADMIN — Medication 20 MILLIGRAM(S): at 05:37

## 2024-06-02 RX ADMIN — AMIODARONE HYDROCHLORIDE 200 MILLIGRAM(S): 400 TABLET ORAL at 05:37

## 2024-06-02 RX ADMIN — Medication 100 MILLIGRAM(S): at 05:37

## 2024-06-02 RX ADMIN — Medication 325 MILLIGRAM(S): at 11:34

## 2024-06-02 RX ADMIN — ENOXAPARIN SODIUM 80 MILLIGRAM(S): 100 INJECTION SUBCUTANEOUS at 23:38

## 2024-06-02 NOTE — DIETITIAN INITIAL EVALUATION ADULT - ORAL INTAKE PTA/DIET HISTORY
Nutrition Hx obtained from patient at bedside. Pt endorses decrease in PO intake and appetite since October 2023, but eats about 50% of meals typically. Follows a low salt diet at home, cooks for herself and lives alone.     Weight Hx: Pt reports UBW in october was 92.7 kg, Compared to CBW 86.2 kg; 7% weight loss in 8 months- not clinically significant. Does not meet weight criteria for malnutrition at this time however remains at risk.   IBW: 50 kg

## 2024-06-02 NOTE — DIETITIAN INITIAL EVALUATION ADULT - OTHER INFO
84 yo F PMHx atrial fibrillation (A-fib) status post cardiac resynchronization therapy pacemaker (CRTP) implantation, managed with Xarelto and amiodarone, and a history of multiple unsuccessful attempts at direct current cardioversion (DCCV), as well as hypertension, hyperlipidemia, hypothyroidism, chronic kidney disease (CKD), and obstructive sleep apnea (FELIX) presented for evaluation of recurring SOB. Upon ER arrival pt found to have several nodules. She additionally developed afib with rvr. She has had multiple admission for pneumonia and RVR in the past.

## 2024-06-02 NOTE — CHART NOTE - NSCHARTNOTEFT_GEN_A_CORE
Electrophysiology PA Note    Pt is scheduled for ___AVN ablation tomorrow    keep NPO after MN  Hold Heparin / Lovenox / NOAC after tonight      LABS:                        11.5   13.83 )-----------( 294      ( 02 Jun 2024 06:44 )             37.4       06-02    142  |  102  |  47<H>  ----------------------------<  166<H>  4.4   |  26  |  1.7<H>    Ca    8.2<L>      02 Jun 2024 06:44  Phos  4.0     06-01  Mg     1.8     06-02    TPro  5.2<L>  /  Alb  3.6  /  TBili  0.7  /  DBili  x   /  AST  22  /  ALT  21  /  AlkPhos  122<H>  06-02      PT/INR - ( 02 Jun 2024 06:44 )   PT: 15.50 sec;   INR: 1.36 ratio         PTT - ( 02 Jun 2024 06:44 )  PTT:29.8 sec    ABO RH Interpretation: O POS (06.02.24 @ 06:44)      Pro-Brain Natriuretic Peptide: 5832 pg/mL (05-30-24 @ 14:06)

## 2024-06-02 NOTE — DIETITIAN INITIAL EVALUATION ADULT - PERTINENT MEDS FT
MEDICATIONS  (STANDING):  allopurinol 100 milliGRAM(s) Oral at bedtime  aMIOdarone    Tablet 200 milliGRAM(s) Oral daily  atorvastatin 20 milliGRAM(s) Oral at bedtime  enoxaparin Injectable 80 milliGRAM(s) SubCutaneous every 12 hours  ferrous    sulfate 325 milliGRAM(s) Oral daily  furosemide    Tablet 20 milliGRAM(s) Oral daily  levothyroxine 175 MICROGram(s) Oral daily  magnesium sulfate  IVPB 1 Gram(s) IV Intermittent once  metoprolol tartrate 100 milliGRAM(s) Oral two times a day  pantoprazole    Tablet 40 milliGRAM(s) Oral before breakfast  polyethylene glycol 3350 17 Gram(s) Oral daily  predniSONE   Tablet 40 milliGRAM(s) Oral daily    MEDICATIONS  (PRN):

## 2024-06-02 NOTE — DIETITIAN INITIAL EVALUATION ADULT - OTHER CALCULATIONS
WEIGHT USED: dosing wt 86.2 kg   ENERGY: 5406-4840 kcal/day (MSJ x 1.2-1.4)  PROTEIN: 69-86 g/day (0.8-1.0 g/kg - GFR 30, CKD)  FLUID: 1 mL/kcal or per team recs  -- Estimated needs with consideration for Age, Weight, BMI, CKD, lab values

## 2024-06-02 NOTE — PROGRESS NOTE ADULT - SUBJECTIVE AND OBJECTIVE BOX
24H events:    Patient is a 83y old Female who presents with a chief complaint of   Primary diagnosis of Atrial fibrillation with RVR       Today is hospital day 3d. No acute or major events overnight.    PAST MEDICAL & SURGICAL HISTORY  HTN (hypertension)    High cholesterol    Hypothyroid  s/p thyroid ca surgery    Afib  on xarelto    Sleep apnea    Osteoarthritis    CKD (chronic kidney disease) stage 3, GFR 30-59 ml/min    Pacemaker    H/O thyroidectomy    S/P rotator cuff surgery    Pacemaker    H/O total knee replacement      SOCIAL HISTORY:  Social History:      ALLERGIES:  No Known Allergies    MEDICATIONS:  STANDING MEDICATIONS  allopurinol 100 milliGRAM(s) Oral at bedtime  aMIOdarone    Tablet 200 milliGRAM(s) Oral daily  apixaban 2.5 milliGRAM(s) Oral every 12 hours  atorvastatin 20 milliGRAM(s) Oral at bedtime  ferrous    sulfate 325 milliGRAM(s) Oral daily  furosemide    Tablet 20 milliGRAM(s) Oral daily  levothyroxine 175 MICROGram(s) Oral daily  magnesium sulfate  IVPB 1 Gram(s) IV Intermittent once  methylPREDNISolone sodium succinate Injectable 60 milliGRAM(s) IV Push two times a day  metoprolol tartrate 100 milliGRAM(s) Oral two times a day  polyethylene glycol 3350 17 Gram(s) Oral daily    PRN MEDICATIONS    VITALS:   T(F): 98.1  HR: 112  BP: 124/80  RR: 18  SpO2: 95%    LABS:                        11.2   9.04  )-----------( 281      ( 01 Jun 2024 06:15 )             36.8     06-01    147<H>  |  106  |  34<H>  ----------------------------<  170<H>  4.1   |  28  |  1.4    Ca    8.4      01 Jun 2024 06:15  Phos  4.0     06-01  Mg     3.4     06-01    TPro  5.3<L>  /  Alb  3.5  /  TBili  1.1  /  DBili  x   /  AST  16  /  ALT  20  /  AlkPhos  148<H>  06-01      Urinalysis Basic - ( 01 Jun 2024 06:15 )    Color: x / Appearance: x / SG: x / pH: x  Gluc: 170 mg/dL / Ketone: x  / Bili: x / Urobili: x   Blood: x / Protein: x / Nitrite: x   Leuk Esterase: x / RBC: x / WBC x   Sq Epi: x / Non Sq Epi: x / Bacteria: x

## 2024-06-02 NOTE — PROGRESS NOTE ADULT - ASSESSMENT
82 yo F PMHx chronic afib, hypothyroidism, recurrent pneumonia, hypothyroidism, pituitary adenoma presented for evaluation of recurring SOB. Upon ER arrival pt found to have several nodules. She additionally developed afib with rvr. She has had multiple admission for pneumonia and RVR in the past.    #Chronic Atrial Fibrillation/Flutter with Rapid Ventricular Response:   -HR is not well controlled.   -increased metoprolol to 100 OD  -planned for AVN ablation on MOnday  -s/p failed DCCV in the past.   -has PPM  -c/w Eliquis    #SOB  -likely from residual inflammatory changes from pneumonia as well as afib rvr  - clinically no active PNA or CHF decompensation  - BNP lower than baseline  - CT chest showed Given worsening appearance on prior CT chest suspect spiculated mass post pneumonia changes, not necessarily malignancy. Pt and family aware of this, agreeable to follow up repeat CT chest in several weeks and if spiculated mass still present then will consider it malignancy and may consider work up.   - She had trace pleural effusion on CT chest, no JVD, or LE edema. BNP is lower than baseline making CHF decomensation unlikely.  - solumedrol initiated for presumed bronchospasm  - switched to prednisone on 6/2 for extra 3 days (total 5 days)  - wean off o2.     Moderate pericardial effusion  - Had trivial pericardial effusion on prior echo.   - Repeat limited echo to assess true size.   - echo showed SMALL effusion  - EF now 44%, likely tachycardia induced  - EP -> NPO after midnight on Sunday for procedure on Monday    Iron Deficiency anemia:   -recently had venofer IV     Hypothyroidism  -History of Thyroid cancer s/p thyroidectomy.   -continue synthroid 175 mcg     Pituitary adenoma  -Cabergoline 1/2 tab of 0.5 mg Q weekly Monday    DVT Prophylaxis: already on Eliquis.   #Progress Note Handoff:  Pending (specify): AVN ablation

## 2024-06-02 NOTE — PROGRESS NOTE ADULT - SUBJECTIVE AND OBJECTIVE BOX
CHIEF COMPLAINT:    Patient is a 83y old  Female who presents with a chief complaint of Atrial fibrillation    INTERVAL HPI/OVERNIGHT EVENTS:    Patient seen and examined at bedside. No acute overnight events occurred.    ROS: Denies SOB, chest pain. All other systems are negative.    Medications:  Standing  allopurinol 100 milliGRAM(s) Oral at bedtime  aMIOdarone    Tablet 200 milliGRAM(s) Oral daily  atorvastatin 20 milliGRAM(s) Oral at bedtime  enoxaparin Injectable 80 milliGRAM(s) SubCutaneous every 12 hours  ferrous    sulfate 325 milliGRAM(s) Oral daily  furosemide    Tablet 20 milliGRAM(s) Oral daily  levothyroxine 175 MICROGram(s) Oral daily  magnesium sulfate  IVPB 1 Gram(s) IV Intermittent once  metoprolol tartrate 100 milliGRAM(s) Oral two times a day  pantoprazole    Tablet 40 milliGRAM(s) Oral before breakfast  polyethylene glycol 3350 17 Gram(s) Oral daily  predniSONE   Tablet 40 milliGRAM(s) Oral daily    PRN Meds        Vital Signs:    T(F): 98.2 (24 @ 11:47), Max: 98.2 (24 @ 11:47)  HR: 103 (24 @ 11:47) (94 - 112)  BP: 164/92 (24 @ 11:47) (124/80 - 164/92)  RR: 16 (24 @ 11:47) (16 - 18)  SpO2: --  I&O's Summary    2024 07:  -  2024 07:00  --------------------------------------------------------  IN: 650 mL / OUT: 300 mL / NET: 350 mL    :  -  2024 14:21  --------------------------------------------------------  IN: 236 mL / OUT: 250 mL / NET: -14 mL      Daily Height in cm: 157.5 (2024 13:36)    Daily Weight in k (2024 04:42)  CAPILLARY BLOOD GLUCOSE          PHYSICAL EXAM:  GENERAL:  NAD  SKIN: No rashes or lesions  HEENT: Atraumatic. Normocephalic. Anicteric  NECK:  No JVD.   PULMONARY: Clear to ausculation bilaterally. No wheezing. No rales  CVS: Normal S1, S2. Regular rate and rhythm. No murmurs.  ABDOMEN/GI: Soft, Nontender, Nondistended; Bowel sounds are present  EXTREMITIES:  No edema B/L LE.  NEUROLOGIC:  No motor deficit.  PSYCH: Alert & oriented x 3, normal affect      LABS:                        11.5   13.83 )-----------( 294      ( 2024 06:44 )             37.4     06-02    142  |  102  |  47<H>  ----------------------------<  166<H>  4.4   |  26  |  1.7<H>    Ca    8.2<L>      2024 06:44  Phos  4.0     06-01  Mg     1.8     06-02    TPro  5.2<L>  /  Alb  3.6  /  TBili  0.7  /  DBili  x   /  AST  22  /  ALT  21  /  AlkPhos  122<H>  06-02    PT/INR - ( 2024 06:44 )   PT: 15.50 sec;   INR: 1.36 ratio         PTT - ( 2024 06:44 )  PTT:29.8 sec          RADIOLOGY & ADDITIONAL TESTS:  Imaging or report Personally Reviewed:  [ ] YES  [ ] NO -->no new images    Telemetry reviewed independently - NSR, no acute events  EKG reviewed independently -->no new EKGs    Consultant(s) Notes Reviewed:  [ ] YES  [ ] NO  Care Discussed with Consultants/Other Providers [ ] YES  [ ] NO    Case discussed with resident  Care discussed with pt

## 2024-06-02 NOTE — DIETITIAN INITIAL EVALUATION ADULT - ADD RECOMMEND
Nutrition Intervention: Meals and Snacks, Coordination of Care  RD to monitor PO intake/tolerance, GI function, Nutrition Labs, Electrolytes, NFPF, Weights    Will follow at high nutrition risk; f/u in 3-5 days or PRN.

## 2024-06-02 NOTE — DIETITIAN INITIAL EVALUATION ADULT - PERSON TAUGHT/METHOD
Heart Healthy diet education provided and explained; reinforced need for diet/written material/patient instructed

## 2024-06-02 NOTE — DIETITIAN INITIAL EVALUATION ADULT - NS FNS DIET ORDER
Diet, NPO after Midnight:      NPO Start Date: 2024,   NPO Start Time: 23:59 (24 @ 00:15) [Active]  Diet, DASH/TLC:   Sodium & Cholesterol Restricted  1500mL Fluid Restriction ([id number])     Special Instructions for Nursin milliLiter(s) to 2000 milliLiter(s) fluid restriction (24 @ 01:19) [Active]

## 2024-06-02 NOTE — DIETITIAN INITIAL EVALUATION ADULT - PERTINENT LABORATORY DATA
06-02    142  |  102  |  47<H>  ----------------------------<  166<H>  4.4   |  26  |  1.7<H>    Ca    8.2<L>      02 Jun 2024 06:44  Phos  4.0     06-01  Mg     1.8     06-02    TPro  5.2<L>  /  Alb  3.6  /  TBili  0.7  /  DBili  x   /  AST  22  /  ALT  21  /  AlkPhos  122<H>  06-02

## 2024-06-03 ENCOUNTER — APPOINTMENT (OUTPATIENT)
Dept: ELECTROPHYSIOLOGY | Facility: HOSPITAL | Age: 84
End: 2024-06-03

## 2024-06-03 LAB
ALBUMIN SERPL ELPH-MCNC: 3.6 G/DL — SIGNIFICANT CHANGE UP (ref 3.5–5.2)
ALP SERPL-CCNC: 112 U/L — SIGNIFICANT CHANGE UP (ref 30–115)
ALT FLD-CCNC: 24 U/L — SIGNIFICANT CHANGE UP (ref 0–41)
ANION GAP SERPL CALC-SCNC: 11 MMOL/L — SIGNIFICANT CHANGE UP (ref 7–14)
AST SERPL-CCNC: 20 U/L — SIGNIFICANT CHANGE UP (ref 0–41)
BASOPHILS # BLD AUTO: 0.01 K/UL — SIGNIFICANT CHANGE UP (ref 0–0.2)
BASOPHILS NFR BLD AUTO: 0.1 % — SIGNIFICANT CHANGE UP (ref 0–1)
BILIRUB SERPL-MCNC: 0.6 MG/DL — SIGNIFICANT CHANGE UP (ref 0.2–1.2)
BUN SERPL-MCNC: 52 MG/DL — HIGH (ref 10–20)
CALCIUM SERPL-MCNC: 8.8 MG/DL — SIGNIFICANT CHANGE UP (ref 8.4–10.5)
CHLORIDE SERPL-SCNC: 102 MMOL/L — SIGNIFICANT CHANGE UP (ref 98–110)
CO2 SERPL-SCNC: 27 MMOL/L — SIGNIFICANT CHANGE UP (ref 17–32)
CREAT SERPL-MCNC: 1.7 MG/DL — HIGH (ref 0.7–1.5)
EGFR: 30 ML/MIN/1.73M2 — LOW
EOSINOPHIL # BLD AUTO: 0 K/UL — SIGNIFICANT CHANGE UP (ref 0–0.7)
EOSINOPHIL NFR BLD AUTO: 0 % — SIGNIFICANT CHANGE UP (ref 0–8)
GLUCOSE SERPL-MCNC: 122 MG/DL — HIGH (ref 70–99)
HCT VFR BLD CALC: 37.5 % — SIGNIFICANT CHANGE UP (ref 37–47)
HGB BLD-MCNC: 11.6 G/DL — LOW (ref 12–16)
IMM GRANULOCYTES NFR BLD AUTO: 0.5 % — HIGH (ref 0.1–0.3)
LYMPHOCYTES # BLD AUTO: 1.07 K/UL — LOW (ref 1.2–3.4)
LYMPHOCYTES # BLD AUTO: 9 % — LOW (ref 20.5–51.1)
MAGNESIUM SERPL-MCNC: 2.5 MG/DL — HIGH (ref 1.8–2.4)
MCHC RBC-ENTMCNC: 26.4 PG — LOW (ref 27–31)
MCHC RBC-ENTMCNC: 30.9 G/DL — LOW (ref 32–37)
MCV RBC AUTO: 85.2 FL — SIGNIFICANT CHANGE UP (ref 81–99)
MONOCYTES # BLD AUTO: 0.86 K/UL — HIGH (ref 0.1–0.6)
MONOCYTES NFR BLD AUTO: 7.3 % — SIGNIFICANT CHANGE UP (ref 1.7–9.3)
NEUTROPHILS # BLD AUTO: 9.85 K/UL — HIGH (ref 1.4–6.5)
NEUTROPHILS NFR BLD AUTO: 83.1 % — HIGH (ref 42.2–75.2)
NRBC # BLD: 0 /100 WBCS — SIGNIFICANT CHANGE UP (ref 0–0)
PLATELET # BLD AUTO: 292 K/UL — SIGNIFICANT CHANGE UP (ref 130–400)
PMV BLD: 11.5 FL — HIGH (ref 7.4–10.4)
POTASSIUM SERPL-MCNC: 3.8 MMOL/L — SIGNIFICANT CHANGE UP (ref 3.5–5)
POTASSIUM SERPL-SCNC: 3.8 MMOL/L — SIGNIFICANT CHANGE UP (ref 3.5–5)
PROT SERPL-MCNC: 5.4 G/DL — LOW (ref 6–8)
RBC # BLD: 4.4 M/UL — SIGNIFICANT CHANGE UP (ref 4.2–5.4)
RBC # FLD: 18.9 % — HIGH (ref 11.5–14.5)
SODIUM SERPL-SCNC: 140 MMOL/L — SIGNIFICANT CHANGE UP (ref 135–146)
WBC # BLD: 11.85 K/UL — HIGH (ref 4.8–10.8)
WBC # FLD AUTO: 11.85 K/UL — HIGH (ref 4.8–10.8)

## 2024-06-03 PROCEDURE — 93650 ICAR CATH ABLTJ AV NODE FUNC: CPT

## 2024-06-03 PROCEDURE — 99232 SBSQ HOSP IP/OBS MODERATE 35: CPT

## 2024-06-03 RX ORDER — ENOXAPARIN SODIUM 100 MG/ML
80 INJECTION SUBCUTANEOUS EVERY 12 HOURS
Refills: 0 | Status: DISCONTINUED | OUTPATIENT
Start: 2024-06-03 | End: 2024-06-03

## 2024-06-03 RX ORDER — APIXABAN 2.5 MG/1
2.5 TABLET, FILM COATED ORAL EVERY 12 HOURS
Refills: 0 | Status: DISCONTINUED | OUTPATIENT
Start: 2024-06-03 | End: 2024-06-03

## 2024-06-03 RX ORDER — VANCOMYCIN HCL 1 G
1000 VIAL (EA) INTRAVENOUS ONCE
Refills: 0 | Status: COMPLETED | OUTPATIENT
Start: 2024-06-03 | End: 2024-06-03

## 2024-06-03 RX ORDER — RIVAROXABAN 15 MG-20MG
15 KIT ORAL
Refills: 0 | Status: DISCONTINUED | OUTPATIENT
Start: 2024-06-03 | End: 2024-06-04

## 2024-06-03 RX ADMIN — RIVAROXABAN 15 MILLIGRAM(S): KIT at 21:35

## 2024-06-03 RX ADMIN — Medication 100 MILLIGRAM(S): at 18:38

## 2024-06-03 RX ADMIN — ATORVASTATIN CALCIUM 20 MILLIGRAM(S): 80 TABLET, FILM COATED ORAL at 21:35

## 2024-06-03 RX ADMIN — Medication 250 MILLIGRAM(S): at 15:38

## 2024-06-03 RX ADMIN — Medication 175 MICROGRAM(S): at 05:53

## 2024-06-03 RX ADMIN — Medication 100 MILLIGRAM(S): at 21:35

## 2024-06-03 RX ADMIN — Medication 20 MILLIGRAM(S): at 05:53

## 2024-06-03 RX ADMIN — Medication 100 MILLIGRAM(S): at 05:54

## 2024-06-03 RX ADMIN — AMIODARONE HYDROCHLORIDE 200 MILLIGRAM(S): 400 TABLET ORAL at 05:53

## 2024-06-03 RX ADMIN — Medication 40 MILLIGRAM(S): at 05:53

## 2024-06-03 RX ADMIN — Medication 325 MILLIGRAM(S): at 12:00

## 2024-06-03 NOTE — PROGRESS NOTE ADULT - SUBJECTIVE AND OBJECTIVE BOX
CHIEF COMPLAINT:    Patient is a 83y old  Female who presents with a chief complaint of Atrial fibrillation     (2024 13:36)      INTERVAL HPI/OVERNIGHT EVENTS:    Patient seen and examined at bedside. No acute overnight events occurred.    ROS: All other systems are negative.    Medications:  Standing  allopurinol 100 milliGRAM(s) Oral at bedtime  aMIOdarone    Tablet 200 milliGRAM(s) Oral daily  atorvastatin 20 milliGRAM(s) Oral at bedtime  ferrous    sulfate 325 milliGRAM(s) Oral daily  furosemide    Tablet 20 milliGRAM(s) Oral daily  levothyroxine 175 MICROGram(s) Oral daily  magnesium sulfate  IVPB 1 Gram(s) IV Intermittent once  metoprolol tartrate 100 milliGRAM(s) Oral two times a day  pantoprazole    Tablet 40 milliGRAM(s) Oral before breakfast  polyethylene glycol 3350 17 Gram(s) Oral daily  predniSONE   Tablet 40 milliGRAM(s) Oral daily    PRN Meds        Vital Signs:    T(F): 98 (24 @ 04:43), Max: 98.2 (24 @ 11:47)  HR: 86 (24 @ 04:43) (86 - 112)  BP: 139/88 (24 @ 04:43) (117/80 - 164/92)  RR: 18 (24 @ 04:43) (16 - 18)  SpO2: --  I&O's Summary    2024 07:01  -  2024 07:00  --------------------------------------------------------  IN: 561 mL / OUT: 400 mL / NET: 161 mL      Daily Height in cm: 157.5 (2024 13:36)    Daily Weight in k (2024 04:43)  CAPILLARY BLOOD GLUCOSE          PHYSICAL EXAM:  GENERAL:  NAD  SKIN: No rashes or lesions  HEENT: Atraumatic. Normocephalic. Anicteric  NECK:  No JVD.   PULMONARY: Clear to ausculation bilaterally. No wheezing. No rales  CVS: Normal S1, S2. Regular rate and rhythm. No murmurs.  ABDOMEN/GI: Soft, Nontender, Nondistended; Bowel sounds are present  EXTREMITIES:  No edema B/L LE.  NEUROLOGIC:  No motor deficit.  PSYCH: Alert & oriented x 3, normal affect      LABS:                        11.6   11.85 )-----------( 292      ( 2024 06:49 )             37.5     06-    142  |  102  |  47<H>  ----------------------------<  166<H>  4.4   |  26  |  1.7<H>    Ca    8.2<L>      2024 06:44  Mg     1.8     0602    TPro  5.2<L>  /  Alb  3.6  /  TBili  0.7  /  DBili  x   /  AST  22  /  ALT  21  /  AlkPhos  122<H>  06-02    PT/INR - ( 2024 06:44 )   PT: 15.50 sec;   INR: 1.36 ratio         PTT - ( 2024 06:44 )  PTT:29.8 sec          RADIOLOGY & ADDITIONAL TESTS:  Imaging or report Personally Reviewed:  [ ] YES  [ ] NO -->no new images    Telemetry reviewed independently - NSR, no acute events  EKG reviewed independently -->no new EKGs    Consultant(s) Notes Reviewed:  [ ] YES  [ ] NO  Care Discussed with Consultants/Other Providers [ ] YES  [ ] NO    Case discussed with resident  Care discussed with pt         CHIEF COMPLAINT:    Patient is a 83y old  Female who presents with a chief complaint of Atrial fibrillation     (2024 13:36)      INTERVAL HPI/OVERNIGHT EVENTS:    Patient seen and examined at bedside. No acute overnight events occurred.    ROS: Denies SOB, chest pain. All other systems are negative.    Medications:  Standing  allopurinol 100 milliGRAM(s) Oral at bedtime  aMIOdarone    Tablet 200 milliGRAM(s) Oral daily  atorvastatin 20 milliGRAM(s) Oral at bedtime  ferrous    sulfate 325 milliGRAM(s) Oral daily  furosemide    Tablet 20 milliGRAM(s) Oral daily  levothyroxine 175 MICROGram(s) Oral daily  magnesium sulfate  IVPB 1 Gram(s) IV Intermittent once  metoprolol tartrate 100 milliGRAM(s) Oral two times a day  pantoprazole    Tablet 40 milliGRAM(s) Oral before breakfast  polyethylene glycol 3350 17 Gram(s) Oral daily  predniSONE   Tablet 40 milliGRAM(s) Oral daily    PRN Meds        Vital Signs:    T(F): 98 (24 @ 04:43), Max: 98.2 (24 @ 11:47)  HR: 86 (24 @ 04:43) (86 - 112)  BP: 139/88 (24 @ 04:43) (117/80 - 164/92)  RR: 18 (24 @ 04:43) (16 - 18)  SpO2: --  I&O's Summary    2024 07:01  -  2024 07:00  --------------------------------------------------------  IN: 561 mL / OUT: 400 mL / NET: 161 mL      Daily Height in cm: 157.5 (2024 13:36)    Daily Weight in k (2024 04:43)  CAPILLARY BLOOD GLUCOSE          PHYSICAL EXAM:  GENERAL:  NAD  SKIN: No rashes or lesions  HEENT: Atraumatic. Normocephalic. Anicteric  NECK:  No JVD.   PULMONARY: Clear to ausculation bilaterally. No wheezing. No rales  CVS: Normal S1, S2. Iregular rate and rhythm. No murmurs.  ABDOMEN/GI: Soft, Nontender, Nondistended; Bowel sounds are present  EXTREMITIES:  No edema B/L LE.  NEUROLOGIC:  No motor deficit.  PSYCH: Alert & oriented x 3, normal affect      LABS:                        11.6   11.85 )-----------( 292      ( 2024 06:49 )             37.5     06-02    142  |  102  |  47<H>  ----------------------------<  166<H>  4.4   |  26  |  1.7<H>    Ca    8.2<L>      2024 06:44  Mg     1.8     06-02    TPro  5.2<L>  /  Alb  3.6  /  TBili  0.7  /  DBili  x   /  AST  22  /  ALT  21  /  AlkPhos  122<H>  06-02    PT/INR - ( 2024 06:44 )   PT: 15.50 sec;   INR: 1.36 ratio         PTT - ( 2024 06:44 )  PTT:29.8 sec          RADIOLOGY & ADDITIONAL TESTS:  Imaging or report Personally Reviewed:  [ ] YES  [ ] NO -->no new images    Telemetry reviewed independently - afib  EKG reviewed independently -->no new EKGs    Consultant(s) Notes Reviewed:  [ ] YES  [ ] NO  Care Discussed with Consultants/Other Providers [ ] YES  [ ] NO    Case discussed with resident  Care discussed with pt

## 2024-06-03 NOTE — PROGRESS NOTE ADULT - SUBJECTIVE AND OBJECTIVE BOX
NEPHROLOGY FOLLOW UP NOTE    pt seen and examined  denies sob    PAST MEDICAL & SURGICAL HISTORY:  HTN (hypertension)      High cholesterol      Hypothyroid  s/p thyroid ca surgery      Afib  on xarelto      Sleep apnea      Osteoarthritis      CKD (chronic kidney disease) stage 3, GFR 30-59 ml/min      Pacemaker      H/O thyroidectomy      S/P rotator cuff surgery      Pacemaker      H/O total knee replacement        Allergies:  No Known Allergies    Home Medications Reviewed    SOCIAL HISTORY:  Denies ETOH,Smoking,   FAMILY HISTORY:  Family history of lung cancer (Mother)    Family history of abdominal aortic aneurysm (AAA) (Father)          REVIEW OF SYSTEMS:  CONSTITUTIONAL: No weakness, fevers or chills  EYES/ENT: No visual changes;  No vertigo or throat pain   NECK: No pain or stiffness  RESPIRATORY: No cough, wheezing, hemoptysis; No shortness of breath  CARDIOVASCULAR: No chest pain or palpitations.  GASTROINTESTINAL: No abdominal or epigastric pain. No nausea, vomiting, or hematemesis; No diarrhea or constipation. No melena or hematochezia.  GENITOURINARY: No dysuria, frequency, foamy urine, urinary urgency, incontinence or hematuria  NEUROLOGICAL: No numbness or weakness  SKIN: No itching, burning, rashes, or lesions   VASCULAR: No bilateral lower extremity edema.   All other review of systems is negative unless indicated above.    PHYSICAL EXAM:  Constitutional: NAD  HEENT: anicteric sclera, oropharynx clear, MMM  Neck: No JVD  Respiratory: b/l decreased BS  Cardiovascular: S1, S2, RRR + ppm  Gastrointestinal: BS+, soft, NT/ND  Extremities: No cyanosis or clubbing. No peripheral edema  Neurological: A/O x 3, no focal deficits  Psychiatric: Normal mood, normal affect  : No CVA tenderness. No joaquin.   Skin: No rashes    Hospital Medications:   MEDICATIONS  (STANDING):  allopurinol 100 milliGRAM(s) Oral at bedtime  aMIOdarone    Tablet 200 milliGRAM(s) Oral daily  atorvastatin 20 milliGRAM(s) Oral at bedtime  enoxaparin Injectable 80 milliGRAM(s) SubCutaneous every 12 hours  ferrous    sulfate 325 milliGRAM(s) Oral daily  furosemide    Tablet 20 milliGRAM(s) Oral daily  levothyroxine 175 MICROGram(s) Oral daily  magnesium sulfate  IVPB 1 Gram(s) IV Intermittent once  metoprolol tartrate 100 milliGRAM(s) Oral two times a day  pantoprazole    Tablet 40 milliGRAM(s) Oral before breakfast  polyethylene glycol 3350 17 Gram(s) Oral daily  predniSONE   Tablet 40 milliGRAM(s) Oral daily        VITALS:  T(F): 97.5 (06-03-24 @ 13:29), Max: 98 (06-03-24 @ 04:43)  HR: 96 (06-03-24 @ 13:29)  BP: 135/81 (06-03-24 @ 13:29)  RR: 16 (06-03-24 @ 13:29)  SpO2: 96% (06-03-24 @ 13:29)  Wt(kg): --    06-01 @ 07:01  -  06-02 @ 07:00  --------------------------------------------------------  IN: 650 mL / OUT: 300 mL / NET: 350 mL    06-02 @ 07:01  -  06-03 @ 07:00  --------------------------------------------------------  IN: 561 mL / OUT: 400 mL / NET: 161 mL          LABS:  06-03    140  |  102  |  52<H>  ----------------------------<  122<H>  3.8   |  27  |  1.7<H>    Ca    8.8      03 Jun 2024 06:49  Mg     2.5     06-03    TPro  5.4<L>  /  Alb  3.6  /  TBili  0.6  /  DBili      /  AST  20  /  ALT  24  /  AlkPhos  112  06-03                          11.6   11.85 )-----------( 292      ( 03 Jun 2024 06:49 )             37.5       Urine Studies:  Urinalysis Basic - ( 03 Jun 2024 06:49 )    Color:  / Appearance:  / SG:  / pH:   Gluc: 122 mg/dL / Ketone:   / Bili:  / Urobili:    Blood:  / Protein:  / Nitrite:    Leuk Esterase:  / RBC:  / WBC    Sq Epi:  / Non Sq Epi:  / Bacteria:           RADIOLOGY & ADDITIONAL STUDIES:

## 2024-06-03 NOTE — PROGRESS NOTE ADULT - ASSESSMENT
84 yo F PMHx chronic afib, hypothyroidism, recurrent pneumonia, hypothyroidism, pituitary adenoma presented for evaluation of recurring SOB. Upon ER arrival pt found to have several nodules. She additionally developed afib with rvr. She has had multiple admission for pneumonia and RVR in the past.    Chronic Atrial Fibrillation/Flutter with Rapid Ventricular Response:   -HR is not well controlled.   -increase metoprolol  -planned for AVN ablation on MOnday, NPO after midnight  -s/p failed DCCV in the past.   -has PPM  -c/w Eliquis    SOB  -likely from residual inflammatory changes from pneumonia as well as afib rvr  - clinically no active PNA or CHF decompensation  - BNP lower than baseline  - CT chest showed Given worsening appearance on prior CT chest suspect spiculated mass post pneumonia changes, not necessarily malignancy. Pt and family aware of this, agreeable to follow up repeat CT chest in several weeks and if spiculated mass still present then will consider it malignancy and may consider work up.   - She had trace pleural effusion on CT chest, no JVD, or LE edema. BNP is lower than baseline making CHF decompensation unlikely.  - solumedrol initiated for presumed bronchospasm-can change to prednsione  - wean off o2.     Moderate pericardial effusion  - Had trivial pericardial effusion on prior echo.   -Repeat limited echo to assess true size.   - echo showed SMALL effusion  - EF now 44%, likely tachycardia induced-f/u EP      Iron Deficiency anemia:   -recently had venofer IV     Hypothyroidism  -History of Thyroid cancer s/p thyroidectomy.   -continue synthroid 175 mcg     Pituitary adenoma  -Cabergoline 1/2 tab of 0.5 mg Q weekly Monday    DVT Prophylaxis: Eliquis.   #Progress Note Handoff:  Pending (specify): AVN ablation  Family discussion: as above with pt  Disposition: home       84 yo F PMHx chronic afib, hypothyroidism, recurrent pneumonia, hypothyroidism, pituitary adenoma presented for evaluation of recurring SOB. Upon ER arrival pt found to have several nodules. She additionally developed afib with rvr. She has had multiple admission for pneumonia and RVR in the past.    Chronic Atrial Fibrillation/Flutter with Rapid Ventricular Response:   -HR is not well controlled.   -increased metoprolol  -planned for AVN ablation as add on today  -s/p failed DCCV in the past.   -has PPM  -c/w Eliquis    SOB  -likely from residual inflammatory changes from pneumonia as well as afib rvr  - clinically no active PNA or CHF decompensation  - BNP lower than baseline  - CT chest showed Given worsening appearance on prior CT chest suspect spiculated mass post pneumonia changes, not necessarily malignancy. Pt and family aware of this, agreeable to follow up repeat CT chest in several weeks and if spiculated mass still present then will consider it malignancy and may consider work up.   - She had trace pleural effusion on CT chest, no JVD, or LE edema. BNP is lower than baseline making CHF decompensation unlikely.  -c/w short course of prednisone   - weaned off o2.     Moderate pericardial effusion  - Had trivial pericardial effusion on prior echo.   -Repeat limited echo to assess true size.   - echo showed SMALL effusion  - EF now 44%, likely tachycardia induced-f/u EP      Iron Deficiency anemia:   -recently had Venofer IV     Hypothyroidism  -History of Thyroid cancer s/p thyroidectomy.   -continue synthroid 175 mcg     Pituitary adenoma  -Cabergoline 1/2 tab of 0.5 mg Q weekly Monday    DVT Prophylaxis: Eliquis.   #Progress Note Handoff:  Pending (specify): AVN ablation  Family discussion: as above with pt  Disposition: home

## 2024-06-03 NOTE — PROGRESS NOTE ADULT - ASSESSMENT
82 yo F PMHx chronic afib, hypothyroidism, recurrent pneumonia, hypothyroidism, pituitary adenoma presented for evaluation of recurring SOB. Upon ER arrival pt found to have several nodules. She additionally developed afib with rvr. She has had multiple admission for pneumonia and RVR in the past.    Chronic Atrial Fibrillation/Flutter with Rapid Ventricular Response:   -HR is not well controlled.   -increase metoprolol  -s/p failed DCCV in the past.   -has PPM  -c/w Eliquis  -ablation today  -resume therapeutic lovenox tongiht    SOB  -likely from residual inflammatory changes from pneumonia as well as afib rvr  - clinically no active PNA or CHF decompensation  - BNP lower than baseline  - CT chest showed Given worsening appearance on prior CT chest suspect spiculated mass post pneumonia changes, not necessarily malignancy. Pt and family aware of this, agreeable to follow up repeat CT chest in several weeks and if spiculated mass still present then will consider it malignancy and may consider work up.   - She had trace pleural effusion on CT chest, no JVD, or LE edema. BNP is lower than baseline making CHF decompensation unlikely.  - solumedrol initiated for presumed bronchospasm-changed to prednisone- last day 6/5, WBC increased, likely due to steroids  - wean off o2.     Moderate pericardial effusion  - Had trivial pericardial effusion on prior echo.   -Repeat limited echo to assess true size.   - echo showed SMALL effusion  - EF now 44%, likely tachycardia induced-f/u EP      Iron Deficiency anemia:   -recently had venofer IV     Hypothyroidism  -History of Thyroid cancer s/p thyroidectomy.   -continue synthroid 175 mcg     Pituitary adenoma  -Cabergoline 1/2 tab of 0.5 mg Q weekly Monday    #Misc  - DVT Prophylaxis: holding for ablation- will resume lovenox tonight  - GI Prophylaxis: None  - Diet: DASH/ Fluid resc  - Activity: IAT  - IV Fluids: None  - Code Status: Full code    pending- ablation

## 2024-06-03 NOTE — PROGRESS NOTE ADULT - ASSESSMENT
CKD stage 3-4  b/l proteinaceous and hemorrhagic renal cysts (on outpt MRI kidneys)  hypokalemia from diuretics  chronic hypernatremia from diuretics   dyspnea / acute on chronic HFpEF  / b/l lung nodular opacities  increased small to moderate pericardial effusion  Afib / s/p PPM 4/15  anemia  FELIX  HTN  hypothyroidism / thyroid Ca / pituitary adenoma     plan:    cont lasix 20mg po qd  trend renal function and lytes  replete K+ prn   for cardiac ablation today                   Pt. Arrived in PACU via cart from the OR. Oxygen applied via NC @ 2L. Attached to monitor, vs stable. Brakes applied, side rails up x 2. Pt. Awake and alert. Denies pain or nausea at this time. Dressing to left foot is dry/intact. No drainage noted. No ice, per Dr. Regina Luu, because pt. Is diabetic. IV infusing without difficulty. SCD to RLE. Bedside report received from [facility] Samantha and Aries Dimas CRNA. Will continue to monitor via bedside.

## 2024-06-03 NOTE — PROGRESS NOTE ADULT - SUBJECTIVE AND OBJECTIVE BOX
24H events:    Patient is a 83y old Female who presents with a chief complaint of Atrial fibrillation     (02 Jun 2024 13:36)    Primary diagnosis of Atrial fibrillation with RVR    Today is 4d of hospitalization. This morning patient was seen and examined at bedside, resting comfortably in bed.    No acute or major events overnight.  NSVT/ PVCs vs afib with aberrancy overnight  planned for ablation today    PAST MEDICAL & SURGICAL HISTORY  HTN (hypertension)    High cholesterol    Hypothyroid  s/p thyroid ca surgery    Afib  on xarelto    Sleep apnea    Osteoarthritis    CKD (chronic kidney disease) stage 3, GFR 30-59 ml/min    Pacemaker    H/O thyroidectomy    S/P rotator cuff surgery    Pacemaker    H/O total knee replacement        ALLERGIES:  No Known Allergies    MEDICATIONS:  STANDING MEDICATIONS  allopurinol 100 milliGRAM(s) Oral at bedtime  aMIOdarone    Tablet 200 milliGRAM(s) Oral daily  atorvastatin 20 milliGRAM(s) Oral at bedtime  enoxaparin Injectable 80 milliGRAM(s) SubCutaneous every 12 hours  ferrous    sulfate 325 milliGRAM(s) Oral daily  furosemide    Tablet 20 milliGRAM(s) Oral daily  levothyroxine 175 MICROGram(s) Oral daily  magnesium sulfate  IVPB 1 Gram(s) IV Intermittent once  metoprolol tartrate 100 milliGRAM(s) Oral two times a day  pantoprazole    Tablet 40 milliGRAM(s) Oral before breakfast  polyethylene glycol 3350 17 Gram(s) Oral daily  predniSONE   Tablet 40 milliGRAM(s) Oral daily    PRN MEDICATIONS    VITALS:   T(F): 98  HR: 86  BP: 139/88  RR: 18  SpO2: --    PHYSICAL EXAM:  GENERAL:   ( x) NAD, lying in bed comfortably     (  ) obtunded     (  ) lethargic     (  ) somnolent    HEAD:   ( x) Atraumatic     (  ) hematoma     (  ) laceration (specify location:       )     NECK:  (x) Supple     (  ) neck stiffness     (  ) nuchal rigidity     (  )  no JVD     (  ) JVD present ( -- cm)    HEART:  Rate -->     (x) normal rate     (  ) bradycardic     (  ) tachycardic  Rhythm -->     (x) regular     (  ) regularly irregular     (  ) irregularly irregular  Murmurs -->     (x) normal s1s2     (  ) systolic murmur     (  ) diastolic murmur     (  ) continuous murmur      (  ) S3 present     (  ) S4 present    LUNGS:   ( x)Unlabored respirations     (  ) tachypnea  ( x) B/L air entry     (  ) decreased breath sounds in:  (location     )    ( x) no adventitious sound     (  ) crackles     (  ) wheezing      (  ) rhonchi      (specify location:       )  (  ) chest wall tenderness (specify location:       )    ABDOMEN:   ( x) Soft     (  ) tense   |   (X  ) nondistended     (  ) distended   |   ( X ) +BS     (  ) hypoactive bowel sounds     (  ) hyperactive bowel sounds  ( x) nontender     (  ) RUQ tenderness     (  ) RLQ tenderness     (  ) LLQ tenderness     (  ) epigastric tenderness     (  ) diffuse tenderness  (  ) Splenomegaly      (  ) Hepatomegaly      (  ) Jaundice     (  ) ecchymosis     EXTREMITIES:  ( x) Normal     (  ) Rash     (  ) ecchymosis     (  ) varicose veins      (  ) pitting edema     (  ) non-pitting edema   (  ) ulceration     (  ) gangrene:     (location:     )    NERVOUS SYSTEM:    ( x) A&Ox3     (  ) confused     (  ) lethargic    LABS:                        11.6   11.85 )-----------( 292      ( 03 Jun 2024 06:49 )             37.5     06-03    140  |  102  |  52<H>  ----------------------------<  122<H>  3.8   |  27  |  1.7<H>    Ca    8.8      03 Jun 2024 06:49  Mg     2.5     06-03    TPro  5.4<L>  /  Alb  3.6  /  TBili  0.6  /  DBili  x   /  AST  20  /  ALT  24  /  AlkPhos  112  06-03    PT/INR - ( 02 Jun 2024 06:44 )   PT: 15.50 sec;   INR: 1.36 ratio         PTT - ( 02 Jun 2024 06:44 )  PTT:29.8 sec  Urinalysis Basic - ( 03 Jun 2024 06:49 )    Color: x / Appearance: x / SG: x / pH: x  Gluc: 122 mg/dL / Ketone: x  / Bili: x / Urobili: x   Blood: x / Protein: x / Nitrite: x   Leuk Esterase: x / RBC: x / WBC x   Sq Epi: x / Non Sq Epi: x / Bacteria: x                RADIOLOGY:

## 2024-06-04 ENCOUNTER — TRANSCRIPTION ENCOUNTER (OUTPATIENT)
Age: 84
End: 2024-06-04

## 2024-06-04 VITALS
TEMPERATURE: 97 F | SYSTOLIC BLOOD PRESSURE: 157 MMHG | HEART RATE: 89 BPM | RESPIRATION RATE: 18 BRPM | DIASTOLIC BLOOD PRESSURE: 99 MMHG | WEIGHT: 198.2 LBS

## 2024-06-04 LAB
ANION GAP SERPL CALC-SCNC: 11 MMOL/L — SIGNIFICANT CHANGE UP (ref 7–14)
BASOPHILS # BLD AUTO: 0.01 K/UL — SIGNIFICANT CHANGE UP (ref 0–0.2)
BASOPHILS NFR BLD AUTO: 0.1 % — SIGNIFICANT CHANGE UP (ref 0–1)
BUN SERPL-MCNC: 46 MG/DL — HIGH (ref 10–20)
CALCIUM SERPL-MCNC: 8.2 MG/DL — LOW (ref 8.4–10.4)
CHLORIDE SERPL-SCNC: 104 MMOL/L — SIGNIFICANT CHANGE UP (ref 98–110)
CO2 SERPL-SCNC: 31 MMOL/L — SIGNIFICANT CHANGE UP (ref 17–32)
CREAT SERPL-MCNC: 1.6 MG/DL — HIGH (ref 0.7–1.5)
EGFR: 32 ML/MIN/1.73M2 — LOW
EOSINOPHIL # BLD AUTO: 0.02 K/UL — SIGNIFICANT CHANGE UP (ref 0–0.7)
EOSINOPHIL NFR BLD AUTO: 0.2 % — SIGNIFICANT CHANGE UP (ref 0–8)
GLUCOSE SERPL-MCNC: 94 MG/DL — SIGNIFICANT CHANGE UP (ref 70–99)
HCT VFR BLD CALC: 39.1 % — SIGNIFICANT CHANGE UP (ref 37–47)
HGB BLD-MCNC: 11.8 G/DL — LOW (ref 12–16)
IMM GRANULOCYTES NFR BLD AUTO: 0.7 % — HIGH (ref 0.1–0.3)
LYMPHOCYTES # BLD AUTO: 1.46 K/UL — SIGNIFICANT CHANGE UP (ref 1.2–3.4)
LYMPHOCYTES # BLD AUTO: 14.9 % — LOW (ref 20.5–51.1)
MAGNESIUM SERPL-MCNC: 1.9 MG/DL — SIGNIFICANT CHANGE UP (ref 1.8–2.4)
MCHC RBC-ENTMCNC: 26 PG — LOW (ref 27–31)
MCHC RBC-ENTMCNC: 30.2 G/DL — LOW (ref 32–37)
MCV RBC AUTO: 86.3 FL — SIGNIFICANT CHANGE UP (ref 81–99)
MONOCYTES # BLD AUTO: 0.86 K/UL — HIGH (ref 0.1–0.6)
MONOCYTES NFR BLD AUTO: 8.7 % — SIGNIFICANT CHANGE UP (ref 1.7–9.3)
NEUTROPHILS # BLD AUTO: 7.41 K/UL — HIGH (ref 1.4–6.5)
NEUTROPHILS NFR BLD AUTO: 75.4 % — HIGH (ref 42.2–75.2)
NRBC # BLD: 0 /100 WBCS — SIGNIFICANT CHANGE UP (ref 0–0)
PLATELET # BLD AUTO: 269 K/UL — SIGNIFICANT CHANGE UP (ref 130–400)
PMV BLD: 11.7 FL — HIGH (ref 7.4–10.4)
POTASSIUM SERPL-MCNC: 3.5 MMOL/L — SIGNIFICANT CHANGE UP (ref 3.5–5)
POTASSIUM SERPL-SCNC: 3.5 MMOL/L — SIGNIFICANT CHANGE UP (ref 3.5–5)
RBC # BLD: 4.53 M/UL — SIGNIFICANT CHANGE UP (ref 4.2–5.4)
RBC # FLD: 19.1 % — HIGH (ref 11.5–14.5)
SODIUM SERPL-SCNC: 146 MMOL/L — SIGNIFICANT CHANGE UP (ref 135–146)
WBC # BLD: 9.83 K/UL — SIGNIFICANT CHANGE UP (ref 4.8–10.8)
WBC # FLD AUTO: 9.83 K/UL — SIGNIFICANT CHANGE UP (ref 4.8–10.8)

## 2024-06-04 PROCEDURE — 99238 HOSP IP/OBS DSCHRG MGMT 30/<: CPT

## 2024-06-04 RX ORDER — ACETAMINOPHEN 500 MG
650 TABLET ORAL ONCE
Refills: 0 | Status: COMPLETED | OUTPATIENT
Start: 2024-06-04 | End: 2024-06-04

## 2024-06-04 RX ORDER — NYSTATIN CREAM 100000 [USP'U]/G
1 CREAM TOPICAL
Refills: 0 | Status: DISCONTINUED | OUTPATIENT
Start: 2024-06-04 | End: 2024-06-04

## 2024-06-04 RX ORDER — POTASSIUM CHLORIDE 20 MEQ
20 PACKET (EA) ORAL ONCE
Refills: 0 | Status: COMPLETED | OUTPATIENT
Start: 2024-06-04 | End: 2024-06-04

## 2024-06-04 RX ORDER — RIVAROXABAN 15 MG-20MG
1 KIT ORAL
Qty: 30 | Refills: 2
Start: 2024-06-04 | End: 2024-09-01

## 2024-06-04 RX ADMIN — Medication 175 MICROGRAM(S): at 05:27

## 2024-06-04 RX ADMIN — PANTOPRAZOLE SODIUM 40 MILLIGRAM(S): 20 TABLET, DELAYED RELEASE ORAL at 05:28

## 2024-06-04 RX ADMIN — Medication 325 MILLIGRAM(S): at 11:42

## 2024-06-04 RX ADMIN — Medication 100 MILLIGRAM(S): at 05:28

## 2024-06-04 RX ADMIN — NYSTATIN CREAM 1 APPLICATION(S): 100000 CREAM TOPICAL at 05:28

## 2024-06-04 RX ADMIN — Medication 20 MILLIGRAM(S): at 05:28

## 2024-06-04 RX ADMIN — Medication 40 MILLIGRAM(S): at 05:27

## 2024-06-04 RX ADMIN — AMIODARONE HYDROCHLORIDE 200 MILLIGRAM(S): 400 TABLET ORAL at 05:28

## 2024-06-04 RX ADMIN — Medication 20 MILLIEQUIVALENT(S): at 13:18

## 2024-06-04 RX ADMIN — POLYETHYLENE GLYCOL 3350 17 GRAM(S): 17 POWDER, FOR SOLUTION ORAL at 11:42

## 2024-06-04 RX ADMIN — Medication 650 MILLIGRAM(S): at 02:28

## 2024-06-04 NOTE — DISCHARGE NOTE PROVIDER - NSDCMRMEDTOKEN_GEN_ALL_CORE_FT
allopurinol 100 mg oral tablet: 1 cap(s) orally once a day (at bedtime)  amiodarone 200 mg oral tablet: 1 tab(s) orally once a day  atorvastatin 20 mg oral tablet: 1 tab(s) orally once a day  cabergoline 0.5 mg oral tablet: 0.5 tab(s) orally once a week , monday night  ferrous sulfate 325 mg (65 mg elemental iron) oral tablet: 1 tab(s) orally once a day  furosemide 20 mg oral tablet: 1 tab(s) orally once a day  levothyroxine 175 mcg (0.175 mg) oral tablet: 1 tab(s) orally once a day  Metoprolol Tartrate 100 mg oral tablet: 1 tab(s) orally every 12 hours  rivaroxaban 15 mg oral tablet: 1 tab(s) orally once a day (before a meal)  trospium 20 mg oral tablet: 1 tab(s) orally once a day

## 2024-06-04 NOTE — DISCHARGE NOTE PROVIDER - CARE PROVIDERS DIRECT ADDRESSES
,DirectAddress_Unknown,[email or web address]

## 2024-06-04 NOTE — PROGRESS NOTE ADULT - SUBJECTIVE AND OBJECTIVE BOX
INTERVAL HPI/OVERNIGHT EVENTS:    Patient s/p AVN ablation.   No events over night    MEDICATIONS  (STANDING):  allopurinol 100 milliGRAM(s) Oral at bedtime  aMIOdarone    Tablet 200 milliGRAM(s) Oral daily  atorvastatin 20 milliGRAM(s) Oral at bedtime  ferrous    sulfate 325 milliGRAM(s) Oral daily  furosemide    Tablet 20 milliGRAM(s) Oral daily  levothyroxine 175 MICROGram(s) Oral daily  magnesium sulfate  IVPB 1 Gram(s) IV Intermittent once  metoprolol tartrate 100 milliGRAM(s) Oral two times a day  nystatin Cream 1 Application(s) Topical two times a day  pantoprazole    Tablet 40 milliGRAM(s) Oral before breakfast  polyethylene glycol 3350 17 Gram(s) Oral daily  rivaroxaban 15 milliGRAM(s) Oral with dinner    MEDICATIONS  (PRN):      Allergies    No Known Allergies    Intolerances      Vital Signs Last 24 Hrs  T(C): 36.3 (04 Jun 2024 04:38), Max: 36.6 (03 Jun 2024 14:48)  T(F): 97.3 (04 Jun 2024 04:38), Max: 97.9 (03 Jun 2024 14:48)  HR: 89 (04 Jun 2024 04:38) (89 - 115)  BP: 157/99 (04 Jun 2024 04:38) (135/81 - 162/109)  BP(mean): 118 (03 Jun 2024 18:03) (98 - 137)  RR: 18 (04 Jun 2024 04:38) (16 - 22)  SpO2: 95% (03 Jun 2024 19:43) (95% - 97%)    Parameters below as of 03 Jun 2024 18:03  Patient On (Oxygen Delivery Method): room air        HEENT LAMONTE EOMI  Lung CTAB  Heart RRR normal S1 S2  abd +BS soft  groins No hematoma, no bleeding  Ext no C/C/E    LABS:                        11.8   9.83  )-----------( 269      ( 04 Jun 2024 05:36 )             39.1     06-04    146  |  104  |  46<H>  ----------------------------<  94  3.5   |  31  |  1.6<H>    Ca    8.2<L>      04 Jun 2024 05:36  Mg     1.9     06-04    TPro  5.4<L>  /  Alb  3.6  /  TBili  0.6  /  DBili  x   /  AST  20  /  ALT  24  /  AlkPhos  112  06-03      Urinalysis Basic - ( 04 Jun 2024 05:36 )    Color: x / Appearance: x / SG: x / pH: x  Gluc: 94 mg/dL / Ketone: x  / Bili: x / Urobili: x   Blood: x / Protein: x / Nitrite: x   Leuk Esterase: x / RBC: x / WBC x   Sq Epi: x / Non Sq Epi: x / Bacteria: x

## 2024-06-04 NOTE — PROGRESS NOTE ADULT - SUBJECTIVE AND OBJECTIVE BOX
NEPHROLOGY FOLLOW UP NOTE    pt seen and examined  s/p AVN ablation  no new complaints    PAST MEDICAL & SURGICAL HISTORY:  HTN (hypertension)      High cholesterol      Hypothyroid  s/p thyroid ca surgery      Afib  on xarelto      Sleep apnea      Osteoarthritis      CKD (chronic kidney disease) stage 3, GFR 30-59 ml/min      Pacemaker      H/O thyroidectomy      S/P rotator cuff surgery      Pacemaker      H/O total knee replacement        Allergies:  No Known Allergies    Home Medications Reviewed    SOCIAL HISTORY:  Denies ETOH,Smoking,   FAMILY HISTORY:  Family history of lung cancer (Mother)    Family history of abdominal aortic aneurysm (AAA) (Father)          REVIEW OF SYSTEMS:  All other review of systems is negative unless indicated above.    PHYSICAL EXAM:  Constitutional: NAD  HEENT: anicteric sclera, oropharynx clear, MMM  Neck: No JVD  Respiratory: b/l decreased BS  Cardiovascular: S1, S2, RRR + ppm  Gastrointestinal: BS+, soft, NT/ND  Extremities: No cyanosis or clubbing. No peripheral edema  Neurological: A/O x 3, no focal deficits  Psychiatric: Normal mood, normal affect  : No CVA tenderness. No joaquin.   Skin: No rashes    Hospital Medications:   MEDICATIONS  (STANDING):  allopurinol 100 milliGRAM(s) Oral at bedtime  aMIOdarone    Tablet 200 milliGRAM(s) Oral daily  atorvastatin 20 milliGRAM(s) Oral at bedtime  ferrous    sulfate 325 milliGRAM(s) Oral daily  furosemide    Tablet 20 milliGRAM(s) Oral daily  levothyroxine 175 MICROGram(s) Oral daily  magnesium sulfate  IVPB 1 Gram(s) IV Intermittent once  metoprolol tartrate 100 milliGRAM(s) Oral two times a day  nystatin Cream 1 Application(s) Topical two times a day  pantoprazole    Tablet 40 milliGRAM(s) Oral before breakfast  polyethylene glycol 3350 17 Gram(s) Oral daily  rivaroxaban 15 milliGRAM(s) Oral with dinner        VITALS:  T(F): 97.3 (06-04-24 @ 04:38), Max: 97.9 (06-03-24 @ 14:48)  HR: 89 (06-04-24 @ 04:38)  BP: 157/99 (06-04-24 @ 04:38)  RR: 18 (06-04-24 @ 04:38)  SpO2: 95% (06-03-24 @ 19:43)  Wt(kg): --    06-02 @ 07:01  -  06-03 @ 07:00  --------------------------------------------------------  IN: 561 mL / OUT: 400 mL / NET: 161 mL    06-03 @ 07:01  -  06-04 @ 07:00  --------------------------------------------------------  IN: 436 mL / OUT: 1025 mL / NET: -589 mL      Height (cm): 157.5 (06-03 @ 15:15)  Weight (kg): 89.4 (06-03 @ 15:15)  BMI (kg/m2): 36 (06-03 @ 15:15)  BSA (m2): 1.9 (06-03 @ 15:15)    LABS:  06-04    146  |  104  |  46<H>  ----------------------------<  94  3.5   |  31  |  1.6<H>    Ca    8.2<L>      04 Jun 2024 05:36  Mg     1.9     06-04    TPro  5.4<L>  /  Alb  3.6  /  TBili  0.6  /  DBili      /  AST  20  /  ALT  24  /  AlkPhos  112  06-03                          11.8   9.83  )-----------( 269      ( 04 Jun 2024 05:36 )             39.1       Urine Studies:  Urinalysis Basic - ( 04 Jun 2024 05:36 )    Color:  / Appearance:  / SG:  / pH:   Gluc: 94 mg/dL / Ketone:   / Bili:  / Urobili:    Blood:  / Protein:  / Nitrite:    Leuk Esterase:  / RBC:  / WBC    Sq Epi:  / Non Sq Epi:  / Bacteria:           RADIOLOGY & ADDITIONAL STUDIES:

## 2024-06-04 NOTE — DISCHARGE NOTE NURSING/CASE MANAGEMENT/SOCIAL WORK - PATIENT PORTAL LINK FT
You can access the FollowMyHealth Patient Portal offered by [facility] by registering at the following website: [email or web address]. By joining [employer]’s FollowMyHealth portal, you will also be able to view your health information using other applications (apps) compatible with our system.

## 2024-06-04 NOTE — DISCHARGE NOTE PROVIDER - NSDCCPCAREPLAN_GEN_ALL_CORE_FT
PRINCIPAL DISCHARGE DIAGNOSIS  Diagnosis: Atrial fibrillation with RVR  Assessment and Plan of Treatment: You presented with atrial fibrillation with rapid ventricular response. You received an AVnode ablation performed by the electrophysiology team. You will be discharged to continue your home medication, except that eliquis will be excahnged for xarelto. [facility].   - No heavy lifting x 1 week  - You may shower today  - No bath/swimming x 1 week  - follow up with EP in 1 month  -You have a medtronic device. It will be adjusted at your next visit.  Please follow up with your PCP as well  Atrial fibrillation is a type of irregular heartbeat (arrhythmia) where the heart quivers continuously in a chaotic pattern that makes the heart unable to pump blood normally. This can increase the risk for stroke, heart failure, and other heart-related conditions. Atrial fibrillation can be caused by a variety of conditions and may be temporary, intermittent, or permanent. Symptoms include feeling that your heart is beating rapidly or irregularly, chest discomfort, shortness of breath, or dizziness/lightheadedness that may be worse with exertion. Treatment is varied but may involve medication or electrical shock (cardioversion).  SEEK IMMEDIATE MEDICAL CARE IF YOU HAVE ANY OF THE FOLLOWING SYMPTOMS: chest pain, shortness of breath, abdominal pain, sweating, vomiting, blood in vomit/bowel movements/urine, dizziness/lightheadedness, weakness or numbness to face/arm/leg, trouble speaking or understanding, facial droop.        SECONDARY DISCHARGE DIAGNOSES  Diagnosis: Acute CHF  Assessment and Plan of Treatment:     Diagnosis: Thickening of wall of gallbladder  Assessment and Plan of Treatment:     Diagnosis: Pericardial effusion  Assessment and Plan of Treatment:     Diagnosis: Opacity of lung on imaging study  Assessment and Plan of Treatment: You received a CT chest taht showed new bilateral lung nodular opacities with a new spiculated opacity in   the left lung measuring 1.3 cm and the largest right lung nodular opacity measures 1 cm.  The findings are more likely be infectious or inflammatory and less likely malignant and to resolve. Please follow with your PCP to repeat the CT chest in sevreal weeks

## 2024-06-04 NOTE — PROGRESS NOTE ADULT - ASSESSMENT
CKD stage 3-4  b/l proteinaceous and hemorrhagic renal cysts (on outpt MRI kidneys)  hypokalemia from diuretics  chronic hypernatremia from diuretics   dyspnea / acute on chronic HFpEF  / b/l lung nodular opacities  increased small to moderate pericardial effusion  Afib / s/p PPM 4/15 s/p AVN ablation 6/3  anemia  FELIX  HTN  hypothyroidism / thyroid Ca / pituitary adenoma     plan:    cont lasix 20mg po qd  KCl 20meq po x 1  CKD counselling  f/u cardiology  outpt renal f/u

## 2024-06-04 NOTE — DISCHARGE NOTE PROVIDER - PROVIDER TOKENS
PROVIDER:[TOKEN:[[id number]],FOLLOWUP:[2 weeks]],PROVIDER:[TOKEN:[[id number]],FOLLOWUP:[1 month]]

## 2024-06-04 NOTE — DISCHARGE NOTE PROVIDER - NSDCFUSCHEDAPPT_GEN_ALL_CORE_FT
[facility]  UROGYN [address]  Scheduled Appointment: 07/12/2024    [facility]  CARDIOLOGY [address] [facility]  Scheduled Appointment: 08/16/2024     [facility]  UROGYN [address]  Scheduled Appointment: 07/12/2024    Patricio Mcleod  [facility]  ELECTROPH [address] [facility]  Scheduled Appointment: 07/19/2024    [facility]  CARDIOLOGY [address] [facility]  Scheduled Appointment: 08/16/2024

## 2024-06-04 NOTE — DISCHARGE NOTE PROVIDER - HOSPITAL COURSE
84 yo F PMHx chronic afib, hypothyroidism, recurrent pneumonia, hypothyroidism, pituitary adenoma presented for evaluation of recurring SOB. Upon ER arrival pt found to have several nodules. She additionally developed afib with rvr. She has had multiple admission for pneumonia and RVR in the past.    Chronic Atrial Fibrillation/Flutter with Rapid Ventricular Response:   -HR is not well controlled.   -increase metoprolol  -s/p failed DCCV in the past.   -has PPM  -c/w Eliquis  -s/p successful AV darlene ablation  -restarted on xarelto   -seen by EP - - No heavy lifting x 1 week  - Pt may shower today  - No bath/swimming x 1 week  - ok to discharge home today  - follow up with EP in 1 month  - Pt has a medtronic PPM.  Lower rate limit is currently set at 90. Device will be reprogrammed at pt's follow up appointment      SOB  -likely from residual inflammatory changes from pneumonia as well as afib rvr  - clinically no active PNA or CHF decompensation  - BNP lower than baseline  - CT chest showed Given worsening appearance on prior CT chest suspect spiculated mass post pneumonia changes, not necessarily malignancy. Pt and family aware of this, agreeable to follow up repeat CT chest in several weeks and if spiculated mass still present then will consider it malignancy and may consider work up.   - She had trace pleural effusion on CT chest, no JVD, or LE edema. BNP is lower than baseline making CHF decompensation unlikely.  - solumedrol initiated for presumed bronchospasm-changed to prednisone- last day 6/5, WBC increased, likely due to steroids  - weaned off o2.     Moderate pericardial effusion  - Had trivial pericardial effusion on prior echo.   -Repeat limited echo to assess true size.   - echo showed SMALL effusion  - EF now 44%, likely tachycardia induced-f/u EP      Iron Deficiency anemia:   -recently had venofer IV     Hypothyroidism  -History of Thyroid cancer s/p thyroidectomy.   -continue synthroid 175 mcg     Pituitary adenoma  -Cabergoline 1/2 tab of 0.5 mg Q weekly Monday    Medically cleared for DC.

## 2024-06-04 NOTE — PROGRESS NOTE ADULT - PROVIDER SPECIALTY LIST ADULT
Electrophysiology
Electrophysiology
Internal Medicine
Electrophysiology
Internal Medicine
Nephrology
Nephrology

## 2024-06-04 NOTE — DISCHARGE NOTE NURSING/CASE MANAGEMENT/SOCIAL WORK - NSDCVIVACCINE_GEN_ALL_CORE_FT
influenza, high-dose, quadrivalent; 20-Dec-2023 15:52; Shaunna Tucker (RN); [employer]; [id number] (Exp. Date: 20-Jun-2024); IntraMuscular; Deltoid Left.; 0.7 milliLiter(s); VIS (VIS Published: 06-Aug-2021, VIS Presented: 20-Dec-2023);

## 2024-06-04 NOTE — DISCHARGE NOTE PROVIDER - CARE PROVIDER_API CALL
Tad Savage  Internal Medicine  [address] [place] [address]  Phone: [phone]  Fax: [phone]  Follow Up Time: 2 weeks    Patricio Mcleod  Cardiac Electrophysiology  [address] [place] [address], [facility] [address]  [place] [address]  Phone: [phone]  Fax: [phone]  Follow Up Time: 1 month

## 2024-06-04 NOTE — PROGRESS NOTE ADULT - ASSESSMENT
A/P  Patient S/P AVN Ablation  - continue Xarelto  - No heavy lifting x 1 week  - Pt may shower today  - No bath/swimming x 1 week  - ok to discharge home today  - follow up with EP in 1 month  - Pt has a medtronic PPM.  [employer] rate limit is currenty set at 90.  May need to reprogram device prior to discharge   A/P  Patient S/P AVN Ablation  - continue Xarelto  - No heavy lifting x 1 week  - Pt may shower today  - No bath/swimming x 1 week  - ok to discharge home today  - follow up with EP in 1 month  - Pt has a medtronic PPM.  Lower rate limit is currently set at 90. Device will be reprogrammed at pt's follow up appointment

## 2024-06-12 DIAGNOSIS — E87.0 HYPEROSMOLALITY AND HYPERNATREMIA: ICD-10-CM

## 2024-06-12 DIAGNOSIS — G47.33 OBSTRUCTIVE SLEEP APNEA (ADULT) (PEDIATRIC): ICD-10-CM

## 2024-06-12 DIAGNOSIS — M19.90 UNSPECIFIED OSTEOARTHRITIS, UNSPECIFIED SITE: ICD-10-CM

## 2024-06-12 DIAGNOSIS — Z85.850 PERSONAL HISTORY OF MALIGNANT NEOPLASM OF THYROID: ICD-10-CM

## 2024-06-12 DIAGNOSIS — I31.39 OTHER PERICARDIAL EFFUSION (NONINFLAMMATORY): ICD-10-CM

## 2024-06-12 DIAGNOSIS — Z79.01 LONG TERM (CURRENT) USE OF ANTICOAGULANTS: ICD-10-CM

## 2024-06-12 DIAGNOSIS — E78.5 HYPERLIPIDEMIA, UNSPECIFIED: ICD-10-CM

## 2024-06-12 DIAGNOSIS — I50.22 CHRONIC SYSTOLIC (CONGESTIVE) HEART FAILURE: ICD-10-CM

## 2024-06-12 DIAGNOSIS — E89.0 POSTPROCEDURAL HYPOTHYROIDISM: ICD-10-CM

## 2024-06-12 DIAGNOSIS — E87.6 HYPOKALEMIA: ICD-10-CM

## 2024-06-12 DIAGNOSIS — J98.01 ACUTE BRONCHOSPASM: ICD-10-CM
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
## 2024-06-12 DIAGNOSIS — I48.11 LONGSTANDING PERSISTENT ATRIAL FIBRILLATION: ICD-10-CM

## 2024-06-12 DIAGNOSIS — Z99.89 DEPENDENCE ON OTHER ENABLING MACHINES AND DEVICES: ICD-10-CM

## 2024-06-12 DIAGNOSIS — T50.2X5A ADVERSE EFFECT OF CARBONIC-ANHYDRASE INHIBITORS, BENZOTHIADIAZIDES AND OTHER DIURETICS, INITIAL ENCOUNTER: ICD-10-CM

## 2024-06-12 DIAGNOSIS — D35.2 BENIGN NEOPLASM OF PITUITARY GLAND: ICD-10-CM

## 2024-06-12 DIAGNOSIS — I13.0 HYPERTENSIVE HEART AND CHRONIC KIDNEY DISEASE WITH HEART FAILURE AND STAGE 1 THROUGH STAGE 4 CHRONIC KIDNEY DISEASE, OR UNSPECIFIED CHRONIC KIDNEY DISEASE: ICD-10-CM

## 2024-06-12 DIAGNOSIS — Z95.0 PRESENCE OF CARDIAC PACEMAKER: ICD-10-CM

## 2024-06-12 DIAGNOSIS — N18.30 CHRONIC KIDNEY DISEASE, STAGE 3 UNSPECIFIED: ICD-10-CM

## 2024-06-12 DIAGNOSIS — D50.9 IRON DEFICIENCY ANEMIA, UNSPECIFIED: ICD-10-CM

## 2024-07-12 ENCOUNTER — APPOINTMENT (OUTPATIENT)
Dept: UROGYNECOLOGY | Facility: CLINIC | Age: 84
End: 2024-07-12
Payer: MEDICARE

## 2024-07-12 ENCOUNTER — EMERGENCY (EMERGENCY)
Facility: HOSPITAL | Age: 84
LOS: 0 days | Discharge: HOME | End: 2024-07-12
Attending: EMERGENCY MEDICINE
Payer: MEDICARE

## 2024-07-12 ENCOUNTER — APPOINTMENT (OUTPATIENT)
Dept: UROGYNECOLOGY | Facility: CLINIC | Age: 84
End: 2024-07-12

## 2024-07-12 VITALS
HEART RATE: 89 BPM | TEMPERATURE: 98 F | WEIGHT: 186.95 LBS | HEIGHT: 62 IN | RESPIRATION RATE: 16 BRPM | SYSTOLIC BLOOD PRESSURE: 111 MMHG | DIASTOLIC BLOOD PRESSURE: 73 MMHG | OXYGEN SATURATION: 97 %

## 2024-07-12 DIAGNOSIS — N18.9 CHRONIC KIDNEY DISEASE, UNSPECIFIED: ICD-10-CM

## 2024-07-12 DIAGNOSIS — E78.5 HYPERLIPIDEMIA, UNSPECIFIED: ICD-10-CM

## 2024-07-12 DIAGNOSIS — I48.91 UNSPECIFIED ATRIAL FIBRILLATION: ICD-10-CM

## 2024-07-12 DIAGNOSIS — S30.0XXA CONTUSION OF LOWER BACK AND PELVIS, INITIAL ENCOUNTER: ICD-10-CM

## 2024-07-12 DIAGNOSIS — I12.9 HYPERTENSIVE CHRONIC KIDNEY DISEASE WITH STAGE 1 THROUGH STAGE 4 CHRONIC KIDNEY DISEASE, OR UNSPECIFIED CHRONIC KIDNEY DISEASE: ICD-10-CM

## 2024-07-12 DIAGNOSIS — D64.9 ANEMIA, UNSPECIFIED: ICD-10-CM

## 2024-07-12 DIAGNOSIS — M25.472 EFFUSION, LEFT ANKLE: ICD-10-CM

## 2024-07-12 DIAGNOSIS — S70.12XA CONTUSION OF LEFT THIGH, INITIAL ENCOUNTER: ICD-10-CM

## 2024-07-12 DIAGNOSIS — E03.9 HYPOTHYROIDISM, UNSPECIFIED: ICD-10-CM

## 2024-07-12 DIAGNOSIS — M25.552 PAIN IN LEFT HIP: ICD-10-CM

## 2024-07-12 DIAGNOSIS — Z98.890 OTHER SPECIFIED POSTPROCEDURAL STATES: Chronic | ICD-10-CM

## 2024-07-12 DIAGNOSIS — Z96.659 PRESENCE OF UNSPECIFIED ARTIFICIAL KNEE JOINT: Chronic | ICD-10-CM
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
## 2024-07-12 DIAGNOSIS — G47.33 OBSTRUCTIVE SLEEP APNEA (ADULT) (PEDIATRIC): ICD-10-CM
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
## 2024-07-12 DIAGNOSIS — Z95.0 PRESENCE OF CARDIAC PACEMAKER: Chronic | ICD-10-CM

## 2024-07-12 DIAGNOSIS — S70.02XA CONTUSION OF LEFT HIP, INITIAL ENCOUNTER: ICD-10-CM

## 2024-07-12 DIAGNOSIS — Z95.0 PRESENCE OF CARDIAC PACEMAKER: ICD-10-CM

## 2024-07-12 DIAGNOSIS — N39.46 MIXED INCONTINENCE: ICD-10-CM

## 2024-07-12 DIAGNOSIS — Z79.01 LONG TERM (CURRENT) USE OF ANTICOAGULANTS: ICD-10-CM

## 2024-07-12 LAB
ALBUMIN SERPL ELPH-MCNC: 3.7 G/DL — SIGNIFICANT CHANGE UP (ref 3.5–5.2)
ALP SERPL-CCNC: 107 U/L — SIGNIFICANT CHANGE UP (ref 30–115)
ALT FLD-CCNC: 10 U/L — SIGNIFICANT CHANGE UP (ref 0–41)
ANION GAP SERPL CALC-SCNC: 13 MMOL/L — SIGNIFICANT CHANGE UP (ref 7–14)
APPEARANCE UR: CLEAR — SIGNIFICANT CHANGE UP
APTT BLD: 41 SEC — HIGH (ref 27–39.2)
AST SERPL-CCNC: 17 U/L — SIGNIFICANT CHANGE UP (ref 0–41)
BACTERIA # UR AUTO: ABNORMAL /HPF
BASOPHILS # BLD AUTO: 0.05 K/UL — SIGNIFICANT CHANGE UP (ref 0–0.2)
BASOPHILS NFR BLD AUTO: 0.5 % — SIGNIFICANT CHANGE UP (ref 0–1)
BILIRUB SERPL-MCNC: 2.2 MG/DL — HIGH (ref 0.2–1.2)
BILIRUB UR-MCNC: NEGATIVE — SIGNIFICANT CHANGE UP
BUN SERPL-MCNC: 25 MG/DL — HIGH (ref 10–20)
CALCIUM SERPL-MCNC: 8.8 MG/DL — SIGNIFICANT CHANGE UP (ref 8.4–10.4)
CAST: 0 /LPF — SIGNIFICANT CHANGE UP (ref 0–4)
CHLORIDE SERPL-SCNC: 105 MMOL/L — SIGNIFICANT CHANGE UP (ref 98–110)
CO2 SERPL-SCNC: 26 MMOL/L — SIGNIFICANT CHANGE UP (ref 17–32)
COLOR SPEC: YELLOW — SIGNIFICANT CHANGE UP
CREAT SERPL-MCNC: 1.4 MG/DL — SIGNIFICANT CHANGE UP (ref 0.7–1.5)
DIFF PNL FLD: NEGATIVE — SIGNIFICANT CHANGE UP
EGFR: 38 ML/MIN/1.73M2 — LOW
EOSINOPHIL # BLD AUTO: 0.07 K/UL — SIGNIFICANT CHANGE UP (ref 0–0.7)
EOSINOPHIL NFR BLD AUTO: 0.8 % — SIGNIFICANT CHANGE UP (ref 0–8)
GLUCOSE SERPL-MCNC: 105 MG/DL — HIGH (ref 70–99)
GLUCOSE UR QL: NEGATIVE MG/DL — SIGNIFICANT CHANGE UP
HCT VFR BLD CALC: 34.1 % — LOW (ref 37–47)
HGB BLD-MCNC: 10.3 G/DL — LOW (ref 12–16)
IMM GRANULOCYTES NFR BLD AUTO: 1.1 % — HIGH (ref 0.1–0.3)
INR BLD: 1.59 RATIO — HIGH (ref 0.65–1.3)
KETONES UR-MCNC: NEGATIVE MG/DL — SIGNIFICANT CHANGE UP
LACTATE SERPL-SCNC: 0.8 MMOL/L — SIGNIFICANT CHANGE UP (ref 0.7–2)
LEUKOCYTE ESTERASE UR-ACNC: ABNORMAL
LIDOCAIN IGE QN: 23 U/L — SIGNIFICANT CHANGE UP (ref 7–60)
LYMPHOCYTES # BLD AUTO: 1.62 K/UL — SIGNIFICANT CHANGE UP (ref 1.2–3.4)
LYMPHOCYTES # BLD AUTO: 17.4 % — LOW (ref 20.5–51.1)
MCHC RBC-ENTMCNC: 26.3 PG — LOW (ref 27–31)
MCHC RBC-ENTMCNC: 30.2 G/DL — LOW (ref 32–37)
MCV RBC AUTO: 87.2 FL — SIGNIFICANT CHANGE UP (ref 81–99)
MONOCYTES # BLD AUTO: 0.89 K/UL — HIGH (ref 0.1–0.6)
MONOCYTES NFR BLD AUTO: 9.6 % — HIGH (ref 1.7–9.3)
NEUTROPHILS # BLD AUTO: 6.58 K/UL — HIGH (ref 1.4–6.5)
NEUTROPHILS NFR BLD AUTO: 70.6 % — SIGNIFICANT CHANGE UP (ref 42.2–75.2)
NITRITE UR-MCNC: POSITIVE
NRBC # BLD: 0 /100 WBCS — SIGNIFICANT CHANGE UP (ref 0–0)
PH UR: 7.5 — SIGNIFICANT CHANGE UP (ref 5–8)
PLATELET # BLD AUTO: 318 K/UL — SIGNIFICANT CHANGE UP (ref 130–400)
PMV BLD: 10.7 FL — HIGH (ref 7.4–10.4)
POTASSIUM SERPL-MCNC: 3.9 MMOL/L — SIGNIFICANT CHANGE UP (ref 3.5–5)
POTASSIUM SERPL-SCNC: 3.9 MMOL/L — SIGNIFICANT CHANGE UP (ref 3.5–5)
PROT SERPL-MCNC: 5.1 G/DL — LOW (ref 6–8)
PROT UR-MCNC: NEGATIVE MG/DL — SIGNIFICANT CHANGE UP
PROTHROM AB SERPL-ACNC: 18.2 SEC — HIGH (ref 9.95–12.87)
RBC # BLD: 3.91 M/UL — LOW (ref 4.2–5.4)
RBC # FLD: 19.2 % — HIGH (ref 11.5–14.5)
RBC CASTS # UR COMP ASSIST: 0 /HPF — SIGNIFICANT CHANGE UP (ref 0–4)
SODIUM SERPL-SCNC: 144 MMOL/L — SIGNIFICANT CHANGE UP (ref 135–146)
SP GR SPEC: 1.02 — SIGNIFICANT CHANGE UP (ref 1–1.03)
SQUAMOUS # UR AUTO: 1 /HPF — SIGNIFICANT CHANGE UP (ref 0–5)
UROBILINOGEN FLD QL: 1 MG/DL — SIGNIFICANT CHANGE UP (ref 0.2–1)
WBC # BLD: 9.31 K/UL — SIGNIFICANT CHANGE UP (ref 4.8–10.8)
WBC # FLD AUTO: 9.31 K/UL — SIGNIFICANT CHANGE UP (ref 4.8–10.8)
WBC UR QL: 6 /HPF — HIGH (ref 0–5)

## 2024-07-12 PROCEDURE — 74177 CT ABD & PELVIS W/CONTRAST: CPT | Mod: 26,MC

## 2024-07-12 PROCEDURE — 99284 EMERGENCY DEPT VISIT MOD MDM: CPT | Mod: 25

## 2024-07-12 PROCEDURE — 82962 GLUCOSE BLOOD TEST: CPT

## 2024-07-12 PROCEDURE — 85610 PROTHROMBIN TIME: CPT

## 2024-07-12 PROCEDURE — 81001 URINALYSIS AUTO W/SCOPE: CPT

## 2024-07-12 PROCEDURE — 36000 PLACE NEEDLE IN VEIN: CPT | Mod: XU

## 2024-07-12 PROCEDURE — 74177 CT ABD & PELVIS W/CONTRAST: CPT | Mod: MC

## 2024-07-12 PROCEDURE — 85025 COMPLETE CBC W/AUTO DIFF WBC: CPT

## 2024-07-12 PROCEDURE — 72125 CT NECK SPINE W/O DYE: CPT | Mod: MC

## 2024-07-12 PROCEDURE — 99443: CPT | Mod: 93

## 2024-07-12 PROCEDURE — 73562 X-RAY EXAM OF KNEE 3: CPT | Mod: LT

## 2024-07-12 PROCEDURE — 70450 CT HEAD/BRAIN W/O DYE: CPT | Mod: MC

## 2024-07-12 PROCEDURE — 99285 EMERGENCY DEPT VISIT HI MDM: CPT

## 2024-07-12 PROCEDURE — 73552 X-RAY EXAM OF FEMUR 2/>: CPT | Mod: LT

## 2024-07-12 PROCEDURE — 83605 ASSAY OF LACTIC ACID: CPT

## 2024-07-12 PROCEDURE — 70450 CT HEAD/BRAIN W/O DYE: CPT | Mod: 26,MC

## 2024-07-12 PROCEDURE — 72125 CT NECK SPINE W/O DYE: CPT | Mod: 26,MC

## 2024-07-12 PROCEDURE — 83690 ASSAY OF LIPASE: CPT

## 2024-07-12 PROCEDURE — 71260 CT THORAX DX C+: CPT | Mod: 26,MC

## 2024-07-12 PROCEDURE — 73552 X-RAY EXAM OF FEMUR 2/>: CPT | Mod: 26,LT

## 2024-07-12 PROCEDURE — 85730 THROMBOPLASTIN TIME PARTIAL: CPT

## 2024-07-12 PROCEDURE — 73562 X-RAY EXAM OF KNEE 3: CPT | Mod: 26,LT

## 2024-07-12 PROCEDURE — 80053 COMPREHEN METABOLIC PANEL: CPT

## 2024-07-12 PROCEDURE — 73590 X-RAY EXAM OF LOWER LEG: CPT | Mod: LT

## 2024-07-12 PROCEDURE — 71260 CT THORAX DX C+: CPT | Mod: MC

## 2024-07-12 PROCEDURE — 36415 COLL VENOUS BLD VENIPUNCTURE: CPT

## 2024-07-12 PROCEDURE — 73590 X-RAY EXAM OF LOWER LEG: CPT | Mod: 26,LT

## 2024-07-12 RX ORDER — SODIUM CHLORIDE 0.9 % (FLUSH) 0.9 %
250 SYRINGE (ML) INJECTION ONCE
Refills: 0 | Status: COMPLETED | OUTPATIENT
Start: 2024-07-12 | End: 2024-07-12

## 2024-07-12 RX ORDER — ACETAMINOPHEN 325 MG
650 TABLET ORAL ONCE
Refills: 0 | Status: COMPLETED | OUTPATIENT
Start: 2024-07-12 | End: 2024-07-12

## 2024-07-12 RX ADMIN — Medication 250 MILLILITER(S): at 15:59

## 2024-07-12 RX ADMIN — Medication 650 MILLIGRAM(S): at 15:59

## 2024-07-19 ENCOUNTER — APPOINTMENT (OUTPATIENT)
Dept: ELECTROPHYSIOLOGY | Facility: CLINIC | Age: 84
End: 2024-07-19

## 2024-07-19 VITALS
HEART RATE: 89 BPM | TEMPERATURE: 97.6 F | DIASTOLIC BLOOD PRESSURE: 71 MMHG | RESPIRATION RATE: 17 BRPM | WEIGHT: 187 LBS | SYSTOLIC BLOOD PRESSURE: 115 MMHG | HEIGHT: 62 IN | BODY MASS INDEX: 34.41 KG/M2

## 2024-07-19 DIAGNOSIS — I48.91 UNSPECIFIED ATRIAL FIBRILLATION: ICD-10-CM

## 2024-07-19 DIAGNOSIS — R06.02 SHORTNESS OF BREATH: ICD-10-CM

## 2024-07-19 PROCEDURE — G2211 COMPLEX E/M VISIT ADD ON: CPT

## 2024-07-19 PROCEDURE — 99214 OFFICE O/P EST MOD 30 MIN: CPT

## 2024-07-19 PROCEDURE — 93000 ELECTROCARDIOGRAM COMPLETE: CPT

## 2024-07-19 RX ORDER — RIVAROXABAN 15 MG/1
15 TABLET, FILM COATED ORAL
Refills: 0 | Status: ACTIVE | COMMUNITY

## 2024-07-29 PROBLEM — I44.2 COMPLETE ATRIOVENTRICULAR BLOCK: Status: ACTIVE | Noted: 2024-07-29

## 2024-07-29 PROBLEM — R06.02 SOB (SHORTNESS OF BREATH): Status: ACTIVE | Noted: 2024-07-29

## 2024-08-15 ENCOUNTER — NON-APPOINTMENT (OUTPATIENT)
Age: 84
End: 2024-08-15

## 2024-08-15 PROCEDURE — 93294 REM INTERROG EVL PM/LDLS PM: CPT

## 2024-08-15 PROCEDURE — 93296 REM INTERROG EVL PM/IDS: CPT

## 2024-08-16 ENCOUNTER — APPOINTMENT (OUTPATIENT)
Dept: CARDIOLOGY | Facility: CLINIC | Age: 84
End: 2024-08-16

## 2024-09-12 NOTE — ED ADULT NURSE NOTE - NS ED NURSE REPORT GIVEN TO FT
Pt with no wt gain since last RDN visit (~28 days ago). Mom reports pt continues with excessive spit-ups; feeds up to 72mL q3h + 1mL MCT oil daily. Discussed incorporation of Gelmix with feeds to help alleviate reflux with SLP. Not sure if this is something that can be ordered through DME; thoughts? SANNA Morgan

## 2024-10-27 NOTE — PATIENT PROFILE ADULT - NSPROGENPREVTRANSF_GEN_A_NUR
[address] [facility] [address]  [address] [place] [id number]  Dept: [phone]  Dept Fax: [phone]  Loc: [phone]    
Patient requests all Lab, Cardiology, and Radiology Results on their Discharge Instructions
no

## 2024-11-17 NOTE — ED ADULT TRIAGE NOTE - NS ED NURSE BANDS TYPE
AZUL spoke with Anabel from [facility] ([phone]) to get an update on beds. Confirms that there are still no beds available.    Name band;

## 2024-11-22 ENCOUNTER — APPOINTMENT (OUTPATIENT)
Dept: ELECTROPHYSIOLOGY | Facility: CLINIC | Age: 84
End: 2024-11-22

## 2024-11-22 ENCOUNTER — NON-APPOINTMENT (OUTPATIENT)
Age: 84
End: 2024-11-22

## 2024-11-22 VITALS
SYSTOLIC BLOOD PRESSURE: 136 MMHG | HEART RATE: 79 BPM | BODY MASS INDEX: 33.99 KG/M2 | WEIGHT: 180 LBS | TEMPERATURE: 97.1 F | HEIGHT: 61 IN | DIASTOLIC BLOOD PRESSURE: 78 MMHG

## 2024-11-22 DIAGNOSIS — I44.2 ATRIOVENTRICULAR BLOCK, COMPLETE: ICD-10-CM

## 2024-11-22 DIAGNOSIS — I48.91 UNSPECIFIED ATRIAL FIBRILLATION: ICD-10-CM

## 2024-11-22 PROCEDURE — 99213 OFFICE O/P EST LOW 20 MIN: CPT

## 2024-11-22 PROCEDURE — 93280 PM DEVICE PROGR EVAL DUAL: CPT

## 2024-11-22 RX ORDER — LEVOTHYROXINE SODIUM 0.17 MG/1
175 TABLET ORAL DAILY
Refills: 0 | Status: ACTIVE | COMMUNITY

## 2024-11-29 ENCOUNTER — INPATIENT (INPATIENT)
Facility: HOSPITAL | Age: 84
LOS: 4 days | Discharge: NURSING, REHAB OR LONG-TERM CARE | DRG: 605 | End: 2024-12-04
Attending: SURGERY | Admitting: SURGERY
Payer: MEDICARE

## 2024-11-29 VITALS
RESPIRATION RATE: 18 BRPM | HEART RATE: 67 BPM | OXYGEN SATURATION: 97 % | DIASTOLIC BLOOD PRESSURE: 49 MMHG | SYSTOLIC BLOOD PRESSURE: 75 MMHG | HEIGHT: 62 IN | TEMPERATURE: 98 F | WEIGHT: 179.9 LBS

## 2024-11-29 DIAGNOSIS — Z95.0 PRESENCE OF CARDIAC PACEMAKER: Chronic | ICD-10-CM

## 2024-11-29 DIAGNOSIS — Z98.890 OTHER SPECIFIED POSTPROCEDURAL STATES: Chronic | ICD-10-CM

## 2024-11-29 DIAGNOSIS — Z96.659 PRESENCE OF UNSPECIFIED ARTIFICIAL KNEE JOINT: Chronic | ICD-10-CM

## 2024-11-29 DIAGNOSIS — S70.10XA CONTUSION OF UNSPECIFIED THIGH, INITIAL ENCOUNTER: ICD-10-CM

## 2024-11-29 LAB
ALBUMIN SERPL ELPH-MCNC: 3.3 G/DL — LOW (ref 3.5–5.2)
ALP SERPL-CCNC: 94 U/L — SIGNIFICANT CHANGE UP (ref 30–115)
ALT FLD-CCNC: 6 U/L — SIGNIFICANT CHANGE UP (ref 0–41)
ANION GAP SERPL CALC-SCNC: 11 MMOL/L — SIGNIFICANT CHANGE UP (ref 7–14)
ANION GAP SERPL CALC-SCNC: 12 MMOL/L — SIGNIFICANT CHANGE UP (ref 7–14)
APTT BLD: 39.1 SEC — SIGNIFICANT CHANGE UP (ref 27–39.2)
APTT BLD: 39.9 SEC — HIGH (ref 27–39.2)
AST SERPL-CCNC: 13 U/L — SIGNIFICANT CHANGE UP (ref 0–41)
BASOPHILS # BLD AUTO: 0.03 K/UL — SIGNIFICANT CHANGE UP (ref 0–0.2)
BASOPHILS # BLD AUTO: 0.04 K/UL — SIGNIFICANT CHANGE UP (ref 0–0.2)
BASOPHILS # BLD AUTO: 0.06 K/UL — SIGNIFICANT CHANGE UP (ref 0–0.2)
BASOPHILS NFR BLD AUTO: 0.3 % — SIGNIFICANT CHANGE UP (ref 0–1)
BASOPHILS NFR BLD AUTO: 0.3 % — SIGNIFICANT CHANGE UP (ref 0–1)
BASOPHILS NFR BLD AUTO: 0.6 % — SIGNIFICANT CHANGE UP (ref 0–1)
BILIRUB SERPL-MCNC: 0.9 MG/DL — SIGNIFICANT CHANGE UP (ref 0.2–1.2)
BLD GP AB SCN SERPL QL: SIGNIFICANT CHANGE UP
BUN SERPL-MCNC: 33 MG/DL — HIGH (ref 10–20)
BUN SERPL-MCNC: 34 MG/DL — HIGH (ref 10–20)
CALCIUM SERPL-MCNC: 7.8 MG/DL — LOW (ref 8.4–10.4)
CALCIUM SERPL-MCNC: 8.2 MG/DL — LOW (ref 8.4–10.5)
CHLORIDE SERPL-SCNC: 103 MMOL/L — SIGNIFICANT CHANGE UP (ref 98–110)
CHLORIDE SERPL-SCNC: 105 MMOL/L — SIGNIFICANT CHANGE UP (ref 98–110)
CO2 SERPL-SCNC: 24 MMOL/L — SIGNIFICANT CHANGE UP (ref 17–32)
CO2 SERPL-SCNC: 26 MMOL/L — SIGNIFICANT CHANGE UP (ref 17–32)
CREAT SERPL-MCNC: 1.5 MG/DL — SIGNIFICANT CHANGE UP (ref 0.7–1.5)
CREAT SERPL-MCNC: 1.6 MG/DL — HIGH (ref 0.7–1.5)
EGFR: 32 ML/MIN/1.73M2 — LOW
EGFR: 35 ML/MIN/1.73M2 — LOW
EOSINOPHIL # BLD AUTO: 0.05 K/UL — SIGNIFICANT CHANGE UP (ref 0–0.7)
EOSINOPHIL # BLD AUTO: 0.06 K/UL — SIGNIFICANT CHANGE UP (ref 0–0.7)
EOSINOPHIL # BLD AUTO: 0.12 K/UL — SIGNIFICANT CHANGE UP (ref 0–0.7)
EOSINOPHIL NFR BLD AUTO: 0.4 % — SIGNIFICANT CHANGE UP (ref 0–8)
EOSINOPHIL NFR BLD AUTO: 0.5 % — SIGNIFICANT CHANGE UP (ref 0–8)
EOSINOPHIL NFR BLD AUTO: 1.2 % — SIGNIFICANT CHANGE UP (ref 0–8)
GLUCOSE SERPL-MCNC: 114 MG/DL — HIGH (ref 70–99)
GLUCOSE SERPL-MCNC: 143 MG/DL — HIGH (ref 70–99)
HCT VFR BLD CALC: 25.5 % — LOW (ref 37–47)
HCT VFR BLD CALC: 26.2 % — LOW (ref 37–47)
HCT VFR BLD CALC: 29.1 % — LOW (ref 37–47)
HGB BLD-MCNC: 7.8 G/DL — LOW (ref 12–16)
HGB BLD-MCNC: 7.9 G/DL — LOW (ref 12–16)
HGB BLD-MCNC: 8.8 G/DL — LOW (ref 12–16)
IMM GRANULOCYTES NFR BLD AUTO: 0.7 % — HIGH (ref 0.1–0.3)
IMM GRANULOCYTES NFR BLD AUTO: 0.9 % — HIGH (ref 0.1–0.3)
IMM GRANULOCYTES NFR BLD AUTO: 1.1 % — HIGH (ref 0.1–0.3)
INR BLD: 2.01 RATIO — HIGH (ref 0.65–1.3)
INR BLD: 2.67 RATIO — HIGH (ref 0.65–1.3)
LYMPHOCYTES # BLD AUTO: 1.73 K/UL — SIGNIFICANT CHANGE UP (ref 1.2–3.4)
LYMPHOCYTES # BLD AUTO: 1.88 K/UL — SIGNIFICANT CHANGE UP (ref 1.2–3.4)
LYMPHOCYTES # BLD AUTO: 13.2 % — LOW (ref 20.5–51.1)
LYMPHOCYTES # BLD AUTO: 17.5 % — LOW (ref 20.5–51.1)
LYMPHOCYTES # BLD AUTO: 18.4 % — LOW (ref 20.5–51.1)
LYMPHOCYTES # BLD AUTO: 2.03 K/UL — SIGNIFICANT CHANGE UP (ref 1.2–3.4)
MAGNESIUM SERPL-MCNC: 1.7 MG/DL — LOW (ref 1.8–2.4)
MCHC RBC-ENTMCNC: 25.2 PG — LOW (ref 27–31)
MCHC RBC-ENTMCNC: 25.2 PG — LOW (ref 27–31)
MCHC RBC-ENTMCNC: 25.4 PG — LOW (ref 27–31)
MCHC RBC-ENTMCNC: 30.2 G/DL — LOW (ref 32–37)
MCHC RBC-ENTMCNC: 30.2 G/DL — LOW (ref 32–37)
MCHC RBC-ENTMCNC: 30.6 G/DL — LOW (ref 32–37)
MCV RBC AUTO: 82.5 FL — SIGNIFICANT CHANGE UP (ref 81–99)
MCV RBC AUTO: 83.7 FL — SIGNIFICANT CHANGE UP (ref 81–99)
MCV RBC AUTO: 83.9 FL — SIGNIFICANT CHANGE UP (ref 81–99)
MONOCYTES # BLD AUTO: 0.97 K/UL — HIGH (ref 0.1–0.6)
MONOCYTES # BLD AUTO: 1.21 K/UL — HIGH (ref 0.1–0.6)
MONOCYTES # BLD AUTO: 1.28 K/UL — HIGH (ref 0.1–0.6)
MONOCYTES NFR BLD AUTO: 10.4 % — HIGH (ref 1.7–9.3)
MONOCYTES NFR BLD AUTO: 9.5 % — HIGH (ref 1.7–9.3)
MONOCYTES NFR BLD AUTO: 9.8 % — HIGH (ref 1.7–9.3)
NEUTROPHILS # BLD AUTO: 7.13 K/UL — HIGH (ref 1.4–6.5)
NEUTROPHILS # BLD AUTO: 8.16 K/UL — HIGH (ref 1.4–6.5)
NEUTROPHILS # BLD AUTO: 9.82 K/UL — HIGH (ref 1.4–6.5)
NEUTROPHILS NFR BLD AUTO: 69.6 % — SIGNIFICANT CHANGE UP (ref 42.2–75.2)
NEUTROPHILS NFR BLD AUTO: 70.4 % — SIGNIFICANT CHANGE UP (ref 42.2–75.2)
NEUTROPHILS NFR BLD AUTO: 75.2 % — SIGNIFICANT CHANGE UP (ref 42.2–75.2)
NRBC # BLD: 0 /100 WBCS — SIGNIFICANT CHANGE UP (ref 0–0)
PHOSPHATE SERPL-MCNC: 4 MG/DL — SIGNIFICANT CHANGE UP (ref 2.1–4.9)
PLATELET # BLD AUTO: 257 K/UL — SIGNIFICANT CHANGE UP (ref 130–400)
PLATELET # BLD AUTO: 268 K/UL — SIGNIFICANT CHANGE UP (ref 130–400)
PLATELET # BLD AUTO: 305 K/UL — SIGNIFICANT CHANGE UP (ref 130–400)
PMV BLD: 10.6 FL — HIGH (ref 7.4–10.4)
PMV BLD: 10.8 FL — HIGH (ref 7.4–10.4)
PMV BLD: 11.7 FL — HIGH (ref 7.4–10.4)
POTASSIUM SERPL-MCNC: 3.7 MMOL/L — SIGNIFICANT CHANGE UP (ref 3.5–5)
POTASSIUM SERPL-MCNC: 4.2 MMOL/L — SIGNIFICANT CHANGE UP (ref 3.5–5)
POTASSIUM SERPL-SCNC: 3.7 MMOL/L — SIGNIFICANT CHANGE UP (ref 3.5–5)
POTASSIUM SERPL-SCNC: 4.2 MMOL/L — SIGNIFICANT CHANGE UP (ref 3.5–5)
PROT SERPL-MCNC: 5.1 G/DL — LOW (ref 6–8)
PROTHROM AB SERPL-ACNC: 24 SEC — HIGH (ref 9.95–12.87)
PROTHROM AB SERPL-ACNC: 32.2 SEC — HIGH (ref 9.95–12.87)
RBC # BLD: 3.09 M/UL — LOW (ref 4.2–5.4)
RBC # BLD: 3.13 M/UL — LOW (ref 4.2–5.4)
RBC # BLD: 3.47 M/UL — LOW (ref 4.2–5.4)
RBC # FLD: 18.4 % — HIGH (ref 11.5–14.5)
RBC # FLD: 18.4 % — HIGH (ref 11.5–14.5)
RBC # FLD: 18.6 % — HIGH (ref 11.5–14.5)
SODIUM SERPL-SCNC: 140 MMOL/L — SIGNIFICANT CHANGE UP (ref 135–146)
SODIUM SERPL-SCNC: 141 MMOL/L — SIGNIFICANT CHANGE UP (ref 135–146)
WBC # BLD: 10.23 K/UL — SIGNIFICANT CHANGE UP (ref 4.8–10.8)
WBC # BLD: 11.59 K/UL — HIGH (ref 4.8–10.8)
WBC # BLD: 13.06 K/UL — HIGH (ref 4.8–10.8)
WBC # FLD AUTO: 10.23 K/UL — SIGNIFICANT CHANGE UP (ref 4.8–10.8)
WBC # FLD AUTO: 11.59 K/UL — HIGH (ref 4.8–10.8)
WBC # FLD AUTO: 13.06 K/UL — HIGH (ref 4.8–10.8)

## 2024-11-29 PROCEDURE — 73706 CT ANGIO LWR EXTR W/O&W/DYE: CPT | Mod: 26,RT,MC

## 2024-11-29 PROCEDURE — 84100 ASSAY OF PHOSPHORUS: CPT

## 2024-11-29 PROCEDURE — 85610 PROTHROMBIN TIME: CPT

## 2024-11-29 PROCEDURE — 99285 EMERGENCY DEPT VISIT HI MDM: CPT | Mod: FS

## 2024-11-29 PROCEDURE — 94760 N-INVAS EAR/PLS OXIMETRY 1: CPT

## 2024-11-29 PROCEDURE — 85027 COMPLETE CBC AUTOMATED: CPT

## 2024-11-29 PROCEDURE — 93010 ELECTROCARDIOGRAM REPORT: CPT

## 2024-11-29 PROCEDURE — 72170 X-RAY EXAM OF PELVIS: CPT | Mod: 26

## 2024-11-29 PROCEDURE — 36415 COLL VENOUS BLD VENIPUNCTURE: CPT

## 2024-11-29 PROCEDURE — 83735 ASSAY OF MAGNESIUM: CPT

## 2024-11-29 PROCEDURE — 73564 X-RAY EXAM KNEE 4 OR MORE: CPT | Mod: 26,RT

## 2024-11-29 PROCEDURE — 85730 THROMBOPLASTIN TIME PARTIAL: CPT

## 2024-11-29 PROCEDURE — 93005 ELECTROCARDIOGRAM TRACING: CPT

## 2024-11-29 PROCEDURE — 73552 X-RAY EXAM OF FEMUR 2/>: CPT | Mod: 26,RT

## 2024-11-29 PROCEDURE — 72125 CT NECK SPINE W/O DYE: CPT | Mod: 26,MC

## 2024-11-29 PROCEDURE — 80048 BASIC METABOLIC PNL TOTAL CA: CPT

## 2024-11-29 PROCEDURE — 70450 CT HEAD/BRAIN W/O DYE: CPT | Mod: 26,MC

## 2024-11-29 PROCEDURE — 97162 PT EVAL MOD COMPLEX 30 MIN: CPT | Mod: GP

## 2024-11-29 PROCEDURE — 85025 COMPLETE CBC W/AUTO DIFF WBC: CPT

## 2024-11-29 PROCEDURE — 99223 1ST HOSP IP/OBS HIGH 75: CPT

## 2024-11-29 RX ORDER — OXYBUTYNIN CHLORIDE 5 MG
5 TABLET ORAL
Refills: 0 | Status: DISCONTINUED | OUTPATIENT
Start: 2024-11-29 | End: 2024-12-04

## 2024-11-29 RX ORDER — METOPROLOL TARTRATE 100 MG/1
50 TABLET, FILM COATED ORAL
Refills: 0 | Status: DISCONTINUED | OUTPATIENT
Start: 2024-11-29 | End: 2024-12-04

## 2024-11-29 RX ORDER — CHLORHEXIDINE GLUCONATE 1.2 MG/ML
1 RINSE ORAL
Refills: 0 | Status: DISCONTINUED | OUTPATIENT
Start: 2024-11-29 | End: 2024-12-04

## 2024-11-29 RX ORDER — AMIODARONE HYDROCHLORIDE 200 MG/1
200 TABLET ORAL DAILY
Refills: 0 | Status: DISCONTINUED | OUTPATIENT
Start: 2024-11-29 | End: 2024-12-04

## 2024-11-29 RX ORDER — LEVOTHYROXINE SODIUM 150 MCG
175 TABLET ORAL DAILY
Refills: 0 | Status: DISCONTINUED | OUTPATIENT
Start: 2024-11-29 | End: 2024-12-04

## 2024-11-29 RX ORDER — ALLOPURINOL 300 MG/1
1 TABLET ORAL
Refills: 0 | DISCHARGE

## 2024-11-29 RX ORDER — ACETAMINOPHEN 500MG 500 MG/1
650 TABLET, COATED ORAL EVERY 6 HOURS
Refills: 0 | Status: DISCONTINUED | OUTPATIENT
Start: 2024-11-29 | End: 2024-12-04

## 2024-11-29 RX ORDER — SENNOSIDES 8.6 MG
2 TABLET ORAL AT BEDTIME
Refills: 0 | Status: DISCONTINUED | OUTPATIENT
Start: 2024-11-29 | End: 2024-12-04

## 2024-11-29 RX ORDER — ACETAMINOPHEN 500MG 500 MG/1
975 TABLET, COATED ORAL ONCE
Refills: 0 | Status: COMPLETED | OUTPATIENT
Start: 2024-11-29 | End: 2024-11-29

## 2024-11-29 RX ORDER — 0.9 % SODIUM CHLORIDE 0.9 %
1000 INTRAVENOUS SOLUTION INTRAVENOUS ONCE
Refills: 0 | Status: COMPLETED | OUTPATIENT
Start: 2024-11-29 | End: 2024-11-29

## 2024-11-29 RX ORDER — ALLOPURINOL 300 MG/1
300 TABLET ORAL AT BEDTIME
Refills: 0 | Status: DISCONTINUED | OUTPATIENT
Start: 2024-11-29 | End: 2024-12-04

## 2024-11-29 RX ORDER — SODIUM CHLORIDE 9 MG/ML
1000 INJECTION, SOLUTION INTRAMUSCULAR; INTRAVENOUS; SUBCUTANEOUS
Refills: 0 | Status: DISCONTINUED | OUTPATIENT
Start: 2024-11-29 | End: 2024-11-30

## 2024-11-29 RX ORDER — FUROSEMIDE 40 MG/1
20 TABLET ORAL DAILY
Refills: 0 | Status: DISCONTINUED | OUTPATIENT
Start: 2024-11-30 | End: 2024-12-04

## 2024-11-29 RX ORDER — INFLUENZA VIRUS VACCINE 15; 15; 15; 15 UG/.5ML; UG/.5ML; UG/.5ML; UG/.5ML
0.5 SUSPENSION INTRAMUSCULAR ONCE
Refills: 0 | Status: DISCONTINUED | OUTPATIENT
Start: 2024-11-29 | End: 2024-12-04

## 2024-11-29 RX ADMIN — ACETAMINOPHEN 500MG 650 MILLIGRAM(S): 500 TABLET, COATED ORAL at 23:07

## 2024-11-29 RX ADMIN — ACETAMINOPHEN 500MG 650 MILLIGRAM(S): 500 TABLET, COATED ORAL at 23:30

## 2024-11-29 RX ADMIN — Medication 2 TABLET(S): at 22:04

## 2024-11-29 RX ADMIN — ACETAMINOPHEN 500MG 975 MILLIGRAM(S): 500 TABLET, COATED ORAL at 12:30

## 2024-11-29 RX ADMIN — Medication 20 MILLIGRAM(S): at 22:05

## 2024-11-29 RX ADMIN — Medication 1000 MILLILITER(S): at 13:00

## 2024-11-29 RX ADMIN — SODIUM CHLORIDE 50 MILLILITER(S): 9 INJECTION, SOLUTION INTRAMUSCULAR; INTRAVENOUS; SUBCUTANEOUS at 23:07

## 2024-11-29 RX ADMIN — ALLOPURINOL 300 MILLIGRAM(S): 300 TABLET ORAL at 22:05

## 2024-11-29 RX ADMIN — METOPROLOL TARTRATE 50 MILLIGRAM(S): 100 TABLET, FILM COATED ORAL at 22:05

## 2024-11-29 RX ADMIN — SODIUM CHLORIDE 50 MILLILITER(S): 9 INJECTION, SOLUTION INTRAMUSCULAR; INTRAVENOUS; SUBCUTANEOUS at 22:04

## 2024-11-29 NOTE — ED PROVIDER NOTE - DATE/TIME 3
7/28/2021    This is a 82 y.o. female   Chief Complaint   Patient presents with    Skin Problem     Pt got a punture wound on lt legs about four weeks ago has some swelling  on lt ankle      HPI     Here for concerns for follow-up for a wound    She previously scraped her leg against the metal part of her bedding at home. She had an open wound and so my partner has been applying topical antibiotic ointment. During this time she reports it is healed up quite well and she has a small scab but otherwise new skin is grown in appropriately. No significant pain    Her GI doctor is retiring and she needs refills of her medications for IBS with diarrhea, takes Carafate for stomach upset and uses Bentyl as needed    Hypertension  BP Readings from Last 3 Encounters:   07/28/21 134/86   07/09/21 136/84   04/02/21 (!) 171/71   She reports doing well valsartan without any dizziness or lightheadedness    She has had a couple of falls over the past year, however one was a malfunction of a stool that she was standing on and the other was tripping over something. No dizziness or lightheadedness    Review of Systems     Current Outpatient Medications   Medication Sig Dispense Refill    dicyclomine (BENTYL) 10 MG capsule Take 1 capsule by mouth 4 times daily 360 capsule 1    sucralfate (CARAFATE) 1 GM tablet Take 1 tablet by mouth 4 times daily BEFORE MEALS 360 tablet 1    mupirocin (BACTROBAN) 2 % ointment Apply 3 times daily as needed 22 g 1    valsartan (DIOVAN) 160 MG tablet TAKE ONE TABLET BY MOUTH DAILY 30 tablet 2    atorvastatin (LIPITOR) 10 MG tablet TAKE ONE TABLET BY MOUTH DAILY 30 tablet 1    methylPREDNISolone (MEDROL DOSEPACK) 4 MG tablet Take as directed on package.  1 kit 0    mometasone (ELOCON) 0.1 % cream APPLY TOPICALLY DAILY AS NEEDED 2 Tube 2    metoprolol tartrate (LOPRESSOR) 50 MG tablet TAKE ONE TABLET BY MOUTH TWICE A  tablet 3    lansoprazole (PREVACID) 30 MG capsule Take 30 mg by mouth daily.      aspirin 81 MG tablet Take 81 mg by mouth daily.  Misc Intestinal Radha Regulat (ALIGN) CAPS Take  by mouth daily.  Multiple Vitamin (MULTI-VITAMIN PO) Take  by mouth daily.  CALCIUM ACETATE by Does not apply route 2 times daily.  GLUCOSAMINE CHONDROITIN COMPLX PO Take  by mouth 2 times daily.  lidocaine (LIDODERM) 5 % Place 1 patch onto the skin daily 12 hours on, 12 hours off. (Patient not taking: Reported on 7/28/2021) 30 patch 0     No current facility-administered medications for this visit. /86 (Site: Right Upper Arm, Position: Sitting, Cuff Size: Medium Adult)   Pulse 70   Temp 98.1 °F (36.7 °C)   Resp 12   Ht 5' 5\" (1.651 m)   Wt 144 lb (65.3 kg)   SpO2 95%   BMI 23.96 kg/m²     Physical Exam  Skin:     Comments: Small 2 cm healing scab on left lower extremity         Wt Readings from Last 3 Encounters:   07/28/21 144 lb (65.3 kg)   07/09/21 145 lb (65.8 kg)   04/02/21 148 lb (67.1 kg)       BP Readings from Last 3 Encounters:   07/28/21 134/86   07/09/21 136/84   04/02/21 (!) 171/71       Assessment/Plan:  1. Wound of left lower extremity, subsequent encounter  Healing quite well. No more topical treatment needed    2. Diarrhea, unspecified type  Chronic related to IBS, take Bentyl as needed. It is well controlled  - dicyclomine (BENTYL) 10 MG capsule; Take 1 capsule by mouth 4 times daily  Dispense: 360 capsule; Refill: 1    3. Irritable bowel syndrome with diarrhea  As above    4. Essential hypertension  Blood pressure is well controlled on her current regimen    5. At high risk for falls  These were mechanical falls and one was related to a stool that broke, not related to medications, dizziness, vertigo, or other concerning etiologies      Return in about 6 months (around 1/28/2022) for medication check. 29-Nov-2024 17:18

## 2024-11-29 NOTE — ED PROVIDER NOTE - ATTENDING APP SHARED VISIT CONTRIBUTION OF CARE
83 yo F with hx of afib on xarelto s/p PPM, CKD, HTN, HLD, hypothyroidism 2/2 thyroid cancer s/p thyroidectomy, OA, FELIX who presents with pain and bruising to back of R thigh noticed yesterday. Pt says she rolled out of bed and fell down on 11/11, hitting R side of head and RLE. No LOC. No preceding cp, sob, dizziness prior to fall. Had no significant pain initially and has been ambulatory since. Uses both walker and cane to ambulate. Yesterday noticed large bruising and swelling to back of R thigh so came to ED. No fever, cp, sob, abd pain, weakness, numbness. Last xarelto taken last night.    PMD Dr. Savage  Cardio Dr Eid    CONSTITUTIONAL: well developed, well nourished, no acute distress, ABC intact, GCS 15  HEAD: normocephalic, hematoma to R parietal scalp  EYES: PERRL at 3mm, EOMI, no raccoon eyes  ENT:  no moreira sign, moist mucous membranes  NECK: no midline tenderness, no stepoffs, no deformity  CV: regular rate, regular rhythm, equal distal pulses  RESP: lungs clear to auscultation bilaterally, normal work of breathing, symmetric rise and fall of chest  CHEST: no chest wall tenderness, no crepitus, no clavicular deformity/tenting  ABD: soft, nondistended, no tenderness, no rebound, no guarding, no rigidity, no pelvic instability  BACK: no midline T/L/S-spine tenderness, no stepoffs, no deformity, no saddle paresthesia  EXT: no obvious deformity, no tenderness of extremities, full ROM, cap refill < 2 seconds, soft compartments  SKIN: no rashes, no lacerations, no abrasions  NEURO: A&Ox3, motor strength 5/5 in all extremities, sensation grossly intact throughout, no focal neurological deficits, GCS 15  PSYCH: normal mood, appropriate affect 83 yo F with hx of afib on xarelto s/p PPM, CKD, HTN, HLD, hypothyroidism 2/2 thyroid cancer s/p thyroidectomy, OA, FELIX who presents with pain and bruising to back of R thigh noticed yesterday. Pt says she rolled out of bed and fell down on 11/11, hitting R side of head and RLE. No LOC. No preceding cp, sob, dizziness prior to fall. Had no significant pain initially and has been ambulatory since. Uses both walker and cane to ambulate. Yesterday noticed large bruising and swelling to back of R thigh so came to ED. No fever, cp, sob, abd pain, weakness, numbness. Last xarelto taken last night.    PMD Dr. Savage  Cardio Dr Eid    CONSTITUTIONAL: well developed, well nourished, no acute distress, ABC intact, GCS 15  HEAD: normocephalic, hematoma to R parietal scalp  EYES: PERRL at 3mm, EOMI, no raccoon eyes  ENT:  no moreira sign, moist mucous membranes  NECK: no midline tenderness, no stepoffs, no deformity  CV: regular rate, regular rhythm, equal distal pulses  RESP: lungs clear to auscultation bilaterally, normal work of breathing, symmetric rise and fall of chest  CHEST: no chest wall tenderness, no crepitus, no clavicular deformity/tenting  ABD: soft, nondistended, no tenderness, no rebound, no guarding, no rigidity, no pelvic instability  BACK: no midline T/L/S-spine tenderness, no stepoffs, no deformity  EXT: significant swelling to R thigh with tenderness/ecchymosis to R posterior thigh, normal extension/flexion of hip, normal extension/flexion of knees, normal dorsiflexion/plantarflexion of ankle, 2+ DP pulses, sensation intact to touch, cap refill <2 seconds, full ROM, cap refill < 2 seconds, soft compartments  SKIN: no rashes, no lacerations, no abrasions  NEURO: A&Ox3, motor strength 5/5 in all extremities, sensation grossly intact throughout, no focal neurological deficits, GCS 15  PSYCH: normal mood, appropriate affect    A&P:  Pt here with significant swelling and ecchymosis to R posterior thigh after mechanical fall 2.5 wks ago. Here in ED, BP 90s/60s which is below her baseline. Neurovascularly intact with full ROM. Pt also on xarelto, concern for actively expanding hematoma. Plan for labs, imaging r/o ICH, fx, dislocation, expanding hematoma, anemia.

## 2024-11-29 NOTE — H&P ADULT - NSHPLABSRESULTS_GEN_ALL_CORE
LABS  CBC (11-29 @ 16:51)                              7.8[L]                         11.59[H]  )----------------(  257        70.4  % Neutrophils, 17.5[L]% Lymphocytes, ANC: 8.16[H]                              25.5[L]  CBC (11-29 @ 12:30)                              8.8[L]                         13.06[H]  )----------------(  305        75.2  % Neutrophils, 13.2[L]% Lymphocytes, ANC: 9.82[H]                              29.1[L]    BMP (11-29 @ 12:30)             141     |  103     |  34[H] 		Ca++ --      Ca 8.2[L]             ---------------------------------( 114[H]		Mg --                 4.2     |  26      |  1.5   			Ph --        LFTs (11-29 @ 12:30)      TPro 5.1[L] / Alb 3.3[L] / TBili 0.9 / DBili -- / AST 13 / ALT 6 / AlkPhos 94    Coags (11-29 @ 14:31)  aPTT 39.9[H] / INR 2.67[H] / PT 32.20[H]    --------------------------------------------------------------------------------------------    MICROBIOLOGY  Urinalysis (11-29 @ 12:30):     Color:  / Appearance:  / SG:  / pH:  / Gluc: 114[H] / Ketones:  / Bili:  / Urobili:  / Protein : / Nitrites:  / Leuk.Est:  / RBC:  / WBC:  / Sq Epi:  / Non Sq Epi:  / Bacteria      --------------------------------------------------------------------------------------------    I&O's Summary    IMAGING:  < from: CT Angio Lower Extremity w/ IV Cont, Right (11.29.24 @ 13:33) >    IMPRESSION:    Large right posterior thigh intramuscular hematoma measuring 10.0 x 8.9 x   18.1 cm without contrast extravasation    Partially imaged likely occlusive disease proximal anterior tibial artery.    < end of copied text >

## 2024-11-29 NOTE — PATIENT PROFILE ADULT - FALL HARM RISK - HARM RISK INTERVENTIONS

## 2024-11-29 NOTE — CONSULT NOTE ADULT - SUBJECTIVE AND OBJECTIVE BOX
JEFFERY UGALDE   [id number]   [birth year]    84yF  ============================================================   DATE OF INITIAL SICU/SDU CONSULT: 11-29-24    INDICATION FOR SICU CONSULT:  hemodynamic monitoring/close H/H monitoring      SICU COURSE EVENTS :  11-29 - admitted to SICU service.      24HOUR EVENTS  -Admission under SICU service.     [X] A ten-point review of systems was negative except as expressed in note.  [X] History was obtained from patient. If unable to participate in their care, history obtained from review of the chart and collateral sources of information.  ============================================================      HPI   HPI:      Pertinent Imaging    ACC: [id number] EXAM:  CT ANGIO LWR EXT (W)AW IC RT   ORDERED BY: KEITH SHARMA     PROCEDURE DATE:  11/29/2024      INTERPRETATION:  CT ANGIOGRAPHY OF THE RIGHT LOWER EXTREMITY PRE AND POST   INTRAVENOUS CONTRAST    CLINICAL HISTORY: Fall and hematoma    TECHNIQUE: Images were obtained of the right lower extremity utilizing CT   angiography protocol. Coronal and sagittal reformatted images were also   provided.    COMPARISON: December 21, 2018    FINDINGS:    VASCULAR: Patent right external iliac artery without aneurysm or   dissection. Patent right common femoral and superficial femoral artery.   Atherosclerotic calcifications throughout right SFA and popliteal artery.   Patent PT trunk and proximal posterior tibial and peroneal arteries.   Partially imaged likely occlusive disease proximal anterior tibial artery.    No evidence for contrast extravasation.    BONES/JOINTS: No acute fracture or dislocation. No suspicious lytic or   blastic lesion. Post right knee replacement.    SOFT TISSUES: Large right posterior thigh intramuscular hematoma   measuring 10.0 x 8.9 x 18.1 cm. Additional subcutaneous soft tissue   swelling throughout the right thigh. Bilateral anterior pelvic dystrophic   calcifications present.      IMPRESSION:    Large right posterior thigh intramuscular hematoma measuring 10.0 x 8.9 x   18.1 cm without contrast extravasation    Partially imaged likely occlusive disease proximal anterior tibial artery.    --- End of Report ---          PAST MEDICAL & SURGICAL HISTORY  HTN (hypertension)  High cholesterol  Hypothyroid  Afib  on xarelto  Sleep apnea  Osteoarthritis  Pituitary adenoma  CKD (chronic kidney disease) stage 3, GFR 30-59 ml/min  Pacemaker  Thyroid cancer      HOME MEDICATIONS    allopurinol 100 mg oral tablet: 1 cap(s) orally once a day (at bedtime)  amiodarone 200 mg oral tablet: 1 tab(s) orally once a day  atorvastatin 20 mg oral tablet: 1 tab(s) orally once a day  cabergoline 0.5 mg oral tablet: 0.5 tab(s) orally once a week , monday night  ferrous sulfate 325 mg (65 mg elemental iron) oral tablet: 1 tab(s) orally once a day  furosemide 20 mg oral tablet: 1 tab(s) orally once a day  levothyroxine 175 mcg (0.175 mg) oral tablet: 1 tab(s) orally once a day  Metoprolol Tartrate 100 mg oral tablet: 1 tab(s) orally every 12 hours  rivaroxaban 15 mg oral tablet: 1 tab(s) orally once a day (before a meal)  trospium 20 mg oral tablet: 1 tab(s) orally once a day    ALLERGIES  Allergies  No Known Allergies  Intolerances         VITAL SIGNS (Last 24Hours)  ICU Vital Signs Last 24 Hrs  T(C): 36.8 (29 Nov 2024 15:51), Max: 36.8 (29 Nov 2024 15:51)  T(F): 98.3 (29 Nov 2024 15:51), Max: 98.3 (29 Nov 2024 15:51)  HR: 67 (29 Nov 2024 15:51) (67 - 67)  BP: 121/70 (29 Nov 2024 15:51) (75/49 - 121/70)  BP(mean): --  ABP: --  ABP(mean): --  RR: 18 (29 Nov 2024 15:51) (18 - 18)  SpO2: 99% (29 Nov 2024 15:51) (97% - 99%)    O2 Parameters below as of 29 Nov 2024 15:51  Patient On (Oxygen Delivery Method): room air      LABS (Last 24Hours)  [11-29 @ 12:30:] Na 141 K 4.2 Cl 103 Mg ___ b>Phos ___ BUN/Cr 34/1.5>>>   [11-29 @ 12:30] AST 13  ALT 6 DBili __  TBili 0.9 Alb 3.3      PHYSICAL EXAM   GCS:    15  Exam: A&Ox3, no focal deficits    RESPIRATORY:  Normal expansion/effort on RA    CARDIOVASCULAR:   non tachycardic on monitor. S1, S2.     GASTROINTESTINAL:  Abdomen soft, non-tender, non-distended    MUSCULOSKELETAL:  Extremities warm, pink, well-perfused. RLE with ACE around thigh, edematous compared to LLE. Palpable DP/PT RLE.      SKIN:  No skin breakdown; no DTI  ----------------------------------------------------------------------------------------------------------  Tubes/Lines/Drains    [x] Peripheral IV    [ ] Urinary Catheter Stewart  [ ]Present on Admission          Insertion Date:                                   [ ] Central Venous Line       [ ]IJ             [ ]Femoral     [ ]Subclavian   [ ]Left  [ ]Right      Insertion Date:          [ ] Arterial Line 	                [ ]Radial    [ ]Femoral     [ ]Axillary         [ ]Left  [ ]Right      Insertion Date:            JEFFERY UGALDE   [id number]   [birth year]    84yF  ============================================================   DATE OF INITIAL SICU/SDU CONSULT: 11-29-24    INDICATION FOR SICU CONSULT:  hemodynamic monitoring/close H/H monitoring      SICU COURSE EVENTS :  11-29 - admitted to SICU service.      24HOUR EVENTS  -Admission under SICU service.     [X] A ten-point review of systems was negative except as expressed in note.  [X] History was obtained from patient. If unable to participate in their care, history obtained from review of the chart and collateral sources of information.  ============================================================      HPI   HPI:  Patient is a 83 y/o female with PMHx of afib (on Xarelto), HLD, hypothyroidism, gout, CKD, FELIX, overactive bladder with recent fall on 11/11 (+HT, -LOC) who presents to the ED with complaints of right lower extremity pain and bruising. Patient states that she has been ambulatory since the fall with her walker which is her baseline. CTA of RLE in ED showing right posterior thigh hematoma. Patient with hypotension in the ED, resolved with fluid boluses. Noted to have acute H/H drop. Patient admitted to SICU for close HD monitoring and serial CBC.     Pertinent Imaging    ACC: [id number] EXAM:  CT ANGIO LWR EXT (W)AW IC RT   ORDERED BY: KEITH SHARMA     PROCEDURE DATE:  11/29/2024      INTERPRETATION:  CT ANGIOGRAPHY OF THE RIGHT LOWER EXTREMITY PRE AND POST   INTRAVENOUS CONTRAST    CLINICAL HISTORY: Fall and hematoma    TECHNIQUE: Images were obtained of the right lower extremity utilizing CT   angiography protocol. Coronal and sagittal reformatted images were also   provided.    COMPARISON: December 21, 2018    FINDINGS:    VASCULAR: Patent right external iliac artery without aneurysm or   dissection. Patent right common femoral and superficial femoral artery.   Atherosclerotic calcifications throughout right SFA and popliteal artery.   Patent PT trunk and proximal posterior tibial and peroneal arteries.   Partially imaged likely occlusive disease proximal anterior tibial artery.    No evidence for contrast extravasation.    BONES/JOINTS: No acute fracture or dislocation. No suspicious lytic or   blastic lesion. Post right knee replacement.    SOFT TISSUES: Large right posterior thigh intramuscular hematoma   measuring 10.0 x 8.9 x 18.1 cm. Additional subcutaneous soft tissue   swelling throughout the right thigh. Bilateral anterior pelvic dystrophic   calcifications present.      IMPRESSION:    Large right posterior thigh intramuscular hematoma measuring 10.0 x 8.9 x   18.1 cm without contrast extravasation    Partially imaged likely occlusive disease proximal anterior tibial artery.    --- End of Report ---          PAST MEDICAL & SURGICAL HISTORY  HTN (hypertension)  High cholesterol  Hypothyroid  Afib  on xarelto  Sleep apnea  Osteoarthritis  Pituitary adenoma  CKD (chronic kidney disease) stage 3, GFR 30-59 ml/min  Pacemaker  Thyroid cancer      HOME MEDICATIONS    allopurinol 100 mg oral tablet: 1 cap(s) orally once a day (at bedtime)  amiodarone 200 mg oral tablet: 1 tab(s) orally once a day  atorvastatin 20 mg oral tablet: 1 tab(s) orally once a day  cabergoline 0.5 mg oral tablet: 0.5 tab(s) orally once a week , monday night  ferrous sulfate 325 mg (65 mg elemental iron) oral tablet: 1 tab(s) orally once a day  furosemide 20 mg oral tablet: 1 tab(s) orally once a day  levothyroxine 175 mcg (0.175 mg) oral tablet: 1 tab(s) orally once a day  Metoprolol Tartrate 100 mg oral tablet: 1 tab(s) orally every 12 hours  rivaroxaban 15 mg oral tablet: 1 tab(s) orally once a day (before a meal)  trospium 20 mg oral tablet: 1 tab(s) orally once a day    ALLERGIES  Allergies  No Known Allergies  Intolerances         VITAL SIGNS (Last 24Hours)  ICU Vital Signs Last 24 Hrs  T(C): 36.8 (29 Nov 2024 15:51), Max: 36.8 (29 Nov 2024 15:51)  T(F): 98.3 (29 Nov 2024 15:51), Max: 98.3 (29 Nov 2024 15:51)  HR: 67 (29 Nov 2024 15:51) (67 - 67)  BP: 121/70 (29 Nov 2024 15:51) (75/49 - 121/70)  BP(mean): --  ABP: --  ABP(mean): --  RR: 18 (29 Nov 2024 15:51) (18 - 18)  SpO2: 99% (29 Nov 2024 15:51) (97% - 99%)    O2 Parameters below as of 29 Nov 2024 15:51  Patient On (Oxygen Delivery Method): room air      LABS (Last 24Hours)  [11-29 @ 12:30:] Na 141 K 4.2 Cl 103 Mg ___ b>Phos ___ BUN/Cr 34/1.5>>>   [11-29 @ 12:30] AST 13  ALT 6 DBili __  TBili 0.9 Alb 3.3      PHYSICAL EXAM   GCS:    15  Exam: A&Ox3, no focal deficits    RESPIRATORY:  Normal expansion/effort on RA    CARDIOVASCULAR:   non tachycardic on monitor. S1, S2.     GASTROINTESTINAL:  Abdomen soft, non-tender, non-distended    MUSCULOSKELETAL:  Extremities warm, pink, well-perfused. RLE with ACE around thigh, edematous compared to LLE. Palpable DP/PT RLE.      SKIN:  No skin breakdown; no DTI  ----------------------------------------------------------------------------------------------------------  Tubes/Lines/Drains    [x] Peripheral IV    [ ] Urinary Catheter Stewart  [ ]Present on Admission          Insertion Date:                                   [ ] Central Venous Line       [ ]IJ             [ ]Femoral     [ ]Subclavian   [ ]Left  [ ]Right      Insertion Date:          [ ] Arterial Line 	                [ ]Radial    [ ]Femoral     [ ]Axillary         [ ]Left  [ ]Right      Insertion Date:

## 2024-11-29 NOTE — H&P ADULT - HISTORY OF PRESENT ILLNESS
84F with PMH significant for atrial fibrillation on Xarelto, hypertension, hyperlipidemia, hypothyroidism, CKD, FELIX, and recent fall on 11/11 (+HT, -LOC) who presents to the ED with complaints of right lower extremity pain and bruising. Patient states that she has been ambulatory since the fall with her walker which is her baseline. She denies any neurologic symptoms and states that she has no other complaints besides her right thigh pain.      General surgery consulted for findings of right posterior thigh hematoma without active extravasation on CTA RLE.

## 2024-11-29 NOTE — H&P ADULT - ASSESSMENT
84F with PMH significant for atrial fibrillation on Xarelto, hypertension, hyperlipidemia, hypothyroidism, CKD, FELIX, and recent fall on 11/11 (+HT, -LOC) who presents to the ED with complaints of right lower extremity pain and bruising. Patient states that she has been ambulatory since the fall with her walker which is her baseline. She denies any neurologic symptoms and states that she has no other complaints besides her right thigh pain.      General surgery consulted for findings of right posterior thigh hematoma without active extravasation on CTA RLE.     PLAN:  #Large R Thigh Hematoma   - Admit to surgical service under Dr. Iraheta   - SICU Consult   - Serial CBC, transfuse as needed   - Compression wrap to R thigh   - Pain control   - DASH/Renal Diet   - Monitor I/O   - Hold DVT ppx / AC for now   - Hemodynamic monitoring   - Resume home meds   - PT/Physiatry    Discussed plan with attending surgeon on call, Dr. Iraheta  SPECTRA [phone]

## 2024-11-29 NOTE — H&P ADULT - ATTENDING COMMENTS
Trauma/Acute Care Surgery Attending Note Attestation  Patient was seen and examined bedside.  I reviewed the resident/PA note and agreed with above assessment and plan with following additions and corrections.    History as above.    I independently performed a medically appropriate exam. The exam was notable for R thigh swelling with tenderness. Neurovascularly intact distal extremities.                          7.9    10.23 )-----------( 268      ( 29 Nov 2024 20:45 )             26.2     11-29    140  |  105  |  33[H]  ----------------------------<  143[H]  3.7   |  24  |  1.6[H]    Ca 7.8[L]; Mg 1.7[L]; Phos 4.0      ( 29 Nov 2024 12:30 )  Alb: 3.3 g/dL / Pro: 5.1 g/dL / AlkPhos: 94 U/L / ALT: 6 U/L / AST: 13 U/L / GGT:x     / Tbili 0.9 mg/dL/ Dbili x     / Indbili x        PT/INR/PTT - ( 29 Nov 2024 20:45 )   PT: 24.00 sec; INR: 2.01 ratio; PTT 39.1 sec    CTA RLE reviewed and interpreted by me: large intramuscular hematoma without evidence of active extravasation     Assessment/Plan:  84y Female PMH afib on xarelto, HTN, HLD, hypothyroidism s/p fall several days prior to presentation found to have R thigh hematoma. No evidence of active extrav on CTA. Repeat hemoglobin dropped but likely dilutional. Given size of hematoma, medication-induced coagulopathy, and hemoglobin drop with hypotension earlier, will plan to admit to surgery and have SICU evaluate for ICU admission for hemodynamic monitoring. Trend hemoglobin. Hold Xarelto. Will wrap R thigh for compression.    Arnaldo Iraheta MD  Trauma/Acute Care Surgery/Surgical Critical Care Attending Trauma/Acute Care Surgery Attending Note Attestation  Patient was seen and examined bedside on 11/29.  I reviewed the resident/PA note and agreed with above assessment and plan with following additions and corrections.    History as above.    I independently performed a medically appropriate exam. The exam was notable for R thigh swelling with tenderness. Neurovascularly intact distal extremities.                          7.9    10.23 )-----------( 268      ( 29 Nov 2024 20:45 )             26.2     11-29    140  |  105  |  33[H]  ----------------------------<  143[H]  3.7   |  24  |  1.6[H]    Ca 7.8[L]; Mg 1.7[L]; Phos 4.0      ( 29 Nov 2024 12:30 )  Alb: 3.3 g/dL / Pro: 5.1 g/dL / AlkPhos: 94 U/L / ALT: 6 U/L / AST: 13 U/L / GGT:x     / Tbili 0.9 mg/dL/ Dbili x     / Indbili x        PT/INR/PTT - ( 29 Nov 2024 20:45 )   PT: 24.00 sec; INR: 2.01 ratio; PTT 39.1 sec    CTA RLE reviewed and interpreted by me: large intramuscular hematoma without evidence of active extravasation     Assessment/Plan:  84y Female PMH afib on xarelto, HTN, HLD, hypothyroidism s/p fall several days prior to presentation found to have R thigh hematoma. No evidence of active extrav on CTA. Repeat hemoglobin dropped but likely dilutional. Given size of hematoma, medication-induced coagulopathy, and hemoglobin drop with hypotension earlier, will plan to admit to surgery and have SICU evaluate for ICU admission for hemodynamic monitoring. Trend hemoglobin. Hold Xarelto. Will wrap R thigh for compression.    Arnaldo Iraheta MD  Trauma/Acute Care Surgery/Surgical Critical Care Attending

## 2024-11-29 NOTE — ED PROVIDER NOTE - PROGRESS NOTE DETAILS
TC: Hgb 8.8, baseline ~11 (previously 11.2 on 8/9/24) TC: BUN/Cr 34/1.5 (previously 34/1.3 on 8/19/24). Imaging notable for large R posterior thigh IM hematoma measuring 10x8.9x18.1cm without contrast extrav. Surgery consulted who requested repeat CBC. Pt signed out to Dr. Dominguez to f/u repeat CBC and surgery recs. TC: Hgb 8.8, baseline ~11 (previously 11.2 on 8/19/24) TC: Repeat CBC 7.8 down from 8.8 earlier today. BP improved to 120s/70s. Consented for 1u pRBC. Pt signed out to Dr. Dominguez to f/u repeat CBC and surgery recs. TC: BUN/Cr 34/1.5 (previously 34/1.3 on 8/19/24). Imaging notable for large R posterior thigh IM hematoma measuring 10x8.9x18.1cm without contrast extrav. Surgery consulted who requested repeat CBC. TC: Repeat CBC 7.8 down from 8.8 earlier today. BP improved to 120s/70s. Consented for 1u pRBC. Pt signed out to Dr. Dominguez to f/u surgery recs. TC: Spoke with pt and daughters several times on recommendation to transfuse 1u pRBC given hgb dropped 3.5 from baseline. They were hesitant on blood transfusion and requested to reach out to pt's cardiologist first. I attempted several times to reach her cardiologist but no answer, left message. Discussed risks/benefits of transfusion and risks of foregoing blood transfusion and they understand risks of refusing blood transfusion. TC: Repeat CBC 7.8 down from 8.8 earlier today. BP improved to 120s/70s. Pt signed out to Dr. Dominguez to f/u surgery recs. Dr. Dominguez - patient seen bedside with surgical attending Dr. Iraheta and daughter bedside. Patient and family are not consenting to transfusion until contact with PMD Cardiologist Dr. Quintero. Courtesy call was placed and unable to get in touch with them.  Family aware, plan is to hold transfusion and admit patient to surgical ICU pending continued reevaluations.  Patient and family stressed understanding that if the blood pressure drops again of the hemoglobin continues to drop the transfusion discussions will be reinitiated by surgical team.

## 2024-11-29 NOTE — ED ADULT NURSE NOTE - OBJECTIVE STATEMENT
pt c/o right lower leg pain, swelling, and ecchymosis since falling on November 11. pt also c/o a "bump to the right side of her head". pt takes Xarelto. Fall precautions in place as per hospital.

## 2024-11-29 NOTE — CONSULT NOTE ADULT - ASSESSMENT
ASSESSMENT:  84 year old female with past medical history significant for atrial fibrillation on Xarelto, hypertension, hyperlipidemia, hypothyroidism, CKD, FELIX, and recent fall on 11/11 (+HT, -LOC) who presents to the ED with complaints of right lower extremity pain and bruising. Patient states that she has been ambulatory since the fall with her walker which is her baseline. She denies any neurologic symptoms and states that she has no other complaints besides her right thigh pain.      General surgery consulted for findings of right posterior thigh hematoma without active extravasation on CTA RLE.  Physical exam findings, imaging, and labs as documented above.     PLAN:  -F/U repeat CBC  -Pending attending evaluation     Above plan discussed with Attending Surgeon Dr. Weaver, patient, patient family, and ED  11-29-24 @ 16:21

## 2024-11-29 NOTE — CONSULT NOTE ADULT - ASSESSMENT
84y Female found to have right thigh hematoma.      NEUROLOGICAL:  #Acute pain    -APAP prn    RESPIRATORY:   #Oxygenation    - on RA with no issues    - encourage incentive spirometry use  #Activity    -increase as tolerated    CARDS:   #hx of afib  - holding home Xarelto given R thigh hematoma  - continue home amiodarone 200 QD  - continue home metoprolol bid with hold parameters   - holding home lasix 20 QD  #hx HLD  - continue atorvastatin 20mg QD   Imaging:   - EKG pending       GASTROINTESTINAL/NUTRITION:   #Diet, DASH    -aspiration precautions, HOB 30  #GI Prophylaxis    -not indicated  #Bowel regimen    - senna    -last bowel movement prior to admission     /RENAL:   #urine output in critically ill    -voiding spontaneously    #maintain euvolemia  - s/p bolus with response  #overactive bladder  - continue home trospium chloride 20 bid    Labs           BUN/Cr: 34/1.5 -->           [11-29 @ 12:30]Na  141 // K  4.2 // Mg  -- // Phos  --         HEME/ONC:   #DVT prophylaxis     -Chemical: holding given R thigh hematoma with acute H/H drop     -Mechanical: SCDs       Labs: Hb/Hct:  8.8/29.1  (11-29 @ 12:30)  -->,  7.8/25.5  (11-29 @ 16:51)  -->                      Plts:  305  -->,  257  -->                 PTT/INR:  39.9/2.67  --->     #R thigh hematoma, no evidence of extrav on CT  - patient and daughter deferring blood transfusion at this time; amendable if hemoglobin drops below 7.0  - close H/H monitoring q6hr   #hx of afib on home Xarelto  - continue to hold home Xarelto 15 QD    ID:  #Monitor WBC and trend fever curve  #Antibiotics Course  WBC- 13.06  --->>,  11.59  --->>  Temp trend- 24hrs T(F): 98.3 (11-29 @ 15:51), Max: 98.3 (11-29 @ 15:51)  Current antibiotics- none indicated  #MRSA nares pending       ENDOCRINE:  #glycemic monitoring     -Glucose goal 140-180. If above 180, will start corrective insulin sliding scale  #hypothyroidism  - continue home levothyroxine 175mcg daily   #continue home cabergoline 0.5mg once weekly on Monday night     MSK:  #ambulate as tolerated  - PT/OT pending   #hx of gout  - continue allopurinol 300QD    SKIN:  #DTI screening negative on admission    - will continue to monitor daily skin changes      LINES/DRAINS:  Terry NAVA  ADVANCED DIRECTIVES:  Full Code    HCP/Emergency Contact-  INDICATION FOR SICU/SDU: R thigh hematoma, hemodynamic monitoring   DISPO:   SICU. Case discussed with attending Dr. Iraheta 84y Female found to have right thigh hematoma.      NEUROLOGICAL:  #Acute pain    -APAP prn    RESPIRATORY:   #Oxygenation    - on RA with no issues    - encourage incentive spirometry use  #Activity    -increase as tolerated    CARDS:   #hx of afib  - holding home Xarelto given R thigh hematoma  - continue home amiodarone 200 QD  - continue home metoprolol bid with hold parameters   - holding home lasix 20 QD  #hx HLD  - continue atorvastatin 20mg QD   Imaging:   - EKG pending       GASTROINTESTINAL/NUTRITION:   #Diet, DASH    -aspiration precautions, HOB 30  #GI Prophylaxis    -not indicated  #Bowel regimen    - senna    -last bowel movement prior to admission     /RENAL:   #urine output in critically ill    -voiding spontaneously    #maintain euvolemia  - s/p bolus with response  - IVF @50cc   #overactive bladder  - continue home trospium chloride 20 bid    Labs           BUN/Cr: 34/1.5 -->           [11-29 @ 12:30]Na  141 // K  4.2 // Mg  -- // Phos  --         HEME/ONC:   #DVT prophylaxis     -Chemical: holding given R thigh hematoma with acute H/H drop     -Mechanical: SCDs       Labs: Hb/Hct:  8.8/29.1  (11-29 @ 12:30)  -->,  7.8/25.5  (11-29 @ 16:51)  -->                      Plts:  305  -->,  257  -->                 PTT/INR:  39.9/2.67  --->     #R thigh hematoma, no evidence of extrav on CT  - patient and daughter deferring blood transfusion at this time; amendable if hemoglobin drops below 7.0  - close H/H monitoring q6hr   #hx of afib on home Xarelto  - continue to hold home Xarelto 15 QD    ID:  #Monitor WBC and trend fever curve  #Antibiotics Course  WBC- 13.06  --->>,  11.59  --->>  Temp trend- 24hrs T(F): 98.3 (11-29 @ 15:51), Max: 98.3 (11-29 @ 15:51)  Current antibiotics- none indicated  #MRSA narsy pending       ENDOCRINE:  #glycemic monitoring     -Glucose goal 140-180. If above 180, will start corrective insulin sliding scale  #hypothyroidism  - continue home levothyroxine 175mcg daily   #continue home cabergoline 0.5mg once weekly on Monday night     MSK:  #ambulate as tolerated  - PT/OT pending   #hx of gout  - continue allopurinol 300QD    SKIN:  #DTI screening negative on admission    - will continue to monitor daily skin changes      LINES/DRAINS:  Terry NAVA  ADVANCED DIRECTIVES:  Full Code    HCP/Emergency Contact-  INDICATION FOR SICU/SDU: R thigh hematoma, hemodynamic monitoring   DISPO:   SICU. Case discussed with attending Dr. Iraheta 84y Female found to have right thigh hematoma.      NEUROLOGICAL:  #Acute pain    -APAP prn    RESPIRATORY:   #Oxygenation    - on RA with no issues    - encourage incentive spirometry use while in bed   #hx of FELIX  #Activity    -increase as tolerated    CARDS:   #hx of afib  - holding home Xarelto given R thigh hematoma  - continue home amiodarone 200 QD  - continue home metoprolol bid with hold parameters   - holding home lasix 20 QD  #hx HLD  - continue atorvastatin 20mg QD   #s/p recent pacemaker   Imaging:   - EKG pending       GASTROINTESTINAL/NUTRITION:   #Diet, DASH    -aspiration precautions, HOB 30  #GI Prophylaxis    -not indicated  #Bowel regimen    - senna    -last bowel movement prior to admission     /RENAL:   #urine output in critically ill    -voiding spontaneously    #hx of CKD  - monitor BUN/Cr  #maintain euvolemia  - s/p bolus with response  - IVF @50cc   #overactive bladder  - continue home trospium chloride 20 bid    Labs           BUN/Cr: 34/1.5 -->           [11-29 @ 12:30]Na  141 // K  4.2 // Mg  -- // Phos  --         HEME/ONC:   #DVT prophylaxis     -Chemical: holding given R thigh hematoma with acute H/H drop     -Mechanical: SCDs       Labs: Hb/Hct:  8.8/29.1  (11-29 @ 12:30)  -->,  7.8/25.5  (11-29 @ 16:51)  -->                      Plts:  305  -->,  257  -->                 PTT/INR:  39.9/2.67  --->     #R thigh hematoma, no evidence of extrav on CT  - patient and daughter deferring blood transfusion at this time; amendable if hemoglobin drops below 7.0  - close H/H monitoring q6hr   #hx of afib on home Xarelto  - continue to hold home Xarelto 15 QD    ID:  #Monitor WBC and trend fever curve  #Antibiotics Course  WBC- 13.06  --->>,  11.59  --->>  Temp trend- 24hrs T(F): 98.3 (11-29 @ 15:51), Max: 98.3 (11-29 @ 15:51)  Current antibiotics- none indicated  #MRSA nares pending       ENDOCRINE:  #glycemic monitoring     -Glucose goal 140-180. If above 180, will start corrective insulin sliding scale  #hypothyroidism  - continue home levothyroxine 175mcg daily   #hx pituitary adenoma  - continue home cabergoline 0.5mg once weekly on Monday night     MSK:  #ambulate as tolerated  - ambulates with walker baseline   - PT/OT pending   #hx of gout  - continue allopurinol 300QD    SKIN:  #DTI screening negative on admission    - will continue to monitor daily skin changes      LINES/DRAINS:  PIV  ADVANCED DIRECTIVES:  Full Code    HCP/Emergency Contact-  INDICATION FOR SICU/SDU: R thigh hematoma, hemodynamic monitoring   DISPO:   SICU. Case discussed with attending Dr. Iraheta 84y Female found to have right thigh hematoma.      NEUROLOGICAL:  #Acute pain    -APAP prn    RESPIRATORY:   #Oxygenation    - on RA with no issues    - encourage incentive spirometry use while in bed   #hx of FELIX  #Activity    -increase as tolerated    CARDS:   #hx of afib  - holding home Xarelto given R thigh hematoma  - continue home amiodarone 200 QD  - continue home metoprolol bid with hold parameters   - holding home lasix 20 QD  - s/p AV Junction Ablation in June 2024  - cardiologist Dr. Quintero   #hx HLD  - continue atorvastatin 20mg QD   Imaging:   - EKG pending   - TTE 06/24: EF of 44%. Indeterminate diastolic dysfunction. Moderate concentric left ventricular hypertrophy. Severe mitral annular calcification. Mild mitral valve regurgitation. Mild to moderate pulmonic valve regurgitation. Small pericardial effusion without echocardiographic evidence of tamponade physiology.    GASTROINTESTINAL/NUTRITION:   #Diet, DASH    -aspiration precautions, HOB 30  #GI Prophylaxis    -not indicated  #Bowel regimen    - senna    -last bowel movement prior to admission     /RENAL:   #urine output in critically ill    -voiding spontaneously    #hx of CKD  - monitor BUN/Cr  #maintain euvolemia  - s/p bolus with response  - IVF @50cc   #overactive bladder  - continue home trospium chloride 20 bid    Labs           BUN/Cr: 34/1.5 -->           [11-29 @ 12:30]Na  141 // K  4.2 // Mg  -- // Phos  --         HEME/ONC:   #DVT prophylaxis     -Chemical: holding given R thigh hematoma with acute H/H drop     -Mechanical: SCDs       Labs: Hb/Hct:  8.8/29.1  (11-29 @ 12:30)  -->,  7.8/25.5  (11-29 @ 16:51)  -->                      Plts:  305  -->,  257  -->                 PTT/INR:  39.9/2.67  --->     #R thigh hematoma, no evidence of extrav on CT  - patient and daughter deferring blood transfusion at this time; amendable if hemoglobin drops below 7.0  - close H/H monitoring q6hr   #hx of afib on home Xarelto  - continue to hold home Xarelto 15 QD    ID:  #Monitor WBC and trend fever curve  #Antibiotics Course  WBC- 13.06  --->>,  11.59  --->>  Temp trend- 24hrs T(F): 98.3 (11-29 @ 15:51), Max: 98.3 (11-29 @ 15:51)  Current antibiotics- none indicated  #MRSA nares pending       ENDOCRINE:  #glycemic monitoring     -Glucose goal 140-180. If above 180, will start corrective insulin sliding scale  #hypothyroidism  - continue home levothyroxine 175mcg daily   #hx pituitary adenoma  - continue home cabergoline 0.5mg once weekly on Monday night     MSK:  #ambulate as tolerated  - ambulates with walker baseline   - PT/OT pending   #hx of gout  - continue allopurinol 300QD    SKIN:  #DTI screening negative on admission    - will continue to monitor daily skin changes      LINES/DRAINS:  PIV  ADVANCED DIRECTIVES:  Full Code    HCP/Emergency Contact-  INDICATION FOR SICU/SDU: R thigh hematoma, hemodynamic monitoring   DISPO:   SICU. Case discussed with attending Dr. Iraheta

## 2024-11-29 NOTE — ED ADULT TRIAGE NOTE - CHIEF COMPLAINT QUOTE
pt c/o right lower leg pain, swelling, and ecchymosis since falling on November 11. pt also c/o a "bump to the right side of her head". pt takes Xarelto

## 2024-11-29 NOTE — CONSULT NOTE ADULT - SUBJECTIVE AND OBJECTIVE BOX
GENERAL SURGERY CONSULT NOTE    Patient: JEFFERY UGALDE , 84y ([birth year])Female   MRN: [id number]  Location: [facility] ED  Visit: 11-29-24 Emergency  Date: 11-29-24 @ 16:21    HPI: 84 year old female with past medical history significant for atrial fibrillation on Xarelto, hypertension, hyperlipidemia, hypothyroidism, CKD, FELIX, and recent fall on 11/11 (+HT, -LOC) who presents to the ED with complaints of right lower extremity pain and bruising. Patient states that she has been ambulatory since the fall with her walker which is her baseline. She denies any neurologic symptoms and states that she has no other complaints besides her right thigh pain.      General surgery consulted for findings of right posterior thigh hematoma without active extravasation on CTA RLE.     PAST MEDICAL & SURGICAL HISTORY:  HTN (hypertension)  High cholesterol  Hypothyroid  Afib  on xarelto  Sleep apnea  Osteoarthritis  CKD (chronic kidney disease) stage 3, GFR 30-59 ml/min  Thyroid cancer  H/O thyroidectomy  S/P rotator cuff surgery  Pacemaker  H/O total knee replacement    Home Medications:  allopurinol 100 mg oral tablet: 1 cap(s) orally once a day (at bedtime) (24 May 2024 08:21)  amiodarone 200 mg oral tablet: 1 tab(s) orally once a day (24 May 2024 08:21)  atorvastatin 20 mg oral tablet: 1 tab(s) orally once a day (24 May 2024 08:21)  cabergoline 0.5 mg oral tablet: 0.5 tab(s) orally once a week , monday night (24 May 2024 08:21)  levothyroxine 175 mcg (0.175 mg) oral tablet: 1 tab(s) orally once a day (24 May 2024 08:21)  trospium 20 mg oral tablet: 1 tab(s) orally once a day (24 May 2024 08:21)    VITALS:  T(F): 98.3 (11-29-24 @ 15:51), Max: 98.3 (11-29-24 @ 15:51)  HR: 67 (11-29-24 @ 15:51) (67 - 67)  BP: 121/70 (11-29-24 @ 15:51) (75/49 - 121/70)  RR: 18 (11-29-24 @ 15:51) (18 - 18)  SpO2: 99% (11-29-24 @ 15:51) (97% - 99%)    PHYSICAL EXAM:  General: NAD, AAOx3, calm and cooperative  Cardiac: RRR S1, S2  Respiratory: CTAB, normal respiratory effort  Musculoskeletal: Strength 5/5 BL UE/LE, ROM intact, compartments soft however with pain to palpation of the posterior right thigh, significant eccyhmosis to posterior right thigh   Vascular: Pulses 2+ throughout, extremities well perfused  Skin: Warm/dry, normal color, no jaundice    MEDICATIONS  (STANDING):    MEDICATIONS  (PRN):    LAB/STUDIES:                        8.8    13.06 )-----------( 305      ( 29 Nov 2024 12:30 )             29.1     11-29    141  |  103  |  34[H]  ----------------------------<  114[H]  4.2   |  26  |  1.5    Ca    8.2[L]      29 Nov 2024 12:30    TPro  5.1[L]  /  Alb  3.3[L]  /  TBili  0.9  /  DBili  x   /  AST  13  /  ALT  6   /  AlkPhos  94  11-29    PT/INR - ( 29 Nov 2024 14:31 )   PT: 32.20 sec;   INR: 2.67 ratio      PTT - ( 29 Nov 2024 14:31 )  PTT:39.9 sec  LIVER FUNCTIONS - ( 29 Nov 2024 12:30 )  Alb: 3.3 g/dL / Pro: 5.1 g/dL / ALK PHOS: 94 U/L / ALT: 6 U/L / AST: 13 U/L / GGT: x           Urinalysis Basic - ( 29 Nov 2024 12:30 )    Color: x / Appearance: x / SG: x / pH: x  Gluc: 114 mg/dL / Ketone: x  / Bili: x / Urobili: x   Blood: x / Protein: x / Nitrite: x   Leuk Esterase: x / RBC: x / WBC x   Sq Epi: x / Non Sq Epi: x / Bacteria: x    IMAGING:  < from: CT Angio Lower Extremity w/ IV Cont, Right (11.29.24 @ 13:33) >  IMPRESSION:    Large right posterior thigh intramuscular hematoma measuring 10.0 x 8.9 x   18.1 cm without contrast extravasation    Partially imaged likely occlusive disease proximal anterior tibial artery.    < end of copied text >

## 2024-11-29 NOTE — ED PROVIDER NOTE - DIFFERENTIAL DIAGNOSIS
Differential Diagnosis differential dx includes but is not limited to:  ICH, fx, dislocation, expanding hematoma, anemia

## 2024-11-29 NOTE — ED ADULT NURSE NOTE - NSICDXPASTMEDICALHX_GEN_ALL_CORE_FT
PAST MEDICAL HISTORY:  Afib on xarelto    CKD (chronic kidney disease) stage 3, GFR 30-59 ml/min     High cholesterol     HTN (hypertension)     Hypothyroid     Osteoarthritis     Sleep apnea     Thyroid cancer

## 2024-11-29 NOTE — ED PROVIDER NOTE - CARE PLAN
Principal Discharge DX:	Traumatic hematoma of thigh  Secondary Diagnosis:	Fall  Secondary Diagnosis:	Closed head injury  Secondary Diagnosis:	Chronic anticoagulation   1 Principal Discharge DX:	Traumatic hematoma of thigh  Secondary Diagnosis:	Fall  Secondary Diagnosis:	Closed head injury  Secondary Diagnosis:	Chronic anticoagulation  Secondary Diagnosis:	Anemia   Principal Discharge DX:	Traumatic hematoma of thigh  Secondary Diagnosis:	Fall  Secondary Diagnosis:	Closed head injury  Secondary Diagnosis:	Chronic anticoagulation  Secondary Diagnosis:	Anemia  Secondary Diagnosis:	Chronic kidney disease (CKD)

## 2024-11-29 NOTE — ED ADULT NURSE NOTE - NSFALLHARMRISKINTERV_ED_ALL_ED

## 2024-11-29 NOTE — ED PROVIDER NOTE - NSFOLLOWUPINSTRUCTIONS_ED_ALL_ED_FT
Hematoma    WHAT YOU NEED TO KNOW:    A hematoma is a collection of blood. A bruise is a type of hematoma. A hematoma may form in a muscle or in the tissues just under the skin. A hematoma that forms under the skin will feel like a bump or hard mass. Hematomas can happen anywhere in your body, including in your brain. Your body may break down and absorb a mild hematoma on its own. A more serious hematoma may need treatment.    DISCHARGE INSTRUCTIONS:    Medicines: You may need any of the following:    Prescription pain medicine may be given. Ask how to take this medicine safely.    NSAIDs, such as ibuprofen, help decrease swelling, pain, and fever. This medicine is available with or without a doctor's order. NSAIDs can cause stomach bleeding or kidney problems in certain people. If you take blood thinner medicine, always ask your healthcare provider if NSAIDs are safe for you. Always read the medicine label and follow directions.    Antibiotics prevent or treat a bacterial infection.    Take your medicine as directed. Contact your healthcare provider if you think your medicine is not helping or if you have side effects. Tell your provider if you are allergic to any medicine. Keep a list of the medicines, vitamins, and herbs you take. Include the amounts, and when and why you take them. Bring the list or the pill bottles to follow-up visits. Carry your medicine list with you in case of an emergency.    Return to the emergency department if:  - You have new or worsening pain, or pain that does not get better with medicine.  - You have a fever.  - You have trouble moving the body part that has the hematoma.    Contact your healthcare provider if:  - You have questions or concerns about your condition or care.    Follow up with your healthcare provider as directed: You may need to have surgery if your hematoma is severe. You may also need other tests to make sure there is no other damage that needs to be treated. Write down your questions so you remember to ask them during your visits.    Self-care:    Rest the area. Rest will help your body heal and will also help prevent more damage.    Apply ice as directed. Ice helps reduce swelling. Ice may also help prevent tissue damage. Use an ice pack, or put crushed ice in a bag. Cover it with a towel. Place it on your hematoma for 20 minutes every hour, or as directed. Ask how many times each day to apply ice, and for how many days.    Compress the injury if possible. Lightly wrap the injury with an elastic or soft bandage. This may help control swelling. Ask your healthcare provider how to wrap your injury properly.    Elevate the area as directed. If possible, raise the area above the level of your heart as often as you can. This will help decrease swelling.    Keep the hematoma covered with a bandage. This will help protect the area while it heals. Hematoma    WHAT YOU NEED TO KNOW:    A hematoma is a collection of blood. A bruise is a type of hematoma. A hematoma may form in a muscle or in the tissues just under the skin. A hematoma that forms under the skin will feel like a bump or hard mass. Hematomas can happen anywhere in your body, including in your brain. Your body may break down and absorb a mild hematoma on its own. A more serious hematoma may need treatment.    DISCHARGE INSTRUCTIONS:    Medicines: You may need any of the following:    Prescription pain medicine may be given. Ask how to take this medicine safely.    NSAIDs, such as ibuprofen, help decrease swelling, pain, and fever. This medicine is available with or without a doctor's order. NSAIDs can cause stomach bleeding or kidney problems in certain people. If you take blood thinner medicine, always ask your healthcare provider if NSAIDs are safe for you. Always read the medicine label and follow directions.    Antibiotics prevent or treat a bacterial infection.    Take your medicine as directed. Contact your healthcare provider if you think your medicine is not helping or if you have side effects. Tell your provider if you are allergic to any medicine. Keep a list of the medicines, vitamins, and herbs you take. Include the amounts, and when and why you take them. Bring the list or the pill bottles to follow-up visits. Carry your medicine list with you in case of an emergency.    Return to the emergency department if:  - You have new or worsening pain, or pain that does not get better with medicine.  - You have a fever.  - You have trouble moving the body part that has the hematoma.    Contact your healthcare provider if:  - You have questions or concerns about your condition or care.    Follow up with your healthcare provider as directed: You may need to have surgery if your hematoma is severe. You may also need other tests to make sure there is no other damage that needs to be treated. Write down your questions so you remember to ask them during your visits.    Self-care:    Rest the area. Rest will help your body heal and will also help prevent more damage.    Apply ice as directed. Ice helps reduce swelling. Ice may also help prevent tissue damage. Use an ice pack, or put crushed ice in a bag. Cover it with a towel. Place it on your hematoma for 20 minutes every hour, or as directed. Ask how many times each day to apply ice, and for how many days.    Compress the injury if possible. Lightly wrap the injury with an elastic or soft bandage. This may help control swelling. Ask your healthcare provider how to wrap your injury properly.    Elevate the area as directed. If possible, raise the area above the level of your heart as often as you can. This will help decrease swelling.    Keep the hematoma covered with a bandage. This will help protect the area while it heals.    Anemia    Anemia is a condition in which the concentration of red blood cells or hemoglobin in the blood is below normal. Hemoglobin is a substance in red blood cells that carries oxygen to the tissues of the body. Anemia results in not enough oxygen reaching these tissues which can cause symptoms such as weakness, dizziness/lightheadedness, shortness of breath, chest pain, paleness, or nausea. The cause of your anemia may or may not be determined immediately. If your hemoglobin was dangerously low, you may have received a blood transfusion. Usually reactions to transfusions occur immediately but monitor yourself for any fevers, rash, or shortness of breath.    SEEK IMMEDIATE MEDICAL CARE IF YOU HAVE ANY OF THE FOLLOWING SYMPTOMS: extreme weakness/chest pain/shortness of breath, black or bloody stools, vomiting blood, fainting, fever, or any signs of dehydration.

## 2024-11-29 NOTE — H&P ADULT - NSHPPHYSICALEXAM_GEN_ALL_CORE
PHYSICAL EXAM:  General: NAD, AAOx3, calm and cooperative  Cardiac: RRR S1, S2  Respiratory: CTAB, normal respiratory effort  Musculoskeletal: Strength 5/5 BL UE/LE, ROM intact, compartments soft however with pain to palpation of the posterior right thigh, significant eccyhmosis to posterior right thigh   Vascular: Pulses 2+ throughout, extremities well perfused  Skin: Warm/dry, normal color, no jaundice

## 2024-11-29 NOTE — ED PROVIDER NOTE - CLINICAL SUMMARY MEDICAL DECISION MAKING FREE TEXT BOX
Pt here with significant swelling and ecchymosis to R posterior thigh after mechanical fall 2.5 wks ago. Here in ED, BP 90s/60s which is below her baseline. Neurovascularly intact with full ROM. Pt also on xarelto, concern for actively expanding hematoma. Plan for labs, imaging r/o ICH, fx, dislocation, expanding hematoma, anemia. Hgb 8.8, baseline ~11 (previously 11.2 on 8/19/24). BUN/Cr 34/1.5 (previously 34/1.3 on 8/19/24). Imaging notable for large R posterior thigh IM hematoma measuring 10x8.9x18.1cm without contrast extrav. Surgery consulted who requested repeat CBC. Pt signed out to Dr. Dominguez to f/u repeat CBC and surgery recs. Pt here with significant swelling and ecchymosis to R posterior thigh after mechanical fall 2.5 wks ago. Here in ED, BP 90s/60s which is below her baseline. Neurovascularly intact with full ROM. Pt also on xarelto, concern for actively expanding hematoma. Plan for labs, imaging r/o ICH, fx, dislocation, expanding hematoma, anemia. Hgb 8.8, baseline ~11 (previously 11.2 on 8/19/24). BUN/Cr 34/1.5 (previously 34/1.3 on 8/19/24). Imaging notable for large R posterior thigh IM hematoma measuring 10x8.9x18.1cm without contrast extrav. Surgery consulted who requested repeat CBC. Repeat CBC 7.8 down from 8.8 earlier today. BP improved to 120s/70s. Consented for 1u pRBC. Pt signed out to Dr. Dominguez to f/u repeat CBC and surgery recs. Pt here with significant swelling and ecchymosis to R posterior thigh after mechanical fall 2.5 wks ago. Here in ED, BP 90s/60s which is below her baseline. Neurovascularly intact with full ROM. Pt also on xarelto, concern for actively expanding hematoma. Plan for labs, imaging r/o ICH, fx, dislocation, expanding hematoma, anemia. Hgb 8.8, baseline ~11 (previously 11.2 on 8/19/24). BUN/Cr 34/1.5 (previously 34/1.3 on 8/19/24). Imaging notable for large R posterior thigh IM hematoma measuring 10x8.9x18.1cm without contrast extrav. Surgery consulted who requested repeat CBC. Repeat CBC 7.8 down from 8.8 earlier today. BP improved to 120s/70s. Consented for 1u pRBC. Pt signed out to Dr. Dominguez to f/u surgery recs. Pt here with significant swelling and ecchymosis to R posterior thigh after mechanical fall 2.5 wks ago. Here in ED, BP 90s/60s which is below her baseline. Neurovascularly intact with full ROM. Pt also on xarelto, concern for actively expanding hematoma. Plan for labs, imaging r/o ICH, fx, dislocation, expanding hematoma, anemia. Hgb 8.8, baseline ~11 (previously 11.2 on 8/19/24). BUN/Cr 34/1.5 (previously 34/1.3 on 8/19/24). Imaging notable for large R posterior thigh IM hematoma measuring 10x8.9x18.1cm without contrast extrav. Surgery consulted who requested repeat CBC. Repeat CBC 7.8 down from 8.8 earlier today. BP improved to 120s/70s. Spoke with pt and daughters several times on recommendation to transfuse 1u pRBC given hgb dropped 3.5 from baseline. They were hesitant on blood transfusion and requested to reach out to pt's cardiologist first. I attempted several times to reach her cardiologist but no answer, left message. Discussed risks/benefits of transfusion and risks of foregoing blood transfusion and they understand risks of refusing blood transfusion.  Pt signed out to Dr. Dominguez to f/u surgery recs. Pt here with significant swelling and ecchymosis to R posterior thigh after mechanical fall 2.5 wks ago. Here in ED, BP 90s/60s which is below her baseline. Neurovascularly intact with full ROM. Pt also on xarelto, concern for actively expanding hematoma. Plan for labs, imaging r/o ICH, fx, dislocation, expanding hematoma, anemia. Hgb 8.8, baseline ~11 (previously 11.2 on 8/19/24). BUN/Cr 34/1.5 (previously 34/1.3 on 8/19/24). Imaging notable for large R posterior thigh IM hematoma measuring 10x8.9x18.1cm without contrast extrav. Surgery consulted who requested repeat CBC. Repeat CBC 7.8 down from 8.8 earlier today. BP improved to 120s/70s. Spoke with pt and daughters several times on recommendation to transfuse 1u pRBC given hgb dropped 3.5 from baseline. They were hesitant on blood transfusion and requested to reach out to pt's cardiologist first. I attempted several times to reach her cardiologist but no answer, left message. Discussed risks/benefits of transfusion and risks of foregoing blood transfusion and they understand risks of refusing blood transfusion. Pt admitted to SICU for close hemodynamic monitoring given risk for worsening hematoma, worsening anemia, hemodynamic instability, etc if not closely monitored and tx'ed.

## 2024-11-29 NOTE — ED PROVIDER NOTE - PHYSICAL EXAMINATION
CONST: Well appearing in NAD  EYES: EOMI, Sclera and conjunctiva clear.   ENT: No nasal discharge  NECK: Non-tender  CARD: Normal S1 S2; Normal rate and rhythm  RESP: Equal BS B/L, No wheezes, rhonchi or rales. No distress  GI: Soft, non-tender, non-distended.  MS: Normal ROM in all extremities. No midline spinal tenderness.  SKIN: Warm, dry, Good turgor. hematoma R posterior distal thigh/ popliteal area, bruising to R inner thigh   NEURO: A&Ox3, Strength 5/5 with no sensory deficits

## 2024-11-29 NOTE — ED PROVIDER NOTE - OBJECTIVE STATEMENT
Patient is a 84-year-old female PMH A-fib, pacemaker (on Xarelto), hypertension, hyperlipidemia, hypothyroid, CKD, FELIX coming to ED for fall with associated right leg swelling and bruise.  Patient reports mechanical trip and fall 2011/11 landing on her back/right side.  +HT, -LOC, ambulatory after fall using walker (baseline), but noticed yesterday swelling and large bruise to right leg behind knee and right inner thigh.  Also noted bump to right side of head since fall, reports that has been getting smaller.  Denies headache, neck pain, chest pain, shortness of breath, dizziness, LOC, unilateral numbness/weakness, paresthesias

## 2024-11-29 NOTE — ED ADULT TRIAGE NOTE - GLASGOW COMA SCALE: EYE OPENING, MLM
I have reviewed discharge instructions with the patient. The patient verbalized understanding. Opportunities for questions was given. Discontinued PIV access. Pt with belongings. (E4) spontaneous

## 2024-11-30 LAB
ANION GAP SERPL CALC-SCNC: 10 MMOL/L — SIGNIFICANT CHANGE UP (ref 7–14)
APTT BLD: 32.9 SEC — SIGNIFICANT CHANGE UP (ref 27–39.2)
BUN SERPL-MCNC: 32 MG/DL — HIGH (ref 10–20)
CALCIUM SERPL-MCNC: 8.1 MG/DL — LOW (ref 8.4–10.5)
CHLORIDE SERPL-SCNC: 105 MMOL/L — SIGNIFICANT CHANGE UP (ref 98–110)
CO2 SERPL-SCNC: 25 MMOL/L — SIGNIFICANT CHANGE UP (ref 17–32)
CREAT SERPL-MCNC: 1.5 MG/DL — SIGNIFICANT CHANGE UP (ref 0.7–1.5)
EGFR: 35 ML/MIN/1.73M2 — LOW
GLUCOSE SERPL-MCNC: 146 MG/DL — HIGH (ref 70–99)
HCT VFR BLD CALC: 25.6 % — LOW (ref 37–47)
HCT VFR BLD CALC: 28.4 % — LOW (ref 37–47)
HGB BLD-MCNC: 7.7 G/DL — LOW (ref 12–16)
HGB BLD-MCNC: 8.3 G/DL — LOW (ref 12–16)
INR BLD: 1.23 RATIO — SIGNIFICANT CHANGE UP (ref 0.65–1.3)
MAGNESIUM SERPL-MCNC: 2.1 MG/DL — SIGNIFICANT CHANGE UP (ref 1.8–2.4)
MCHC RBC-ENTMCNC: 25.1 PG — LOW (ref 27–31)
MCHC RBC-ENTMCNC: 25.2 PG — LOW (ref 27–31)
MCHC RBC-ENTMCNC: 29.2 G/DL — LOW (ref 32–37)
MCHC RBC-ENTMCNC: 30.1 G/DL — LOW (ref 32–37)
MCV RBC AUTO: 83.4 FL — SIGNIFICANT CHANGE UP (ref 81–99)
MCV RBC AUTO: 86.1 FL — SIGNIFICANT CHANGE UP (ref 81–99)
NRBC # BLD: 0 /100 WBCS — SIGNIFICANT CHANGE UP (ref 0–0)
NRBC # BLD: 0 /100 WBCS — SIGNIFICANT CHANGE UP (ref 0–0)
PHOSPHATE SERPL-MCNC: 3.5 MG/DL — SIGNIFICANT CHANGE UP (ref 2.1–4.9)
PLATELET # BLD AUTO: 261 K/UL — SIGNIFICANT CHANGE UP (ref 130–400)
PLATELET # BLD AUTO: 275 K/UL — SIGNIFICANT CHANGE UP (ref 130–400)
PMV BLD: 10.4 FL — SIGNIFICANT CHANGE UP (ref 7.4–10.4)
PMV BLD: 11.5 FL — HIGH (ref 7.4–10.4)
POTASSIUM SERPL-MCNC: 4 MMOL/L — SIGNIFICANT CHANGE UP (ref 3.5–5)
POTASSIUM SERPL-SCNC: 4 MMOL/L — SIGNIFICANT CHANGE UP (ref 3.5–5)
PROTHROM AB SERPL-ACNC: 14.6 SEC — HIGH (ref 9.95–12.87)
RBC # BLD: 3.07 M/UL — LOW (ref 4.2–5.4)
RBC # BLD: 3.3 M/UL — LOW (ref 4.2–5.4)
RBC # FLD: 18.5 % — HIGH (ref 11.5–14.5)
RBC # FLD: 18.6 % — HIGH (ref 11.5–14.5)
SODIUM SERPL-SCNC: 140 MMOL/L — SIGNIFICANT CHANGE UP (ref 135–146)
WBC # BLD: 9.02 K/UL — SIGNIFICANT CHANGE UP (ref 4.8–10.8)
WBC # BLD: 9.71 K/UL — SIGNIFICANT CHANGE UP (ref 4.8–10.8)
WBC # FLD AUTO: 9.02 K/UL — SIGNIFICANT CHANGE UP (ref 4.8–10.8)
WBC # FLD AUTO: 9.71 K/UL — SIGNIFICANT CHANGE UP (ref 4.8–10.8)

## 2024-11-30 RX ADMIN — ACETAMINOPHEN 500MG 650 MILLIGRAM(S): 500 TABLET, COATED ORAL at 17:04

## 2024-11-30 RX ADMIN — ACETAMINOPHEN 500MG 650 MILLIGRAM(S): 500 TABLET, COATED ORAL at 11:50

## 2024-11-30 RX ADMIN — ALLOPURINOL 300 MILLIGRAM(S): 300 TABLET ORAL at 21:44

## 2024-11-30 RX ADMIN — METOPROLOL TARTRATE 50 MILLIGRAM(S): 100 TABLET, FILM COATED ORAL at 18:18

## 2024-11-30 RX ADMIN — METOPROLOL TARTRATE 50 MILLIGRAM(S): 100 TABLET, FILM COATED ORAL at 05:13

## 2024-11-30 RX ADMIN — ACETAMINOPHEN 500MG 650 MILLIGRAM(S): 500 TABLET, COATED ORAL at 05:12

## 2024-11-30 RX ADMIN — ACETAMINOPHEN 500MG 650 MILLIGRAM(S): 500 TABLET, COATED ORAL at 11:20

## 2024-11-30 RX ADMIN — Medication 2 TABLET(S): at 21:44

## 2024-11-30 RX ADMIN — ACETAMINOPHEN 500MG 650 MILLIGRAM(S): 500 TABLET, COATED ORAL at 17:34

## 2024-11-30 RX ADMIN — SODIUM CHLORIDE 50 MILLILITER(S): 9 INJECTION, SOLUTION INTRAMUSCULAR; INTRAVENOUS; SUBCUTANEOUS at 11:21

## 2024-11-30 RX ADMIN — Medication 175 MICROGRAM(S): at 05:13

## 2024-11-30 RX ADMIN — ACETAMINOPHEN 500MG 650 MILLIGRAM(S): 500 TABLET, COATED ORAL at 06:00

## 2024-11-30 RX ADMIN — Medication 5 MILLIGRAM(S): at 17:04

## 2024-11-30 RX ADMIN — CHLORHEXIDINE GLUCONATE 1 APPLICATION(S): 1.2 RINSE ORAL at 05:17

## 2024-11-30 RX ADMIN — Medication 25 GRAM(S): at 05:12

## 2024-11-30 RX ADMIN — Medication 5 MILLIGRAM(S): at 05:12

## 2024-11-30 RX ADMIN — Medication 20 MILLIGRAM(S): at 21:45

## 2024-11-30 NOTE — PROGRESS NOTE ADULT - SUBJECTIVE AND OBJECTIVE BOX
JEFFERY UGALDE   [id number]   [birth year]    84yF  ============================================================   DATE OF INITIAL SICU/SDU CONSULT: 11-29-24    INDICATION FOR SICU CONSULT:  hemodynamic monitoring/close H/H monitoring      SICU COURSE EVENTS :  11-29 - admitted to SICU service.      24HOUR EVENTS  11//29  NIGHT  - AAO x 3, hemotma on right posterior thigh outlined with tenderness to posterior medial glutor hard areas     [X] A ten-point review of systems was negative except as expressed in note.  [X] History was obtained from patient. If unable to participate in their care, history obtained from review of the chart and collateral sources of information.  ================================================= JEFFERY UGALDE   [id number]   [birth year]    84yF  ============================================================   DATE OF INITIAL SICU/SDU CONSULT: 11-29-24    INDICATION FOR SICU CONSULT:  hemodynamic monitoring/close H/H monitoring      SICU COURSE EVENTS :  11-29 - admitted to SICU service.      24HOUR EVENTS  11//29  NIGHT  - A&O x 3, hematoma on right posterior thigh outlined with tenderness to posterior medial gluteal area    [X] A ten-point review of systems was negative except as expressed in note.  [X] History was obtained from patient. If unable to participate in their care, history obtained from review of the chart and collateral sources of information.  =================================================    Physical exam:  Neuro- alert and oriented x 3  Resp- clear no crackles, no rhonchi, no crackles  Cardiac- S1 S2, irregular rhythm,   Abd- soft, NT, NT, + bowel sounds  MSK- ROM x 4, distal pulses intact, posterior right thigh compartment soft, ace bandage in place

## 2024-11-30 NOTE — CONSULT NOTE ADULT - SUBJECTIVE AND OBJECTIVE BOX
HPI:  84F with PMH significant for atrial fibrillation on Xarelto, hypertension, hyperlipidemia, hypothyroidism, CKD, FELIX, and recent fall on 11/11 (+HT, -LOC) who presents to the ED with complaints of right lower extremity pain and bruising. Patient states that she has been ambulatory since the fall with her walker which is her baseline. She denies any neurologic symptoms and states that she has no other complaints besides her right thigh pain.      General surgery consulted for findings of right posterior thigh hematoma without active extravasation on CTA RLE.      < from: CT Angio Lower Extremity w/ IV Cont, Right (11.29.24 @ 13:33) >  IMPRESSION:    Large right posterior thigh intramuscular hematoma measuring 10.0 x 8.9 x   18.1 cm without contrast extravasation    Partially imaged likely occlusive disease proximal anterior tibial artery.      < end of copied text >        PAST MEDICAL & SURGICAL HISTORY:  HTN (hypertension)      High cholesterol      Hypothyroid      Afib  on xarelto      Sleep apnea      Osteoarthritis      CKD (chronic kidney disease) stage 3, GFR 30-59 ml/min      Thyroid cancer      H/O thyroidectomy      S/P rotator cuff surgery      Pacemaker      H/O total knee replacement          Hospital Course:    TODAY'S SUBJECTIVE & REVIEW OF SYMPTOMS:     Constitutional WNL   Cardio WNL   Resp WNL   GI WNL  Heme WNL  Endo WNL  Skin WNL  MSK right thigh pain   Neuro WNL  Cognitive WNL  Psych WNL      MEDICATIONS  (STANDING):  acetaminophen     Tablet .. 650 milliGRAM(s) Oral every 6 hours  allopurinol 300 milliGRAM(s) Oral at bedtime  aMIOdarone    Tablet 200 milliGRAM(s) Oral daily  atorvastatin 20 milliGRAM(s) Oral at bedtime  chlorhexidine 2% Cloths 1 Application(s) Topical <User Schedule>  furosemide    Tablet 20 milliGRAM(s) Oral daily  influenza  Vaccine (HIGH DOSE) 0.5 milliLiter(s) IntraMuscular once  levothyroxine 175 MICROGram(s) Oral daily  metoprolol tartrate 50 milliGRAM(s) Oral two times a day  oxybutynin 5 milliGRAM(s) Oral two times a day  senna 2 Tablet(s) Oral at bedtime  sodium chloride 0.9%. 1000 milliLiter(s) (50 mL/Hr) IV Continuous <Continuous>    MEDICATIONS  (PRN):      FAMILY HISTORY:  Family history of lung cancer (Mother)    Family history of abdominal aortic aneurysm (AAA) (Father)        Allergies    No Known Allergies    Intolerances        SOCIAL HISTORY:    [  ] Etoh  [  ] Smoking  [  ] Substance abuse     Home Environment:  [ x  ] Home Alone  [   ] Lives with Family  [   ] Home Health Aid    Dwelling:  [   ] Apartment  [ x  ] Private House  [   ] Adult Home  [   ] Skilled Nursing Facility      [   ] Short Term  [   ] Long Term  [x   ] Stairs       Elevator [   ]    FUNCTIONAL STATUS PTA: (Check all that apply)  Ambulation: [  x  ]Independent    [   ] Dependent     [   ] Non-Ambulatory  Assistive Device: [   ] SA Cane  [   ]  Q Cane  [  x ] Walker  [   ]  Wheelchair  ADL : [  x ] Independent  [    ]  Dependent       Vital Signs Last 24 Hrs  T(C): 35.7 (30 Nov 2024 08:00), Max: 36.9 (29 Nov 2024 21:00)  T(F): 96.3 (30 Nov 2024 08:00), Max: 98.4 (29 Nov 2024 21:00)  HR: 67 (30 Nov 2024 09:00) (60 - 100)  BP: 99/50 (30 Nov 2024 09:00) (75/49 - 130/62)  BP(mean): 71 (30 Nov 2024 09:00) (70 - 89)  RR: 17 (30 Nov 2024 09:00) (17 - 18)  SpO2: 99% (30 Nov 2024 09:00) (96% - 99%)    Parameters below as of 30 Nov 2024 10:00  Patient On (Oxygen Delivery Method): nasal cannula  O2 Flow (L/min): 2        PHYSICAL EXAM: Awake & Alert  GENERAL: NAD  HEAD:  Normocephalic  CHEST/LUNG: Clear   HEART: S1S2+  ABDOMEN: Soft, Nontender  EXTREMITIES:  no calf tenderness, right thigh hematoma     NERVOUS SYSTEM:  Cranial Nerves 2-12 intact [   ] Abnormal  [   ]  ROM: WFL all extremities [   ]  Abnormal [ x  ]limited RLE   Motor Strength: WFL all extremities  [   ]  Abnormal [  x ]limited RLE  Sensation: intact to light touch [ x  ] Abnormal [   ]    FUNCTIONAL STATUS:  Bed Mobility: Independent [   ]  Supervision [   ]  Needs Assistance [ x  ]  N/A [   ]  Transfers: Independent [   ]  Supervision [   ]  Needs Assistance [   ]  N/A [   ]   Ambulation: Independent [   ]  Supervision [   ]  Needs Assistance [   ]  N/A [   ]  ADL: Independent [   ] Requires Assistance [   ] N/A [   ]      LABS:                        7.7    9.02  )-----------( 261      ( 30 Nov 2024 05:42 )             25.6     11-29    140  |  105  |  33[H]  ----------------------------<  143[H]  3.7   |  24  |  1.6[H]    Ca    7.8[L]      29 Nov 2024 20:45  Phos  4.0     11-29  Mg     1.7     11-29    TPro  5.1[L]  /  Alb  3.3[L]  /  TBili  0.9  /  DBili  x   /  AST  13  /  ALT  6   /  AlkPhos  94  11-29    PT/INR - ( 29 Nov 2024 20:45 )   PT: 24.00 sec;   INR: 2.01 ratio         PTT - ( 29 Nov 2024 20:45 )  PTT:39.1 sec  Urinalysis Basic - ( 29 Nov 2024 20:45 )    Color: x / Appearance: x / SG: x / pH: x  Gluc: 143 mg/dL / Ketone: x  / Bili: x / Urobili: x   Blood: x / Protein: x / Nitrite: x   Leuk Esterase: x / RBC: x / WBC x   Sq Epi: x / Non Sq Epi: x / Bacteria: x        RADIOLOGY & ADDITIONAL STUDIES:

## 2024-11-30 NOTE — CONSULT NOTE ADULT - ASSESSMENT
IMPRESSION: Rehab of gait dysfunction / right thigh hematoma / afib (on Xarelto), HLD, hypothyroidism, gout, CKD, FELIX, overactive bladder     PRECAUTIONS: [   ] Cardiac  [   ] Respiratory  [   ] Seizures [   ] Contact Isolation  [   ] Droplet Isolation  [   ] Other    Weight Bearing Status:     RECOMMENDATION:    Out of Bed to Chair     DVT/Decubiti Prophylaxis    REHAB PLAN:     [ x   ] Bedside P/T 3-5 times a week   [    ]   Bedside O/T  2-3 times a week             [    ] Speech Therapy               [    ]  No Rehab Therapy Indicated   Conditioning/ROM                                    ADL  Bed Mobility                                               Conditioning/ROM  Transfers                                                     Bed Mobility  Sitting /Standing Balance                         Transfers                                        Gait Training                                               Sitting/Standing Balance  Stair Training [   ]Applicable                    Home equipment Eval                                                                        Splinting  [   ] Only      GOALS:   ADL   [    ]   Independent                    Transfers  [  x  ] Independent                          Ambulation  [  x  ] Independent     [    x ] With device                            [    ]  CG                                                         [    ]  CG                                                                  [    ] CG                            [    ] Min A                                                   [    ] Min A                                                              [    ] Min  A          DISCHARGE PLAN:   [    ]  Good candidate for Intensive Rehabilitation/Hospital based                                             Will tolerate 3hrs Intensive Rehab Daily                                       [  x   ]  Short Term Rehab in Skilled Nursing Facility                          vs             [  x   ]  Home with Outpatient or  services                                         [     ]  Possible Candidate for Intensive Hospital based Rehab

## 2024-11-30 NOTE — PROGRESS NOTE ADULT - SUBJECTIVE AND OBJECTIVE BOX
GENERAL SURGERY CONSULT NOTE    Patient: JEFFERY UGALDE , 84y ([birth year])Female   MRN: [id number]  Location: [facility] ED  Visit: 11-29-24 Emergency  Date: 11-30-24 @ 14:49 PM    24HR EVENTS: NAEO. Patient complains of L thigh pain with palpation to hematoma, no other complaints at this time. R posterior thigh hematoma not significantly changed on exam. Vitals WNL, afebrile. Hgb stable at 7.9 (from 7.8 on prior labs).    OBJECTIVE:  Vital Signs Last 24 Hrs  T(C): 35.7 (30 Nov 2024 12:00), Max: 36.9 (29 Nov 2024 21:00)  T(F): 96.3 (30 Nov 2024 12:00), Max: 98.4 (29 Nov 2024 21:00)  HR: 61 (30 Nov 2024 14:00) (60 - 100)  BP: 112/58 (30 Nov 2024 14:00) (94/51 - 130/62)  BP(mean): 79 (30 Nov 2024 14:00) (69 - 89)  RR: 36 (30 Nov 2024 14:00) (17 - 36)  SpO2: 99% (30 Nov 2024 14:00) (96% - 100%)    Parameters below as of 30 Nov 2024 15:00  Patient On (Oxygen Delivery Method): nasal cannula  O2 Flow (L/min): 2      I&O's Summary    29 Nov 2024 07:01  -  30 Nov 2024 07:00  --------------------------------------------------------  IN: 740 mL / OUT: 0 mL / NET: 740 mL    30 Nov 2024 07:01  -  30 Nov 2024 14:46  --------------------------------------------------------  IN: 600 mL / OUT: 550 mL / NET: 50 mL        PHYSICAL EXAM:  General: NAD, AAOx3  Cardiac: RRR  Respiratory: Equal chest rise bilaterally, normal respiratory effort  Musculoskeletal: Moves all extremities to command. Compartments soft, some pain to palpation of the posterior right thigh, eccyhmosis to posterior right thigh   Vascular: Pulses 2+ throughout, extremities well perfused  Skin: Warm/dry, normal color, no jaundice    LABS:                        7.7    9.02  )-----------( 261      ( 30 Nov 2024 05:42 )             25.6     11-29    140  |  105  |  33[H]  ----------------------------<  143[H]  3.7   |  24  |  1.6[H]    Ca    7.8[L]      29 Nov 2024 20:45  Phos  4.0     11-29  Mg     1.7     11-29    TPro  5.1[L]  /  Alb  3.3[L]  /  TBili  0.9  /  DBili  x   /  AST  13  /  ALT  6   /  AlkPhos  94  11-29    PT/INR - ( 29 Nov 2024 20:45 )   PT: 24.00 sec;   INR: 2.01 ratio         PTT - ( 29 Nov 2024 20:45 )  PTT:39.1 sec  Urinalysis Basic - ( 29 Nov 2024 20:45 )    Color: x / Appearance: x / SG: x / pH: x  Gluc: 143 mg/dL / Ketone: x  / Bili: x / Urobili: x   Blood: x / Protein: x / Nitrite: x   Leuk Esterase: x / RBC: x / WBC x   Sq Epi: x / Non Sq Epi: x / Bacteria: x        RADIOLOGY & ADDITIONAL STUDIES: GENERAL SURGERY CONSULT NOTE    Patient: JEFFERY UGALDE , 84y ([birth year])Female   MRN: [id number]  Location: [facility] ED  Visit: 11-29-24 Emergency  Date: 11-30-24 @ 14:49 PM    24HR EVENTS: NAEO. Patient complains of R thigh pain with palpation to hematoma, no other complaints at this time. R posterior thigh hematoma not significantly changed on exam. Vitals WNL, afebrile. Hgb stable at 7.9 (from 7.8 on prior labs).    OBJECTIVE:  Vital Signs Last 24 Hrs  T(C): 35.7 (30 Nov 2024 12:00), Max: 36.9 (29 Nov 2024 21:00)  T(F): 96.3 (30 Nov 2024 12:00), Max: 98.4 (29 Nov 2024 21:00)  HR: 61 (30 Nov 2024 14:00) (60 - 100)  BP: 112/58 (30 Nov 2024 14:00) (94/51 - 130/62)  BP(mean): 79 (30 Nov 2024 14:00) (69 - 89)  RR: 36 (30 Nov 2024 14:00) (17 - 36)  SpO2: 99% (30 Nov 2024 14:00) (96% - 100%)    Parameters below as of 30 Nov 2024 15:00  Patient On (Oxygen Delivery Method): nasal cannula  O2 Flow (L/min): 2      I&O's Summary    29 Nov 2024 07:01  -  30 Nov 2024 07:00  --------------------------------------------------------  IN: 740 mL / OUT: 0 mL / NET: 740 mL    30 Nov 2024 07:01  -  30 Nov 2024 14:46  --------------------------------------------------------  IN: 600 mL / OUT: 550 mL / NET: 50 mL        PHYSICAL EXAM:  General: NAD, AAOx3  Cardiac: RRR  Respiratory: Equal chest rise bilaterally, normal respiratory effort  Musculoskeletal: Moves all extremities to command. Compartments soft, some pain to palpation of the posterior right thigh, eccyhmosis to posterior right thigh   Vascular: Pulses 2+ throughout, extremities well perfused  Skin: Warm/dry, normal color, no jaundice    LABS:                        7.7    9.02  )-----------( 261      ( 30 Nov 2024 05:42 )             25.6     11-29    140  |  105  |  33[H]  ----------------------------<  143[H]  3.7   |  24  |  1.6[H]    Ca    7.8[L]      29 Nov 2024 20:45  Phos  4.0     11-29  Mg     1.7     11-29    TPro  5.1[L]  /  Alb  3.3[L]  /  TBili  0.9  /  DBili  x   /  AST  13  /  ALT  6   /  AlkPhos  94  11-29    PT/INR - ( 29 Nov 2024 20:45 )   PT: 24.00 sec;   INR: 2.01 ratio         PTT - ( 29 Nov 2024 20:45 )  PTT:39.1 sec  Urinalysis Basic - ( 29 Nov 2024 20:45 )    Color: x / Appearance: x / SG: x / pH: x  Gluc: 143 mg/dL / Ketone: x  / Bili: x / Urobili: x   Blood: x / Protein: x / Nitrite: x   Leuk Esterase: x / RBC: x / WBC x   Sq Epi: x / Non Sq Epi: x / Bacteria: x        RADIOLOGY & ADDITIONAL STUDIES:

## 2024-11-30 NOTE — PROGRESS NOTE ADULT - ASSESSMENT
ASSESSMENT:  84 year old female with past medical history significant for atrial fibrillation on Xarelto, hypertension, hyperlipidemia, hypothyroidism, CKD, FELIX, and recent fall on 11/11 (+HT, -LOC) who presents to the ED with complaints of right lower extremity pain and bruising. Patient states that she has been ambulatory since the fall with her walker which is her baseline. She denies any neurologic symptoms and states that she has no other complaints besides her right thigh pain.      General surgery consulted for findings of right posterior thigh hematoma without active extravasation on CTA RLE.     PLAN:  -F/U labs, replete PRN  -Monitor hemodynamics, support PRN  -Regular diet  -Holding DVT ppx/AC at this time  -RLE ACE wrap  -Rest of care per SICU    Above plan discussed with On Call Attending Surgeon, Dr. Iraheta

## 2024-11-30 NOTE — PROGRESS NOTE ADULT - ASSESSMENT
84y Female found to have right thigh hematoma.      NEUROLOGICAL:  #Acute pain    -APAP prn    RESPIRATORY:   #Oxygenation    - on RA with no issues    - encourage incentive spirometry use while in bed   #hx of FELIX  #Activity    -increase as tolerated    CARDS:   #hx of afib  - holding home Xarelto given R thigh hematoma  - continue home amiodarone 200 QD  - continue home metoprolol bid with hold parameters   - holding home lasix 20 QD  - s/p AV Junction Ablation in June 2024  - cardiologist Dr. Quintero   #hx HLD  - continue atorvastatin 20mg QD   Imaging:   - EKG pending   - TTE 06/24: EF of 44%. Indeterminate diastolic dysfunction. Moderate concentric left ventricular hypertrophy. Severe mitral annular calcification. Mild mitral valve regurgitation. Mild to moderate pulmonic valve regurgitation. Small pericardial effusion without echocardiographic evidence of tamponade physiology.    GASTROINTESTINAL/NUTRITION:   #Diet, DASH    -aspiration precautions, HOB 30  #GI Prophylaxis    -not indicated  #Bowel regimen    - senna    -last bowel movement prior to admission     /RENAL:   #urine output in critically ill    -voiding spontaneously    #hx of CKD  - monitor BUN/Cr  #maintain euvolemia  - s/p bolus with response  - IVF @50cc   #overactive bladder  - continue home trospium chloride 20 bid    Labs           BUN/Cr: 34/1.5 -->           [11-29 @ 12:30]Na  141 // K  4.2 // Mg  -- // Phos  --         HEME/ONC:   #DVT prophylaxis     -Chemical: holding given R thigh hematoma with acute H/H drop     -Mechanical: SCDs       Labs: Hb/Hct:  8.8/29.1  (11-29 @ 12:30)  -->,  7.8/25.5  (11-29 @ 16:51)  -->                      Plts:  305  -->,  257  -->                 PTT/INR:  39.9/2.67  --->     #R thigh hematoma, no evidence of extrav on CT  - patient and daughter deferring blood transfusion at this time; amendable if hemoglobin drops below 7.0  - close H/H monitoring q6hr   #hx of afib on home Xarelto  - continue to hold home Xarelto 15 QD    ID:  #Monitor WBC and trend fever curve  #Antibiotics Course  WBC- 13.06  --->>,  11.59  --->>  Temp trend- 24hrs T(F): 98.3 (11-29 @ 15:51), Max: 98.3 (11-29 @ 15:51)  Current antibiotics- none indicated  #MRSA nares pending       ENDOCRINE:  #glycemic monitoring     -Glucose goal 140-180. If above 180, will start corrective insulin sliding scale  #hypothyroidism  - continue home levothyroxine 175mcg daily   #hx pituitary adenoma  - continue home cabergoline 0.5mg once weekly on Monday night     MSK:  #ambulate as tolerated  - ambulates with walker baseline   - PT/OT pending   #hx of gout  - continue allopurinol 300QD    SKIN:  #DTI screening negative on admission    - will continue to monitor daily skin changes      LINES/DRAINS:  PIV  ADVANCED DIRECTIVES:  Full Code    HCP/Emergency Contact-  INDICATION FOR SICU/SDU: R thigh hematoma, hemodynamic monitoring   DISPO:   SICU. Case discussed with attending Dr. guerra     84y Female found to have right thigh hematoma.      NEUROLOGICAL:  #Acute pain    -APAP prn    RESPIRATORY:   #Oxygenation    - on RA with no issues    - encourage incentive spirometry use while in bed   #hx of FELIX  #Activity    -increase as tolerated    CARDS:   #hx of afib  - holding home Xarelto given R thigh hematoma  - continue home amiodarone 200 QD  - continue home metoprolol bid with hold parameters   - holding home lasix 20 QD  - s/p AV Junction Ablation in June 2024  - cardiologist Dr. Quintero   #hx HLD  - continue atorvastatin 20mg QD   Imaging:   - EKG pending   - TTE 06/24: EF of 44%. Indeterminate diastolic dysfunction. Moderate concentric left ventricular hypertrophy. Severe mitral annular calcification. Mild mitral valve regurgitation. Mild to moderate pulmonic valve regurgitation. Small pericardial effusion without echocardiographic evidence of tamponade physiology.    GASTROINTESTINAL/NUTRITION:   #Diet, DASH    -aspiration precautions, HOB 30  #GI Prophylaxis    -not indicated  #Bowel regimen    - senna    -last bowel movement prior to admission     /RENAL:   #urine output in critically ill    -voiding spontaneously    #hx of CKD  - monitor BUN/Cr  #maintain euvolemia  - s/p bolus with response  - IVF @50cc   #overactive bladder  - continue home trospium chloride 20 bid     Labs:          BUN/Cr -  34/1.5  -->,  33/1.6  -->          [11-29 @ 20:45]Na  140 // K  3.7 // Mg  1.7 // Phos  4.0  [11-29 @ 12:30]Na  141 // K  4.2 // Mg  -- // Phos  --           HEME/ONC:   #DVT prophylaxis     -Chemical: holding given R thigh hematoma with acute H/H drop     -Mechanical: SCDs       Labs: Hgb/Hct:  7.8/25.5  (11-29 @ 16:51)  -->,  7.9/26.2  (11-29 @ 20:45)  -->                      Platelets:  257  -->,  268  -->                 PTT/INR:  39.9/2.67  --->,  39.1/2.01  --->       #R thigh hematoma, no evidence of extrav on CT  - patient and daughter deferring blood transfusion at this time; amendable if hemoglobin drops below 7.0  - close H/H monitoring q6hr   #hx of afib on home Xarelto  - continue to hold home Xarelto 15 QD    ID:  #Monitor WBC and trend fever curve  #Antibiotics Course  WBC -  13.06  --->>,  11.59  --->>,  10.23  --->>  Temp trend - 24hrs T(F): 97.2 (11-30 @ 00:00), Max: 98.4 (11-29 @ 21:00)    Current antibiotics - influenza  Vaccine (HIGH DOSE) 0.5 IntraMuscular once    #MRSA nares pending       ENDOCRINE:  #glycemic monitoring     -Glucose goal 140-180. If above 180, will start corrective insulin sliding scale  #hypothyroidism  - continue home levothyroxine 175mcg daily   #hx pituitary adenoma  - continue home cabergoline 0.5mg once weekly on Monday night     MSK:  #ambulate as tolerated  - ambulates with walker baseline   - PT/OT pending   #hx of gout  - continue allopurinol 300QD    SKIN:  #DTI screening negative on admission    - will continue to monitor daily skin changes      LINES/DRAINS:  PIV  ADVANCED DIRECTIVES:  Full Code    HCP/Emergency Contact-  INDICATION FOR SICU/SDU: R thigh hematoma, hemodynamic monitoring   DISPO:   SICU. Case discussed with attending Dr. guerra     Assessment and Recommendation:   	  84y Female s/p mechanical  fall at home found to have right thigh hematoma.      NEUROLOGICAL:  #Acute pain- controlled    -APAP prn    RESPIRATORY:   #Oxygenation    - on RA with no issues    - encourage incentive spirometry use while in bed   #hx of FELIX   - s/p B/L TKA     #Activity    -increase as tolerated    CARDS:   #hx of afib  - holding home Xarelto given R thigh hematoma  - continue home amiodarone 200 QD  - continue home metoprolol bid with hold parameters   - holding home lasix 20 QD  - s/p AV Junction Ablation in June 2024  - cardiologist Dr. Quintero   #hx HLD  - continue atorvastatin 20mg QD   Imaging:   - EKG pending   - TTE 06/24: EF of 44%. Indeterminate diastolic dysfunction. Moderate concentric left ventricular hypertrophy. Severe mitral annular calcification. Mild mitral valve regurgitation. Mild to moderate pulmonic valve regurgitation. Small pericardial effusion without echocardiographic evidence of tamponade physiology.    GASTROINTESTINAL/NUTRITION:   #Diet, DASH    -aspiration precautions, HOB 30  #GI Prophylaxis    -not indicated  #Bowel regimen    - senna    -last bowel movement prior to admission     /RENAL:   #urine output in critically ill    -voiding spontaneously    #hx of CKD   - monitor BUN/Cr   -baseline Cr 1.4  #maintain euvolemia   - s/p bolus with response   - IVF @50cc   #overactive bladder  - continue home trospium chloride 20 bid    Labs           BUN/Cr: 34/1.5 --> 33-->1.6          [11-29 @ 20:45 ]Na  140 // K  3.7// Mg 1.7  // Phos  4.0  #hypomagnesemai   -repleted       HEME/ONC:   #DVT prophylaxis     -Chemical: holding given R thigh hematoma with acute H/H drop     -Mechanical: SCDs       Labs: Hb/Hct:  8.8/29.1  (11-29 @ 12:30)  -->,  7.8/25.5  (11-29 @ 16:51)  -->                      Plts:  305  -->,  257  --> 261                PTT/INR:  39.9/2.67  ---> 39/2.01    #R thigh hematoma, no evidence of extrav on CT  - patient and daughter deferring blood transfusion at this time; amendable if hemoglobin drops below 7.0  - close H/H monitoring q6hr   #hx of afib on home Xarelto  - continue to hold home Xarelto 15 QD    ID:  #Monitor WBC and trend fever curve  WBC- 13.06  --->>,  11.59  --->>9  Temp trend- 24hrs T(F): 98.3 (11-29 @ 15:51), Max: 98.3 (11-29 @ 15:51)  Current antibiotics- none indicated  #MRSA nares pending       ENDOCRINE:  #glycemic monitoring     -Glucose goal 140-180. If above 180, will start corrective insulin sliding scale  #hypothyroidism  - continue home levothyroxine 175mcg daily   #hx pituitary adenoma  - continue home cabergoline 0.5mg once weekly on Monday night     MSK:  #ambulate as tolerated  - ambulates with walker baseline   - PT/OT pending   #hx of gout  - continue allopurinol 300QD    SKIN:  #DTI screening negative on admission    - will continue to monitor daily skin changes      LINES/DRAINS:  PIV  ADVANCED DIRECTIVES:  Full Code    HCP/Emergency Contact-  INDICATION FOR SICU/SDU: R thigh hematoma, hemodynamic monitoring   DISPO:   Dg to SDU    Case discussed with attending Dr. Mabry

## 2024-12-01 LAB
ANION GAP SERPL CALC-SCNC: 10 MMOL/L — SIGNIFICANT CHANGE UP (ref 7–14)
ANION GAP SERPL CALC-SCNC: 10 MMOL/L — SIGNIFICANT CHANGE UP (ref 7–14)
BASOPHILS # BLD AUTO: 0.03 K/UL — SIGNIFICANT CHANGE UP (ref 0–0.2)
BASOPHILS NFR BLD AUTO: 0.3 % — SIGNIFICANT CHANGE UP (ref 0–1)
BUN SERPL-MCNC: 29 MG/DL — HIGH (ref 10–20)
BUN SERPL-MCNC: 31 MG/DL — HIGH (ref 10–20)
CALCIUM SERPL-MCNC: 8 MG/DL — LOW (ref 8.4–10.5)
CALCIUM SERPL-MCNC: 8.1 MG/DL — LOW (ref 8.4–10.4)
CHLORIDE SERPL-SCNC: 106 MMOL/L — SIGNIFICANT CHANGE UP (ref 98–110)
CHLORIDE SERPL-SCNC: 107 MMOL/L — SIGNIFICANT CHANGE UP (ref 98–110)
CO2 SERPL-SCNC: 25 MMOL/L — SIGNIFICANT CHANGE UP (ref 17–32)
CO2 SERPL-SCNC: 25 MMOL/L — SIGNIFICANT CHANGE UP (ref 17–32)
CREAT SERPL-MCNC: 1.4 MG/DL — SIGNIFICANT CHANGE UP (ref 0.7–1.5)
CREAT SERPL-MCNC: 1.5 MG/DL — SIGNIFICANT CHANGE UP (ref 0.7–1.5)
EGFR: 35 ML/MIN/1.73M2 — LOW
EGFR: 38 ML/MIN/1.73M2 — LOW
EOSINOPHIL # BLD AUTO: 0.15 K/UL — SIGNIFICANT CHANGE UP (ref 0–0.7)
EOSINOPHIL NFR BLD AUTO: 1.7 % — SIGNIFICANT CHANGE UP (ref 0–8)
GLUCOSE SERPL-MCNC: 99 MG/DL — SIGNIFICANT CHANGE UP (ref 70–99)
GLUCOSE SERPL-MCNC: 99 MG/DL — SIGNIFICANT CHANGE UP (ref 70–99)
HCT VFR BLD CALC: 26.1 % — LOW (ref 37–47)
HCT VFR BLD CALC: 27.5 % — LOW (ref 37–47)
HGB BLD-MCNC: 7.8 G/DL — LOW (ref 12–16)
HGB BLD-MCNC: 8.1 G/DL — LOW (ref 12–16)
IMM GRANULOCYTES NFR BLD AUTO: 0.5 % — HIGH (ref 0.1–0.3)
LYMPHOCYTES # BLD AUTO: 1.5 K/UL — SIGNIFICANT CHANGE UP (ref 1.2–3.4)
LYMPHOCYTES # BLD AUTO: 17.1 % — LOW (ref 20.5–51.1)
MAGNESIUM SERPL-MCNC: 2.1 MG/DL — SIGNIFICANT CHANGE UP (ref 1.8–2.4)
MAGNESIUM SERPL-MCNC: 2.3 MG/DL — SIGNIFICANT CHANGE UP (ref 1.8–2.4)
MCHC RBC-ENTMCNC: 25.1 PG — LOW (ref 27–31)
MCHC RBC-ENTMCNC: 25.3 PG — LOW (ref 27–31)
MCHC RBC-ENTMCNC: 29.5 G/DL — LOW (ref 32–37)
MCHC RBC-ENTMCNC: 29.9 G/DL — LOW (ref 32–37)
MCV RBC AUTO: 84.7 FL — SIGNIFICANT CHANGE UP (ref 81–99)
MCV RBC AUTO: 85.1 FL — SIGNIFICANT CHANGE UP (ref 81–99)
MONOCYTES # BLD AUTO: 0.7 K/UL — HIGH (ref 0.1–0.6)
MONOCYTES NFR BLD AUTO: 8 % — SIGNIFICANT CHANGE UP (ref 1.7–9.3)
NEUTROPHILS # BLD AUTO: 6.33 K/UL — SIGNIFICANT CHANGE UP (ref 1.4–6.5)
NEUTROPHILS NFR BLD AUTO: 72.4 % — SIGNIFICANT CHANGE UP (ref 42.2–75.2)
NRBC # BLD: 0 /100 WBCS — SIGNIFICANT CHANGE UP (ref 0–0)
NRBC # BLD: 0 /100 WBCS — SIGNIFICANT CHANGE UP (ref 0–0)
PHOSPHATE SERPL-MCNC: 3.5 MG/DL — SIGNIFICANT CHANGE UP (ref 2.1–4.9)
PHOSPHATE SERPL-MCNC: 3.7 MG/DL — SIGNIFICANT CHANGE UP (ref 2.1–4.9)
PLATELET # BLD AUTO: 266 K/UL — SIGNIFICANT CHANGE UP (ref 130–400)
PLATELET # BLD AUTO: 279 K/UL — SIGNIFICANT CHANGE UP (ref 130–400)
PMV BLD: 10.5 FL — HIGH (ref 7.4–10.4)
PMV BLD: 10.8 FL — HIGH (ref 7.4–10.4)
POTASSIUM SERPL-MCNC: 4.1 MMOL/L — SIGNIFICANT CHANGE UP (ref 3.5–5)
POTASSIUM SERPL-MCNC: 4.3 MMOL/L — SIGNIFICANT CHANGE UP (ref 3.5–5)
POTASSIUM SERPL-SCNC: 4.1 MMOL/L — SIGNIFICANT CHANGE UP (ref 3.5–5)
POTASSIUM SERPL-SCNC: 4.3 MMOL/L — SIGNIFICANT CHANGE UP (ref 3.5–5)
RBC # BLD: 3.08 M/UL — LOW (ref 4.2–5.4)
RBC # BLD: 3.23 M/UL — LOW (ref 4.2–5.4)
RBC # FLD: 18.5 % — HIGH (ref 11.5–14.5)
RBC # FLD: 18.6 % — HIGH (ref 11.5–14.5)
SODIUM SERPL-SCNC: 141 MMOL/L — SIGNIFICANT CHANGE UP (ref 135–146)
SODIUM SERPL-SCNC: 142 MMOL/L — SIGNIFICANT CHANGE UP (ref 135–146)
WBC # BLD: 8.54 K/UL — SIGNIFICANT CHANGE UP (ref 4.8–10.8)
WBC # BLD: 8.75 K/UL — SIGNIFICANT CHANGE UP (ref 4.8–10.8)
WBC # FLD AUTO: 8.54 K/UL — SIGNIFICANT CHANGE UP (ref 4.8–10.8)
WBC # FLD AUTO: 8.75 K/UL — SIGNIFICANT CHANGE UP (ref 4.8–10.8)

## 2024-12-01 RX ORDER — HEPARIN SODIUM,PORCINE 1000/ML
5000 VIAL (ML) INJECTION EVERY 8 HOURS
Refills: 0 | Status: DISCONTINUED | OUTPATIENT
Start: 2024-12-01 | End: 2024-12-04

## 2024-12-01 RX ADMIN — ACETAMINOPHEN 500MG 650 MILLIGRAM(S): 500 TABLET, COATED ORAL at 14:57

## 2024-12-01 RX ADMIN — ACETAMINOPHEN 500MG 650 MILLIGRAM(S): 500 TABLET, COATED ORAL at 14:30

## 2024-12-01 RX ADMIN — Medication 2 TABLET(S): at 22:39

## 2024-12-01 RX ADMIN — ALLOPURINOL 300 MILLIGRAM(S): 300 TABLET ORAL at 22:39

## 2024-12-01 RX ADMIN — Medication 5 MILLIGRAM(S): at 06:40

## 2024-12-01 RX ADMIN — AMIODARONE HYDROCHLORIDE 200 MILLIGRAM(S): 200 TABLET ORAL at 06:42

## 2024-12-01 RX ADMIN — Medication 20 MILLIGRAM(S): at 22:39

## 2024-12-01 RX ADMIN — Medication 5000 UNIT(S): at 22:39

## 2024-12-01 RX ADMIN — Medication 5 MILLIGRAM(S): at 17:41

## 2024-12-01 RX ADMIN — CHLORHEXIDINE GLUCONATE 1 APPLICATION(S): 1.2 RINSE ORAL at 06:40

## 2024-12-01 RX ADMIN — Medication 5000 UNIT(S): at 14:30

## 2024-12-01 NOTE — PROGRESS NOTE ADULT - SUBJECTIVE AND OBJECTIVE BOX
GENERAL SURGERY CONSULT NOTE    Patient: JEFFERY UGALDE , 84y ([birth year])Female   MRN: [id number]  Location: [facility] ED  Visit: 11-29-24 Emergency  Date: 12-01-24 @ 3:32 AM    24HR EVENTS: Downgraded to SDU. NAEO. Patient with no complaints at bedside, hematoma stable on exam. Nasal canula decreased to 2L from 3L, patient with tachypnea to 30s. Hgb 8.1 from 8.3, labs stable.    OBJECTIVE:  Vital Signs Last 24 Hrs  T(C): 35.7 (01 Dec 2024 00:00), Max: 36.2 (30 Nov 2024 04:00)  T(F): 96.3 (01 Dec 2024 00:00), Max: 97.1 (30 Nov 2024 04:00)  HR: 84 (01 Dec 2024 00:00) (60 - 84)  BP: 116/58 (01 Dec 2024 00:00) (90/52 - 127/61)  BP(mean): 83 (01 Dec 2024 00:00) (67 - 88)  RR: 19 (01 Dec 2024 00:00) (17 - 36)  SpO2: 95% (01 Dec 2024 00:00) (95% - 100%)    Parameters below as of 01 Dec 2024 00:00  Patient On (Oxygen Delivery Method): nasal cannula  O2 Flow (L/min): 2      I&O's Summary    29 Nov 2024 07:01  -  30 Nov 2024 07:00  --------------------------------------------------------  IN: 740 mL / OUT: 0 mL / NET: 740 mL    30 Nov 2024 07:01  -  01 Dec 2024 03:32  --------------------------------------------------------  IN: 800 mL / OUT: 950 mL / NET: -150 mL      PHYSICAL EXAM:  General: NAD, AAOx3  Cardiac: RRR  Respiratory: Equal chest rise bilaterally, normal respiratory effort  Musculoskeletal: Moves all extremities to command. Compartments soft, some pain to palpation of the posterior right thigh, eccyhmosis to posterior right thigh   Vascular: Pulses 2+ throughout, extremities well perfused  Skin: Warm/dry, normal color, no jaundice    LABS:                        8.1    8.54  )-----------( 266      ( 01 Dec 2024 00:23 )             27.5     12-01    141  |  106  |  31[H]  ----------------------------<  99  4.1   |  25  |  1.5    Ca    8.0[L]      01 Dec 2024 00:23  Phos  3.5     12-01  Mg     2.1     12-01    TPro  5.1[L]  /  Alb  3.3[L]  /  TBili  0.9  /  DBili  x   /  AST  13  /  ALT  6   /  AlkPhos  94  11-29    PT/INR - ( 30 Nov 2024 17:43 )   PT: 14.60 sec;   INR: 1.23 ratio         PTT - ( 30 Nov 2024 17:43 )  PTT:32.9 sec  Urinalysis Basic - ( 01 Dec 2024 00:23 )    Color: x / Appearance: x / SG: x / pH: x  Gluc: 99 mg/dL / Ketone: x  / Bili: x / Urobili: x   Blood: x / Protein: x / Nitrite: x   Leuk Esterase: x / RBC: x / WBC x   Sq Epi: x / Non Sq Epi: x / Bacteria: x        RADIOLOGY & ADDITIONAL STUDIES:

## 2024-12-01 NOTE — PROGRESS NOTE ADULT - ASSESSMENT
ASSESSMENT:  84 year old female with past medical history significant for atrial fibrillation on Xarelto, hypertension, hyperlipidemia, hypothyroidism, CKD, FELIX, and recent fall on 11/11 (+HT, -LOC) who presents to the ED with complaints of right lower extremity pain and bruising. Patient states that she has been ambulatory since the fall with her walker which is her baseline. She denies any neurologic symptoms and states that she has no other complaints besides her right thigh pain.      General surgery consulted for findings of right posterior thigh hematoma without active extravasation on CTA RLE.     PLAN:  -F/U labs, replete PRN  -Monitor hemodynamics, support PRN  -DASH diet  -Holding DVT ppx/AC at this time  -RLE ACE wrap  -Rest of care per SICU

## 2024-12-01 NOTE — PROGRESS NOTE ADULT - ASSESSMENT
84y Female s/p mechanical  fall at home found to have right thigh hematoma.      NEUROLOGICAL:  #Acute pain    -APAP prn      RESPIRATORY:   #Oxygenation    -wean off NC to RA as tolerated; currently on 2L NC   - encourage incentive spirometry use  #Activity    -increase as tolerated    CARDS:   #hx of afib  - holding home Xarelto given R thigh hematoma  - continue home amiodarone 200 QD  - home metoprolol bid with hold parameters   - home lasix 20 QD  - s/p AV Junction Ablation in June 2024  - cardiologist Dr. Quintero   #hx HLD  - continue atorvastatin 20mg QD   Imaging:   - TTE 06/24: EF of 44%. Indeterminate diastolic dysfunction. Moderate concentric left ventricular hypertrophy. Severe mitral annular calcification. Mild mitral valve regurgitation. Mild to moderate pulmonic valve regurgitation. Small pericardial effusion without echocardiographic evidence of tamponade physiology.        GASTROINTESTINAL/NUTRITION:   #Diet, DASH/TLC:     -aspiration precautions, HOB 30  #GI Prophylaxis     -not indicated  #Bowel regimen    -senna    -last bowel movement prior to admission    /RENAL:   #urine output in critically ill    - voiding spontaneously  #hx of CKD   - monitor BUN/Cr   -baseline Cr 1.4  #overactive bladder  - continue home trospium chloride 20 bid    Labs          BUN/Cr: 34/1.5 --> 33/1.6 -->           [11-30 @ 17:43]Na  140 // K  4.0 // Mg  2.1 // Phos  3.5  [11-29 @ 20:45]Na  140 // K  3.7 // Mg  1.7 // Phos  4.0         HEME/ONC:   #DVT prophylaxis     -Chemical: holding given R thigh hematoma     -Mechanical: SCDs       Labs: Hb/Hct:  7.7/25.6  (11-30 @ 05:42)  -->,  8.3/28.4  (11-30 @ 16:00)  -->                      Plts:  261  -->,  275  -->                 PTT/INR:  32.9/1.23  --->     #R thigh hematoma, no evidence of extrav on CT  - patient and daughter deferring blood transfusion at this time; amendable if hemoglobin drops below 7.0  - close H/H monitoring   #hx of afib on Xarelto  - continue to hold home Xarelto 15 QD      ID:  #Monitor WBC and trend fever curve  WBC- 10.23  --->>,  9.02  --->>,  9.71  --->>  Temp trend- 24hrs T(F): 96.3 (12-01 @ 00:00), Max: 97.1 (11-30 @ 04:00)  Current antibiotics- none indicated      ENDOCRINE:  #glycemic monitoring     -Glucose goal 140-180. If above 180, will start corrective insulin sliding scale  #hypothyroidism  - continue home levothyroxine 175mcg daily   #hx pituitary adenoma  - continue home cabergoline 0.5mg once weekly on Monday night       MSK:  #ambulate as tolerated  - ambulates with walker baseline   - PT/OT pending   - physiatry recommending short term rehab  #hx of gout  - continue allopurinol 300QD    SKIN:  #DTI screening negative     - will continue to monitor daily skin changes      LINES/DRAINS:  PIV  ADVANCED DIRECTIVES:  Full Code  INDICATION FOR SICU/SDU: Contusion of unspecified thigh, initial encounter  DISPO:  SDU. Case discussed with attending Dr. Mabry Assessment and Plan:   84y Female s/p mechanical  fall at home found to have right thigh hematoma.      NEUROLOGICAL:  #Acute pain    -APAP prn    RESPIRATORY:   #Oxygenation    -saturating well on room air   - encourage incentive spirometry use  #Activity    -increase as tolerated    CARDS:   #hx of afib  - holding home Xarelto given R thigh hematoma  - continue home amiodarone 200 QD  - home metoprolol bid with hold parameters   - home lasix 20 QD  - s/p AV Junction Ablation in June 2024  - cardiologist Dr. Quintero   #hx HLD  - continue atorvastatin 20mg QD   Imaging:   - TTE 06/24: EF of 44%. Indeterminate diastolic dysfunction. Moderate concentric left ventricular hypertrophy. Severe mitral annular calcification. Mild mitral valve regurgitation. Mild to moderate pulmonic valve regurgitation. Small pericardial effusion without echocardiographic evidence of tamponade physiology.        GASTROINTESTINAL/NUTRITION:   #Diet, DASH/TLC:     -aspiration precautions, HOB 30  #GI Prophylaxis     -not indicated  #Bowel regimen    -senna    -last bowel movement prior to admission    /RENAL:   #urine output in critically ill    - voiding spontaneously  #hx of CKD   - monitor BUN/Cr   -baseline Cr 1.4  #overactive bladder  - continue home trospium chloride 20 bid    Labs:          BUN/Cr -  32/1.5  -->,  31/1.5  -->          [12-01 @ 00:23]Na  141 // K  4.1 // Mg  2.1 // Phos  3.5  [11-30 @ 17:43]Na  140 // K  4.0 // Mg  2.1 // Phos  3.5         HEME/ONC:   #DVT prophylaxis     -Chemical: holding given R thigh hematoma     -Mechanical: SCDs         Labs: Hgb/Hct:  8.3/28.4  (11-30 @ 16:00)  -->,  8.1/27.5  (12-01 @ 00:23)  -->                      Platelets:  275  -->,  266  -->                 PTT/INR:  32.9/1.23  --->     #R thigh hematoma, no evidence of extrav on CT  - patient and daughter deferring blood transfusion at this time; amendable if hemoglobin drops below 7.0  - close H/H monitoring   #hx of afib on Xarelto  - continue to hold home Xarelto 15 QD      ID:  #Monitor WBC and trend fever curve  WBC -  9.02  --->>,  9.71  --->>,  8.54  --->>  Temp trend - 24hrs T(F): 96.9 (12-01 @ 08:00), Max: 96.9 (12-01 @ 08:00)  Current antibiotics- none indicated      ENDOCRINE:  #glycemic monitoring     -Glucose goal 140-180. If above 180, will start corrective insulin sliding scale  #hypothyroidism  - continue home levothyroxine 175mcg daily   #hx pituitary adenoma  - continue home cabergoline 0.5mg once weekly on Monday night       MSK:  #ambulate as tolerated  - ambulates with walker baseline   - PT/OT pending   - physiatry recommending short term rehab  #hx of gout  - continue allopurinol 300QD    SKIN:  #DTI screening negative     - will continue to monitor daily skin changes      LINES/DRAINS:  PIV  ADVANCED DIRECTIVES:  Full Code  INDICATION FOR SICU/SDU: Contusion of unspecified thigh, initial encounter  DISPO:  SDU. Case discussed with attending Dr. Mabry

## 2024-12-01 NOTE — PROGRESS NOTE ADULT - SUBJECTIVE AND OBJECTIVE BOX
JEFFERY UGALDE   84y (1940)Female   Visit ID: [id number] 11-29-24 (2d)  MRN: [id number]    DATE OF INITIAL SICU CONSULT: 11/30/24    INDICATION FOR SICU/SDU CONSULT: hemodynamic monitoring/close H/H monitoring given right thigh hematoma on AC at home    SICU COURSE EVENTS:   11-29 - admitted to SICU service.   11/30: H/H remained overall stable. DG to SDU.    24 Hour Events:   11/30  NIGHT  - PM rounds: HR 60s, SBP 110s, 3L NC      DAY   -Repeat CBC, coags for downtrending hgb   - hematoma stable         [X] A ten-point review of systems was otherwise negative except as noted above.  [  ] Due to altered mental status/intubation, subjective information was not attained from the patient. History was obtained, to the extent possible, from review of the chart and collateral sources of information.    =========================================================================================================================================   JEFFERY UGALDE   84y (1940)Female   Visit ID: [id number] 11-29-24 (2d)  MRN: [id number]    DATE OF INITIAL SICU CONSULT: 11/30/24    INDICATION FOR SICU/SDU CONSULT: hemodynamic monitoring/close H/H monitoring given right thigh hematoma on AC at home    SICU COURSE EVENTS:   11-29 - admitted to SICU service.   11/30: H/H remained overall stable. DG to SDU.    24 Hour Events:   11/30  NIGHT  - PM rounds: HR 60s, SBP 110s, 3L NC      DAY   -Repeat CBC, coags for downtrending hgb   - hematoma stable         [X] A ten-point review of systems was otherwise negative except as noted above.  [  ] Due to altered mental status/intubation, subjective information was not attained from the patient. History was obtained, to the extent possible, from review of the chart and collateral sources of information.    =========================================================================================================================================  Daily     Daily     Diet, DASH/TLC:   Sodium & Cholesterol Restricted (11-29-24 @ 18:51)      CURRENT MEDS:  Neurologic Medications  acetaminophen     Tablet .. 650 milliGRAM(s) Oral every 6 hours    Respiratory Medications    Cardiovascular Medications  aMIOdarone    Tablet 200 milliGRAM(s) Oral daily  furosemide    Tablet 20 milliGRAM(s) Oral daily  metoprolol tartrate 50 milliGRAM(s) Oral two times a day    Gastrointestinal Medications  senna 2 Tablet(s) Oral at bedtime    Genitourinary Medications  oxybutynin 5 milliGRAM(s) Oral two times a day    Hematologic/Oncologic Medications  influenza  Vaccine (HIGH DOSE) 0.5 milliLiter(s) IntraMuscular once    Antimicrobial/Immunologic Medications    Endocrine/Metabolic Medications  allopurinol 300 milliGRAM(s) Oral at bedtime  atorvastatin 20 milliGRAM(s) Oral at bedtime  levothyroxine 175 MICROGram(s) Oral daily    Topical/Other Medications  chlorhexidine 2% Cloths 1 Application(s) Topical <User Schedule>      ICU Vital Signs Last 24 Hrs  T(C): 36.1 (01 Dec 2024 08:00), Max: 36.1 (01 Dec 2024 08:00)  T(F): 96.9 (01 Dec 2024 08:00), Max: 96.9 (01 Dec 2024 08:00)  HR: 82 (01 Dec 2024 08:00) (60 - 84)  BP: 126/60 (01 Dec 2024 08:00) (90/52 - 127/61)  BP(mean): 86 (01 Dec 2024 08:00) (67 - 88)  RR: 18 (01 Dec 2024 06:00) (17 - 36)  SpO2: 76% (01 Dec 2024 08:00) (76% - 100%)    O2 Parameters below as of 01 Dec 2024 06:00  Patient On (Oxygen Delivery Method): nasal cannula  O2 Flow (L/min): 4    I&O's Summary    30 Nov 2024 07:01  -  01 Dec 2024 07:00  --------------------------------------------------------  IN: 800 mL / OUT: 1250 mL / NET: -450 mL    01 Dec 2024 07:01  -  01 Dec 2024 11:25  --------------------------------------------------------  IN: 0 mL / OUT: 200 mL / NET: -200 mL      I&O's Detail    30 Nov 2024 07:01  -  01 Dec 2024 07:00  --------------------------------------------------------  IN:    Oral Fluid: 200 mL    sodium chloride 0.9%: 600 mL  Total IN: 800 mL    OUT:    Voided (mL): 1250 mL  Total OUT: 1250 mL    Total NET: -450 mL      01 Dec 2024 07:01  -  01 Dec 2024 11:25  --------------------------------------------------------  IN:  Total IN: 0 mL    OUT:    Voided (mL): 200 mL  Total OUT: 200 mL    Total NET: -200 mL          PHYSICAL EXAM:    General/Neuro: GCS: 15,  alert & oriented x 3, no focal deficits  Lungs: Normal expansion/effort on room air.   Cardiovascular : S1, S2.    Cardiac Rhythm: Normal Sinus Rhythm  GI: Abdomen soft, Non-tender, Non-distended.    Extremities: Extremities warm, pink, well-perfused. Right thigh hematoma stable, soft.   Derm: Good skin turgor, no skin breakdown.    : voiding freely       CXR:     LABS:  CAPILLARY BLOOD GLUCOSE                          8.1    8.54  )-----------( 266      ( 01 Dec 2024 00:23 )             27.5       12-01    141  |  106  |  31[H]  ----------------------------<  99  4.1   |  25  |  1.5    Ca    8.0[L]      01 Dec 2024 00:23  Phos  3.5     12-01  Mg     2.1     12-01    TPro  5.1[L]  /  Alb  3.3[L]  /  TBili  0.9  /  DBili  x   /  AST  13  /  ALT  6   /  AlkPhos  94  11-29      PT/INR - ( 30 Nov 2024 17:43 )   PT: 14.60 sec;   INR: 1.23 ratio         PTT - ( 30 Nov 2024 17:43 )  PTT:32.9 sec      Urinalysis Basic - ( 01 Dec 2024 00:23 )    Color: x / Appearance: x / SG: x / pH: x  Gluc: 99 mg/dL / Ketone: x  / Bili: x / Urobili: x   Blood: x / Protein: x / Nitrite: x   Leuk Esterase: x / RBC: x / WBC x   Sq Epi: x / Non Sq Epi: x / Bacteria: x

## 2024-12-02 LAB
ANION GAP SERPL CALC-SCNC: 9 MMOL/L — SIGNIFICANT CHANGE UP (ref 7–14)
BUN SERPL-MCNC: 26 MG/DL — HIGH (ref 10–20)
CALCIUM SERPL-MCNC: 8.3 MG/DL — LOW (ref 8.4–10.4)
CHLORIDE SERPL-SCNC: 109 MMOL/L — SIGNIFICANT CHANGE UP (ref 98–110)
CO2 SERPL-SCNC: 25 MMOL/L — SIGNIFICANT CHANGE UP (ref 17–32)
CREAT SERPL-MCNC: 1.3 MG/DL — SIGNIFICANT CHANGE UP (ref 0.7–1.5)
EGFR: 41 ML/MIN/1.73M2 — LOW
GLUCOSE SERPL-MCNC: 92 MG/DL — SIGNIFICANT CHANGE UP (ref 70–99)
HCT VFR BLD CALC: 27.8 % — LOW (ref 37–47)
HGB BLD-MCNC: 8 G/DL — LOW (ref 12–16)
MAGNESIUM SERPL-MCNC: 2.2 MG/DL — SIGNIFICANT CHANGE UP (ref 1.8–2.4)
MCHC RBC-ENTMCNC: 24.9 PG — LOW (ref 27–31)
MCHC RBC-ENTMCNC: 28.8 G/DL — LOW (ref 32–37)
MCV RBC AUTO: 86.6 FL — SIGNIFICANT CHANGE UP (ref 81–99)
NRBC # BLD: 0 /100 WBCS — SIGNIFICANT CHANGE UP (ref 0–0)
PHOSPHATE SERPL-MCNC: 3.6 MG/DL — SIGNIFICANT CHANGE UP (ref 2.1–4.9)
PLATELET # BLD AUTO: 261 K/UL — SIGNIFICANT CHANGE UP (ref 130–400)
PMV BLD: 10.6 FL — HIGH (ref 7.4–10.4)
POTASSIUM SERPL-MCNC: 4 MMOL/L — SIGNIFICANT CHANGE UP (ref 3.5–5)
POTASSIUM SERPL-SCNC: 4 MMOL/L — SIGNIFICANT CHANGE UP (ref 3.5–5)
RBC # BLD: 3.21 M/UL — LOW (ref 4.2–5.4)
RBC # FLD: 18.8 % — HIGH (ref 11.5–14.5)
SODIUM SERPL-SCNC: 143 MMOL/L — SIGNIFICANT CHANGE UP (ref 135–146)
WBC # BLD: 7.25 K/UL — SIGNIFICANT CHANGE UP (ref 4.8–10.8)
WBC # FLD AUTO: 7.25 K/UL — SIGNIFICANT CHANGE UP (ref 4.8–10.8)

## 2024-12-02 PROCEDURE — 99233 SBSQ HOSP IP/OBS HIGH 50: CPT | Mod: FS

## 2024-12-02 RX ORDER — NYSTATIN 100000 [USP'U]/G
1 POWDER TOPICAL EVERY 12 HOURS
Refills: 0 | Status: DISCONTINUED | OUTPATIENT
Start: 2024-12-02 | End: 2024-12-04

## 2024-12-02 RX ADMIN — METOPROLOL TARTRATE 50 MILLIGRAM(S): 100 TABLET, FILM COATED ORAL at 06:02

## 2024-12-02 RX ADMIN — Medication 175 MICROGRAM(S): at 06:01

## 2024-12-02 RX ADMIN — FUROSEMIDE 20 MILLIGRAM(S): 40 TABLET ORAL at 06:18

## 2024-12-02 RX ADMIN — CHLORHEXIDINE GLUCONATE 1 APPLICATION(S): 1.2 RINSE ORAL at 06:13

## 2024-12-02 RX ADMIN — AMIODARONE HYDROCHLORIDE 200 MILLIGRAM(S): 200 TABLET ORAL at 06:01

## 2024-12-02 RX ADMIN — NYSTATIN 1 APPLICATION(S): 100000 POWDER TOPICAL at 17:21

## 2024-12-02 RX ADMIN — Medication 2 TABLET(S): at 21:18

## 2024-12-02 RX ADMIN — Medication 5000 UNIT(S): at 21:18

## 2024-12-02 RX ADMIN — ACETAMINOPHEN 500MG 650 MILLIGRAM(S): 500 TABLET, COATED ORAL at 06:03

## 2024-12-02 RX ADMIN — Medication 5000 UNIT(S): at 06:02

## 2024-12-02 RX ADMIN — ACETAMINOPHEN 500MG 650 MILLIGRAM(S): 500 TABLET, COATED ORAL at 06:01

## 2024-12-02 RX ADMIN — Medication 20 MILLIGRAM(S): at 21:18

## 2024-12-02 RX ADMIN — METOPROLOL TARTRATE 50 MILLIGRAM(S): 100 TABLET, FILM COATED ORAL at 17:22

## 2024-12-02 RX ADMIN — Medication 5 MILLIGRAM(S): at 17:22

## 2024-12-02 RX ADMIN — Medication 5 MILLIGRAM(S): at 06:01

## 2024-12-02 RX ADMIN — ALLOPURINOL 300 MILLIGRAM(S): 300 TABLET ORAL at 21:18

## 2024-12-02 RX ADMIN — Medication 5000 UNIT(S): at 14:44

## 2024-12-02 NOTE — DIETITIAN INITIAL EVALUATION ADULT - EDUCATION DIETARY MODIFICATIONS
Encouraged PO intake, reviewed and discussed oral nutrition supplements - pt states she does not like Ensure/(1) partially meets; needs review/practice/verbalization

## 2024-12-02 NOTE — DIETITIAN INITIAL EVALUATION ADULT - ADD RECOMMEND
1. Continue with current diet order  2. Add Magic Cup 2x/day (290 kcal, 9 gm protein per serving) - chocolate flavor only per pt  3. Encourage PO intake by obtaining food preferences and assist with meal times prn     Pt at moderate risk

## 2024-12-02 NOTE — PROGRESS NOTE ADULT - ASSESSMENT
84y Female s/p mechanical  fall at home found to have right thigh hematoma.      NEUROLOGICAL:  #Acute pain    -APAP prn    RESPIRATORY:   #Oxygenation    -saturating well on room air   - encourage incentive spirometry use  #Activity    -increase as tolerated    CARDS:   #hx of afib  - holding home Xarelto given R thigh hematoma  - continue home amiodarone 200 QD  - home metoprolol bid with hold parameters   - home lasix 20 QD  - s/p AV Junction Ablation in June 2024  - cardiologist Dr. Quintero   #hx HLD  - continue atorvastatin 20mg QD   Imaging:   - TTE 06/24: EF of 44%. Indeterminate diastolic dysfunction. Moderate concentric left ventricular hypertrophy. Severe mitral annular calcification. Mild mitral valve regurgitation. Mild to moderate pulmonic valve regurgitation. Small pericardial effusion without echocardiographic evidence of tamponade physiology.        GASTROINTESTINAL/NUTRITION:   #Diet, DASH/TLC:     -aspiration precautions, HOB 30  #GI Prophylaxis     -not indicated  #Bowel regimen    -senna    -last bowel movement 12/1    /RENAL:   #urine output in critically ill    - voiding spontaneously  #hx of CKD   - monitor BUN/Cr   -baseline Cr 1.4  #overactive bladder  - continue home trospium chloride 20 bid    [12-01 @ 00:23] BUN/Cr  31/1.5  -->, [12-01 @ 16:27] BUN/Cr  29/1.4  -->  [12-01 @ 16:27]Na  142 // K  4.3 // Mg  2.3 // Phos  3.7  [12-01 @ 00:23]Na  141 // K  4.1 // Mg  2.1 // Phos  3.5       HEME/ONC:   #DVT prophylaxis     -Chemical: holding given R thigh hematoma     -Mechanical: SCDs      Labs: Hb/Hct:  7.8/26.1  (12-01 @ 16:27)  -->,  8.0/27.8  (12-02 @ 05:56)  -->                      Plts:  279  -->,  261  -->                 PTT/INR:      #R thigh hematoma, no evidence of extrav on CT  - patient and daughter deferring blood transfusion at this time; amendable if hemoglobin drops below 7.0  #hx of afib on Xarelto  - continue to hold home Xarelto 15 QD    ID:  #Monitor WBC and trend fever curve  WBC- 8.54  --->>,  8.75  --->>,  7.25  --->>  Temp trend- 24hrs T(F): 98.7 (12-02 @ 00:00), Max: 98.7 (12-02 @ 00:00)  Current antibiotics-influenza  Vaccine (HIGH DOSE) 0.5 once    ENDOCRINE:  #glycemic monitoring     -Glucose goal 140-180. If above 180, will start corrective insulin sliding scale  #hypothyroidism  - continue home levothyroxine 175mcg daily   #hx pituitary adenoma  - continue home cabergoline 0.5mg once weekly on Monday night     MSK:  #ambulate as tolerated  - ambulates with walker baseline   - physiatry recommending short term rehab  #hx of gout  - continue allopurinol 300QD    SKIN:  #DTI screening negative     - will continue to monitor daily skin changes      LINES/DRAINS:  PIV  ADVANCED DIRECTIVES:  Full Code  INDICATION FOR SICU/SDU: Contusion of unspecified thigh, initial encounter  DISPO:  SDU   Assessment and Plan  84y Female s/p mechanical  fall at home found to have right thigh hematoma.      NEUROLOGICAL:  #Acute pain    -APAP prn    RESPIRATORY:   #Oxygenation    -saturating well on room air   - encourage incentive spirometry use  #Activity    -increase as tolerated    CARDS:   #hx of afib  - holding home Xarelto given R thigh hematoma  - continue home amiodarone 200 QD  - home metoprolol bid with hold parameters   - home lasix 20 QD  - s/p AV Junction Ablation in June 2024  - cardiologist Dr. Quintero   #hx HLD  - continue atorvastatin 20mg QD   Imaging:   - TTE 06/24: EF of 44%. Indeterminate diastolic dysfunction. Moderate concentric left ventricular hypertrophy. Severe mitral annular calcification. Mild mitral valve regurgitation. Mild to moderate pulmonic valve regurgitation. Small pericardial effusion without echocardiographic evidence of tamponade physiology.    GASTROINTESTINAL/NUTRITION:   #Diet, DASH/TLC:     -aspiration precautions, HOB 30  #GI Prophylaxis     -not indicated  #Bowel regimen    -senna    -last bowel movement 12/1    /RENAL:   #urine output in critically ill    - voiding spontaneously  #hx of CKD   - monitor BUN/Cr   -baseline Cr 1.4  #overactive bladder  - continue home trospium chloride 20 bid      Labs:          BUN/Cr -  29/1.4  -->,  26/1.3  -->          [12-02 @ 05:56]Na  143 // K  4.0 // Mg  2.2 // Phos  3.6       HEME/ONC:   #DVT prophylaxis     -Chemical: holding given R thigh hematoma     -Mechanical: SCDs        Labs: Hgb/Hct:  7.8/26.1  (12-01 @ 16:27)  -->,  8.0/27.8  (12-02 @ 05:56)  -->                      Platelets:  279  -->,  261  -->                 PTT/INR:      #R thigh hematoma, no evidence of extrav on CT  - patient and daughter deferring blood transfusion at this time; amendable if hemoglobin drops below 7.0  #hx of afib on Xarelto  - continue to hold home Xarelto 15 QD    ID:  #Monitor WBC and trend fever curve  WBC -  8.54  --->>,  8.75  --->>,  7.25  --->>  Temp trend - 24hrs T(F): 98.2 (12-02 @ 04:00), Max: 98.7 (12-02 @ 00:00)  Current antibiotics-influenza  Vaccine (HIGH DOSE) 0.5 once  #abdominal skin fold rash  - nystatin cream    ENDOCRINE:  #glycemic monitoring     -Glucose goal 140-180. If above 180, will start corrective insulin sliding scale  #hypothyroidism  - continue home levothyroxine 175mcg daily   #hx pituitary adenoma  - continue home cabergoline 0.5mg once weekly on Monday night     MSK:  #ambulate as tolerated  - ambulates with walker baseline   - physiatry recommending short term rehab  #hx of gout  - continue allopurinol 300QD    SKIN:  #DTI screening negative     - will continue to monitor daily skin changes      LINES/DRAINS:  PIV  ADVANCED DIRECTIVES:  Full Code  INDICATION FOR SICU/SDU: Contusion of unspecified thigh, initial encounter  DISPO:  SDU

## 2024-12-02 NOTE — DIETITIAN INITIAL EVALUATION ADULT - PERTINENT LABORATORY DATA
12-02    143  |  109  |  26[H]  ----------------------------<  92  4.0   |  25  |  1.3    Ca    8.3[L]      02 Dec 2024 05:56  Phos  3.6     12-02  Mg     2.2     12-02

## 2024-12-02 NOTE — CHART NOTE - NSCHARTNOTEFT_GEN_A_CORE
SICU Transfer Note    Transfer from: SICU  Transfer to: Trauma Surgery     SDU COURSE:  Patient is a 83 y/o female with PMHx of afib (on Xarelto), HLD, hypothyroidism, gout, CKD, FELIX, overactive bladder with recent fall on 11/11 (+HT, -LOC) who presents to the ED with complaints of right lower extremity pain and bruising. Patient states that she has been ambulatory since the fall with her walker which is her baseline. CTA of RLE in ED showing right posterior thigh hematoma. Patient with hypotension in the ED, resolved with fluid boluses. Noted to have acute H/H drop. Patient admitted to SICU for close HD monitoring and serial CBC. CTA RLE with large right posterior thigh intramuscular hematoma measuring 10.0 x 8.9 x 18.1 cm without contrast extravasation without active extravasation.     11/29: admitted to SICU service.   11/30: H/H remained overall stable. DG to SDU. Physiatry: Home with outpt PT/VNS vs SNF  12/1: Hematoma and H/H stable, DVT ppx started   12/2: Patient remains hemodynamically stable, H/H stable, hematoma resolving     Patient is tolerating regular diet, urinating and ambulating. She is now stable for downgrade to the floor.      ASSESSMENT & PLAN:   Assessment and Plan  84y Female s/p mechanical  fall at home found to have right thigh hematoma.      NEUROLOGICAL:  #Acute pain    -APAP prn    RESPIRATORY:   #Oxygenation    -saturating well on room air   - encourage incentive spirometry use  #Activity    -increase as tolerated    CARDS:   #hx of afib  - holding home Xarelto given R thigh hematoma  - continue home amiodarone 200 QD  - home metoprolol bid with hold parameters   - home lasix 20 QD  - s/p AV Junction Ablation in June 2024  - cardiologist Dr. Quintero   #hx HLD  - continue atorvastatin 20mg QD   Imaging:   - TTE 06/24: EF of 44%. Indeterminate diastolic dysfunction. Moderate concentric left ventricular hypertrophy. Severe mitral annular calcification. Mild mitral valve regurgitation. Mild to moderate pulmonic valve regurgitation. Small pericardial effusion without echocardiographic evidence of tamponade physiology.    GASTROINTESTINAL/NUTRITION:   #Diet, DASH/TLC:     -aspiration precautions, HOB 30  #GI Prophylaxis     -not indicated  #Bowel regimen    -senna    -last bowel movement 12/1    /RENAL:   #urine output in critically ill    - voiding spontaneously  #hx of CKD   - monitor BUN/Cr   -baseline Cr 1.4  #overactive bladder  - continue home trospium chloride 20 bid      Labs:          BUN/Cr -  29/1.4  -->,  26/1.3  -->          [12-02 @ 05:56]Na  143 // K  4.0 // Mg  2.2 // Phos  3.6       HEME/ONC:   #DVT prophylaxis     -Chemical: holding given R thigh hematoma     -Mechanical: SCDs        Labs: Hgb/Hct:  7.8/26.1  (12-01 @ 16:27)  -->,  8.0/27.8  (12-02 @ 05:56)  -->                      Platelets:  279  -->,  261  -->                 PTT/INR:      #R thigh hematoma, no evidence of extrav on CT  - H/H stable, no need for blood transfusuion, per patient and daughter would only consider if hgb <7  #hx of afib on Xarelto  - continue to hold home Xarelto 15 QD    ID:  #Monitor WBC and trend fever curve  WBC -  8.54  --->>,  8.75  --->>,  7.25  --->>  Temp trend - 24hrs T(F): 98.2 (12-02 @ 04:00), Max: 98.7 (12-02 @ 00:00)  Current antibiotics-influenza  Vaccine (HIGH DOSE) 0.5 once  #abdominal skin fold rash  - nystatin cream    ENDOCRINE:  #glycemic monitoring     -Glucose goal 140-180. If above 180, will start corrective insulin sliding scale  #hypothyroidism  - continue home levothyroxine 175mcg daily   #hx pituitary adenoma  - continue home cabergoline 0.5mg once weekly on Monday night     MSK:  #ambulate as tolerated  - ambulates with walker baseline   - physiatry recommending short term rehab  #hx of gout  - continue allopurinol 300QD    SKIN:  #DTI screening negative     - will continue to monitor daily skin changes    LINES/DRAINS:  PIV  ADVANCED DIRECTIVES:  Full Code  INDICATION FOR SICU/SDU: Contusion of unspecified thigh, initial encounter  DISPO:  4C      Vital Signs Last 24 Hrs  T(C): 36.7 (02 Dec 2024 11:00), Max: 37.1 (02 Dec 2024 00:00)  T(F): 98 (02 Dec 2024 11:00), Max: 98.7 (02 Dec 2024 00:00)  HR: 61 (02 Dec 2024 11:00) (61 - 86)  BP: 122/61 (02 Dec 2024 11:00) (122/58 - 155/87)  BP(mean): 88 (02 Dec 2024 11:00) (83 - 95)  RR: 18 (02 Dec 2024 00:00) (18 - 18)  SpO2: 99% (02 Dec 2024 11:00) (91% - 100%)    Parameters below as of 02 Dec 2024 07:00  Patient On (Oxygen Delivery Method): nasal cannula  O2 Flow (L/min): 2    I&O's Summary    01 Dec 2024 07:01  -  02 Dec 2024 07:00  --------------------------------------------------------  IN: 500 mL / OUT: 1200 mL / NET: -700 mL      MEDICATIONS  (STANDING):  acetaminophen     Tablet .. 650 milliGRAM(s) Oral every 6 hours  allopurinol 300 milliGRAM(s) Oral at bedtime  aMIOdarone    Tablet 200 milliGRAM(s) Oral daily  atorvastatin 20 milliGRAM(s) Oral at bedtime  chlorhexidine 2% Cloths 1 Application(s) Topical <User Schedule>  furosemide    Tablet 20 milliGRAM(s) Oral daily  heparin   Injectable 5000 Unit(s) SubCutaneous every 8 hours  influenza  Vaccine (HIGH DOSE) 0.5 milliLiter(s) IntraMuscular once  levothyroxine 175 MICROGram(s) Oral daily  metoprolol tartrate 50 milliGRAM(s) Oral two times a day  nystatin Cream 1 Application(s) Topical every 12 hours  oxybutynin 5 milliGRAM(s) Oral two times a day  senna 2 Tablet(s) Oral at bedtime    MEDICATIONS  (PRN):      LABS                                            8.0                   Neurophils% (auto):   x      (12-02 @ 05:56):    7.25 )-----------(261          Lymphocytes% (auto):  x                                             27.8                   Eosinphils% (auto):   x        Manual%: Neutrophils x    ; Lymphocytes x    ; Eosinophils x    ; Bands%: x    ; Blasts x                                    143    |  109    |  26                  Calcium: 8.3   / iCa: x      (12-02 @ 05:56)    ----------------------------<  92        Magnesium: 2.2                              4.0     |  25     |  1.3              Phosphorous: 3.6        For Follow-Up:  - 2000 labs  - f/u restart xarelto   - Continue to monitor H/H  - Continue to monitor R thigh hematoma    Signed out to MD Chao   Date: 12/2/24  Time: 4:15PM SICU Transfer Note    Transfer from: SICU  Transfer to: Trauma Surgery     SDU COURSE:  Patient is a 85 y/o female with PMHx of afib (on Xarelto), HLD, hypothyroidism, gout, CKD, FELIX, overactive bladder with recent fall on 11/11 (+HT, -LOC) who presents to the ED with complaints of right lower extremity pain and bruising. Patient states that she has been ambulatory since the fall with her walker which is her baseline. CTA of RLE in ED showing right posterior thigh hematoma. Patient with hypotension in the ED, resolved with fluid boluses. Noted to have acute H/H drop. Patient admitted to SICU for close HD monitoring and serial CBC. CTA RLE with large right posterior thigh intramuscular hematoma measuring 10.0 x 8.9 x 18.1 cm without contrast extravasation without active extravasation.     11/29: admitted to SICU service.   11/30: H/H remained overall stable. DG to SDU. Physiatry: Home with outpt PT/VNS vs SNF  12/1: Hematoma and H/H stable, DVT ppx started   12/2: Patient remains hemodynamically stable, H/H stable, hematoma resolving     Patient is tolerating regular diet, urinating and ambulating. She is now stable for downgrade to the floor.      ASSESSMENT & PLAN:   Assessment and Plan  84y Female s/p mechanical  fall at home found to have right thigh hematoma.      NEUROLOGICAL:  #Acute pain    -APAP prn    RESPIRATORY:   #Oxygenation    -saturating well on room air   - encourage incentive spirometry use  #Activity    -increase as tolerated    CARDS:   #hx of afib  - holding home Xarelto given R thigh hematoma  - continue home amiodarone 200 QD  - home metoprolol bid with hold parameters   - home lasix 20 QD  - s/p AV Junction Ablation in June 2024  - cardiologist Dr. Quintero   #hx HLD  - continue atorvastatin 20mg QD   Imaging:   - TTE 06/24: EF of 44%. Indeterminate diastolic dysfunction. Moderate concentric left ventricular hypertrophy. Severe mitral annular calcification. Mild mitral valve regurgitation. Mild to moderate pulmonic valve regurgitation. Small pericardial effusion without echocardiographic evidence of tamponade physiology.    GASTROINTESTINAL/NUTRITION:   #Diet, DASH/TLC:     -aspiration precautions, HOB 30  #GI Prophylaxis     -not indicated  #Bowel regimen    -senna    -last bowel movement 12/1    /RENAL:   #urine output in critically ill    - voiding spontaneously  #hx of CKD   - monitor BUN/Cr   -baseline Cr 1.4  #overactive bladder  - continue home trospium chloride 20 bid      Labs:          BUN/Cr -  29/1.4  -->,  26/1.3  -->          [12-02 @ 05:56]Na  143 // K  4.0 // Mg  2.2 // Phos  3.6       HEME/ONC:   #DVT prophylaxis     -Chemical: holding given R thigh hematoma     -Mechanical: SCDs        Labs: Hgb/Hct:  7.8/26.1  (12-01 @ 16:27)  -->,  8.0/27.8  (12-02 @ 05:56)  -->                      Platelets:  279  -->,  261  -->                 PTT/INR:      #R thigh hematoma, no evidence of extrav on CT  - H/H stable, no need for blood transfusuion, per patient and daughter would only consider if hgb <7  #hx of afib on Xarelto  - continue to hold home Xarelto 15 QD    ID:  #Monitor WBC and trend fever curve  WBC -  8.54  --->>,  8.75  --->>,  7.25  --->>  Temp trend - 24hrs T(F): 98.2 (12-02 @ 04:00), Max: 98.7 (12-02 @ 00:00)  Current antibiotics-influenza  Vaccine (HIGH DOSE) 0.5 once  #abdominal skin fold rash  - nystatin cream    ENDOCRINE:  #glycemic monitoring     -Glucose goal 140-180. If above 180, will start corrective insulin sliding scale  #hypothyroidism  - continue home levothyroxine 175mcg daily   #hx pituitary adenoma  - continue home cabergoline 0.5mg once weekly on Monday night     MSK:  #ambulate as tolerated  - ambulates with walker baseline   - physiatry recommending short term rehab  #hx of gout  - continue allopurinol 300QD    SKIN:  #DTI screening negative     - will continue to monitor daily skin changes    LINES/DRAINS:  PIV  ADVANCED DIRECTIVES:  Full Code  INDICATION FOR SICU/SDU: Contusion of unspecified thigh, initial encounter  DISPO:  4C      Vital Signs Last 24 Hrs  T(C): 36.7 (02 Dec 2024 11:00), Max: 37.1 (02 Dec 2024 00:00)  T(F): 98 (02 Dec 2024 11:00), Max: 98.7 (02 Dec 2024 00:00)  HR: 61 (02 Dec 2024 11:00) (61 - 86)  BP: 122/61 (02 Dec 2024 11:00) (122/58 - 155/87)  BP(mean): 88 (02 Dec 2024 11:00) (83 - 95)  RR: 18 (02 Dec 2024 00:00) (18 - 18)  SpO2: 99% (02 Dec 2024 11:00) (91% - 100%)    Parameters below as of 02 Dec 2024 07:00  Patient On (Oxygen Delivery Method): nasal cannula  O2 Flow (L/min): 2    I&O's Summary    01 Dec 2024 07:01  -  02 Dec 2024 07:00  --------------------------------------------------------  IN: 500 mL / OUT: 1200 mL / NET: -700 mL      MEDICATIONS  (STANDING):  acetaminophen     Tablet .. 650 milliGRAM(s) Oral every 6 hours  allopurinol 300 milliGRAM(s) Oral at bedtime  aMIOdarone    Tablet 200 milliGRAM(s) Oral daily  atorvastatin 20 milliGRAM(s) Oral at bedtime  chlorhexidine 2% Cloths 1 Application(s) Topical <User Schedule>  furosemide    Tablet 20 milliGRAM(s) Oral daily  heparin   Injectable 5000 Unit(s) SubCutaneous every 8 hours  influenza  Vaccine (HIGH DOSE) 0.5 milliLiter(s) IntraMuscular once  levothyroxine 175 MICROGram(s) Oral daily  metoprolol tartrate 50 milliGRAM(s) Oral two times a day  nystatin Cream 1 Application(s) Topical every 12 hours  oxybutynin 5 milliGRAM(s) Oral two times a day  senna 2 Tablet(s) Oral at bedtime    MEDICATIONS  (PRN):      LABS                                            8.0                   Neurophils% (auto):   x      (12-02 @ 05:56):    7.25 )-----------(261          Lymphocytes% (auto):  x                                             27.8                   Eosinphils% (auto):   x        Manual%: Neutrophils x    ; Lymphocytes x    ; Eosinophils x    ; Bands%: x    ; Blasts x                                    143    |  109    |  26                  Calcium: 8.3   / iCa: x      (12-02 @ 05:56)    ----------------------------<  92        Magnesium: 2.2                              4.0     |  25     |  1.3              Phosphorous: 3.6        For Follow-Up:  - 2000 labs  - f/u restart xarelto   - Continue to monitor H/H  - Continue to monitor R thigh hematoma    Signed out to MD Zhanna Narvaez   Date: 12/2/24  Time: 4:15PM

## 2024-12-02 NOTE — PROGRESS NOTE ADULT - SUBJECTIVE AND OBJECTIVE BOX
Patient is a 84y old  Female who presents with a chief complaint of Right Thigh Hematoma      HPI:  84F with PMH significant for atrial fibrillation on Xarelto, hypertension, hyperlipidemia, hypothyroidism, CKD, FELIX, and recent fall on 11/11 (+HT, -LOC) who presents to the ED with complaints of right lower extremity pain and bruising. Patient states that she has been ambulatory since the fall with her walker which is her baseline. She denies any neurologic symptoms and states that she has no other complaints besides her right thigh pain.      General surgery consulted for findings of right posterior thigh hematoma without active extravasation on CTA RLE.        PHYSICAL EXAM    Vital Signs Last 24 Hrs  T(C): 37 (02 Dec 2024 16:02), Max: 37.1 (02 Dec 2024 00:00)  T(F): 98.6 (02 Dec 2024 16:02), Max: 98.7 (02 Dec 2024 00:00)  HR: 60 (02 Dec 2024 16:02) (60 - 86)  BP: 125/58 (02 Dec 2024 16:02) (122/61 - 155/87)  BP(mean): 83 (02 Dec 2024 16:02) (83 - 95)  RR: 18 (02 Dec 2024 16:02) (18 - 18)  SpO2: 100% (02 Dec 2024 16:02) (91% - 100%)    Parameters below as of 02 Dec 2024 16:00  Patient On (Oxygen Delivery Method): nasal cannula  O2 Flow (L/min): 2      Constitutional - NAD  Chest - CTA  Cardiovascular - S1S2+  Abdomen -  Soft  Extremities -  No calf tenderness   Function : needs assist with ADLs and ambulate with assist on unit according to pt                  PT eval pending     acetaminophen     Tablet .. 650 milliGRAM(s) Oral every 6 hours  allopurinol 300 milliGRAM(s) Oral at bedtime  aMIOdarone    Tablet 200 milliGRAM(s) Oral daily  atorvastatin 20 milliGRAM(s) Oral at bedtime  chlorhexidine 2% Cloths 1 Application(s) Topical <User Schedule>  furosemide    Tablet 20 milliGRAM(s) Oral daily  heparin   Injectable 5000 Unit(s) SubCutaneous every 8 hours  influenza  Vaccine (HIGH DOSE) 0.5 milliLiter(s) IntraMuscular once  levothyroxine 175 MICROGram(s) Oral daily  metoprolol tartrate 50 milliGRAM(s) Oral two times a day  nystatin Cream 1 Application(s) Topical every 12 hours  oxybutynin 5 milliGRAM(s) Oral two times a day  senna 2 Tablet(s) Oral at bedtime      RECENT LABS/IMAGING                        8.0    7.25  )-----------( 261      ( 02 Dec 2024 05:56 )             27.8     12-02    143  |  109  |  26[H]  ----------------------------<  92  4.0   |  25  |  1.3    Ca    8.3[L]      02 Dec 2024 05:56  Phos  3.6     12-02  Mg     2.2     12-02      PT/INR - ( 30 Nov 2024 17:43 )   PT: 14.60 sec;   INR: 1.23 ratio         PTT - ( 30 Nov 2024 17:43 )  PTT:32.9 sec  Urinalysis Basic - ( 02 Dec 2024 05:56 )    Color: x / Appearance: x / SG: x / pH: x  Gluc: 92 mg/dL / Ketone: x  / Bili: x / Urobili: x   Blood: x / Protein: x / Nitrite: x   Leuk Esterase: x / RBC: x / WBC x   Sq Epi: x / Non Sq Epi: x / Bacteria: x

## 2024-12-02 NOTE — PROGRESS NOTE ADULT - SUBJECTIVE AND OBJECTIVE BOX
JEFFERY UGALDE   [id number]   [birth year]    84yF  ============================================================   DATE OF INITIAL SICU CONSULT: 11/30/24    INDICATION FOR SICU/SDU CONSULT: hemodynamic monitoring/close H/H monitoring given right thigh hematoma on AC at home    SICU COURSE EVENTS:   11-29 - admitted to SICU service.   11/30: H/H remained overall stable. DG to SDU.    24 Hour Events:   12/1  Night  - , HR 76. 96% on room air. Hematoma soft, receeding.     Day  - DVT ppx started, f/u repeat hgb 7.8 from 8.1    [X] A ten-point review of systems was negative except as expressed in note.  [X] History was obtained from patient. If unable to participate in their care, history obtained from review of the chart and collateral sources of information.  ============================================================    JEFFERY UGALDE   [id number]   [birth year]    84yF  ============================================================   DATE OF INITIAL SICU CONSULT: 24    INDICATION FOR SICU/SDU CONSULT: hemodynamic monitoring/close H/H monitoring given right thigh hematoma on AC at home    SICU COURSE EVENTS:    - admitted to SICU service.   : H/H remained overall stable. DG to SDU.    24 Hour Events:     Night  - , HR 76. 96% on room air. Hematoma soft, receeding.     Day  - DVT ppx started, f/u repeat hgb 7.8 from 8.1    [X] A ten-point review of systems was negative except as expressed in note.  [X] History was obtained from patient. If unable to participate in their care, history obtained from review of the chart and collateral sources of information.  ============================================================   Daily     Daily Weight in k.8 (02 Dec 2024 00:00)    Diet, DASH/TLC:   Sodium & Cholesterol Restricted (24 @ 18:51)      CURRENT MEDS:  Neurologic Medications  acetaminophen     Tablet .. 650 milliGRAM(s) Oral every 6 hours    Respiratory Medications    Cardiovascular Medications  aMIOdarone    Tablet 200 milliGRAM(s) Oral daily  furosemide    Tablet 20 milliGRAM(s) Oral daily  metoprolol tartrate 50 milliGRAM(s) Oral two times a day    Gastrointestinal Medications  senna 2 Tablet(s) Oral at bedtime    Genitourinary Medications  oxybutynin 5 milliGRAM(s) Oral two times a day    Hematologic/Oncologic Medications  heparin   Injectable 5000 Unit(s) SubCutaneous every 8 hours  influenza  Vaccine (HIGH DOSE) 0.5 milliLiter(s) IntraMuscular once    Antimicrobial/Immunologic Medications    Endocrine/Metabolic Medications  allopurinol 300 milliGRAM(s) Oral at bedtime  atorvastatin 20 milliGRAM(s) Oral at bedtime  levothyroxine 175 MICROGram(s) Oral daily    Topical/Other Medications  chlorhexidine 2% Cloths 1 Application(s) Topical <User Schedule>  nystatin Cream 1 Application(s) Topical every 12 hours      ICU Vital Signs Last 24 Hrs  T(C): 36.7 (02 Dec 2024 07:00), Max: 37.1 (02 Dec 2024 00:00)  T(F): 98 (02 Dec 2024 07:00), Max: 98.7 (02 Dec 2024 00:00)  HR: 72 (02 Dec 2024 07:) (61 - 86)  BP: 135/68 (02 Dec 2024 07:00) (122/58 - 155/87)  BP(mean): 95 (02 Dec 2024 07:) (83 - 95)  RR: 18 (02 Dec 2024 00:00) (18 - 18)  SpO2: 93% (02 Dec 2024 07:) (91% - 100%)    O2 Parameters below as of 02 Dec 2024 07:00  Patient On (Oxygen Delivery Method): nasal cannula  O2 Flow (L/min): 2      I&O's Summary    01 Dec 2024 07:01  -  02 Dec 2024 07:00  --------------------------------------------------------  IN: 500 mL / OUT: 1200 mL / NET: -700 mL      I&O's Detail    01 Dec 2024 07:01  -  02 Dec 2024 07:00  --------------------------------------------------------  IN:    Oral Fluid: 500 mL  Total IN: 500 mL    OUT:    IV PiggyBack: 0 mL    Voided (mL): 1200 mL  Total OUT: 1200 mL    Total NET: -700 mL      PHYSICAL EXAM:    General/Neuro: GCS: 15, alert & oriented x 3, no focal deficits  Lungs: Normal expansion/effort on 2L NC.   Cardiovascular : S1, S2.  Regular rate and rhythm.  Peripheral edema   GI: Abdomen soft, Non-tender, Non-distended.    Extremities: Extremities warm, pink, well-perfused. Right thigh hematoma soft.   Derm: Abdominal skin fold rash.    : Primafit in place.     CXR:     LABS:  CAPILLARY BLOOD GLUCOSE                              8.0    7.25  )-----------( 261      ( 02 Dec 2024 05:56 )             27.8       12-    143  |  109  |  26[H]  ----------------------------<  92  4.0   |  25  |  1.3    Ca    8.3[L]      02 Dec 2024 05:56  Phos  3.6     12-  Mg     2.2     12-02        PT/INR - ( 2024 17:43 )   PT: 14.60 sec;   INR: 1.23 ratio         PTT - ( 2024 17:43 )  PTT:32.9 sec      Urinalysis Basic - ( 02 Dec 2024 05:56 )    Color: x / Appearance: x / SG: x / pH: x  Gluc: 92 mg/dL / Ketone: x  / Bili: x / Urobili: x   Blood: x / Protein: x / Nitrite: x   Leuk Esterase: x / RBC: x / WBC x   Sq Epi: x / Non Sq Epi: x / Bacteria: x

## 2024-12-02 NOTE — DIETITIAN INITIAL EVALUATION ADULT - COLLABORATION WITH OTHER PROVIDERS
Intervention: meals and snacks, oral nutrition supplements, coordination of care; RN/resident Monitoring/Evaluation: diet order, energy intake, weights, labs, GI s/s, BG, skin status, NFPE

## 2024-12-02 NOTE — CONSULT NOTE ADULT - SUBJECTIVE AND OBJECTIVE BOX
NEPHROLOGY CONSULTATION NOTE    84F with PMH significant for atrial fibrillation on Xarelto, hypertension, hyperlipidemia, hypothyroidism, CKD, FELIX, and recent fall on 11/11 (+HT, -LOC) who presents to the ED with complaints of right lower extremity pain and bruising. Patient states that she has been ambulatory since the fall with her walker which is her baseline. She denies any neurologic symptoms and states that she has no other complaints besides her right thigh pain.          PAST MEDICAL & SURGICAL HISTORY:  HTN (hypertension)      High cholesterol      Hypothyroid      Afib  on xarelto      Sleep apnea      Osteoarthritis      CKD (chronic kidney disease) stage 3, GFR 30-59 ml/min      Thyroid cancer      H/O thyroidectomy      S/P rotator cuff surgery      Pacemaker      H/O total knee replacement        Allergies:  No Known Allergies    Home Medications Reviewed    SOCIAL HISTORY:  Denies ETOH,Smoking,   FAMILY HISTORY:  Family history of lung cancer (Mother)    Family history of abdominal aortic aneurysm (AAA) (Father)          REVIEW OF SYSTEMS:  All other review of systems is negative unless indicated above.    PHYSICAL EXAM:  Constitutional: NAD  HEENT: anicteric sclera, oropharynx clear, MMM  Neck: No JVD  Respiratory: CTAB, no wheezes, rales or rhonchi  Cardiovascular: S1, S2, RRR  Gastrointestinal: BS+, soft, NT/ND  Extremities: R posterior thigh ecchymosis   Neurological: A/O x 3, no focal deficits  Psychiatric: Normal mood, normal affect  : No CVA tenderness. No joaquin.   Skin: No rashes    Hospital Medications:   MEDICATIONS  (STANDING):  acetaminophen     Tablet .. 650 milliGRAM(s) Oral every 6 hours  allopurinol 300 milliGRAM(s) Oral at bedtime  aMIOdarone    Tablet 200 milliGRAM(s) Oral daily  atorvastatin 20 milliGRAM(s) Oral at bedtime  chlorhexidine 2% Cloths 1 Application(s) Topical <User Schedule>  furosemide    Tablet 20 milliGRAM(s) Oral daily  heparin   Injectable 5000 Unit(s) SubCutaneous every 8 hours  influenza  Vaccine (HIGH DOSE) 0.5 milliLiter(s) IntraMuscular once  levothyroxine 175 MICROGram(s) Oral daily  metoprolol tartrate 50 milliGRAM(s) Oral two times a day  nystatin Cream 1 Application(s) Topical every 12 hours  oxybutynin 5 milliGRAM(s) Oral two times a day  senna 2 Tablet(s) Oral at bedtime        VITALS:  T(F): 98 (12-02-24 @ 11:00), Max: 98.7 (12-02-24 @ 00:00)  HR: 61 (12-02-24 @ 11:00)  BP: 122/61 (12-02-24 @ 11:00)  RR: 18 (12-02-24 @ 00:00)  SpO2: 99% (12-02-24 @ 11:00)  Wt(kg): --    11-30 @ 07:01  -  12-01 @ 07:00  --------------------------------------------------------  IN: 800 mL / OUT: 1250 mL / NET: -450 mL    12-01 @ 07:01  -  12-02 @ 07:00  --------------------------------------------------------  IN: 500 mL / OUT: 1200 mL / NET: -700 mL          LABS:  12-02    143  |  109  |  26[H]  ----------------------------<  92  4.0   |  25  |  1.3    Ca    8.3[L]      02 Dec 2024 05:56  Phos  3.6     12-02  Mg     2.2     12-02                            8.0    7.25  )-----------( 261      ( 02 Dec 2024 05:56 )             27.8       Urine Studies:  Urinalysis Basic - ( 02 Dec 2024 05:56 )    Color:  / Appearance:  / SG:  / pH:   Gluc: 92 mg/dL / Ketone:   / Bili:  / Urobili:    Blood:  / Protein:  / Nitrite:    Leuk Esterase:  / RBC:  / WBC    Sq Epi:  / Non Sq Epi:  / Bacteria:           RADIOLOGY & ADDITIONAL STUDIES:

## 2024-12-02 NOTE — DIETITIAN INITIAL EVALUATION ADULT - ORAL INTAKE PTA/DIET HISTORY
Pt reports good PO PTA; reports a small/light breakfast and normal lunch/dinner meals. Pt reports she adheres to a low sodium diet. NKFA. Pt takes MVI, Vit B and iron supplements at home. No protein drink supplement use at home. Unsure of any wt loss, reports  lb.

## 2024-12-02 NOTE — DIETITIAN INITIAL EVALUATION ADULT - ENERGY INTAKE
Pt reports she has been requesting lighter breakfast items on admission; had yogurt with fruit and muffin this morning. Pt states she prefers lighter breakfast options vs eggs, Khmer toast, potatoes. Pt reports more intake during lunch and dinner meals. Tolerating well.     Flowsheet intake: 11/30 - 51-75% intake

## 2024-12-02 NOTE — PROGRESS NOTE ADULT - ASSESSMENT
Imp: Rehab of gait dysfunction / right thigh hematoma / afib (on Xarelto), HLD, hypothyroidism, gout, CKD, FELIX, overactive bladder   Plan:  bedside therapy             encourage OOB             monitor H/H             home with services vs. [facility] pending her functional progress

## 2024-12-02 NOTE — PROGRESS NOTE ADULT - ASSESSMENT
ASSESSMENT:  84 year old female with past medical history significant for atrial fibrillation on Xarelto, hypertension, hyperlipidemia, hypothyroidism, CKD, FELIX, and recent fall on 11/11 (+HT, -LOC) who presents to the ED with complaints of right lower extremity pain and bruising. Patient states that she has been ambulatory since the fall with her walker which is her baseline. She denies any neurologic symptoms and states that she has no other complaints besides her right thigh pain.      General surgery consulted for findings of right posterior thigh hematoma without active extravasation on CTA RLE.     PLAN:  -F/U labs, replete PRN  -Monitor hemodynamics, support PRN  -DASH diet  -DVT ppx  -RLE ACE wrap  -F/u CM for dispo

## 2024-12-02 NOTE — DIETITIAN INITIAL EVALUATION ADULT - PERTINENT MEDS FT
MEDICATIONS  (STANDING):  acetaminophen     Tablet .. 650 milliGRAM(s) Oral every 6 hours  allopurinol 300 milliGRAM(s) Oral at bedtime  aMIOdarone    Tablet 200 milliGRAM(s) Oral daily  atorvastatin 20 milliGRAM(s) Oral at bedtime  chlorhexidine 2% Cloths 1 Application(s) Topical <User Schedule>  furosemide    Tablet 20 milliGRAM(s) Oral daily  heparin   Injectable 5000 Unit(s) SubCutaneous every 8 hours  influenza  Vaccine (HIGH DOSE) 0.5 milliLiter(s) IntraMuscular once  levothyroxine 175 MICROGram(s) Oral daily  metoprolol tartrate 50 milliGRAM(s) Oral two times a day  nystatin Cream 1 Application(s) Topical every 12 hours  oxybutynin 5 milliGRAM(s) Oral two times a day  senna 2 Tablet(s) Oral at bedtime    MEDICATIONS  (PRN):

## 2024-12-02 NOTE — CONSULT NOTE ADULT - ASSESSMENT
CKD stage 3, pt well known to me  resolved, mild KOBI  s/p iv contrast on admission  s/p fall  RLE hematoma  anemia  b/l proteinaceous and hemorrhagic renal cysts (on outpt MRI kidneys)  chronic HFpEF  / hx of b/l lung nodular opacities  hx of pericardial effusion / Afib / s/p PPM 4/15 s/p AVN ablation 6/3  anemia  FELIX  HTN  hypothyroidism / thyroid Ca / pituitary adenoma     plan:    cont lasix 20mg po qd  no further IVF  PRBC transfusion if Hgb < 7  f/u trauma  SICU care

## 2024-12-02 NOTE — PROGRESS NOTE ADULT - NS ATTEND AMEND GEN_ALL_CORE FT
Patient is under SDU care.  Hb 8   Right thigh hematoma is better.  Pain is better  Still requires )2 with NC 2L.    ASSESSMENT:  85 y/o female, S/P Fall.  Right Thigh Hematoma.  Acute blood loss anemia.  Coagulopathy due to Xarelto.  A.fib.  Atelectasis.  Acute pain due to trauma.    PLAN:  - pain control - use Tylenol as needed  - encourage incentive spirometer  - wean O2 with NC  - keep normotensive - euvolemic  - on Metoprolol and Amiodarone po   - dash diet  - aspiration precautions at all time  - follow serum electrolytes and UOP  - DVT prophylaxis  - PT

## 2024-12-02 NOTE — PROGRESS NOTE ADULT - SUBJECTIVE AND OBJECTIVE BOX
GENERAL SURGERY CONSULT NOTE    Patient: JEFFERY UGALDE , 84y ([birth year])Female   MRN: [id number]  Location: [facility] ED  Visit: 11-29-24 Emergency  Date: 12-02-24 @ 16:24 PM    24HR EVENTS: NAEO. Hematoma improving on physical exam. DVT ppx started yesterday, hgb 8.0 from 7.8 today. Patient hemodynamically stable, 98% on 2L NC currently. Labs stable. Plan for downgrade to floors.    OBJECTIVE:  Vital Signs Last 24 Hrs  T(C): 36.7 (02 Dec 2024 11:00), Max: 37.1 (02 Dec 2024 00:00)  T(F): 98 (02 Dec 2024 11:00), Max: 98.7 (02 Dec 2024 00:00)  HR: 61 (02 Dec 2024 11:00) (61 - 86)  BP: 122/61 (02 Dec 2024 11:00) (122/61 - 155/87)  BP(mean): 88 (02 Dec 2024 11:00) (83 - 95)  RR: 18 (02 Dec 2024 00:00) (18 - 18)  SpO2: 99% (02 Dec 2024 11:00) (91% - 100%)    Parameters below as of 02 Dec 2024 07:00  Patient On (Oxygen Delivery Method): nasal cannula  O2 Flow (L/min): 2      I&O's Summary    01 Dec 2024 07:01  -  02 Dec 2024 07:00  --------------------------------------------------------  IN: 500 mL / OUT: 1200 mL / NET: -700 mL        PHYSICAL EXAM:  General: NAD, AAOx3  Cardiac: RRR  Respiratory: Equal chest rise bilaterally, normal respiratory effort  Musculoskeletal: Moves all extremities to command. Compartments soft, some pain to palpation of the posterior right thigh, eccyhmosis to posterior right thigh   Vascular: Pulses 2+ throughout, extremities well perfused  Skin: Warm/dry, normal color, no jaundice    LABS:                        8.0    7.25  )-----------( 261      ( 02 Dec 2024 05:56 )             27.8     12-02    143  |  109  |  26[H]  ----------------------------<  92  4.0   |  25  |  1.3    Ca    8.3[L]      02 Dec 2024 05:56  Phos  3.6     12-02  Mg     2.2     12-02      PT/INR - ( 30 Nov 2024 17:43 )   PT: 14.60 sec;   INR: 1.23 ratio         PTT - ( 30 Nov 2024 17:43 )  PTT:32.9 sec  Urinalysis Basic - ( 02 Dec 2024 05:56 )    Color: x / Appearance: x / SG: x / pH: x  Gluc: 92 mg/dL / Ketone: x  / Bili: x / Urobili: x   Blood: x / Protein: x / Nitrite: x   Leuk Esterase: x / RBC: x / WBC x   Sq Epi: x / Non Sq Epi: x / Bacteria: x        RADIOLOGY & ADDITIONAL STUDIES:

## 2024-12-02 NOTE — DIETITIAN INITIAL EVALUATION ADULT - OTHER INFO
84y Female s/p mechanical  fall at home found to have right thigh hematoma.    #R thigh hematoma, no evidence of extrav on CT  - patient and daughter deferring blood transfusion at this time; amendable if hemoglobin drops below 7.0  #hx of CKD   - monitor BUN/Cr   -baseline Cr 1.4

## 2024-12-03 LAB
ANION GAP SERPL CALC-SCNC: 12 MMOL/L — SIGNIFICANT CHANGE UP (ref 7–14)
ANION GAP SERPL CALC-SCNC: 13 MMOL/L — SIGNIFICANT CHANGE UP (ref 7–14)
BUN SERPL-MCNC: 26 MG/DL — HIGH (ref 10–20)
BUN SERPL-MCNC: 26 MG/DL — HIGH (ref 10–20)
CALCIUM SERPL-MCNC: 8.1 MG/DL — LOW (ref 8.4–10.5)
CALCIUM SERPL-MCNC: 8.6 MG/DL — SIGNIFICANT CHANGE UP (ref 8.4–10.4)
CHLORIDE SERPL-SCNC: 105 MMOL/L — SIGNIFICANT CHANGE UP (ref 98–110)
CHLORIDE SERPL-SCNC: 106 MMOL/L — SIGNIFICANT CHANGE UP (ref 98–110)
CO2 SERPL-SCNC: 25 MMOL/L — SIGNIFICANT CHANGE UP (ref 17–32)
CO2 SERPL-SCNC: 26 MMOL/L — SIGNIFICANT CHANGE UP (ref 17–32)
CREAT SERPL-MCNC: 1.3 MG/DL — SIGNIFICANT CHANGE UP (ref 0.7–1.5)
CREAT SERPL-MCNC: 1.5 MG/DL — SIGNIFICANT CHANGE UP (ref 0.7–1.5)
EGFR: 35 ML/MIN/1.73M2 — LOW
EGFR: 41 ML/MIN/1.73M2 — LOW
GLUCOSE SERPL-MCNC: 87 MG/DL — SIGNIFICANT CHANGE UP (ref 70–99)
GLUCOSE SERPL-MCNC: 91 MG/DL — SIGNIFICANT CHANGE UP (ref 70–99)
HCT VFR BLD CALC: 25.3 % — LOW (ref 37–47)
HCT VFR BLD CALC: 28.8 % — LOW (ref 37–47)
HGB BLD-MCNC: 7.6 G/DL — LOW (ref 12–16)
HGB BLD-MCNC: 8.6 G/DL — LOW (ref 12–16)
MAGNESIUM SERPL-MCNC: 2 MG/DL — SIGNIFICANT CHANGE UP (ref 1.8–2.4)
MAGNESIUM SERPL-MCNC: 2 MG/DL — SIGNIFICANT CHANGE UP (ref 1.8–2.4)
MCHC RBC-ENTMCNC: 25.6 PG — LOW (ref 27–31)
MCHC RBC-ENTMCNC: 25.6 PG — LOW (ref 27–31)
MCHC RBC-ENTMCNC: 29.9 G/DL — LOW (ref 32–37)
MCHC RBC-ENTMCNC: 30 G/DL — LOW (ref 32–37)
MCV RBC AUTO: 85.2 FL — SIGNIFICANT CHANGE UP (ref 81–99)
MCV RBC AUTO: 85.7 FL — SIGNIFICANT CHANGE UP (ref 81–99)
NRBC # BLD: 0 /100 WBCS — SIGNIFICANT CHANGE UP (ref 0–0)
NRBC # BLD: 0 /100 WBCS — SIGNIFICANT CHANGE UP (ref 0–0)
PHOSPHATE SERPL-MCNC: 3.5 MG/DL — SIGNIFICANT CHANGE UP (ref 2.1–4.9)
PHOSPHATE SERPL-MCNC: 4 MG/DL — SIGNIFICANT CHANGE UP (ref 2.1–4.9)
PLATELET # BLD AUTO: 307 K/UL — SIGNIFICANT CHANGE UP (ref 130–400)
PLATELET # BLD AUTO: 354 K/UL — SIGNIFICANT CHANGE UP (ref 130–400)
PMV BLD: 10.9 FL — HIGH (ref 7.4–10.4)
PMV BLD: 11 FL — HIGH (ref 7.4–10.4)
POTASSIUM SERPL-MCNC: 4.2 MMOL/L — SIGNIFICANT CHANGE UP (ref 3.5–5)
POTASSIUM SERPL-MCNC: 4.2 MMOL/L — SIGNIFICANT CHANGE UP (ref 3.5–5)
POTASSIUM SERPL-SCNC: 4.2 MMOL/L — SIGNIFICANT CHANGE UP (ref 3.5–5)
POTASSIUM SERPL-SCNC: 4.2 MMOL/L — SIGNIFICANT CHANGE UP (ref 3.5–5)
RBC # BLD: 2.97 M/UL — LOW (ref 4.2–5.4)
RBC # BLD: 3.36 M/UL — LOW (ref 4.2–5.4)
RBC # FLD: 19 % — HIGH (ref 11.5–14.5)
RBC # FLD: 19.6 % — HIGH (ref 11.5–14.5)
SODIUM SERPL-SCNC: 143 MMOL/L — SIGNIFICANT CHANGE UP (ref 135–146)
SODIUM SERPL-SCNC: 144 MMOL/L — SIGNIFICANT CHANGE UP (ref 135–146)
WBC # BLD: 7.55 K/UL — SIGNIFICANT CHANGE UP (ref 4.8–10.8)
WBC # BLD: 8.44 K/UL — SIGNIFICANT CHANGE UP (ref 4.8–10.8)
WBC # FLD AUTO: 7.55 K/UL — SIGNIFICANT CHANGE UP (ref 4.8–10.8)
WBC # FLD AUTO: 8.44 K/UL — SIGNIFICANT CHANGE UP (ref 4.8–10.8)

## 2024-12-03 PROCEDURE — 99232 SBSQ HOSP IP/OBS MODERATE 35: CPT

## 2024-12-03 RX ADMIN — AMIODARONE HYDROCHLORIDE 200 MILLIGRAM(S): 200 TABLET ORAL at 05:14

## 2024-12-03 RX ADMIN — CHLORHEXIDINE GLUCONATE 1 APPLICATION(S): 1.2 RINSE ORAL at 05:13

## 2024-12-03 RX ADMIN — FUROSEMIDE 20 MILLIGRAM(S): 40 TABLET ORAL at 05:14

## 2024-12-03 RX ADMIN — ACETAMINOPHEN 500MG 650 MILLIGRAM(S): 500 TABLET, COATED ORAL at 17:27

## 2024-12-03 RX ADMIN — METOPROLOL TARTRATE 50 MILLIGRAM(S): 100 TABLET, FILM COATED ORAL at 17:27

## 2024-12-03 RX ADMIN — Medication 5000 UNIT(S): at 05:14

## 2024-12-03 RX ADMIN — Medication 5 MILLIGRAM(S): at 17:27

## 2024-12-03 RX ADMIN — ACETAMINOPHEN 500MG 650 MILLIGRAM(S): 500 TABLET, COATED ORAL at 23:15

## 2024-12-03 RX ADMIN — Medication 20 MILLIGRAM(S): at 21:14

## 2024-12-03 RX ADMIN — Medication 5 MILLIGRAM(S): at 05:13

## 2024-12-03 RX ADMIN — ACETAMINOPHEN 500MG 650 MILLIGRAM(S): 500 TABLET, COATED ORAL at 17:57

## 2024-12-03 RX ADMIN — ALLOPURINOL 300 MILLIGRAM(S): 300 TABLET ORAL at 21:13

## 2024-12-03 RX ADMIN — Medication 5000 UNIT(S): at 21:14

## 2024-12-03 RX ADMIN — Medication 5000 UNIT(S): at 14:04

## 2024-12-03 RX ADMIN — Medication 175 MICROGRAM(S): at 05:14

## 2024-12-03 RX ADMIN — METOPROLOL TARTRATE 50 MILLIGRAM(S): 100 TABLET, FILM COATED ORAL at 05:14

## 2024-12-03 RX ADMIN — NYSTATIN 1 APPLICATION(S): 100000 POWDER TOPICAL at 05:15

## 2024-12-03 RX ADMIN — Medication 2 TABLET(S): at 21:13

## 2024-12-03 RX ADMIN — NYSTATIN 1 APPLICATION(S): 100000 POWDER TOPICAL at 17:27

## 2024-12-03 NOTE — PROGRESS NOTE ADULT - ATTENDING COMMENTS
Trauma/Acute Care Surgery Attending Note Attestation  Patient was seen and examined bedside.  I reviewed the resident/PA note and agreed with above assessment and plan with following additions and corrections.    Reports feeling unsteady on her feet. Pain well-controlled.    I independently performed a medically appropriate exam. The exam was notable for R thigh hematoma that is not expanding.      Assessment/Plan:  84y Female PMH afib on xarelto, HTN, HLD, hypothyroidism admitted with R thigh hematoma with concern for active bleeding.  - R Thigh hematoma - hold xarelto, continue to monitor on exam  - Afib - amiodarone 200mg daily, metoprolol 50mg BID  - Hypothyroidism - synthroid 175mcg daily  - Gout - allopurinol 300mg QHS  - HTN - furosemide 20mg daily  - DVT ppx    Given patient's balance issues, will hold xarelto for now.    Arnaldo Iraheta MD  Trauma/Acute Care Surgery/Surgical Critical Care Attending

## 2024-12-03 NOTE — PROGRESS NOTE ADULT - ASSESSMENT
CKD stage 3, pt well known to me  resolved, mild KOBI  s/p iv contrast on admission  s/p fall  RLE hematoma  anemia  b/l proteinaceous and hemorrhagic renal cysts (on outpt MRI kidneys)  chronic HFpEF  / hx of b/l lung nodular opacities  hx of pericardial effusion / Afib / s/p PPM 4/15 s/p AVN ablation 6/3  anemia  FELIX  HTN  hypothyroidism / thyroid Ca / pituitary adenoma     plan:    cont lasix 20mg po qd  cont metoprolol   trend h/h  PRBC transfusion if Hgb < 7  physical therapy   full code

## 2024-12-03 NOTE — PHYSICAL THERAPY INITIAL EVALUATION ADULT - PERTINENT HX OF CURRENT PROBLEM, REHAB EVAL
84F with PMH significant for atrial fibrillation on Xarelto, hypertension, hyperlipidemia, hypothyroidism, CKD, FELIX, and recent fall on 11/11 (+HT, -LOC) who presents to the ED with complaints of right lower extremity pain and bruising. Patient states that she has been ambulatory since the fall with her walker which is her baseline. She denies any neurologic symptoms and states that she has no other complaints besides her right thigh pain.

## 2024-12-03 NOTE — PHYSICAL THERAPY INITIAL EVALUATION ADULT - GAIT TRAINING, PT EVAL
Goal: 150' with RW mod I by D/C. Stair Training Goal: 4 steps with appropriate HR with supervision by D/C

## 2024-12-03 NOTE — PROGRESS NOTE ADULT - SUBJECTIVE AND OBJECTIVE BOX
GENERAL SURGERY PROGRESS NOTE    Patient: JEFFERY UGALDE , 84y ([birth year])Female   MRN: [id number]  Location: [address]  Visit: 11-29-24 Inpatient  Date: 12-03-24 @ 03:34    Hospital Day #: 5    Events of past 24 hours: Hematoma improving on physical exam. On DVT ppx, hgb stable. Patient hemodynamically stable, 98% on 2L NC currently. Labs stable. downgraded to floors.      PAST MEDICAL & SURGICAL HISTORY:  HTN (hypertension)      High cholesterol      Hypothyroid      Afib  on xarelto      Sleep apnea      Osteoarthritis      CKD (chronic kidney disease) stage 3, GFR 30-59 ml/min      Thyroid cancer      H/O thyroidectomy      S/P rotator cuff surgery      Pacemaker      H/O total knee replacement          Vitals:   T(F): 98.1 (12-02-24 @ 23:59), Max: 98.6 (12-02-24 @ 16:00)  HR: 65 (12-02-24 @ 23:59)  BP: 129/65 (12-02-24 @ 23:59)  RR: 18 (12-02-24 @ 23:59)  SpO2: 98% (12-02-24 @ 23:59)      Diet, DASH/TLC:   Sodium & Cholesterol Restricted      Fluids:     I & O's:    12-01-24 @ 07:01  -  12-02-24 @ 07:00  --------------------------------------------------------  IN:    Oral Fluid: 500 mL  Total IN: 500 mL    OUT:    IV PiggyBack: 0 mL    Voided (mL): 1200 mL  Total OUT: 1200 mL    Total NET: -700 mL      PHYSICAL EXAM:  General: NAD, AAOx3  Cardiac: RRR  Respiratory: Equal chest rise bilaterally, normal respiratory effort  Musculoskeletal: Moves all extremities to command. Compartments soft, some pain to palpation of the posterior right thigh, eccyhmosis to posterior right thigh   Vascular: Pulses 2+ throughout, extremities well perfused  Skin: Warm/dry, normal color, no jaundice      MEDICATIONS  (STANDING):  acetaminophen     Tablet .. 650 milliGRAM(s) Oral every 6 hours  allopurinol 300 milliGRAM(s) Oral at bedtime  aMIOdarone    Tablet 200 milliGRAM(s) Oral daily  atorvastatin 20 milliGRAM(s) Oral at bedtime  chlorhexidine 2% Cloths 1 Application(s) Topical <User Schedule>  furosemide    Tablet 20 milliGRAM(s) Oral daily  heparin   Injectable 5000 Unit(s) SubCutaneous every 8 hours  influenza  Vaccine (HIGH DOSE) 0.5 milliLiter(s) IntraMuscular once  levothyroxine 175 MICROGram(s) Oral daily  metoprolol tartrate 50 milliGRAM(s) Oral two times a day  nystatin Cream 1 Application(s) Topical every 12 hours  oxybutynin 5 milliGRAM(s) Oral two times a day  senna 2 Tablet(s) Oral at bedtime    MEDICATIONS  (PRN):      DVT PROPHYLAXIS: heparin   Injectable 5000 Unit(s) SubCutaneous every 8 hours    GI PROPHYLAXIS:   ANTICOAGULATION:   ANTIBIOTICS:            LAB/STUDIES:  Labs:  CAPILLARY BLOOD GLUCOSE                              7.6    7.55  )-----------( 307      ( 02 Dec 2024 22:50 )             25.3         12-02    144  |  106  |  26[H]  ----------------------------<  91  4.2   |  26  |  1.3      Calcium: 8.1 mg/dL (12-02-24 @ 22:50)      LFTs:         Coags:            Urinalysis Basic - ( 02 Dec 2024 22:50 )    Color: x / Appearance: x / SG: x / pH: x  Gluc: 91 mg/dL / Ketone: x  / Bili: x / Urobili: x   Blood: x / Protein: x / Nitrite: x   Leuk Esterase: x / RBC: x / WBC x   Sq Epi: x / Non Sq Epi: x / Bacteria: x        ASSESSMENT:  84 year old female with past medical history significant for atrial fibrillation on Xarelto, hypertension, hyperlipidemia, hypothyroidism, CKD, FELIX, and recent fall on 11/11 (+HT, -LOC) who presents to the ED with complaints of right lower extremity pain and bruising. Patient states that she has been ambulatory since the fall with her walker which is her baseline. She denies any neurologic symptoms and states that she has no other complaints besides her right thigh pain.      General surgery consulted for findings of right posterior thigh hematoma without active extravasation on CTA RLE.     PLAN:  -F/U labs, replete PRN  -Monitor hemodynamics, support PRN  -DASH diet  -DVT ppx  -RLE ACE wrap  -F/u CM for dispo      TRAUMA SURGERY SPECTRA: [id number]   Spiral Flap Text: The defect edges were debeveled with a #15 scalpel blade.  Given the location of the defect, shape of the defect and the proximity to free margins a spiral flap was deemed most appropriate.  Using a sterile surgical marker, an appropriate rotation flap was drawn incorporating the defect and placing the expected incisions within the relaxed skin tension lines where possible. The area thus outlined was incised deep to adipose tissue with a #15 scalpel blade.  The skin margins were undermined to an appropriate distance in all directions utilizing iris scissors.

## 2024-12-03 NOTE — PROGRESS NOTE ADULT - SUBJECTIVE AND OBJECTIVE BOX
NEPHROLOGY FOLLOW UP NOTE    transferred to floors  + void  c/o leg heaviness with ambulation       PAST MEDICAL & SURGICAL HISTORY:  HTN (hypertension)      High cholesterol      Hypothyroid      Afib  on xarelto      Sleep apnea      Osteoarthritis      CKD (chronic kidney disease) stage 3, GFR 30-59 ml/min      Thyroid cancer      H/O thyroidectomy      S/P rotator cuff surgery      Pacemaker      H/O total knee replacement        Allergies:  No Known Allergies    Home Medications Reviewed    SOCIAL HISTORY:  Denies ETOH,Smoking,   FAMILY HISTORY:  Family history of lung cancer (Mother)    Family history of abdominal aortic aneurysm (AAA) (Father)          REVIEW OF SYSTEMS:  All other review of systems is negative unless indicated above.    PHYSICAL EXAM:  Constitutional: NAD  HEENT: anicteric sclera, oropharynx clear, MMM  Neck: No JVD  Respiratory: CTAB, no wheezes, rales or rhonchi  Cardiovascular: S1, S2, RRR  Gastrointestinal: BS+, soft, NT/ND  Extremities: R posterior thigh ecchymosis   Neurological: A/O x 3, no focal deficits  Psychiatric: Normal mood, normal affect  : No CVA tenderness. No joaquin.   Skin: No rashes    Hospital Medications:   MEDICATIONS  (STANDING):  acetaminophen     Tablet .. 650 milliGRAM(s) Oral every 6 hours  allopurinol 300 milliGRAM(s) Oral at bedtime  aMIOdarone    Tablet 200 milliGRAM(s) Oral daily  atorvastatin 20 milliGRAM(s) Oral at bedtime  chlorhexidine 2% Cloths 1 Application(s) Topical <User Schedule>  furosemide    Tablet 20 milliGRAM(s) Oral daily  heparin   Injectable 5000 Unit(s) SubCutaneous every 8 hours  influenza  Vaccine (HIGH DOSE) 0.5 milliLiter(s) IntraMuscular once  levothyroxine 175 MICROGram(s) Oral daily  metoprolol tartrate 50 milliGRAM(s) Oral two times a day  nystatin Cream 1 Application(s) Topical every 12 hours  oxybutynin 5 milliGRAM(s) Oral two times a day  senna 2 Tablet(s) Oral at bedtime        VITALS:  T(F): 98.1 (12-03-24 @ 08:43), Max: 98.6 (12-02-24 @ 16:00)  HR: 80 (12-03-24 @ 08:43)  BP: 128/74 (12-03-24 @ 08:43)  RR: 18 (12-03-24 @ 08:43)  SpO2: 98% (12-03-24 @ 08:43)  Wt(kg): --    12-01 @ 07:01  -  12-02 @ 07:00  --------------------------------------------------------  IN: 500 mL / OUT: 1200 mL / NET: -700 mL    12-02 @ 07:01  -  12-03 @ 07:00  --------------------------------------------------------  IN: 480 mL / OUT: 630 mL / NET: -150 mL          LABS:  12-02    144  |  106  |  26[H]  ----------------------------<  91  4.2   |  26  |  1.3    Ca    8.1[L]      02 Dec 2024 22:50  Phos  3.5     12-02  Mg     2.0     12-02                            7.6    7.55  )-----------( 307      ( 02 Dec 2024 22:50 )             25.3       Urine Studies:  Urinalysis Basic - ( 02 Dec 2024 22:50 )    Color:  / Appearance:  / SG:  / pH:   Gluc: 91 mg/dL / Ketone:   / Bili:  / Urobili:    Blood:  / Protein:  / Nitrite:    Leuk Esterase:  / RBC:  / WBC    Sq Epi:  / Non Sq Epi:  / Bacteria:           RADIOLOGY & ADDITIONAL STUDIES:

## 2024-12-04 ENCOUNTER — TRANSCRIPTION ENCOUNTER (OUTPATIENT)
Age: 84
End: 2024-12-04

## 2024-12-04 VITALS
RESPIRATION RATE: 17 BRPM | OXYGEN SATURATION: 96 % | DIASTOLIC BLOOD PRESSURE: 61 MMHG | SYSTOLIC BLOOD PRESSURE: 117 MMHG | TEMPERATURE: 97 F

## 2024-12-04 PROCEDURE — 99238 HOSP IP/OBS DSCHRG MGMT 30/<: CPT

## 2024-12-04 RX ORDER — OXYBUTYNIN CHLORIDE 5 MG
1 TABLET ORAL
Qty: 0 | Refills: 0 | DISCHARGE
Start: 2024-12-04

## 2024-12-04 RX ORDER — 0.9 % SODIUM CHLORIDE 0.9 %
250 INTRAVENOUS SOLUTION INTRAVENOUS ONCE
Refills: 0 | Status: COMPLETED | OUTPATIENT
Start: 2024-12-04 | End: 2024-12-04

## 2024-12-04 RX ADMIN — Medication 5000 UNIT(S): at 14:09

## 2024-12-04 RX ADMIN — CHLORHEXIDINE GLUCONATE 1 APPLICATION(S): 1.2 RINSE ORAL at 05:47

## 2024-12-04 RX ADMIN — FUROSEMIDE 20 MILLIGRAM(S): 40 TABLET ORAL at 05:44

## 2024-12-04 RX ADMIN — Medication 5 MILLIGRAM(S): at 05:44

## 2024-12-04 RX ADMIN — Medication 5000 UNIT(S): at 05:45

## 2024-12-04 RX ADMIN — Medication 175 MICROGRAM(S): at 05:45

## 2024-12-04 RX ADMIN — NYSTATIN 1 APPLICATION(S): 100000 POWDER TOPICAL at 05:47

## 2024-12-04 RX ADMIN — ACETAMINOPHEN 500MG 650 MILLIGRAM(S): 500 TABLET, COATED ORAL at 00:00

## 2024-12-04 RX ADMIN — Medication 500 MILLILITER(S): at 10:10

## 2024-12-04 RX ADMIN — METOPROLOL TARTRATE 50 MILLIGRAM(S): 100 TABLET, FILM COATED ORAL at 05:44

## 2024-12-04 NOTE — DISCHARGE NOTE PROVIDER - NSDCCPCAREPLAN_GEN_ALL_CORE_FT
PRINCIPAL DISCHARGE DIAGNOSIS  Diagnosis: Traumatic hematoma of thigh  Assessment and Plan of Treatment: Diet: Continue regular diet  Activity: Ambulate and get out of bed as tolerated, and with assistance if feeling weak. No heavy lifting > 10 lbs for 2 weeks. Avoid straining or excessive activity x 6 weeks.   Pain: You can take over the counter medications such as Tylenol and Ibuprofen for pain control. Please adhere to the instructions on the back of the bottle.  Home medications:  - WE ARE HOLDING YOUR XARELTO DUE TO BEING A FALL RISK. PLEASE FOLLOW UP WITH YOUR PRIMARY CARE PROVIDER ABOUT RESTARTING YOUR XARELTO.  - Can continue all other medications as directed  Follow up:   PLEASE FOLLOW UP WITH YOUR PRIMARY CARE PROVIDER.   Please follow up with surgery clinic in 2-4 weeks as needed.  If you develop fevers, chills, worsening pain, nausea that won't subside, vomiting, or any other symptoms of concern, please call MD or return to the ED for further advice, evaluation, and/or treatment.      SECONDARY DISCHARGE DIAGNOSES  Diagnosis: Fall  Assessment and Plan of Treatment:     Diagnosis: Closed head injury  Assessment and Plan of Treatment:     Diagnosis: Chronic anticoagulation  Assessment and Plan of Treatment:     Diagnosis: Anemia  Assessment and Plan of Treatment:     Diagnosis: Chronic kidney disease (CKD)  Assessment and Plan of Treatment:

## 2024-12-04 NOTE — PROGRESS NOTE ADULT - SUBJECTIVE AND OBJECTIVE BOX
SURGERY PROGRESS NOTE    Patient: JEFFERY UGALDE , 84y ([birth year])Female   MRN: [id number]  Location: [address]  Visit: 11-29-24 Inpatient  Date: 12-04-24 @ 02:53    Hospital Day #: 6    Events of past 24 hours:  No events overnight Pt was seen at the bedside lying comfortably in the bed. Pt is tolerating diet well, denies any N/V. Pt is passing G and having BM.    PAST MEDICAL & SURGICAL HISTORY:  HTN (hypertension)  High cholesterol  Hypothyroid  Afib  on xarelto  Sleep apnea  Osteoarthritis  CKD (chronic kidney disease) stage 3, GFR 30-59 ml/min  Thyroid cancer  H/O thyroidectomy  S/P rotator cuff surgery  Pacemaker  H/O total knee replacement    Vitals:   T(F): 97.6 (12-03-24 @ 23:47), Max: 98.5 (12-03-24 @ 04:00)  HR: 70 (12-03-24 @ 23:47)  BP: 137/72 (12-03-24 @ 23:47)  RR: 18 (12-03-24 @ 23:47)  SpO2: 95% (12-03-24 @ 23:47)    Diet, DASH/TLC:   Sodium & Cholesterol Restricted    Fluids:     I & O's:    12-02-24 @ 07:01  -  12-03-24 @ 07:00  --------------------------------------------------------  IN:    Oral Fluid: 480 mL  Total IN: 480 mL    OUT:    Voided (mL): 630 mL  Total OUT: 630 mL    Total NET: -150 mL    PHYSICAL EXAM:  General: NAD, calm  HEENT: NCAT, EOMI  Cardiac: RRR  Respiratory: On RA, normal respiratory effort  Abdomen: Soft, non-distended, non-tender  Musculoskeletal: Right thiStrength 5/5 BL UE/LE, ROM intact, compartments soft  Neuro: Sensation grossly intact and equal throughout, no focal deficits  Vascular: Pulses 2+ throughout, extremities well perfused  Skin: Warm/dry, normal color, no jaundice  Incision/wound: healing well, dressings in place, clean, dry and intact    MEDICATIONS  (STANDING):  acetaminophen     Tablet .. 650 milliGRAM(s) Oral every 6 hours  allopurinol 300 milliGRAM(s) Oral at bedtime  aMIOdarone    Tablet 200 milliGRAM(s) Oral daily  atorvastatin 20 milliGRAM(s) Oral at bedtime  chlorhexidine 2% Cloths 1 Application(s) Topical <User Schedule>  furosemide    Tablet 20 milliGRAM(s) Oral daily  heparin   Injectable 5000 Unit(s) SubCutaneous every 8 hours  influenza  Vaccine (HIGH DOSE) 0.5 milliLiter(s) IntraMuscular once  levothyroxine 175 MICROGram(s) Oral daily  metoprolol tartrate 50 milliGRAM(s) Oral two times a day  nystatin Cream 1 Application(s) Topical every 12 hours  oxybutynin 5 milliGRAM(s) Oral two times a day  senna 2 Tablet(s) Oral at bedtime    MEDICATIONS  (PRN):    DVT PROPHYLAXIS: heparin   Injectable 5000 Unit(s) SubCutaneous every 8 hours    LAB/STUDIES:  Labs:  CAPILLARY BLOOD GLUCOSE                   8.6    8.44  )-----------( 354      ( 03 Dec 2024 19:50 )             28.8         12-03    143  |  105  |  26[H]  ----------------------------<  87  4.2   |  25  |  1.5      Calcium: 8.6 mg/dL (12-03-24 @ 19:50)    Urinalysis Basic - ( 03 Dec 2024 19:50 )    Color: x / Appearance: x / SG: x / pH: x  Gluc: 87 mg/dL / Ketone: x  / Bili: x / Urobili: x   Blood: x / Protein: x / Nitrite: x   Leuk Esterase: x / RBC: x / WBC x   Sq Epi: x / Non Sq Epi: x / Bacteria: x

## 2024-12-04 NOTE — PROGRESS NOTE ADULT - ASSESSMENT
ASSESSMENT:  84y Female PMH afib on xarelto, HTN, HLD, hypothyroidism admitted with R thigh hematoma with concern for active bleeding.      PLAN:  - F/u CM on [facility] bed availability  - Holding Xarelto for now  - Monitor vitals  - DVT ppx    *Pending final discussions w/ attending*    EGS [id number]

## 2024-12-04 NOTE — DISCHARGE NOTE NURSING/CASE MANAGEMENT/SOCIAL WORK - NSDCVIVACCINE_GEN_ALL_CORE_FT
influenza, high-dose, quadrivalent; 20-Dec-2023 15:52; Shaunna Tucker (RN); [employer]; [id number] (Exp. Date: 20-Jun-2024); IntraMuscular; Deltoid Left.; 0.7 milliLiter(s); VIS (VIS Published: 06-Aug-2021, VIS Presented: 20-Dec-2023);

## 2024-12-04 NOTE — PROGRESS NOTE ADULT - ASSESSMENT
CKD stage 3   resolved, mild KOBI  s/p iv contrast on admission  s/p fall  RLE hematoma  anemia  b/l proteinaceous and hemorrhagic renal cysts (on outpt MRI kidneys)  chronic HFpEF  / hx of b/l lung nodular opacities  hx of pericardial effusion / Afib / s/p PPM 4/15 s/p AVN ablation 6/3  anemia  FELIX  HTN  hypothyroidism / thyroid Ca / pituitary adenoma     plan:    cont lasix 20mg po qd  cont metoprolol   physical therapy   outpt renal f/u  ckd counselling

## 2024-12-04 NOTE — DISCHARGE NOTE PROVIDER - NSDCFUSCHEDAPPT_GEN_ALL_CORE_FT
[facility] Physician [facility]  CARDIOLOGY [address] [facility]  Scheduled Appointment: 02/21/2025

## 2024-12-04 NOTE — DISCHARGE NOTE NURSING/CASE MANAGEMENT/SOCIAL WORK - FINANCIAL ASSISTANCE
[facility] provides services at a reduced cost to those who are determined to be eligible through [facility]’s financial assistance program. Information regarding [facility]’s financial assistance program can be found by going to [email or web address] or by calling [phone].

## 2024-12-04 NOTE — PROGRESS NOTE ADULT - SUBJECTIVE AND OBJECTIVE BOX
NEPHROLOGY FOLLOW UP NOTE    pt seen and examined  no new complaints       PAST MEDICAL & SURGICAL HISTORY:  HTN (hypertension)      High cholesterol      Hypothyroid      Afib  on xarelto      Sleep apnea      Osteoarthritis      CKD (chronic kidney disease) stage 3, GFR 30-59 ml/min      Thyroid cancer      H/O thyroidectomy      S/P rotator cuff surgery      Pacemaker      H/O total knee replacement        Allergies:  No Known Allergies    Home Medications Reviewed    SOCIAL HISTORY:  Denies ETOH,Smoking,   FAMILY HISTORY:  Family history of lung cancer (Mother)    Family history of abdominal aortic aneurysm (AAA) (Father)          REVIEW OF SYSTEMS:  All other review of systems is negative unless indicated above.    PHYSICAL EXAM:  Constitutional: NAD  HEENT: anicteric sclera, oropharynx clear, MMM  Neck: No JVD  Respiratory: CTAB, no wheezes, rales or rhonchi  Cardiovascular: S1, S2, RRR  Gastrointestinal: BS+, soft, NT/ND  Extremities: R posterior thigh ecchymosis   Neurological: A/O x 3, no focal deficits  Psychiatric: Normal mood, normal affect  : No CVA tenderness. No joaquin.   Skin: No rashes    Hospital Medications:   MEDICATIONS  (STANDING):  acetaminophen     Tablet .. 650 milliGRAM(s) Oral every 6 hours  allopurinol 300 milliGRAM(s) Oral at bedtime  aMIOdarone    Tablet 200 milliGRAM(s) Oral daily  atorvastatin 20 milliGRAM(s) Oral at bedtime  chlorhexidine 2% Cloths 1 Application(s) Topical <User Schedule>  furosemide    Tablet 20 milliGRAM(s) Oral daily  heparin   Injectable 5000 Unit(s) SubCutaneous every 8 hours  influenza  Vaccine (HIGH DOSE) 0.5 milliLiter(s) IntraMuscular once  levothyroxine 175 MICROGram(s) Oral daily  metoprolol tartrate 50 milliGRAM(s) Oral two times a day  nystatin Cream 1 Application(s) Topical every 12 hours  oxybutynin 5 milliGRAM(s) Oral two times a day  senna 2 Tablet(s) Oral at bedtime        VITALS:  T(F): 97.2 (12-04-24 @ 14:00), Max: 98.2 (12-03-24 @ 16:00)  HR: 78 (12-04-24 @ 04:00)  BP: 117/61 (12-04-24 @ 14:00)  RR: 17 (12-04-24 @ 14:00)  SpO2: 96% (12-04-24 @ 14:00)  Wt(kg): --    12-02 @ 07:01  -  12-03 @ 07:00  --------------------------------------------------------  IN: 480 mL / OUT: 630 mL / NET: -150 mL          LABS:  12-03    143  |  105  |  26[H]  ----------------------------<  87  4.2   |  25  |  1.5    Ca    8.6      03 Dec 2024 19:50  Phos  4.0     12-03  Mg     2.0     12-03                            8.6    8.44  )-----------( 354      ( 03 Dec 2024 19:50 )             28.8       Urine Studies:  Urinalysis Basic - ( 03 Dec 2024 19:50 )    Color:  / Appearance:  / SG:  / pH:   Gluc: 87 mg/dL / Ketone:   / Bili:  / Urobili:    Blood:  / Protein:  / Nitrite:    Leuk Esterase:  / RBC:  / WBC    Sq Epi:  / Non Sq Epi:  / Bacteria:           RADIOLOGY & ADDITIONAL STUDIES:

## 2024-12-04 NOTE — PROGRESS NOTE ADULT - REASON FOR ADMISSION
Right Thigh Hematoma
no

## 2024-12-04 NOTE — DISCHARGE NOTE NURSING/CASE MANAGEMENT/SOCIAL WORK - NSDCPETBCESMAN_GEN_ALL_CORE
Attempted to reach patient for: VT alert. /89 on 8/11 @ 2126. BP typically runs on the softer side, see trends below.     Outcome: Left VM advising patient to recheck blood pressure. Will send message via patient portal as well.     Next Follow Up: Monday.    08/11/22 09:26:59 /89  08/09/22 07:24:16 /67  08/08/22 05:31:34 /68  08/04/22 08:03:32 /76  08/03/22 08:18:07 /71  08/02/22 09:03:21 /78  08/01/22 07:39:09 /70  
If you are a smoker, it is important for your health to stop smoking. Please be aware that second hand smoke is also harmful.

## 2024-12-04 NOTE — DISCHARGE NOTE PROVIDER - HOSPITAL COURSE
84F with PMH significant for atrial fibrillation on Xarelto, hypertension, hyperlipidemia, hypothyroidism, CKD, FELIX, and recent fall on 11/11 (+HT, -LOC) who presents to the ED with complaints of right lower extremity pain and bruising. Patient states that she has been ambulatory since the fall with her walker which is her baseline. She denies any neurologic symptoms and states that she has no other complaints besides her right thigh pain. Patient with hypotension in the ED, resolved with fluid boluses. Noted to have acute H/H drop. Patient admitted to SICU for close HD monitoring and serial CBC. CTA RLE with large right posterior thigh intramuscular hematoma measuring 10.0 x 8.9 x 18.1 cm without contrast extravasation without active extravasation.     11/29: admitted to SICU service.   11/30: H/H remained overall stable. DG to SDU. Physiatry: Home with outpt PT/VNS vs SNF  12/1: Hematoma and H/H stable, DVT ppx started   12/2: Patient remains hemodynamically stable, H/H stable, hematoma resolving     Downgraded to the floor, where patient was on 2L NC. She was weaned off of it and is now saturating well without labored breathing on room air. Patient is tolerating regular diet, voiding, and ambulating with physical therapy. She is hemodynamically stable and ready to be discharged to [facility] for rehab.

## 2024-12-04 NOTE — DISCHARGE NOTE PROVIDER - NSDCMRMEDTOKEN_GEN_ALL_CORE_FT
allopurinol 300 mg oral tablet: 1 tab(s) orally once a day (at bedtime)  amiodarone 200 mg oral tablet: 1 tab(s) orally once a day  atorvastatin 20 mg oral tablet: 1 tab(s) orally once a day  cabergoline 0.5 mg oral tablet: 0.5 tab(s) orally once a week , monday night  furosemide 20 mg oral tablet: 1 tab(s) orally once a day  levothyroxine 175 mcg (0.175 mg) oral tablet: 1 tab(s) orally once a day  Metoprolol Tartrate 100 mg oral tablet: 1 tab(s) orally every 12 hours  oxyBUTYnin 5 mg oral tablet: 1 tab(s) orally 2 times a day  trospium 20 mg oral tablet: 1 tab(s) orally once a day

## 2024-12-04 NOTE — DISCHARGE NOTE NURSING/CASE MANAGEMENT/SOCIAL WORK - PATIENT PORTAL LINK FT
You can access the FollowMyHealth Patient Portal offered by [facility] by registering at the following website: [email or web address]. By joining [employer]’s FollowMyHealth portal, you will also be able to view your health information using other applications (apps) compatible with our system.
,DirectAddress_Unknown

## 2024-12-04 NOTE — PROGRESS NOTE ADULT - ATTENDING COMMENTS
Trauma/Acute Care Surgery Attending Note Attestation  Patient was seen and examined bedside.  I reviewed the resident/PA note and agreed with above assessment and plan with following additions and corrections.    No complaints this AM.    I independently performed a medically appropriate exam. The exam was notable for R thigh hematoma that is not expanding.      84y Female PMH afib on xarelto, HTN, HLD, hypothyroidism admitted with R thigh hematoma with concern for active bleeding.  - R Thigh hematoma - hold xarelto, continue to monitor on exam  - Afib - amiodarone 200mg daily, metoprolol 50mg BID, follow up with outpatient cardiologist regarding restarting xarelto  - Hypothyroidism - synthroid 175mcg daily  - Gout - allopurinol 300mg QHS  - HTN - furosemide 20mg daily  - DVT ppx    Arnaldo Iraheta MD  Trauma/Acute Care Surgery/Surgical Critical Care Attending

## 2024-12-04 NOTE — DISCHARGE NOTE PROVIDER - ATTENDING DISCHARGE PHYSICAL EXAMINATION:
I independently performed a medically appropriate exam. The exam was notable for R thigh hematoma that is not expanding.

## 2024-12-11 DIAGNOSIS — E89.0 POSTPROCEDURAL HYPOTHYROIDISM: ICD-10-CM

## 2024-12-11 DIAGNOSIS — Z79.899 OTHER LONG TERM (CURRENT) DRUG THERAPY: ICD-10-CM

## 2024-12-11 DIAGNOSIS — Z79.890 HORMONE REPLACEMENT THERAPY: ICD-10-CM

## 2024-12-11 DIAGNOSIS — M19.90 UNSPECIFIED OSTEOARTHRITIS, UNSPECIFIED SITE: ICD-10-CM

## 2024-12-11 DIAGNOSIS — E83.42 HYPOMAGNESEMIA: ICD-10-CM

## 2024-12-11 DIAGNOSIS — Z79.01 LONG TERM (CURRENT) USE OF ANTICOAGULANTS: ICD-10-CM

## 2024-12-11 DIAGNOSIS — N28.1 CYST OF KIDNEY, ACQUIRED: ICD-10-CM

## 2024-12-11 DIAGNOSIS — D68.8 OTHER SPECIFIED COAGULATION DEFECTS: ICD-10-CM

## 2024-12-11 DIAGNOSIS — D35.2 BENIGN NEOPLASM OF PITUITARY GLAND: ICD-10-CM

## 2024-12-11 DIAGNOSIS — S70.11XA CONTUSION OF RIGHT THIGH, INITIAL ENCOUNTER: ICD-10-CM

## 2024-12-11 DIAGNOSIS — N18.30 CHRONIC KIDNEY DISEASE, STAGE 3 UNSPECIFIED: ICD-10-CM

## 2024-12-11 DIAGNOSIS — N17.9 ACUTE KIDNEY FAILURE, UNSPECIFIED: ICD-10-CM

## 2024-12-11 DIAGNOSIS — M10.9 GOUT, UNSPECIFIED: ICD-10-CM
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
## 2024-12-11 DIAGNOSIS — D62 ACUTE POSTHEMORRHAGIC ANEMIA: ICD-10-CM

## 2024-12-11 DIAGNOSIS — Z95.0 PRESENCE OF CARDIAC PACEMAKER: ICD-10-CM

## 2024-12-11 DIAGNOSIS — N32.81 OVERACTIVE BLADDER: ICD-10-CM

## 2024-12-11 DIAGNOSIS — S09.90XA UNSPECIFIED INJURY OF HEAD, INITIAL ENCOUNTER: ICD-10-CM

## 2024-12-11 DIAGNOSIS — I48.91 UNSPECIFIED ATRIAL FIBRILLATION: ICD-10-CM

## 2024-12-11 DIAGNOSIS — Z85.850 PERSONAL HISTORY OF MALIGNANT NEOPLASM OF THYROID: ICD-10-CM

## 2024-12-11 DIAGNOSIS — E78.5 HYPERLIPIDEMIA, UNSPECIFIED: ICD-10-CM

## 2024-12-11 DIAGNOSIS — J98.11 ATELECTASIS: ICD-10-CM

## 2024-12-11 DIAGNOSIS — G47.33 OBSTRUCTIVE SLEEP APNEA (ADULT) (PEDIATRIC): ICD-10-CM

## 2024-12-11 DIAGNOSIS — I50.32 CHRONIC DIASTOLIC (CONGESTIVE) HEART FAILURE: ICD-10-CM

## 2024-12-11 DIAGNOSIS — I95.9 HYPOTENSION, UNSPECIFIED: ICD-10-CM

## 2024-12-18 NOTE — DISCHARGE NOTE NURSING/CASE MANAGEMENT/SOCIAL WORK - NSPROMEDSBROUGHTTOHOSP_GEN_A_NUR
Procedure Instructions  ________________________________________________________________    Preparing for Your Procedure  If you have an implanted cardiac device (pacemaker, defibrillator) please notify our office.   Purchase a new toothbrush.   If you are going home on pain medications, buy laxatives or stool softeners as pain medications can cause constipation.  Stop use of all tobacco products (smoking, chewing, vaping, etc.) as soon as possible.  If you are unable to stop, do your best to decrease the use as much as possible before surgery.  Nicotine patches may stay in place. If you are having a heart procedure they must be removed at or before midnight prior to your procedure.    Exercise and be as active as you can. Being active may help shorten your hospital stay, decrease your chance of pneumonia and help you feel better.  Do breathing exercises at least 2 times a day: Take 10 slow, deep breaths, then cough hard 3 times. After surgery, you may be given other instructions about deep breathing.  If you are going home on the day of your procedure:  Plan for a responsible adult to drive you home (Taxi/Uber not acceptable). If you do not have someone to drive you home, your procedure may be canceled.   It is recommended that someone stay with you for the first 24 hours.  If the patient is under 18 years old, a parent or authorized adult needs to stay at the hospital until the child is discharged from the procedure area or admitted to a hospital bed.  Contact your insurance company to determine coverage. Insurance may require prior authorization. We can help with this, but it is your responsibility. Charges for this procedure may include fees for the surgeon and their assistant, facility, anesthesia, and other services such as lab, radiology, pathology, etc.  You may be called to make payment arrangements.  If you have forms that need to be completed (such as disability, work release or FMLA), drop these off  at our office before the procedure date. A Release of Information form may need to be completed to allow us to share information with the insurance company or your employer.    NOTE:  Follow these instructions to help avoid delays or cancellation of your procedure. ________________________________________________________________    Prepare Your Home  To avoid a fall, remove any rugs or clutter and clear all walkways.   Place a night light in hallways or bathrooms.  Go grocery shopping and fill your pantry with healthy foods.   You may feel tired after your procedure, prepare several meals ahead of time to be kept in the freezer.       Assistance at Home  After your surgery you may need help with normal everyday activities while your body heals.  You might need transportation to and from appointments.      THE WEEK BEFORE PROCEDURE  Call our office at [phone] if you have:  Signs of illness, such as fever, sore throat or a cold.  Any skin problems, rashes, bug bites or open sores in area of procedure.  An exposure to COVID-19 or develop COVID-19 symptoms.    Expect a phone call from a hospital nurse to review your health history, medications and procedure plans.  For patient and community safety, facilities may have restrictions on persons accompanying patients in the building.  Please confirm current restrictions during your call with the hospital nurse.      2 DAYS BEFORE PROCEDURE  Begin using the new toothbrush. Brush teeth and tongue at least 2 times a day.  Do not shave near the surgical area until after the procedure.  You may be given specific DIET INSTRUCTIONS.  Follow these closely.      DAY BEFORE PROCEDURE  Review and follow your specific DIET INSTRUCTIONS.  DO NOT use tobacco or alcohol.  DO NOT use recreational or illegal drugs.  Remove nail polish from fingers and toes. Nails should be clean. Remove artificial nails.   Remove all jewelry and body piercings.  Review and follow your specific bathing  no instructions.   Put on clean clothing or pajamas after bathing.  Sleep with clean bedsheets and don't allow pets to sleep in your bed.    MORNING OF YOUR PROCEDURE  Brush teeth and tongue.  Wear loose, comfortable clothing.  DO NOT wear any makeup.  DO NOT apply lotions, powders or hair products.  DO NOT eat anything, including chewing gum, mints or candy.    Bring these items with you:  Items you rely on such as hearing aids, glasses, cane or walker.  Any inhalers you use.  CPAP/BiPAP machine, if you use one.  Insurance cards and ID.  A copy of your Advance Directive, if you have one.    Do not bring these items with you:  DO NOT bring medications, unless you are told differently  DO NOT bring valuables (jewelry, cash, etc.)  DO NOT wear contact lenses. Wear glasses if needed.     ________________________________________________________________    Things to Know After Surgery    Go to an Emergency Room if you have:  trouble breathing or chest pain    Contact our office if you:  have redness, swelling or unusual pain in one of your legs  are unable to keep fluids down. Nausea can be related to anesthesia or pain medicines.  have a fever of 101 degrees or higher.  have pain not adequately controlled with medication.   have bleeding or drainage from your incision that is increasing, or has a bad odor.  are unable to empty your bladder for more than 12 hours, or if your bladder feels uncomfortable prior to this.    DO NOT make important decisions for at least 24 hours after your procedure as you will likely still have anesthesia in your system.    DO NOT drive, operate heavy machinery, or drink alcohol for at least 24 hours after your procedure.    DO NOT drive while taking narcotics or other sedating medications.  Avoid smoking and other nicotine products.  These have been shown to interfere with the healing process.  A sore throat can result from the breathing tube used to administer general anesthesia. This usually  lasts 1-2 days. Cold liquids, ice chips, and throat lozenges help relieve the soreness.  If you received a nerve block during your procedure, it usually wears off within 12 to 24 hours.  Follow the specific instructions on your discharge paperwork from the hospital on how to manage any dressings or drains.   You will get instructions about activity restrictions. Expect that you may have to limit your activities; our goal is to have you return to normal activities as soon as possible. This will be discussed at your first post-operative visit.  Discuss with your provider how long you may be off work or have work restrictions.    Pain  You will get instructions and/or prescriptions to help manage your pain.    It is important to take your pain medications as prescribed during the first 48 hours to prevent your pain from getting out of control.   Set a realistic expectation about your pain.  You may not have complete relief immediately after your procedure.    Pain medications can cause constipation. Take a stool softener (e.g., Colace or Senokot-S) daily while using prescription pain medication. If you do not have a bowel movement within 3 days after your procedure, take MiraLAX (or generic polyethylene glycol): 1 dose in the morning and 1 dose at bedtime until bowel movements are back to normal. When you stop your pain medication, you should be able to stop your stool softener.   If you have not had a bowel movement within 2 to 3 days, and you have not had surgery in the rectal area, you may try an over the counter Dulcolax suppository or Fleets enema.    To avoid constipation drink 6 to 8 glasses of water a day unless you are on a fluid restriction.      Medications and Refills  Always take prescribed medication as directed on the bottle or by your doctor.    Do not exceed 4000 mg of acetaminophen (Tylenol) per day.    If you are allowed to take ibuprofen (Motrin), do not exceed 3200 mg per day.   Remember your pain  prescription may contain acetaminophen or ibuprofen.   Prescription refill requests are handled during normal business hours, Monday thru Friday 8 am - 4:30 pm. Allow one full business day to respond to refill requests.    **Refill requests made after hours or on weekends, will not be   addressed until the following business day.**  Some narcotic prescriptions cannot be called directly into the pharmacy. Not all pharmacies accept electronic refills. Please take this into consideration when contacting our office. You may need to  a hand-written prescription from one of our offices, or  allow for U.S. Postal Service mail delivery of the written prescription.     Dressing/Incision Care  Keep your dressing(s) clean and dry.  Do not apply any creams, lotions or ointments to your incision until fully healed unless you are specifically instructed to use.   Your incision may be closed with either sutures, steri-strips (butterfly tape), Dermabond (surgical glue) or staples.  Dermabond: Do not peel off, this will fall off on its own.  Steri-strips: Should fall off in 7-10 days.  If after 10 days they have not fallen off, you may gently peel them off.    Staples or visible sutures:  Need to be removed in our office typically 10-14 days after your procedure.   Internal sutures: Do not need to be removed.  They will dissolve on their own.

## 2024-12-20 NOTE — ASU PATIENT PROFILE, ADULT - ALCOHOL USE HISTORY SINGLE SELECT
POSTOPERATIVE VISIT    VISIT DATE:   12/23/2024     Primary Care Physician:  Jt Corona MD     History of Present Illness:  Naya Treviño is a 62 year old female status post excision right upper abdominal wall mass completed on 12/17/2024.      The patient states that she doing well. She denies pain, redness or swelling at the excision site. No fevers or chills.     Past medical history, surgical history, medications, and allergies reviewed and are unchanged.      Physical Exam:   There were no vitals taken for this visit.  There is no height or weight on file to calculate BMI.  General:  Healthy appearing in no apparent distress. Well nourished, well developed.    Right upper abdomen:  Incision is clean, dry, and intact without surrounding erythema or drainage. Wound is nontender to palpation without fluctuance or cellulitis.    Surgical Findings: 5.5 cm lipoma-appearing soft tissue mass on the musculature     Pathology: Pending at time of visit today    Assessment and Plan:  Naya Treviño is a 62 year old female who is status post  completed on excision right upper abdominal wall mass completed on 12/17/2024.  She is doing well postoperatively without complaints. Incision is healing well without signs of infection.    She may return to regular activity as tolerated, using pain as a guide. At this point she can use Tylenol or ibuprofen as needed for postoperative pain.  Patient was instructed that the skin glue will flake off on its own, and she may peel it off once it starts flaking then apply vitamin E lotion to incisions to keep moist. We will call the patient with the final pathology once it results.  Patient may return as needed.      Maryann Davenport PA-C  General Surgery    Addendum:  Patient seen and examined. I agree with above findings, assessment, and plan. I have participated in patient evaluation and edited above note. I have formulated the assessment and plan and discussed with patient and  staff.  Jose Castle MD        never

## 2025-02-21 ENCOUNTER — APPOINTMENT (OUTPATIENT)
Dept: CARDIOLOGY | Facility: CLINIC | Age: 85
End: 2025-02-21

## 2025-02-21 PROCEDURE — 93296 REM INTERROG EVL PM/IDS: CPT

## 2025-02-21 PROCEDURE — 93294 REM INTERROG EVL PM/LDLS PM: CPT

## 2025-02-25 PROBLEM — C73 MALIGNANT NEOPLASM OF THYROID GLAND: Chronic | Status: ACTIVE | Noted: 2024-11-29

## 2025-03-04 ENCOUNTER — INPATIENT (INPATIENT)
Facility: HOSPITAL | Age: 85
LOS: 1 days | Discharge: HOME HEALTH | DRG: 66 | End: 2025-03-06
Attending: STUDENT IN AN ORGANIZED HEALTH CARE EDUCATION/TRAINING PROGRAM | Admitting: STUDENT IN AN ORGANIZED HEALTH CARE EDUCATION/TRAINING PROGRAM
Payer: MEDICARE

## 2025-03-04 VITALS
RESPIRATION RATE: 18 BRPM | DIASTOLIC BLOOD PRESSURE: 95 MMHG | WEIGHT: 182.1 LBS | OXYGEN SATURATION: 93 % | TEMPERATURE: 98 F | HEART RATE: 80 BPM | SYSTOLIC BLOOD PRESSURE: 167 MMHG | HEIGHT: 59 IN

## 2025-03-04 DIAGNOSIS — Z96.659 PRESENCE OF UNSPECIFIED ARTIFICIAL KNEE JOINT: Chronic | ICD-10-CM

## 2025-03-04 DIAGNOSIS — Z98.890 OTHER SPECIFIED POSTPROCEDURAL STATES: Chronic | ICD-10-CM

## 2025-03-04 DIAGNOSIS — Z95.0 PRESENCE OF CARDIAC PACEMAKER: Chronic | ICD-10-CM

## 2025-03-04 DIAGNOSIS — I62.00 NONTRAUMATIC SUBDURAL HEMORRHAGE, UNSPECIFIED: ICD-10-CM

## 2025-03-04 LAB
ALBUMIN SERPL ELPH-MCNC: 4.4 G/DL — SIGNIFICANT CHANGE UP (ref 3.5–5.2)
ALP SERPL-CCNC: 155 U/L — HIGH (ref 30–115)
ALT FLD-CCNC: 20 U/L — SIGNIFICANT CHANGE UP (ref 0–41)
ANION GAP SERPL CALC-SCNC: 15 MMOL/L — HIGH (ref 7–14)
APTT BLD: 43.9 SEC — HIGH (ref 27–39.2)
AST SERPL-CCNC: 29 U/L — SIGNIFICANT CHANGE UP (ref 0–41)
BASOPHILS # BLD AUTO: 0.03 K/UL — SIGNIFICANT CHANGE UP (ref 0–0.2)
BASOPHILS NFR BLD AUTO: 0.3 % — SIGNIFICANT CHANGE UP (ref 0–1)
BILIRUB SERPL-MCNC: 0.6 MG/DL — SIGNIFICANT CHANGE UP (ref 0.2–1.2)
BUN SERPL-MCNC: 34 MG/DL — HIGH (ref 10–20)
CALCIUM SERPL-MCNC: 8.6 MG/DL — SIGNIFICANT CHANGE UP (ref 8.4–10.5)
CHLORIDE SERPL-SCNC: 106 MMOL/L — SIGNIFICANT CHANGE UP (ref 98–110)
CO2 SERPL-SCNC: 23 MMOL/L — SIGNIFICANT CHANGE UP (ref 17–32)
CREAT SERPL-MCNC: 1.1 MG/DL — SIGNIFICANT CHANGE UP (ref 0.7–1.5)
EGFR: 50 ML/MIN/1.73M2 — LOW
EGFR: 50 ML/MIN/1.73M2 — LOW
EOSINOPHIL # BLD AUTO: 0.06 K/UL — SIGNIFICANT CHANGE UP (ref 0–0.7)
EOSINOPHIL NFR BLD AUTO: 0.7 % — SIGNIFICANT CHANGE UP (ref 0–8)
GLUCOSE SERPL-MCNC: 146 MG/DL — HIGH (ref 70–99)
HCT VFR BLD CALC: 40.1 % — SIGNIFICANT CHANGE UP (ref 37–47)
HGB BLD-MCNC: 12.3 G/DL — SIGNIFICANT CHANGE UP (ref 12–16)
IMM GRANULOCYTES NFR BLD AUTO: 0.5 % — HIGH (ref 0.1–0.3)
INR BLD: 1.97 RATIO — HIGH (ref 0.65–1.3)
LACTATE SERPL-SCNC: 1.8 MMOL/L — SIGNIFICANT CHANGE UP (ref 0.7–2)
LYMPHOCYTES # BLD AUTO: 2.57 K/UL — SIGNIFICANT CHANGE UP (ref 1.2–3.4)
LYMPHOCYTES # BLD AUTO: 27.8 % — SIGNIFICANT CHANGE UP (ref 20.5–51.1)
MCHC RBC-ENTMCNC: 25.6 PG — LOW (ref 27–31)
MCHC RBC-ENTMCNC: 30.7 G/DL — LOW (ref 32–37)
MCV RBC AUTO: 83.5 FL — SIGNIFICANT CHANGE UP (ref 81–99)
MONOCYTES # BLD AUTO: 0.42 K/UL — SIGNIFICANT CHANGE UP (ref 0.1–0.6)
MONOCYTES NFR BLD AUTO: 4.6 % — SIGNIFICANT CHANGE UP (ref 1.7–9.3)
NEUTROPHILS # BLD AUTO: 6.1 K/UL — SIGNIFICANT CHANGE UP (ref 1.4–6.5)
NEUTROPHILS NFR BLD AUTO: 66.1 % — SIGNIFICANT CHANGE UP (ref 42.2–75.2)
NRBC BLD AUTO-RTO: 0 /100 WBCS — SIGNIFICANT CHANGE UP (ref 0–0)
PLATELET # BLD AUTO: 251 K/UL — SIGNIFICANT CHANGE UP (ref 130–400)
PMV BLD: 11.3 FL — HIGH (ref 7.4–10.4)
POTASSIUM SERPL-MCNC: 4.3 MMOL/L — SIGNIFICANT CHANGE UP (ref 3.5–5)
POTASSIUM SERPL-SCNC: 4.3 MMOL/L — SIGNIFICANT CHANGE UP (ref 3.5–5)
PROT SERPL-MCNC: 6.2 G/DL — SIGNIFICANT CHANGE UP (ref 6–8)
PROTHROM AB SERPL-ACNC: 23.6 SEC — HIGH (ref 9.95–12.87)
RBC # BLD: 4.8 M/UL — SIGNIFICANT CHANGE UP (ref 4.2–5.4)
RBC # FLD: 17.2 % — HIGH (ref 11.5–14.5)
SODIUM SERPL-SCNC: 144 MMOL/L — SIGNIFICANT CHANGE UP (ref 135–146)
WBC # BLD: 9.23 K/UL — SIGNIFICANT CHANGE UP (ref 4.8–10.8)
WBC # FLD AUTO: 9.23 K/UL — SIGNIFICANT CHANGE UP (ref 4.8–10.8)

## 2025-03-04 PROCEDURE — 73502 X-RAY EXAM HIP UNI 2-3 VIEWS: CPT | Mod: 26,RT

## 2025-03-04 PROCEDURE — 70450 CT HEAD/BRAIN W/O DYE: CPT | Mod: MC

## 2025-03-04 PROCEDURE — 81001 URINALYSIS AUTO W/SCOPE: CPT

## 2025-03-04 PROCEDURE — 83735 ASSAY OF MAGNESIUM: CPT

## 2025-03-04 PROCEDURE — 80354 DRUG SCREENING FENTANYL: CPT

## 2025-03-04 PROCEDURE — 73552 X-RAY EXAM OF FEMUR 2/>: CPT | Mod: 26,RT

## 2025-03-04 PROCEDURE — 80053 COMPREHEN METABOLIC PANEL: CPT

## 2025-03-04 PROCEDURE — 72125 CT NECK SPINE W/O DYE: CPT | Mod: 26

## 2025-03-04 PROCEDURE — 84100 ASSAY OF PHOSPHORUS: CPT

## 2025-03-04 PROCEDURE — 85610 PROTHROMBIN TIME: CPT

## 2025-03-04 PROCEDURE — 74177 CT ABD & PELVIS W/CONTRAST: CPT | Mod: 26

## 2025-03-04 PROCEDURE — 36415 COLL VENOUS BLD VENIPUNCTURE: CPT

## 2025-03-04 PROCEDURE — 99284 EMERGENCY DEPT VISIT MOD MDM: CPT

## 2025-03-04 PROCEDURE — 97161 PT EVAL LOW COMPLEX 20 MIN: CPT | Mod: GP

## 2025-03-04 PROCEDURE — 85025 COMPLETE CBC W/AUTO DIFF WBC: CPT

## 2025-03-04 PROCEDURE — 80048 BASIC METABOLIC PNL TOTAL CA: CPT

## 2025-03-04 PROCEDURE — 71260 CT THORAX DX C+: CPT | Mod: 26

## 2025-03-04 PROCEDURE — 70450 CT HEAD/BRAIN W/O DYE: CPT | Mod: 26

## 2025-03-04 PROCEDURE — 87641 MR-STAPH DNA AMP PROBE: CPT

## 2025-03-04 PROCEDURE — 99285 EMERGENCY DEPT VISIT HI MDM: CPT | Mod: FS

## 2025-03-04 PROCEDURE — 71045 X-RAY EXAM CHEST 1 VIEW: CPT | Mod: 26

## 2025-03-04 PROCEDURE — 85730 THROMBOPLASTIN TIME PARTIAL: CPT

## 2025-03-04 PROCEDURE — 87640 STAPH A DNA AMP PROBE: CPT

## 2025-03-04 PROCEDURE — 80307 DRUG TEST PRSMV CHEM ANLYZR: CPT

## 2025-03-04 RX ORDER — ACETAMINOPHEN 500 MG/5ML
650 LIQUID (ML) ORAL EVERY 6 HOURS
Refills: 0 | Status: DISCONTINUED | OUTPATIENT
Start: 2025-03-04 | End: 2025-03-06

## 2025-03-04 RX ORDER — OXYBUTYNIN CHLORIDE 5 MG/1
5 TABLET, FILM COATED, EXTENDED RELEASE ORAL DAILY
Refills: 0 | Status: DISCONTINUED | OUTPATIENT
Start: 2025-03-04 | End: 2025-03-06

## 2025-03-04 RX ORDER — LEVOTHYROXINE SODIUM 300 MCG
175 TABLET ORAL DAILY
Refills: 0 | Status: DISCONTINUED | OUTPATIENT
Start: 2025-03-04 | End: 2025-03-06

## 2025-03-04 RX ORDER — FERROUS SULFATE 137(45) MG
325 TABLET, EXTENDED RELEASE ORAL
Refills: 0 | DISCHARGE

## 2025-03-04 RX ORDER — AMIODARONE HYDROCHLORIDE 50 MG/ML
200 INJECTION, SOLUTION INTRAVENOUS DAILY
Refills: 0 | Status: DISCONTINUED | OUTPATIENT
Start: 2025-03-04 | End: 2025-03-05

## 2025-03-04 RX ORDER — METOPROLOL SUCCINATE 50 MG/1
100 TABLET, EXTENDED RELEASE ORAL EVERY 12 HOURS
Refills: 0 | Status: DISCONTINUED | OUTPATIENT
Start: 2025-03-04 | End: 2025-03-06

## 2025-03-04 RX ORDER — PROTHROMBIN COMPLEX CONCENTRATE (HUMAN) 25.5; 16.5; 24; 22; 22; 26 [IU]/ML; [IU]/ML; [IU]/ML; [IU]/ML; [IU]/ML; [IU]/ML
4000 POWDER, FOR SOLUTION INTRAVENOUS ONCE
Refills: 0 | Status: COMPLETED | OUTPATIENT
Start: 2025-03-04 | End: 2025-03-04

## 2025-03-04 RX ORDER — LIDOCAINE HYDROCHLORIDE 20 MG/ML
1 JELLY TOPICAL EVERY 24 HOURS
Refills: 0 | Status: DISCONTINUED | OUTPATIENT
Start: 2025-03-04 | End: 2025-03-06

## 2025-03-04 RX ORDER — SODIUM CHLORIDE 9 G/1000ML
1000 INJECTION, SOLUTION INTRAVENOUS
Refills: 0 | Status: DISCONTINUED | OUTPATIENT
Start: 2025-03-04 | End: 2025-03-04

## 2025-03-04 RX ORDER — ACETAMINOPHEN 500 MG/5ML
1000 LIQUID (ML) ORAL ONCE
Refills: 0 | Status: COMPLETED | OUTPATIENT
Start: 2025-03-04 | End: 2025-03-04

## 2025-03-04 RX ORDER — FUROSEMIDE 10 MG/ML
20 INJECTION INTRAMUSCULAR; INTRAVENOUS DAILY
Refills: 0 | Status: DISCONTINUED | OUTPATIENT
Start: 2025-03-04 | End: 2025-03-06

## 2025-03-04 RX ORDER — ATORVASTATIN CALCIUM 80 MG/1
20 TABLET, FILM COATED ORAL AT BEDTIME
Refills: 0 | Status: DISCONTINUED | OUTPATIENT
Start: 2025-03-04 | End: 2025-03-06

## 2025-03-04 RX ORDER — METHOCARBAMOL 500 MG/1
750 TABLET, FILM COATED ORAL EVERY 8 HOURS
Refills: 0 | Status: DISCONTINUED | OUTPATIENT
Start: 2025-03-04 | End: 2025-03-06

## 2025-03-04 RX ADMIN — Medication 1000 MILLIGRAM(S): at 18:55

## 2025-03-04 RX ADMIN — PROTHROMBIN COMPLEX CONCENTRATE (HUMAN) 400 INTERNATIONAL UNIT(S): 25.5; 16.5; 24; 22; 22; 26 POWDER, FOR SOLUTION INTRAVENOUS at 20:48

## 2025-03-04 RX ADMIN — Medication 650 MILLIGRAM(S): at 23:48

## 2025-03-04 NOTE — ED ADULT NURSE NOTE - NSFALLHARMRISKINTERV_ED_ALL_ED

## 2025-03-04 NOTE — H&P ADULT - NSHPLABSRESULTS_GEN_ALL_CORE
Labs:  CAPILLARY BLOOD GLUCOSE      POCT Blood Glucose.: 159 mg/dL (04 Mar 2025 18:26)    LFTs:    Coags:      RADIOLOGY & ADDITIONAL STUDIES:  --------------------------------------------------------------------------------------- Labs:  CAPILLARY BLOOD GLUCOSE      POCT Blood Glucose.: 159 mg/dL (04 Mar 2025 18:26)    LFTs:    Coags:      RADIOLOGY & ADDITIONAL STUDIES:  ---------------------------------------------------------------------------------------  < from: CT Abdomen and Pelvis w/ IV Cont (03.04.25 @ 20:01) >    No CT evidence of acute traumatic injury within the chest, abdomen or   pelvis.    Chronic/stable findings described above    < end of copied text >    < from: CT Cervical Spine No Cont (03.04.25 @ 19:46) >      CT HEAD:  Thin acute subdural hemorrhage is noted along the anterior right falx   without mass effect or midline shift.    CT CERVICAL SPINE:  No acute fracture or subluxation.    < end of copied text >    < from: Xray Hip 2-3 Views, Right (03.04.25 @ 19:09) >      PELVIS: No acute displaced fracture. Moderate bilateral hip and lower   lumbar spine degenerative changes. Vascular calcifications.    HIP/FEMUR: Status post right knee arthroplasty. No acute displaced   fracture. Vascular calcifications.    < end of copied text >    < from: Xray Chest 1 View AP/PA (03.04.25 @ 19:06) >      Unchanged enlarged cardiac silhouette with mild pulmonary vascular   congestion.    < end of copied text >

## 2025-03-04 NOTE — H&P ADULT - HISTORY OF PRESENT ILLNESS
TRAUMA ACTIVATION LEVEL:  CODE / ALERT  / CONSULT  ACTIVATED BY: EMS**  /  ED**  INTUBATED: YES** / NO**      MECHANISM OF INJURY:   [] Blunt     [] MVC	  [x] Fall	  [] Pedestrian Struck	  [] Motorcycle     [] Assault     [] Bicycle collision    [] Sports injury    [] Penetrating    [] Gun Shot Wound      [] Stab Wound    GCS: 15 	E: 4	V: 5	M: 6    HPI:    84yF w/ PMHx of HTN, HLD, FELIX, osteoarthritis, CKD3, thyroid cancer s/p thyroidectomy, pacemaker, b/l total knee replacement seen as  Trauma Alert s/p fall backwards at approximately 1030 this morning (+) HT, (?) LOC, (+) AC. Patient states she was unable to walk after the fall, has been experiencing right lower extremity pain for the past week which worsened after the fall. The pain progressively worsened throughout the day which caused her to come to the ED for further evaluation. She is currently complaining of posterior neck pain and right leg pain. She denies any nausea, vomiting, blurry vision, chest pain, shortness of breath, recent illness. She endorses multiple recent falls. She was previously admitted within the past 6 months, complicated by hematoma development in the right lower extremity.   Trauma assessment in ED: ABCs intact , GCS 15 , AAOx3.    PAST MEDICAL & SURGICAL HISTORY:  HTN (hypertension)  High cholesterol  Hypothyroid  Afib  on xarelto  Sleep apnea  Osteoarthritis  CKD (chronic kidney disease) stage 3, GFR 30-59 ml/min  Thyroid cancer  H/O thyroidectomy  S/P rotator cuff surgery  Pacemaker  H/O total knee replacement    Allergies    No Known Allergies    Intolerances    Home Medications:  allopurinol 300 mg oral tablet: 1 tab(s) orally once a day (at bedtime) (29 Nov 2024 20:15)  amiodarone 200 mg oral tablet: 1 tab(s) orally once a day (29 Nov 2024 20:14)  atorvastatin 20 mg oral tablet: 1 tab(s) orally once a day (29 Nov 2024 20:14)  cabergoline 0.5 mg oral tablet: 0.5 tab(s) orally once a week , monday night (29 Nov 2024 20:14)  levothyroxine 175 mcg (0.175 mg) oral tablet: 1 tab(s) orally once a day (29 Nov 2024 20:14)  oxyBUTYnin 5 mg oral tablet: 1 tab(s) orally 2 times a day (04 Dec 2024 13:06)  trospium 20 mg oral tablet: 1 tab(s) orally once a day (29 Nov 2024 20:14)      ROS: 10-system review is otherwise negative except HPI above.      Primary Survey:    A - airway intact  B - bilateral breath sounds and good chest rise  C - palpable pulses in all extremities  D - GCS 15 on arrival, MONTIEL  Exposure obtained

## 2025-03-04 NOTE — ED PROVIDER NOTE - PHYSICAL EXAMINATION
VITAL SIGNS: noted  CONSTITUTIONAL: Well-developed; well-nourished; in no acute distress  HEAD: Normocephalic; atraumatic  EYES: PERRL, EOM intact; conjunctiva and sclera clear  ENT: No nasal discharge;, MMM, oropharynx clear without tonsillar hypertrophy or exudates  NECK: Supple; No anterior cervical lymphadenopathy noted, mild midline ttp no step offs  CARD: S1, S2 normal; no murmurs, gallops, or rubs. Regular rate and rhythm  RESP: CTAB/L, no wheezes, rales or rhonchi  ABD: Normal bowel sounds; soft; non-distended; non-tender; no organoomegaly. No CVA tenderness  EXT: Normal ROM. No calf tenderness or edema. Distal pulses intact + right hip and right distal femur ttp  NEURO: Awake and alert, oriented. Grossly unremarkable. No focal deficits.  SKIN: Skin exam is warm and dry, no acute rash

## 2025-03-04 NOTE — CONSULT NOTE ADULT - SUBJECTIVE AND OBJECTIVE BOX
JEFFERY UGALDE   [id number]   [birth year]    84yF  ============================================================   DATE OF INITIAL SICU/SDU CONSULT: 03-04-25    INDICATION FOR SICU CONSULT:       SICU COURSE EVENTS :  03-04 - admitted to SICU service  ============================================================      24HOUR EVENTS  -Admission under SICU service    [X] A ten-point review of systems was negative except as expressed in note.  [X] History was obtained from patient. If unable to participate in their care, history obtained from review of the chart and collateral sources of information.  ============================================================      HPI   84yF w/ PMHx of HTN, HLD, FELIX, osteoarthritis, CKD3, thyroid cancer s/p thyroidectomy, pacemaker, b/l total knee replacement seen as a Trauma Alert s/p fall backwards at approximately 10:30 this morning (+) HT, (?) LOC, (+) AC. Patient states she was unable to walk after the fall, has been experiencing right lower extremity pain for the past week which worsened after the fall. The pain progressively worsened throughout the day which caused her to come to the ED for further evaluation. She is currently complaining of posterior neck pain and right leg pain. She denies any nausea, vomiting, blurry vision, chest pain, shortness of breath, recent illness. She endorses multiple recent falls. She was previously admitted within the past 6 months, complicated by hematoma development in the right lower extremity.   Trauma assessment in ED: ABCs intact , GCS 15 , AAOx3.    PAST MEDICAL & SURGICAL HISTORY:  HTN (hypertension)  High cholesterol  Hypothyroid  Afib  on xarelto  Sleep apnea  Osteoarthritis  CKD (chronic kidney disease) stage 3, GFR 30-59 ml/min  Thyroid cancer  H/O thyroidectomy  S/P rotator cuff surgery  Pacemaker  H/O total knee replacement    Allergies  No Known Allergies    Intolerances    Home Medications:  allopurinol 300 mg oral tablet: 1 tab(s) orally once a day (at bedtime) (29 Nov 2024 20:15)  amiodarone 200 mg oral tablet: 1 tab(s) orally once a day (29 Nov 2024 20:14)  atorvastatin 20 mg oral tablet: 1 tab(s) orally once a day (29 Nov 2024 20:14)  cabergoline 0.5 mg oral tablet: 0.5 tab(s) orally once a week , monday night (29 Nov 2024 20:14)  levothyroxine 175 mcg (0.175 mg) oral tablet: 1 tab(s) orally once a day (29 Nov 2024 20:14)  oxyBUTYnin 5 mg oral tablet: 1 tab(s) orally 2 times a day (04 Dec 2024 13:06)  trospium 20 mg oral tablet: 1 tab(s) orally once a day (29 Nov 2024 20:14)    ROS: 10-system review is otherwise negative except HPI above.      Primary Survey:    A - airway intact  B - bilateral breath sounds and good chest rise  C - palpable pulses in all extremities  D - GCS 15 on arrival, MONTIEL  Exposure obtained (04 Mar 2025 21:25)    Pertinent Imaging  < from: CT Head No Cont (03.04.25 @ 19:40) >  CT HEAD:  Thin acute subdural hemorrhage is noted along the anterior right falx   without mass effect or midline shift.    CT CERVICAL SPINE:  No acute fracture or subluxation.  < end of copied text >    < from: CT Chest w/ IV Cont (03.04.25 @ 20:01) >  IMPRESSION:  No CT evidence of acute traumatic injury within the chest, abdomen or   pelvis.  Chronic/stable findings described above  < end of copied text >    PAST MEDICAL & SURGICAL HISTORY  HTN (hypertension)  High cholesterol  Hypothyroid  Afib  on xarelto  Sleep apnea  Osteoarthritis  Pituitary adenoma  CKD (chronic kidney disease) stage 3, GFR 30-59 ml/min  Pacemaker  Thyroid cancer    HOME MEDICATIONS    allopurinol 300 mg oral tablet: 1 tab(s) orally once a day (at bedtime)  amiodarone 200 mg oral tablet: 1 tab(s) orally once a day  atorvastatin 20 mg oral tablet: 1 tab(s) orally once a day  cabergoline 0.5 mg oral tablet: 0.5 tab(s) orally once a week , monday night  ferrous sulfate: 325 milligram(s) orally once a day  furosemide 20 mg oral tablet: 1 tab(s) orally once a day  levothyroxine 175 mcg (0.175 mg) oral tablet: 1 tab(s) orally once a day  Metoprolol Tartrate 100 mg oral tablet: 1 tab(s) orally every 12 hours  trospium 20 mg oral tablet: 1 tab(s) orally once a day  Xarelto 15 mg oral tablet: 1 tab(s) orally once a day    ACTIVE MEDICATIONS    acetaminophen     Tablet .. 650 milliGRAM(s) Oral every 6 hours  allopurinol 300 milliGRAM(s) Oral at bedtime  aMIOdarone    Tablet 200 milliGRAM(s) Oral daily  atorvastatin 20 milliGRAM(s) Oral at bedtime  furosemide    Tablet 20 milliGRAM(s) Oral daily  lactated ringers. 1000 milliLiter(s) IV Continuous <Continuous>  levothyroxine 175 MICROGram(s) Oral daily  metoprolol tartrate 100 milliGRAM(s) Oral every 12 hours  oxybutynin 5 milliGRAM(s) Oral daily    ALLERGIES  Allergies  No Known Allergies    Intolerances    VITAL SIGNS (Last 24Hours)  ICU Vital Signs Last 24 Hrs  T(C): 36.4 (04 Mar 2025 18:09), Max: 36.4 (04 Mar 2025 18:09)  T(F): 97.6 (04 Mar 2025 18:09), Max: 97.6 (04 Mar 2025 18:09)  HR: 64 (04 Mar 2025 20:34) (64 - 80)  BP: 141/83 (04 Mar 2025 20:34) (141/83 - 167/95)  BP(mean): --  ABP: --  ABP(mean): --  RR: 16 (04 Mar 2025 20:34) (16 - 18)  SpO2: 95% (04 Mar 2025 20:34) (93% - 95%)    O2 Parameters below as of 04 Mar 2025 20:34  Patient On (Oxygen Delivery Method): room air    INTAKE/OUTPUT (Last 24Hours)  I&O's Summary    LABS (Last 24Hours)  [03-04 @ 18:47:] Na 144 K 4.3 Cl 106 Mg ___ Phos ___ BUN/Cr 34/1.1>>>     [03-04 @ 18:47] AST 29  ALT 20 DBili __  TBili 0.6 Alb 4.4    PHYSICAL EXAM   GCS:      Exam: A&Ox3, no focal deficits    RESPIRATORY:  Normal expansion/effort    CARDIOVASCULAR:   non tachycardic  No peripheral edema    GASTROINTESTINAL:  Abdomen soft, non-tender, non-distended    MUSCULOSKELETAL:  Extremities warm, pink, well-perfused., RLE mildly tender, b/l knee scars    DERM:  No skin breakdown     :   Exam: Stewart catheter in place.     ----------------------------------------------------------------------------------------------------------  Tubes/Lines/Drains    [x] Peripheral IV  JEFFERY UGLADE   [id number]   [birth year]    84yF  ============================================================   DATE OF INITIAL SICU/SDU CONSULT: 03-04-25    INDICATION FOR SICU CONSULT:       SICU COURSE EVENTS :  03-04 - admitted to SICU service  ============================================================      24HOUR EVENTS  -Admission under SICU service    [X] A ten-point review of systems was negative except as expressed in note.  [X] History was obtained from patient. If unable to participate in their care, history obtained from review of the chart and collateral sources of information.  ============================================================      HPI   84yF w/ PMHx of HTN, HLD, FELIX, osteoarthritis, CKD3, overactive bladder, gout, thyroid cancer s/p thyroidectomy, a. fib on Xarelto, pacemaker, b/l total knee replacement seen as a Trauma Alert s/p fall backwards at approximately 10:30 this morning (+) HT, (?) LOC, (+) AC. Patient states she was unable to walk after the fall, has been experiencing right lower extremity pain for the past week which worsened after the fall. The pain progressively worsened throughout the day which caused her to come to the ED for further evaluation. She is currently complaining of posterior neck pain and right leg pain. She denies any nausea, vomiting, blurry vision, chest pain, shortness of breath, recent illness. She endorses multiple recent falls. She was previously admitted within the past 6 months, complicated by hematoma development in the right lower extremity. Trauma assessment in ED: ABCs intact , GCS 15 , AAOx3. Found on workup to have thin acute subdural hemorrhage along the right falx.     SICU consulted for Q1hr neurochecks.     PAST MEDICAL & SURGICAL HISTORY:  HTN (hypertension)  High cholesterol  Hypothyroid  Afib on xarelto  Sleep apnea  Osteoarthritis  CKD (chronic kidney disease) stage 3, GFR 30-59 ml/min  Thyroid cancer  H/O thyroidectomy  S/P rotator cuff surgery  Pacemaker  H/O total knee replacement    Allergies  No Known Allergies    Intolerances    Home Medications:  allopurinol 300 mg oral tablet: 1 tab(s) orally once a day (at bedtime) (29 Nov 2024 20:15)  amiodarone 200 mg oral tablet: 1 tab(s) orally once a day (29 Nov 2024 20:14)  atorvastatin 20 mg oral tablet: 1 tab(s) orally once a day (29 Nov 2024 20:14)  cabergoline 0.5 mg oral tablet: 0.5 tab(s) orally once a week , monday night (29 Nov 2024 20:14)  levothyroxine 175 mcg (0.175 mg) oral tablet: 1 tab(s) orally once a day (29 Nov 2024 20:14)  oxyBUTYnin 5 mg oral tablet: 1 tab(s) orally 2 times a day (04 Dec 2024 13:06)  trospium 20 mg oral tablet: 1 tab(s) orally once a day (29 Nov 2024 20:14)    ROS: 10-system review is otherwise negative except HPI above.      Primary Survey:    A - airway intact  B - bilateral breath sounds and good chest rise  C - palpable pulses in all extremities  D - GCS 15 on arrival, MONTIEL  Exposure obtained (04 Mar 2025 21:25)    Pertinent Imaging  < from: CT Head No Cont (03.04.25 @ 19:40) >  CT HEAD:  Thin acute subdural hemorrhage is noted along the anterior right falx   without mass effect or midline shift.    CT CERVICAL SPINE:  No acute fracture or subluxation.  < end of copied text >    < from: CT Chest w/ IV Cont (03.04.25 @ 20:01) >  IMPRESSION:  No CT evidence of acute traumatic injury within the chest, abdomen or   pelvis.  Chronic/stable findings described above  < end of copied text >    PAST MEDICAL & SURGICAL HISTORY  HTN (hypertension)  High cholesterol  Hypothyroid  Afib  on xarelto  Sleep apnea  Osteoarthritis  Pituitary adenoma  CKD (chronic kidney disease) stage 3, GFR 30-59 ml/min  Pacemaker  Thyroid cancer    HOME MEDICATIONS    allopurinol 300 mg oral tablet: 1 tab(s) orally once a day (at bedtime)  amiodarone 200 mg oral tablet: 1 tab(s) orally once a day  atorvastatin 20 mg oral tablet: 1 tab(s) orally once a day  cabergoline 0.5 mg oral tablet: 0.5 tab(s) orally once a week , monday night  ferrous sulfate: 325 milligram(s) orally once a day  furosemide 20 mg oral tablet: 1 tab(s) orally once a day  levothyroxine 175 mcg (0.175 mg) oral tablet: 1 tab(s) orally once a day  Metoprolol Tartrate 100 mg oral tablet: 1 tab(s) orally every 12 hours  trospium 20 mg oral tablet: 1 tab(s) orally once a day  Xarelto 15 mg oral tablet: 1 tab(s) orally once a day    ACTIVE MEDICATIONS    acetaminophen     Tablet .. 650 milliGRAM(s) Oral every 6 hours  allopurinol 300 milliGRAM(s) Oral at bedtime  aMIOdarone    Tablet 200 milliGRAM(s) Oral daily  atorvastatin 20 milliGRAM(s) Oral at bedtime  furosemide    Tablet 20 milliGRAM(s) Oral daily  lactated ringers. 1000 milliLiter(s) IV Continuous <Continuous>  levothyroxine 175 MICROGram(s) Oral daily  metoprolol tartrate 100 milliGRAM(s) Oral every 12 hours  oxybutynin 5 milliGRAM(s) Oral daily    ALLERGIES  Allergies  No Known Allergies    Intolerances    VITAL SIGNS (Last 24Hours)  ICU Vital Signs Last 24 Hrs  T(C): 36.4 (04 Mar 2025 18:09), Max: 36.4 (04 Mar 2025 18:09)  T(F): 97.6 (04 Mar 2025 18:09), Max: 97.6 (04 Mar 2025 18:09)  HR: 64 (04 Mar 2025 20:34) (64 - 80)  BP: 141/83 (04 Mar 2025 20:34) (141/83 - 167/95)  BP(mean): --  ABP: --  ABP(mean): --  RR: 16 (04 Mar 2025 20:34) (16 - 18)  SpO2: 95% (04 Mar 2025 20:34) (93% - 95%)    O2 Parameters below as of 04 Mar 2025 20:34  Patient On (Oxygen Delivery Method): room air    INTAKE/OUTPUT (Last 24Hours)  I&O's Summary    LABS (Last 24Hours)  [03-04 @ 18:47:] Na 144 K 4.3 Cl 106 Mg ___ Phos ___ BUN/Cr 34/1.1>>>     [03-04 @ 18:47] AST 29  ALT 20 DBili __  TBili 0.6 Alb 4.4    PHYSICAL EXAM   GCS:      Exam: A&Ox3, no focal deficits    RESPIRATORY:  Normal expansion/effort    CARDIOVASCULAR:   non tachycardic  No peripheral edema    GASTROINTESTINAL:  Abdomen soft, non-tender, non-distended    MUSCULOSKELETAL:  Extremities warm, pink, well-perfused., RLE mildly tender, b/l knee scars    DERM:  No skin breakdown     :   Exam: Stewart catheter in place.     ----------------------------------------------------------------------------------------------------------  Tubes/Lines/Drains    [x] Peripheral IV

## 2025-03-04 NOTE — CONSULT NOTE ADULT - SUBJECTIVE AND OBJECTIVE BOX
TRAUMA ACTIVATION LEVEL:  CODE / ALERT  / CONSULT  ACTIVATED BY: EMS**  /  ED**  INTUBATED: YES** / NO**      MECHANISM OF INJURY:   [] Blunt     [] MVC	  [] Fall	  [] Pedestrian Struck	  [] Motorcycle     [] Assault     [] Bicycle collision    [] Sports injury    [] Penetrating    [] Gun Shot Wound      [] Stab Wound    GCS: 15 	E: 4	V: 5	M: 6    HPI:    84yF w/ PMHx of *** seen as (Code Trauma / Trauma Alert / Trauma Consult) s/p ******.  Trauma assessment in ED: ABCs intact , GCS 15 , AAOx3.    PAST MEDICAL & SURGICAL HISTORY:  HTN (hypertension)      High cholesterol      Hypothyroid      Afib  on xarelto      Sleep apnea      Osteoarthritis      CKD (chronic kidney disease) stage 3, GFR 30-59 ml/min      Thyroid cancer      H/O thyroidectomy      S/P rotator cuff surgery      Pacemaker      H/O total knee replacement          Allergies    No Known Allergies    Intolerances        Home Medications:  allopurinol 300 mg oral tablet: 1 tab(s) orally once a day (at bedtime) (29 Nov 2024 20:15)  amiodarone 200 mg oral tablet: 1 tab(s) orally once a day (29 Nov 2024 20:14)  atorvastatin 20 mg oral tablet: 1 tab(s) orally once a day (29 Nov 2024 20:14)  cabergoline 0.5 mg oral tablet: 0.5 tab(s) orally once a week , monday night (29 Nov 2024 20:14)  levothyroxine 175 mcg (0.175 mg) oral tablet: 1 tab(s) orally once a day (29 Nov 2024 20:14)  oxyBUTYnin 5 mg oral tablet: 1 tab(s) orally 2 times a day (04 Dec 2024 13:06)  trospium 20 mg oral tablet: 1 tab(s) orally once a day (29 Nov 2024 20:14)      ROS: 10-system review is otherwise negative except HPI above.      Primary Survey:    A - airway intact  B - bilateral breath sounds and good chest rise  C - palpable pulses in all extremities  D - GCS 15 on arrival, MONTIEL  Exposure obtained    Vital Signs Last 24 Hrs  T(C): 36.4 (04 Mar 2025 18:09), Max: 36.4 (04 Mar 2025 18:09)  T(F): 97.6 (04 Mar 2025 18:09), Max: 97.6 (04 Mar 2025 18:09)  HR: 80 (04 Mar 2025 18:09) (80 - 80)  BP: 167/95 (04 Mar 2025 18:09) (167/95 - 167/95)  BP(mean): --  RR: 18 (04 Mar 2025 18:09) (18 - 18)  SpO2: 93% (04 Mar 2025 18:09) (93% - 93%)    Parameters below as of 04 Mar 2025 18:09  Patient On (Oxygen Delivery Method): room air        Secondary Survey:   General: NAD  HEENT: Normocephalic, atraumatic, EOMI, PEERLA. no scalp lacerations   Neck: Soft, midline trachea. no c-spine tenderness  Chest: No chest wall tenderness, no subcutaneous emphysema   Cardiac: S1, S2, RRR  Respiratory: Bilateral breath sounds, clear and equal bilaterally  Abdomen: Soft, non-distended, non-tender, no rebound, no guarding.  Groin: Normal appearing, pelvis stable   Ext:  Moving b/l upper and lower extremities. Palpable Radial b/l UE, b/l DP palpable in LE.   Back: No T/L/S spine tenderness, No palpable runoff/stepoff/deformity  Rectal: No monica blood, VANITA with good tone    FAST: *****/Negative    ACCESS / DEVICES:  [ ] Peripheral IV  [ ] Central Venous Line	[ ] R	[ ] L	[ ] IJ	[ ] Fem	[ ] SC	Placed:   [ ] Arterial Line		[ ] R	[ ] L	[ ] Fem	[ ] Rad	[ ] Ax	Placed:   [ ] PICC:					[ ] Mediport  [ ] Urinary Catheter,  Date Placed:   [ ] Chest tube: [ ] Right, [ ] Left  [ ] MATEUS/Kevan Drains    Labs:  CAPILLARY BLOOD GLUCOSE      POCT Blood Glucose.: 159 mg/dL (04 Mar 2025 18:26)                  LFTs:         Coags:                        RADIOLOGY & ADDITIONAL STUDIES:  ---------------------------------------------------------------------------------------    ASSESSMENT:  84y Female  w/ PMHx of *** seen as (Code Trauma / Trauma Alert / Trauma Consult) s/p ****** with complaint of *** , external signs of trauma include *** . Trauma assessment in ED: ABCs intact , GCS 15 , AAOx3,  MONTIEL.     Injuries identified:   -   -   -     PLAN:   - Trauma Labs: (CBC, BMP, Coags, T&S, UA, EtOH level)  Additional studies:  EKG  Utox    Trauma Imaging to include the following:  - CXR, Pelvic Xray  - CT Head,  CT C-spine, CT Max/Face, CT Chest, CT Abd/Pelvis  - Extremity films: None    Additional consultations:  - Neurosurgery  - Orthopedics  - OMFS  - PT/Rehab/SW  - Hospitalist/Medicine     Disposition pending results of above labs and imaging  Above plan discussed with Trauma attending,  ***  , patient, patient family, and ED team  --------------------------------------------------------------------------------------  03-04-25 @ 18:47 TRAUMA ACTIVATION LEVEL:  CODE / ALERT  / CONSULT  ACTIVATED BY: EMS**  /  ED**  INTUBATED: YES** / NO**      MECHANISM OF INJURY:   [] Blunt     [] MVC	  [x] Fall	  [] Pedestrian Struck	  [] Motorcycle     [] Assault     [] Bicycle collision    [] Sports injury    [] Penetrating    [] Gun Shot Wound      [] Stab Wound    GCS: 15 	E: 4	V: 5	M: 6    HPI:    84yF w/ PMHx of HTN, HLD, FELIX, osteoarthritis, CKD3, thyroid cancer s/p thyroidectomy, pacemaker, b/l total knee replacement seen as  Trauma Alert s/p fall backwards at approximately 1030 this morning (+) HT, (?) LOC, (+) AC. Patient states she was unable to walk after the fall, has been experiencing right lower extremity pain for the past week which worsened after the fall. The pain progressively worsened throughout the day which caused her to come to the ED for further evaluation. She is currently complaining of posterior neck pain and right leg pain. She denies any nausea, vomiting, blurry vision, chest pain, shortness of breath, recent illness. She endorses multiple recent falls. She was previously admitted within the past 6 months, complicated by hematoma development in the right lower extremity.   Trauma assessment in ED: ABCs intact , GCS 15 , AAOx3.    PAST MEDICAL & SURGICAL HISTORY:  HTN (hypertension)  High cholesterol  Hypothyroid  Afib  on xarelto  Sleep apnea  Osteoarthritis  CKD (chronic kidney disease) stage 3, GFR 30-59 ml/min  Thyroid cancer  H/O thyroidectomy  S/P rotator cuff surgery  Pacemaker  H/O total knee replacement    Allergies    No Known Allergies    Intolerances    Home Medications:  allopurinol 300 mg oral tablet: 1 tab(s) orally once a day (at bedtime) (29 Nov 2024 20:15)  amiodarone 200 mg oral tablet: 1 tab(s) orally once a day (29 Nov 2024 20:14)  atorvastatin 20 mg oral tablet: 1 tab(s) orally once a day (29 Nov 2024 20:14)  cabergoline 0.5 mg oral tablet: 0.5 tab(s) orally once a week , monday night (29 Nov 2024 20:14)  levothyroxine 175 mcg (0.175 mg) oral tablet: 1 tab(s) orally once a day (29 Nov 2024 20:14)  oxyBUTYnin 5 mg oral tablet: 1 tab(s) orally 2 times a day (04 Dec 2024 13:06)  trospium 20 mg oral tablet: 1 tab(s) orally once a day (29 Nov 2024 20:14)      ROS: 10-system review is otherwise negative except HPI above.      Primary Survey:    A - airway intact  B - bilateral breath sounds and good chest rise  C - palpable pulses in all extremities  D - GCS 15 on arrival, MONTIEL  Exposure obtained    Vital Signs Last 24 Hrs  T(C): 36.4 (04 Mar 2025 18:09), Max: 36.4 (04 Mar 2025 18:09)  T(F): 97.6 (04 Mar 2025 18:09), Max: 97.6 (04 Mar 2025 18:09)  HR: 80 (04 Mar 2025 18:09) (80 - 80)  BP: 167/95 (04 Mar 2025 18:09) (167/95 - 167/95)  BP(mean): --  RR: 18 (04 Mar 2025 18:09) (18 - 18)  SpO2: 93% (04 Mar 2025 18:09) (93% - 93%)    Parameters below as of 04 Mar 2025 18:09  Patient On (Oxygen Delivery Method): room air        Secondary Survey:   General: NAD  HEENT: Normocephalic, atraumatic, EOMI, PEERLA. no scalp lacerations   Neck: Soft, midline trachea.  (+) c-spine tenderness, c collar in place  Chest: No chest wall tenderness, no subcutaneous emphysema   Cardiac: S1, S2, RRR  Respiratory: Bilateral breath sounds, clear and equal bilaterally  Abdomen: Soft, non-distended, non-tender, no rebound, no guarding.  Groin: Normal appearing, pelvis stable   Ext:  Moving b/l upper and lower extremities. Palpable Radial b/l UE, b/l DP palpable in LE. Tenderness to palpation in the anterior and posterior right distal lower extremity.   Back: No T/L/S spine tenderness, No palpable runoff/stepoff/deformity  Rectal: No monica blood, VANITA with good tone    FAST: Negative    ACCESS / DEVICES:  [x ] Peripheral IV  [ ] Central Venous Line	[ ] R	[ ] L	[ ] IJ	[ ] Fem	[ ] SC	Placed:   [ ] Arterial Line		[ ] R	[ ] L	[ ] Fem	[ ] Rad	[ ] Ax	Placed:   [ ] PICC:					[ ] Mediport  [ ] Urinary Catheter,  Date Placed:   [ ] Chest tube: [ ] Right, [ ] Left  [ ] MATEUS/Kevan Drains    Labs:  CAPILLARY BLOOD GLUCOSE      POCT Blood Glucose.: 159 mg/dL (04 Mar 2025 18:26)    LFTs:    Coags:      RADIOLOGY & ADDITIONAL STUDIES:  ---------------------------------------------------------------------------------------

## 2025-03-04 NOTE — ED PROVIDER NOTE - PROGRESS NOTE DETAILS
AY: Patient reports she took her Xarelto at 5 PM.  Per neurosurgery AMANDA Bay, patient will likely require reversal.  Neurosurgery consulted, pending recommendations.

## 2025-03-04 NOTE — ED PROVIDER NOTE - WR INTERPRETATION 3
no acute cardiopulmonary pathology. no significant change from prior XR as interpreted by dr. almanza

## 2025-03-04 NOTE — CONSULT NOTE ADULT - SUBJECTIVE AND OBJECTIVE BOX
Neurosurgery Consult    Patient is a 84y old  Female who presents with a chief complaint of fall    HPI:·  84-year-old female with past medical history of A-fib pacemaker on Xarelto hypertension hyperlipidemia hypothyroid CKD FELIX.  Who presents to the ED with chief complaint of mechanical fall.  Per patient she was leaning on a door which opened and she subsequently fell backwards hitting her head onto the door.  This fall was unwitnessed and happened at approximately 10:30 AM this morning.  Patient called her daughter who came later in the afternoon and drove her to the hospital.  Patient endorses right hip pain but has been able to ambulate since time of injury.  Denies any LOC chest pain shortness of breath palpitations.    Interval Hx; Pt denies headache, nausea, vomiting. Pt states her last dose of xarelto was 5pm today. She c/o only of right hip pain. HCT shows very thin 2mm SDH along anterior falx..           PAST MEDICAL & SURGICAL HISTORY:  HTN (hypertension)  High cholesterol  Hypothyroid  Afib  on xarelto  Sleep apnea  Osteoarthritis  CKD (chronic kidney disease) stage 3, GFR 30-59 ml/min  Thyroid cancer  H/O thyroidectomy  S/P rotator cuff surgery  Pacemaker  H/O total knee replacement          FAMILY HISTORY:  Family history of lung cancer (Mother)    Family history of abdominal aortic aneurysm (AAA) (Father)        Social History: (-) x 3    Allergies    No Known Allergies    Intolerances        MEDICATIONS  (STANDING):    MEDICATIONS  (PRN):      Review of systems:    Constitutional: No fever, weight loss or fatigue    Eyes: No eye pain or discharge  ENMT:  No difficulty hearing; No sinus or throat pain  Neck: No pain or stiffness  Respiratory: No cough, wheezing, chills or hemoptysis  Cardiovascular: No chest pain, palpitations, shortness of breath, dyspnea on exertion  Gastrointestinal: No abdominal pain, nausea, vomiting or hematemesis; No diarrhea or constipation.   Genitourinary: No dysuria, frequency, hematuria or incontinence  Neurological: As per HPI  Skin: No rashes or lesions   Endocrine: No heat or cold intolerance; No hair loss  Musculoskeletal: No joint pain or swelling  Psychiatric: No depression, anxiety, mood swings  Heme/Lymph: No easy bruising or bleeding gums    Vital Signs Last 24 Hrs  T(C): 36.4 (04 Mar 2025 18:09), Max: 36.4 (04 Mar 2025 18:09)  T(F): 97.6 (04 Mar 2025 18:09), Max: 97.6 (04 Mar 2025 18:09)  HR: 64 (04 Mar 2025 20:34) (64 - 80)  BP: 141/83 (04 Mar 2025 20:34) (141/83 - 167/95)  BP(mean): --  RR: 16 (04 Mar 2025 20:34) (16 - 18)  SpO2: 95% (04 Mar 2025 20:34) (93% - 95%)    Parameters below as of 04 Mar 2025 20:34  Patient On (Oxygen Delivery Method): room air        Neurologic Examination:  General:  Appearance is consistent with chronologic age.  No abnormal facies.   General: The patient is oriented to person, place, time and date.  Recent and remote memory intact.  Fund of knowledge is intact and normal.  Language with normal repetition, comprehension and naming.  Nondysarthric.    Cranial nerves: intact VA, VFF.  EOMI w/o nystagmus, skew or reported double vision.  PERRL.  No ptosis/weakness of eyelid closure.  Facial sensation is normal with normal bite.  No facial asymmetry.  Hearing grossly intact b/l.  Palate elevates midline.  Tongue midline.  Motor examination:   Normal tone, bulk and range of motion.  No tenderness, twitching, tremors or involuntary movements.  Formal Muscle Strength Testing: (MRC grade R/L) 5/5 UE; 5/5 LE.  No observable drift.  Sensory examination:   Intact to light touch and pinprick, pain, temperature and proprioception and vibration in all extremities.  Cerebellum:     Gait narrow based and normal.    Labs:   CBC Full  -  ( 04 Mar 2025 18:47 )  WBC Count : 9.23 K/uL  RBC Count : 4.80 M/uL  Hemoglobin : 12.3 g/dL  Hematocrit : 40.1 %  Platelet Count - Automated : 251 K/uL  Mean Cell Volume : 83.5 fL  Mean Cell Hemoglobin : 25.6 pg  Mean Cell Hemoglobin Concentration : 30.7 g/dL  Auto Neutrophil # : x  Auto Lymphocyte # : x  Auto Monocyte # : x  Auto Eosinophil # : x  Auto Basophil # : x  Auto Neutrophil % : x  Auto Lymphocyte % : x  Auto Monocyte % : x  Auto Eosinophil % : x  Auto Basophil % : x    03-04    144  |  106  |  34[H]  ----------------------------<  146[H]  4.3   |  23  |  1.1    Ca    8.6      04 Mar 2025 18:47    TPro  6.2  /  Alb  4.4  /  TBili  0.6  /  DBili  x   /  AST  29  /  ALT  20  /  AlkPhos  155[H]  03-04    LIVER FUNCTIONS - ( 04 Mar 2025 18:47 )  Alb: 4.4 g/dL / Pro: 6.2 g/dL / ALK PHOS: 155 U/L / ALT: 20 U/L / AST: 29 U/L / GGT: x           PT/INR - ( 04 Mar 2025 20:08 )   PT: 23.60 sec;   INR: 1.97 ratio         PTT - ( 04 Mar 2025 20:08 )  PTT:43.9 sec  Urinalysis Basic - ( 04 Mar 2025 18:47 )    Color: x / Appearance: x / SG: x / pH: x  Gluc: 146 mg/dL / Ketone: x  / Bili: x / Urobili: x   Blood: x / Protein: x / Nitrite: x   Leuk Esterase: x / RBC: x / WBC x   Sq Epi: x / Non Sq Epi: x / Bacteria: x          Neuroimaging:  NCHCT: CT Head No Cont:   ACC: [id number] EXAM:  CT CERVICAL SPINE   ORDERED BY: CHRIS GONZALEZ     ACC: [id number] EXAM:  CT BRAIN   ORDERED BY: CHRIS GONZALEZ     PROCEDURE DATE:  03/04/2025          INTERPRETATION:  CLINICAL INDICATIONS:  Trauma.    TECHNIQUE:  Noncontrast head and cervical spine CT was performed.    Multiple contiguous axial images were obtained from the skull base to the   vertex without the use of intravenous contrast. Reformatted coronal and   sagittal images were were subsequently obtained andreviewed.    Axial images were obtained through the cervical spine using multislice   helical technique.  Reformatted coronal and sagittal images were were   subsequently obtained and reviewed.    COMPARISON: CT head cervical spine dated 7/12/2024.    FINDINGS:    CT BRAIN:  Thin acute subdural hemorrhage noted along the anterior right aspect of   falx measuring approximately 2 mm, series 602 image 33. There is no mass   effect or midline shift.    The basal cisterns are patent. There is no hydrocephalus.    The visualized orbits are unremarkable.    The calvarium is intact, without depressed fracture.    The visualized paranasal sinuses and mastoid air cells are clear.    CT CERVICAL SPINE:  There are diffuse osteopenic changes noted.    There is no prevertebral soft tissue swelling. There is no splaying of   the spinous processes. The occipital condyles are normal. Lateral masses   of C1 align normally with C2. No lucent fracture line is identified.   There is no spondylolisthesis.    Multilevel degenerative osteoarthritis and degenerative disc disease are   present. Findings include marginal osteophytes, uncovertebral spurring,   loss of normal disc space height, endplate sclerosis, and facet joint   arthrosis.    The spinal canal contents are suboptimally evaluated inherent to CT   technique though grossly unremarkable.    The visualized lung apices are within normal limits.    Miscellaneous:  None.    IMPRESSION:    CT HEAD:  Thin acute subdural hemorrhage is noted along the anterior right falx   without mass effect or midline shift.    CT CERVICAL SPINE:  No acute fracture or subluxation.    These findings were discussed with Dr. CHRIS GONZALEZ [phone] at   3/4/2025 8:01 PM by Dr. Soria with read back confirmation.    --- End of Report ---    PATRICE SORIA MD; Attending Radiologist  This document has been electronically signed. Mar  4 2025  8:01PM (03-04-25 @ 19:40)        Assessment:  This is a 84y Female with h/o f with hx of A-fib pacemaker on Xarelto hypertension hyperlipidemia hypothyroid CKD FELIX presents s/p mechanical fall with skim SDH along falx          Plan: No acute neurosurgical intervention          Rpt HCT in 6H - Appreciate PCC administration per ED          Keep SBP below 140         HOB at 30         Neuro checks q 4H if Rpt HCT stable   -   03-04-25 @ 20:53    Case discussed and Films reviewed with Dr Munoz

## 2025-03-04 NOTE — ED PROVIDER NOTE - OBJECTIVE STATEMENT
84-year-old female with past medical history of A-fib pacemaker on Xarelto hypertension hyperlipidemia hypothyroid CKD FELIX.  Who presents to the ED with chief complaint of mechanical fall.  Per patient she was leaning on a door which opened and she subsequently fell backwards hitting her head onto the door.  This fall was unwitnessed and happened at approximately 10:30 AM this morning.  Patient called her daughter who came later in the afternoon and drove her to the hospital.  Patient endorses right hip pain but has been able to ambulate since time of injury.  Denies any LOC chest pain shortness of breath palpitations.

## 2025-03-04 NOTE — ED ADULT TRIAGE NOTE - CHIEF COMPLAINT QUOTE
s/p fall this morning lost balance on door and tripped. Pt on Xarelto and hit back of head. R leg pain. Trauma alert called.

## 2025-03-04 NOTE — ED PROVIDER NOTE - CLINICAL SUMMARY MEDICAL DECISION MAKING FREE TEXT BOX
83 yo woman w/ A-fib, pacemaker (on Xarelto), hypertension, hyperlipidemia, hypothyroid, CKD, FELIX here s/p mech fall  pt was leaning on door which opened and fall hitting back of head on door and R hip  occurred at 1030 am today  no loc, no cp, sob, villarreal, paliptations  c/o mild neck pain  able to stand and walk afterwards but pain worsened in hip leading to ED presentation tonight  no other complaints at this time    wd/wn  + moderate pain distress  nc/at  eomi, perrl  + paraspinal neck ttp R side  no midline ttp  from X 4  strength 5/5 X 4  gait stable  nt/nd + BS  + mild R hip ttp  + mild upper leg ttp  from of knee/ankle b/l  rrr s1s2 wnl  cta b/l  aao x 3    83 yo woman w/ mech fall w/ neck and hip pain  ct head, c-spine  XR chest, pelvis, hip, femur  analgesia, reassess 83 yo woman w/ A-fib, pacemaker (on Xarelto), hypertension, hyperlipidemia, hypothyroid, CKD, FELIX here s/p mech fall  pt was leaning on door which opened and fall hitting back of head on door and R hip  occurred at 1030 am today  no loc, no cp, sob, villarreal, paliptations  c/o mild neck pain  able to stand and walk afterwards but pain worsened in hip leading to ED presentation tonight  no other complaints at this time    wd/wn  + moderate pain distress  nc/at  eomi, perrl  + paraspinal neck ttp R side  no midline ttp  from X 4  strength 5/5 X 4  gait stable  nt/nd + BS  + mild R hip ttp  + mild upper leg ttp  from of knee/ankle b/l  rrr s1s2 wnl  cta b/l  aao x 3    83 yo woman w/ mech fall w/ neck and hip pain  ct head, c-spine  XR chest, pelvis, hip, femur  analgesia, reassess    2000: CT read w/ small SDH w/o shift. NSGY contacted. Pt w/ unremarkable neurologic exam at this time 85 yo woman w/ A-fib, pacemaker (on Xarelto), hypertension, hyperlipidemia, hypothyroid, CKD, FELIX here s/p mech fall  pt was leaning on door which opened and fall hitting back of head on door and R hip  occurred at 1030 am today  no loc, no cp, sob, villarreal, paliptations  c/o mild neck pain  able to stand and walk afterwards but pain worsened in hip leading to ED presentation tonight  no other complaints at this time    wd/wn  + moderate pain distress  nc/at  eomi, perrl  + paraspinal neck ttp R side  no midline ttp  from X 4  strength 5/5 X 4  gait stable  nt/nd + BS  + mild R hip ttp  + mild upper leg ttp  from of knee/ankle b/l  rrr s1s2 wnl  cta b/l  aao x 3    85 yo woman w/ mech fall w/ neck and hip pain  ct head, c-spine  XR chest, pelvis, hip, femur  analgesia, reassess    2000: CT read w/ small SDH w/o shift. NSGY contacted. Pt w/ unremarkable neurologic exam at this time. Last dose of Xa inhibitor was this evening. Will reverse w/ PCC    Crit Care Time (35 min): pt p/w fall w/ head trauma. Full trauma assessment, IV, Monitor placed. Labs and imaging ordered and interpreted. > 35 min spent in assessment, management, reassessment and discussion w/ consultants.

## 2025-03-04 NOTE — H&P ADULT - NSHPPHYSICALEXAM_GEN_ALL_CORE
Secondary Survey:   General: NAD  HEENT: Normocephalic, atraumatic, EOMI, PEERLA. no scalp lacerations   Neck: Soft, midline trachea.  (+) c-spine tenderness, c collar in place  Chest: No chest wall tenderness, no subcutaneous emphysema   Cardiac: S1, S2, RRR  Respiratory: Bilateral breath sounds, clear and equal bilaterally  Abdomen: Soft, non-distended, non-tender, no rebound, no guarding.  Groin: Normal appearing, pelvis stable   Ext:  Moving b/l upper and lower extremities. Palpable Radial b/l UE, b/l DP palpable in LE. Tenderness to palpation in the anterior and posterior right distal lower extremity.   Back: No T/L/S spine tenderness, No palpable runoff/stepoff/deformity  Rectal: No monica blood, VANITA with good tone    FAST: Negative    ACCESS / DEVICES:  [x ] Peripheral IV  [ ] Central Venous Line	[ ] R	[ ] L	[ ] IJ	[ ] Fem	[ ] SC	Placed:   [ ] Arterial Line		[ ] R	[ ] L	[ ] Fem	[ ] Rad	[ ] Ax	Placed:   [ ] PICC:					[ ] Mediport  [ ] Urinary Catheter,  Date Placed:   [ ] Chest tube: [ ] Right, [ ] Left  [ ] MATEUS/Kevan Drains

## 2025-03-04 NOTE — H&P ADULT - ASSESSMENT
ASSESSMENT:  84yF w/ PMHx of HTN, HLD, FELIX, osteoarthritis, CKD3, thyroid cancer s/p thyroidectomy, pacemaker, b/l total knee replacement seen as  Trauma Alert s/p fall backwards at approximately 1030 this morning (+) HT, (?) LOC, (+) AC. Patient states she was unable to walk after the fall, has been experiencing right lower extremity pain for the past week which worsened after the fall. The pain progressively worsened throughout the day which caused her to come to the ED for further evaluation. She is currently complaining of posterior neck pain and right leg pain. She denies any nausea, vomiting, blurry vision, chest pain, shortness of breath, recent illness. She endorses multiple recent falls. She was previously admitted within the past 6 months, complicated by hematoma development in the right lower extremity.   Trauma assessment in ED: ABCs intact , GCS 15 , AAOx3.    external signs of trauma include: none   Trauma assessment in ED: ABCs intact , GCS 15 , AAOx3,  MONTIEL.     Injuries identified:   -   -   -     PLAN:   - Trauma Labs: (CBC, BMP, Coags, T&S, UA, EtOH level)  Additional studies:  EKG  Utox    Trauma Imaging to include the following:  - CXR, Pelvic Xray  - CT Head,  CT C-spine, CT Chest, CT Abd/Pelvis  - Extremity films: right lower extremity    Additional consultations:  - Neurosurgery  - Orthopedics  - OMFS  - PT/Rehab/SW  - Hospitalist/Medicine     Disposition pending results of above labs and imaging  Above plan discussed with Trauma attending, Dr. MAC, patient, patient family, and ED team  --------------------------------------------------------------------------------------  03-04-25 @ 18:47   ASSESSMENT:  84yF w/ PMHx of HTN, HLD, FELIX, osteoarthritis, CKD3, thyroid cancer s/p thyroidectomy, pacemaker, b/l total knee replacement seen as  Trauma Alert s/p fall backwards at approximately 1030 this morning (+) HT, (?) LOC, (+) AC. Patient states she was unable to walk after the fall, has been experiencing right lower extremity pain for the past week which worsened after the fall. The pain progressively worsened throughout the day which caused her to come to the ED for further evaluation. She is currently complaining of posterior neck pain and right leg pain. She denies any nausea, vomiting, blurry vision, chest pain, shortness of breath, recent illness. She endorses multiple recent falls. She was previously admitted within the past 6 months, complicated by hematoma development in the right lower extremity.   Trauma assessment in ED: ABCs intact , GCS 15 , AAOx3.    external signs of trauma include: none   Trauma assessment in ED: ABCs intact , GCS 15 , AAOx3,  MONTIEL.     Injuries identified:   - Thin acute subdural hemorrhage is noted along the anterior right falx without mass effect or midline shift.  -   -     PLAN:   - Admit to Dr. Weaver under Trauma Surgery Service  - SICU consult for q1hr neuro checks  - Hold DVT ppx  - NPO, IVF  - NSGY c/s - Rpt HCT in 6H,  Appreciate PCC administration per ED,  Keep SBP below 140, HOB at 30, Neuro checks q 4H if Rpt HCT stable   - Continue home medications as indicated, holding home xarelto  - Repeat head CT approx 1:30AM  - Hemodynamic monitoring  - pain control    Above plan discussed with Trauma attending, Dr. WEAVER, patient, patient family, and ED team  --------------------------------------------------------------------------------------  03-04-25 @ 18:47

## 2025-03-05 ENCOUNTER — TRANSCRIPTION ENCOUNTER (OUTPATIENT)
Age: 85
End: 2025-03-05

## 2025-03-05 LAB
ANION GAP SERPL CALC-SCNC: 12 MMOL/L — SIGNIFICANT CHANGE UP (ref 7–14)
ANION GAP SERPL CALC-SCNC: 13 MMOL/L — SIGNIFICANT CHANGE UP (ref 7–14)
APPEARANCE UR: CLEAR — SIGNIFICANT CHANGE UP
APTT BLD: 33.8 SEC — SIGNIFICANT CHANGE UP (ref 27–39.2)
APTT BLD: 38.8 SEC — SIGNIFICANT CHANGE UP (ref 27–39.2)
BASOPHILS # BLD AUTO: 0 K/UL — SIGNIFICANT CHANGE UP (ref 0–0.2)
BASOPHILS # BLD AUTO: 0.05 K/UL — SIGNIFICANT CHANGE UP (ref 0–0.2)
BASOPHILS NFR BLD AUTO: 0 % — SIGNIFICANT CHANGE UP (ref 0–1)
BASOPHILS NFR BLD AUTO: 0.5 % — SIGNIFICANT CHANGE UP (ref 0–1)
BILIRUB UR-MCNC: NEGATIVE — SIGNIFICANT CHANGE UP
BUN SERPL-MCNC: 28 MG/DL — HIGH (ref 10–20)
BUN SERPL-MCNC: 31 MG/DL — HIGH (ref 10–20)
CALCIUM SERPL-MCNC: 8.5 MG/DL — SIGNIFICANT CHANGE UP (ref 8.4–10.5)
CALCIUM SERPL-MCNC: 8.6 MG/DL — SIGNIFICANT CHANGE UP (ref 8.4–10.5)
CHLORIDE SERPL-SCNC: 105 MMOL/L — SIGNIFICANT CHANGE UP (ref 98–110)
CHLORIDE SERPL-SCNC: 109 MMOL/L — SIGNIFICANT CHANGE UP (ref 98–110)
CO2 SERPL-SCNC: 23 MMOL/L — SIGNIFICANT CHANGE UP (ref 17–32)
CO2 SERPL-SCNC: 24 MMOL/L — SIGNIFICANT CHANGE UP (ref 17–32)
COLOR SPEC: YELLOW — SIGNIFICANT CHANGE UP
CREAT SERPL-MCNC: 1 MG/DL — SIGNIFICANT CHANGE UP (ref 0.7–1.5)
CREAT SERPL-MCNC: 1.5 MG/DL — SIGNIFICANT CHANGE UP (ref 0.7–1.5)
DIFF PNL FLD: NEGATIVE — SIGNIFICANT CHANGE UP
EGFR: 35 ML/MIN/1.73M2 — LOW
EGFR: 35 ML/MIN/1.73M2 — LOW
EGFR: 56 ML/MIN/1.73M2 — LOW
EGFR: 56 ML/MIN/1.73M2 — LOW
EOSINOPHIL # BLD AUTO: 0 K/UL — SIGNIFICANT CHANGE UP (ref 0–0.7)
EOSINOPHIL # BLD AUTO: 0.08 K/UL — SIGNIFICANT CHANGE UP (ref 0–0.7)
EOSINOPHIL NFR BLD AUTO: 0 % — SIGNIFICANT CHANGE UP (ref 0–8)
EOSINOPHIL NFR BLD AUTO: 0.8 % — SIGNIFICANT CHANGE UP (ref 0–8)
GLUCOSE SERPL-MCNC: 105 MG/DL — HIGH (ref 70–99)
GLUCOSE SERPL-MCNC: 124 MG/DL — HIGH (ref 70–99)
GLUCOSE UR QL: NEGATIVE MG/DL — SIGNIFICANT CHANGE UP
HCT VFR BLD CALC: 36.4 % — LOW (ref 37–47)
HCT VFR BLD CALC: 37.1 % — SIGNIFICANT CHANGE UP (ref 37–47)
HGB BLD-MCNC: 11 G/DL — LOW (ref 12–16)
HGB BLD-MCNC: 11.2 G/DL — LOW (ref 12–16)
IMM GRANULOCYTES NFR BLD AUTO: 0.4 % — HIGH (ref 0.1–0.3)
INR BLD: 1.04 RATIO — SIGNIFICANT CHANGE UP (ref 0.65–1.3)
INR BLD: 1.36 RATIO — HIGH (ref 0.65–1.3)
KETONES UR-MCNC: NEGATIVE MG/DL — SIGNIFICANT CHANGE UP
LEUKOCYTE ESTERASE UR-ACNC: NEGATIVE — SIGNIFICANT CHANGE UP
LYMPHOCYTES # BLD AUTO: 2.27 K/UL — SIGNIFICANT CHANGE UP (ref 1.2–3.4)
LYMPHOCYTES # BLD AUTO: 26.3 % — SIGNIFICANT CHANGE UP (ref 20.5–51.1)
LYMPHOCYTES # BLD AUTO: 48.9 % — SIGNIFICANT CHANGE UP (ref 20.5–51.1)
LYMPHOCYTES # BLD AUTO: 5.09 K/UL — HIGH (ref 1.2–3.4)
MAGNESIUM SERPL-MCNC: 1.8 MG/DL — SIGNIFICANT CHANGE UP (ref 1.8–2.4)
MAGNESIUM SERPL-MCNC: 1.9 MG/DL — SIGNIFICANT CHANGE UP (ref 1.8–2.4)
MCHC RBC-ENTMCNC: 25.5 PG — LOW (ref 27–31)
MCHC RBC-ENTMCNC: 25.6 PG — LOW (ref 27–31)
MCHC RBC-ENTMCNC: 30.2 G/DL — LOW (ref 32–37)
MCHC RBC-ENTMCNC: 30.2 G/DL — LOW (ref 32–37)
MCV RBC AUTO: 84.3 FL — SIGNIFICANT CHANGE UP (ref 81–99)
MCV RBC AUTO: 84.9 FL — SIGNIFICANT CHANGE UP (ref 81–99)
MONOCYTES # BLD AUTO: 0.3 K/UL — SIGNIFICANT CHANGE UP (ref 0.1–0.6)
MONOCYTES # BLD AUTO: 0.72 K/UL — HIGH (ref 0.1–0.6)
MONOCYTES NFR BLD AUTO: 3.5 % — SIGNIFICANT CHANGE UP (ref 1.7–9.3)
MONOCYTES NFR BLD AUTO: 6.9 % — SIGNIFICANT CHANGE UP (ref 1.7–9.3)
MRSA PCR RESULT.: NEGATIVE — SIGNIFICANT CHANGE UP
NEUTROPHILS # BLD AUTO: 4.43 K/UL — SIGNIFICANT CHANGE UP (ref 1.4–6.5)
NEUTROPHILS # BLD AUTO: 5.22 K/UL — SIGNIFICANT CHANGE UP (ref 1.4–6.5)
NEUTROPHILS NFR BLD AUTO: 42.5 % — SIGNIFICANT CHANGE UP (ref 42.2–75.2)
NEUTROPHILS NFR BLD AUTO: 60.5 % — SIGNIFICANT CHANGE UP (ref 42.2–75.2)
NITRITE UR-MCNC: NEGATIVE — SIGNIFICANT CHANGE UP
NRBC BLD AUTO-RTO: 0 /100 WBCS — SIGNIFICANT CHANGE UP (ref 0–0)
PCP SPEC-MCNC: SIGNIFICANT CHANGE UP
PH UR: 7.5 — SIGNIFICANT CHANGE UP (ref 5–8)
PHOSPHATE SERPL-MCNC: 4.4 MG/DL — SIGNIFICANT CHANGE UP (ref 2.1–4.9)
PHOSPHATE SERPL-MCNC: 4.5 MG/DL — SIGNIFICANT CHANGE UP (ref 2.1–4.9)
PLATELET # BLD AUTO: 207 K/UL — SIGNIFICANT CHANGE UP (ref 130–400)
PLATELET # BLD AUTO: 235 K/UL — SIGNIFICANT CHANGE UP (ref 130–400)
PMV BLD: 10.1 FL — SIGNIFICANT CHANGE UP (ref 7.4–10.4)
PMV BLD: 11.2 FL — HIGH (ref 7.4–10.4)
POTASSIUM SERPL-MCNC: 3.7 MMOL/L — SIGNIFICANT CHANGE UP (ref 3.5–5)
POTASSIUM SERPL-MCNC: 4.1 MMOL/L — SIGNIFICANT CHANGE UP (ref 3.5–5)
POTASSIUM SERPL-SCNC: 3.7 MMOL/L — SIGNIFICANT CHANGE UP (ref 3.5–5)
POTASSIUM SERPL-SCNC: 4.1 MMOL/L — SIGNIFICANT CHANGE UP (ref 3.5–5)
PROT UR-MCNC: 30 MG/DL
PROTHROM AB SERPL-ACNC: 12.3 SEC — SIGNIFICANT CHANGE UP (ref 9.95–12.87)
PROTHROM AB SERPL-ACNC: 16.2 SEC — HIGH (ref 9.95–12.87)
RBC # BLD: 4.32 M/UL — SIGNIFICANT CHANGE UP (ref 4.2–5.4)
RBC # BLD: 4.37 M/UL — SIGNIFICANT CHANGE UP (ref 4.2–5.4)
RBC # FLD: 17.3 % — HIGH (ref 11.5–14.5)
RBC # FLD: 17.4 % — HIGH (ref 11.5–14.5)
SODIUM SERPL-SCNC: 141 MMOL/L — SIGNIFICANT CHANGE UP (ref 135–146)
SODIUM SERPL-SCNC: 145 MMOL/L — SIGNIFICANT CHANGE UP (ref 135–146)
SP GR SPEC: 1.02 — SIGNIFICANT CHANGE UP (ref 1–1.03)
UROBILINOGEN FLD QL: 0.2 MG/DL — SIGNIFICANT CHANGE UP (ref 0.2–1)
WBC # BLD: 10.41 K/UL — SIGNIFICANT CHANGE UP (ref 4.8–10.8)
WBC # BLD: 8.63 K/UL — SIGNIFICANT CHANGE UP (ref 4.8–10.8)
WBC # FLD AUTO: 10.41 K/UL — SIGNIFICANT CHANGE UP (ref 4.8–10.8)
WBC # FLD AUTO: 8.63 K/UL — SIGNIFICANT CHANGE UP (ref 4.8–10.8)

## 2025-03-05 PROCEDURE — 99232 SBSQ HOSP IP/OBS MODERATE 35: CPT

## 2025-03-05 PROCEDURE — 99221 1ST HOSP IP/OBS SF/LOW 40: CPT

## 2025-03-05 PROCEDURE — 70450 CT HEAD/BRAIN W/O DYE: CPT | Mod: 26

## 2025-03-05 RX ORDER — SENNA 187 MG
2 TABLET ORAL AT BEDTIME
Refills: 0 | Status: DISCONTINUED | OUTPATIENT
Start: 2025-03-05 | End: 2025-03-06

## 2025-03-05 RX ORDER — METHOCARBAMOL 500 MG/1
1 TABLET, FILM COATED ORAL
Qty: 15 | Refills: 0
Start: 2025-03-05 | End: 2025-03-09

## 2025-03-05 RX ORDER — MAGNESIUM SULFATE 500 MG/ML
1 SYRINGE (ML) INJECTION ONCE
Refills: 0 | Status: COMPLETED | OUTPATIENT
Start: 2025-03-05 | End: 2025-03-06

## 2025-03-05 RX ORDER — RIVAROXABAN 10 MG/1
1 TABLET, FILM COATED ORAL
Refills: 0 | DISCHARGE

## 2025-03-05 RX ORDER — HEPARIN SODIUM 1000 [USP'U]/ML
5000 INJECTION INTRAVENOUS; SUBCUTANEOUS EVERY 8 HOURS
Refills: 0 | Status: DISCONTINUED | OUTPATIENT
Start: 2025-03-05 | End: 2025-03-06

## 2025-03-05 RX ORDER — LIDOCAINE HYDROCHLORIDE 20 MG/ML
1 JELLY TOPICAL
Qty: 0 | Refills: 0 | DISCHARGE
Start: 2025-03-05

## 2025-03-05 RX ORDER — ACETAMINOPHEN 500 MG/5ML
2 LIQUID (ML) ORAL
Qty: 0 | Refills: 0 | DISCHARGE
Start: 2025-03-05

## 2025-03-05 RX ADMIN — Medication 300 MILLIGRAM(S): at 21:12

## 2025-03-05 RX ADMIN — Medication 5 MILLIGRAM(S): at 03:13

## 2025-03-05 RX ADMIN — Medication 175 MICROGRAM(S): at 05:10

## 2025-03-05 RX ADMIN — Medication 40 MILLIGRAM(S): at 05:11

## 2025-03-05 RX ADMIN — METOPROLOL SUCCINATE 100 MILLIGRAM(S): 50 TABLET, EXTENDED RELEASE ORAL at 05:11

## 2025-03-05 RX ADMIN — Medication 2 TABLET(S): at 21:12

## 2025-03-05 RX ADMIN — ATORVASTATIN CALCIUM 20 MILLIGRAM(S): 80 TABLET, FILM COATED ORAL at 21:12

## 2025-03-05 RX ADMIN — FUROSEMIDE 20 MILLIGRAM(S): 10 INJECTION INTRAMUSCULAR; INTRAVENOUS at 05:10

## 2025-03-05 RX ADMIN — Medication 1 APPLICATION(S): at 05:10

## 2025-03-05 RX ADMIN — Medication 650 MILLIGRAM(S): at 11:06

## 2025-03-05 RX ADMIN — LIDOCAINE HYDROCHLORIDE 1 PATCH: 20 JELLY TOPICAL at 21:11

## 2025-03-05 RX ADMIN — METHOCARBAMOL 750 MILLIGRAM(S): 500 TABLET, FILM COATED ORAL at 21:12

## 2025-03-05 RX ADMIN — OXYBUTYNIN CHLORIDE 5 MILLIGRAM(S): 5 TABLET, FILM COATED, EXTENDED RELEASE ORAL at 11:06

## 2025-03-05 RX ADMIN — LIDOCAINE HYDROCHLORIDE 1 PATCH: 20 JELLY TOPICAL at 03:27

## 2025-03-05 RX ADMIN — LIDOCAINE HYDROCHLORIDE 1 PATCH: 20 JELLY TOPICAL at 06:05

## 2025-03-05 RX ADMIN — Medication 650 MILLIGRAM(S): at 02:59

## 2025-03-05 RX ADMIN — METOPROLOL SUCCINATE 100 MILLIGRAM(S): 50 TABLET, EXTENDED RELEASE ORAL at 17:41

## 2025-03-05 RX ADMIN — HEPARIN SODIUM 5000 UNIT(S): 1000 INJECTION INTRAVENOUS; SUBCUTANEOUS at 21:11

## 2025-03-05 RX ADMIN — METHOCARBAMOL 750 MILLIGRAM(S): 500 TABLET, FILM COATED ORAL at 05:11

## 2025-03-05 RX ADMIN — Medication 650 MILLIGRAM(S): at 17:42

## 2025-03-05 RX ADMIN — HEPARIN SODIUM 5000 UNIT(S): 1000 INJECTION INTRAVENOUS; SUBCUTANEOUS at 13:09

## 2025-03-05 RX ADMIN — METHOCARBAMOL 750 MILLIGRAM(S): 500 TABLET, FILM COATED ORAL at 13:09

## 2025-03-05 NOTE — CHART NOTE - NSCHARTNOTEFT_GEN_A_CORE
Discussed with Dr Munoz pt's Rpt HCT is stable ok for DVT Prophylaxis , q 4 hour Neuro checks and ,may restart xarelto in 2 weeks
SICU Transfer Note    JEFFERY UGALDE  84y (1940)  [id number]      Transfer from: SICU  Transfer to: Surgery-4C trauma      SICU COURSE:  03-04 - admitted to SICU service  03-05- Repeat CTH stable, liberalized to q4hr neuro checks and started on chemical DVT ppx. DG to 4C.     PAST MEDICAL & SURGICAL HISTORY:  HTN (hypertension)  High cholesterol  Hypothyroid  Afib  on xarelto  Sleep apnea  Osteoarthritis  CKD (chronic kidney disease) stage 3, GFR 30-59 ml/min  Thyroid cancer  H/O thyroidectomy  S/P rotator cuff surgery  Pacemaker  H/O total knee replacement        Allergies  No Known Allergies  Intolerances      MEDICATIONS  (STANDING):  acetaminophen     Tablet .. 650 milliGRAM(s) Oral every 6 hours  allopurinol 300 milliGRAM(s) Oral at bedtime  atorvastatin 20 milliGRAM(s) Oral at bedtime  chlorhexidine 2% Cloths 1 Application(s) Topical <User Schedule>  furosemide    Tablet 20 milliGRAM(s) Oral daily  heparin   Injectable 5000 Unit(s) SubCutaneous every 8 hours  levothyroxine 175 MICROGram(s) Oral daily  lidocaine   4% Patch 1 Patch Transdermal every 24 hours  methocarbamol 750 milliGRAM(s) Oral every 8 hours  metoprolol tartrate 100 milliGRAM(s) Oral every 12 hours  oxybutynin 5 milliGRAM(s) Oral daily  pantoprazole    Tablet 40 milliGRAM(s) Oral before breakfast  senna 2 Tablet(s) Oral at bedtime    MEDICATIONS  (PRN):      Vital Signs Last 24 Hrs  T(C): 36.2 (05 Mar 2025 12:00), Max: 36.4 (04 Mar 2025 18:09)  T(F): 97.2 (05 Mar 2025 12:00), Max: 97.6 (04 Mar 2025 18:09)  HR: 61 (05 Mar 2025 12:00) (60 - 80)  BP: 150/68 (05 Mar 2025 12:00) (124/68 - 167/95)  BP(mean): 98 (05 Mar 2025 12:00) (88 - 125)  RR: 17 (04 Mar 2025 22:15) (16 - 18)  SpO2: 97% (05 Mar 2025 12:00) (92% - 97%)    Parameters below as of 05 Mar 2025 12:00  Patient On (Oxygen Delivery Method): room air      I&O's Summary    04 Mar 2025 07:01  -  05 Mar 2025 07:00  --------------------------------------------------------  IN: 720 mL / OUT: 400 mL / NET: 320 mL    05 Mar 2025 07:01  -  05 Mar 2025 12:14  --------------------------------------------------------  IN: 160 mL / OUT: 450 mL / NET: -290 mL        LABS  LABS:                        11.2   8.63  )-----------( 235      ( 05 Mar 2025 04:50 )             37.1       03-05    145  |  109  |  28[H]  ----------------------------<  124[H]  4.1   |  23  |  1.0    Ca    8.6      05 Mar 2025 04:50  Phos  4.5     03-05  Mg     1.9     03-05    TPro  6.2  /  Alb  4.4  /  TBili  0.6  /  DBili  x   /  AST  29  /  ALT  20  /  AlkPhos  155[H]  03-04      PT/INR - ( 05 Mar 2025 04:50 )   PT: 16.20 sec;   INR: 1.36 ratio    PTT - ( 05 Mar 2025 04:50 )  PTT:38.8 sec                            Assessment & Plan:  84F s/p mechanical fall with acute SDH.     NEURO:  #Acute pain    -Tylenol 650mg q6h     -Robaxin 750mg q8h    -Lidocaine patch to RLE q24hr  #SDH along anterior right falx    -s/p PCC in ED    - repeat CTH #2: stable     - neurosurgery consulted > ok q4hr neuro checks and chemical DVT ppx, SBP goal <140, HOB 30, no keppra needed, restart Xarelto in 2 weeks    RESP:   #Oxygen   - tolerating room air   #Activity  -bedrest until repeat CTH  #hx FELIX on CPAP at home      CARDS:   #Hypertension    -SBP goal <140    -Hospitalist consult for comanagement     - home lasix 20 QD  #Hyperlipidemia    -Home atorvastatin   #Atrial Fibrillation/Pacemaker     -lopressor 100bid    -previously on amiodarone 200 QD, now off s/p PPM    -Holding home Xarelto in setting of acute intracranial hemorrhage > restart in 2 weeks per neurosurgery  -TTE 06/2024: Mildly decreased global left ventricular systolic function, EF 44%.        GI/NUTR:   #Diet, DASH    - aspiration precautions, HOB 30  #GI Prophylaxis  -pantoprazole 40mg   #Bowel regimen  - senna    /RENAL:   #CKDIII    -not on dialysis      - IVL  #Overactive bladder    - Home trospium unavailable, oxybutynin ordered while admitted    Labs:          BUN/Cr- 34/1.1  -->          [03-04 @ 18:47]Na  144 // K  4.3 // Mg  -- // Phos  --    HEME/ONC:   #DVT prophylaxis     - chemical: SQH per neurosurgery; continue to hold home Xarelto x2 weeks   - SCDs     Labs: Hb/Hct:  12.3/40.1  (03-04 @ 18:47)  -->                      Plts:  251  -->                 PTT/INR:  43.9/1.97  --->       ID:  #monitor for leukocytosis   WBC- 9.23  --->>  Temp trend- 24hrs T(F): 97.6 (03-04 @ 18:09), Max: 97.6 (03-04 @ 18:09)  Current antibiotics-none indicated  MRSA swab pending     ENDO:  #Thyroid CA s/p thyroidectomy   - continue home synthroid 175mcg   #glycemic monitoring  - Glucose Goal 140-180  - if above 180 start insulin sliding scale     MSK:  #Gout    -Home allopurinol 300 QD    #Osteoarthritis, B/L Total Knee Replacement   #activity  - increase as tolerated  - PT consult > home PT    SKIN:  #DTI screenings negative          Follow Up:  - 2000 labs  - dispo planning  - holding home Xarelto x2 weeks      Signed out to: Anay PATEL  Date: 3/5/25  Time: 4730

## 2025-03-05 NOTE — DISCHARGE NOTE PROVIDER - NSFOLLOWUPCLINICS_GEN_ALL_ED_FT
[facility]  Trauma Surgery  [address] [place] [address]  Phone: [phone]  Fax:   Follow Up Time: 2 weeks

## 2025-03-05 NOTE — DISCHARGE NOTE PROVIDER - CARE PROVIDER_API CALL
Rebeka Munoz  Neurosurgery  [address] [place] [address], [address]  [place] [address]  Phone: [phone]  Fax: [phone]  Follow Up Time:

## 2025-03-05 NOTE — DISCHARGE NOTE PROVIDER - NSDCFUSCHEDAPPT_GEN_ALL_CORE_FT
Jer Ruiz  [facility]  PULMMED [address]  Scheduled Appointment: 04/07/2025    [facility]  CARDIOLOGY [address]  Scheduled Appointment: 05/23/2025

## 2025-03-05 NOTE — PROGRESS NOTE ADULT - SUBJECTIVE AND OBJECTIVE BOX
Yes - the patient is able to be screened JEFFERY UGALDE   [id number]   [birth year]    84yF  ============================================================   DATE OF INITIAL SICU/SDU CONSULT: 03-04-25    INDICATION FOR SICU CONSULT:       SICU COURSE EVENTS :  03-04 - admitted to SICU service  ============================================================      24HOUR EVENTS  -Admission under SICU service    [X] A ten-point review of systems was negative except as expressed in note.  [X] History was obtained from patient. If unable to participate in their care, history obtained from review of the chart and collateral sources of information.  ============================================================

## 2025-03-05 NOTE — DISCHARGE NOTE PROVIDER - NSDCCPCAREPLAN_GEN_ALL_CORE_FT
PRINCIPAL DISCHARGE DIAGNOSIS  Diagnosis: Subdural hemorrhage  Assessment and Plan of Treatment: Follow up outpatient with DR. Munoz in 1-2 weeks.  Follow up in the concussion clinic as needed.   Please HOLD your xarelto for 2 weeks. You may restart xarelto in 2 weeks. Please follow up with neurosurgery for further guidance.   Please follow up with your primary care physician within 1 week.   Continue regular diet.  Pain: Take tylenol as needed for pain. You can use over the counter lidocaine patch for your right leg pain. You can take robaxin three times a day for right leg pain.         SECONDARY DISCHARGE DIAGNOSES  Diagnosis: Head trauma  Assessment and Plan of Treatment:      PRINCIPAL DISCHARGE DIAGNOSIS  Diagnosis: Subdural hemorrhage  Assessment and Plan of Treatment: Diet: Continue regular diet  Activity: Ambulate and get out of bed as tolerated, and with assistance if feeling weak.  Pain: You can take over the counter medications such as Tylenol for pain control. Please adhere to the instructions on the back of the bottle. If it was discussed that you would be receiving prescription pain medication upon discharge, this prescription will be sent to your pharmacy. You can take OTC lidocaine patch for your right leg pain. You can take robaxin 3x/day as needed for right leg pain.   Neurosurgery recommendations:   - Please HOLD your xarelto for 2 weeks. You can restart as normally instructed in 2 weeks on 3/20/25.   - Follow up outpatient with Dr. Munoz in 1-2 weeks.   Follow up:  - Neurpsurgery: Follow up with Dr. Munoz in 1-2 weeks.   - Trauma Clinic: Follow up in 1-2 weeks  - Nephrology: Please follow up with Dr. Dong on Monday 3/10 with repeat BMP to monitor creatinine.   If you develop fevers, chills, worsening pain, dizziness, confusion, nausea that won't subside, vomiting, or any other symptoms of concern, please call MD or return to the ED for further advice, evaluation, and/or treatment.        SECONDARY DISCHARGE DIAGNOSES  Diagnosis: Head trauma  Assessment and Plan of Treatment:

## 2025-03-05 NOTE — PHYSICAL THERAPY INITIAL EVALUATION ADULT - RISK REDUCTION/PREVENTION, PT EVAL
Bedside shift change report given to Adrian Meneses RN (oncoming nurse) by Kristyn Garza RN (offgoing nurse). Report included the following information SBAR, Kardex, Intake/Output, MAR, Recent Results and Med Rec Status. 8:30 PM Notified Dr. Lela Sanz of consult placed this AM for hospitalist d/t patient's new onset diabetes. MD also aware of patients current VS and assessment findings. Dr. Lela Sanz states he will see patient this evening. risk factors n/a

## 2025-03-05 NOTE — DISCHARGE NOTE PROVIDER - CARE PROVIDERS DIRECT ADDRESSES
[email or web address]

## 2025-03-05 NOTE — CONSULT NOTE ADULT - SUBJECTIVE AND OBJECTIVE BOX
This is an 84 year old Female, with PMH significant for atrial fibrillation on Xarelto, hypertension, hyperlipidemia, hypothyroidism, CKD, FELIX, and recent fall on 11/11 (+HT, -LOC) who presents    1. Mechanical fall complicated by subdural hematoma in a pt who takes anticoagulation (Xarelto)   - Admitted to SICU   - Receive PCC in the ED. Repeat CTH   - Pain meds prn with laxative       2. Hypertension   - Plan to keep sBP<140     3. Hyperlipidemia  - Cont atorvastatin ordered     4. Atrial Fibrillation/Pacemaker   - Cont metoprolol   -Holding home Xarelto in the setting of acute intracranial hemorrhage, PCC given in the ED       5. Hx of sCHF   - Holding lasix now   * Echo from 06/2024: Mildly decreased global leftventricular systolic function with a biplane EF of 44%. Indeterminate diastolic dysfunction. Moderate   concentric left ventricular hypertrophy.    6. CKD stage III   - Was on IVF at  80ml/hr    7. Overactive bladder    -Home trospium unavailable oxybutynin ordered    8. DVT px   - Sequential     9. Thyroid CA s/p thyroidectomy     -Home levothyroxine ordered    10. Gout  - Cont allopurinol ordered     11. Osteoarthritis, B/L Total Knee Replacement     -Bedrest until repeat CTH, then activity as tolerated if stable    -PT/Physiatry consults     12. FELIX on CPAP at home-->BiPAP ordered family to bring in home machine     This is an 84 year old Female, with PMH significant for atrial fibrillation on Xarelto, hypertension, hyperlipidemia, hypothyroidism, CKD, FELIX, and recent fall on 11/11 (+HT, -LOC) who presents    VITALS:   T(C): 36.3 (03-05-25 @ 08:00), Max: 36.4 (03-04-25 @ 18:09)  HR: 61 (03-05-25 @ 09:00) (60 - 80)  BP: 129/74 (03-05-25 @ 09:00) (124/68 - 167/95)  RR: 17 (03-04-25 @ 22:15) (16 - 18)  SpO2: 97% (03-05-25 @ 09:00) (92% - 97%)    GENERAL: NAD, lying in bed comfortably  HEART: Regular rate and rhythm,  LUNGS: Unlabored respirations.  Clear to auscultation bilaterally  ABDOMEN: Soft, nontender, nondistended, +BS  EXTREMITIES: 2+ peripheral pulses bilaterally. right knee pain but no signs of inflammation       1. Mechanical fall complicated by subdural hematoma in a pt who takes anticoagulation (Xarelto)   - Admitted to SICU   - Receive PCC in the ED.   - Repeat CTH:  a) Stable mild thickening along the anterior falx since the prior CT head from 3/4/2025 likely representing trace subdural hemorrhage.  - Pain meds prn with laxative       2. Hypertension   - Plan to keep sBP<140     3. Hyperlipidemia  - Cont atorvastatin ordered     4. Atrial Fibrillation/Pacemaker   - Cont metoprolol   -Holding home Xarelto in the setting of acute intracranial hemorrhage, PCC given in the ED       5. Hx of sCHF   - Resume lasix upon dc   * Echo from 06/2024: Mildly decreased global left ventricular systolic function with a biplane EF of 44%. Indeterminate diastolic dysfunction. Moderate   concentric left ventricular hypertrophy.    6. CKD stage III   - Was on IVF at  80ml/hr    7. Overactive bladder    -Home trospium unavailable oxybutynin ordered    8. DVT px   - Sequential     9. Thyroid CA s/p thyroidectomy     -Home levothyroxine ordered    10. Gout  - Cont allopurinol ordered     11. Osteoarthritis, B/L Total Knee Replacement     pt is medically clear for dc. Decision to resume xarelto defer to neurosx.       12. FELIX on CPAP at home-->BiPAP ordered family to bring in home machine

## 2025-03-05 NOTE — PHYSICAL THERAPY INITIAL EVALUATION ADULT - ADDITIONAL COMMENTS
PLOF was walking with RW. Has 1 RW on main floor and 1 RW on second floor. 5 stairs to enter house, 10 inside.

## 2025-03-05 NOTE — CONSULT NOTE ADULT - ASSESSMENT
CKD stage 3, pt known to me  s/p iv contrast  mechanical fall  SDH  hx of falls  anemia  b/l proteinaceous and hemorrhagic renal cysts (on outpt MRI kidneys)  chronic HFpEF  / hx of b/l lung nodular opacities  hx of pericardial effusion / Afib / s/p PPM 4/15 s/p AVN ablation 6/3  anemia  FELIX  HTN  hypothyroidism / thyroid Ca / pituitary adenoma     plan:    dc IVF  can resume outpt oral low dose loop diuretics  f/u renal function and lytes s/p iv contrast  cont metoprolol   SICU / hospitalist mgm't                    
IMPRESSION: Rehab of traumatic R SDH, s/p fall with head trauma, no LOC / HTN, HLD, FELIX, osteoarthritis, CKD3, thyroid cancer s/p thyroidectomy, pacemaker, b/l total knee replacement     PRECAUTIONS: [   ] Cardiac  [   ] Respiratory  [   ] Seizures [   ] Contact Isolation  [   ] Droplet Isolation  [   ] Other    Weight Bearing Status:     RECOMMENDATION:    Out of Bed to Chair     DVT/Decubiti Prophylaxis    REHAB PLAN:     [ x   ] Bedside P/T 3-5 times a week   [    ]   Bedside O/T  2-3 times a week             [    ] Speech Therapy               [    ]  No Rehab Therapy Indicated   Conditioning/ROM                                    ADL  Bed Mobility                                               Conditioning/ROM  Transfers                                                     Bed Mobility  Sitting /Standing Balance                         Transfers                                        Gait Training                                               Sitting/Standing Balance  Stair Training [   ]Applicable                    Home equipment Eval                                                                        Splinting  [   ] Only      GOALS:   ADL   [    ]   Independent                    Transfers  [ x   ] Independent                          Ambulation  [  x  ] Independent     [ x    ] With device                            [    ]  CG                                                         [    ]  CG                                                                  [    ] CG                            [    ] Min A                                                   [    ] Min A                                                              [    ] Min  A          DISCHARGE PLAN:   [    ]  Good candidate for Intensive Rehabilitation/Hospital based                                             Will tolerate 3hrs Intensive Rehab Daily                                       [ x    ]  Short Term Rehab in Skilled Nursing Facility                              vs         [  x   ]  Home with Outpatient or VN services                                         [     ]  Possible Candidate for Intensive Hospital based Rehab                                       
Assessment & Plan  84y Female s/p fall with acute SDH     NEURO:  #Acute pain    -Tylenol 650mg q6h     -Robaxin 750mg q8h    -Lidocaine patch to RLE q24hr  #SDH along anterior right falx    -s/p PCC in ED    -Q1hr neurochecks    -holding DVT ppx    -repeat CTH @0130    -SBP goal <140    -Head of bed @30 degrees    RESP:     -Oxygen insufficiency-tolerating room air     -Activity-bedrest until repeat CTH    -FELIX on CPAP at home-->BiPAP ordered       CARDS:   #Hypertension    -SBP goal <140    -Hospitalist consult pending for comanagement   #Hyperlipidemia    -Home atorvastatin ordered   #Atrial Fibrillation/Pacemaker     -Home metoprolol ordered    -Home amiodarone ordered     -Holding home Xarelto int he setting of acute intracranial hemorrhage   Echo from 06/2024:   Mildly decreased global leftventricular systolic function with a   biplane EF of 44%. Indeterminate diastolic dysfunction. Moderate   concentric left ventricular hypertrophy.    -Home Lasix ordered     GI/NUTR:     Diet, NPO except medications     GI Prophylaxis-pantoprazole 40mg qam     Bowel regimen-not indicated     /RENAL:   #CKDIII    -not on dialysis      -maintain euvolemia (with caution given decreased EF)      -normal saline @ 80cc/hr  #Overactive bladder    -Home oxybutynin oredered    -Home trospium ordered    Labs:          BUN/Cr- 34/1.1  -->          [03-04 @ 18:47]Na  144 // K  4.3 // Mg  -- // Phos  --    HEME/ONC:   #DVT prophylaxis     -Holding in the setting of acute intracranial hemorrhage      Labs: Hb/Hct:  12.3/40.1  (03-04 @ 18:47)  -->                      Plts:  251  -->                 PTT/INR:  43.9/1.97  --->     ID:  WBC- 9.23  --->>  Temp trend- 24hrs T(F): 97.6 (03-04 @ 18:09), Max: 97.6 (03-04 @ 18:09)  Current antibiotics-none indicated  MRSA swab pending     ENDO:  #Thyroid CA s/p thyroidectomy     -Home levothyroxine ordered    Glucose Glucose: 146 (03-04 @ 18:47)    Goal 140-180    MSK:  #Gout    -Home allopurinol ordered   #Osteoarthritis, B/L Total Knee Replacement     -Bedrest until repeat CTH, then activity as tolerated if stable    -PT/Physiatry consults     LINES/DRAINS:  PIV    DISPO:    SICU  Case discussed with attending Dr. Weaver
ASSESSMENT:  84yF w/ PMHx of HTN, HLD, FELIX, osteoarthritis, CKD3, thyroid cancer s/p thyroidectomy, pacemaker, b/l total knee replacement seen as  Trauma Alert s/p fall backwards at approximately 1030 this morning (+) HT, (?) LOC, (+) AC. Patient states she was unable to walk after the fall, has been experiencing right lower extremity pain for the past week which worsened after the fall. The pain progressively worsened throughout the day which caused her to come to the ED for further evaluation. She is currently complaining of posterior neck pain and right leg pain. She denies any nausea, vomiting, blurry vision, chest pain, shortness of breath, recent illness. She endorses multiple recent falls. She was previously admitted within the past 6 months, complicated by hematoma development in the right lower extremity.   Trauma assessment in ED: ABCs intact , GCS 15 , AAOx3.    external signs of trauma include: none   Trauma assessment in ED: ABCs intact , GCS 15 , AAOx3,  MONTIEL.     Injuries identified:   -   -   -     PLAN:   - Trauma Labs: (CBC, BMP, Coags, T&S, UA, EtOH level)  Additional studies:  EKG  Utox    Trauma Imaging to include the following:  - CXR, Pelvic Xray  - CT Head,  CT C-spine, CT Chest, CT Abd/Pelvis  - Extremity films: right lower extremity    Additional consultations:  - Neurosurgery  - Orthopedics  - OMFS  - PT/Rehab/SW  - Hospitalist/Medicine     Disposition pending results of above labs and imaging  Above plan discussed with Trauma attending, Dr. MAC, patient, patient family, and ED team  --------------------------------------------------------------------------------------  03-04-25 @ 18:47

## 2025-03-05 NOTE — DISCHARGE NOTE PROVIDER - NSDCMRMEDTOKEN_GEN_ALL_CORE_FT
acetaminophen 325 mg oral tablet: 2 tab(s) orally every 6 hours  allopurinol 300 mg oral tablet: 1 tab(s) orally once a day (at bedtime)  amiodarone 200 mg oral tablet: 1 tab(s) orally once a day  atorvastatin 20 mg oral tablet: 1 tab(s) orally once a day  cabergoline 0.5 mg oral tablet: 0.5 tab(s) orally once a week , monday night  ferrous sulfate: 325 milligram(s) orally once a day  furosemide 20 mg oral tablet: 1 tab(s) orally once a day  levothyroxine 175 mcg (0.175 mg) oral tablet: 1 tab(s) orally once a day  lidocaine 4% topical film: Apply topically to affected area once a day  methocarbamol 750 mg oral tablet: 1 tab(s) orally every 8 hours  Metoprolol Tartrate 100 mg oral tablet: 1 tab(s) orally every 12 hours  trospium 20 mg oral tablet: 1 tab(s) orally once a day

## 2025-03-05 NOTE — DISCHARGE NOTE NURSING/CASE MANAGEMENT/SOCIAL WORK - NSDCPEFALRISK_GEN_ALL_CORE
For information on Fall & Injury Prevention, visit: [email or web address]  [email or web address] OR  https://www.cdc.gov/steadi/patient.html

## 2025-03-05 NOTE — CONSULT NOTE ADULT - CONSULT REQUESTED DATE/TIME
04-Mar-2025 22:20
05-Mar-2025 14:32
04-Mar-2025
04-Mar-2025 18:47
05-Mar-2025 07:59
05-Mar-2025 17:10

## 2025-03-05 NOTE — PROGRESS NOTE ADULT - SUBJECTIVE AND OBJECTIVE BOX
TRAUMA SURGERY PROGRESS NOTE    Patient: JEFFERY UGALDE , 84y ([birth year])Female   MRN: [id number]  Location: [address]  Visit: 03-04-25 Inpatient  Date: 03-05-25 @ 07:18    Hospital Day #: 2    Procedure/Dx/Injuries: SDH ALONG ANTERIOR RIGHT FALX    Events of past 24 hours: Repeat CTH stable pending CTH official read. Pending NSG clearance to start DVT PPX. On 2L NC. SBP 180s got hydralazine push.     PAST MEDICAL & SURGICAL HISTORY:  HTN (hypertension)      High cholesterol      Hypothyroid      Afib  on xarelto      Sleep apnea      Osteoarthritis      CKD (chronic kidney disease) stage 3, GFR 30-59 ml/min      Thyroid cancer      H/O thyroidectomy      S/P rotator cuff surgery      Pacemaker      H/O total knee replacement          Vitals:   T(F): 96.9 (03-05-25 @ 04:00), Max: 97.6 (03-04-25 @ 18:09)  HR: 76 (03-05-25 @ 06:00)  BP: 128/68 (03-05-25 @ 06:00)  RR: 17 (03-04-25 @ 22:15)  SpO2: 92% (03-05-25 @ 06:00)      Diet, NPO:   Except Medications     Special Instructions for Nursing:  Except Medications      Fluids: sodium chloride 0.9%.: Solution, 1000 milliLiter(s) infuse at 80 mL/Hr      I & O's:    03-04-25 @ 07:01  -  03-05-25 @ 07:00  --------------------------------------------------------  IN:    sodium chloride 0.9%: 720 mL  Total IN: 720 mL    OUT:    Voided (mL): 400 mL  Total OUT: 400 mL    Total NET: 320 mL    PHYSICAL EXAM:  General: NAD  HEENT: Normocephalic, atraumatic, EOMI, PEERLA. no scalp lacerations   Cardiac: RRR  Respiratory: Bilateral breath sounds, clear and equal bilaterally  Abdomen: Soft, non-distended, non-tender, no rebound, no guarding.  Groin: Normal appearing, pelvis stable   Ext:  Moving b/l upper and lower extremities. Palpable Radial b/l UE, b/l DP palpable in LE. Tenderness to palpation in the anterior and posterior right distal lower extremity.       MEDICATIONS  (STANDING):  acetaminophen     Tablet .. 650 milliGRAM(s) Oral every 6 hours  allopurinol 300 milliGRAM(s) Oral at bedtime  atorvastatin 20 milliGRAM(s) Oral at bedtime  chlorhexidine 2% Cloths 1 Application(s) Topical <User Schedule>  furosemide    Tablet 20 milliGRAM(s) Oral daily  levothyroxine 175 MICROGram(s) Oral daily  lidocaine   4% Patch 1 Patch Transdermal every 24 hours  methocarbamol 750 milliGRAM(s) Oral every 8 hours  metoprolol tartrate 100 milliGRAM(s) Oral every 12 hours  oxybutynin 5 milliGRAM(s) Oral daily  pantoprazole    Tablet 40 milliGRAM(s) Oral before breakfast  sodium chloride 0.9%. 1000 milliLiter(s) (80 mL/Hr) IV Continuous <Continuous>    MEDICATIONS  (PRN):      DVT PROPHYLAXIS: SCDs,   GI PROPHYLAXIS: pantoprazole    Tablet 40 milliGRAM(s) Oral before breakfast    ANTICOAGULATION:   ANTIBIOTICS:           LAB/STUDIES:  Labs:  CAPILLARY BLOOD GLUCOSE      POCT Blood Glucose.: 159 mg/dL (04 Mar 2025 18:26)                          11.2   8.63  )-----------( 235      ( 05 Mar 2025 04:50 )             37.1       Auto Immature Granulocyte %: 0.5 % (03-04-25 @ 18:47)    03-05    145  |  109  |  28[H]  ----------------------------<  124[H]  4.1   |  23  |  1.0      Magnesium: 1.9 mg/dL (03-05-25 @ 04:50)      LFTs:             6.2  | 0.6  | 29       ------------------[155     ( 04 Mar 2025 18:47 )  4.4  | x    | 20          Lipase:x      Amylase:x         Lactate, Blood: 1.8 mmol/L (03-04-25 @ 20:08)      Coags:     16.20  ----< 1.36    ( 05 Mar 2025 04:50 )     38.8                Urinalysis Basic - ( 05 Mar 2025 04:50 )    Color: x / Appearance: x / SG: x / pH: x  Gluc: 124 mg/dL / Ketone: x  / Bili: x / Urobili: x   Blood: x / Protein: x / Nitrite: x   Leuk Esterase: x / RBC: x / WBC x   Sq Epi: x / Non Sq Epi: x / Bacteria: x      ASSESSMENT:  84yF w/ PMHx of HTN, HLD, FELIX, osteoarthritis, CKD3, thyroid cancer s/p thyroidectomy, pacemaker, b/l total knee replacement seen as  Trauma Alert s/p fall backwards at approximately 1030 this morning (+) HT, (?) LOC, (+) AC. Patient states she was unable to walk after the fall, has been experiencing right lower extremity pain for the past week which worsened after the fall. The pain progressively worsened throughout the day which caused her to come to the ED for further evaluation. She is currently complaining of posterior neck pain and right leg pain. She denies any nausea, vomiting, blurry vision, chest pain, shortness of breath, recent illness. She endorses multiple recent falls. She was previously admitted within the past 6 months, complicated by hematoma development in the right lower extremity.   Trauma assessment in ED: ABCs intact , GCS 15 , AAOx3.    external signs of trauma include: none   Trauma assessment in ED: ABCs intact , GCS 15 , AAOx3,  MONTIEL.       PLAN:   - Follow up repeat CTH official read.   - PT/Rehab/SW  - Hospitalist/Medicine co management.   -

## 2025-03-05 NOTE — PROGRESS NOTE ADULT - ASSESSMENT
Assessment & Plan  84y Female s/p fall with acute SDH     NEURO:  #Acute pain    -Tylenol 650mg q6h     -Robaxin 750mg q8h    -Lidocaine patch to RLE q24hr  #SDH along anterior right falx    -s/p PCC in ED    -Q1hr neurochecks    -holding DVT ppx    -repeat CTH @0130    -SBP goal <140    -Head of bed @30 degrees    RESP:     -Oxygen insufficiency-tolerating room air     -Activity-bedrest until repeat CTH    -FELIX on CPAP at home-->BiPAP ordered       CARDS:   #Hypertension    -SBP goal <140    -Hospitalist consult pending for comanagement   #Hyperlipidemia    -Home atorvastatin ordered   #Atrial Fibrillation/Pacemaker     -Home metoprolol ordered    -Home amiodarone ordered     -Holding home Xarelto int he setting of acute intracranial hemorrhage   Echo from 06/2024:   Mildly decreased global leftventricular systolic function with a   biplane EF of 44%. Indeterminate diastolic dysfunction. Moderate   concentric left ventricular hypertrophy.    -Home Lasix ordered     GI/NUTR:     Diet, NPO except medications     GI Prophylaxis-pantoprazole 40mg qam     Bowel regimen-not indicated     /RENAL:   #CKDIII    -not on dialysis      -maintain euvolemia (with caution given decreased EF)      -normal saline @ 80cc/hr  #Overactive bladder    -Home oxybutynin oredered    -Home trospium ordered    Labs:          BUN/Cr- 34/1.1  -->          [03-04 @ 18:47]Na  144 // K  4.3 // Mg  -- // Phos  --    HEME/ONC:   #DVT prophylaxis     -Holding in the setting of acute intracranial hemorrhage      Labs: Hb/Hct:  12.3/40.1  (03-04 @ 18:47)  -->                      Plts:  251  -->                 PTT/INR:  43.9/1.97  --->     ID:  WBC- 9.23  --->>  Temp trend- 24hrs T(F): 97.6 (03-04 @ 18:09), Max: 97.6 (03-04 @ 18:09)  Current antibiotics-none indicated  MRSA swab pending     ENDO:  #Thyroid CA s/p thyroidectomy     -Home levothyroxine ordered    Glucose Glucose: 146 (03-04 @ 18:47)    Goal 140-180    MSK:  #Gout    -Home allopurinol ordered   #Osteoarthritis, B/L Total Knee Replacement     -Bedrest until repeat CTH, then activity as tolerated if stable    -PT/Physiatry consults     LINES/DRAINS:  PIV    DISPO:    SICU  Case to be discussed with Dr. Duomnt   Assessment & Plan  84y Female s/p fall with acute SDH     NEURO:  #Acute pain    -Tylenol 650mg q6h     -Robaxin 750mg q8h    -Lidocaine patch to RLE q24hr  #SDH along anterior right falx    -s/p PCC in ED    -Q1hr neurochecks    -holding DVT ppx    -repeat CTH @0130    -SBP goal <140    -Head of bed @30 degrees    RESP:     -Oxygen insufficiency-tolerating room air     -Activity-bedrest until repeat CTH    -FELIX on CPAP at home-->BiPAP ordered family to bring in home machine      CARDS:   #Hypertension    -SBP goal <140    -Hospitalist consult pending for comanagement   #Hyperlipidemia    -Home atorvastatin ordered   #Atrial Fibrillation/Pacemaker     -Home metoprolol ordered    -Home amiodarone ordered     -Holding home Xarelto in the setting of acute intracranial hemorrhage, PCC given in the ED   Echo from 06/2024:   Mildly decreased global leftventricular systolic function with a   biplane EF of 44%. Indeterminate diastolic dysfunction. Moderate   concentric left ventricular hypertrophy.    -Home Lasix ordered     GI/NUTR:     Diet, NPO except medications     GI Prophylaxis-pantoprazole 40mg qam     Bowel regimen-not indicated     /RENAL:   #CKDIII    -not on dialysis      -maintain euvolemia (with caution given decreased EF)      -normal saline @ 80cc/hr  #Overactive bladder    -Home oxybutynin oredered    -Home trospium ordered    Labs:          BUN/Cr- 34/1.1  -->          [03-04 @ 18:47]Na  144 // K  4.3 // Mg  -- // Phos  --    HEME/ONC:   #DVT prophylaxis     -Holding in the setting of acute intracranial hemorrhage      Labs: Hb/Hct:  12.3/40.1  (03-04 @ 18:47)  -->                      Plts:  251  -->                 PTT/INR:  43.9/1.97  --->     ID:  WBC- 9.23  --->>  Temp trend- 24hrs T(F): 97.6 (03-04 @ 18:09), Max: 97.6 (03-04 @ 18:09)  Current antibiotics-none indicated  MRSA swab pending     ENDO:  #Thyroid CA s/p thyroidectomy     -Home levothyroxine ordered    Glucose Glucose: 146 (03-04 @ 18:47)    Goal 140-180    MSK:  #Gout    -Home allopurinol ordered   #Osteoarthritis, B/L Total Knee Replacement     -Bedrest until repeat CTH, then activity as tolerated if stable    -PT/Physiatry consults     LINES/DRAINS:  PIV    DISPO:    SICU  Case to be discussed with Dr. Dumont   Assessment & Plan  84y Female s/p fall with acute SDH     NEURO:  #Acute pain    -Tylenol 650mg q6h     -Robaxin 750mg q8h    -Lidocaine patch to RLE q24hr  #SDH along anterior right falx    -s/p PCC in ED    -Q1hr neurochecks    -holding DVT ppx    -repeat CTH @0130    -SBP goal <140    -Head of bed @30 degrees    RESP:     -Oxygen insufficiency-tolerating room air     -Activity-bedrest until repeat CTH    -FELIX on CPAP at home-->BiPAP ordered family to bring in home machine      CARDS:   #Hypertension    -SBP goal <140    -Hospitalist consult pending for comanagement   #Hyperlipidemia    -Home atorvastatin ordered   #Atrial Fibrillation/Pacemaker     -Home metoprolol ordered    -Holding home Xarelto in the setting of acute intracranial hemorrhage, PCC given in the ED   Echo from 06/2024:   Mildly decreased global leftventricular systolic function with a   biplane EF of 44%. Indeterminate diastolic dysfunction. Moderate   concentric left ventricular hypertrophy.    -Home Lasix ordered     GI/NUTR:     Diet, NPO except medications     GI Prophylaxis-pantoprazole 40mg qam     Bowel regimen-not indicated     /RENAL:   #CKDIII    -not on dialysis      -maintain euvolemia (with caution given decreased EF)      -normal saline @ 80cc/hr  #Overactive bladder    -Home oxybutynin oredered    -Home trospium ordered    Labs:          BUN/Cr- 34/1.1  -->          [03-04 @ 18:47]Na  144 // K  4.3 // Mg  -- // Phos  --    HEME/ONC:   #DVT prophylaxis     -Holding in the setting of acute intracranial hemorrhage      Labs: Hb/Hct:  12.3/40.1  (03-04 @ 18:47)  -->                      Plts:  251  -->                 PTT/INR:  43.9/1.97  --->     ID:  WBC- 9.23  --->>  Temp trend- 24hrs T(F): 97.6 (03-04 @ 18:09), Max: 97.6 (03-04 @ 18:09)  Current antibiotics-none indicated  MRSA swab pending     ENDO:  #Thyroid CA s/p thyroidectomy     -Home levothyroxine ordered    Glucose Glucose: 146 (03-04 @ 18:47)    Goal 140-180    MSK:  #Gout    -Home allopurinol ordered   #Osteoarthritis, B/L Total Knee Replacement     -Bedrest until repeat CTH, then activity as tolerated if stable    -PT/Physiatry consults     LINES/DRAINS:  PIV    DISPO:    SICU  Case to be discussed with Dr. Dumont   Assessment & Plan  84y Female s/p fall with acute SDH     NEURO:  #Acute pain    -Tylenol 650mg q6h     -Robaxin 750mg q8h    -Lidocaine patch to RLE q24hr  #SDH along anterior right falx    -s/p PCC in ED    -Q1hr neurochecks    -holding DVT ppx    -repeat CTH @0130    -SBP goal <140    -Head of bed @30 degrees    RESP:     -Oxygen insufficiency-tolerating room air     -Activity-bedrest until repeat CTH    -FELIX on CPAP at home-->BiPAP ordered family to bring in home machine      CARDS:   #Hypertension    -SBP goal <140    -Hospitalist consult pending for comanagement   #Hyperlipidemia    -Home atorvastatin ordered   #Atrial Fibrillation/Pacemaker     -Home metoprolol ordered    -Holding home Xarelto in the setting of acute intracranial hemorrhage, PCC given in the ED   Echo from 06/2024:   Mildly decreased global leftventricular systolic function with a   biplane EF of 44%. Indeterminate diastolic dysfunction. Moderate   concentric left ventricular hypertrophy.    -Home Lasix ordered     GI/NUTR:     Diet, NPO except medications     GI Prophylaxis-pantoprazole 40mg qam     Bowel regimen-not indicated     /RENAL:   #CKDIII    -not on dialysis      -maintain euvolemia (with caution given decreased EF)      -normal saline @ 80cc/hr  #Overactive bladder    -Home trospium unavailable oxybutynin ordered    Labs:          BUN/Cr- 34/1.1  -->          [03-04 @ 18:47]Na  144 // K  4.3 // Mg  -- // Phos  --    HEME/ONC:   #DVT prophylaxis     -Holding in the setting of acute intracranial hemorrhage      Labs: Hb/Hct:  12.3/40.1  (03-04 @ 18:47)  -->                      Plts:  251  -->                 PTT/INR:  43.9/1.97  --->     ID:  WBC- 9.23  --->>  Temp trend- 24hrs T(F): 97.6 (03-04 @ 18:09), Max: 97.6 (03-04 @ 18:09)  Current antibiotics-none indicated  MRSA swab pending     ENDO:  #Thyroid CA s/p thyroidectomy     -Home levothyroxine ordered    Glucose Glucose: 146 (03-04 @ 18:47)    Goal 140-180    MSK:  #Gout    -Home allopurinol ordered   #Osteoarthritis, B/L Total Knee Replacement     -Bedrest until repeat CTH, then activity as tolerated if stable    -PT/Physiatry consults     LINES/DRAINS:  PIV    DISPO:    SICU  Case to be discussed with Dr. Dumont

## 2025-03-05 NOTE — DISCHARGE NOTE NURSING/CASE MANAGEMENT/SOCIAL WORK - PATIENT PORTAL LINK FT
You can access the FollowMyHealth Patient Portal offered by [facility] by registering at the following website: [email or web address]. By joining [employer]’s FollowMyHealth portal, you will also be able to view your health information using other applications (apps) compatible with our system.

## 2025-03-05 NOTE — CONSULT NOTE ADULT - SUBJECTIVE AND OBJECTIVE BOX
NEPHROLOGY CONSULTATION NOTE    84yF w/ PMHx of HTN, HLD, FELIX, osteoarthritis, CKD3, thyroid cancer s/p thyroidectomy, pacemaker, b/l total knee replacement seen as  Trauma Alert s/p fall backwards at approximately 1030 this morning (+) HT, (?) LOC, (+) AC. Patient states she was unable to walk after the fall, has been experiencing right lower extremity pain for the past week which worsened after the fall. The pain progressively worsened throughout the day which caused her to come to the ED for further evaluation. She is currently complaining of posterior neck pain and right leg pain. She denies any nausea, vomiting, blurry vision, chest pain, shortness of breath, recent illness. She endorses multiple recent falls. She was previously admitted within the past 6 months, complicated by hematoma development in the right lower extremity.   Trauma assessment in ED: ABCs intact , GCS 15 , AAOx3.       PAST MEDICAL & SURGICAL HISTORY:  HTN (hypertension)      High cholesterol      Hypothyroid      Afib  on xarelto      Sleep apnea      Osteoarthritis      CKD (chronic kidney disease) stage 3, GFR 30-59 ml/min      Thyroid cancer      H/O thyroidectomy      S/P rotator cuff surgery      Pacemaker      H/O total knee replacement        Allergies:  No Known Allergies    Home Medications Reviewed    SOCIAL HISTORY:  Denies ETOH,Smoking,   FAMILY HISTORY:  Family history of lung cancer (Mother)    Family history of abdominal aortic aneurysm (AAA) (Father)          REVIEW OF SYSTEMS:  All other review of systems is negative unless indicated above.    PHYSICAL EXAM:  Constitutional: NAD  HEENT: anicteric sclera, oropharynx clear, MMM  Neck: No JVD  Respiratory: CTAB, no wheezes, rales or rhonchi  Cardiovascular: S1, S2, RRR  Gastrointestinal: BS+, soft, NT/ND  Extremities: no edema  Neurological: A/O x 3, no focal deficits  Psychiatric: Normal mood, normal affect  : No CVA tenderness. No joaquin.   Skin: No rashes    Hospital Medications:   MEDICATIONS  (STANDING):  acetaminophen     Tablet .. 650 milliGRAM(s) Oral every 6 hours  allopurinol 300 milliGRAM(s) Oral at bedtime  atorvastatin 20 milliGRAM(s) Oral at bedtime  chlorhexidine 2% Cloths 1 Application(s) Topical <User Schedule>  furosemide    Tablet 20 milliGRAM(s) Oral daily  heparin   Injectable 5000 Unit(s) SubCutaneous every 8 hours  levothyroxine 175 MICROGram(s) Oral daily  lidocaine   4% Patch 1 Patch Transdermal every 24 hours  methocarbamol 750 milliGRAM(s) Oral every 8 hours  metoprolol tartrate 100 milliGRAM(s) Oral every 12 hours  oxybutynin 5 milliGRAM(s) Oral daily  pantoprazole    Tablet 40 milliGRAM(s) Oral before breakfast  senna 2 Tablet(s) Oral at bedtime        VITALS:  T(F): 97.2 (03-05-25 @ 12:00), Max: 97.6 (03-04-25 @ 18:09)  HR: 66 (03-05-25 @ 14:00)  BP: 131/61 (03-05-25 @ 14:00)  RR: 17 (03-04-25 @ 22:15)  SpO2: 97% (03-05-25 @ 14:00)  Wt(kg): --    03-04 @ 07:01  -  03-05 @ 07:00  --------------------------------------------------------  IN: 720 mL / OUT: 400 mL / NET: 320 mL    03-05 @ 07:01  -  03-05 @ 14:34  --------------------------------------------------------  IN: 160 mL / OUT: 450 mL / NET: -290 mL      Height (cm): 149.9 (03-04 @ 18:09)  Weight (kg): 82.599 (03-04 @ 18:09)  BMI (kg/m2): 36.8 (03-04 @ 18:09)  BSA (m2): 1.77 (03-04 @ 18:09)    LABS:  03-05    145  |  109  |  28[H]  ----------------------------<  124[H]  4.1   |  23  |  1.0    Ca    8.6      05 Mar 2025 04:50  Phos  4.5     03-05  Mg     1.9     03-05    TPro  6.2  /  Alb  4.4  /  TBili  0.6  /  DBili      /  AST  29  /  ALT  20  /  AlkPhos  155[H]  03-04                          11.2   8.63  )-----------( 235      ( 05 Mar 2025 04:50 )             37.1       Urine Studies:  Urinalysis Basic - ( 05 Mar 2025 04:50 )    Color:  / Appearance:  / SG:  / pH:   Gluc: 124 mg/dL / Ketone:   / Bili:  / Urobili:    Blood:  / Protein:  / Nitrite:    Leuk Esterase:  / RBC:  / WBC    Sq Epi:  / Non Sq Epi:  / Bacteria:           RADIOLOGY & ADDITIONAL STUDIES:

## 2025-03-05 NOTE — CONSULT NOTE ADULT - SUBJECTIVE AND OBJECTIVE BOX
HPI:    84yF w/ PMHx of HTN, HLD, FELIX, osteoarthritis, CKD3, thyroid cancer s/p thyroidectomy, pacemaker, b/l total knee replacement seen as  Trauma Alert s/p fall backwards at approximately 1030 this morning (+) HT, (?) LOC, (+) AC. Patient states she was unable to walk after the fall, has been experiencing right lower extremity pain for the past week which worsened after the fall. The pain progressively worsened throughout the day which caused her to come to the ED for further evaluation. She is currently complaining of posterior neck pain and right leg pain. She denies any nausea, vomiting, blurry vision, chest pain, shortness of breath, recent illness. She endorses multiple recent falls. She was previously admitted within the past 6 months, complicated by hematoma development in the right lower extremity.   Trauma assessment in ED: ABCs intact , GCS 15 , AAOx3.    Injuries identified:   - Thin acute subdural hemorrhage is noted along the anterior right falx without mass effect or midline shift.      PAST MEDICAL & SURGICAL HISTORY:  HTN (hypertension)      High cholesterol      Hypothyroid      Afib  on xarelto      Sleep apnea      Osteoarthritis      CKD (chronic kidney disease) stage 3, GFR 30-59 ml/min      Thyroid cancer      H/O thyroidectomy      S/P rotator cuff surgery      Pacemaker      H/O total knee replacement          Hospital Course:    TODAY'S SUBJECTIVE & REVIEW OF SYMPTOMS:     Constitutional WNL   Cardio WNL   Resp WNL   GI WNL  Heme WNL  Endo WNL  Skin WNL  MSK pain   Neuro WNL  Cognitive WNL  Psych WNL      MEDICATIONS  (STANDING):  acetaminophen     Tablet .. 650 milliGRAM(s) Oral every 6 hours  allopurinol 300 milliGRAM(s) Oral at bedtime  atorvastatin 20 milliGRAM(s) Oral at bedtime  chlorhexidine 2% Cloths 1 Application(s) Topical <User Schedule>  furosemide    Tablet 20 milliGRAM(s) Oral daily  heparin   Injectable 5000 Unit(s) SubCutaneous every 8 hours  levothyroxine 175 MICROGram(s) Oral daily  lidocaine   4% Patch 1 Patch Transdermal every 24 hours  methocarbamol 750 milliGRAM(s) Oral every 8 hours  metoprolol tartrate 100 milliGRAM(s) Oral every 12 hours  oxybutynin 5 milliGRAM(s) Oral daily  pantoprazole    Tablet 40 milliGRAM(s) Oral before breakfast  senna 2 Tablet(s) Oral at bedtime    MEDICATIONS  (PRN):      FAMILY HISTORY:  Family history of lung cancer (Mother)    Family history of abdominal aortic aneurysm (AAA) (Father)        Allergies    No Known Allergies    Intolerances        SOCIAL HISTORY:    [  ] Etoh  [  ] Smoking  [  ] Substance abuse     Home Environment:  [ x  ] Home Alone  [   ] Lives with Family  [   ] Home Health Aid    Dwelling:  [   ] Apartment  [ x  ] Private House  [   ] Adult Home  [   ] Skilled Nursing Facility      [   ] Short Term  [   ] Long Term  [x   ] Stairs       Elevator [   ]    FUNCTIONAL STATUS PTA: (Check all that apply)  Ambulation: [ x   ]Independent    [   ] Dependent     [   ] Non-Ambulatory  Assistive Device: [   ] SA Cane  [   ]  Q Cane  [   ] Walker  [   ]  Wheelchair  ADL : [ x  ] Independent  [    ]  Dependent       Vital Signs Last 24 Hrs  T(C): 36.2 (05 Mar 2025 12:00), Max: 36.4 (04 Mar 2025 18:09)  T(F): 97.2 (05 Mar 2025 12:00), Max: 97.6 (04 Mar 2025 18:09)  HR: 66 (05 Mar 2025 14:00) (60 - 80)  BP: 131/61 (05 Mar 2025 14:00) (124/68 - 167/95)  BP(mean): 86 (05 Mar 2025 14:00) (86 - 125)  RR: 17 (04 Mar 2025 22:15) (16 - 18)  SpO2: 97% (05 Mar 2025 14:00) (92% - 97%)    Parameters below as of 05 Mar 2025 12:00  Patient On (Oxygen Delivery Method): room air          PHYSICAL EXAM: Awake & Alert  GENERAL: NAD  HEAD:  Normocephalic  CHEST/LUNG: Clear   HEART: S1S2+  ABDOMEN: Soft, Nontender  EXTREMITIES:  no calf tenderness    NERVOUS SYSTEM:  Cranial Nerves 2-12 intact [   ] Abnormal  [   ]  ROM: WFL all extremities [ x  ]  Abnormal [   ]  Motor Strength: WFL all extremities  [  x ]  Abnormal [   ]  Sensation: intact to light touch [  x ] Abnormal [   ]    FUNCTIONAL STATUS:  Bed Mobility: Independent [   ]  Supervision [x   ]  Needs Assistance [   ]  N/A [   ]  Transfers: Independent [   ]  Supervision [  x ]  Needs Assistance [   ]  N/A [   ]   Ambulation: Independent [   ]  Supervision [  x ]  Needs Assistance [   ]  N/A [   ]  ADL: Independent [   ] Requires Assistance [   ] N/A [   ]      LABS:                        11.2   8.63  )-----------( 235      ( 05 Mar 2025 04:50 )             37.1     03-05    145  |  109  |  28[H]  ----------------------------<  124[H]  4.1   |  23  |  1.0    Ca    8.6      05 Mar 2025 04:50  Phos  4.5     03-05  Mg     1.9     03-05    TPro  6.2  /  Alb  4.4  /  TBili  0.6  /  DBili  x   /  AST  29  /  ALT  20  /  AlkPhos  155[H]  03-04    PT/INR - ( 05 Mar 2025 04:50 )   PT: 16.20 sec;   INR: 1.36 ratio         PTT - ( 05 Mar 2025 04:50 )  PTT:38.8 sec  Urinalysis Basic - ( 05 Mar 2025 04:50 )    Color: x / Appearance: x / SG: x / pH: x  Gluc: 124 mg/dL / Ketone: x  / Bili: x / Urobili: x   Blood: x / Protein: x / Nitrite: x   Leuk Esterase: x / RBC: x / WBC x   Sq Epi: x / Non Sq Epi: x / Bacteria: x        RADIOLOGY & ADDITIONAL STUDIES:

## 2025-03-05 NOTE — DISCHARGE NOTE NURSING/CASE MANAGEMENT/SOCIAL WORK - NSDCVIVACCINE_GEN_ALL_CORE_FT
influenza, high-dose, quadrivalent; 20-Dec-2023 15:52; Shaunna Tucker (RN); [employer]; [id number] (Exp. Date: 20-Jun-2024); IntraMuscular; Deltoid Left.; 0.7 milliLiter(s); VIS (VIS Published: 06-Aug-2021, VIS Presented: 20-Dec-2023);

## 2025-03-05 NOTE — DISCHARGE NOTE NURSING/CASE MANAGEMENT/SOCIAL WORK - FINANCIAL ASSISTANCE
[facility] provides services at a reduced cost to those who are determined to be eligible through [facility]’s financial assistance program. Information regarding [facility]’s financial assistance program can be found by going to [email or web address] or by calling [phone].

## 2025-03-05 NOTE — DISCHARGE NOTE PROVIDER - HOSPITAL COURSE
84yF w/ PMHx of HTN, HLD, FELIX, osteoarthritis, CKD3, overactive bladder, gout, thyroid cancer s/p thyroidectomy, a. fib on Xarelto, pacemaker, b/l total knee replacement seen as a Trauma Alert s/p fall backwards the morning of presentation (+) HT, (?) LOC, (+) AC. Patient was unable to walk after the fall, was experiencing right lower extremity pain for the past week which worsened after the fall. The pain progressively worsened throughout the day which caused her to come to the ED for further evaluation. On presentation, she complained of posterior neck pain and right leg pain. She denied any nausea, vomiting, blurry vision, chest pain, shortness of breath, recent illness. She endorsed multiple recent falls. She was previously admitted within the past 6 months, complicated by hematoma development in the right lower extremity. Trauma assessment in ED: ABCs intact , GCS 15 , AAOx3. Found on workup to have thin acute subdural hemorrhage along the right falx. She was admitted to SICU for q1h neurochecks. PCC was administered in the ED for an INR of 1.97 with improvement to 1.36. Neurosurgery was consulted; recommended no acute intervention, repeat CT scan in 6 hours, SBP goal < 140. Hydralazine 5mg IVP given in SICU for  with improvement. Repeat CT head was stable, DVT prophylaxis was started and changed to q4h neurochecks per neurosurgery. Patient was seen by physical therapy, recommended home physical therapy. She was hemodynamically stable, at baseline mental status, cleared by neurosurgery, tolerating diet, and she ambulated with PT. She was downgraded from SICU on 3/5 and is now medically optimized for discharge home. 84yF w/ PMHx of HTN, HLD, FELIX, osteoarthritis, CKD3, overactive bladder, gout, thyroid cancer s/p thyroidectomy, a. fib on Xarelto, pacemaker, b/l total knee replacement seen as a Trauma Alert s/p fall backwards the morning of presentation (+) HT, (?) LOC, (+) AC. Patient was unable to walk after the fall, was experiencing right lower extremity pain for the past week which worsened after the fall. The pain progressively worsened throughout the day which caused her to come to the ED for further evaluation. On presentation, she complained of posterior neck pain and right leg pain. She denied any nausea, vomiting, blurry vision, chest pain, shortness of breath, recent illness. She endorsed multiple recent falls. She was previously admitted within the past 6 months, complicated by hematoma development in the right lower extremity. Trauma assessment in ED: ABCs intact , GCS 15 , AAOx3. Found on workup to have thin acute subdural hemorrhage along the right falx. She was admitted to SICU for q1h neurochecks. PCC was administered in the ED for an INR of 1.97 with improvement to 1.36.     Injuries identified:   - SDH along right falx    Patient admitted to SICU service for Q1 neuro checks as recommended by neurosurgery. Had repeat imaging which showed stable CT head. Neurosurgery cleared patient for q4 neuro checks and started on DVT prophylaxis, no keppra required, and restart Xarelto in 2 weeks. Patient was seen by physical therapy, recommended home physical therapy. She was hemodynamically stable, at baseline mental status, cleared by neurosurgery, tolerating diet, and she ambulated with PT. She was downgraded from SICU on 3/5. Nephrology consulted and recommending continuing with home medications and following up outpatient. Patient is hemodynamically stable and optimized for discharge home. 84yF w/ PMHx of HTN, HLD, FELIX, osteoarthritis, CKD3, overactive bladder, gout, thyroid cancer s/p thyroidectomy, a. fib on Xarelto, pacemaker, b/l total knee replacement seen as a Trauma Alert s/p fall backwards the morning of presentation (+) HT, (?) LOC, (+) AC. Patient was unable to walk after the fall, was experiencing right lower extremity pain for the past week which worsened after the fall. The pain progressively worsened throughout the day which caused her to come to the ED for further evaluation. On presentation, she complained of posterior neck pain and right leg pain. She denied any nausea, vomiting, blurry vision, chest pain, shortness of breath, recent illness. She endorsed multiple recent falls. She was previously admitted within the past 6 months, complicated by hematoma development in the right lower extremity. Trauma assessment in ED: ABCs intact , GCS 15 , AAOx3. Found on workup to have thin acute subdural hemorrhage along the right falx. She was admitted to SICU for q1h neuro checks. PCC was administered in the ED for an INR of 1.97 with improvement to 1.36.     Injuries identified:   - SDH along right falx    Patient admitted to SICU service for Q1 neuro checks as recommended by neurosurgery. Had repeat imaging which showed stable CT head. Neurosurgery cleared patient for q4 neuro checks and started on DVT prophylaxis, no keppra required, and restart Xarelto in 2 weeks. Patient was seen by physical therapy, recommended home physical therapy. She was hemodynamically stable, at baseline mental status, cleared by neurosurgery, tolerating diet, and she ambulated with PT. She was downgraded from SICU on 3/5. Nephrology consulted and recommending continuing with home medications and following up outpatient. Patient is hemodynamically stable and optimized for discharge home.

## 2025-03-06 VITALS
SYSTOLIC BLOOD PRESSURE: 125 MMHG | TEMPERATURE: 98 F | HEART RATE: 60 BPM | DIASTOLIC BLOOD PRESSURE: 76 MMHG | RESPIRATION RATE: 18 BRPM | OXYGEN SATURATION: 97 %

## 2025-03-06 LAB
ALBUMIN SERPL ELPH-MCNC: 3.6 G/DL — SIGNIFICANT CHANGE UP (ref 3.5–5.2)
ALP SERPL-CCNC: 115 U/L — SIGNIFICANT CHANGE UP (ref 30–115)
ALT FLD-CCNC: 13 U/L — SIGNIFICANT CHANGE UP (ref 0–41)
ANION GAP SERPL CALC-SCNC: 12 MMOL/L — SIGNIFICANT CHANGE UP (ref 7–14)
AST SERPL-CCNC: 14 U/L — SIGNIFICANT CHANGE UP (ref 0–41)
BILIRUB SERPL-MCNC: 0.6 MG/DL — SIGNIFICANT CHANGE UP (ref 0.2–1.2)
BUN SERPL-MCNC: 31 MG/DL — HIGH (ref 10–20)
CALCIUM SERPL-MCNC: 8.3 MG/DL — LOW (ref 8.4–10.5)
CHLORIDE SERPL-SCNC: 108 MMOL/L — SIGNIFICANT CHANGE UP (ref 98–110)
CO2 SERPL-SCNC: 27 MMOL/L — SIGNIFICANT CHANGE UP (ref 17–32)
CREAT SERPL-MCNC: 1.4 MG/DL — SIGNIFICANT CHANGE UP (ref 0.7–1.5)
EGFR: 38 ML/MIN/1.73M2 — LOW
EGFR: 38 ML/MIN/1.73M2 — LOW
GLUCOSE SERPL-MCNC: 94 MG/DL — SIGNIFICANT CHANGE UP (ref 70–99)
POTASSIUM SERPL-MCNC: 4 MMOL/L — SIGNIFICANT CHANGE UP (ref 3.5–5)
POTASSIUM SERPL-SCNC: 4 MMOL/L — SIGNIFICANT CHANGE UP (ref 3.5–5)
PROT SERPL-MCNC: 5.2 G/DL — LOW (ref 6–8)
SODIUM SERPL-SCNC: 147 MMOL/L — HIGH (ref 135–146)

## 2025-03-06 PROCEDURE — 99239 HOSP IP/OBS DSCHRG MGMT >30: CPT

## 2025-03-06 PROCEDURE — 99231 SBSQ HOSP IP/OBS SF/LOW 25: CPT

## 2025-03-06 RX ORDER — SODIUM CHLORIDE 9 G/1000ML
1000 INJECTION, SOLUTION INTRAVENOUS ONCE
Refills: 0 | Status: COMPLETED | OUTPATIENT
Start: 2025-03-06 | End: 2025-03-06

## 2025-03-06 RX ADMIN — Medication 650 MILLIGRAM(S): at 06:02

## 2025-03-06 RX ADMIN — Medication 40 MILLIEQUIVALENT(S): at 05:34

## 2025-03-06 RX ADMIN — Medication 100 GRAM(S): at 05:32

## 2025-03-06 RX ADMIN — Medication 650 MILLIGRAM(S): at 05:32

## 2025-03-06 RX ADMIN — SODIUM CHLORIDE 1000 MILLILITER(S): 9 INJECTION, SOLUTION INTRAVENOUS at 06:24

## 2025-03-06 RX ADMIN — LIDOCAINE HYDROCHLORIDE 1 PATCH: 20 JELLY TOPICAL at 11:57

## 2025-03-06 RX ADMIN — Medication 1 APPLICATION(S): at 05:33

## 2025-03-06 RX ADMIN — METHOCARBAMOL 750 MILLIGRAM(S): 500 TABLET, FILM COATED ORAL at 05:34

## 2025-03-06 RX ADMIN — Medication 175 MICROGRAM(S): at 05:32

## 2025-03-06 RX ADMIN — Medication 40 MILLIGRAM(S): at 05:34

## 2025-03-06 RX ADMIN — OXYBUTYNIN CHLORIDE 5 MILLIGRAM(S): 5 TABLET, FILM COATED, EXTENDED RELEASE ORAL at 11:25

## 2025-03-06 RX ADMIN — METOPROLOL SUCCINATE 100 MILLIGRAM(S): 50 TABLET, EXTENDED RELEASE ORAL at 05:34

## 2025-03-06 RX ADMIN — HEPARIN SODIUM 5000 UNIT(S): 1000 INJECTION INTRAVENOUS; SUBCUTANEOUS at 05:34

## 2025-03-06 RX ADMIN — FUROSEMIDE 20 MILLIGRAM(S): 10 INJECTION INTRAMUSCULAR; INTRAVENOUS at 05:32

## 2025-03-06 NOTE — PROGRESS NOTE ADULT - SUBJECTIVE AND OBJECTIVE BOX
TRAUMA SURGERY PROGRESS NOTE    24 HR EVENTS: NAEO. Patient oriented x3, alert. Denies headaches, dizziness, or numbness/weakness of extremities. Patient saturating well on RA, vitals WNL. Hgb stable at 11.0 from 11.2, other labs stable as well. Repeat CTH stable. PT eval with recommendation for home PT and Physiatry eval with recommendation for STR vs home with outpatient services. Anticipated for discharge 3/6    OBJECTIVE:  Vital Signs Last 24 Hrs  T(C): 36.7 (06 Mar 2025 00:00), Max: 36.7 (06 Mar 2025 00:00)  T(F): 98 (06 Mar 2025 00:00), Max: 98 (06 Mar 2025 00:00)  HR: 60 (06 Mar 2025 00:00) (60 - 76)  BP: 111/60 (06 Mar 2025 00:00) (111/60 - 161/72)  BP(mean): 86 (05 Mar 2025 14:00) (86 - 104)  RR: 19 (06 Mar 2025 00:00) (18 - 19)  SpO2: 95% (06 Mar 2025 00:00) (92% - 97%)    Parameters below as of 05 Mar 2025 20:00  Patient On (Oxygen Delivery Method): room air        I&O's Summary    04 Mar 2025 07:01  -  05 Mar 2025 07:00  --------------------------------------------------------  IN: 720 mL / OUT: 400 mL / NET: 320 mL    05 Mar 2025 07:01  -  06 Mar 2025 01:00  --------------------------------------------------------  IN: 160 mL / OUT: 850 mL / NET: -690 mL        PHYSICAL EXAM:  GENERAL: NAD, awake and alert  HEENT: PERRL, EOMI, moist mucus membranes  CHEST/LUNG: Equal chest rise bilaterally. Breathing comfortably on room air  HEART: Regular rate and rhythm  ABDOMEN: Soft, non-tender, non-distended. No rebound or guarding  EXTREMITIES:  2+ peripheral pulses b/l. No peripheral edema  NEUROLOGY: A&O x 3, no focal deficits. Clear speech      LABS:                        11.0   10.41 )-----------( 207      ( 05 Mar 2025 21:47 )             36.4     03-05    141  |  105  |  31[H]  ----------------------------<  105[H]  3.7   |  24  |  1.5    Ca    8.5      05 Mar 2025 21:47  Phos  4.4     03-05  Mg     1.8     03-05    TPro  6.2  /  Alb  4.4  /  TBili  0.6  /  DBili  x   /  AST  29  /  ALT  20  /  AlkPhos  155[H]  03-04    PT/INR - ( 05 Mar 2025 21:47 )   PT: 12.30 sec;   INR: 1.04 ratio         PTT - ( 05 Mar 2025 21:47 )  PTT:33.8 sec  Urinalysis Basic - ( 05 Mar 2025 21:47 )    Color: x / Appearance: x / SG: x / pH: x  Gluc: 105 mg/dL / Ketone: x  / Bili: x / Urobili: x   Blood: x / Protein: x / Nitrite: x   Leuk Esterase: x / RBC: x / WBC x   Sq Epi: x / Non Sq Epi: x / Bacteria: x        RADIOLOGY & ADDITIONAL STUDIES:

## 2025-03-06 NOTE — PROGRESS NOTE ADULT - SUBJECTIVE AND OBJECTIVE BOX
NEPHROLOGY FOLLOW UP NOTE    pt seen and examined  no complaints  + void  cr bumped up      PAST MEDICAL & SURGICAL HISTORY:  HTN (hypertension)      High cholesterol      Hypothyroid      Afib  on xarelto      Sleep apnea      Osteoarthritis      CKD (chronic kidney disease) stage 3, GFR 30-59 ml/min      Thyroid cancer      H/O thyroidectomy      S/P rotator cuff surgery      Pacemaker      H/O total knee replacement        Allergies:  No Known Allergies    Home Medications Reviewed    SOCIAL HISTORY:  Denies ETOH,Smoking,   FAMILY HISTORY:  Family history of lung cancer (Mother)    Family history of abdominal aortic aneurysm (AAA) (Father)          REVIEW OF SYSTEMS:  All other review of systems is negative unless indicated above.    PHYSICAL EXAM:  Constitutional: NAD  HEENT: anicteric sclera, oropharynx clear, MMM  Neck: No JVD  Respiratory: CTAB, no wheezes, rales or rhonchi  Cardiovascular: S1, S2, RRR  Gastrointestinal: BS+, soft, NT/ND  Extremities: no edema  Neurological: A/O x 3, no focal deficits  Psychiatric: Normal mood, normal affect  : No CVA tenderness. No joaquin.   Skin: No rashes    Hospital Medications:   MEDICATIONS  (STANDING):  acetaminophen     Tablet .. 650 milliGRAM(s) Oral every 6 hours  allopurinol 300 milliGRAM(s) Oral at bedtime  atorvastatin 20 milliGRAM(s) Oral at bedtime  chlorhexidine 2% Cloths 1 Application(s) Topical <User Schedule>  furosemide    Tablet 20 milliGRAM(s) Oral daily  heparin   Injectable 5000 Unit(s) SubCutaneous every 8 hours  levothyroxine 175 MICROGram(s) Oral daily  lidocaine   4% Patch 1 Patch Transdermal every 24 hours  methocarbamol 750 milliGRAM(s) Oral every 8 hours  metoprolol tartrate 100 milliGRAM(s) Oral every 12 hours  oxybutynin 5 milliGRAM(s) Oral daily  pantoprazole    Tablet 40 milliGRAM(s) Oral before breakfast  senna 2 Tablet(s) Oral at bedtime        VITALS:  T(F): 97.5 (03-06-25 @ 11:48), Max: 98 (03-06-25 @ 00:00)  HR: 60 (03-06-25 @ 11:48)  BP: 125/76 (03-06-25 @ 11:48)  RR: 18 (03-06-25 @ 11:48)  SpO2: 97% (03-06-25 @ 11:48)  Wt(kg): --    03-04 @ 07:01  -  03-05 @ 07:00  --------------------------------------------------------  IN: 720 mL / OUT: 400 mL / NET: 320 mL    03-05 @ 07:01  -  03-06 @ 07:00  --------------------------------------------------------  IN: 160 mL / OUT: 1600 mL / NET: -1440 mL    03-06 @ 07:01  -  03-06 @ 16:20  --------------------------------------------------------  IN: 0 mL / OUT: 300 mL / NET: -300 mL      Height (cm): 177.8 (03-05 @ 16:40)  Weight (kg): 82.554 (03-05 @ 16:40)  BMI (kg/m2): 26.1 (03-05 @ 16:40)  BSA (m2): 2.01 (03-05 @ 16:40)    LABS:  03-06    147[H]  |  108  |  31[H]  ----------------------------<  94  4.0   |  27  |  1.4    Ca    8.3[L]      06 Mar 2025 11:34  Phos  4.4     03-05  Mg     1.8     03-05    TPro  5.2[L]  /  Alb  3.6  /  TBili  0.6  /  DBili      /  AST  14  /  ALT  13  /  AlkPhos  115  03-06                          11.0   10.41 )-----------( 207      ( 05 Mar 2025 21:47 )             36.4       Urine Studies:  Urinalysis Basic - ( 06 Mar 2025 11:34 )    Color:  / Appearance:  / SG:  / pH:   Gluc: 94 mg/dL / Ketone:   / Bili:  / Urobili:    Blood:  / Protein:  / Nitrite:    Leuk Esterase:  / RBC:  / WBC    Sq Epi:  / Non Sq Epi:  / Bacteria:           RADIOLOGY & ADDITIONAL STUDIES:

## 2025-03-06 NOTE — PROGRESS NOTE ADULT - SUBJECTIVE AND OBJECTIVE BOX
JEFFERY UGALDE  84y  [facility] [address]      Patient is a 84y old  Female who presents with a chief complaint of subdural hemorrhage (06 Mar 2025 01:00)      INTERVAL HPI/OVERNIGHT EVENTS:        REVIEW OF SYSTEMS:        FAMILY HISTORY:  Family history of lung cancer (Mother)    Family history of abdominal aortic aneurysm (AAA) (Father)      T(C): 36.6 (03-06-25 @ 08:31), Max: 36.7 (03-06-25 @ 00:00)  HR: 80 (03-06-25 @ 08:31) (60 - 80)  BP: 147/87 (03-06-25 @ 08:31) (111/60 - 150/68)  RR: 18 (03-06-25 @ 08:31) (18 - 19)  SpO2: 97% (03-06-25 @ 08:31) (95% - 97%)  Wt(kg): --Vital Signs Last 24 Hrs  T(C): 36.6 (06 Mar 2025 08:31), Max: 36.7 (06 Mar 2025 00:00)  T(F): 97.9 (06 Mar 2025 08:31), Max: 98 (06 Mar 2025 00:00)  HR: 80 (06 Mar 2025 08:31) (60 - 80)  BP: 147/87 (06 Mar 2025 08:31) (111/60 - 150/68)  BP(mean): 86 (05 Mar 2025 14:00) (86 - 98)  RR: 18 (06 Mar 2025 08:31) (18 - 19)  SpO2: 97% (06 Mar 2025 08:31) (95% - 97%)    Parameters below as of 06 Mar 2025 08:31  Patient On (Oxygen Delivery Method): room air        PHYSICAL EXAM:  GENERAL: NAD, well-groomed, well-developed  HEAD:  Atraumatic, Normocephalic  EYES: EOMI, PERRLA, conjunctiva and sclera clear  ENMT: No tonsillar erythema, exudates, or enlargement; Moist mucous membranes, Good dentition, No lesions  NECK: Supple, No JVD, Normal thyroid  NERVOUS SYSTEM:  Alert & Oriented X3, Good concentration; Motor Strength 5/5 B/L upper and lower extremities; DTRs 2+ intact and symmetric  PULM: Clear to auscultation bilaterally  CARDIAC: Regular rate and rhythm; No murmurs, rubs, or gallops  GI: Soft, Nontender, Nondistended; Bowel sounds present  EXTREMITIES:  2+ Peripheral Pulses, No clubbing, cyanosis, or edema  LYMPH: No lymphadenopathy noted  SKIN: No rashes or lesions    Consultant(s) Notes Reviewed:  [x ] YES  [ ] NO  Care Discussed with Consultants/Other Providers [ x] YES  [ ] NO    LABS:                            11.0   10.41 )-----------( 207      ( 05 Mar 2025 21:47 )             36.4   03-05    141  |  105  |  31[H]  ----------------------------<  105[H]  3.7   |  24  |  1.5    Ca    8.5      05 Mar 2025 21:47  Phos  4.4     03-05  Mg     1.8     03-05    TPro  6.2  /  Alb  4.4  /  TBili  0.6  /  DBili  x   /  AST  29  /  ALT  20  /  AlkPhos  155[H]  03-04            acetaminophen     Tablet .. 650 milliGRAM(s) Oral every 6 hours  allopurinol 300 milliGRAM(s) Oral at bedtime  atorvastatin 20 milliGRAM(s) Oral at bedtime  chlorhexidine 2% Cloths 1 Application(s) Topical <User Schedule>  furosemide    Tablet 20 milliGRAM(s) Oral daily  heparin   Injectable 5000 Unit(s) SubCutaneous every 8 hours  levothyroxine 175 MICROGram(s) Oral daily  lidocaine   4% Patch 1 Patch Transdermal every 24 hours  methocarbamol 750 milliGRAM(s) Oral every 8 hours  metoprolol tartrate 100 milliGRAM(s) Oral every 12 hours  oxybutynin 5 milliGRAM(s) Oral daily  pantoprazole    Tablet 40 milliGRAM(s) Oral before breakfast  senna 2 Tablet(s) Oral at bedtime      This is an 84 year old Female, with PMH significant for atrial fibrillation on Xarelto, hypertension, hyperlipidemia, hypothyroidism, CKD, FELIX, and recent fall on 11/11 (+HT, -LOC) who presents        1. Mechanical fall complicated by subdural hematoma in a pt who takes anticoagulation (Xarelto)   - Admitted to SICU   - Receive PCC in the ED.   - Repeat CTH:  a) Stable mild thickening along the anterior falx since the prior CT head from 3/4/2025 likely representing trace subdural hemorrhage.  - Pain meds prn with laxative       2. Hypertension   - Plan to keep sBP<140     3. Hyperlipidemia  - Cont atorvastatin ordered     4. Atrial Fibrillation/Pacemaker   - Cont metoprolol   -Holding home Xarelto in the setting of acute intracranial hemorrhage, PCC given in the ED       5. Hx of sCHF   - Resume lasix upon dc   * Echo from 06/2024: Mildly decreased global left ventricular systolic function with a biplane EF of 44%. Indeterminate diastolic dysfunction. Moderate   concentric left ventricular hypertrophy.    6. CKD stage III   - Was on IVF at  80ml/hr    7. Overactive bladder    -Home trospium unavailable oxybutynin ordered    8. DVT px   - Sequential     9. Thyroid CA s/p thyroidectomy     -Home levothyroxine ordered    10. Gout  - Cont allopurinol ordered     11. Osteoarthritis, B/L Total Knee Replacement     pt is medically clear for dc. Decision to resume xarelto defer to neurosx.       12. FELIX on CPAP at home-->BiPAP ordered family to bring in home machine     JEFFERY UGALDE  84y  [facility] [address] [address]      Patient is a 84y old  Female who presents with a chief complaint of subdural hemorrhage (06 Mar 2025 01:00)      INTERVAL HPI/OVERNIGHT EVENTS:    Patient feels well  she has no complains. alert and oriented with no confusion noted         FAMILY HISTORY:  Family history of lung cancer (Mother)    Family history of abdominal aortic aneurysm (AAA) (Father)      T(C): 36.6 (03-06-25 @ 08:31), Max: 36.7 (03-06-25 @ 00:00)  HR: 80 (03-06-25 @ 08:31) (60 - 80)  BP: 147/87 (03-06-25 @ 08:31) (111/60 - 150/68)  RR: 18 (03-06-25 @ 08:31) (18 - 19)  SpO2: 97% (03-06-25 @ 08:31) (95% - 97%)  Wt(kg): --Vital Signs Last 24 Hrs  T(C): 36.6 (06 Mar 2025 08:31), Max: 36.7 (06 Mar 2025 00:00)  T(F): 97.9 (06 Mar 2025 08:31), Max: 98 (06 Mar 2025 00:00)  HR: 80 (06 Mar 2025 08:31) (60 - 80)  BP: 147/87 (06 Mar 2025 08:31) (111/60 - 150/68)  BP(mean): 86 (05 Mar 2025 14:00) (86 - 98)  RR: 18 (06 Mar 2025 08:31) (18 - 19)  SpO2: 97% (06 Mar 2025 08:31) (95% - 97%)    Parameters below as of 06 Mar 2025 08:31  Patient On (Oxygen Delivery Method): room air        PHYSICAL EXAM:  GENERAL: NAD, well-groomed, well-developed  PULM: Clear to auscultation bilaterally  CARDIAC: Regular rate and rhythm;   GI: Soft, Nontender, Nondistended; Bowel sounds present  EXTREMITIES:  2+ Peripheral Pulses,     Consultant(s) Notes Reviewed:  [x ] YES  [ ] NO  Care Discussed with Consultants/Other Providers [ x] YES  [ ] NO    LABS:                            11.0   10.41 )-----------( 207      ( 05 Mar 2025 21:47 )             36.4   03-05    141  |  105  |  31[H]  ----------------------------<  105[H]  3.7   |  24  |  1.5    Ca    8.5      05 Mar 2025 21:47  Phos  4.4     03-05  Mg     1.8     03-05    TPro  6.2  /  Alb  4.4  /  TBili  0.6  /  DBili  x   /  AST  29  /  ALT  20  /  AlkPhos  155[H]  03-04            acetaminophen     Tablet .. 650 milliGRAM(s) Oral every 6 hours  allopurinol 300 milliGRAM(s) Oral at bedtime  atorvastatin 20 milliGRAM(s) Oral at bedtime  chlorhexidine 2% Cloths 1 Application(s) Topical <User Schedule>  furosemide    Tablet 20 milliGRAM(s) Oral daily  heparin   Injectable 5000 Unit(s) SubCutaneous every 8 hours  levothyroxine 175 MICROGram(s) Oral daily  lidocaine   4% Patch 1 Patch Transdermal every 24 hours  methocarbamol 750 milliGRAM(s) Oral every 8 hours  metoprolol tartrate 100 milliGRAM(s) Oral every 12 hours  oxybutynin 5 milliGRAM(s) Oral daily  pantoprazole    Tablet 40 milliGRAM(s) Oral before breakfast  senna 2 Tablet(s) Oral at bedtime      This is an 84 year old Female, with PMH significant for atrial fibrillation on Xarelto, hypertension, hyperlipidemia, hypothyroidism, CKD, FELIX, and recent fall on 11/11 (+HT, -LOC) who presents        1. Mechanical fall complicated by subdural hematoma in a pt who takes anticoagulation (Xarelto)   - Admitted to SICU   - Receive PCC in the ED.   - Repeat CTH:  a) Stable mild thickening along the anterior falx since the prior CT head from 3/4/2025 likely representing trace subdural hemorrhage.  - Pain meds prn with laxative     2. Hypertension   - Plan to keep sBP<140     3. Hyperlipidemia  - Cont atorvastatin ordered     4. Atrial Fibrillation/Pacemaker   - Cont metoprolol   -Holding home Xarelto in the setting of acute intracranial hemorrhage, PCC given in the ED       5. Hx of sCHF   - Resume lasix upon dc   * Echo from 06/2024: Mildly decreased global left ventricular systolic function with a biplane EF of 44%. Indeterminate diastolic dysfunction. Moderate   concentric left ventricular hypertrophy.    6. CKD stage III   - Was on IVF at  80ml/hr    7. Overactive bladder    -Home trospium unavailable oxybutynin ordered    8. DVT px   - Sequential     9. Thyroid CA s/p thyroidectomy     -Home levothyroxine ordered    10. Gout  - Cont allopurinol ordered     11. Osteoarthritis, B/L Total Knee Replacement     12. FELIX on CPAP at home-->BiPAP ordered family to bring in home machine    pt is medically clear for dc. Decision to resume xarelto defer to neurosx.

## 2025-03-06 NOTE — PROVIDER CONTACT NOTE (OTHER) - SITUATION
Pt received half of ordered 1L bolus and pts IV started leaking. IV removed and pt refused new iv placement

## 2025-03-06 NOTE — PROGRESS NOTE ADULT - ATTENDING COMMENTS
ACS Attending Note Attestation    Patient is examined and evaluated at the bedside with the residents/PAs. Treatment plan discussed with the team, nurses, and consulting physicians and consulting teams. Medications, radiological studies and all other relevant studies reviewed. I reviewed the resident/PA note and agreed with above assessment and plan with following additions and corrections. Time devoted to teaching and to any procedures I billed separately is not included.       Lesia Mabry MD  Trauma/ACS/Surgical Critical care Attending
ACS Attending Note Attestation    Patient is examined and evaluated at the bedside with the residents/PAs. Treatment plan discussed with the team, nurses, and consulting physicians and consulting teams. Medications, radiological studies and all other relevant studies reviewed. I reviewed the resident/PA note and agreed with above assessment and plan with following additions and corrections. Time devoted to teaching and to any procedures I billed separately is not included.     she would like to go home today    Physical Exam:   I independently performed a medically appropriate exam. The exam was notable for    AO x 3  equal and bilateral chest rise  RRR  Abd: soft, NT, ND  all 4 extremities are neurovascularly intact    Labs: I have reviewed the labs on system    Radiology: I have reviewed and interpreted the imaging  Repeat CT head is stable     Assessment:   mechanical fall with SDH  KOBI on CKd, improving  PMH: afib, HLD, HTN, thyroid cancer s/p thyroidectomy, gout,     Plan:	  continue with home meds  home nephrologist following, appreciate recs, can be discharged home with outpatient follow up  medicine comanagement following appreciate recs  hold xarelto for 2 weeks as per neurosurgery  discharge today    [35 ]       minutes spent on total encounter. The necessity of the time above spent during the encounter on this date of service as described above.    Lesia Mabry MD  Trauma/ACS/Surgical Critical care Attending
Summary : 84 year old female patient Isabella Joseph, presenting post mechanical fall, hit the head. She reported no neurological symptoms or pain after the fall and has been experiencing muscle spasms in her right thigh before her admission. She also reported mild burning in urine overnight.  - Chief Complaint (CC) : Patient came in following a mechanical fall that resulted in a head injury.  - History of Present Illness (HPI) : The patient, Isabella Joseph, had a mechanical fall yesterday morning. Despite hitting the head, no neurological symptoms were reported. She was admitted last night for neuro checks and a repeat CT. Her vital signs were slightly hypertensive, readings in the 160s, and slightly prolonged coags. INR was at 1.97, PTT at 43.9 and PT at 23.60. Her INR and PT values were brought down this morning to 1.36 and 16 respectively.  - Past Medical History : Patient has a history of AFib, is on home eloquis, CKD, thyroid cancer post thyroidectomy, hypertension and hyperlipidemia. She also has a history of overactive bladder, heart disease and an autoimmune disorder.  - Past Surgical History : Patient had a past surgical history of thyroidectomy and bilateral knee replacements.  - Family History : The family history of the patient is not mentioned.  - Social History : Patient lives with a friend and waits for her daughter to get home.  - Review of Systems : Repeated CT scans confirmed a stable right-sided subdural hematoma. Patient's GCS was 15, cranial nerves were intact, and she exhibited 5 out of 5 motor strength and sensation. She had no new neurological complaints or signs of infection.  - Medications : Patient medications include home allopurinol for gout, Tylenol 650, Robaxin 750, lidocaine patch, pantoprazole, Eliquis, atorvastatin, and Synthroid 175.  - Allergies : No known allergies.  Objective:  - Diagnostic Results : Admission CT for trauma indicated a trace right-sided subdural hematoma. Initial lab results showed slightly prolonged coag, which has been resolved with proferombin concentrate. Neuro checks are now Q4.  - Vital Signs : Blood pressure was slightly elevated, reading in the 160s.  - Physical Examination (PE) : Patient has active bowel sounds in all quadrants and mild tenderness in the left lower quadrant. No signs of bruising or external trauma.  Assessment:  - Summary : Patient is diagnosed with right-sided subdural hematoma due to a mechanical fall. No new neurological complaints have been reported since admission.  - Problems :  - Trace right-sided subdural hematoma, Hypertensive episode, Prolonged coags    - Differential Diagnosis :  - Brain injury due to mechanical fall, Hypertension, AFib    Plan:  - Summary : The patient's diet will be advanced to a full regular diet. Continuation of anticoagulant medication has been put on hold due to the presence of subdural hematoma. We are initiating her on DVT prophylaxis and pain control for muscle spasms. Patient will also undergo physical therapy as tolerated. Downgrading her status to the floor pending neural.  - Plan :  - Advance diet, Initiate DVT prophylaxis, Physical therapy, Lower blood pressure intake, Discontinue anticoagulant (Eliquis), Downgrade her status to the floor

## 2025-03-06 NOTE — PROGRESS NOTE ADULT - ASSESSMENT
ASSESSMENT:  84yF w/ PMHx of HTN, HLD, FELIX, osteoarthritis, CKD3, thyroid cancer s/p thyroidectomy, pacemaker, b/l total knee replacement seen as  Trauma Alert s/p fall backwards at approximately 1030 this morning (+) HT, (?) LOC, (+) AC. Patient states she was unable to walk after the fall, has been experiencing right lower extremity pain for the past week which worsened after the fall. The pain progressively worsened throughout the day which caused her to come to the ED for further evaluation. She is currently complaining of posterior neck pain and right leg pain. She denies any nausea, vomiting, blurry vision, chest pain, shortness of breath, recent illness. She endorses multiple recent falls. She was previously admitted within the past 6 months, complicated by hematoma development in the right lower extremity.   Trauma assessment in ED: ABCs intact , GCS 15 , AAOx3.    PLAN:   - Hospitalist/Medicine co management  - Monitor hemodynamics  - Monitor labs, replete PRN  - Plan for discharge home with home PT, possible dc today

## 2025-03-06 NOTE — PROGRESS NOTE ADULT - ASSESSMENT
CKD stage 3   s/p iv contrast with bump in Cr  mechanical fall  SDH  hx of falls  anemia  b/l proteinaceous and hemorrhagic renal cysts (on outpt MRI kidneys)  chronic HFpEF  / hx of b/l lung nodular opacities  hx of pericardial effusion / Afib / s/p PPM 4/15 s/p AVN ablation 6/3  anemia  FELIX  HTN  hypothyroidism / thyroid Ca / pituitary adenoma     plan:    ivf given  pt for dc home per surgery  can resume outpt oral low dose loop diuretics  full code  check bmp next week as outpt to confirm resolution of possible AIRAM.

## 2025-03-12 DIAGNOSIS — Z79.890 HORMONE REPLACEMENT THERAPY: ICD-10-CM

## 2025-03-12 DIAGNOSIS — I50.22 CHRONIC SYSTOLIC (CONGESTIVE) HEART FAILURE: ICD-10-CM

## 2025-03-12 DIAGNOSIS — N17.9 ACUTE KIDNEY FAILURE, UNSPECIFIED: ICD-10-CM

## 2025-03-12 DIAGNOSIS — M10.9 GOUT, UNSPECIFIED: ICD-10-CM

## 2025-03-12 DIAGNOSIS — D63.1 ANEMIA IN CHRONIC KIDNEY DISEASE: ICD-10-CM

## 2025-03-12 DIAGNOSIS — I48.91 UNSPECIFIED ATRIAL FIBRILLATION: ICD-10-CM

## 2025-03-12 DIAGNOSIS — N18.30 CHRONIC KIDNEY DISEASE, STAGE 3 UNSPECIFIED: ICD-10-CM
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
## 2025-03-12 DIAGNOSIS — G47.33 OBSTRUCTIVE SLEEP APNEA (ADULT) (PEDIATRIC): ICD-10-CM

## 2025-03-12 DIAGNOSIS — N32.81 OVERACTIVE BLADDER: ICD-10-CM

## 2025-03-12 DIAGNOSIS — Z95.0 PRESENCE OF CARDIAC PACEMAKER: ICD-10-CM
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
## 2025-03-12 DIAGNOSIS — E89.0 POSTPROCEDURAL HYPOTHYROIDISM: ICD-10-CM

## 2025-03-12 DIAGNOSIS — E78.00 PURE HYPERCHOLESTEROLEMIA, UNSPECIFIED: ICD-10-CM

## 2025-03-12 DIAGNOSIS — M79.661 PAIN IN RIGHT LOWER LEG: ICD-10-CM

## 2025-03-12 DIAGNOSIS — Z96.653 PRESENCE OF ARTIFICIAL KNEE JOINT, BILATERAL: ICD-10-CM

## 2025-03-12 DIAGNOSIS — Z79.01 LONG TERM (CURRENT) USE OF ANTICOAGULANTS: ICD-10-CM

## 2025-03-12 DIAGNOSIS — S06.5XAA TRAUMATIC SUBDURAL HEMORRHAGE WITH LOSS OF CONSCIOUSNESS STATUS UNKNOWN, INITIAL ENCOUNTER: ICD-10-CM

## 2025-03-12 DIAGNOSIS — Z85.850 PERSONAL HISTORY OF MALIGNANT NEOPLASM OF THYROID: ICD-10-CM

## 2025-03-12 DIAGNOSIS — M19.90 UNSPECIFIED OSTEOARTHRITIS, UNSPECIFIED SITE: ICD-10-CM

## 2025-03-28 ENCOUNTER — APPOINTMENT (OUTPATIENT)
Dept: SURGERY | Facility: CLINIC | Age: 85
End: 2025-03-28

## 2025-03-28 VITALS
HEART RATE: 89 BPM | TEMPERATURE: 97 F | WEIGHT: 181 LBS | DIASTOLIC BLOOD PRESSURE: 78 MMHG | OXYGEN SATURATION: 99 % | HEIGHT: 61 IN | BODY MASS INDEX: 34.17 KG/M2 | SYSTOLIC BLOOD PRESSURE: 130 MMHG

## 2025-03-28 PROCEDURE — 99213 OFFICE O/P EST LOW 20 MIN: CPT

## 2025-04-07 ENCOUNTER — APPOINTMENT (OUTPATIENT)
Dept: PULMONOLOGY | Facility: CLINIC | Age: 85
End: 2025-04-07
Payer: MEDICARE

## 2025-04-07 VITALS — OXYGEN SATURATION: 92 % | HEART RATE: 92 BPM

## 2025-04-07 DIAGNOSIS — G47.33 OBSTRUCTIVE SLEEP APNEA (ADULT) (PEDIATRIC): ICD-10-CM

## 2025-04-07 DIAGNOSIS — R91.8 OTHER NONSPECIFIC ABNORMAL FINDING OF LUNG FIELD: ICD-10-CM

## 2025-04-07 PROCEDURE — 99214 OFFICE O/P EST MOD 30 MIN: CPT

## 2025-04-17 NOTE — H&P PST ADULT - VENOUS THROMBOEMBOLISM
no

## 2025-05-05 ENCOUNTER — NON-APPOINTMENT (OUTPATIENT)
Age: 85
End: 2025-05-05

## 2025-05-05 ENCOUNTER — APPOINTMENT (OUTPATIENT)
Dept: NEUROSURGERY | Facility: CLINIC | Age: 85
End: 2025-05-05
Payer: MEDICARE

## 2025-05-05 VITALS — WEIGHT: 181 LBS | HEIGHT: 61 IN | BODY MASS INDEX: 34.17 KG/M2

## 2025-05-05 DIAGNOSIS — S06.5XAA TRAUMATIC SUBDURAL HEMORRHAGE WITH LOSS OF CONSCIOUSNESS STATUS UNKNOWN, INITIAL ENCOUNTER: ICD-10-CM

## 2025-05-05 PROCEDURE — 99202 OFFICE O/P NEW SF 15 MIN: CPT

## 2025-05-06 NOTE — PATIENT PROFILE ADULT - DO YOU WANT TO COMPLETE THE HCP AND NAME A HEALTH CARE AGENT
Pt informed of xray results and recommendations. Informed pt to  paper work at the . Pt verbalized understanding.     no

## 2025-05-07 PROBLEM — S06.5XAA SUBDURAL HEMATOMA: Status: ACTIVE | Noted: 2025-05-07

## 2025-05-23 ENCOUNTER — APPOINTMENT (OUTPATIENT)
Dept: CARDIOLOGY | Facility: CLINIC | Age: 85
End: 2025-05-23

## 2025-05-23 PROCEDURE — 93294 REM INTERROG EVL PM/LDLS PM: CPT

## 2025-05-23 PROCEDURE — 93296 REM INTERROG EVL PM/IDS: CPT

## 2025-05-27 ENCOUNTER — OUTPATIENT (OUTPATIENT)
Dept: OUTPATIENT SERVICES | Facility: HOSPITAL | Age: 85
LOS: 1 days | End: 2025-05-27
Payer: MEDICARE

## 2025-05-27 DIAGNOSIS — Z98.890 OTHER SPECIFIED POSTPROCEDURAL STATES: Chronic | ICD-10-CM

## 2025-05-27 DIAGNOSIS — Z00.8 ENCOUNTER FOR OTHER GENERAL EXAMINATION: ICD-10-CM

## 2025-05-27 DIAGNOSIS — Z95.0 PRESENCE OF CARDIAC PACEMAKER: Chronic | ICD-10-CM

## 2025-05-27 DIAGNOSIS — Z96.659 PRESENCE OF UNSPECIFIED ARTIFICIAL KNEE JOINT: Chronic | ICD-10-CM

## 2025-05-27 PROCEDURE — 78803 RP LOCLZJ TUM SPECT 1 AREA: CPT | Mod: 26

## 2025-05-27 PROCEDURE — A9560: CPT | Mod: XU

## 2025-05-27 PROCEDURE — 78803 RP LOCLZJ TUM SPECT 1 AREA: CPT

## 2025-05-28 DIAGNOSIS — Z00.8 ENCOUNTER FOR OTHER GENERAL EXAMINATION: ICD-10-CM

## 2025-06-04 NOTE — ED ADULT NURSE NOTE - CAS TRG GEN SKIN CONDITION
Team Note Due:  Thursday    Assessment/Intervention/Current Symtoms and Care Coordination:  Chart review and met with team, discussed pt progress, symptomology, and response to treatment.  Discussed the discharge plan and any potential impediments to discharge.    Team Meeting  There is a new order for the patient to get a daily injection - a blood thinner - due to her lb results.        GISSEL Thompson working [place] psychiatry and therapy. She says they want an TRAVIS to be sent. I will do that.  [facility] - [phone] [address] [place] [address]  Psychiatry:  [facility] - [phone] [address] [place] [address]  Fax: [phone]         Therapy  [facility] only has males        Discharge Plan or Goal:  Home with services  She can talk with her CADI CM about assisted living.         Barriers to Discharge:  Pt may want to extend her stay  Pt was started on a daily injection.     Referral Status:    Insurance Coverage for SErvices  [facility] Medicare Advantage with [place]    Psychiatry - CCs checking on where this is and if the  patient has an appointment  [facility] Donna working [place] psychiatry and therapy.  [facility] - [phone] [address] [place] [address]      Therapy - CCs checking to see if psychiatry clinic has therapy services  Pt does want therapy    Home Health  Herlinda from [facility] @ [phone] called and said They have terminated the patient.  [facility] has agreed to take the patient.      Behavioral Health/CADI :   Jose Morfin,  II  Behavioral Health Waiver   [facility]  [address] [address] [place] [address]  [id number]  Cell:  [phone]  Fax:  [phone]  [email or web address]  Services Authorized:  Home Delivered Meals  Skilled Nursing 1x week - [facility] is will to see pt on Mondays, pt has to confirm  this is okay  Homemaker with assit with personal care  [employer] - Individual Home Supports  [employer] - Personal Emergency Response        Legal Status:  Voluntary         Contacts (include TRAVIS status):      Pt signed TRAVIS for   VALENTIN WEST Spouse   [phone]           Upcoming Meetings and Dates/Important Information and next steps:                                 Warm

## 2025-06-26 ENCOUNTER — RX RENEWAL (OUTPATIENT)
Age: 85
End: 2025-06-26

## 2025-07-24 ENCOUNTER — OUTPATIENT (OUTPATIENT)
Dept: OUTPATIENT SERVICES | Facility: HOSPITAL | Age: 85
LOS: 1 days | End: 2025-07-24
Payer: MEDICARE

## 2025-07-24 DIAGNOSIS — Z96.659 PRESENCE OF UNSPECIFIED ARTIFICIAL KNEE JOINT: Chronic | ICD-10-CM

## 2025-07-24 DIAGNOSIS — Z98.890 OTHER SPECIFIED POSTPROCEDURAL STATES: Chronic | ICD-10-CM

## 2025-07-24 DIAGNOSIS — Z95.0 PRESENCE OF CARDIAC PACEMAKER: Chronic | ICD-10-CM

## 2025-07-24 DIAGNOSIS — Z12.31 ENCOUNTER FOR SCREENING MAMMOGRAM FOR MALIGNANT NEOPLASM OF BREAST: ICD-10-CM

## 2025-07-24 PROCEDURE — 77063 BREAST TOMOSYNTHESIS BI: CPT | Mod: 26

## 2025-07-24 PROCEDURE — 77067 SCR MAMMO BI INCL CAD: CPT

## 2025-07-24 PROCEDURE — 77067 SCR MAMMO BI INCL CAD: CPT | Mod: 26

## 2025-07-24 PROCEDURE — 77063 BREAST TOMOSYNTHESIS BI: CPT

## 2025-07-25 DIAGNOSIS — Z12.31 ENCOUNTER FOR SCREENING MAMMOGRAM FOR MALIGNANT NEOPLASM OF BREAST: ICD-10-CM

## 2025-07-31 ENCOUNTER — OUTPATIENT (OUTPATIENT)
Dept: OUTPATIENT SERVICES | Facility: HOSPITAL | Age: 85
LOS: 1 days | End: 2025-07-31
Payer: MEDICARE

## 2025-07-31 DIAGNOSIS — Z98.890 OTHER SPECIFIED POSTPROCEDURAL STATES: Chronic | ICD-10-CM

## 2025-07-31 DIAGNOSIS — R92.8 OTHER ABNORMAL AND INCONCLUSIVE FINDINGS ON DIAGNOSTIC IMAGING OF BREAST: ICD-10-CM

## 2025-07-31 DIAGNOSIS — Z96.659 PRESENCE OF UNSPECIFIED ARTIFICIAL KNEE JOINT: Chronic | ICD-10-CM

## 2025-07-31 DIAGNOSIS — Z95.0 PRESENCE OF CARDIAC PACEMAKER: Chronic | ICD-10-CM

## 2025-07-31 PROCEDURE — G0279: CPT

## 2025-07-31 PROCEDURE — 77065 DX MAMMO INCL CAD UNI: CPT | Mod: 26,RT

## 2025-07-31 PROCEDURE — G0279: CPT | Mod: 26

## 2025-07-31 PROCEDURE — 77065 DX MAMMO INCL CAD UNI: CPT | Mod: RT

## 2025-08-01 DIAGNOSIS — R92.8 OTHER ABNORMAL AND INCONCLUSIVE FINDINGS ON DIAGNOSTIC IMAGING OF BREAST: ICD-10-CM

## 2025-08-04 ENCOUNTER — RX RENEWAL (OUTPATIENT)
Age: 85
End: 2025-08-04

## 2025-08-19 ENCOUNTER — NON-APPOINTMENT (OUTPATIENT)
Age: 85
End: 2025-08-19

## 2025-08-19 ENCOUNTER — APPOINTMENT (OUTPATIENT)
Dept: ELECTROPHYSIOLOGY | Facility: CLINIC | Age: 85
End: 2025-08-19
Payer: MEDICARE

## 2025-08-19 VITALS
DIASTOLIC BLOOD PRESSURE: 86 MMHG | WEIGHT: 178 LBS | HEIGHT: 61 IN | HEART RATE: 61 BPM | SYSTOLIC BLOOD PRESSURE: 154 MMHG | BODY MASS INDEX: 33.61 KG/M2

## 2025-08-19 DIAGNOSIS — E03.9 HYPOTHYROIDISM, UNSPECIFIED: ICD-10-CM

## 2025-08-19 DIAGNOSIS — I48.91 UNSPECIFIED ATRIAL FIBRILLATION: ICD-10-CM

## 2025-08-19 DIAGNOSIS — I44.2 ATRIOVENTRICULAR BLOCK, COMPLETE: ICD-10-CM

## 2025-08-19 DIAGNOSIS — Z45.018 ENCOUNTER FOR ADJUSTMENT AND MANAGEMENT OF OTHER PART OF CARDIAC PACEMAKER: ICD-10-CM

## 2025-08-19 DIAGNOSIS — I10 ESSENTIAL (PRIMARY) HYPERTENSION: ICD-10-CM

## 2025-08-19 PROCEDURE — 93000 ELECTROCARDIOGRAM COMPLETE: CPT | Mod: 59

## 2025-08-19 PROCEDURE — 99214 OFFICE O/P EST MOD 30 MIN: CPT

## 2025-08-19 PROCEDURE — 93281 PM DEVICE PROGR EVAL MULTI: CPT

## 2025-08-19 RX ORDER — APIXABAN 2.5 MG/1
2.5 TABLET, FILM COATED ORAL
Qty: 180 | Refills: 3 | Status: ACTIVE | COMMUNITY